# Patient Record
Sex: MALE | Race: WHITE | NOT HISPANIC OR LATINO | Employment: OTHER | URBAN - METROPOLITAN AREA
[De-identification: names, ages, dates, MRNs, and addresses within clinical notes are randomized per-mention and may not be internally consistent; named-entity substitution may affect disease eponyms.]

---

## 2017-03-30 ENCOUNTER — TRANSCRIBE ORDERS (OUTPATIENT)
Dept: ADMINISTRATIVE | Facility: HOSPITAL | Age: 72
End: 2017-03-30

## 2017-03-30 DIAGNOSIS — N34.2 INFECTIVE URETHRITIS: Primary | ICD-10-CM

## 2017-03-31 ENCOUNTER — HOSPITAL ENCOUNTER (OUTPATIENT)
Dept: GENERAL RADIOLOGY | Facility: HOSPITAL | Age: 72
Discharge: HOME | End: 2017-03-31
Admitting: NURSE PRACTITIONER

## 2017-03-31 VITALS
DIASTOLIC BLOOD PRESSURE: 77 MMHG | RESPIRATION RATE: 16 BRPM | HEART RATE: 90 BPM | HEIGHT: 71 IN | OXYGEN SATURATION: 95 % | SYSTOLIC BLOOD PRESSURE: 123 MMHG | TEMPERATURE: 97.6 F | BODY MASS INDEX: 25.2 KG/M2 | WEIGHT: 180 LBS

## 2017-03-31 DIAGNOSIS — N34.2 INFECTIVE URETHRITIS: ICD-10-CM

## 2017-03-31 PROCEDURE — 76937 US GUIDE VASCULAR ACCESS: CPT

## 2017-03-31 PROCEDURE — 77001 FLUOROGUIDE FOR VEIN DEVICE: CPT

## 2017-03-31 RX ORDER — LIDOCAINE WITH 8.4% SOD BICARB 0.9%(10ML)
2 SYRINGE (ML) INJECTION ONCE
Status: COMPLETED | OUTPATIENT
Start: 2017-03-31 | End: 2017-03-31

## 2017-03-31 RX ORDER — OXYBUTYNIN CHLORIDE 5 MG/1
5 TABLET ORAL 2 TIMES DAILY
COMMUNITY
End: 2018-10-02

## 2017-03-31 RX ORDER — MONTELUKAST SODIUM 10 MG/1
10 TABLET ORAL NIGHTLY
COMMUNITY

## 2017-03-31 RX ORDER — NITROFURANTOIN MACROCRYSTALS 100 MG/1
100 CAPSULE ORAL NIGHTLY
COMMUNITY
End: 2017-10-01 | Stop reason: HOSPADM

## 2017-03-31 RX ADMIN — Medication 5 ML: at 13:25

## 2017-09-28 ENCOUNTER — APPOINTMENT (OUTPATIENT)
Dept: GENERAL RADIOLOGY | Facility: HOSPITAL | Age: 72
End: 2017-09-28

## 2017-09-28 ENCOUNTER — HOSPITAL ENCOUNTER (OUTPATIENT)
Facility: HOSPITAL | Age: 72
Setting detail: OBSERVATION
LOS: 1 days | Discharge: NURSING, REHAB OR LONG-TERM CARE | End: 2017-10-01
Attending: EMERGENCY MEDICINE | Admitting: INTERNAL MEDICINE

## 2017-09-28 ENCOUNTER — APPOINTMENT (OUTPATIENT)
Dept: CT IMAGING | Facility: HOSPITAL | Age: 72
End: 2017-09-28

## 2017-09-28 DIAGNOSIS — R53.1 WEAKNESS: Primary | ICD-10-CM

## 2017-09-28 DIAGNOSIS — G20 PARKINSON'S DISEASE (HCC): ICD-10-CM

## 2017-09-28 DIAGNOSIS — R26.89 IMPAIRED GAIT AND MOBILITY: ICD-10-CM

## 2017-09-28 PROBLEM — R29.898 LEFT LEG WEAKNESS: Status: ACTIVE | Noted: 2017-09-28

## 2017-09-28 PROBLEM — J44.9 COPD (CHRONIC OBSTRUCTIVE PULMONARY DISEASE) (HCC): Status: ACTIVE | Noted: 2017-09-28

## 2017-09-28 PROBLEM — I25.10 CORONARY ARTERY DISEASE: Status: ACTIVE | Noted: 2017-09-28

## 2017-09-28 PROBLEM — Z86.73 HISTORY OF CVA (CEREBROVASCULAR ACCIDENT): Status: ACTIVE | Noted: 2017-09-28

## 2017-09-28 LAB
ALBUMIN SERPL-MCNC: 3.4 G/DL (ref 3.5–5.2)
ALBUMIN/GLOB SERPL: 1.1 G/DL
ALP SERPL-CCNC: 76 U/L (ref 39–117)
ALT SERPL W P-5'-P-CCNC: 7 U/L (ref 1–41)
ANION GAP SERPL CALCULATED.3IONS-SCNC: 10.9 MMOL/L
AST SERPL-CCNC: 10 U/L (ref 1–40)
BASOPHILS # BLD AUTO: 0.01 10*3/MM3 (ref 0–0.2)
BASOPHILS NFR BLD AUTO: 0.1 % (ref 0–1.5)
BILIRUB SERPL-MCNC: 0.4 MG/DL (ref 0.1–1.2)
BILIRUB UR QL STRIP: NEGATIVE
BUN BLD-MCNC: 13 MG/DL (ref 8–23)
BUN/CREAT SERPL: 14 (ref 7–25)
CALCIUM SPEC-SCNC: 10.2 MG/DL (ref 8.6–10.5)
CHLORIDE SERPL-SCNC: 105 MMOL/L (ref 98–107)
CLARITY UR: CLEAR
CO2 SERPL-SCNC: 25.1 MMOL/L (ref 22–29)
COLOR UR: YELLOW
CREAT BLD-MCNC: 0.93 MG/DL (ref 0.76–1.27)
DEPRECATED RDW RBC AUTO: 47.9 FL (ref 37–54)
EOSINOPHIL # BLD AUTO: 0.1 10*3/MM3 (ref 0–0.7)
EOSINOPHIL NFR BLD AUTO: 1.1 % (ref 0.3–6.2)
ERYTHROCYTE [DISTWIDTH] IN BLOOD BY AUTOMATED COUNT: 13.4 % (ref 11.5–14.5)
GFR SERPL CREATININE-BSD FRML MDRD: 79 ML/MIN/1.73
GLOBULIN UR ELPH-MCNC: 3.1 GM/DL
GLUCOSE BLD-MCNC: 109 MG/DL (ref 65–99)
GLUCOSE UR STRIP-MCNC: NEGATIVE MG/DL
HCT VFR BLD AUTO: 42.3 % (ref 40.4–52.2)
HGB BLD-MCNC: 13.8 G/DL (ref 13.7–17.6)
HGB UR QL STRIP.AUTO: NEGATIVE
HOLD SPECIMEN: NORMAL
HOLD SPECIMEN: NORMAL
IMM GRANULOCYTES # BLD: 0.02 10*3/MM3 (ref 0–0.03)
IMM GRANULOCYTES NFR BLD: 0.2 % (ref 0–0.5)
KETONES UR QL STRIP: NEGATIVE
LEUKOCYTE ESTERASE UR QL STRIP.AUTO: NEGATIVE
LYMPHOCYTES # BLD AUTO: 2.58 10*3/MM3 (ref 0.9–4.8)
LYMPHOCYTES NFR BLD AUTO: 27.9 % (ref 19.6–45.3)
MAGNESIUM SERPL-MCNC: 2 MG/DL (ref 1.6–2.4)
MCH RBC QN AUTO: 32.1 PG (ref 27–32.7)
MCHC RBC AUTO-ENTMCNC: 32.6 G/DL (ref 32.6–36.4)
MCV RBC AUTO: 98.4 FL (ref 79.8–96.2)
MONOCYTES # BLD AUTO: 0.91 10*3/MM3 (ref 0.2–1.2)
MONOCYTES NFR BLD AUTO: 9.8 % (ref 5–12)
NEUTROPHILS # BLD AUTO: 5.62 10*3/MM3 (ref 1.9–8.1)
NEUTROPHILS NFR BLD AUTO: 60.9 % (ref 42.7–76)
NITRITE UR QL STRIP: NEGATIVE
PH UR STRIP.AUTO: 6 [PH] (ref 5–8)
PLATELET # BLD AUTO: 186 10*3/MM3 (ref 140–500)
PMV BLD AUTO: 11.5 FL (ref 6–12)
POTASSIUM BLD-SCNC: 4.3 MMOL/L (ref 3.5–5.2)
PROT SERPL-MCNC: 6.5 G/DL (ref 6–8.5)
PROT UR QL STRIP: NEGATIVE
RBC # BLD AUTO: 4.3 10*6/MM3 (ref 4.6–6)
SODIUM BLD-SCNC: 141 MMOL/L (ref 136–145)
SP GR UR STRIP: 1.02 (ref 1–1.03)
TROPONIN T SERPL-MCNC: <0.01 NG/ML (ref 0–0.03)
UROBILINOGEN UR QL STRIP: NORMAL
VALPROATE SERPL-MCNC: 33 MCG/ML (ref 50–125)
WBC NRBC COR # BLD: 9.24 10*3/MM3 (ref 4.5–10.7)
WHOLE BLOOD HOLD SPECIMEN: NORMAL
WHOLE BLOOD HOLD SPECIMEN: NORMAL

## 2017-09-28 PROCEDURE — 85025 COMPLETE CBC W/AUTO DIFF WBC: CPT | Performed by: EMERGENCY MEDICINE

## 2017-09-28 PROCEDURE — 80053 COMPREHEN METABOLIC PANEL: CPT | Performed by: EMERGENCY MEDICINE

## 2017-09-28 PROCEDURE — 84484 ASSAY OF TROPONIN QUANT: CPT | Performed by: EMERGENCY MEDICINE

## 2017-09-28 PROCEDURE — 36415 COLL VENOUS BLD VENIPUNCTURE: CPT | Performed by: EMERGENCY MEDICINE

## 2017-09-28 PROCEDURE — 83735 ASSAY OF MAGNESIUM: CPT | Performed by: EMERGENCY MEDICINE

## 2017-09-28 PROCEDURE — 93005 ELECTROCARDIOGRAM TRACING: CPT | Performed by: EMERGENCY MEDICINE

## 2017-09-28 PROCEDURE — 70450 CT HEAD/BRAIN W/O DYE: CPT

## 2017-09-28 PROCEDURE — 80164 ASSAY DIPROPYLACETIC ACD TOT: CPT | Performed by: EMERGENCY MEDICINE

## 2017-09-28 PROCEDURE — 99285 EMERGENCY DEPT VISIT HI MDM: CPT

## 2017-09-28 PROCEDURE — 71010 HC CHEST PA OR AP: CPT

## 2017-09-28 PROCEDURE — 82550 ASSAY OF CK (CPK): CPT | Performed by: INTERNAL MEDICINE

## 2017-09-28 PROCEDURE — 93010 ELECTROCARDIOGRAM REPORT: CPT | Performed by: INTERNAL MEDICINE

## 2017-09-28 PROCEDURE — 81003 URINALYSIS AUTO W/O SCOPE: CPT | Performed by: EMERGENCY MEDICINE

## 2017-09-28 RX ORDER — SODIUM CHLORIDE 0.9 % (FLUSH) 0.9 %
10 SYRINGE (ML) INJECTION AS NEEDED
Status: DISCONTINUED | OUTPATIENT
Start: 2017-09-28 | End: 2017-09-29

## 2017-09-29 ENCOUNTER — APPOINTMENT (OUTPATIENT)
Dept: CARDIOLOGY | Facility: HOSPITAL | Age: 72
End: 2017-09-29
Attending: INTERNAL MEDICINE

## 2017-09-29 ENCOUNTER — APPOINTMENT (OUTPATIENT)
Dept: MRI IMAGING | Facility: HOSPITAL | Age: 72
End: 2017-09-29

## 2017-09-29 PROBLEM — G95.9 CERVICAL MYELOPATHY (HCC): Status: ACTIVE | Noted: 2017-09-29

## 2017-09-29 PROBLEM — R53.81 DEBILITY: Status: ACTIVE | Noted: 2017-09-29

## 2017-09-29 PROBLEM — I67.9 CEREBROVASCULAR DISEASE: Status: ACTIVE | Noted: 2017-09-29

## 2017-09-29 PROBLEM — G20 PARKINSON'S DISEASE (HCC): Status: ACTIVE | Noted: 2017-09-29

## 2017-09-29 LAB
CHOLEST SERPL-MCNC: 197 MG/DL (ref 0–200)
CK SERPL-CCNC: 88 U/L (ref 20–200)
HBA1C MFR BLD: 4.87 % (ref 4.8–5.6)
HDLC SERPL-MCNC: 23 MG/DL (ref 40–60)
LDLC SERPL CALC-MCNC: 106 MG/DL (ref 0–100)
LDLC/HDLC SERPL: 4.59 {RATIO}
TRIGL SERPL-MCNC: 342 MG/DL (ref 0–150)
TSH SERPL DL<=0.05 MIU/L-ACNC: 0.01 MIU/ML (ref 0.27–4.2)
VIT B12 BLD-MCNC: >2000 PG/ML (ref 211–946)
VLDLC SERPL-MCNC: 68.4 MG/DL (ref 5–40)

## 2017-09-29 PROCEDURE — 93306 TTE W/DOPPLER COMPLETE: CPT

## 2017-09-29 PROCEDURE — 84439 ASSAY OF FREE THYROXINE: CPT | Performed by: INTERNAL MEDICINE

## 2017-09-29 PROCEDURE — 99222 1ST HOSP IP/OBS MODERATE 55: CPT | Performed by: PSYCHIATRY & NEUROLOGY

## 2017-09-29 PROCEDURE — 82607 VITAMIN B-12: CPT | Performed by: INTERNAL MEDICINE

## 2017-09-29 PROCEDURE — 93306 TTE W/DOPPLER COMPLETE: CPT | Performed by: INTERNAL MEDICINE

## 2017-09-29 PROCEDURE — 84443 ASSAY THYROID STIM HORMONE: CPT | Performed by: INTERNAL MEDICINE

## 2017-09-29 PROCEDURE — 96360 HYDRATION IV INFUSION INIT: CPT

## 2017-09-29 PROCEDURE — 96361 HYDRATE IV INFUSION ADD-ON: CPT

## 2017-09-29 PROCEDURE — A9577 INJ MULTIHANCE: HCPCS | Performed by: INTERNAL MEDICINE

## 2017-09-29 PROCEDURE — 97162 PT EVAL MOD COMPLEX 30 MIN: CPT

## 2017-09-29 PROCEDURE — 70553 MRI BRAIN STEM W/O & W/DYE: CPT

## 2017-09-29 PROCEDURE — 25510000001 GADOBENATE DIMEGLUMINE 529 MG/ML SOLUTION: Performed by: INTERNAL MEDICINE

## 2017-09-29 PROCEDURE — 70544 MR ANGIOGRAPHY HEAD W/O DYE: CPT

## 2017-09-29 PROCEDURE — 70549 MR ANGIOGRAPH NECK W/O&W/DYE: CPT

## 2017-09-29 PROCEDURE — G0378 HOSPITAL OBSERVATION PER HR: HCPCS

## 2017-09-29 PROCEDURE — 80061 LIPID PANEL: CPT | Performed by: INTERNAL MEDICINE

## 2017-09-29 PROCEDURE — 83036 HEMOGLOBIN GLYCOSYLATED A1C: CPT | Performed by: INTERNAL MEDICINE

## 2017-09-29 RX ORDER — CETIRIZINE HYDROCHLORIDE 10 MG/1
10 TABLET ORAL DAILY
Status: DISCONTINUED | OUTPATIENT
Start: 2017-09-29 | End: 2017-09-29

## 2017-09-29 RX ORDER — MONTELUKAST SODIUM 10 MG/1
10 TABLET ORAL NIGHTLY
Status: DISCONTINUED | OUTPATIENT
Start: 2017-09-29 | End: 2017-10-01 | Stop reason: HOSPADM

## 2017-09-29 RX ORDER — OXYBUTYNIN CHLORIDE 5 MG/1
5 TABLET ORAL DAILY
Status: DISCONTINUED | OUTPATIENT
Start: 2017-09-29 | End: 2017-10-01 | Stop reason: HOSPADM

## 2017-09-29 RX ORDER — ONDANSETRON 4 MG/1
4 TABLET, ORALLY DISINTEGRATING ORAL EVERY 6 HOURS PRN
Status: DISCONTINUED | OUTPATIENT
Start: 2017-09-29 | End: 2017-10-01 | Stop reason: HOSPADM

## 2017-09-29 RX ORDER — CALCIUM CARBONATE 200(500)MG
2 TABLET,CHEWABLE ORAL 2 TIMES DAILY PRN
Status: DISCONTINUED | OUTPATIENT
Start: 2017-09-29 | End: 2017-10-01 | Stop reason: HOSPADM

## 2017-09-29 RX ORDER — SODIUM CHLORIDE 9 MG/ML
75 INJECTION, SOLUTION INTRAVENOUS CONTINUOUS
Status: ACTIVE | OUTPATIENT
Start: 2017-09-29 | End: 2017-09-30

## 2017-09-29 RX ORDER — ALBUTEROL SULFATE 90 UG/1
2 AEROSOL, METERED RESPIRATORY (INHALATION) EVERY 4 HOURS PRN
Status: ON HOLD | COMMUNITY
End: 2018-05-03

## 2017-09-29 RX ORDER — PANTOPRAZOLE SODIUM 20 MG/1
20 TABLET, DELAYED RELEASE ORAL 2 TIMES DAILY
Status: DISCONTINUED | OUTPATIENT
Start: 2017-09-29 | End: 2017-10-01 | Stop reason: HOSPADM

## 2017-09-29 RX ORDER — ASPIRIN 325 MG
325 TABLET ORAL DAILY
Status: DISCONTINUED | OUTPATIENT
Start: 2017-09-29 | End: 2017-10-01 | Stop reason: HOSPADM

## 2017-09-29 RX ORDER — SODIUM CHLORIDE 0.9 % (FLUSH) 0.9 %
1-10 SYRINGE (ML) INJECTION AS NEEDED
Status: DISCONTINUED | OUTPATIENT
Start: 2017-09-29 | End: 2017-10-01 | Stop reason: HOSPADM

## 2017-09-29 RX ORDER — NITROGLYCERIN 0.4 MG/1
0.4 TABLET SUBLINGUAL
Status: DISCONTINUED | OUTPATIENT
Start: 2017-09-29 | End: 2017-10-01 | Stop reason: HOSPADM

## 2017-09-29 RX ORDER — SODIUM CHLORIDE 9 MG/ML
75 INJECTION, SOLUTION INTRAVENOUS CONTINUOUS
Status: ACTIVE | OUTPATIENT
Start: 2017-09-29 | End: 2017-09-29

## 2017-09-29 RX ORDER — LEVOTHYROXINE SODIUM 175 UG/1
175 TABLET ORAL DAILY
Status: DISCONTINUED | OUTPATIENT
Start: 2017-09-29 | End: 2017-09-30

## 2017-09-29 RX ORDER — ACETAMINOPHEN 325 MG/1
650 TABLET ORAL EVERY 4 HOURS PRN
Status: DISCONTINUED | OUTPATIENT
Start: 2017-09-29 | End: 2017-10-01 | Stop reason: HOSPADM

## 2017-09-29 RX ORDER — DOCUSATE SODIUM 100 MG/1
100 CAPSULE, LIQUID FILLED ORAL 2 TIMES DAILY
Status: DISCONTINUED | OUTPATIENT
Start: 2017-09-29 | End: 2017-10-01 | Stop reason: HOSPADM

## 2017-09-29 RX ORDER — ONDANSETRON 4 MG/1
4 TABLET, FILM COATED ORAL EVERY 6 HOURS PRN
Status: DISCONTINUED | OUTPATIENT
Start: 2017-09-29 | End: 2017-10-01 | Stop reason: HOSPADM

## 2017-09-29 RX ORDER — OLANZAPINE 10 MG/1
10 TABLET ORAL NIGHTLY
Status: DISCONTINUED | OUTPATIENT
Start: 2017-09-29 | End: 2017-09-29

## 2017-09-29 RX ORDER — ONDANSETRON 2 MG/ML
4 INJECTION INTRAMUSCULAR; INTRAVENOUS EVERY 6 HOURS PRN
Status: DISCONTINUED | OUTPATIENT
Start: 2017-09-29 | End: 2017-10-01 | Stop reason: HOSPADM

## 2017-09-29 RX ORDER — ATORVASTATIN CALCIUM 80 MG/1
80 TABLET, FILM COATED ORAL NIGHTLY
Status: DISCONTINUED | OUTPATIENT
Start: 2017-09-29 | End: 2017-10-01 | Stop reason: HOSPADM

## 2017-09-29 RX ORDER — ASPIRIN 300 MG/1
300 SUPPOSITORY RECTAL DAILY
Status: DISCONTINUED | OUTPATIENT
Start: 2017-09-29 | End: 2017-10-01 | Stop reason: HOSPADM

## 2017-09-29 RX ORDER — DIVALPROEX SODIUM 500 MG/1
500 TABLET, DELAYED RELEASE ORAL 2 TIMES DAILY
Status: DISCONTINUED | OUTPATIENT
Start: 2017-09-29 | End: 2017-10-01 | Stop reason: HOSPADM

## 2017-09-29 RX ORDER — ARFORMOTEROL TARTRATE 15 UG/2ML
15 SOLUTION RESPIRATORY (INHALATION)
COMMUNITY

## 2017-09-29 RX ORDER — DONEPEZIL HYDROCHLORIDE 10 MG/1
20 TABLET, FILM COATED ORAL NIGHTLY
Status: DISCONTINUED | OUTPATIENT
Start: 2017-09-29 | End: 2017-10-01 | Stop reason: HOSPADM

## 2017-09-29 RX ORDER — CARBIDOPA/LEVODOPA 25MG-250MG
1 TABLET ORAL
Status: DISCONTINUED | OUTPATIENT
Start: 2017-09-29 | End: 2017-10-01 | Stop reason: HOSPADM

## 2017-09-29 RX ORDER — OLANZAPINE 10 MG/1
10 TABLET ORAL NIGHTLY
Status: DISCONTINUED | OUTPATIENT
Start: 2017-09-29 | End: 2017-10-01 | Stop reason: HOSPADM

## 2017-09-29 RX ADMIN — DIVALPROEX SODIUM 500 MG: 500 TABLET, DELAYED RELEASE ORAL at 11:02

## 2017-09-29 RX ADMIN — CARBIDOPA AND LEVODOPA 1 TABLET: 25; 250 TABLET ORAL at 17:33

## 2017-09-29 RX ADMIN — SODIUM CHLORIDE 75 ML/HR: 9 INJECTION, SOLUTION INTRAVENOUS at 22:11

## 2017-09-29 RX ADMIN — PANTOPRAZOLE SODIUM 20 MG: 20 TABLET, DELAYED RELEASE ORAL at 17:33

## 2017-09-29 RX ADMIN — SODIUM CHLORIDE 75 ML/HR: 9 INJECTION, SOLUTION INTRAVENOUS at 17:33

## 2017-09-29 RX ADMIN — PANTOPRAZOLE SODIUM 20 MG: 20 TABLET, DELAYED RELEASE ORAL at 11:02

## 2017-09-29 RX ADMIN — ATORVASTATIN CALCIUM 80 MG: 80 TABLET, FILM COATED ORAL at 21:00

## 2017-09-29 RX ADMIN — DOCUSATE SODIUM 100 MG: 100 CAPSULE, LIQUID FILLED ORAL at 11:02

## 2017-09-29 RX ADMIN — OXYBUTYNIN CHLORIDE 5 MG: 5 TABLET ORAL at 11:02

## 2017-09-29 RX ADMIN — DONEPEZIL HYDROCHLORIDE 20 MG: 10 TABLET, FILM COATED ORAL at 21:00

## 2017-09-29 RX ADMIN — LEVOTHYROXINE SODIUM 175 MCG: 175 TABLET ORAL at 11:03

## 2017-09-29 RX ADMIN — DIVALPROEX SODIUM 500 MG: 500 TABLET, DELAYED RELEASE ORAL at 17:33

## 2017-09-29 RX ADMIN — MONTELUKAST 10 MG: 10 TABLET, FILM COATED ORAL at 21:00

## 2017-09-29 RX ADMIN — ASPIRIN 325 MG: 325 TABLET ORAL at 11:02

## 2017-09-29 RX ADMIN — SODIUM CHLORIDE 75 ML/HR: 9 INJECTION, SOLUTION INTRAVENOUS at 02:24

## 2017-09-29 RX ADMIN — GADOBENATE DIMEGLUMINE 16 ML: 529 INJECTION, SOLUTION INTRAVENOUS at 09:36

## 2017-09-29 RX ADMIN — OLANZAPINE 10 MG: 10 TABLET, FILM COATED ORAL at 21:00

## 2017-09-29 RX ADMIN — CETIRIZINE HYDROCHLORIDE 10 MG: 10 TABLET, FILM COATED ORAL at 11:03

## 2017-09-29 RX ADMIN — DOCUSATE SODIUM 100 MG: 100 CAPSULE, LIQUID FILLED ORAL at 17:33

## 2017-09-30 PROBLEM — R79.89 ABNORMAL TSH: Status: ACTIVE | Noted: 2017-09-30

## 2017-09-30 LAB
GLUCOSE BLDC GLUCOMTR-MCNC: 117 MG/DL (ref 70–130)
T4 FREE SERPL-MCNC: 1.61 NG/DL (ref 0.93–1.7)

## 2017-09-30 PROCEDURE — 94640 AIRWAY INHALATION TREATMENT: CPT

## 2017-09-30 PROCEDURE — 94799 UNLISTED PULMONARY SVC/PX: CPT

## 2017-09-30 PROCEDURE — G8979 MOBILITY GOAL STATUS: HCPCS

## 2017-09-30 PROCEDURE — G8978 MOBILITY CURRENT STATUS: HCPCS

## 2017-09-30 PROCEDURE — G0378 HOSPITAL OBSERVATION PER HR: HCPCS

## 2017-09-30 PROCEDURE — 82962 GLUCOSE BLOOD TEST: CPT

## 2017-09-30 PROCEDURE — 99213 OFFICE O/P EST LOW 20 MIN: CPT | Performed by: PSYCHIATRY & NEUROLOGY

## 2017-09-30 PROCEDURE — 97110 THERAPEUTIC EXERCISES: CPT

## 2017-09-30 RX ORDER — BUSPIRONE HYDROCHLORIDE 5 MG/1
5 TABLET ORAL 3 TIMES DAILY PRN
Status: DISCONTINUED | OUTPATIENT
Start: 2017-09-30 | End: 2017-10-01 | Stop reason: HOSPADM

## 2017-09-30 RX ORDER — ARFORMOTEROL TARTRATE 15 UG/2ML
15 SOLUTION RESPIRATORY (INHALATION)
Status: DISCONTINUED | OUTPATIENT
Start: 2017-09-30 | End: 2017-10-01 | Stop reason: HOSPADM

## 2017-09-30 RX ADMIN — ASPIRIN 300 MG: 300 SUPPOSITORY RECTAL at 08:29

## 2017-09-30 RX ADMIN — DOCUSATE SODIUM 100 MG: 100 CAPSULE, LIQUID FILLED ORAL at 08:30

## 2017-09-30 RX ADMIN — ARFORMOTEROL TARTRATE 15 MCG: 15 SOLUTION RESPIRATORY (INHALATION) at 19:26

## 2017-09-30 RX ADMIN — PANTOPRAZOLE SODIUM 20 MG: 20 TABLET, DELAYED RELEASE ORAL at 08:30

## 2017-09-30 RX ADMIN — PANTOPRAZOLE SODIUM 20 MG: 20 TABLET, DELAYED RELEASE ORAL at 17:17

## 2017-09-30 RX ADMIN — OLANZAPINE 10 MG: 10 TABLET, FILM COATED ORAL at 21:19

## 2017-09-30 RX ADMIN — DOCUSATE SODIUM 100 MG: 100 CAPSULE, LIQUID FILLED ORAL at 17:18

## 2017-09-30 RX ADMIN — BUSPIRONE HYDROCHLORIDE 5 MG: 5 TABLET ORAL at 16:03

## 2017-09-30 RX ADMIN — DIVALPROEX SODIUM 500 MG: 500 TABLET, DELAYED RELEASE ORAL at 08:29

## 2017-09-30 RX ADMIN — CARBIDOPA AND LEVODOPA 1 TABLET: 25; 250 TABLET ORAL at 08:30

## 2017-09-30 RX ADMIN — LEVOTHYROXINE SODIUM 175 MCG: 175 TABLET ORAL at 08:30

## 2017-09-30 RX ADMIN — ARFORMOTEROL TARTRATE 15 MCG: 15 SOLUTION RESPIRATORY (INHALATION) at 14:46

## 2017-09-30 RX ADMIN — OXYBUTYNIN CHLORIDE 5 MG: 5 TABLET ORAL at 08:29

## 2017-09-30 RX ADMIN — DIVALPROEX SODIUM 500 MG: 500 TABLET, DELAYED RELEASE ORAL at 17:18

## 2017-09-30 RX ADMIN — ALBUTEROL SULFATE 2.5 MG: 2.5 SOLUTION RESPIRATORY (INHALATION) at 06:35

## 2017-09-30 RX ADMIN — CARBIDOPA AND LEVODOPA 1 TABLET: 25; 250 TABLET ORAL at 17:17

## 2017-09-30 RX ADMIN — ATORVASTATIN CALCIUM 80 MG: 80 TABLET, FILM COATED ORAL at 21:19

## 2017-09-30 RX ADMIN — MONTELUKAST 10 MG: 10 TABLET, FILM COATED ORAL at 21:19

## 2017-09-30 RX ADMIN — CARBIDOPA AND LEVODOPA 1 TABLET: 25; 250 TABLET ORAL at 12:51

## 2017-09-30 RX ADMIN — BUSPIRONE HYDROCHLORIDE 5 MG: 5 TABLET ORAL at 23:29

## 2017-09-30 RX ADMIN — DONEPEZIL HYDROCHLORIDE 20 MG: 10 TABLET, FILM COATED ORAL at 21:19

## 2017-10-01 VITALS
HEIGHT: 71 IN | OXYGEN SATURATION: 97 % | WEIGHT: 179.2 LBS | RESPIRATION RATE: 16 BRPM | DIASTOLIC BLOOD PRESSURE: 71 MMHG | SYSTOLIC BLOOD PRESSURE: 102 MMHG | TEMPERATURE: 98 F | BODY MASS INDEX: 25.09 KG/M2 | HEART RATE: 69 BPM

## 2017-10-01 PROBLEM — E03.9 HYPOTHYROIDISM: Status: ACTIVE | Noted: 2017-10-01

## 2017-10-01 PROCEDURE — G0378 HOSPITAL OBSERVATION PER HR: HCPCS

## 2017-10-01 PROCEDURE — 94799 UNLISTED PULMONARY SVC/PX: CPT

## 2017-10-01 PROCEDURE — 97110 THERAPEUTIC EXERCISES: CPT

## 2017-10-01 RX ORDER — CARBIDOPA/LEVODOPA 25MG-250MG
1 TABLET ORAL
Qty: 90 TABLET | Refills: 0 | Status: SHIPPED | OUTPATIENT
Start: 2017-10-01 | End: 2019-01-01 | Stop reason: HOSPADM

## 2017-10-01 RX ORDER — ATORVASTATIN CALCIUM 80 MG/1
80 TABLET, FILM COATED ORAL NIGHTLY
Qty: 30 TABLET | Refills: 0 | Status: SHIPPED | OUTPATIENT
Start: 2017-10-01 | End: 2018-05-12 | Stop reason: HOSPADM

## 2017-10-01 RX ORDER — BUSPIRONE HYDROCHLORIDE 5 MG/1
5 TABLET ORAL 3 TIMES DAILY PRN
Start: 2017-10-01

## 2017-10-01 RX ORDER — LEVOTHYROXINE SODIUM 137 UG/1
137 TABLET ORAL DAILY
Qty: 30 TABLET | Refills: 0 | Status: SHIPPED | OUTPATIENT
Start: 2017-10-01 | End: 2017-10-01 | Stop reason: HOSPADM

## 2017-10-01 RX ORDER — ASPIRIN 325 MG
325 TABLET ORAL DAILY
Status: ON HOLD
Start: 2017-10-02 | End: 2018-05-03 | Stop reason: ALTCHOICE

## 2017-10-01 RX ORDER — PSEUDOEPHEDRINE HCL 30 MG
100 TABLET ORAL 2 TIMES DAILY
Start: 2017-10-01 | End: 2018-10-02

## 2017-10-01 RX ORDER — ACETAMINOPHEN 325 MG/1
650 TABLET ORAL EVERY 4 HOURS PRN
Start: 2017-10-01

## 2017-10-01 RX ADMIN — CARBIDOPA AND LEVODOPA 1 TABLET: 25; 250 TABLET ORAL at 11:46

## 2017-10-01 RX ADMIN — DOCUSATE SODIUM 100 MG: 100 CAPSULE, LIQUID FILLED ORAL at 09:05

## 2017-10-01 RX ADMIN — ARFORMOTEROL TARTRATE 15 MCG: 15 SOLUTION RESPIRATORY (INHALATION) at 09:16

## 2017-10-01 RX ADMIN — CARBIDOPA AND LEVODOPA 1 TABLET: 25; 250 TABLET ORAL at 09:05

## 2017-10-01 RX ADMIN — BUSPIRONE HYDROCHLORIDE 5 MG: 5 TABLET ORAL at 09:13

## 2017-10-01 RX ADMIN — ASPIRIN 325 MG: 325 TABLET ORAL at 09:00

## 2017-10-01 RX ADMIN — DIVALPROEX SODIUM 500 MG: 500 TABLET, DELAYED RELEASE ORAL at 09:05

## 2017-10-01 RX ADMIN — OXYBUTYNIN CHLORIDE 5 MG: 5 TABLET ORAL at 09:05

## 2017-10-01 RX ADMIN — PANTOPRAZOLE SODIUM 20 MG: 20 TABLET, DELAYED RELEASE ORAL at 09:05

## 2017-10-01 NOTE — PROGRESS NOTES
Continued Stay Note  [facility]     Patient Name: Victor Manuel Issa  MRN: [id number]  Today's Date: 10/1/2017    Admit Date: 9/28/2017          Discharge Plan       10/01/17 1123    Case Management/Social Work Plan    Plan Per Bella, [facility] can [facility] pt but she is waiting on the financial OK from family for private pay placement for skilled care    Patient/Family In Agreement With Plan other (see comments)   pt and family reviewing financial options for SNF at [place]    Additional Comments Received call from Bella with [facility] who states they can accept pt clinically and she has spoken with pt and family. They are discussing finances at this time and she will contact [facility] today if she hears back from family. [facility] to follow.............JW      10/01/17 1000    Case Management/Social Work Plan    Plan referral remains pending for [facility]    Patient/Family In Agreement With Plan yes    Additional Comments Placed call to [facility] ([phone]) to f/u on SNF. Spoke with Bella and she requested [facility] fax clinicals to [phone]. Info faxed and Bella states she will call [facility] back with further info as soon as possible. [facility] to follow...........JW              Discharge Codes     None            Nataliia Montes RN

## 2017-10-01 NOTE — DISCHARGE SUMMARY
NAME: Victor Manuel Issa ADMIT: 2017   : 1945  PCP: CUONG Guadalupe    MRN: [id number] LOS: 1 days   AGE/SEX: 72 y.o. male  ROOM: [facility]     Date of Admission:  2017  Date of Discharge:  10/1/2017    PCP: CUONG Guadalupe    CHIEF COMPLAINT  Weakness - Generalized (Weakness x2 days, just finished oral antibiotic for uti. )      DISCHARGE DIAGNOSIS  Active Hospital Problems (** Indicates Principal Problem)    Diagnosis Date Noted   • **Parkinson's disease [G20] 2017   • Hypothyroidism [E03.9] 10/01/2017   • Abnormal TSH [R94.6] 2017   • Debility [R53.81] 2017   • Cerebrovascular disease [I67.9] 2017   • Left leg weakness [R29.898] 2017   • History of CVA (cerebrovascular accident) [Z86.73] 2017   • COPD (chronic obstructive pulmonary disease) [J44.9] 2017   • Coronary artery disease [I25.10] 2017      Resolved Hospital Problems    Diagnosis Date Noted Date Resolved   No resolved problems to display.       SECONDARY DIAGNOSES  Past Medical History:   Diagnosis Date   • Arthritis    • Bipolar 1 disorder    • COPD (chronic obstructive pulmonary disease)    • Coronary artery disease    • Disease of thyroid gland    • Hyperlipidemia    • Myocardial infarction    • Stroke        CONSULTS   Dr. Peterson- Neurology    HOSPITAL COURSE  Patient is a 72 y.o. male with history of previous stroke, CAD, bipolar disorder. He has had significant worsening weakness over the past few months and mostly has been in a wheelchair.    He was admitted. Apparently the history he gave ER/Dr. Daigle was more acute onset so stroke workup was done which was negative, but again then when I discussed with him on hospital day 2 it was more of a gradual process. He was seen by Neurology who felt this may be from Parkinsons disease. He was started on sinemet with some improvement. Physical therapy was recommending subacute rehab but the patient could not afford it and  would rather go home with .      He is on zyprexa for bipolar which could make Parkinsons symptoms worse but I do not want to cause worsening of his bipolar so he will follow with his Psychiatrist.    His TSH was very low. I was going to decrease his synthroid dose, however he and his wife said that actually Shira CUONG Pierce  Had just decreased it from 200mcg to 175mcg and he hasn't yet picked up the new RX. So he can go to the 175mcg and follow with CUONG Guadalupe.    It sounds like he gets frequent abx for UTIs but my suspicion is a lot of times this is asymptomatic bacturia. U/A negative here. Family wants to repeat but that is not indicated as he has no symptoms. I told them unnecessary abx would increase risk of Cdiff or renal failure and they understand.        DIAGNOSTICS  MRI Brain With & Without Contrast [[id number]] Not Reviewed        Order Status: Completed Collected: 09/29/17 1122        Updated: 09/29/17 1405       Narrative:         MRI OF THE BRAIN WITH AND WITHOUT CONTRAST, MRA OF THE HEAD WITHOUT  CONTRAST AND MRA OF THE NECK WITH AND WITHOUT CONTRAST 09/29/2017     CLINICAL HISTORY: Bilateral leg weakness left worse than right for the  past 3 days, possible stroke.     MRI OF THE BRAIN TECHNIQUE: Axial T1, FLAIR, fat-suppressed T2, axial  diffusion and gradient echo T2, sagittal T1 and postcontrast axial  fat-suppressed T1 and coronal T1-weighted images were obtained of the  entire head.     COMPARISON: This is correlated to a prior MRI of the brain from [facility] 03/22/2014 and prior noncontrast head CT 09/28/2017.     FINDINGS: There is some mild T2 high signal in the periventricular white  matter consistent with mild small vessel disease. There is a 5 x 3 mm  old lacunar infarct in the anterior limb of the right internal capsule  and 8 x 5 mm old lacunar infarct in the posterior left putamen and a 7 x  3 mm old lacunar infarct at the junction of the head and body of  the  right caudate nucleus and a 6 x 3 mm old lacunar infarct at the junction  of the head and body of the left caudate nucleus. There is an 11 x 6 mm  old infarct in the posterior inferior right cerebellum and a 10 x 4 mm  old infarct in the inferior lateral right cerebellum and these are both  in the right PICA territory. The remainder of the brain parenchyma is  normal in signal intensity. Specifically, no diffusion-weighted  abnormality is seen with no acute infarct identified. On the gradient  echo T2-weighted images, no acute or old blood breakdown products are  seen intracranially. The ventricles are normal in size. There is no mass  effect, no midline shift and no extra-axial fluid collections are  identified. No abnormal areas of enhancement are seen in the head.   There is a tiny amount of fluid at the inferior tip of the left mastoid  air cells. The paranasal sinuses, the right mastoid and middle ear  cavity are clear. Good flow voids are demonstrated within the cerebral  vessels and in the dural venous sinuses.          Impression:         1. No acute abnormality is seen with no acute infarct identified.  2. There is mild small vessel disease in the cerebral white matter.  There are multiple old lacunar type infarcts including a 5 x 3 mm old  lacunar infarct in the anterior limb of the right internal capsule and  an 8 x 5 mm old lacunar infarct in the posterior left putamen extending  superiorly into the mid left corona radiata region, a 7 x 3 mm old  lacunar infarct at the junction of the head and body of the right  caudate nucleus, a 6 x 3 mm old lacunar infarct at the junction of the  head and body of the left caudate nucleus, and a 5 mm old lacunar  infarct in the anterior superior medial right thalamus. In addition,  there is an 11 x 6 mm old infarct in the posterior inferior right  cerebellum and a 10 x 4 mm old infarct in the inferior lateral right  cerebellum, both of which are in the right PICA  territory.  3. The remainder of the MRI of the brain is normal and again no acute  infarct is seen. The etiology of the bilateral leg weakness is not  established on this exam.        MRA OF THE HEAD TECHNIQUE: 3D time-of-flight images were obtained of the  vertebrobasilar system and of the [place] with maximum  intensity projection reconstructions for an MRA examination.     FINDINGS: On the MRA of the vertebrobasilar system, the distal right  vertebral artery is smaller in diameter. The small diameter distal right  vertebral artery demonstrates good flow signal from the C1-2 cervical  level to the vertebrobasilar junction. It demonstrates some areas of  mild stenosis in its intracranial segment. The more dominant distal left  vertebral artery is widely patent from the C1-2 cervical level of the  vertebrobasilar junction. The posterior inferior cerebellar arteries are  normal in appearance. The proximal basilar artery is normal in  appearance. Upper cervical and petrous segments of the internal carotid  arteries are normal in appearance bilaterally.     On the MRA of the [place], the distal basilar artery and  basilar tip are normal in appearance. There is an atretic P1 segment of  the right posterior cerebral artery with persistent fetal origin of the  right posterior cerebral artery off the back wall of the supracavernous  right internal carotid artery which is a normal anatomic variation. The  posterior cerebral and superior cerebellar arteries are within normal  limits. The cavernous and supracavernous segments of the internal  carotid arteries are within normal limits. There is a markedly  hypoplastic A1 segment of the right anterior cerebral artery. The  dominant left A1 segment supplies both A2 segments via well-developed  anterior communicating artery. The anterior communicating artery origin  is normal in appearance.  The M1 segment of the middle cerebral arteries  and middle cerebral  artery trifurcations are normal in appearance.     IMPRESSION:   Normal MRA of the head.        MRA OF THE NECK TECHNIQUE: 2D and 3D time-of-flight images were obtained  of the great vessels of the neck. Additional gadolinium enhanced MRA was  obtained of the great vessels of the neck and maximum intensity  projection reconstructions were performed to complete the MRA  examination.     FINDINGS: There is anatomic origin of the great vessels off the aortic  arch. The left subclavian artery origin is normal in appearance. No  stenosis is seen in the left subclavian artery. The left vertebral  artery origin is normal in appearance. No stenosis is seen in the left  vertebral artery from its origin to the vertebrobasilar junction. The  left common carotid origin is normal in appearance. No stenosis is seen  in the left common carotid artery and its bifurcation into the left  internal and external carotid arteries is within normal limits. No  stenosis is seen in the left internal carotid artery using the NASCET  criteria. The brachiocephalic artery origin is normal in appearance. No  stenosis is seen in the brachiocephalic artery and its bifurcation into  the right subclavian and common carotid arteries is within normal  limits. No stenosis is seen in the right subclavian artery. The right  vertebral artery origin of the proximal right vertebral artery is less  than optimally assessed but appears to be patent. The smaller diameter  right vertebral artery appears to be widely patent from its origin to  the vertebrobasilar junction. The right common carotid origin is normal  in appearance. No stenosis is seen in the right common carotid artery  and its bifurcation into the right internal and external carotid  arteries is normal in appearance. No stenosis is seen in the right  internal carotid artery using NASCET criteria.     IMPRESSION:   Essentially negative MRA of the neck with no stenosis seen  in the great vessels of  the neck.         Vitamin B12 [[id number]] (Abnormal) Collected: 09/29/17 1836        Lab Status: Final result Specimen: Blood Updated: 09/29/17 1937        Vitamin B-12 >2000 (H) pg/mL        Hemoglobin A1c [[id number]] (Normal) Collected: 09/29/17 0443       Lab Status: Final result Specimen: Blood Updated: 09/29/17 0646        Hemoglobin A1C 4.87 %        Narrative:         Hemoglobin A1C Ranges:    Increased Risk for Diabetes  5.7% to 6.4%  Diabetes                     >= 6.5%  Diabetic Goal                < 7.0%       Lipid Panel [[id number]] (Abnormal) Collected: 09/29/17 0443       Lab Status: Final result Specimen: Blood Updated: 09/29/17 0700        Total Cholesterol 197 mg/dL         Triglycerides 342 (H) mg/dL         HDL Cholesterol 23 (L) mg/dL         LDL Cholesterol  106 (H) mg/dL         VLDL Cholesterol 68.4 (H) mg/dL         LDL/HDL Ratio 4.59       Narrative:         Cholesterol Reference Ranges  (U.S. Department of Health and Human Services ATP III Classifications)    Desirable          <200 mg/dL  Borderline High    200-239 mg/dL  High Risk          >240 mg/dL      Triglyceride Reference Ranges  (U.S. Department of Health and Human Services ATP III Classifications)    Normal           <150 mg/dL  Borderline High  150-199 mg/dL  High             200-499 mg/dL  Very High        >500 mg/dL    HDL Reference Ranges  (U.S. Department of Health and Human Services ATP III Classifcations)    Low     <40 mg/dl (major risk factor for CHD)  High    >60 mg/dl ('negative' risk factor for CHD)        LDL Reference Ranges  (U.S. Department of Health and Human Services ATP III Classifcations)    Optimal          <100 mg/dL  Near Optimal     100-129 mg/dL  Borderline High  130-159 mg/dL  High             160-189 mg/dL  Very High        >189 mg/dL       TSH [[id number]] (Abnormal) Collected: 09/29/17 0443       Lab Status: Final result Specimen: Blood Updated: 09/29/17 1806        TSH 0.013 (L) mIU/mL        T4, Free  [[id number]] (Normal) Collected: 09/29/17 0443       Lab Status: Final result Specimen: Blood Updated: 09/30/17 1722        Free T4 1.61 ng/dL        Urinalysis With / Culture If Indicated - Urine, Clean Catch [[id number]] (Normal) Collected: 09/28/17 1958       Lab Status: Final result Specimen: Urine from Urine, Catheter Updated: 09/28/17 2115        Color, UA Yellow        Appearance, UA Clear        pH, UA 6.0        Specific [place], UA 1.019        Glucose, UA Negative        Ketones, UA Negative        Bilirubin, UA Negative        Blood, UA Negative        Protein, UA Negative        Leuk Esterase, UA Negative        Nitrite, UA Negative        Urobilinogen, UA 1.0 E.U./dL       Narrative:         Urine microscopic not indicated.       [place] Draw [[id number]] Collected: 09/28/17 1444       Lab Status: Final result Specimen: Blood Updated: 09/28/17 1602       Narrative:         The following orders were created for panel order [place] Draw.  Procedure                               Abnormality         Status                     ---------                               -----------         ------                     Light Blue Top[[id number]]                                   Final result               Green Top (Gel)[[id number]]                                  Final result               Lavender Top[[id number]]                                     Final result               Gold Top - SST[[id number]]                                   Final result                 Please view results for these tests on the individual orders.       Comprehensive Metabolic Panel [[id number]] (Abnormal) Collected: 09/28/17 1444       Lab Status: Final result Specimen: Blood Updated: 09/28/17 1548        Glucose 109 (H) mg/dL         BUN 13 mg/dL         Creatinine 0.93 mg/dL         Sodium 141 mmol/L         Potassium 4.3 mmol/L         Chloride 105 mmol/L         CO2 25.1 mmol/L         Calcium 10.2 mg/dL         Total Protein 6.5 g/dL          Albumin 3.40 (L) g/dL         ALT (SGPT) 7 U/L         AST (SGOT) 10 U/L         Alkaline Phosphatase 76 U/L         Total Bilirubin 0.4 mg/dL         eGFR Non African Amer 79 mL/min/1.73         Globulin 3.1 gm/dL         A/G Ratio 1.1 g/dL         BUN/Creatinine Ratio 14.0            PHYSICAL EXAM  Objective     Alert, nad  chronically ill  No resp distress  generalized muscle weakness- improved from 9/29  Better tone/ less rigidity today    CONDITION ON DISCHARGE  Stable.      DISCHARGE DISPOSITION   Skilled Nursing Facility (DC - External)      DISCHARGE MEDICATIONS   Victor Manuel Issa   Home Medication Instructions ANN:[id number]    Printed on:10/01/17 6009   Medication Information                      acetaminophen (TYLENOL) 325 MG tablet  Take 2 tablets by mouth Every 4 (Four) Hours As Needed for Mild Pain , Headache or Fever.             albuterol (PROVENTIL HFA;VENTOLIN HFA) 108 (90 Base) MCG/ACT inhaler  Inhale 2 puffs Every 4 (Four) Hours As Needed for Wheezing.             arformoterol (BROVANA) 15 MCG/2ML nebulizer solution  Take 15 mcg by nebulization 2 (Two) Times a Day.             aspirin 325 MG tablet  Take 1 tablet by mouth Daily.             atorvastatin (LIPITOR) 80 MG tablet  Take 1 tablet by mouth Every Night.             busPIRone (BUSPAR) 5 MG tablet  Take 1 tablet by mouth 3 (Three) Times a Day As Needed (anxiety).             carbidopa-levodopa (SINEMET)  MG per tablet  Take 1 tablet by mouth 3 (Three) Times a Day With Meals.             cholecalciferol (VITAMIN D3) 1000 UNITS tablet  Take 1,000 Units by mouth daily.             divalproex (DEPAKOTE) 250 MG EC tablet  Take 500 mg by mouth 2 (two) times a day.             docusate sodium 100 MG capsule  Take 100 mg by mouth 2 (Two) Times a Day.             donepezil (ARICEPT) 10 MG tablet  Take 20 mg by mouth every night.             fexofenadine (ALLEGRA) 180 MG tablet  Take 180 mg by mouth daily.             LEVOTHYROXINE  SODIUM PO  Take 175 mcg by mouth Daily.             montelukast (SINGULAIR) 10 MG tablet  Take 10 mg by mouth Every Night.             Multiple Vitamins-Minerals (MULTIVITAMIN ADULT PO)  Take 1 tablet by mouth daily.             OLANZapine (ZYPREXA) 10 MG tablet  Take 10 mg by mouth every night.             oxybutynin (DITROPAN) 5 MG tablet  Take 5 mg by mouth Daily.             pantoprazole (PROTONIX) 20 MG EC tablet  Take 20 mg by mouth 2 (two) times a day.                No future appointments.  Additional Instructions for the Follow-ups that You Need to Schedule     Additional Discharge Follow-Up (Specify Provider)    As directed    To:  Psychiatrist   Follow Up Details:  2-3 weeks as able to schedule       Ambulatory Referral to Home Health    As directed    CUONG Guadalupe   Face to Face Visit Date:  10/1/2017   Follow-up Provider for Plan of Care?:  I treated the patient in an acute care facility and will not continue treatment after discharge.   Follow-up Provider:  KATY CABRERA   Reason/Clinical Findings:  parkinsons, debility, previous stroke   Describe mobility limitations that make leaving home difficult:  parkinsons, debility, previous stroke   Nursing/Therapeutic Services Requested:   Skilled Nursing  Physical Therapy  Occupational Therapy      Skilled nursing orders:  Medication education   PT orders:   Therapeutic exercise  Transfer training  Strengthening  Gait Training  Home safety assessment      Weight Bearing Status:  As Tolerated   Occupational orders:   Activities of daily living  Strengthening  Home safety assessment      Frequency:  Other Comment - 5 times per week       Discharge Follow-Up With Specified Provider    As directed    To:  [facility]   Follow Up Details:  1 month or as able to schedule       Discharge Follow-up with PCP    As directed    Follow Up Details:  PCP in 1-2 weeks             Follow-up Information     Follow up with [facility] .    Specialty:  Home  Health Services    Contact information:    [address]  [facility] [address]  [phone]        Follow up with [facility] Gu[facility] .    Specialty:  Family Medicine    Why:  Rehab physician in 1-2 days, PCP after dc from rehab    Contact information:    [address] [address]  [phone]          Follow up with [facility] Guadalupe .    Specialty:  Family Medicine    Why:  PCP in 1-2 weeks    Contact information:    [address] [id number] [phone]            TEST  RESULTS PENDING AT DISCHARGE         Braulio Grissom MD  [facility]  10/01/17  5:06 PM      It was a pleasure taking care of this patient while in the hospital.

## 2017-10-01 NOTE — PLAN OF CARE
Problem: Patient Care Overview (Adult)  Goal: Plan of Care Review  Outcome: Ongoing (interventions implemented as appropriate)    10/01/17 0445   Coping/Psychosocial Response Interventions   Plan Of Care Reviewed With patient;spouse   Patient Care Overview   Progress no change   Outcome Evaluation   Outcome Summary/Follow up Plan Patient had a bout of anxiety last night, busbar given with relief, states will be going to rehab today, wife at bedside, no complaints of pain.       Goal: Adult Individualization and Mutuality  Outcome: Ongoing (interventions implemented as appropriate)  Goal: Discharge Needs Assessment  Outcome: Ongoing (interventions implemented as appropriate)    Problem: Activity Intolerance (Adult)  Goal: Identify Related Risk Factors and Signs and Symptoms  Outcome: Outcome(s) achieved Date Met:  10/01/17  Goal: Activity Tolerance  Outcome: Ongoing (interventions implemented as appropriate)  Goal: Effective Energy Conservation Techniques  Outcome: Ongoing (interventions implemented as appropriate)    Problem: Fall Risk (Adult)  Goal: Identify Related Risk Factors and Signs and Symptoms  Outcome: Outcome(s) achieved Date Met:  10/01/17  Goal: Absence of Falls  Outcome: Ongoing (interventions implemented as appropriate)

## 2017-10-01 NOTE — THERAPY TREATMENT NOTE
Acute Care - Physical Therapy Treatment Note  [facility]     Patient Name: Victor Manuel Issa  : 1945  MRN: [id number]  Today's Date: 10/1/2017  Onset of Illness/Injury or Date of Surgery Date: 17  Date of Referral to PT: 17  Referring Physician: Pilo    Admit Date: 2017    Visit Dx:    ICD-10-CM ICD-9-CM   1. Weakness R53.1 780.79   2. Impaired gait and mobility R26.89 781.2     Patient Active Problem List   Diagnosis   • Thigh pain, musculoskeletal   • Left leg weakness   • History of CVA (cerebrovascular accident)   • COPD (chronic obstructive pulmonary disease)   • Coronary artery disease   • Cervical myelopathy   • Parkinson's disease   • Debility   • Cerebrovascular disease   • Abnormal TSH               Adult Rehabilitation Note       10/01/17 1100 17 1132       Rehab Assessment/Intervention    Discipline physical therapy assistant  -RW physical therapist  -     Document Type therapy note (daily note)  -RW therapy note (daily note)  -     Subjective Information agree to therapy;no complaints  -RW agree to therapy  -LH     Patient Effort, Rehab Treatment good  -RW good  -     Precautions/Limitations fall precautions  -RW fall precautions  -LH     Recorded by [RW] Yuridia Felipe, PTA [LH] Elisa Hannon, PT     Pain Assessment    Pain Assessment No/denies pain  -RW No/denies pain  -LH     Recorded by [RW] Yuridia Felipe, PTA [LH] Elisa Hannon, PT     Vision Assessment/Intervention    Visual Impairment  WNL  -LH     Recorded by  [] Elisa Hannon, PT     Cognitive Assessment/Intervention    Current Cognitive/Communication Assessment functional  -RW functional  -     Orientation Status oriented x 4  -RW oriented to;person;situation  -     Follows Commands/Answers Questions 100% of the time  -% of the time  -     Personal Safety WNL/WFL  -RW mild impairment  -     Personal Safety Interventions fall prevention program maintained;gait belt;nonskid  shoes/slippers when out of bed  -RW      Recorded by [RW] Yuridia Felipe PTA [LH] Elisa Hannon, PT     Bed Mobility, Assessment/Treatment    Bed Mobility, Assistive Device bed rails;head of bed elevated  -RW bed rails;draw sheet  -     Bed Mob, Supine to Sit, [place] minimum assist (75% patient effort);moderate assist (50% patient effort);verbal cues required;nonverbal cues required (demo/gesture)  - moderate assist (50% patient effort)  -     Bed Mob, Sit to Supine, [place] not tested   Pt up in chair  -RW not tested  -     Recorded by [RW] Yuridia Felipe PTA [LH] Elisa Hannon, PT     Transfer Assessment/Treatment    Transfers, Bed-Chair [place] minimum assist (75% patient effort);2 person assist required;verbal cues required;nonverbal cues required (demo/gesture)  -RW      Transfers, Chair-Bed [place] not tested  -RW      Transfers, Bed-Chair-Bed, Assist Device rolling walker  -RW      Transfers, Sit-Stand [place] minimum assist (75% patient effort);2 person assist required;verbal cues required;nonverbal cues required (demo/gesture)  - minimum assist (75% patient effort);moderate assist (50% patient effort);2 person assist required  -     Transfers, Stand-Sit [place] minimum assist (75% patient effort);2 person assist required;verbal cues required;nonverbal cues required (demo/gesture)  - minimum assist (75% patient effort);moderate assist (50% patient effort);2 person assist required  -     Transfers, Sit-Stand-Sit, Assist Device rolling walker;elevated surface  -RW rolling walker  -     Transfer, Safety Issues balance decreased during turns;sequencing ability decreased;step length decreased;weight-shifting ability decreased;loses balance backward  -RW sequencing ability decreased;step length decreased;balance decreased during turns;weight-shifting ability decreased  -     Transfer, Impairments ROM decreased;strength decreased;impaired balance;coordination  impaired;motor control impaired;postural control impaired  - ROM decreased;strength decreased;impaired balance;coordination impaired;motor control impaired;postural control impaired  -     Transfer, Comment Pt able to take a few pivoting steps from bed to chair. Posterior lean in standing  -      Recorded by [RW] Yuridia Felipe PTA [] Elisa Hannon PT     Gait Assessment/Treatment    Gait, [place] Level not tested  - moderate assist (50% patient effort);2 person assist required;minimum assist (75% patient effort)  -     Gait, Assistive Device  rolling walker  -     Gait, Distance (Feet)  3  -     Gait, Gait Deviations  bilateral:;dayanna decreased;decreased heel strike;forward flexed posture  -     Gait, Safety Issues  step length decreased;weight-shifting ability decreased;sequencing ability decreased;balance decreased during turns  -     Gait, Impairments  ROM decreased;muscle tone abnormal;strength decreased  -     Gait, Comment  leonora knee flexion - hamstring tightness/contracture 2' WC use/immobility  -     Recorded by [RW] Yuridia Felipe PTA [] Elisa Hannon PT     Therapy Exercises    Bilateral Lower Extremities AROM:;10 reps;sitting;ankle pumps/circles;hip flexion;LAQ  - 15 reps;AROM:;sitting;ankle pumps/circles;LAQ;hip flexion  -     Recorded by [RW] Yuridia Felipe PTA [] Elisa Hannon PT     Positioning and Restraints    Pre-Treatment Position in bed  -RW in bed  -     Post Treatment Position chair  - chair  -     In Chair sitting;call light within reach;encouraged to call for assist;exit alarm on;with family/caregiver;notified [facility]  - sitting;call light within reach;encouraged to call for assist;with family/caregiver;notified [facility]  -     Recorded by [RW] Yuridia Felipe PTA [] Elisa Hannon PT       User Key  (r) = Recorded By, (t) = Taken By, (c) = Cosigned By    Initials Name Effective Dates     Elisa Hannon PT 02/07/17 -      Yuridia Felipe PTA  04/06/16 -                 IP PT Goals       09/29/17 1049          Bed Mobility PT LTG    Bed Mobility PT LTG, Date Established 09/29/17  -      Bed Mobility PT LTG, Time to Achieve 1 wk  -      Bed Mobility PT LTG, Activity Type all bed mobility  -      Bed Mobility PT LTG, [place] Level minimum assist (75% patient effort)  -      Transfer Training PT LTG    Transfer Training PT LTG, Date Established 09/29/17  -      Transfer Training PT LTG, Time to Achieve 1 wk  -      Transfer Training PT LTG, Activity Type all transfers  -      Transfer Training PT LTG, [place] Level minimum assist (75% patient effort)  -      Transfer Training PT LTG, Assist Device walker, rolling  -      Gait Training PT LTG    Gait Training Goal PT LTG, Date Established 09/29/17  -      Gait Training Goal PT LTG, Time to Achieve 1 wk  -      Gait Training Goal PT LTG, [place] Level minimum assist (75% patient effort)  -      Gait Training Goal PT LTG, Assist Device walker, rolling  -      Gait Training Goal PT LTG, Distance to Achieve 80  -LH        User Key  (r) = Recorded By, (t) = Taken By, (c) = Cosigned By    Initials Name Provider Type     Elisa Hannon, PT Physical Therapist          Physical Therapy Education     Title: PT OT SLP Therapies (Done)     Topic: Physical Therapy (Done)     Point: Mobility training (Done)    Learning Progress Summary    Learner Readiness Method Response Comment Documented by Status   Patient Acceptance [facility] D VU,NR  RW 10/01/17 1144 Done    Acceptance E [facility] 09/30/17 1135 Active    Acceptance E NR   09/29/17 1049 Active    Acceptance [facility] MEJIA   09/29/17 0154 Done               Point: Home exercise program (Done)    Learning Progress Summary    Learner Readiness Method Response Comment Documented by Status   Patient Acceptance [facility] D VU,NR   10/01/17 1144 Done    Acceptance E [facility] 09/30/17 1135 Active    Acceptance E [facility] 09/29/17 1049 Active     Acceptance E,TB VU   09/29/17 0154 Done               Point: Body mechanics (Done)    Learning Progress Summary    Learner Readiness Method Response Comment Documented by Status   Patient Acceptance E,TB,D VU,NR   10/01/17 1144 Done    Acceptance E NR   09/30/17 1135 Active    Acceptance E NR   09/29/17 1049 Active    Acceptance E,TB VU   09/29/17 0154 Done               Point: Precautions (Done)    Learning Progress Summary    Learner Readiness Method Response Comment Documented by Status   Patient Acceptance E,TB,D VU,NR   10/01/17 1144 Done    Acceptance E NR   09/30/17 1135 Active    Acceptance E NR   09/29/17 1049 Active    Acceptance E,TB VU   09/29/17 0154 Done                      User Key     Initials Effective Dates Name Provider Type Discipline     02/07/17 -  Elisa Hannon, PT Physical Therapist PT     04/06/16 -  Yuridia Felipe PTA Physical Therapy Assistant PT     04/28/17 -  Danielle Downs RN Registered Nurse Nurse                    PT Recommendation and Plan  Anticipated Discharge Disposition: skilled nursing facility  Planned Therapy Interventions: balance training, bed mobility training, gait training, home exercise program, ROM (Range of Motion), stair training, strengthening, stretching, transfer training, wheelchair management/propulsion training  PT Frequency: daily  Plan of Care Review  Plan Of Care Reviewed With: patient, spouse  Outcome Summary/Follow up Plan: Pt tolerated PT well. Posterior lean in standing and requiring assist x2 for mobility. Would recommend SNF at this time.           Outcome Measures       10/01/17 1100 09/30/17 1100 09/29/17 1000    How much help from another person do you currently need...    Turning from your back to your side while in flat bed without using bedrails? 3  -RW 2  -LH 2  -LH    Moving from lying on back to sitting on the side of a flat bed without bedrails? 2  -RW 2  -LH 2  -LH    Moving to and from a bed to a chair  (including a wheelchair)? 2  -RW 2  -LH 2  -LH    Standing up from a chair using your arms (e.g., wheelchair, bedside chair)? 2  -RW 2  -LH 2  -LH    Climbing 3-5 steps with a railing? 1  -RW 2  -LH 1  -LH    To walk in hospital room? 2  -RW 2  -LH 1  -LH    AM-PAC 6 Clicks Score 12  -RW 12  -LH 10  -LH    Functional Assessment    Outcome Measure Options AM-PAC 6 Clicks Basic Mobility (PT)  -RW AM-PAC 6 Clicks Basic Mobility (PT)  - AM-PAC 6 Clicks Basic Mobility (PT)  -      User Key  (r) = Recorded By, (t) = Taken By, (c) = Cosigned By    Initials Name Provider Type     Elisa Hannon, PT Physical Therapist    RW Yuridia Felipe PTA Physical Therapy Assistant           Time Calculation:         PT Charges       10/01/17 1142          Time Calculation    Start Time 1126  -RW      Stop Time 1141  -RW      Time Calculation (min) 15 min  -RW      PT Received On 10/01/17  -      PT - Next Appointment 10/02/17  -        User Key  (r) = Recorded By, (t) = Taken By, (c) = Cosigned By    Initials Name Provider Type     Yuridia Felipe PTA Physical Therapy Assistant          Therapy Charges for Today     Code Description Service Date Service Provider Modifiers Qty    65231313948 HC PT THER PROC EA 15 MIN 10/1/2017 Yuridia Felipe PTA GP 1    91546093277 HC PT THER SUPP EA 15 MIN 10/1/2017 Yuridia Felipe PTA GP 1          PT G-Codes  Outcome Measure Options: AM-PAC 6 Clicks Basic Mobility (PT)  Functional Limitation: Mobility: Walking and moving around  Mobility: Walking and Moving Around Current Status (): At least 60 percent but less than 80 percent impaired, limited or restricted  Mobility: Walking and Moving Around Goal Status (): At least 40 percent but less than 60 percent impaired, limited or restricted    Yuridia Felipe PTA  10/1/2017

## 2017-10-01 NOTE — PROGRESS NOTES
Continued Stay Note  [facility]     Patient Name: Victor Maneul Issa  MRN: [id number]  Today's Date: 10/1/2017    Admit Date: 9/28/2017          Discharge Plan       10/01/17 1255    Case Management/Social Work Plan    Plan Home via private auto with [facility] to follow.    Patient/Family In Agreement With Plan yes    Additional Comments Spoke with pt's spouse Evelyn who states they will be unable to afford to go to rehab and have discussed this with Dr. Grissom. Spouse states MD is going to request PT to see more frequently at home. [facility] has already accepted and is following and [facility] will fax DC summary and update on-call [facility] nurse Char of pending DC to home later today...........JW      10/01/17 1123    Case Management/Social Work Plan    Plan Per Bella, [facility] can [facility] pt but she is waiting on the financial OK from family for private pay placement for skilled care    Patient/Family In Agreement With Plan other (see comments)   pt and family reviewing financial options for SNF at [facility]    Additional Comments Received call from Bella with [facility] who states they can accept pt clinically and she has spoken with pt and family. They are discussing finances at this time and she will contact [facility] today if she hears back from family. [facility] to follow.............JW      10/01/17 1000    Case Management/Social Work Plan    Plan referral remains pending for [facility]    Patient/Family In Agreement With Plan yes    Additional Comments Placed call to [facility] ([phone]) to f/u on SNF. Spoke with Bella and she requested [facility] fax clinicals to [phone]. Info faxed and Bella states she will call [facility] back with further info as soon as possible. [facility] to follow...........JW              Discharge Codes     None        Expected Discharge Date and Time     Expected Discharge Date Expected Discharge Time    Oct 1, 2017             Nataliia Montes RN

## 2017-10-01 NOTE — PLAN OF CARE
Problem: Patient Care Overview (Adult)  Goal: Plan of Care Review  Outcome: Ongoing (interventions implemented as appropriate)    10/01/17 1144   Coping/Psychosocial Response Interventions   Plan Of Care Reviewed With patient;spouse   Outcome Evaluation   Outcome Summary/Follow up Plan Pt tolerated PT well. Posterior lean in standing and requiring assist x2 for mobility. Would recommend SNF at this time.

## 2017-10-01 NOTE — PLAN OF CARE
Problem: Patient Care Overview (Adult)  Goal: Plan of Care Review  Outcome: Outcome(s) achieved Date Met:  10/01/17  Goal: Adult Individualization and Mutuality  Outcome: Outcome(s) achieved Date Met:  10/01/17  Goal: Discharge Needs Assessment  Outcome: Outcome(s) achieved Date Met:  10/01/17    Problem: Activity Intolerance (Adult)  Goal: Activity Tolerance  Outcome: Ongoing (interventions implemented as appropriate)  Goal: Effective Energy Conservation Techniques  Outcome: Ongoing (interventions implemented as appropriate)    Problem: Fall Risk (Adult)  Goal: Absence of Falls  Outcome: Ongoing (interventions implemented as appropriate)    Problem: Mobility, Physical Impaired (Adult)  Goal: Identify Related Risk Factors and Signs and Symptoms  Outcome: Ongoing (interventions implemented as appropriate)  Goal: Enhanced Mobility Skills  Outcome: Ongoing (interventions implemented as appropriate)  Goal: Enhanced Functionality Ability  Outcome: Ongoing (interventions implemented as appropriate)

## 2017-10-02 LAB
BH CV ECHO MEAS - ACS: 2.3 CM
BH CV ECHO MEAS - AO MAX PG: 4 MMHG
BH CV ECHO MEAS - AO MEAN PG (FULL): 1 MMHG
BH CV ECHO MEAS - AO MEAN PG: 3 MMHG
BH CV ECHO MEAS - AO ROOT AREA (BSA CORRECTED): 1.7
BH CV ECHO MEAS - AO ROOT AREA: 9.6 CM^2
BH CV ECHO MEAS - AO ROOT DIAM: 3.5 CM
BH CV ECHO MEAS - AO V2 MAX: 96 CM/SEC
BH CV ECHO MEAS - AO V2 MEAN: 87.2 CM/SEC
BH CV ECHO MEAS - AO V2 VTI: 28.7 CM
BH CV ECHO MEAS - AVA(I,A): 2.4 CM^2
BH CV ECHO MEAS - AVA(I,D): 2.4 CM^2
BH CV ECHO MEAS - BSA(HAYCOCK): 2 M^2
BH CV ECHO MEAS - BSA: 2 M^2
BH CV ECHO MEAS - BZI_BMI: 25 KILOGRAMS/M^2
BH CV ECHO MEAS - BZI_METRIC_HEIGHT: 180.3 CM
BH CV ECHO MEAS - BZI_METRIC_WEIGHT: 81.2 KG
BH CV ECHO MEAS - CONTRAST EF (2CH): 66.7 ML/M^2
BH CV ECHO MEAS - CONTRAST EF 4CH: 57.4 ML/M^2
BH CV ECHO MEAS - EDV(CUBED): 74.1 ML
BH CV ECHO MEAS - EDV(MOD-SP2): 72 ML
BH CV ECHO MEAS - EDV(MOD-SP4): 94 ML
BH CV ECHO MEAS - EDV(TEICH): 78.6 ML
BH CV ECHO MEAS - EF(CUBED): 63.6 %
BH CV ECHO MEAS - EF(MOD-SP2): 66.7 %
BH CV ECHO MEAS - EF(MOD-SP4): 57.4 %
BH CV ECHO MEAS - EF(TEICH): 55.5 %
BH CV ECHO MEAS - ESV(CUBED): 27 ML
BH CV ECHO MEAS - ESV(MOD-SP2): 24 ML
BH CV ECHO MEAS - ESV(MOD-SP4): 40 ML
BH CV ECHO MEAS - ESV(TEICH): 35 ML
BH CV ECHO MEAS - FS: 28.6 %
BH CV ECHO MEAS - IVS/LVPW: 1.1
BH CV ECHO MEAS - IVSD: 1.2 CM
BH CV ECHO MEAS - LAT PEAK E' VEL: 7 CM/SEC
BH CV ECHO MEAS - LV DIASTOLIC VOL/BSA (35-75): 46.7 ML/M^2
BH CV ECHO MEAS - LV MASS(C)D: 167.4 GRAMS
BH CV ECHO MEAS - LV MASS(C)DI: 83.2 GRAMS/M^2
BH CV ECHO MEAS - LV MEAN PG: 2 MMHG
BH CV ECHO MEAS - LV SYSTOLIC VOL/BSA (12-30): 19.9 ML/M^2
BH CV ECHO MEAS - LV V1 MEAN: 61.2 CM/SEC
BH CV ECHO MEAS - LV V1 VTI: 22.3 CM
BH CV ECHO MEAS - LVIDD: 4.2 CM
BH CV ECHO MEAS - LVIDS: 3 CM
BH CV ECHO MEAS - LVLD AP2: 7.4 CM
BH CV ECHO MEAS - LVLD AP4: 7.6 CM
BH CV ECHO MEAS - LVLS AP2: 5.9 CM
BH CV ECHO MEAS - LVLS AP4: 6.6 CM
BH CV ECHO MEAS - LVOT AREA (M): 3.1 CM^2
BH CV ECHO MEAS - LVOT AREA: 3.1 CM^2
BH CV ECHO MEAS - LVOT DIAM: 2 CM
BH CV ECHO MEAS - LVPWD: 1.1 CM
BH CV ECHO MEAS - MED PEAK E' VEL: 6 CM/SEC
BH CV ECHO MEAS - MR MAX PG: 44.5 MMHG
BH CV ECHO MEAS - MR MAX VEL: 332.7 CM/SEC
BH CV ECHO MEAS - MV A DUR: 0.14 SEC
BH CV ECHO MEAS - MV A MAX VEL: 77.9 CM/SEC
BH CV ECHO MEAS - MV DEC SLOPE: 349 CM/SEC^2
BH CV ECHO MEAS - MV DEC TIME: 0.14 SEC
BH CV ECHO MEAS - MV E MAX VEL: 64 CM/SEC
BH CV ECHO MEAS - MV E/A: 0.82
BH CV ECHO MEAS - MV MEAN PG: 1 MMHG
BH CV ECHO MEAS - MV P1/2T MAX VEL: 74.4 CM/SEC
BH CV ECHO MEAS - MV P1/2T: 62.4 MSEC
BH CV ECHO MEAS - MV V2 MEAN: 48.2 CM/SEC
BH CV ECHO MEAS - MV V2 VTI: 26.6 CM
BH CV ECHO MEAS - MVA P1/2T LCG: 3 CM^2
BH CV ECHO MEAS - MVA(P1/2T): 3.5 CM^2
BH CV ECHO MEAS - MVA(VTI): 2.6 CM^2
BH CV ECHO MEAS - PA ACC SLOPE: 1125 CM/SEC^2
BH CV ECHO MEAS - PA ACC TIME: 0.09 SEC
BH CV ECHO MEAS - PA MAX PG (FULL): 2.1 MMHG
BH CV ECHO MEAS - PA MAX PG: 3.8 MMHG
BH CV ECHO MEAS - PA PR(ACCEL): 39.4 MMHG
BH CV ECHO MEAS - PA V2 MAX: 97.3 CM/SEC
BH CV ECHO MEAS - PULM A REVS DUR: 0.12 SEC
BH CV ECHO MEAS - PULM A REVS VEL: 90.2 CM/SEC
BH CV ECHO MEAS - PULM DIAS VEL: 69.4 CM/SEC
BH CV ECHO MEAS - PULM S/D: 1.1
BH CV ECHO MEAS - PULM SYS VEL: 79.4 CM/SEC
BH CV ECHO MEAS - PVA(V,A): 1.9 CM^2
BH CV ECHO MEAS - PVA(V,D): 1.9 CM^2
BH CV ECHO MEAS - QP/QS: 0.54
BH CV ECHO MEAS - RAP SYSTOLE: 3 MMHG
BH CV ECHO MEAS - RV MAX PG: 1.7 MMHG
BH CV ECHO MEAS - RV MEAN PG: 1 MMHG
BH CV ECHO MEAS - RV V1 MAX: 64.4 CM/SEC
BH CV ECHO MEAS - RV V1 MEAN: 40.9 CM/SEC
BH CV ECHO MEAS - RV V1 VTI: 13.4 CM
BH CV ECHO MEAS - RVOT AREA: 2.8 CM^2
BH CV ECHO MEAS - RVOT DIAM: 1.9 CM
BH CV ECHO MEAS - RVSP: 19.8 MMHG
BH CV ECHO MEAS - SI(AO): 137.3 ML/M^2
BH CV ECHO MEAS - SI(CUBED): 23.4 ML/M^2
BH CV ECHO MEAS - SI(LVOT): 34.8 ML/M^2
BH CV ECHO MEAS - SI(MOD-SP2): 23.9 ML/M^2
BH CV ECHO MEAS - SI(MOD-SP4): 26.8 ML/M^2
BH CV ECHO MEAS - SI(TEICH): 21.7 ML/M^2
BH CV ECHO MEAS - SV(AO): 276.1 ML
BH CV ECHO MEAS - SV(CUBED): 47.1 ML
BH CV ECHO MEAS - SV(LVOT): 70.1 ML
BH CV ECHO MEAS - SV(MOD-SP2): 48 ML
BH CV ECHO MEAS - SV(MOD-SP4): 54 ML
BH CV ECHO MEAS - SV(RVOT): 38 ML
BH CV ECHO MEAS - SV(TEICH): 43.6 ML
BH CV ECHO MEAS - TAPSE (>1.6): 1 CM2
BH CV ECHO MEAS - TR MAX VEL: 205 CM/SEC
BH CV VAS BP RIGHT ARM: NORMAL MMHG
BH CV XLRA - RV BASE: 2.2 CM
BH CV XLRA - TDI S': 8 CM/SEC
E/E' RATIO: 11
LEFT ATRIUM VOLUME INDEX: 24 ML/M2

## 2018-05-03 ENCOUNTER — HOSPITAL ENCOUNTER (INPATIENT)
Facility: HOSPITAL | Age: 73
LOS: 9 days | Discharge: NURSING, REHAB OR LONG-TERM CARE | End: 2018-05-12
Attending: EMERGENCY MEDICINE | Admitting: HOSPITALIST

## 2018-05-03 ENCOUNTER — APPOINTMENT (OUTPATIENT)
Dept: GENERAL RADIOLOGY | Facility: HOSPITAL | Age: 73
End: 2018-05-03

## 2018-05-03 DIAGNOSIS — S72.001A CLOSED FRACTURE OF RIGHT HIP, INITIAL ENCOUNTER (HCC): Primary | ICD-10-CM

## 2018-05-03 DIAGNOSIS — S72.009A HIP FRACTURE (HCC): ICD-10-CM

## 2018-05-03 DIAGNOSIS — R26.2 DIFFICULTY WALKING: ICD-10-CM

## 2018-05-03 LAB
ALBUMIN SERPL-MCNC: 3.5 G/DL (ref 3.5–5.2)
ALBUMIN/GLOB SERPL: 1.2 G/DL
ALP SERPL-CCNC: 67 U/L (ref 39–117)
ALT SERPL W P-5'-P-CCNC: 8 U/L (ref 1–41)
ANION GAP SERPL CALCULATED.3IONS-SCNC: 10.7 MMOL/L
AST SERPL-CCNC: 12 U/L (ref 1–40)
BASOPHILS # BLD AUTO: 0.03 10*3/MM3 (ref 0–0.2)
BASOPHILS NFR BLD AUTO: 0.3 % (ref 0–1.5)
BILIRUB SERPL-MCNC: 0.2 MG/DL (ref 0.1–1.2)
BUN BLD-MCNC: 19 MG/DL (ref 8–23)
BUN/CREAT SERPL: 21.6 (ref 7–25)
CALCIUM SPEC-SCNC: 9.8 MG/DL (ref 8.6–10.5)
CHLORIDE SERPL-SCNC: 106 MMOL/L (ref 98–107)
CO2 SERPL-SCNC: 26.3 MMOL/L (ref 22–29)
CREAT BLD-MCNC: 0.88 MG/DL (ref 0.76–1.27)
DEPRECATED RDW RBC AUTO: 50.4 FL (ref 37–54)
EOSINOPHIL # BLD AUTO: 0.15 10*3/MM3 (ref 0–0.7)
EOSINOPHIL NFR BLD AUTO: 1.4 % (ref 0.3–6.2)
ERYTHROCYTE [DISTWIDTH] IN BLOOD BY AUTOMATED COUNT: 13.6 % (ref 11.5–14.5)
GFR SERPL CREATININE-BSD FRML MDRD: 85 ML/MIN/1.73
GLOBULIN UR ELPH-MCNC: 2.9 GM/DL
GLUCOSE BLD-MCNC: 81 MG/DL (ref 65–99)
HCT VFR BLD AUTO: 43.3 % (ref 40.4–52.2)
HGB BLD-MCNC: 14 G/DL (ref 13.7–17.6)
IMM GRANULOCYTES # BLD: 0.02 10*3/MM3 (ref 0–0.03)
IMM GRANULOCYTES NFR BLD: 0.2 % (ref 0–0.5)
LYMPHOCYTES # BLD AUTO: 2.6 10*3/MM3 (ref 0.9–4.8)
LYMPHOCYTES NFR BLD AUTO: 24.1 % (ref 19.6–45.3)
MCH RBC QN AUTO: 32.4 PG (ref 27–32.7)
MCHC RBC AUTO-ENTMCNC: 32.3 G/DL (ref 32.6–36.4)
MCV RBC AUTO: 100.2 FL (ref 79.8–96.2)
MONOCYTES # BLD AUTO: 0.78 10*3/MM3 (ref 0.2–1.2)
MONOCYTES NFR BLD AUTO: 7.2 % (ref 5–12)
NEUTROPHILS # BLD AUTO: 7.19 10*3/MM3 (ref 1.9–8.1)
NEUTROPHILS NFR BLD AUTO: 66.8 % (ref 42.7–76)
PLATELET # BLD AUTO: 186 10*3/MM3 (ref 140–500)
PMV BLD AUTO: 11.3 FL (ref 6–12)
POTASSIUM BLD-SCNC: 3.8 MMOL/L (ref 3.5–5.2)
PROT SERPL-MCNC: 6.4 G/DL (ref 6–8.5)
RBC # BLD AUTO: 4.32 10*6/MM3 (ref 4.6–6)
SODIUM BLD-SCNC: 143 MMOL/L (ref 136–145)
VALPROATE SERPL-MCNC: 16 MCG/ML (ref 50–125)
WBC NRBC COR # BLD: 10.77 10*3/MM3 (ref 4.5–10.7)

## 2018-05-03 PROCEDURE — 25010000002 MORPHINE PER 10 MG: Performed by: INTERNAL MEDICINE

## 2018-05-03 PROCEDURE — 80164 ASSAY DIPROPYLACETIC ACD TOT: CPT | Performed by: EMERGENCY MEDICINE

## 2018-05-03 PROCEDURE — 99284 EMERGENCY DEPT VISIT MOD MDM: CPT

## 2018-05-03 PROCEDURE — 25010000002 MORPHINE PER 10 MG: Performed by: EMERGENCY MEDICINE

## 2018-05-03 PROCEDURE — 80053 COMPREHEN METABOLIC PANEL: CPT | Performed by: EMERGENCY MEDICINE

## 2018-05-03 PROCEDURE — 85025 COMPLETE CBC W/AUTO DIFF WBC: CPT | Performed by: EMERGENCY MEDICINE

## 2018-05-03 PROCEDURE — 73502 X-RAY EXAM HIP UNI 2-3 VIEWS: CPT

## 2018-05-03 RX ORDER — NICOTINE 21 MG/24HR
1 PATCH, TRANSDERMAL 24 HOURS TRANSDERMAL EVERY 24 HOURS
Status: DISCONTINUED | OUTPATIENT
Start: 2018-05-03 | End: 2018-05-12 | Stop reason: HOSPADM

## 2018-05-03 RX ORDER — SIMVASTATIN 40 MG
40 TABLET ORAL NIGHTLY
COMMUNITY

## 2018-05-03 RX ORDER — ALBUTEROL SULFATE 2.5 MG/3ML
2.5 SOLUTION RESPIRATORY (INHALATION) DAILY
Status: DISCONTINUED | OUTPATIENT
Start: 2018-05-04 | End: 2018-05-12 | Stop reason: HOSPADM

## 2018-05-03 RX ORDER — PANTOPRAZOLE SODIUM 40 MG/1
40 TABLET, DELAYED RELEASE ORAL 2 TIMES DAILY
COMMUNITY
End: 2018-05-12 | Stop reason: HOSPADM

## 2018-05-03 RX ORDER — NITROFURANTOIN MACROCRYSTALS 100 MG/1
100 CAPSULE ORAL DAILY
COMMUNITY
End: 2018-05-12 | Stop reason: HOSPADM

## 2018-05-03 RX ORDER — SODIUM CHLORIDE 9 MG/ML
75 INJECTION, SOLUTION INTRAVENOUS CONTINUOUS
Status: DISCONTINUED | OUTPATIENT
Start: 2018-05-03 | End: 2018-05-07

## 2018-05-03 RX ORDER — DIVALPROEX SODIUM 500 MG/1
500 TABLET, DELAYED RELEASE ORAL 2 TIMES DAILY
COMMUNITY

## 2018-05-03 RX ORDER — NAPROXEN 500 MG/1
500 TABLET ORAL 2 TIMES DAILY PRN
COMMUNITY
End: 2018-05-12 | Stop reason: HOSPADM

## 2018-05-03 RX ORDER — DONEPEZIL HYDROCHLORIDE 10 MG/1
20 TABLET, FILM COATED ORAL NIGHTLY
COMMUNITY

## 2018-05-03 RX ORDER — PROMETHAZINE HYDROCHLORIDE 25 MG/1
25 TABLET ORAL EVERY 6 HOURS PRN
COMMUNITY
End: 2019-01-01 | Stop reason: HOSPADM

## 2018-05-03 RX ORDER — VITAMIN E 200 UNIT
1 CAPSULE ORAL DAILY
COMMUNITY
End: 2018-10-02

## 2018-05-03 RX ORDER — ATORVASTATIN CALCIUM 20 MG/1
20 TABLET, FILM COATED ORAL DAILY
Status: DISCONTINUED | OUTPATIENT
Start: 2018-05-04 | End: 2018-05-12 | Stop reason: HOSPADM

## 2018-05-03 RX ORDER — SODIUM CHLORIDE 0.9 % (FLUSH) 0.9 %
1-10 SYRINGE (ML) INJECTION AS NEEDED
Status: DISCONTINUED | OUTPATIENT
Start: 2018-05-03 | End: 2018-05-12 | Stop reason: HOSPADM

## 2018-05-03 RX ORDER — ACETAMINOPHEN 325 MG/1
650 TABLET ORAL EVERY 4 HOURS PRN
Status: DISCONTINUED | OUTPATIENT
Start: 2018-05-03 | End: 2018-05-12 | Stop reason: HOSPADM

## 2018-05-03 RX ORDER — MORPHINE SULFATE 2 MG/ML
2 INJECTION, SOLUTION INTRAMUSCULAR; INTRAVENOUS
Status: DISCONTINUED | OUTPATIENT
Start: 2018-05-03 | End: 2018-05-03

## 2018-05-03 RX ORDER — CHOLECALCIFEROL (VITAMIN D3) 125 MCG
5 CAPSULE ORAL NIGHTLY PRN
Status: DISCONTINUED | OUTPATIENT
Start: 2018-05-03 | End: 2018-05-03

## 2018-05-03 RX ORDER — DONEPEZIL HYDROCHLORIDE 10 MG/1
20 TABLET, FILM COATED ORAL NIGHTLY
Status: DISCONTINUED | OUTPATIENT
Start: 2018-05-03 | End: 2018-05-12 | Stop reason: HOSPADM

## 2018-05-03 RX ORDER — ONDANSETRON 2 MG/ML
4 INJECTION INTRAMUSCULAR; INTRAVENOUS EVERY 6 HOURS PRN
Status: DISCONTINUED | OUTPATIENT
Start: 2018-05-03 | End: 2018-05-12 | Stop reason: HOSPADM

## 2018-05-03 RX ORDER — PROPRANOLOL HYDROCHLORIDE 10 MG/1
10 TABLET ORAL DAILY
Status: DISCONTINUED | OUTPATIENT
Start: 2018-05-04 | End: 2018-05-12

## 2018-05-03 RX ORDER — MULTIPLE VITAMINS W/ MINERALS TAB 9MG-400MCG
1 TAB ORAL DAILY
COMMUNITY

## 2018-05-03 RX ORDER — GUAIFENESIN 600 MG/1
600 TABLET, EXTENDED RELEASE ORAL DAILY PRN
COMMUNITY
End: 2018-05-12 | Stop reason: HOSPADM

## 2018-05-03 RX ORDER — MELATONIN
1000 DAILY
Status: DISCONTINUED | OUTPATIENT
Start: 2018-05-04 | End: 2018-05-12 | Stop reason: HOSPADM

## 2018-05-03 RX ORDER — MORPHINE SULFATE 2 MG/ML
2 INJECTION, SOLUTION INTRAMUSCULAR; INTRAVENOUS ONCE
Status: COMPLETED | OUTPATIENT
Start: 2018-05-03 | End: 2018-05-05

## 2018-05-03 RX ORDER — DOCUSATE SODIUM 100 MG/1
100 CAPSULE, LIQUID FILLED ORAL 2 TIMES DAILY
Status: DISCONTINUED | OUTPATIENT
Start: 2018-05-03 | End: 2018-05-12 | Stop reason: HOSPADM

## 2018-05-03 RX ORDER — ALBUTEROL SULFATE 2.5 MG/3ML
2.5 SOLUTION RESPIRATORY (INHALATION) DAILY
Status: ON HOLD | COMMUNITY
End: 2018-05-12

## 2018-05-03 RX ORDER — ESOMEPRAZOLE MAGNESIUM 40 MG/1
40 CAPSULE, DELAYED RELEASE ORAL DAILY
COMMUNITY
End: 2018-10-06 | Stop reason: HOSPADM

## 2018-05-03 RX ORDER — CARBIDOPA/LEVODOPA 25MG-250MG
1 TABLET ORAL
Status: DISCONTINUED | OUTPATIENT
Start: 2018-05-04 | End: 2018-05-12 | Stop reason: HOSPADM

## 2018-05-03 RX ORDER — NICOTINE 21 MG/24HR
1 PATCH, TRANSDERMAL 24 HOURS TRANSDERMAL ONCE
Status: COMPLETED | OUTPATIENT
Start: 2018-05-03 | End: 2018-05-04

## 2018-05-03 RX ORDER — SODIUM CHLORIDE 0.9 % (FLUSH) 0.9 %
10 SYRINGE (ML) INJECTION AS NEEDED
Status: DISCONTINUED | OUTPATIENT
Start: 2018-05-03 | End: 2018-05-12 | Stop reason: HOSPADM

## 2018-05-03 RX ORDER — BUSPIRONE HYDROCHLORIDE 5 MG/1
5 TABLET ORAL 3 TIMES DAILY PRN
Status: DISCONTINUED | OUTPATIENT
Start: 2018-05-03 | End: 2018-05-12 | Stop reason: HOSPADM

## 2018-05-03 RX ORDER — OXYBUTYNIN CHLORIDE 5 MG/1
5 TABLET ORAL 2 TIMES DAILY
Status: DISCONTINUED | OUTPATIENT
Start: 2018-05-03 | End: 2018-05-12 | Stop reason: HOSPADM

## 2018-05-03 RX ORDER — FLUTICASONE PROPIONATE 50 MCG
1 SPRAY, SUSPENSION (ML) NASAL DAILY
Status: DISCONTINUED | OUTPATIENT
Start: 2018-05-04 | End: 2018-05-12 | Stop reason: HOSPADM

## 2018-05-03 RX ORDER — PHENOL 1.4 %
10 AEROSOL, SPRAY (ML) MUCOUS MEMBRANE NIGHTLY PRN
Status: ON HOLD | COMMUNITY
End: 2018-05-03

## 2018-05-03 RX ORDER — LEVOTHYROXINE SODIUM 0.2 MG/1
200 TABLET ORAL DAILY
COMMUNITY
End: 2018-10-02

## 2018-05-03 RX ORDER — LEVOTHYROXINE SODIUM 0.1 MG/1
200 TABLET ORAL
Status: DISCONTINUED | OUTPATIENT
Start: 2018-05-04 | End: 2018-05-12 | Stop reason: HOSPADM

## 2018-05-03 RX ORDER — PROPRANOLOL HYDROCHLORIDE 10 MG/1
10 TABLET ORAL DAILY
COMMUNITY
End: 2018-05-12 | Stop reason: HOSPADM

## 2018-05-03 RX ORDER — DIVALPROEX SODIUM 500 MG/1
500 TABLET, DELAYED RELEASE ORAL 2 TIMES DAILY
Status: DISCONTINUED | OUTPATIENT
Start: 2018-05-03 | End: 2018-05-08

## 2018-05-03 RX ORDER — CICLOPIROX 7.7 MG/G
1 GEL TOPICAL DAILY
COMMUNITY
End: 2018-10-02

## 2018-05-03 RX ORDER — ALBUTEROL SULFATE 90 UG/1
2 AEROSOL, METERED RESPIRATORY (INHALATION) EVERY 4 HOURS PRN
Status: DISCONTINUED | OUTPATIENT
Start: 2018-05-03 | End: 2018-05-03

## 2018-05-03 RX ORDER — FLUTICASONE PROPIONATE 50 MCG
1 SPRAY, SUSPENSION (ML) NASAL DAILY
COMMUNITY

## 2018-05-03 RX ORDER — CETIRIZINE HYDROCHLORIDE 10 MG/1
10 TABLET ORAL DAILY
Status: DISCONTINUED | OUTPATIENT
Start: 2018-05-04 | End: 2018-05-12 | Stop reason: HOSPADM

## 2018-05-03 RX ORDER — ARFORMOTEROL TARTRATE 15 UG/2ML
15 SOLUTION RESPIRATORY (INHALATION)
Status: DISCONTINUED | OUTPATIENT
Start: 2018-05-03 | End: 2018-05-12 | Stop reason: HOSPADM

## 2018-05-03 RX ORDER — MULTIPLE VITAMINS W/ MINERALS TAB 9MG-400MCG
1 TAB ORAL DAILY
Status: DISCONTINUED | OUTPATIENT
Start: 2018-05-04 | End: 2018-05-12 | Stop reason: HOSPADM

## 2018-05-03 RX ORDER — MONTELUKAST SODIUM 10 MG/1
10 TABLET ORAL NIGHTLY
Status: DISCONTINUED | OUTPATIENT
Start: 2018-05-03 | End: 2018-05-12 | Stop reason: HOSPADM

## 2018-05-03 RX ORDER — PANTOPRAZOLE SODIUM 40 MG/1
40 TABLET, DELAYED RELEASE ORAL EVERY MORNING
Status: DISCONTINUED | OUTPATIENT
Start: 2018-05-04 | End: 2018-05-12 | Stop reason: HOSPADM

## 2018-05-03 RX ORDER — OLANZAPINE 10 MG/1
10 TABLET ORAL NIGHTLY
Status: DISCONTINUED | OUTPATIENT
Start: 2018-05-03 | End: 2018-05-08

## 2018-05-03 RX ORDER — PROMETHAZINE HYDROCHLORIDE 25 MG/1
25 TABLET ORAL EVERY 6 HOURS PRN
Status: DISCONTINUED | OUTPATIENT
Start: 2018-05-03 | End: 2018-05-12 | Stop reason: HOSPADM

## 2018-05-03 RX ADMIN — MORPHINE SULFATE 4 MG: 4 INJECTION, SOLUTION INTRAMUSCULAR; INTRAVENOUS at 22:20

## 2018-05-03 RX ADMIN — DONEPEZIL HYDROCHLORIDE 20 MG: 10 TABLET, FILM COATED ORAL at 23:35

## 2018-05-03 RX ADMIN — OLANZAPINE 10 MG: 10 TABLET, FILM COATED ORAL at 23:35

## 2018-05-03 RX ADMIN — MORPHINE SULFATE 2 MG: 2 INJECTION, SOLUTION INTRAMUSCULAR; INTRAVENOUS at 20:17

## 2018-05-03 RX ADMIN — SODIUM CHLORIDE 75 ML/HR: 9 INJECTION, SOLUTION INTRAVENOUS at 21:52

## 2018-05-03 RX ADMIN — NICOTINE 1 PATCH: 21 PATCH, EXTENDED RELEASE TRANSDERMAL at 19:21

## 2018-05-03 RX ADMIN — MORPHINE SULFATE 2 MG: 2 INJECTION, SOLUTION INTRAMUSCULAR; INTRAVENOUS at 19:04

## 2018-05-03 RX ADMIN — MONTELUKAST 10 MG: 10 TABLET, FILM COATED ORAL at 23:35

## 2018-05-03 RX ADMIN — DIVALPROEX SODIUM 500 MG: 500 TABLET, DELAYED RELEASE ORAL at 23:34

## 2018-05-03 NOTE — ED PROVIDER NOTES
[facility] EMERGENCY DEPARTMENT ENCOUNTER    CHIEF COMPLAINT  Chief Complaint: right hip pain s/p fall  History given by: patient  History limited by: nothing  [facility] Room Number: 48/48  PMD: CUONG Guadalupe      HPI:  Pt is a 72 y.o. male who presents complaining of right hip pain s/p mechanical fall while trying to move a large ceramic plant pot PTA. Pt also c/o tingling to his right leg. Pt denies hitting his head, LOC, neck pain, CP, abdominal pain or SOA. Pt reports he is not on blood thinners.     Onset: sudden  Duration: PTA  Severity: moderate  Associated symptoms: tingling to right leg  Previous treatment: Pt reports no treatment PTA.    PAST MEDICAL HISTORY  Active Ambulatory Problems     Diagnosis Date Noted   • Thigh pain, musculoskeletal 03/22/2016   • Left leg weakness 09/28/2017   • History of CVA (cerebrovascular accident) 09/28/2017   • COPD (chronic obstructive pulmonary disease) 09/28/2017   • Coronary artery disease 09/28/2017   • Cervical myelopathy 09/29/2017   • Parkinson's disease 09/29/2017   • Debility 09/29/2017   • Cerebrovascular disease 09/29/2017   • Abnormal TSH 09/30/2017   • Hypothyroidism 10/01/2017     Resolved Ambulatory Problems     Diagnosis Date Noted   • No Resolved Ambulatory Problems     Past Medical History:   Diagnosis Date   • Arthritis    • Bipolar 1 disorder    • COPD (chronic obstructive pulmonary disease)    • Coronary artery disease    • Disease of thyroid gland    • Hyperlipidemia    • Myocardial infarction    • Stroke        PAST SURGICAL HISTORY  Past Surgical History:   Procedure Laterality Date   • ABDOMINAL SURGERY     • CARDIAC SURGERY     • CHOLECYSTECTOMY     • CORONARY ARTERY BYPASS GRAFT      x2   • EYE SURGERY      cataracts   • MUSCLE BIOPSY Left 3/24/2016    Procedure: LT THIGH MUSCLE BIOPSY;  Surgeon: Fausto Mack MD;  Location: [facility];  Service:    • SKIN BIOPSY     • THYROID SURGERY     • TURP / TRANSURETHRAL INCISION / DRAINAGE  PROSTATE     • VASCULAR SURGERY         FAMILY HISTORY  Family History   Problem Relation Age of Onset   • Cancer Mother    • Arthritis Father    • Heart disease Father    • Early death Brother    • Heart disease Brother        SOCIAL HISTORY  Social History     Social History   • Marital status:      Spouse name: N/A   • Number of children: N/A   • Years of education: N/A     Occupational History   • Not on file.     Social History Main Topics   • Smoking status: Current Every Day Smoker     Packs/day: 1.00     Types: Cigarettes     Start date: 1960   • Smokeless tobacco: Not on file      Comment: Educated pt on quitting   • Alcohol use No   • Drug use: No   • Sexual activity: Yes     Partners: Female     Other Topics Concern   • Not on file     Social History Narrative   • No narrative on file       ALLERGIES  Percocet [oxycodone-acetaminophen] and Phenergan [promethazine hcl]    REVIEW OF SYSTEMS  Review of Systems   Constitutional: Negative for activity change, appetite change and fever.   HENT: Negative for congestion and sore throat.    Respiratory: Negative for cough and shortness of breath.    Cardiovascular: Negative for chest pain and leg swelling.   Gastrointestinal: Negative for abdominal pain, diarrhea and vomiting.   Genitourinary: Negative for decreased urine volume and dysuria.   Musculoskeletal: Positive for arthralgias (right hip). Negative for neck pain.   Skin: Negative for rash and wound.   Neurological: Negative for weakness, numbness and headaches.        Tingling to right leg   All other systems reviewed and are negative.      PHYSICAL EXAM  ED Triage Vitals   Temp Heart Rate Resp BP SpO2   05/03/18 1648 05/03/18 1647 05/03/18 1647 05/03/18 1647 05/03/18 1647   98 °F (36.7 °C) 65 14 136/78 96 %      Temp src Heart Rate Source Patient Position BP Location FiO2 (%)   05/03/18 1648 05/03/18 1647 05/03/18 1647 -- --   Oral Monitor Lying         Physical Exam   Constitutional: He is  oriented to person, place, and time. He appears distressed (mild).   HENT:   Head: Normocephalic and atraumatic.   Eyes: EOM are normal. Pupils are equal, round, and reactive to light.   Neck: Normal range of motion. Neck supple.   Cardiovascular: Normal rate, normal heart sounds and intact distal pulses.    Pulses:       Posterior tibial pulses are 2+ on the right side, and 2+ on the left side.   Pulmonary/Chest: Effort normal and breath sounds normal. No respiratory distress.   Abdominal: Soft. There is no tenderness. There is no rebound and no guarding.   Musculoskeletal: He exhibits no edema.        Right hip: He exhibits decreased range of motion, tenderness and bony tenderness. He exhibits no deformity and no laceration.   Tenderness to right hip  Distal ROM, pulse, sens intact right lower leg.   Neurological: He is alert and oriented to person, place, and time. He has normal sensation and normal strength.   Skin: Skin is warm and dry.   Psychiatric: Mood and affect normal.   Nursing note and vitals reviewed.      LAB RESULTS  Lab Results (last 24 hours)     Procedure Component Value Units Date/Time    Valproic Acid Level, Total [[id number]] Collected:  05/03/18 1903    Specimen:  Blood Updated:  05/03/18 1913    CBC & Differential [[id number]] Collected:  05/03/18 1903    Specimen:  Blood Updated:  05/03/18 1913    Narrative:       The following orders were created for panel order CBC & Differential.  Procedure                               Abnormality         Status                     ---------                               -----------         ------                     CBC Auto Differential[[id number]]                            In process                   Please view results for these tests on the individual orders.    Comprehensive Metabolic Panel [[id number]] Collected:  05/03/18 1903    Specimen:  Blood Updated:  05/03/18 1913    CBC Auto Differential [[id number]] Collected:  05/03/18 1903    Specimen:   Blood Updated:  05/03/18 1913          I ordered the above labs and reviewed the results    RADIOLOGY  XR Hip With or Without Pelvis 2 - 3 View Right      FINDINGS: An AP view of the pelvis as well as AP and crosstable lateral  views of the right hip demonstrates a subcapital femoral fracture on the  right with mild impaction and superolateral displacement. There is no  evidence of dislocation of the femoral head. No other fractures are  identified. There is moderate osteoarthritis involving the left hip.      The above information was called to Dr. Butts.        I ordered the above noted radiological studies. Interpreted by radiologist. Reviewed by me in PACS.       PROCEDURES  Procedures      PROGRESS AND CONSULTS  ED Course   1855  I discussed pt's percocet allergy. Will order morphine for pain.    1858  Notified pt and wife of XR hip which shows a fracture. Discussed plan to admit pt. Pt understands and agrees with the plan, all questions answered.    1903  Placed call to [facility] and ortho for consult and nicotine patch per request of pt.    1922  Discussed pt's case and imaging results with Dr. Aaron ([facility]) who will admit pt.    1940  Discussed pt's case and imaging results with Dr. Vallejo (ortho) who will consult.    MEDICAL DECISION MAKING  Results were reviewed/discussed with the patient and they were also made aware of online access. Pt also made aware that some labs, such as cultures, will not be resulted during ER visit and follow up with PMD is necessary.     MDM  Number of Diagnoses or Management Options  Closed fracture of right hip (femor), initial encounter:      Amount and/or Complexity of Data Reviewed  Clinical lab tests: ordered and reviewed (valproic acid=16.0)  Tests in the radiology section of CPT®: ordered and reviewed (XR right hip shows fracture.)  Discuss the patient with other providers: yes (Dr. Vallejo (ortho), Dr. Aaron ([facility]))  Independent visualization of images, tracings, or  specimens: yes    Patient Progress  Patient progress: stable         DIAGNOSIS  Final diagnoses:   Closed fracture of right hip (femor), initial encounter       DISPOSITION  ADMISSION    Discussed treatment plan and reason for admission with pt/family and admitting physician.  Pt/family voiced understanding of the plan for admission for further testing/treatment as needed.       Latest Documented Vital Signs:  As of 7:23 PM  BP- 136/78 HR- 65 Temp- 98 °F (36.7 °C) (Oral) O2 sat- 96%    --  Documentation assistance provided by blue Cotto for Dr. Black.  Information recorded by the scribe was done at my direction and has been verified and validated by me.     Rain Cotto  05/03/18 4539       Atnonia Black MD  05/08/18 0567

## 2018-05-03 NOTE — PROGRESS NOTES
Clinical Pharmacy Services: Medication History    Victor Manuel Issa is a 72 y.o. male presenting to [facility] for   Chief Complaint   Patient presents with   • Fall     pt lost his balance and fell on his right hip. Pt complains of pain in that hip.    • Hip Pain       He  has a past medical history of Arthritis; Bipolar 1 disorder; COPD (chronic obstructive pulmonary disease); Coronary artery disease; Disease of thyroid gland; Hyperlipidemia; Myocardial infarction; and Stroke.    Allergies as of 05/03/2018 - Reviewed 05/03/2018   Allergen Reaction Noted   • Percocet [oxycodone-acetaminophen] Delirium 03/22/2016   • Phenergan [promethazine hcl]  03/22/2016       Medication information was obtained from: Spouse, medication list  Pharmacy and Phone Number: [facility] [phone]    Prior to Admission Medications     Prescriptions Last Dose Informant Patient Reported? Taking?    acetaminophen (TYLENOL) 325 MG tablet  Spouse/Significant Other No Yes    Take 2 tablets by mouth Every 4 (Four) Hours As Needed for Mild Pain , Headache or Fever.    albuterol (PROVENTIL HFA;VENTOLIN HFA) 108 (90 Base) MCG/ACT inhaler  Spouse/Significant Other Yes Yes    Inhale 2 puffs Every 4 (Four) Hours As Needed for Wheezing.    albuterol (PROVENTIL) (2.5 MG/3ML) 0.083% nebulizer solution  Spouse/Significant Other Yes Yes    Take 2.5 mg by nebulization Daily.    arformoterol (BROVANA) 15 MCG/2ML nebulizer solution  Spouse/Significant Other Yes Yes    Take 15 mcg by nebulization 2 (Two) Times a Day.    aspirin 325 MG tablet  Spouse/Significant Other No No    Take 1 tablet by mouth Daily.    atorvastatin (LIPITOR) 80 MG tablet  Spouse/Significant Other No No    Take 1 tablet by mouth Every Night.    busPIRone (BUSPAR) 5 MG tablet  Spouse/Significant Other No Yes    Take 1 tablet by mouth 3 (Three) Times a Day As Needed (anxiety).    carbidopa-levodopa (SINEMET)  MG per tablet  Spouse/Significant Other No Yes    Take 1  tablet by mouth 3 (Three) Times a Day With Meals.    cholecalciferol (VITAMIN D3) 1000 UNITS tablet  Spouse/Significant Other Yes Yes    Take 1,000 Units by mouth daily.    ciclopirox (LOPROX) 0.77 % gel  Spouse/Significant Other Yes Yes    Apply 1 application topically Daily. Apply to the nails    diclofenac (VOLTAREN) 1 % gel gel  Spouse/Significant Other Yes Yes    Apply 4 g topically Daily As Needed (apply to the knees).    divalproex (DEPAKOTE) 500 MG DR tablet  Spouse/Significant Other Yes Yes    Take 500 mg by mouth 2 (Two) Times a Day.    docusate sodium 100 MG capsule  Spouse/Significant Other No Yes    Take 100 mg by mouth 2 (Two) Times a Day.    donepezil (ARICEPT) 10 MG tablet  Spouse/Significant Other Yes Yes    Take 20 mg by mouth Every Night.    esomeprazole (nexIUM) 40 MG capsule  Spouse/Significant Other Yes Yes    Take 40 mg by mouth Daily As Needed.    fexofenadine (ALLEGRA) 180 MG tablet  Spouse/Significant Other Yes Yes    Take 180 mg by mouth daily.    fluticasone (FLONASE) 50 MCG/ACT nasal spray  Spouse/Significant Other Yes Yes    1 spray into each nostril Daily.    guaiFENesin (MUCINEX) 600 MG 12 hr tablet  Spouse/Significant Other Yes Yes    Take 600 mg by mouth Daily As Needed for Cough.    levothyroxine (SYNTHROID, LEVOTHROID) 200 MCG tablet  Spouse/Significant Other Yes Yes    Take 200 mcg by mouth Daily.    MEGARED OMEGA-3 KRILL  MG capsule  Spouse/Significant Other Yes Yes    Take 1 capsule by mouth Daily.    Melatonin 10 MG tablet  Spouse/Significant Other Yes Yes    Take 10 mg by mouth At Night As Needed.    montelukast (SINGULAIR) 10 MG tablet  Spouse/Significant Other Yes Yes    Take 10 mg by mouth Every Night.    Multiple Vitamins-Minerals (MULTIVITAMIN WITH MINERALS) tablet tablet  Spouse/Significant Other Yes Yes    Take 1 tablet by mouth Daily.    naproxen (NAPROSYN) 500 MG tablet  Spouse/Significant Other Yes Yes    Take 500 mg by mouth 2 (Two) Times a Day As Needed for  Mild Pain .    nitrofurantoin (MACRODANTIN) 100 MG capsule  Spouse/Significant Other Yes Yes    Take 100 mg by mouth Daily.    OLANZapine (ZYPREXA) 10 MG tablet  Spouse/Significant Other Yes Yes    Take 10 mg by mouth every night.    oxybutynin (DITROPAN) 5 MG tablet  Spouse/Significant Other Yes Yes    Take 5 mg by mouth 2 (Two) Times a Day.    pantoprazole (PROTONIX) 40 MG EC tablet  Spouse/Significant Other Yes Yes    Take 40 mg by mouth 2 (Two) Times a Day.    promethazine (PHENERGAN) 25 MG tablet  Spouse/Significant Other Yes Yes    Take 25 mg by mouth Every 6 (Six) Hours As Needed for Nausea or Vomiting.    propranolol (INDERAL) 10 MG tablet  Spouse/Significant Other Yes Yes    Take 10 mg by mouth Daily.    simvastatin (ZOCOR) 40 MG tablet  Spouse/Significant Other Yes Yes    Take 40 mg by mouth Every Night.            Medication notes: Added per patient list:  Naproxen, Nexium, Mucinex, Voltaren, Propranolol, Steven Red, Melatonin, Nitrofurantoin, Promethazine, Flonase, Ciclopirox, Albuterol, Simvastatin    Removed per spouse:  Aspirin, Lipitor    This medication list is complete to the best of my knowledge as of 5/3/2018    Please call if questions.    Tracie Pérez, Medication History Technician  5/3/2018 7:58 PM

## 2018-05-03 NOTE — ED NOTES
Pt states that his right hip hurts and can feel some numbness in right lower leg. Pt pulses present with no discoloration at this time.      Vee Castañeda RN  05/03/18 [id number]

## 2018-05-04 LAB
ABO GROUP BLD: NORMAL
ABO GROUP BLD: NORMAL
ALBUMIN SERPL-MCNC: 3.6 G/DL (ref 3.5–5.2)
ALBUMIN/GLOB SERPL: 1.4 G/DL
ALP SERPL-CCNC: 67 U/L (ref 39–117)
ALT SERPL W P-5'-P-CCNC: 7 U/L (ref 1–41)
ANION GAP SERPL CALCULATED.3IONS-SCNC: 12.2 MMOL/L
AST SERPL-CCNC: 11 U/L (ref 1–40)
BACTERIA UR QL AUTO: ABNORMAL /HPF
BASOPHILS # BLD AUTO: 0.05 10*3/MM3 (ref 0–0.2)
BASOPHILS NFR BLD AUTO: 0.5 % (ref 0–1.5)
BILIRUB SERPL-MCNC: 0.7 MG/DL (ref 0.1–1.2)
BILIRUB UR QL STRIP: NEGATIVE
BLD GP AB SCN SERPL QL: NEGATIVE
BUN BLD-MCNC: 17 MG/DL (ref 8–23)
BUN/CREAT SERPL: 21.8 (ref 7–25)
CALCIUM SPEC-SCNC: 9.6 MG/DL (ref 8.6–10.5)
CHLORIDE SERPL-SCNC: 107 MMOL/L (ref 98–107)
CLARITY UR: ABNORMAL
CO2 SERPL-SCNC: 21.8 MMOL/L (ref 22–29)
COLOR UR: ABNORMAL
CREAT BLD-MCNC: 0.78 MG/DL (ref 0.76–1.27)
DEPRECATED RDW RBC AUTO: 51.7 FL (ref 37–54)
EOSINOPHIL # BLD AUTO: 0.23 10*3/MM3 (ref 0–0.7)
EOSINOPHIL NFR BLD AUTO: 2.3 % (ref 0.3–6.2)
ERYTHROCYTE [DISTWIDTH] IN BLOOD BY AUTOMATED COUNT: 13.7 % (ref 11.5–14.5)
GFR SERPL CREATININE-BSD FRML MDRD: 97 ML/MIN/1.73
GLOBULIN UR ELPH-MCNC: 2.6 GM/DL
GLUCOSE BLD-MCNC: 102 MG/DL (ref 65–99)
GLUCOSE UR STRIP-MCNC: NEGATIVE MG/DL
HCT VFR BLD AUTO: 44.3 % (ref 40.4–52.2)
HGB BLD-MCNC: 13.9 G/DL (ref 13.7–17.6)
HGB UR QL STRIP.AUTO: NEGATIVE
HYALINE CASTS UR QL AUTO: ABNORMAL /LPF
IMM GRANULOCYTES # BLD: 0.02 10*3/MM3 (ref 0–0.03)
IMM GRANULOCYTES NFR BLD: 0.2 % (ref 0–0.5)
KETONES UR QL STRIP: NEGATIVE
LEUKOCYTE ESTERASE UR QL STRIP.AUTO: ABNORMAL
LYMPHOCYTES # BLD AUTO: 2.76 10*3/MM3 (ref 0.9–4.8)
LYMPHOCYTES NFR BLD AUTO: 28.2 % (ref 19.6–45.3)
MAGNESIUM SERPL-MCNC: 2.2 MG/DL (ref 1.6–2.4)
MCH RBC QN AUTO: 32.1 PG (ref 27–32.7)
MCHC RBC AUTO-ENTMCNC: 31.4 G/DL (ref 32.6–36.4)
MCV RBC AUTO: 102.3 FL (ref 79.8–96.2)
MONOCYTES # BLD AUTO: 0.86 10*3/MM3 (ref 0.2–1.2)
MONOCYTES NFR BLD AUTO: 8.8 % (ref 5–12)
NEUTROPHILS # BLD AUTO: 5.88 10*3/MM3 (ref 1.9–8.1)
NEUTROPHILS NFR BLD AUTO: 60 % (ref 42.7–76)
NITRITE UR QL STRIP: NEGATIVE
PH UR STRIP.AUTO: 5.5 [PH] (ref 5–8)
PLATELET # BLD AUTO: 143 10*3/MM3 (ref 140–500)
PMV BLD AUTO: 10.9 FL (ref 6–12)
POTASSIUM BLD-SCNC: 4.1 MMOL/L (ref 3.5–5.2)
PROT SERPL-MCNC: 6.2 G/DL (ref 6–8.5)
PROT UR QL STRIP: NEGATIVE
RBC # BLD AUTO: 4.33 10*6/MM3 (ref 4.6–6)
RBC # UR: ABNORMAL /HPF
REF LAB TEST METHOD: ABNORMAL
RH BLD: POSITIVE
RH BLD: POSITIVE
SODIUM BLD-SCNC: 141 MMOL/L (ref 136–145)
SP GR UR STRIP: 1.02 (ref 1–1.03)
SQUAMOUS #/AREA URNS HPF: ABNORMAL /HPF
T&S EXPIRATION DATE: NORMAL
TROPONIN T SERPL-MCNC: <0.01 NG/ML (ref 0–0.03)
UROBILINOGEN UR QL STRIP: ABNORMAL
WBC NRBC COR # BLD: 9.8 10*3/MM3 (ref 4.5–10.7)
WBC UR QL AUTO: ABNORMAL /HPF

## 2018-05-04 PROCEDURE — 94799 UNLISTED PULMONARY SVC/PX: CPT

## 2018-05-04 PROCEDURE — 93010 ELECTROCARDIOGRAM REPORT: CPT | Performed by: INTERNAL MEDICINE

## 2018-05-04 PROCEDURE — 86850 RBC ANTIBODY SCREEN: CPT | Performed by: ORTHOPAEDIC SURGERY

## 2018-05-04 PROCEDURE — 93005 ELECTROCARDIOGRAM TRACING: CPT | Performed by: INTERNAL MEDICINE

## 2018-05-04 PROCEDURE — 80053 COMPREHEN METABOLIC PANEL: CPT | Performed by: INTERNAL MEDICINE

## 2018-05-04 PROCEDURE — 86901 BLOOD TYPING SEROLOGIC RH(D): CPT | Performed by: ORTHOPAEDIC SURGERY

## 2018-05-04 PROCEDURE — 81001 URINALYSIS AUTO W/SCOPE: CPT | Performed by: INTERNAL MEDICINE

## 2018-05-04 PROCEDURE — 87186 SC STD MICRODIL/AGAR DIL: CPT | Performed by: INTERNAL MEDICINE

## 2018-05-04 PROCEDURE — 85025 COMPLETE CBC W/AUTO DIFF WBC: CPT | Performed by: INTERNAL MEDICINE

## 2018-05-04 PROCEDURE — 87086 URINE CULTURE/COLONY COUNT: CPT | Performed by: INTERNAL MEDICINE

## 2018-05-04 PROCEDURE — 25010000002 MORPHINE PER 10 MG: Performed by: INTERNAL MEDICINE

## 2018-05-04 PROCEDURE — 99222 1ST HOSP IP/OBS MODERATE 55: CPT | Performed by: INTERNAL MEDICINE

## 2018-05-04 PROCEDURE — 99222 1ST HOSP IP/OBS MODERATE 55: CPT | Performed by: ORTHOPAEDIC SURGERY

## 2018-05-04 PROCEDURE — 86900 BLOOD TYPING SEROLOGIC ABO: CPT | Performed by: ORTHOPAEDIC SURGERY

## 2018-05-04 PROCEDURE — 94640 AIRWAY INHALATION TREATMENT: CPT

## 2018-05-04 PROCEDURE — 84484 ASSAY OF TROPONIN QUANT: CPT | Performed by: INTERNAL MEDICINE

## 2018-05-04 PROCEDURE — 83735 ASSAY OF MAGNESIUM: CPT | Performed by: INTERNAL MEDICINE

## 2018-05-04 RX ADMIN — DIVALPROEX SODIUM 500 MG: 500 TABLET, DELAYED RELEASE ORAL at 21:06

## 2018-05-04 RX ADMIN — PANTOPRAZOLE SODIUM 40 MG: 40 TABLET, DELAYED RELEASE ORAL at 09:38

## 2018-05-04 RX ADMIN — CARBIDOPA AND LEVODOPA 1 TABLET: 25; 250 TABLET ORAL at 18:14

## 2018-05-04 RX ADMIN — OXYBUTYNIN CHLORIDE 5 MG: 5 TABLET ORAL at 09:19

## 2018-05-04 RX ADMIN — OLANZAPINE 10 MG: 10 TABLET, FILM COATED ORAL at 21:06

## 2018-05-04 RX ADMIN — DIVALPROEX SODIUM 500 MG: 500 TABLET, DELAYED RELEASE ORAL at 09:18

## 2018-05-04 RX ADMIN — MORPHINE SULFATE 4 MG: 4 INJECTION, SOLUTION INTRAMUSCULAR; INTRAVENOUS at 03:27

## 2018-05-04 RX ADMIN — FLUTICASONE PROPIONATE 1 SPRAY: 50 SPRAY, METERED NASAL at 09:21

## 2018-05-04 RX ADMIN — MORPHINE SULFATE 4 MG: 4 INJECTION, SOLUTION INTRAMUSCULAR; INTRAVENOUS at 21:13

## 2018-05-04 RX ADMIN — DONEPEZIL HYDROCHLORIDE 20 MG: 10 TABLET, FILM COATED ORAL at 21:06

## 2018-05-04 RX ADMIN — CARBIDOPA AND LEVODOPA 1 TABLET: 25; 250 TABLET ORAL at 14:16

## 2018-05-04 RX ADMIN — ARFORMOTEROL TARTRATE 15 MCG: 15 SOLUTION RESPIRATORY (INHALATION) at 20:58

## 2018-05-04 RX ADMIN — MULTIPLE VITAMINS W/ MINERALS TAB 1 TABLET: TAB at 09:17

## 2018-05-04 RX ADMIN — SODIUM CHLORIDE 75 ML/HR: 9 INJECTION, SOLUTION INTRAVENOUS at 11:35

## 2018-05-04 RX ADMIN — LEVOTHYROXINE SODIUM 200 MCG: 100 TABLET ORAL at 09:17

## 2018-05-04 RX ADMIN — NICOTINE 1 PATCH: 21 PATCH, EXTENDED RELEASE TRANSDERMAL at 21:06

## 2018-05-04 RX ADMIN — ARFORMOTEROL TARTRATE 15 MCG: 15 SOLUTION RESPIRATORY (INHALATION) at 15:36

## 2018-05-04 RX ADMIN — ATORVASTATIN CALCIUM 20 MG: 20 TABLET, FILM COATED ORAL at 09:17

## 2018-05-04 RX ADMIN — BUSPIRONE HYDROCHLORIDE 5 MG: 5 TABLET ORAL at 09:18

## 2018-05-04 RX ADMIN — CETIRIZINE HYDROCHLORIDE 10 MG: 10 TABLET, FILM COATED ORAL at 09:20

## 2018-05-04 RX ADMIN — MORPHINE SULFATE 4 MG: 4 INJECTION, SOLUTION INTRAMUSCULAR; INTRAVENOUS at 13:14

## 2018-05-04 RX ADMIN — ALBUTEROL SULFATE 2.5 MG: 2.5 SOLUTION RESPIRATORY (INHALATION) at 10:22

## 2018-05-04 RX ADMIN — PROPRANOLOL HYDROCHLORIDE 10 MG: 10 TABLET ORAL at 09:18

## 2018-05-04 RX ADMIN — VITAMIN D, TAB 1000IU (100/BT) 1000 UNITS: 25 TAB at 09:17

## 2018-05-04 RX ADMIN — CARBIDOPA AND LEVODOPA 1 TABLET: 25; 250 TABLET ORAL at 09:16

## 2018-05-04 RX ADMIN — ARFORMOTEROL TARTRATE 15 MCG: 15 SOLUTION RESPIRATORY (INHALATION) at 00:41

## 2018-05-04 RX ADMIN — OXYBUTYNIN CHLORIDE 5 MG: 5 TABLET ORAL at 21:06

## 2018-05-04 NOTE — PLAN OF CARE
Problem: Patient Care Overview  Goal: Plan of Care Review  Outcome: Ongoing (interventions implemented as appropriate)   05/04/18 0317   Coping/Psychosocial   Plan of Care Reviewed With patient   Plan of Care Review   Progress no change   OTHER   Outcome Summary VSS, NEUROVASCULAR STATUS WNL, VOIDING WELL, REPORTS FAIR PAIN CONTROL, DISCUSSED IMPORTANCE OF NOT SMOKING TO FACILITATE HEALING AFTER SURGERY, FOR SURGERY THIS AM      Goal: Individualization and Mutuality  Outcome: Ongoing (interventions implemented as appropriate)    Goal: Discharge Needs Assessment  Outcome: Ongoing (interventions implemented as appropriate)    Goal: Interprofessional Rounds/Family Conf  Outcome: Ongoing (interventions implemented as appropriate)      Problem: Fall Risk (Adult)  Goal: Identify Related Risk Factors and Signs and Symptoms  Outcome: Outcome(s) achieved Date Met: 05/04/18    Goal: Absence of Fall  Outcome: Ongoing (interventions implemented as appropriate)      Problem: Fracture Orthopaedic (Adult)  Goal: Signs and Symptoms of Listed Potential Problems Will be Absent, Minimized or Managed (Fracture Orthopaedic)  Outcome: Ongoing (interventions implemented as appropriate)

## 2018-05-04 NOTE — PROGRESS NOTES
Name: Victor Manuel Issa ADMIT: 5/3/2018   : 1945  PCP: CUONG Guadalupe    MRN: [id number] LOS: 1 days   AGE/SEX: 72 y.o. male  ROOM: [facility]/   Subjective     Pain right hip but overall controlled  No chest pain or palpitations  No soa or cough  No nausea or vomiting but decreased appetite  Objective   Vital Signs  Temp:  [97.5 °F (36.4 °C)-99.1 °F (37.3 °C)] 97.5 °F (36.4 °C)  Heart Rate:  [64-94] 65  Resp:  [14-20] 16  BP: ()/(55-87) 95/57  SpO2:  [93 %-96 %] 93 %  on   ;   Device (Oxygen Therapy): room air  Body mass index is 24.72 kg/m².    Physical Exam   Constitutional: No distress.   HENT:   Head: Normocephalic and atraumatic.   Cardiovascular: Normal rate and regular rhythm.    No murmur heard.  Pulmonary/Chest: Effort normal and breath sounds normal. He has no wheezes.   Abdominal: Soft. Bowel sounds are normal. He exhibits no distension.   Musculoskeletal: He exhibits no edema or tenderness.        Right hip: He exhibits decreased range of motion and bony tenderness.   Neurological: He is alert.   Skin: Skin is warm and dry. He is not diaphoretic.   Psychiatric: He has a normal mood and affect. His behavior is normal.   Vitals reviewed.      Results Review:       I reviewed the patient's new clinical results.    Results from last 7 days  Lab Units 18  0443 18  1903   WBC 10*3/mm3 9.80 10.77*   HEMOGLOBIN g/dL 13.9 14.0   PLATELETS 10*3/mm3 143 186     Results from last 7 days  Lab Units 18  0558 18  1903   SODIUM mmol/L 141 143   POTASSIUM mmol/L 4.1 3.8   CHLORIDE mmol/L 107 106   CO2 mmol/L 21.8* 26.3   BUN mg/dL 17 19   CREATININE mg/dL 0.78 0.88   GLUCOSE mg/dL 102* 81   Estimated Creatinine Clearance: 92.1 mL/min (by C-G formula based on SCr of 0.78 mg/dL).  Results from last 7 days  Lab Units 18  0558 18  0443 18  1903   CALCIUM mg/dL 9.6  --  9.8   ALBUMIN g/dL 3.60  --  3.50   MAGNESIUM mg/dL  --  2.2  --          albuterol 2.5 mg  Nebulization Daily   arformoterol 15 mcg Nebulization BID - RT   atorvastatin 20 mg Oral Daily   carbidopa-levodopa 1 tablet Oral TID With Meals   cetirizine 10 mg Oral Daily   cholecalciferol 1,000 Units Oral Daily   divalproex 500 mg Oral BID   docusate sodium 100 mg Oral BID   donepezil 20 mg Oral Nightly   fluticasone 1 spray Nasal Daily   levothyroxine 200 mcg Oral Q AM   montelukast 10 mg Oral Nightly   Morphine sulfate (PF) 2 mg Intravenous Once   multivitamin with minerals 1 tablet Oral Daily   nicotine 1 patch Transdermal Once   nicotine 1 patch Transdermal Q24H   OLANZapine 10 mg Oral Nightly   oxybutynin 5 mg Oral BID   pantoprazole 40 mg Oral QAM   propranolol 10 mg Oral Daily       sodium chloride 75 mL/hr Last Rate: 75 mL/hr (05/04/18 1135)   Diet Regular  NPO Diet NPO Except: Sips With Meds      Assessment/Plan      Active Hospital Problems (** Indicates Principal Problem)    Diagnosis Date Noted   • **Hip fracture requiring operative repair, right, closed, initial encounter [S72.001A] 05/03/2018   • Hypothyroidism [E03.9] 10/01/2017   • Parkinson's disease [G20] 09/29/2017   • COPD (chronic obstructive pulmonary disease) [J44.9] 09/28/2017   • Coronary artery disease [I25.10] 09/28/2017   • History of CVA (cerebrovascular accident) [Z86.73] 09/28/2017      Resolved Hospital Problems    Diagnosis Date Noted Date Resolved   No resolved problems to display.       · Right subcapital femoral neck fracture: Acceptable risk for surgery, plan OR tomorrow am. Pain controlled, cont IV fluids  · Parkinson's: continue sinemet  · COPD: no active issues, cont home meds  · H/o CVA  · CAD: statin, nicotine patch  · Bipolar 1 disorder: cont zyprexa, depakote  · ? Demenita: on aricept which we will continue  · Reactive leukocytosis resolved  · PT consult post op        Kuldip Hurst MD  [facility]  05/04/18  2:25 PM

## 2018-05-04 NOTE — CONSULTS
Date of Hospital Visit: 18  Encounter Provider: Victor Manuel Bhat MD  Place of Service: [facility] CARDIOLOGY  Patient Name: Victor Manuel Issa  :1945  [id number]  Referral Provider: Lisbet Aaron MD    Chief complaint: Hip fracture preop evaluation    History of Present Illness: 72-year-old gentleman who fell down while moving a planned and fractured his hip he did not pass out.  He has had bypass surgery twice one to  once  and is followed by Dr. Read.  He is an ongoing smoker he does not have any chest pain shortness of breath PND orthopnea edema syncope palpitations or angina currently.  He is not allergic to any anesthetic agents and has not had any history of bleeding      Past Medical History:   Diagnosis Date   • Arthritis    • Bipolar 1 disorder    • COPD (chronic obstructive pulmonary disease)    • Coronary artery disease    • Disease of thyroid gland    • Hyperlipidemia    • Myocardial infarction    • Stroke        Past Surgical History:   Procedure Laterality Date   • ABDOMINAL SURGERY     • CARDIAC SURGERY     • CHOLECYSTECTOMY     • CORONARY ARTERY BYPASS GRAFT      x2   • EYE SURGERY      cataracts   • MUSCLE BIOPSY Left 3/24/2016    Procedure: LT THIGH MUSCLE BIOPSY;  Surgeon: Fausto Mack MD;  Location: [facility];  Service:    • SKIN BIOPSY     • THYROID SURGERY     • TURP / TRANSURETHRAL INCISION / DRAINAGE PROSTATE     • VASCULAR SURGERY         Prescriptions Prior to Admission   Medication Sig Dispense Refill Last Dose   • acetaminophen (TYLENOL) 325 MG tablet Take 2 tablets by mouth Every 4 (Four) Hours As Needed for Mild Pain , Headache or Fever.      • albuterol (PROVENTIL) (2.5 MG/3ML) 0.083% nebulizer solution Take 2.5 mg by nebulization Daily.      • arformoterol (BROVANA) 15 MCG/2ML nebulizer solution Take 15 mcg by nebulization 2 (Two) Times a Day.      • atorvastatin (LIPITOR) 80 MG tablet Take 1 tablet by mouth Every  Night. 30 tablet 0    • busPIRone (BUSPAR) 5 MG tablet Take 1 tablet by mouth 3 (Three) Times a Day As Needed (anxiety).      • carbidopa-levodopa (SINEMET)  MG per tablet Take 1 tablet by mouth 3 (Three) Times a Day With Meals. 90 tablet 0    • cholecalciferol (VITAMIN D3) 1000 UNITS tablet Take 1,000 Units by mouth daily.   Taking   • ciclopirox (LOPROX) 0.77 % gel Apply 1 application topically Daily. Apply to the nails      • diclofenac (VOLTAREN) 1 % gel gel Apply 4 g topically Daily As Needed (apply to the knees).      • divalproex (DEPAKOTE) 500 MG DR tablet Take 500 mg by mouth 2 (Two) Times a Day.      • docusate sodium 100 MG capsule Take 100 mg by mouth 2 (Two) Times a Day.      • donepezil (ARICEPT) 10 MG tablet Take 20 mg by mouth Every Night.      • esomeprazole (nexIUM) 40 MG capsule Take 40 mg by mouth Daily As Needed.      • fexofenadine (ALLEGRA) 180 MG tablet Take 180 mg by mouth daily.   Taking   • fluticasone (FLONASE) 50 MCG/ACT nasal spray 1 spray into each nostril Daily.      • guaiFENesin (MUCINEX) 600 MG 12 hr tablet Take 600 mg by mouth Daily As Needed for Cough.      • levothyroxine (SYNTHROID, LEVOTHROID) 200 MCG tablet Take 200 mcg by mouth Daily.      • MEGARED OMEGA-3 KRILL  MG capsule Take 1 capsule by mouth Daily.      • montelukast (SINGULAIR) 10 MG tablet Take 10 mg by mouth Every Night.   Taking   • Multiple Vitamins-Minerals (MULTIVITAMIN WITH MINERALS) tablet tablet Take 1 tablet by mouth Daily.      • naproxen (NAPROSYN) 500 MG tablet Take 500 mg by mouth 2 (Two) Times a Day As Needed for Mild Pain .      • nitrofurantoin (MACRODANTIN) 100 MG capsule Take 100 mg by mouth Daily.      • OLANZapine (ZYPREXA) 10 MG tablet Take 10 mg by mouth every night.   Taking   • oxybutynin (DITROPAN) 5 MG tablet Take 5 mg by mouth 2 (Two) Times a Day.   Taking   • pantoprazole (PROTONIX) 40 MG EC tablet Take 40 mg by mouth 2 (Two) Times a Day.      • promethazine (PHENERGAN) 25 MG  tablet Take 25 mg by mouth Every 6 (Six) Hours As Needed for Nausea or Vomiting.      • propranolol (INDERAL) 10 MG tablet Take 10 mg by mouth Daily.      • simvastatin (ZOCOR) 40 MG tablet Take 40 mg by mouth Every Night.          Current Meds  Scheduled Meds:  albuterol 2.5 mg Nebulization Daily   arformoterol 15 mcg Nebulization BID - RT   atorvastatin 20 mg Oral Daily   carbidopa-levodopa 1 tablet Oral TID With Meals   cetirizine 10 mg Oral Daily   cholecalciferol 1,000 Units Oral Daily   divalproex 500 mg Oral BID   docusate sodium 100 mg Oral BID   donepezil 20 mg Oral Nightly   fluticasone 1 spray Nasal Daily   levothyroxine 200 mcg Oral Q AM   montelukast 10 mg Oral Nightly   Morphine sulfate (PF) 2 mg Intravenous Once   multivitamin with minerals 1 tablet Oral Daily   nicotine 1 patch Transdermal Once   nicotine 1 patch Transdermal Q24H   OLANZapine 10 mg Oral Nightly   oxybutynin 5 mg Oral BID   pantoprazole 40 mg Oral QAM   propranolol 10 mg Oral Daily     Continuous Infusions:  sodium chloride 75 mL/hr Last Rate: 75 mL/hr (05/03/18 2152)     PRN Meds:.•  acetaminophen  •  busPIRone  •  Morphine  •  ondansetron  •  promethazine  •  sodium chloride  •  Insert peripheral IV **AND** sodium chloride    Allergies as of 05/03/2018 - Reviewed 05/03/2018   Allergen Reaction Noted   • Percocet [oxycodone-acetaminophen] Delirium 03/22/2016   • Phenergan [promethazine hcl] Hallucinations 03/22/2016       Social History     Social History   • Marital status:      Spouse name: N/A   • Number of children: N/A   • Years of education: N/A     Occupational History   • Not on file.     Social History Main Topics   • Smoking status: Current Every Day Smoker     Packs/day: 1.00     Types: Cigarettes     Start date: 1960   • Smokeless tobacco: Not on file      Comment: Educated pt on quitting   • Alcohol use No   • Drug use: No   • Sexual activity: Yes     Partners: Female     Other Topics Concern   • Not on file  "    Social History Narrative   • No narrative on file       Family History   Problem Relation Age of Onset   • Cancer Mother    • Arthritis Father    • Heart disease Father    • Early death Brother    • Heart disease Brother        REVIEW OF SYSTEMS:   ROS was performed and is negative except as outlined in HPI     REVIEW OF SYSTEMS:   CONSTITUTIONAL: No weight loss, fever, chills, weakness or fatigue.   HEENT: Eyes: No visual loss, blurred vision, double vision or yellow sclerae. Ears, Nose, Throat: No hearing loss, sneezing, congestion, runny nose or sore throat.   SKIN: No rash or itching.     RESPIRATORY: No shortness of breath, hemoptysis, cough or sputum.   GASTROINTESTINAL: No anorexia, nausea, vomiting or diarrhea. No abdominal pain, bright red blood per rectum or melena.  GENITOURINARY: No burning on urination, hematuria or increased frequency.  NEUROLOGICAL: No headache, dizziness, syncope, paralysis, ataxia, numbness or tingling in the extremities. No change in bowel or bladder control.   MUSCULOSKELETAL: No muscle, back pain, joint pain or stiffness.   HEMATOLOGIC: No anemia, bleeding or bruising.   LYMPHATICS: No enlarged nodes. No history of splenectomy.   PSYCHIATRIC: No history of depression, anxiety, hallucinations.   ENDOCRINOLOGIC: No reports of sweating, cold or heat intolerance. No polyuria or polydipsia.       Objective:   Temp:  [97.7 °F (36.5 °C)-99.1 °F (37.3 °C)] 97.9 °F (36.6 °C)  Heart Rate:  [65-94] 72  Resp:  [14-16] 16  BP: (103-140)/(55-87) 105/65  Body mass index is 24.72 kg/m².  Flowsheet Rows    Flowsheet Row First Filed Value   Admission Height 180.3 cm (71\") Documented at 05/03/2018 1649   Admission Weight 77.1 kg (170 lb) Documented at 05/03/2018 1649        Vitals:    05/04/18 0722   BP: 105/65   Pulse: 72   Resp: 16   Temp: 97.9 °F (36.6 °C)   SpO2: 96%       Head:    Normocephalic, without obvious abnormality, atraumatic   Eyes:            Lids and lashes normal, conjunctivae " and sclerae normal, no   icterus, no pallor   Ears:    Ears appear intact with no abnormalities noted   Throat:   No oral lesions, dentition good   Neck:   No adenopathy, supple, trachea midline, no thyromegaly, no   carotid bruit, no JVD   Lungs:     Breath sounds are equal and clear to auscultation    Heart:    Normal S1 and S2, RRR, No M/G/R   Abdomen:     Normal bowel sounds, no masses, no organomegaly, soft        non-tender, non-distended, no guarding   Extremities:   Moves all extremities well, no edema, no cyanosis, no redness   Pulses:   Pulses palpable and equal bilaterally.    Skin:  Psychiatric:   No bleeding, bruising or rash    Awake, alert and oriented x 3, normal mood and affect             I personally viewed and interpreted the patient's EKG/Telemetry data    Assessment:  Active Hospital Problems (** Indicates Principal Problem)    Diagnosis Date Noted   • **Hip fracture requiring operative repair, right, closed, initial encounter [S72.001A] 05/03/2018   • Hypothyroidism [E03.9] 10/01/2017   • Parkinson's disease [G20] 09/29/2017   • COPD (chronic obstructive pulmonary disease) [J44.9] 09/28/2017   • Coronary artery disease [I25.10] 09/28/2017   • History of CVA (cerebrovascular accident) [Z86.73] 09/28/2017      Resolved Hospital Problems    Diagnosis Date Noted Date Resolved   No resolved problems to display.       Plan:He does not have any active cardiac symptoms right now and has a hip fracture.  He has to have his hip fixed arousal never get out of bed again.  We know that the sooner it can get done the better it is for him.  I don't think he needs any further testing and I think he's had an acceptable risk to proceed with his hip surgery.  We will follow along.  He has nonspecific changes on his EKG that are not changed    Victor Manuel Bhat MD  05/04/18  8:35 AM.

## 2018-05-04 NOTE — CONSULTS
Orthopedic Consult    Patient: Victor Manuel Issa    Date of Admission: 5/3/2018  4:53 PM    YOB: 1945    Medical Record Number: [id number]    Consulting Physician: Kuldip Hurst MD    Chief Complaints: Right femoral neck fracture    History of Present Illness: 72 y.o. male [facility] [place] to services of Kuldip Hurst MD with a displaced femoral neck fracture. Patient reports that the injury was sustained in a fall at home. Landed awkwardly on the affected hip. Noticed immediate pain and inability to bear weight on the leg. Was brought to the ER and diagnosed with a displaced femoral neck fracture. Localizes the pain to the groin. Describes pain as severe, constant, worse with any movement but alleviated by rest. Denies LOC. Denies any other associated injury or complaint.  Prior to his fall, he ambulated with a walker.    Allergies:   Allergies   Allergen Reactions   • Percocet [Oxycodone-Acetaminophen] Delirium   • Phenergan [Promethazine Hcl] Hallucinations       Medications:   Home Medications:    Current Facility-Administered Medications:   •  acetaminophen (TYLENOL) tablet 650 mg, 650 mg, Oral, Q4H PRN, Lisbet Aaron MD  •  albuterol (PROVENTIL) nebulizer solution 0.083% 2.5 mg/3mL, 2.5 mg, Nebulization, Daily, Lisbet Aaron MD  •  arformoterol (BROVANA) nebulizer solution 15 mcg, 15 mcg, Nebulization, BID - RT, Lisbet Aaron MD, 15 mcg at 05/04/18 0041  •  atorvastatin (LIPITOR) tablet 20 mg, 20 mg, Oral, Daily, Lisbet Aaron MD  •  busPIRone (BUSPAR) tablet 5 mg, 5 mg, Oral, TID PRN, Lisbet Aaron MD  •  carbidopa-levodopa (SINEMET)  MG per tablet 1 tablet, 1 tablet, Oral, TID With Meals, Lisbet Aaron MD  •  cetirizine (zyrTEC) tablet 10 mg, 10 mg, Oral, Daily, Lisbet Aaron MD  •  cholecalciferol (VITAMIN D3) tablet 1,000 Units, 1,000 Units, Oral, Daily, Lisbet Aaron MD  •  divalproex (DEPAKOTE) DR tablet 500 mg, 500 mg, Oral, BID, Jawed MD Galen, 500 mg at  05/03/18 2334  •  docusate sodium (COLACE) capsule 100 mg, 100 mg, Oral, BID, Jawstanley Aaron MD  •  donepezil (ARICEPT) tablet 20 mg, 20 mg, Oral, Nightly, Jawed MD Galen, 20 mg at 05/03/18 2335  •  fluticasone (FLONASE) 50 MCG/ACT nasal spray 1 spray, 1 spray, Nasal, Daily, Lisbet Aaron MD  •  levothyroxine (SYNTHROID, LEVOTHROID) tablet 200 mcg, 200 mcg, Oral, Q AM, Lisbet Aaron MD  •  montelukast (SINGULAIR) tablet 10 mg, 10 mg, Oral, Nightly, Jawed MD Galen, 10 mg at 05/03/18 2335  •  morphine injection 4 mg, 4 mg, Intravenous, Q4H PRN, Lisbet Aaron MD, 4 mg at 05/04/18 0327  •  Morphine sulfate (PF) injection 2 mg, 2 mg, Intravenous, Once, Antonia Black MD  •  multivitamin with minerals 1 tablet, 1 tablet, Oral, Daily, Lisbet Aaron MD  •  nicotine (NICODERM CQ) 21 MG/24HR patch 1 patch, 1 patch, Transdermal, Once, Antonia Black MD, 1 patch at 05/03/18 1921  •  nicotine (NICODERM CQ) 21 MG/24HR patch 1 patch, 1 patch, Transdermal, Q24H, Jawstanley Aaron MD  •  OLANZapine (zyPREXA) tablet 10 mg, 10 mg, Oral, Nightly, Jawstanley Aaron MD, 10 mg at 05/03/18 2335  •  ondansetron (ZOFRAN) injection 4 mg, 4 mg, Intravenous, Q6H PRN, Lisbet Aaron MD  •  oxybutynin (DITROPAN) tablet 5 mg, 5 mg, Oral, BID, Lisbet Aaron MD  •  pantoprazole (PROTONIX) EC tablet 40 mg, 40 mg, Oral, QAM, Lisbet Aaron MD  •  promethazine (PHENERGAN) tablet 25 mg, 25 mg, Oral, Q6H PRN, Lisbet Aaron MD  •  propranolol (INDERAL) tablet 10 mg, 10 mg, Oral, Daily, Jawstanley Aaron MD  •  sodium chloride 0.9 % flush 1-10 mL, 1-10 mL, Intravenous, PRN, Jawstanley Aaron MD  •  Insert peripheral IV, , , Once **AND** sodium chloride 0.9 % flush 10 mL, 10 mL, Intravenous, PRN, Antonia Black MD  •  sodium chloride 0.9 % infusion, 75 mL/hr, Intravenous, Continuous, Jawed MD Galen, Last Rate: 75 mL/hr at 05/03/18 2152, 75 mL/hr at 05/03/18 2152    Current Medications:  Scheduled Meds:  albuterol 2.5 mg Nebulization Daily   arformoterol 15 mcg Nebulization BID - RT    atorvastatin 20 mg Oral Daily   carbidopa-levodopa 1 tablet Oral TID With Meals   cetirizine 10 mg Oral Daily   cholecalciferol 1,000 Units Oral Daily   divalproex 500 mg Oral BID   docusate sodium 100 mg Oral BID   donepezil 20 mg Oral Nightly   fluticasone 1 spray Nasal Daily   levothyroxine 200 mcg Oral Q AM   montelukast 10 mg Oral Nightly   Morphine sulfate (PF) 2 mg Intravenous Once   multivitamin with minerals 1 tablet Oral Daily   nicotine 1 patch Transdermal Once   nicotine 1 patch Transdermal Q24H   OLANZapine 10 mg Oral Nightly   oxybutynin 5 mg Oral BID   pantoprazole 40 mg Oral QAM   propranolol 10 mg Oral Daily     Continuous Infusions:  sodium chloride 75 mL/hr Last Rate: 75 mL/hr (05/03/18 2152)     PRN Meds:.•  acetaminophen  •  busPIRone  •  Morphine  •  ondansetron  •  promethazine  •  sodium chloride  •  Insert peripheral IV **AND** sodium chloride    Past Medical History:   Diagnosis Date   • Arthritis    • Bipolar 1 disorder    • COPD (chronic obstructive pulmonary disease)    • Coronary artery disease    • Disease of thyroid gland    • Hyperlipidemia    • Myocardial infarction    • Stroke        Past Surgical History:   Procedure Laterality Date   • ABDOMINAL SURGERY     • CARDIAC SURGERY     • CHOLECYSTECTOMY     • CORONARY ARTERY BYPASS GRAFT      x2   • EYE SURGERY      cataracts   • MUSCLE BIOPSY Left 3/24/2016    Procedure: LT THIGH MUSCLE BIOPSY;  Surgeon: Fausto Mack MD;  Location: [facility];  Service:    • SKIN BIOPSY     • THYROID SURGERY     • TURP / TRANSURETHRAL INCISION / DRAINAGE PROSTATE     • VASCULAR SURGERY         Social History     Occupational History   • Not on file.     Social History Main Topics   • Smoking status: Current Every Day Smoker     Packs/day: 1.00     Types: Cigarettes     Start date: 1960   • Smokeless tobacco: Not on file      Comment: Educated pt on quitting   • Alcohol use No   • Drug use: No   • Sexual activity: Yes     Partners:  Female    Social History     Social History Narrative   • No narrative on file       Family History   Problem Relation Age of Onset   • Cancer Mother    • Arthritis Father    • Heart disease Father    • Early death Brother    • Heart disease Brother      Review of Systems:     Constitutional:  Denies fever, shaking or chills   Eyes:  Denies change in visual acuity   HEENT:  Denies nasal congestion or sore throat   Respiratory:  Denies cough or shortness of breath   Cardiovascular:  Denies chest pain or edema  Endocrine: Denies tremors, palpitations, intolerance of heat or cold, polyuria, polydipsia.  GI:  Denies abdominal pain, nausea, vomiting, bloody stools or diarrhea  :  Denies frequency, urgency, incontinence, retention, or nocturia.  Musculoskeletal:  Denies numbness tingling or loss of motor function except as above  Integument:  Denies rash, lesion or ulceration   Neurologic:  Denies headache or focal weakness, deficits  Heme:  Denies epistaxis, spontaneous or excessive bleeding, epistaxis, hematuria, melena, fatigue, enlarged or tender lymph nodes.      All other pertinent positives and negatives as noted above in HPI.    Physical Exam: 72 y.o. male    Vitals:    05/03/18 2245 05/04/18 0041 05/04/18 0330 05/04/18 0722   BP: 117/67  103/65 105/65   BP Location: Right arm  Right arm Right arm   Patient Position: Lying  Lying Lying   Pulse: 94 79 79 72   Resp: 16 16 16 16   Temp: 99.1 °F (37.3 °C)  98.4 °F (36.9 °C) 97.9 °F (36.6 °C)   TempSrc: Oral  Oral Oral   SpO2: 93% 95% 94% 96%   Weight:       Height:         General:  Awake, alert. No acute distress.     Head/Neck:  Normocephalic, atraumatic.  Conjunctiva and sclera clear.  Hearing adequate for the exam.  Neck is supple with normal ROM.    Psych:  Affect and demeanor appropriate.    CV:  Regular rate and rhythm.  Hemodynamically stable.    Lungs:  Good chest expansion, breathing unlabored.    Abdomen:  Soft.  Non-tender, non-distended.    Extremities:       Right lower extremity: Skin appears benign without obvious lacerations, ulcerations or lesions. Leg appears shortened and externally rotated. Compartments soft without evidence for DVT or compartment syndrome. No atrophy. No palpable masses or adenopathy. Focal TTP over hip. ROM of hip extremely limited and uncomfortable. No pain with passive motion of knee, ankle. Strength well-preserved distally. Sensation to light touch grossly intact distally. Good skin turgor, brisk cap refill and good pulses distally.    All other extremities atraumatic without gross abnormality. Contralateral leg demonstrates no bony tenderness. Good hip, knee motion. No instability. Good motor and sensory function distally with brisk cap refill.    Diagnostic Tests:    Admission on 05/03/2018   Component Date Value Ref Range Status   • Valproic Acid 05/03/2018 16.0* 50.0 - 125.0 mcg/mL Final   • Glucose 05/03/2018 81  65 - 99 mg/dL Final   • BUN 05/03/2018 19  8 - 23 mg/dL Final   • Creatinine 05/03/2018 0.88  0.76 - 1.27 mg/dL Final   • Sodium 05/03/2018 143  136 - 145 mmol/L Final   • Potassium 05/03/2018 3.8  3.5 - 5.2 mmol/L Final   • Chloride 05/03/2018 106  98 - 107 mmol/L Final   • CO2 05/03/2018 26.3  22.0 - 29.0 mmol/L Final   • Calcium 05/03/2018 9.8  8.6 - 10.5 mg/dL Final   • Total Protein 05/03/2018 6.4  6.0 - 8.5 g/dL Final   • Albumin 05/03/2018 3.50  3.50 - 5.20 g/dL Final   • ALT (SGPT) 05/03/2018 8  1 - 41 U/L Final   • AST (SGOT) 05/03/2018 12  1 - 40 U/L Final   • Alkaline Phosphatase 05/03/2018 67  39 - 117 U/L Final   • Total Bilirubin 05/03/2018 0.2  0.1 - 1.2 mg/dL Final   • eGFR Non African Amer 05/03/2018 85  >60 mL/min/1.73 Final   • Globulin 05/03/2018 2.9  gm/dL Final   • A/G Ratio 05/03/2018 1.2  g/dL Final   • BUN/Creatinine Ratio 05/03/2018 21.6  7.0 - 25.0 Final   • Anion Gap 05/03/2018 10.7  mmol/L Final   • WBC 05/03/2018 10.77* 4.50 - 10.70 10*3/mm3 Final   • RBC 05/03/2018 4.32* 4.60 - 6.00 10*6/mm3  Final   • Hemoglobin 05/03/2018 14.0  13.7 - 17.6 g/dL Final   • Hematocrit 05/03/2018 43.3  40.4 - 52.2 % Final   • MCV 05/03/2018 100.2* 79.8 - 96.2 fL Final   • MCH 05/03/2018 32.4  27.0 - 32.7 pg Final   • MCHC 05/03/2018 32.3* 32.6 - 36.4 g/dL Final   • RDW 05/03/2018 13.6  11.5 - 14.5 % Final   • RDW-SD 05/03/2018 50.4  37.0 - 54.0 fl Final   • MPV 05/03/2018 11.3  6.0 - 12.0 fL Final   • Platelets 05/03/2018 186  140 - 500 10*3/mm3 Final   • Neutrophil % 05/03/2018 66.8  42.7 - 76.0 % Final   • Lymphocyte % 05/03/2018 24.1  19.6 - 45.3 % Final   • Monocyte % 05/03/2018 7.2  5.0 - 12.0 % Final   • Eosinophil % 05/03/2018 1.4  0.3 - 6.2 % Final   • Basophil % 05/03/2018 0.3  0.0 - 1.5 % Final   • Immature Grans % 05/03/2018 0.2  0.0 - 0.5 % Final   • Neutrophils, Absolute 05/03/2018 7.19  1.90 - 8.10 10*3/mm3 Final   • Lymphocytes, Absolute 05/03/2018 2.60  0.90 - 4.80 10*3/mm3 Final   • Monocytes, Absolute 05/03/2018 0.78  0.20 - 1.20 10*3/mm3 Final   • Eosinophils, Absolute 05/03/2018 0.15  0.00 - 0.70 10*3/mm3 Final   • Basophils, Absolute 05/03/2018 0.03  0.00 - 0.20 10*3/mm3 Final   • Immature Grans, Absolute 05/03/2018 0.02  0.00 - 0.03 10*3/mm3 Final   • Glucose 05/04/2018 102* 65 - 99 mg/dL Final   • BUN 05/04/2018 17  8 - 23 mg/dL Final   • Creatinine 05/04/2018 0.78  0.76 - 1.27 mg/dL Final   • Sodium 05/04/2018 141  136 - 145 mmol/L Final   • Potassium 05/04/2018 4.1  3.5 - 5.2 mmol/L Final   • Chloride 05/04/2018 107  98 - 107 mmol/L Final   • CO2 05/04/2018 21.8* 22.0 - 29.0 mmol/L Final   • Calcium 05/04/2018 9.6  8.6 - 10.5 mg/dL Final   • Total Protein 05/04/2018 6.2  6.0 - 8.5 g/dL Final   • Albumin 05/04/2018 3.60  3.50 - 5.20 g/dL Final   • ALT (SGPT) 05/04/2018 7  1 - 41 U/L Final   • AST (SGOT) 05/04/2018 11  1 - 40 U/L Final   • Alkaline Phosphatase 05/04/2018 67  39 - 117 U/L Final   • Total Bilirubin 05/04/2018 0.7  0.1 - 1.2 mg/dL Final   • eGFR Non African Amer 05/04/2018 97  >60  mL/min/1.73 Final   • Globulin 05/04/2018 2.6  gm/dL Final   • A/G Ratio 05/04/2018 1.4  g/dL Final   • BUN/Creatinine Ratio 05/04/2018 21.8  7.0 - 25.0 Final   • Anion Gap 05/04/2018 12.2  mmol/L Final   • WBC 05/04/2018 9.80  4.50 - 10.70 10*3/mm3 Final   • RBC 05/04/2018 4.33* 4.60 - 6.00 10*6/mm3 Final   • Hemoglobin 05/04/2018 13.9  13.7 - 17.6 g/dL Final   • Hematocrit 05/04/2018 44.3  40.4 - 52.2 % Final   • MCV 05/04/2018 102.3* 79.8 - 96.2 fL Final   • MCH 05/04/2018 32.1  27.0 - 32.7 pg Final   • MCHC 05/04/2018 31.4* 32.6 - 36.4 g/dL Final   • RDW 05/04/2018 13.7  11.5 - 14.5 % Final   • RDW-SD 05/04/2018 51.7  37.0 - 54.0 fl Final   • MPV 05/04/2018 10.9  6.0 - 12.0 fL Final   • Platelets 05/04/2018 143  140 - 500 10*3/mm3 Final   • Neutrophil % 05/04/2018 60.0  42.7 - 76.0 % Final   • Lymphocyte % 05/04/2018 28.2  19.6 - 45.3 % Final   • Monocyte % 05/04/2018 8.8  5.0 - 12.0 % Final   • Eosinophil % 05/04/2018 2.3  0.3 - 6.2 % Final   • Basophil % 05/04/2018 0.5  0.0 - 1.5 % Final   • Immature Grans % 05/04/2018 0.2  0.0 - 0.5 % Final   • Neutrophils, Absolute 05/04/2018 5.88  1.90 - 8.10 10*3/mm3 Final   • Lymphocytes, Absolute 05/04/2018 2.76  0.90 - 4.80 10*3/mm3 Final   • Monocytes, Absolute 05/04/2018 0.86  0.20 - 1.20 10*3/mm3 Final   • Eosinophils, Absolute 05/04/2018 0.23  0.00 - 0.70 10*3/mm3 Final   • Basophils, Absolute 05/04/2018 0.05  0.00 - 0.20 10*3/mm3 Final   • Immature Grans, Absolute 05/04/2018 0.02  0.00 - 0.03 10*3/mm3 Final   • Magnesium 05/04/2018 2.2  1.6 - 2.4 mg/dL Final   • Troponin T 05/04/2018 <0.010  0.000 - 0.030 ng/mL Final     Lab Results (last 24 hours)     Procedure Component Value Units Date/Time    Comprehensive Metabolic Panel [[id number]]  (Abnormal) Collected:  05/04/18 0558    Specimen:  Blood Updated:  05/04/18 0634     Glucose 102 (H) mg/dL      BUN 17 mg/dL      Creatinine 0.78 mg/dL      Sodium 141 mmol/L      Potassium 4.1 mmol/L      Chloride 107 mmol/L       CO2 21.8 (L) mmol/L      Calcium 9.6 mg/dL      Total Protein 6.2 g/dL      Albumin 3.60 g/dL      ALT (SGPT) 7 U/L      AST (SGOT) 11 U/L      Alkaline Phosphatase 67 U/L      Total Bilirubin 0.7 mg/dL      eGFR Non African Amer 97 mL/min/1.73      Globulin 2.6 gm/dL      A/G Ratio 1.4 g/dL      BUN/Creatinine Ratio 21.8     Anion Gap 12.2 mmol/L     Narrative:       The MDRD GFR formula is only valid for adults with stable renal function between ages 18 and 70.    Magnesium [[id number]]  (Normal) Collected:  05/04/18 0443    Specimen:  Blood Updated:  05/04/18 0535     Magnesium 2.2 mg/dL     Troponin [[id number]]  (Normal) Collected:  05/04/18 0443    Specimen:  Blood Updated:  05/04/18 0535     Troponin T <0.010 ng/mL     Narrative:       Troponin T Reference Ranges:  Less than 0.03 ng/mL:    Negative for AMI  0.03 to 0.09 ng/mL:      Indeterminant for AMI  Greater than 0.09 ng/mL: Positive for AMI    CBC Auto Differential [[id number]]  (Abnormal) Collected:  05/04/18 0443    Specimen:  Blood Updated:  05/04/18 0509     WBC 9.80 10*3/mm3      RBC 4.33 (L) 10*6/mm3      Hemoglobin 13.9 g/dL      Hematocrit 44.3 %      .3 (H) fL      MCH 32.1 pg      MCHC 31.4 (L) g/dL      RDW 13.7 %      RDW-SD 51.7 fl      MPV 10.9 fL      Platelets 143 10*3/mm3      Neutrophil % 60.0 %      Lymphocyte % 28.2 %      Monocyte % 8.8 %      Eosinophil % 2.3 %      Basophil % 0.5 %      Immature Grans % 0.2 %      Neutrophils, Absolute 5.88 10*3/mm3      Lymphocytes, Absolute 2.76 10*3/mm3      Monocytes, Absolute 0.86 10*3/mm3      Eosinophils, Absolute 0.23 10*3/mm3      Basophils, Absolute 0.05 10*3/mm3      Immature Grans, Absolute 0.02 10*3/mm3     Valproic Acid Level, Total [[id number]]  (Abnormal) Collected:  05/03/18 1903    Specimen:  Blood Updated:  05/03/18 1939     Valproic Acid 16.0 (L) mcg/mL     Comprehensive Metabolic Panel [[id number]] Collected:  05/03/18 1903    Specimen:  Blood Updated:  05/03/18 1939      Glucose 81 mg/dL      BUN 19 mg/dL      Creatinine 0.88 mg/dL      Sodium 143 mmol/L      Potassium 3.8 mmol/L      Chloride 106 mmol/L      CO2 26.3 mmol/L      Calcium 9.8 mg/dL      Total Protein 6.4 g/dL      Albumin 3.50 g/dL      ALT (SGPT) 8 U/L      AST (SGOT) 12 U/L      Alkaline Phosphatase 67 U/L      Total Bilirubin 0.2 mg/dL      eGFR Non African Amer 85 mL/min/1.73      Globulin 2.9 gm/dL      A/G Ratio 1.2 g/dL      BUN/Creatinine Ratio 21.6     Anion Gap 10.7 mmol/L     Narrative:       The MDRD GFR formula is only valid for adults with stable renal function between ages 18 and 70.    CBC & Differential [[id number]] Collected:  05/03/18 1903    Specimen:  Blood Updated:  05/03/18 1923    Narrative:       The following orders were created for panel order CBC & Differential.  Procedure                               Abnormality         Status                     ---------                               -----------         ------                     CBC Auto Differential[[id number]]        Abnormal            Final result                 Please view results for these tests on the individual orders.    CBC Auto Differential [[id number]]  (Abnormal) Collected:  05/03/18 1903    Specimen:  Blood Updated:  05/03/18 1923     WBC 10.77 (H) 10*3/mm3      RBC 4.32 (L) 10*6/mm3      Hemoglobin 14.0 g/dL      Hematocrit 43.3 %      .2 (H) fL      MCH 32.4 pg      MCHC 32.3 (L) g/dL      RDW 13.6 %      RDW-SD 50.4 fl      MPV 11.3 fL      Platelets 186 10*3/mm3      Neutrophil % 66.8 %      Lymphocyte % 24.1 %      Monocyte % 7.2 %      Eosinophil % 1.4 %      Basophil % 0.3 %      Immature Grans % 0.2 %      Neutrophils, Absolute 7.19 10*3/mm3      Lymphocytes, Absolute 2.60 10*3/mm3      Monocytes, Absolute 0.78 10*3/mm3      Eosinophils, Absolute 0.15 10*3/mm3      Basophils, Absolute 0.03 10*3/mm3      Immature Grans, Absolute 0.02 10*3/mm3         Imaging:  AP pelvis and lateral views of the right hip are  available for review on the [facility] system along with the associated report. The films show a displaced subcapital femoral neck fracture.    Assessment:  Right femoral neck fracture    Plan:  We had a thorough discussion regarding the risks, benefits and alternatives to an arthroplasty and non-surgical management versus surgery. I explained that surgical risks include infection, hematoma, hardware related complications including failure of fixation, loosening, fracture, persistent pain and/or loss of motion, dislocation, acetabular pain necessitating conversion to a total hip, post-operative arthrofibrosis, iatrogenic nerve and/or blood vessel injury resulting in permanent weakness, numbness or dysfunction, RSD, DVT, PE, positioning related neuropraxia, and anesthesia related complications resulting in death. Lastly, we discussed the rehab and all that will be expected of the patient post operatively to ensure an optimal outcome. The patient voiced understanding of the risks, benefits, and alternative forms of treatment that were discussed and the patient consents to proceed with the hip hemiarthroplasty.  I had hoped to proceed with his surgery this morning but cardiology clearance was required.  We'll plan to proceed first thing in the morning.  Thank you for the consultation.    Date: 5/4/2018    Winston Vallejo MD    CC: Lisbet Aaron MD

## 2018-05-04 NOTE — NURSING NOTE
DR PALM CALLED AND STATES TO HAVE CARDIOLOGY ON CALL LOOK AT EKG TO SEE IF PT OK TO GO TO SURGERY. DR PALM STATES PT OK TO GO TO SURGERY FROM [facility]NAL [facility] POINT

## 2018-05-04 NOTE — PLAN OF CARE
Problem: Patient Care Overview  Goal: Plan of Care Review  Outcome: Ongoing (interventions implemented as appropriate)   05/04/18 1720   Coping/Psychosocial   Plan of Care Reviewed With patient   Plan of Care Review   Progress improving   OTHER   Outcome Summary VSS. Pain controlled with IV morphine. Neurovascular status WNL. Condom cath changed after pericare. UA obtained as ordered, culture pending. Surgery scheduled for 0730 on 5/5/18. Pt and family want to wait to sign consent after speaking with MD, still has questions. NPO after midnight with sips of water for meds. Discussed BP med and monitoring r/t HTN.        Problem: Fall Risk (Adult)  Goal: Absence of Fall  Outcome: Ongoing (interventions implemented as appropriate)      Problem: Fracture Orthopaedic (Adult)  Goal: Signs and Symptoms of Listed Potential Problems Will be Absent, Minimized or Managed (Fracture Orthopaedic)  Outcome: Ongoing (interventions implemented as appropriate)      Problem: Skin Injury Risk (Adult)  Goal: Identify Related Risk Factors and Signs and Symptoms  Outcome: Outcome(s) achieved Date Met: 05/04/18    Goal: Skin Health and Integrity  Outcome: Ongoing (interventions implemented as appropriate)

## 2018-05-04 NOTE — ED NOTES
Attempted to call report, nurse in isolation room and will call me back at this time     Vee Castañeda RN  05/03/18 2015

## 2018-05-04 NOTE — H&P
Internal medicine history and physical   INTERNAL MEDICINE   [facility]       Patient Identification:  Name: Victor Manuel Issa  Age: 72 y.o.  Sex: male  :  1945  MRN: [id number]                 Primary Care Physician: CUONG Guadalupe                                   Chief Complaint: Severe pain and discomfort in the right hip after a fall this afternoon.    History of Present Illness:   Patient is a 72-year-old male whose past medical history as listed below was doing otherwise okay and had no new symptoms until this afternoon while moving a ceramic pot in the house he lost his balance and fell.  His wife witnessed the fall and because of his inability to get up and severe pain in his right hip EMS was called and patient was brought into the emergency room for further evaluation.  Patient states that they had a indoor plant that  and he got another plant that needed to be placed in the house that require removing the ceramic pot to a different area.  Patient has underlying history of COPD and coronary artery disease and sees Dr. Read for his heart issues.  He states that the last time he saw Dr. Read was about a year ago and has not had a need to see him since then.  He denies any chest pain denies any orthopnea denies any PND denies any specific swelling of his legs.  He says that his ability to do physical activities unchanged in the last year.  His last hospitalization prior to this was in 2017 when he was admitted for weakness after finishing oral antibiotics for urinary tract infection and was diagnosed with Parkinson's disease or vertebral describes as he was told that he has parkinsonian weakness.  According to the patient if it was not for the fall she would not have come to the hospital otherwise.      Past Medical History:  Past Medical History:   Diagnosis Date   • Arthritis    • Bipolar 1 disorder    • COPD (chronic obstructive pulmonary disease)    •  Coronary artery disease    • Disease of thyroid gland    • Hyperlipidemia    • Myocardial infarction    • Stroke      Past Surgical History:  Past Surgical History:   Procedure Laterality Date   • ABDOMINAL SURGERY     • CARDIAC SURGERY     • CHOLECYSTECTOMY     • CORONARY ARTERY BYPASS GRAFT      x2   • EYE SURGERY      cataracts   • MUSCLE BIOPSY Left 3/24/2016    Procedure: LT THIGH MUSCLE BIOPSY;  Surgeon: Fausto Mack MD;  Location: [facility] MAIN OR;  Service:    • SKIN BIOPSY     • THYROID SURGERY     • TURP / TRANSURETHRAL INCISION / DRAINAGE PROSTATE     • VASCULAR SURGERY        Home Meds:    (Not in a hospital admission)  Current Meds:     Current Facility-Administered Medications:   •  morphine injection 2 mg, 2 mg, Intravenous, Q1H PRN, Antonia Black MD, 2 mg at 05/03/18 2017  •  nicotine (NICODERM CQ) 21 MG/24HR patch 1 patch, 1 patch, Transdermal, Once, Antonia Black MD, 1 patch at 05/03/18 1921  •  Insert peripheral IV, , , Once **AND** sodium chloride 0.9 % flush 10 mL, 10 mL, Intravenous, PRN, Antonia Black MD    Current Outpatient Prescriptions:   •  acetaminophen (TYLENOL) 325 MG tablet, Take 2 tablets by mouth Every 4 (Four) Hours As Needed for Mild Pain , Headache or Fever., Disp: , Rfl:   •  albuterol (PROVENTIL HFA;VENTOLIN HFA) 108 (90 Base) MCG/ACT inhaler, Inhale 2 puffs Every 4 (Four) Hours As Needed for Wheezing., Disp: , Rfl:   •  albuterol (PROVENTIL) (2.5 MG/3ML) 0.083% nebulizer solution, Take 2.5 mg by nebulization Daily., Disp: , Rfl:   •  arformoterol (BROVANA) 15 MCG/2ML nebulizer solution, Take 15 mcg by nebulization 2 (Two) Times a Day., Disp: , Rfl:   •  busPIRone (BUSPAR) 5 MG tablet, Take 1 tablet by mouth 3 (Three) Times a Day As Needed (anxiety)., Disp: , Rfl:   •  carbidopa-levodopa (SINEMET)  MG per tablet, Take 1 tablet by mouth 3 (Three) Times a Day With Meals., Disp: 90 tablet, Rfl: 0  •  cholecalciferol (VITAMIN D3) 1000 UNITS tablet, Take 1,000 Units by  mouth daily., Disp: , Rfl:   •  ciclopirox (LOPROX) 0.77 % gel, Apply 1 application topically Daily. Apply to the nails, Disp: , Rfl:   •  diclofenac (VOLTAREN) 1 % gel gel, Apply 4 g topically Daily As Needed (apply to the knees)., Disp: , Rfl:   •  divalproex (DEPAKOTE) 500 MG DR tablet, Take 500 mg by mouth 2 (Two) Times a Day., Disp: , Rfl:   •  docusate sodium 100 MG capsule, Take 100 mg by mouth 2 (Two) Times a Day., Disp: , Rfl:   •  donepezil (ARICEPT) 10 MG tablet, Take 20 mg by mouth Every Night., Disp: , Rfl:   •  esomeprazole (nexIUM) 40 MG capsule, Take 40 mg by mouth Daily As Needed., Disp: , Rfl:   •  fexofenadine (ALLEGRA) 180 MG tablet, Take 180 mg by mouth daily., Disp: , Rfl:   •  fluticasone (FLONASE) 50 MCG/ACT nasal spray, 1 spray into each nostril Daily., Disp: , Rfl:   •  guaiFENesin (MUCINEX) 600 MG 12 hr tablet, Take 600 mg by mouth Daily As Needed for Cough., Disp: , Rfl:   •  levothyroxine (SYNTHROID, LEVOTHROID) 200 MCG tablet, Take 200 mcg by mouth Daily., Disp: , Rfl:   •  MEGARED OMEGA-3 KRILL  MG capsule, Take 1 capsule by mouth Daily., Disp: , Rfl:   •  Melatonin 10 MG tablet, Take 10 mg by mouth At Night As Needed., Disp: , Rfl:   •  montelukast (SINGULAIR) 10 MG tablet, Take 10 mg by mouth Every Night., Disp: , Rfl:   •  Multiple Vitamins-Minerals (MULTIVITAMIN WITH MINERALS) tablet tablet, Take 1 tablet by mouth Daily., Disp: , Rfl:   •  naproxen (NAPROSYN) 500 MG tablet, Take 500 mg by mouth 2 (Two) Times a Day As Needed for Mild Pain ., Disp: , Rfl:   •  nitrofurantoin (MACRODANTIN) 100 MG capsule, Take 100 mg by mouth Daily., Disp: , Rfl:   •  OLANZapine (ZYPREXA) 10 MG tablet, Take 10 mg by mouth every night., Disp: , Rfl:   •  oxybutynin (DITROPAN) 5 MG tablet, Take 5 mg by mouth 2 (Two) Times a Day., Disp: , Rfl:   •  pantoprazole (PROTONIX) 40 MG EC tablet, Take 40 mg by mouth 2 (Two) Times a Day., Disp: , Rfl:   •  promethazine (PHENERGAN) 25 MG tablet, Take 25 mg  "by mouth Every 6 (Six) Hours As Needed for Nausea or Vomiting., Disp: , Rfl:   •  propranolol (INDERAL) 10 MG tablet, Take 10 mg by mouth Daily., Disp: , Rfl:   •  simvastatin (ZOCOR) 40 MG tablet, Take 40 mg by mouth Every Night., Disp: , Rfl:   •  aspirin 325 MG tablet, Take 1 tablet by mouth Daily., Disp: , Rfl:   •  atorvastatin (LIPITOR) 80 MG tablet, Take 1 tablet by mouth Every Night., Disp: 30 tablet, Rfl: 0  Allergies:  Allergies   Allergen Reactions   • Percocet [Oxycodone-Acetaminophen] Delirium   • Phenergan [Promethazine Hcl]      Social History:   Social History   Substance Use Topics   • Smoking status: Current Every Day Smoker     Packs/day: 1.00     Types: Cigarettes     Start date: 1960   • Smokeless tobacco: Not on file      Comment: Educated pt on quitting   • Alcohol use No      Family History:  Family History   Problem Relation Age of Onset   • Cancer Mother    • Arthritis Father    • Heart disease Father    • Early death Brother    • Heart disease Brother           Review of Systems  See history of present illness and past medical history.    Constitutional: Negative for activity change, appetite change and fever.   HENT: Negative for congestion and sore throat.    Respiratory: Negative for cough and shortness of breath.    Cardiovascular: Negative for chest pain and leg swelling.   Gastrointestinal: Negative for abdominal pain, diarrhea and vomiting.   Genitourinary: Negative for decreased urine volume and dysuria.   Musculoskeletal: Positive for arthralgias (right hip). Negative for neck pain.   Skin: Negative for rash and wound.   Neurological: Negative for weakness, numbness and headaches.       Vitals:   /82   Pulse 65   Temp 98 °F (36.7 °C) (Oral)   Resp 14   Ht 180.3 cm (71\")   Wt 77.1 kg (170 lb)   SpO2 95%   BMI 23.71 kg/m²   I/O: No intake or output data in the 24 hours ending 05/03/18 2056  Exam:  General Appearance:    Alert, cooperative, no distress, appears stated " age   Head:    Normocephalic, without obvious abnormality, atraumatic   Eyes:    PERRL, conjunctiva/corneas clear, EOM's intact, both eyes   Ears:    Normal external ear canals, both ears   Nose:   Nares normal, septum midline, mucosa normal, no drainage    or sinus tenderness   Throat:   Lips, tongue, gums normal; oral mucosa pink and moist   Neck:   Supple, symmetrical, trachea midline, no adenopathy;     thyroid:  no enlargement/tenderness/nodules; no carotid    bruit or JVD   Back:     Symmetric, no curvature, ROM normal, no CVA tenderness   Lungs:     Bilateral air entry clear to auscultation obvious rhonchi or crackles heard no obvious use of accessory muscles of breathing noted    Chest Wall:    No tenderness or deformity    Heart:    Regular rate and rhythm, S1 and S2 normal, no murmur, rub   or gallop   Abdomen:     Soft, non-tender, bowel sounds active all four quadrants,     no masses, no hepatomegaly, no splenomegaly   Extremities:   Right hip tenderness and limited range of motion.  Chronic changes in the lower extremities noted    Pulses:   Pulses palpable in all extremities; symmetric all extremities   Skin:   Skin color normal, Skin is warm and dry,  no rashes or palpable lesions   Neurologic:   Has some obvious rigidity involving the left leg but no focal deficits noted leg could not be tested because of the pain.       Data Review:      I reviewed the patient's new clinical results.    Results from last 7 days  Lab Units 05/03/18  1903   WBC 10*3/mm3 10.77*   HEMOGLOBIN g/dL 14.0   PLATELETS 10*3/mm3 186       Results from last 7 days  Lab Units 05/03/18  1903   SODIUM mmol/L 143   POTASSIUM mmol/L 3.8   CHLORIDE mmol/L 106   CO2 mmol/L 26.3   BUN mg/dL 19   CREATININE mg/dL 0.88   CALCIUM mg/dL 9.8   GLUCOSE mg/dL 81     Depakote level is 16  X-rays reviewed  Assessment:  Active Hospital Problems (** Indicates Principal Problem)    Diagnosis Date Noted   • **Hip fracture requiring operative  repair, right, closed, initial encounter [S72.001A] 05/03/2018   • Hypothyroidism [E03.9] 10/01/2017   • Parkinson's disease [G20] 09/29/2017   • COPD (chronic obstructive pulmonary disease) [J44.9] 09/28/2017   • Coronary artery disease [I25.10] 09/28/2017   • History of CVA (cerebrovascular accident) [Z86.73] 09/28/2017      Resolved Hospital Problems    Diagnosis Date Noted Date Resolved   No resolved problems to display.       Plan:  Status post mechanical fall while carrying a heavy ceramic pot in the context of underlying Parkinson's disease and previous CVA - provide him with fall precautions and monitor his clinical course.  Continue his home medications for Parkinson's disease.  Subcapital femoral neck fracture with mild impaction and displacement - on his orthopedic surgery consultation pain control and prepare him for surgery.  Preoperative clearance: Patient does not have any symptoms of cardiac decompensation in his status is fairly stable the last 1 year in terms of physical capacity, absence of chest pain orthopnea and PND and no worsening in dyspnea on exertion.  We'll check baseline EKG and if that turns out to be okay that he should be cleared for hip surgery with close intraoperative and perioperative monitoring of his hemodynamics.  COPD-clinically stable provide him with nebulizer treatment and incentive spirometry preop and postoperatively.  History of CVA currently is does not show any evidence of new weakness continue present care.  Coronary artery disease clinically stable with no new symptoms of cardiac decompensation and is to continue his present cardiac regimen but hold aspirin and restart until after surgery.  History of urinary tract infection with hospitalization in September 2017 for weakness plan: Check urinalysis and urine C&S.  Hypothyroidism-continue his thyroid replacement therapy  Smoking provide him with nicotine patch  Dyslipidemia continue his statins.    Lisbet Aaron,  MD   5/3/2018  8:56 PM  Much of this encounter note is an electronic transcription/translation of spoken language to printed text. The electronic translation of spoken language may permit erroneous, or at times, nonsensical words or phrases to be inadvertently transcribed; Although I have reviewed the note for such errors, some may still exist

## 2018-05-05 ENCOUNTER — ANESTHESIA (OUTPATIENT)
Dept: PERIOP | Facility: HOSPITAL | Age: 73
End: 2018-05-05

## 2018-05-05 ENCOUNTER — ANESTHESIA EVENT (OUTPATIENT)
Dept: PERIOP | Facility: HOSPITAL | Age: 73
End: 2018-05-05

## 2018-05-05 ENCOUNTER — APPOINTMENT (OUTPATIENT)
Dept: GENERAL RADIOLOGY | Facility: HOSPITAL | Age: 73
End: 2018-05-05

## 2018-05-05 LAB
BACTERIA UR QL AUTO: ABNORMAL /HPF
BILIRUB UR QL STRIP: NEGATIVE
CLARITY UR: ABNORMAL
COLOR UR: YELLOW
GLUCOSE UR STRIP-MCNC: NEGATIVE MG/DL
HGB UR QL STRIP.AUTO: NEGATIVE
HYALINE CASTS UR QL AUTO: ABNORMAL /LPF
KETONES UR QL STRIP: NEGATIVE
LEUKOCYTE ESTERASE UR QL STRIP.AUTO: ABNORMAL
NITRITE UR QL STRIP: POSITIVE
PH UR STRIP.AUTO: 6.5 [PH] (ref 5–8)
PROT UR QL STRIP: NEGATIVE
RBC # UR: ABNORMAL /HPF
REF LAB TEST METHOD: ABNORMAL
SP GR UR STRIP: 1.01 (ref 1–1.03)
SQUAMOUS #/AREA URNS HPF: ABNORMAL /HPF
UROBILINOGEN UR QL STRIP: ABNORMAL
WBC UR QL AUTO: ABNORMAL /HPF

## 2018-05-05 PROCEDURE — 94799 UNLISTED PULMONARY SVC/PX: CPT

## 2018-05-05 PROCEDURE — 99231 SBSQ HOSP IP/OBS SF/LOW 25: CPT | Performed by: INTERNAL MEDICINE

## 2018-05-05 PROCEDURE — 88304 TISSUE EXAM BY PATHOLOGIST: CPT | Performed by: ORTHOPAEDIC SURGERY

## 2018-05-05 PROCEDURE — 25010000002 MORPHINE SULFATE (PF) 2 MG/ML SOLUTION: Performed by: EMERGENCY MEDICINE

## 2018-05-05 PROCEDURE — 25010000003 CEFAZOLIN IN DEXTROSE 2-4 GM/100ML-% SOLUTION: Performed by: ORTHOPAEDIC SURGERY

## 2018-05-05 PROCEDURE — 72170 X-RAY EXAM OF PELVIS: CPT

## 2018-05-05 PROCEDURE — 88311 DECALCIFY TISSUE: CPT | Performed by: ORTHOPAEDIC SURGERY

## 2018-05-05 PROCEDURE — 25010000002 FENTANYL CITRATE (PF) 100 MCG/2ML SOLUTION: Performed by: NURSE ANESTHETIST, CERTIFIED REGISTERED

## 2018-05-05 PROCEDURE — 25010000002 FENTANYL CITRATE (PF) 100 MCG/2ML SOLUTION: Performed by: ANESTHESIOLOGY

## 2018-05-05 PROCEDURE — 27236 TREAT THIGH FRACTURE: CPT | Performed by: ORTHOPAEDIC SURGERY

## 2018-05-05 PROCEDURE — 81001 URINALYSIS AUTO W/SCOPE: CPT | Performed by: INTERNAL MEDICINE

## 2018-05-05 PROCEDURE — 25010000002 CEFTRIAXONE PER 250 MG: Performed by: INTERNAL MEDICINE

## 2018-05-05 PROCEDURE — 25010000002 MORPHINE PER 10 MG: Performed by: INTERNAL MEDICINE

## 2018-05-05 PROCEDURE — 25010000002 LORAZEPAM PER 2 MG: Performed by: INTERNAL MEDICINE

## 2018-05-05 PROCEDURE — L1830 KO IMMOB CANVAS LONG PRE OTS: HCPCS | Performed by: ORTHOPAEDIC SURGERY

## 2018-05-05 PROCEDURE — 0SRR019 REPLACEMENT OF RIGHT HIP JOINT, FEMORAL SURFACE WITH METAL SYNTHETIC SUBSTITUTE, CEMENTED, OPEN APPROACH: ICD-10-PCS | Performed by: ORTHOPAEDIC SURGERY

## 2018-05-05 PROCEDURE — C1776 JOINT DEVICE (IMPLANTABLE): HCPCS | Performed by: ORTHOPAEDIC SURGERY

## 2018-05-05 PROCEDURE — 25010000002 VANCOMYCIN PER 500 MG: Performed by: NURSE ANESTHETIST, CERTIFIED REGISTERED

## 2018-05-05 PROCEDURE — C1713 ANCHOR/SCREW BN/BN,TIS/BN: HCPCS | Performed by: ORTHOPAEDIC SURGERY

## 2018-05-05 PROCEDURE — 25010000002 PROPOFOL 10 MG/ML EMULSION: Performed by: NURSE ANESTHETIST, CERTIFIED REGISTERED

## 2018-05-05 DEVICE — ARTICUL/EZE FEMORAL HEAD DIAMETER 28MM +1.5 12/14 TAPER
Type: IMPLANTABLE DEVICE | Site: HIP | Status: FUNCTIONAL
Brand: ARTICUL/EZE

## 2018-05-05 DEVICE — SUMMIT FEMORAL STEM 12/14 TAPER CEMENTED SIZE 5 STD 120MM
Type: IMPLANTABLE DEVICE | Site: HIP | Status: FUNCTIONAL
Brand: SUMMIT

## 2018-05-05 DEVICE — CEMENTRALIZER STEM CENTRALIZER 10.0MM CEMENTED
Type: IMPLANTABLE DEVICE | Site: HIP | Status: FUNCTIONAL
Brand: CEMENTRALIZER

## 2018-05-05 DEVICE — SELF CENTERING BI-POLAR HEAD 28MM ID 48MM OD
Type: IMPLANTABLE DEVICE | Site: HIP | Status: FUNCTIONAL
Brand: SELF CENTERING

## 2018-05-05 DEVICE — PRT HIP BIPOL PRIM DEPUY 9525109/9525107: Type: IMPLANTABLE DEVICE | Site: HIP | Status: FUNCTIONAL

## 2018-05-05 RX ORDER — BISACODYL 10 MG
10 SUPPOSITORY, RECTAL RECTAL DAILY PRN
Status: DISCONTINUED | OUTPATIENT
Start: 2018-05-05 | End: 2018-05-12 | Stop reason: HOSPADM

## 2018-05-05 RX ORDER — EPHEDRINE SULFATE 50 MG/ML
INJECTION, SOLUTION INTRAVENOUS AS NEEDED
Status: DISCONTINUED | OUTPATIENT
Start: 2018-05-05 | End: 2018-05-05 | Stop reason: SURG

## 2018-05-05 RX ORDER — FENTANYL CITRATE 50 UG/ML
50 INJECTION, SOLUTION INTRAMUSCULAR; INTRAVENOUS
Status: DISCONTINUED | OUTPATIENT
Start: 2018-05-05 | End: 2018-05-05 | Stop reason: HOSPADM

## 2018-05-05 RX ORDER — DIPHENHYDRAMINE HYDROCHLORIDE 50 MG/ML
12.5 INJECTION INTRAMUSCULAR; INTRAVENOUS
Status: DISCONTINUED | OUTPATIENT
Start: 2018-05-05 | End: 2018-05-05 | Stop reason: HOSPADM

## 2018-05-05 RX ORDER — ROCURONIUM BROMIDE 10 MG/ML
INJECTION, SOLUTION INTRAVENOUS AS NEEDED
Status: DISCONTINUED | OUTPATIENT
Start: 2018-05-05 | End: 2018-05-05 | Stop reason: SURG

## 2018-05-05 RX ORDER — MAGNESIUM HYDROXIDE 1200 MG/15ML
LIQUID ORAL AS NEEDED
Status: DISCONTINUED | OUTPATIENT
Start: 2018-05-05 | End: 2018-05-05 | Stop reason: HOSPADM

## 2018-05-05 RX ORDER — NALOXONE HCL 0.4 MG/ML
0.2 VIAL (ML) INJECTION AS NEEDED
Status: DISCONTINUED | OUTPATIENT
Start: 2018-05-05 | End: 2018-05-05 | Stop reason: HOSPADM

## 2018-05-05 RX ORDER — ACETAMINOPHEN 325 MG/1
325 TABLET ORAL EVERY 4 HOURS PRN
Status: DISCONTINUED | OUTPATIENT
Start: 2018-05-05 | End: 2018-05-12 | Stop reason: HOSPADM

## 2018-05-05 RX ORDER — ASPIRIN 325 MG
325 TABLET, DELAYED RELEASE (ENTERIC COATED) ORAL DAILY
Status: DISCONTINUED | OUTPATIENT
Start: 2018-05-06 | End: 2018-05-12 | Stop reason: HOSPADM

## 2018-05-05 RX ORDER — MEPERIDINE HYDROCHLORIDE 25 MG/ML
12.5 INJECTION INTRAMUSCULAR; INTRAVENOUS; SUBCUTANEOUS
Status: DISCONTINUED | OUTPATIENT
Start: 2018-05-05 | End: 2018-05-05 | Stop reason: HOSPADM

## 2018-05-05 RX ORDER — LORAZEPAM 0.5 MG/1
0.5 TABLET ORAL ONCE
Status: DISCONTINUED | OUTPATIENT
Start: 2018-05-05 | End: 2018-05-07

## 2018-05-05 RX ORDER — CEFAZOLIN SODIUM 2 G/100ML
2 INJECTION, SOLUTION INTRAVENOUS EVERY 8 HOURS
Status: COMPLETED | OUTPATIENT
Start: 2018-05-05 | End: 2018-05-06

## 2018-05-05 RX ORDER — ONDANSETRON 4 MG/1
4 TABLET, ORALLY DISINTEGRATING ORAL EVERY 6 HOURS PRN
Status: DISCONTINUED | OUTPATIENT
Start: 2018-05-05 | End: 2018-05-12 | Stop reason: HOSPADM

## 2018-05-05 RX ORDER — TRANEXAMIC ACID 100 MG/ML
INJECTION, SOLUTION INTRAVENOUS
Status: DISPENSED
Start: 2018-05-05 | End: 2018-05-05

## 2018-05-05 RX ORDER — HYDROMORPHONE HCL 110MG/55ML
0.25 PATIENT CONTROLLED ANALGESIA SYRINGE INTRAVENOUS
Status: DISCONTINUED | OUTPATIENT
Start: 2018-05-05 | End: 2018-05-05 | Stop reason: HOSPADM

## 2018-05-05 RX ORDER — PROPOFOL 10 MG/ML
VIAL (ML) INTRAVENOUS AS NEEDED
Status: DISCONTINUED | OUTPATIENT
Start: 2018-05-05 | End: 2018-05-05 | Stop reason: SURG

## 2018-05-05 RX ORDER — LIDOCAINE HYDROCHLORIDE 10 MG/ML
0.5 INJECTION, SOLUTION EPIDURAL; INFILTRATION; INTRACAUDAL; PERINEURAL ONCE AS NEEDED
Status: DISCONTINUED | OUTPATIENT
Start: 2018-05-05 | End: 2018-05-05 | Stop reason: HOSPADM

## 2018-05-05 RX ORDER — HYDROMORPHONE HYDROCHLORIDE 1 MG/ML
0.5 INJECTION, SOLUTION INTRAMUSCULAR; INTRAVENOUS; SUBCUTANEOUS
Status: DISCONTINUED | OUTPATIENT
Start: 2018-05-05 | End: 2018-05-12 | Stop reason: HOSPADM

## 2018-05-05 RX ORDER — FLUMAZENIL 0.1 MG/ML
0.2 INJECTION INTRAVENOUS AS NEEDED
Status: DISCONTINUED | OUTPATIENT
Start: 2018-05-05 | End: 2018-05-05 | Stop reason: HOSPADM

## 2018-05-05 RX ORDER — SODIUM CHLORIDE 0.9 % (FLUSH) 0.9 %
1-10 SYRINGE (ML) INJECTION AS NEEDED
Status: DISCONTINUED | OUTPATIENT
Start: 2018-05-05 | End: 2018-05-05 | Stop reason: HOSPADM

## 2018-05-05 RX ORDER — ONDANSETRON 2 MG/ML
4 INJECTION INTRAMUSCULAR; INTRAVENOUS ONCE AS NEEDED
Status: DISCONTINUED | OUTPATIENT
Start: 2018-05-05 | End: 2018-05-05 | Stop reason: HOSPADM

## 2018-05-05 RX ORDER — ONDANSETRON 2 MG/ML
4 INJECTION INTRAMUSCULAR; INTRAVENOUS EVERY 6 HOURS PRN
Status: DISCONTINUED | OUTPATIENT
Start: 2018-05-05 | End: 2018-05-12 | Stop reason: HOSPADM

## 2018-05-05 RX ORDER — LORAZEPAM 2 MG/ML
0.5 INJECTION INTRAMUSCULAR ONCE
Status: COMPLETED | OUTPATIENT
Start: 2018-05-05 | End: 2018-05-05

## 2018-05-05 RX ORDER — FAMOTIDINE 10 MG/ML
20 INJECTION, SOLUTION INTRAVENOUS ONCE
Status: COMPLETED | OUTPATIENT
Start: 2018-05-05 | End: 2018-05-05

## 2018-05-05 RX ORDER — CEFAZOLIN SODIUM 2 G/100ML
2 INJECTION, SOLUTION INTRAVENOUS ONCE
Status: COMPLETED | OUTPATIENT
Start: 2018-05-05 | End: 2018-05-05

## 2018-05-05 RX ORDER — ONDANSETRON 4 MG/1
4 TABLET, FILM COATED ORAL EVERY 6 HOURS PRN
Status: DISCONTINUED | OUTPATIENT
Start: 2018-05-05 | End: 2018-05-12 | Stop reason: HOSPADM

## 2018-05-05 RX ORDER — HYDRALAZINE HYDROCHLORIDE 20 MG/ML
5 INJECTION INTRAMUSCULAR; INTRAVENOUS
Status: DISCONTINUED | OUTPATIENT
Start: 2018-05-05 | End: 2018-05-05 | Stop reason: HOSPADM

## 2018-05-05 RX ORDER — HYDROCODONE BITARTRATE AND ACETAMINOPHEN 5; 325 MG/1; MG/1
2 TABLET ORAL EVERY 4 HOURS PRN
Status: DISCONTINUED | OUTPATIENT
Start: 2018-05-05 | End: 2018-05-11

## 2018-05-05 RX ORDER — TRANEXAMIC ACID 100 MG/ML
INJECTION, SOLUTION INTRAVENOUS AS NEEDED
Status: DISCONTINUED | OUTPATIENT
Start: 2018-05-05 | End: 2018-05-05 | Stop reason: HOSPADM

## 2018-05-05 RX ORDER — CEFTRIAXONE SODIUM 1 G/50ML
1 INJECTION, SOLUTION INTRAVENOUS EVERY 24 HOURS
Status: DISCONTINUED | OUTPATIENT
Start: 2018-05-05 | End: 2018-05-10

## 2018-05-05 RX ORDER — EPHEDRINE SULFATE 50 MG/ML
5 INJECTION, SOLUTION INTRAVENOUS ONCE AS NEEDED
Status: DISCONTINUED | OUTPATIENT
Start: 2018-05-05 | End: 2018-05-05 | Stop reason: HOSPADM

## 2018-05-05 RX ORDER — LIDOCAINE HYDROCHLORIDE 20 MG/ML
INJECTION, SOLUTION INFILTRATION; PERINEURAL AS NEEDED
Status: DISCONTINUED | OUTPATIENT
Start: 2018-05-05 | End: 2018-05-05 | Stop reason: SURG

## 2018-05-05 RX ORDER — SODIUM CHLORIDE, SODIUM LACTATE, POTASSIUM CHLORIDE, CALCIUM CHLORIDE 600; 310; 30; 20 MG/100ML; MG/100ML; MG/100ML; MG/100ML
9 INJECTION, SOLUTION INTRAVENOUS CONTINUOUS
Status: DISCONTINUED | OUTPATIENT
Start: 2018-05-05 | End: 2018-05-07

## 2018-05-05 RX ORDER — NALOXONE HCL 0.4 MG/ML
0.1 VIAL (ML) INJECTION
Status: DISCONTINUED | OUTPATIENT
Start: 2018-05-05 | End: 2018-05-12 | Stop reason: HOSPADM

## 2018-05-05 RX ORDER — LABETALOL HYDROCHLORIDE 5 MG/ML
5 INJECTION, SOLUTION INTRAVENOUS
Status: DISCONTINUED | OUTPATIENT
Start: 2018-05-05 | End: 2018-05-05 | Stop reason: HOSPADM

## 2018-05-05 RX ORDER — DOCUSATE SODIUM 100 MG/1
100 CAPSULE, LIQUID FILLED ORAL 2 TIMES DAILY PRN
Status: DISCONTINUED | OUTPATIENT
Start: 2018-05-05 | End: 2018-05-12 | Stop reason: HOSPADM

## 2018-05-05 RX ADMIN — MORPHINE SULFATE 2 MG: 2 INJECTION, SOLUTION INTRAMUSCULAR; INTRAVENOUS at 04:45

## 2018-05-05 RX ADMIN — DONEPEZIL HYDROCHLORIDE 20 MG: 10 TABLET, FILM COATED ORAL at 20:27

## 2018-05-05 RX ADMIN — EPHEDRINE SULFATE 5 MG: 50 INJECTION INTRAMUSCULAR; INTRAVENOUS; SUBCUTANEOUS at 08:41

## 2018-05-05 RX ADMIN — FAMOTIDINE 20 MG: 10 INJECTION, SOLUTION INTRAVENOUS at 07:37

## 2018-05-05 RX ADMIN — VANCOMYCIN HYDROCHLORIDE 1 G: 1 INJECTION, POWDER, LYOPHILIZED, FOR SOLUTION INTRAVENOUS at 08:59

## 2018-05-05 RX ADMIN — PROPOFOL 120 MG: 10 INJECTION, EMULSION INTRAVENOUS at 07:58

## 2018-05-05 RX ADMIN — LIDOCAINE HYDROCHLORIDE 80 MG: 20 INJECTION, SOLUTION INFILTRATION; PERINEURAL at 07:58

## 2018-05-05 RX ADMIN — SUGAMMADEX 160 MG: 100 INJECTION, SOLUTION INTRAVENOUS at 09:34

## 2018-05-05 RX ADMIN — OXYBUTYNIN CHLORIDE 5 MG: 5 TABLET ORAL at 20:29

## 2018-05-05 RX ADMIN — EPHEDRINE SULFATE 5 MG: 50 INJECTION INTRAMUSCULAR; INTRAVENOUS; SUBCUTANEOUS at 08:56

## 2018-05-05 RX ADMIN — EPHEDRINE SULFATE 10 MG: 50 INJECTION INTRAMUSCULAR; INTRAVENOUS; SUBCUTANEOUS at 09:19

## 2018-05-05 RX ADMIN — SODIUM CHLORIDE, POTASSIUM CHLORIDE, SODIUM LACTATE AND CALCIUM CHLORIDE 9 ML/HR: 600; 310; 30; 20 INJECTION, SOLUTION INTRAVENOUS at 07:36

## 2018-05-05 RX ADMIN — LORAZEPAM 0.5 MG: 2 INJECTION, SOLUTION INTRAMUSCULAR; INTRAVENOUS at 21:44

## 2018-05-05 RX ADMIN — FENTANYL CITRATE 50 MCG: 50 INJECTION INTRAMUSCULAR; INTRAVENOUS at 07:58

## 2018-05-05 RX ADMIN — CEFTRIAXONE SODIUM 1 G: 1 INJECTION, SOLUTION INTRAVENOUS at 22:52

## 2018-05-05 RX ADMIN — ROCURONIUM BROMIDE 40 MG: 10 INJECTION INTRAVENOUS at 07:58

## 2018-05-05 RX ADMIN — DIVALPROEX SODIUM 500 MG: 500 TABLET, DELAYED RELEASE ORAL at 12:51

## 2018-05-05 RX ADMIN — SODIUM CHLORIDE 75 ML/HR: 9 INJECTION, SOLUTION INTRAVENOUS at 00:26

## 2018-05-05 RX ADMIN — CEFAZOLIN SODIUM 2 G: 2 INJECTION, SOLUTION INTRAVENOUS at 08:05

## 2018-05-05 RX ADMIN — HYDROCODONE BITARTRATE AND ACETAMINOPHEN 2 TABLET: 5; 325 TABLET ORAL at 16:30

## 2018-05-05 RX ADMIN — CARBIDOPA AND LEVODOPA 1 TABLET: 25; 250 TABLET ORAL at 12:51

## 2018-05-05 RX ADMIN — MORPHINE SULFATE 4 MG: 4 INJECTION, SOLUTION INTRAMUSCULAR; INTRAVENOUS at 20:37

## 2018-05-05 RX ADMIN — CEFAZOLIN SODIUM 2 G: 2 INJECTION, SOLUTION INTRAVENOUS at 17:30

## 2018-05-05 RX ADMIN — DIVALPROEX SODIUM 500 MG: 500 TABLET, DELAYED RELEASE ORAL at 20:28

## 2018-05-05 RX ADMIN — SODIUM CHLORIDE, POTASSIUM CHLORIDE, SODIUM LACTATE AND CALCIUM CHLORIDE 9 ML/HR: 600; 310; 30; 20 INJECTION, SOLUTION INTRAVENOUS at 10:24

## 2018-05-05 RX ADMIN — FENTANYL CITRATE 50 MCG: 50 INJECTION INTRAMUSCULAR; INTRAVENOUS at 10:24

## 2018-05-05 RX ADMIN — CARBIDOPA AND LEVODOPA 1 TABLET: 25; 250 TABLET ORAL at 17:32

## 2018-05-05 RX ADMIN — ALBUTEROL SULFATE 2.5 MG: 2.5 SOLUTION RESPIRATORY (INHALATION) at 18:48

## 2018-05-05 RX ADMIN — OLANZAPINE 10 MG: 10 TABLET, FILM COATED ORAL at 20:29

## 2018-05-05 RX ADMIN — PROPRANOLOL HYDROCHLORIDE 10 MG: 10 TABLET ORAL at 12:56

## 2018-05-05 RX ADMIN — NICOTINE 1 PATCH: 21 PATCH, EXTENDED RELEASE TRANSDERMAL at 22:52

## 2018-05-05 NOTE — PERIOPERATIVE NURSING NOTE
Dr Vallejo made aware of patient's U/A results from 5/4. No new orders, ok to proceed with surgery

## 2018-05-05 NOTE — ANESTHESIA POSTPROCEDURE EVALUATION
Patient: Victor Manuel Issa    Procedure Summary     Date:  05/05/18 Room / Location:  [facility] OR [address] [place] [address] MAIN OR    Anesthesia Start:  0756 Anesthesia Stop:  0954    Procedure:  RIGHT HIP HEMIARTHROPLASTY (Right Hip) Diagnosis:      Surgeon:  Winston Vallejo MD Provider:  Terra Agudelo MD    Anesthesia Type:  general ASA Status:  3          Anesthesia Type: general  Last vitals  BP   103/49 (05/05/18 1103)   Temp   36.1 °C (97 °F) (05/05/18 1103)   Pulse   55 (05/05/18 1103)   Resp   18 (05/05/18 1103)     SpO2   97 % (05/05/18 1103)     Post Anesthesia Care and Evaluation      Comments: Patient was discharged from the PACU without being evaluated by anesthesia.  No apparent anesthetic complications were reported. Non-medically directed

## 2018-05-05 NOTE — PLAN OF CARE
Problem: Patient Care Overview  Goal: Plan of Care Review  Outcome: Ongoing (interventions implemented as appropriate)   05/05/18 6293   Coping/Psychosocial   Plan of Care Reviewed With patient;daughter;spouse   OTHER   Outcome Summary VSS. Pain has been well controlled with IV analgesic. Has refused frequently to turn and reposition despite education regarding skin injury risk. Condom cath in place. UA with 4+ bacteria--Dr. Aaron in this date and will speak with other providers on case regarding proceeding with scheduled surgical repair of right hip fracture today. Family continue to have questions regarding surgery. Currently NPO. Verbalizes understanding of all educational topics to include benefits of smoking cessation and recovery along with pre-existing COPD     Goal: Individualization and Mutuality  Outcome: Ongoing (interventions implemented as appropriate)      Problem: Fall Risk (Adult)  Goal: Absence of Fall  Outcome: Ongoing (interventions implemented as appropriate)      Problem: Fracture Orthopaedic (Adult)  Goal: Signs and Symptoms of Listed Potential Problems Will be Absent, Minimized or Managed (Fracture Orthopaedic)  Outcome: Ongoing (interventions implemented as appropriate)      Problem: Skin Injury Risk (Adult)  Goal: Skin Health and Integrity  Outcome: Ongoing (interventions implemented as appropriate)   05/05/18 2393   Skin Injury Risk (Adult)   Skin Health and Integrity making progress toward outcome

## 2018-05-05 NOTE — ANESTHESIA PROCEDURE NOTES
Airway  Urgency: elective    Date/Time: 5/5/2018 8:03 AM  Airway not difficult    General Information and Staff    Patient location during procedure: OR  Anesthesiologist: LUIS THOMPSON  CRNA: JUAREZ BRITT    Indications and Patient Condition  Indications for airway management: airway protection    Preoxygenated: yes  Mask difficulty assessment: 1 - vent by mask    Final Airway Details  Final airway type: endotracheal airway      Successful airway: ETT  Cuffed: yes   Successful intubation technique: direct laryngoscopy  Endotracheal tube insertion site: oral  Blade: Doug  Blade size: #4  ETT size: 7.5 mm  Cormack-Lehane Classification: grade I - full view of glottis  Placement verified by: chest auscultation and capnometry   Cuff volume (mL): 5  Measured from: lips  ETT to lips (cm): 21  Number of attempts at approach: 1    Additional Comments  Smooth IV induction. Trachea intubated. Cuff up. Dentition intact without injury.

## 2018-05-05 NOTE — ANESTHESIA PREPROCEDURE EVALUATION
Anesthesia Evaluation     Patient summary reviewed and Nursing notes reviewed   NPO Solid Status: > 8 hours  NPO Liquid Status: > 8 hours           Airway   Mallampati: III  TM distance: <3 FB  Neck ROM: limited  Possible difficult intubation  Dental    (+) poor dentition    Pulmonary    (+) COPD moderate, decreased breath sounds,   Cardiovascular     ECG reviewed  Patient on routine beta blocker and Beta blocker given within 24 hours of surgery  Rhythm: regular  Rate: normal    (+) past MI  >12 months, CAD, CABG,       Neuro/Psych  (+) CVA residual symptoms,     GI/Hepatic/Renal/Endo      Musculoskeletal     Abdominal    Substance History      OB/GYN          Other                        Anesthesia Plan    ASA 3     general     Anesthetic plan and risks discussed with patient, child and spouse/significant other.

## 2018-05-05 NOTE — PROGRESS NOTES
"Victor Manuel Issa  1945 72 y.o.  [id number]      Patient Care Team:  CUONG Guadalupe as PCP - General (Family Medicine)    CC: Hip fracture status post repair today, history of CABG    Interval History: Doing well early postoperatively no chest pain or shortness of breath      Objective   Vital Signs  Temp:  [97 °F (36.1 °C)-97.8 °F (36.6 °C)] 97 °F (36.1 °C)  Heart Rate:  [54-76] 55  Resp:  [13-20] 18  BP: ()/(49-76) 103/49    Intake/Output Summary (Last 24 hours) at 05/05/18 1313  Last data filed at 05/05/18 0950   Gross per 24 hour   Intake          2571.25 ml   Output              800 ml   Net          1771.25 ml     Flowsheet Rows    Flowsheet Row First Filed Value   Admission Height 180.3 cm (71\") Documented at 05/03/2018 1649   Admission Weight 77.1 kg (170 lb) Documented at 05/03/2018 1649          Physical Exam:   General Appearance:    Alert,oriented, in no acute distress   Lungs:     Clear to auscultation,BS are equal    Heart:    Normal S1 and S2, RRR without murmur, gallop or rub   HEENT:    Sclera are clear, no JVD or adenopathy   Abdomen:     Normal bowel sounds, soft non-tender, non-distended, no HSM   Extremities:   Moves all extremities well, no edema, no cyanosis, no             Redness, no rash     Medication Review:        albuterol 2.5 mg Nebulization Daily   arformoterol 15 mcg Nebulization BID - RT   [START ON 5/6/2018] aspirin 325 mg Oral Daily   atorvastatin 20 mg Oral Daily   carbidopa-levodopa 1 tablet Oral TID With Meals   ceFAZolin 2 g Intravenous Q8H   cetirizine 10 mg Oral Daily   cholecalciferol 1,000 Units Oral Daily   divalproex 500 mg Oral BID   docusate sodium 100 mg Oral BID   donepezil 20 mg Oral Nightly   fluticasone 1 spray Nasal Daily   levothyroxine 200 mcg Oral Q AM   montelukast 10 mg Oral Nightly   multivitamin with minerals 1 tablet Oral Daily   mupirocin  Each Nare BID   nicotine 1 patch Transdermal Q24H   OLANZapine 10 mg Oral Nightly   oxybutynin 5 " mg Oral BID   pantoprazole 40 mg Oral QAM   propranolol 10 mg Oral Daily   Tranexamic Acid      vancomycin 15 mg/kg Intravenous Once       lactated ringers 9 mL/hr Last Rate: 9 mL/hr (05/05/18 1024)   sodium chloride 75 mL/hr Last Rate: 75 mL/hr (05/05/18 0026)         I reviewed the patient's new clinical results.  I personally viewed and interpreted the patient's EKG/Telemetry data    Assessment/Plan  Active Hospital Problems (** Indicates Principal Problem)    Diagnosis Date Noted   • **Hip fracture requiring operative repair, right, closed, initial encounter [S72.001A] 05/03/2018   • Hypothyroidism [E03.9] 10/01/2017   • Parkinson's disease [G20] 09/29/2017   • COPD (chronic obstructive pulmonary disease) [J44.9] 09/28/2017   • Coronary artery disease [I25.10] 09/28/2017   • History of CVA (cerebrovascular accident) [Z86.73] 09/28/2017      Resolved Hospital Problems    Diagnosis Date Noted Date Resolved   No resolved problems to display.       Doing well from a cardiac standpoint we will see him as needed have him follow-up with his primary cardiologist Louann Velazquez cardiologist    Victor Manuel Bhat MD  05/05/18  1:13 PM

## 2018-05-05 NOTE — BRIEF OP NOTE
HIP ENDOPROSTHESIS  Progress Note    Victor Manuel Issa  5/3/2018 - 5/5/2018    Pre-op Diagnosis:   Right hip fractrue       Post-Op Diagnosis Codes:   same    Procedure/CPT® Codes:      Procedure(s):  RIGHT HIP HEMIARTHROPLASTY    Surgeon(s):  Winston Vallejo MD    Anesthesia: General    Staff:   Circulator: Kathy Rush RN  Scrub Person: Leah Peña  Vendor Representative: Mendoza Álvarez  Assistant: LESVIA Corona    Estimated Blood Loss: 100ml    Urine Voided: * No values recorded between 5/5/2018  7:55 AM and 5/5/2018  9:25 AM *    Specimens:                ID Type Source Tests Collected by Time   A : right femur head Bone Hip, Right TISSUE PATHOLOGY EXAM Winston Vallejo MD 5/5/2018 0830         Drains:   External Urinary Catheter (Active)   Site Assessment Clean 5/5/2018  5:05 AM   Application/Removal external catheter changed;skin care provided 5/4/2018  1:14 PM   Collection Container Standard drainage bag 5/4/2018  9:13 PM   Securement Method Securing device 5/5/2018  5:05 AM   Output (mL) 300 mL 5/5/2018  5:05 AM       Findings: see dictation    Complications: none      Winston Vallejo MD     Date: 5/5/2018  Time: 9:25 AM

## 2018-05-05 NOTE — NURSING NOTE
While covering patient for primary RN, DR Aaron here and ask that lab work be opened up in computer for him to visualize

## 2018-05-05 NOTE — PROGRESS NOTES
"    Name: Victor Manuel Issa ADMIT: 5/3/2018   : 1945  PCP: ShiraCUONG Covarrubias    MRN: [id number] LOS: 2 days   AGE/SEX: 72 y.o. male  ROOM: [facility]   Subjective   Feels \"groggy\"  Pain right hip improved post op  No chest pain or palpitations  No soa or cough  No nausea or vomiting but decreased appetite  Objective   Vital Signs  Temp:  [97 °F (36.1 °C)-97.8 °F (36.6 °C)] 97 °F (36.1 °C)  Heart Rate:  [54-76] 55  Resp:  [13-20] 18  BP: ()/(49-76) 103/49  SpO2:  [92 %-98 %] 97 %  on  Flow (L/min):  [2-4] 2;   Device (Oxygen Therapy): nasal cannula  Body mass index is 24.72 kg/m².    Physical Exam   Constitutional: No distress.   HENT:   Head: Normocephalic and atraumatic.   Cardiovascular: Normal rate and regular rhythm.    No murmur heard.  Pulmonary/Chest: Effort normal and breath sounds normal. He has no wheezes.   Abdominal: Soft. Bowel sounds are normal. He exhibits no distension.   Musculoskeletal: He exhibits no edema or tenderness.        Right hip: He exhibits decreased range of motion and bony tenderness.   Neurological: He is alert.   Skin: Skin is warm and dry. He is not diaphoretic.   Psychiatric: He has a normal mood and affect. His behavior is normal.   Vitals reviewed.      Results Review:       I reviewed the patient's new clinical results.    Results from last 7 days  Lab Units 18  0443 18  1903   WBC 10*3/mm3 9.80 10.77*   HEMOGLOBIN g/dL 13.9 14.0   PLATELETS 10*3/mm3 143 186       Results from last 7 days  Lab Units 18  0558 18  1903   SODIUM mmol/L 141 143   POTASSIUM mmol/L 4.1 3.8   CHLORIDE mmol/L 107 106   CO2 mmol/L 21.8* 26.3   BUN mg/dL 17 19   CREATININE mg/dL 0.78 0.88   GLUCOSE mg/dL 102* 81   Estimated Creatinine Clearance: 92.1 mL/min (by C-G formula based on SCr of 0.78 mg/dL).    Results from last 7 days  Lab Units 18  0558 18  0443 18  1903   CALCIUM mg/dL 9.6  --  9.8   ALBUMIN g/dL 3.60  --  3.50   MAGNESIUM mg/dL  --  2.2 "  --          albuterol 2.5 mg Nebulization Daily   arformoterol 15 mcg Nebulization BID - RT   [START ON 5/6/2018] aspirin 325 mg Oral Daily   atorvastatin 20 mg Oral Daily   carbidopa-levodopa 1 tablet Oral TID With Meals   ceFAZolin 2 g Intravenous Q8H   cetirizine 10 mg Oral Daily   cholecalciferol 1,000 Units Oral Daily   divalproex 500 mg Oral BID   docusate sodium 100 mg Oral BID   donepezil 20 mg Oral Nightly   fluticasone 1 spray Nasal Daily   levothyroxine 200 mcg Oral Q AM   montelukast 10 mg Oral Nightly   multivitamin with minerals 1 tablet Oral Daily   mupirocin  Each Nare BID   nicotine 1 patch Transdermal Q24H   OLANZapine 10 mg Oral Nightly   oxybutynin 5 mg Oral BID   pantoprazole 40 mg Oral QAM   propranolol 10 mg Oral Daily   Tranexamic Acid      vancomycin 15 mg/kg Intravenous Once       lactated ringers 9 mL/hr Last Rate: 9 mL/hr (05/05/18 1024)   sodium chloride 75 mL/hr Last Rate: 75 mL/hr (05/05/18 0026)   Diet Regular      Assessment/Plan      Active Hospital Problems (** Indicates Principal Problem)    Diagnosis Date Noted   • **Hip fracture requiring operative repair, right, closed, initial encounter [S72.001A] 05/03/2018   • Hypothyroidism [E03.9] 10/01/2017   • Parkinson's disease [G20] 09/29/2017   • COPD (chronic obstructive pulmonary disease) [J44.9] 09/28/2017   • Coronary artery disease [I25.10] 09/28/2017   • History of CVA (cerebrovascular accident) [Z86.73] 09/28/2017      Resolved Hospital Problems    Diagnosis Date Noted Date Resolved   No resolved problems to display.       · Right subcapital femoral neck fracture: POD 0 right hip hemiarthroplasty Pain better controlled, cont IV fluids  · Parkinson's: continue sinemet  · COPD: no active issues, cont home meds  · H/o CVA  · CAD: statin, nicotine patch  · Bipolar 1 disorder: cont zyprexa, depakote  · ? Demenita: on aricept which we will continue  · Reactive leukocytosis resolved  · PT consulted        Kuldip Hurst  MD [facility]  05/05/18  2:13 PM

## 2018-05-05 NOTE — OP NOTE
Orthopaedic Operative Note    Facility: [facility]    Patient: Victor Manuel Issa     Medical Record Number: [id number]     YOB: 1945     Dictating Surgeon: Winston Vallejo M.D.*     Primary Care Physician: CUONG Guadalupe     Date of Operation: 5/5/2018     PREOPERATIVE DIAGNOSIS: Displaced right femoral neck fracture.       POSTOPERATIVE DIAGNOSIS: Displaced right femoral neck fracture.       PROCEDURE PERFORMED: Right hip hemiarthroplasty      SURGEON: Winston Vallejo MD       ASSISTANT: AYDIN Leung       ANESTHESIA: General.       SPECIMENS: None.       COMPLICATIONS: None.       ESTIMATED BLOOD LOSS: Less than 100 mL.       IMPLANTS: Depuy Summitt size 5 cemented femoral stem with 28 head (1.5 mm neck)   and 48 bipolar shell.                INDICATIONS:  The patient fell and sustained a displaced femoral neck fracture.  The risks, benefits and alternatives to a hemiarthroplasty were discussed with the patient and their family in detail.  Risks discussed included infection, wound healing problems, hematoma formation, prosthesis-related problems including loosening, fracture, leg length discrepancy, dislocation or persistent pain, any of which could result in the need for further surgery.  We also discussed the risk of iatrogenic nerve or major blood vessel injury which could result in permanent weakness, numbness, dysfunction and possibly loss of life with major blood vessel injury.  Also, discussed the risk of DVT, PE, and anesthesia related complications resulting in death.  They acknowledged understanding of this information and have consented to proceed.              DESCRIPTION OF PROCEDURE: The patient and operative site were identified in the   preoperative holding area. The surgical site was marked. Following this, the   patient was taken to the operating room and placed in the supine position.    Adequate general anesthesia was administered and then was  repositioned on   the operating table in the lateral decubitus with the operative side up. A   timeout was taken. Preoperative antibiotics were administered. All bony   prominences were carefully padded and protected once we had the patient appropriately   positioned. The operative extremity was prepped and draped in a standard   sterile fashion. I cleaned the extremity with an alcohol solution. A Hibiclens   scrub was performed then the extremity was prepped with ChloraPrep x2. I   allowed those to dry for 3 minutes before the draping procedure was carried   out. An approximately 8 cm incision was fashioned about the posterolateral   aspect of the hip, carefully dissected down through skin and subcutaneous   tissues to identify the fascia. This was split in line and the underlying   bursal tissue and short external rotators exposed. The piriformis was tagged   and released off the posterior aspect of the femur and then I carefully   released the short external rotators down to the level of the quadratus,   preserving the capsule as a separate layer. The capsule was incised opened and   the fracture site exposed, tagging the capsule to allow for a later anatomic repair. The fracture hematoma was evacuated. Cutting guide   for the neck was inserted. I measured the cut for roughly a centimeter above the   lesser and then cut the neck in the typical fashion. The cut portion of the   neck was removed, followed by the head from the acetabulum. The was measured   on the back table.. I trialed with the matching size implant in the acetabulum and   it seemed to fit well. The acetabular articular cartilage appeared   well-preserved, as was the periarticular labrum. I did not feel that a total   hip was necessary in this case, and I did feel that we could proceed with the   hemiarthroplasty. Again, I trialed with the appropriate size head; it seemed to fit well in the   socket and I therefore directed my attention to the  femur. The femur was   reamed and broached in typical fashion.. I elected to cement in this case due to the poor bone quality.   A distal cement restrictor was placed and then the canal   irrigated out and cleaned in the typical fashion. I had my assistant mix 2   batches of Simplex bone cement on the back table using current generation   cement mixing technique and a centrifuge. The cement was applied down the   canal. This was pressurized. The implant was impacted into position, careful   to maintain appropriate anteversion as I referenced off of the lower leg. The   excess extruded cement was carefully removed with a [place] elevator. I held the   implant in position until the cement had fully cured. Once the implant was   stable, I trialed off of that with multiple neck lengths and the trial shell.    Ultimately, the 1.5 mm neck seemed to fit the best. It restored a symmetric leg length   relative to the contralateral side and excellent motion and stability without   excessive tension in the periarticular soft tissue structures and hamstrings.    The trial component was removed. Final component impacted into position. I   took care to make sure the Morley taper was fully secured before reducing the   hip, and again carrying it through range of motion. Once again, the hip   demonstrated full motion and excellent stability.     The wound was irrigated out   with 500 mL of a Betadine-containing saline solution followed by 1L of sterile   saline mixed with bacitracin via pulsatile lavage. Topical transexamic acid   was utilized and left in position for 2 minutes before irrigating this out.  The capsule was   anatomically repaired using multiple 0 Vicryl sutures. A Tevdek suture was used   to repair the piriformis to the trochanter through a transosseous tunnel. I   was able to get an excellent repair of the posterior soft tissue structures.    The fascia was closed using a loop PDS stitch. I then irrigated with another  2L   of sterile saline and made sure we had good hemostasis. The wound was then   sequentially closed in layers using Vicryl for the deep tissues and staples for   the skin. Sterile dressings were applied to the wound. The drapes were   withdrawn. The patient tolerated the procedure well. There were no   complications. The patient was awakened and taken to the recovery room in excellent   condition.          Winston Vallejo M.D.   5/5/2018     Cc: CUONG Guadalupe

## 2018-05-06 ENCOUNTER — APPOINTMENT (OUTPATIENT)
Dept: GENERAL RADIOLOGY | Facility: HOSPITAL | Age: 73
End: 2018-05-06

## 2018-05-06 PROBLEM — R09.02 HYPOXIA: Status: ACTIVE | Noted: 2018-05-06

## 2018-05-06 PROBLEM — N39.0 URINARY TRACT INFECTION WITHOUT HEMATURIA: Status: ACTIVE | Noted: 2018-05-06

## 2018-05-06 LAB
BACTERIA SPEC AEROBE CULT: ABNORMAL
BACTERIA SPEC AEROBE CULT: ABNORMAL
BASOPHILS # BLD AUTO: 0.02 10*3/MM3 (ref 0–0.2)
BASOPHILS NFR BLD AUTO: 0.2 % (ref 0–1.5)
DEPRECATED RDW RBC AUTO: 48.6 FL (ref 37–54)
EOSINOPHIL # BLD AUTO: 0.34 10*3/MM3 (ref 0–0.7)
EOSINOPHIL NFR BLD AUTO: 3 % (ref 0.3–6.2)
ERYTHROCYTE [DISTWIDTH] IN BLOOD BY AUTOMATED COUNT: 13.5 % (ref 11.5–14.5)
HCT VFR BLD AUTO: 36.5 % (ref 40.4–52.2)
HGB BLD-MCNC: 12 G/DL (ref 13.7–17.6)
IMM GRANULOCYTES # BLD: 0.03 10*3/MM3 (ref 0–0.03)
IMM GRANULOCYTES NFR BLD: 0.3 % (ref 0–0.5)
LYMPHOCYTES # BLD AUTO: 1.72 10*3/MM3 (ref 0.9–4.8)
LYMPHOCYTES NFR BLD AUTO: 15.4 % (ref 19.6–45.3)
MCH RBC QN AUTO: 32.3 PG (ref 27–32.7)
MCHC RBC AUTO-ENTMCNC: 32.9 G/DL (ref 32.6–36.4)
MCV RBC AUTO: 98.4 FL (ref 79.8–96.2)
MONOCYTES # BLD AUTO: 0.98 10*3/MM3 (ref 0.2–1.2)
MONOCYTES NFR BLD AUTO: 8.8 % (ref 5–12)
NEUTROPHILS # BLD AUTO: 8.12 10*3/MM3 (ref 1.9–8.1)
NEUTROPHILS NFR BLD AUTO: 72.6 % (ref 42.7–76)
PLATELET # BLD AUTO: 138 10*3/MM3 (ref 140–500)
PMV BLD AUTO: 10.9 FL (ref 6–12)
RBC # BLD AUTO: 3.71 10*6/MM3 (ref 4.6–6)
WBC NRBC COR # BLD: 11.18 10*3/MM3 (ref 4.5–10.7)

## 2018-05-06 PROCEDURE — 94799 UNLISTED PULMONARY SVC/PX: CPT

## 2018-05-06 PROCEDURE — 85025 COMPLETE CBC W/AUTO DIFF WBC: CPT | Performed by: INTERNAL MEDICINE

## 2018-05-06 PROCEDURE — 25010000002 CEFTRIAXONE PER 250 MG: Performed by: INTERNAL MEDICINE

## 2018-05-06 PROCEDURE — 25010000003 CEFAZOLIN IN DEXTROSE 2-4 GM/100ML-% SOLUTION: Performed by: ORTHOPAEDIC SURGERY

## 2018-05-06 PROCEDURE — 71045 X-RAY EXAM CHEST 1 VIEW: CPT

## 2018-05-06 PROCEDURE — 97161 PT EVAL LOW COMPLEX 20 MIN: CPT

## 2018-05-06 RX ORDER — LORAZEPAM 1 MG/1
1 TABLET ORAL ONCE
Status: COMPLETED | OUTPATIENT
Start: 2018-05-06 | End: 2018-05-06

## 2018-05-06 RX ADMIN — CEFAZOLIN SODIUM 2 G: 2 INJECTION, SOLUTION INTRAVENOUS at 01:31

## 2018-05-06 RX ADMIN — MULTIPLE VITAMINS W/ MINERALS TAB 1 TABLET: TAB at 08:26

## 2018-05-06 RX ADMIN — HYDROCODONE BITARTRATE AND ACETAMINOPHEN 2 TABLET: 5; 325 TABLET ORAL at 16:02

## 2018-05-06 RX ADMIN — OXYBUTYNIN CHLORIDE 5 MG: 5 TABLET ORAL at 22:17

## 2018-05-06 RX ADMIN — VITAMIN D, TAB 1000IU (100/BT) 1000 UNITS: 25 TAB at 08:26

## 2018-05-06 RX ADMIN — SODIUM CHLORIDE 75 ML/HR: 9 INJECTION, SOLUTION INTRAVENOUS at 02:33

## 2018-05-06 RX ADMIN — MUPIROCIN: 20 OINTMENT TOPICAL at 22:30

## 2018-05-06 RX ADMIN — CARBIDOPA AND LEVODOPA 1 TABLET: 25; 250 TABLET ORAL at 08:26

## 2018-05-06 RX ADMIN — CETIRIZINE HYDROCHLORIDE 10 MG: 10 TABLET, FILM COATED ORAL at 08:26

## 2018-05-06 RX ADMIN — ALBUTEROL SULFATE 2.5 MG: 2.5 SOLUTION RESPIRATORY (INHALATION) at 06:50

## 2018-05-06 RX ADMIN — HYDROCODONE BITARTRATE AND ACETAMINOPHEN 2 TABLET: 5; 325 TABLET ORAL at 09:35

## 2018-05-06 RX ADMIN — DOCUSATE SODIUM 100 MG: 100 CAPSULE, LIQUID FILLED ORAL at 08:34

## 2018-05-06 RX ADMIN — LEVOTHYROXINE SODIUM 200 MCG: 100 TABLET ORAL at 06:34

## 2018-05-06 RX ADMIN — LORAZEPAM 1 MG: 1 TABLET ORAL at 23:33

## 2018-05-06 RX ADMIN — CARBIDOPA AND LEVODOPA 1 TABLET: 25; 250 TABLET ORAL at 17:48

## 2018-05-06 RX ADMIN — ATORVASTATIN CALCIUM 20 MG: 20 TABLET, FILM COATED ORAL at 08:26

## 2018-05-06 RX ADMIN — DONEPEZIL HYDROCHLORIDE 20 MG: 10 TABLET, FILM COATED ORAL at 22:16

## 2018-05-06 RX ADMIN — DIVALPROEX SODIUM 500 MG: 500 TABLET, DELAYED RELEASE ORAL at 22:16

## 2018-05-06 RX ADMIN — CARBIDOPA AND LEVODOPA 1 TABLET: 25; 250 TABLET ORAL at 11:42

## 2018-05-06 RX ADMIN — ARFORMOTEROL TARTRATE 15 MCG: 15 SOLUTION RESPIRATORY (INHALATION) at 23:08

## 2018-05-06 RX ADMIN — DOCUSATE SODIUM 100 MG: 100 CAPSULE, LIQUID FILLED ORAL at 22:16

## 2018-05-06 RX ADMIN — OLANZAPINE 10 MG: 10 TABLET, FILM COATED ORAL at 22:17

## 2018-05-06 RX ADMIN — ARFORMOTEROL TARTRATE 15 MCG: 15 SOLUTION RESPIRATORY (INHALATION) at 09:12

## 2018-05-06 RX ADMIN — NICOTINE 1 PATCH: 21 PATCH, EXTENDED RELEASE TRANSDERMAL at 23:58

## 2018-05-06 RX ADMIN — MUPIROCIN: 20 OINTMENT TOPICAL at 08:34

## 2018-05-06 RX ADMIN — MONTELUKAST 10 MG: 10 TABLET, FILM COATED ORAL at 22:17

## 2018-05-06 RX ADMIN — DIVALPROEX SODIUM 500 MG: 500 TABLET, DELAYED RELEASE ORAL at 08:27

## 2018-05-06 RX ADMIN — PANTOPRAZOLE SODIUM 40 MG: 40 TABLET, DELAYED RELEASE ORAL at 06:34

## 2018-05-06 RX ADMIN — OXYBUTYNIN CHLORIDE 5 MG: 5 TABLET ORAL at 08:25

## 2018-05-06 RX ADMIN — FLUTICASONE PROPIONATE 1 SPRAY: 50 SPRAY, METERED NASAL at 08:26

## 2018-05-06 RX ADMIN — ASPIRIN 325 MG: 325 TABLET, DELAYED RELEASE ORAL at 08:26

## 2018-05-06 RX ADMIN — CEFTRIAXONE SODIUM 1 G: 1 INJECTION, SOLUTION INTRAVENOUS at 23:33

## 2018-05-06 NOTE — PLAN OF CARE
Problem: Patient Care Overview  Goal: Plan of Care Review  Outcome: Ongoing (interventions implemented as appropriate)   05/06/18 1026   Coping/Psychosocial   Plan of Care Reviewed With patient;spouse   OTHER   Outcome Summary Pt presents with impaired mobility in all areas after hip fx s/p right hemiarthroplasty. Pt amb inside home with rwx and sup for 20-30' PTA due to declining strength/Parkinson like symptoms(per wife). Pt now with SHANTHI precautions and severe pain. Pt max/dep of 2 today for bed mobility and attempt to stand. Anticipate need for snf at d/c.

## 2018-05-06 NOTE — NURSING NOTE
PATIENT WAKING UP MORE NOW BUT STILL SLEEPY, FOLLOWS COMMANDS, TONGUE MIDLINE, ORIENTED ONLY TO SELF, NO ARM DRIFT, CLARITZA, SLIGHT WEAKNESS TO LEFT SIDE WHICH IS PTS BASELINE,

## 2018-05-06 NOTE — NURSING NOTE
When assisting pt back to bed, pt agitated and unable to follow directions, refused to use walker, so pt stood up, with asst of two, chair removed from behind patient and bed slid over to patient, patient then sat down on bed and was asst to lie down.

## 2018-05-06 NOTE — PLAN OF CARE
Problem: Patient Care Overview  Goal: Plan of Care Review  Outcome: Ongoing (interventions implemented as appropriate)   05/06/18 0308   Coping/Psychosocial   Plan of Care Reviewed With patient   Plan of Care Review   Progress improving   OTHER   Outcome Summary VSS, DSG C/D/I, NEUROVASCULAR STATUS WNL, VOIDING INCONTINENTLY, U/A SHOWS UTI AND PT STARTED ON IV ROCEPHIN, PT CONFUSED AND AGITATIDED AT SHIFT CHANGE AND RESPONDED WELL TO ATIVAN, MED X1 FOR PAIN, MAX ASST OF 3 TO GET PT FROM CHAIR TO BED-HE WAS UNABLE TO FOLLOW DIRECTIONS TO USE WALKER OR MOVE HIS LEGS, EDUCATED ON IMPORTANCE OF NOT SMOKING TO FACILITATE HEALING , PT TO START TODAY     Goal: Individualization and Mutuality  Outcome: Ongoing (interventions implemented as appropriate)    Goal: Discharge Needs Assessment  Outcome: Ongoing (interventions implemented as appropriate)    Goal: Interprofessional Rounds/Family Conf  Outcome: Ongoing (interventions implemented as appropriate)      Problem: Fall Risk (Adult)  Goal: Absence of Fall  Outcome: Ongoing (interventions implemented as appropriate)      Problem: Fracture Orthopaedic (Adult)  Goal: Signs and Symptoms of Listed Potential Problems Will be Absent, Minimized or Managed (Fracture Orthopaedic)  Outcome: Ongoing (interventions implemented as appropriate)      Problem: Skin Injury Risk (Adult)  Goal: Skin Health and Integrity  Outcome: Ongoing (interventions implemented as appropriate)

## 2018-05-06 NOTE — NURSING NOTE
Dr Grissom notified that pt agitated and that family requesting he be given meds to calm him down, also notified of meds already given to patient and also notified of pts abnormal u/a results and orders received

## 2018-05-06 NOTE — PROGRESS NOTES
Name: Victor Manuel Issa ADMIT: 5/3/2018   : 1945  PCP: CUONG Guadalupe    MRN: [id number] LOS: 3 days   AGE/SEX: 72 y.o. male  ROOM: [facility]   Subjective   Confused overnight which is better now per wife  Pain right hip improved post op  No chest pain or palpitations  No soa or cough  No nausea or vomiting but decreased appetite  Objective   Vital Signs  Temp:  [96.9 °F (36.1 °C)-97.8 °F (36.6 °C)] 97.3 °F (36.3 °C)  Heart Rate:  [69-78] 69  Resp:  [14-22] 16  BP: ()/(52-63) 93/52  SpO2:  [94 %-99 %] 96 %  on  Flow (L/min):  [2-2.5] 2;   Device (Oxygen Therapy): nasal cannula  Body mass index is 24.72 kg/m².    Physical Exam   Constitutional: No distress.   Looks ill     HENT:   Head: Normocephalic and atraumatic.   Cardiovascular: Normal rate and regular rhythm.    No murmur heard.  Pulmonary/Chest: Effort normal. He has no wheezes. He has rales (bilateral, throughout).   Abdominal: Soft. Bowel sounds are normal. He exhibits no distension.   Musculoskeletal: He exhibits no edema or tenderness.        Right hip: He exhibits decreased range of motion and bony tenderness.   Neurological: He is alert.   Skin: Skin is warm and dry. He is not diaphoretic.   Psychiatric: He has a normal mood and affect. His behavior is normal.   Vitals reviewed.      Results Review:       I reviewed the patient's new clinical results.    Results from last 7 days  Lab Units 18  0854 18  0443 18  1903   WBC 10*3/mm3 11.18* 9.80 10.77*   HEMOGLOBIN g/dL 12.0* 13.9 14.0   PLATELETS 10*3/mm3 138* 143 186       Results from last 7 days  Lab Units 18  0558 18  1903   SODIUM mmol/L 141 143   POTASSIUM mmol/L 4.1 3.8   CHLORIDE mmol/L 107 106   CO2 mmol/L 21.8* 26.3   BUN mg/dL 17 19   CREATININE mg/dL 0.78 0.88   GLUCOSE mg/dL 102* 81   Estimated Creatinine Clearance: 92.1 mL/min (by C-G formula based on SCr of 0.78 mg/dL).    Results from last 7 days  Lab Units 18  0558 18  0443  05/03/18  1903   CALCIUM mg/dL 9.6  --  9.8   ALBUMIN g/dL 3.60  --  3.50   MAGNESIUM mg/dL  --  2.2  --          albuterol 2.5 mg Nebulization Daily   arformoterol 15 mcg Nebulization BID - RT   aspirin 325 mg Oral Daily   atorvastatin 20 mg Oral Daily   carbidopa-levodopa 1 tablet Oral TID With Meals   ceftriaxone 1 g Intravenous Q24H   cetirizine 10 mg Oral Daily   cholecalciferol 1,000 Units Oral Daily   divalproex 500 mg Oral BID   docusate sodium 100 mg Oral BID   donepezil 20 mg Oral Nightly   fluticasone 1 spray Nasal Daily   levothyroxine 200 mcg Oral Q AM   LORazepam 0.5 mg Oral Once   Or      LORazepam 0.5 mg Oral Once   montelukast 10 mg Oral Nightly   multivitamin with minerals 1 tablet Oral Daily   mupirocin  Each Nare BID   nicotine 1 patch Transdermal Q24H   OLANZapine 10 mg Oral Nightly   oxybutynin 5 mg Oral BID   pantoprazole 40 mg Oral QAM   propranolol 10 mg Oral Daily   vancomycin 15 mg/kg Intravenous Once       lactated ringers 9 mL/hr Last Rate: 9 mL/hr (05/05/18 1024)   sodium chloride 75 mL/hr Last Rate: 75 mL/hr (05/06/18 0233)   Diet Regular      Assessment/Plan      Active Hospital Problems (** Indicates Principal Problem)    Diagnosis Date Noted   • **Hip fracture requiring operative repair, right, closed, initial encounter [S72.001A] 05/03/2018   • Urinary tract infection without hematuria [N39.0] 05/06/2018   • Hypothyroidism [E03.9] 10/01/2017   • Parkinson's disease [G20] 09/29/2017   • COPD (chronic obstructive pulmonary disease) [J44.9] 09/28/2017   • Coronary artery disease [I25.10] 09/28/2017   • History of CVA (cerebrovascular accident) [Z86.73] 09/28/2017      Resolved Hospital Problems    Diagnosis Date Noted Date Resolved   No resolved problems to display.       · Right subcapital femoral neck fracture: POD 1 right hip hemiarthroplasty Pain better controlled, off IV fluids  · UTI: rocephin, follow cultures  · Dyspnea, hypoxia and cough with rhonchi: poor inspiration and  cough, get CXR now  · Parkinson's: continue sinemet  · COPD: no active issues, cont home meds  · H/o CVA  · CAD: statin, nicotine patch  · Bipolar 1 disorder: cont zyprexa, depakote  · ? Demenita: on aricept which we will continue  · Reactive leukocytosis resolved  · PT consulted    DISPO: rehab TBD    Kuldip Hurst MD  [facility]  05/06/18  2:07 PM

## 2018-05-06 NOTE — PROGRESS NOTES
Patients Name:  Victor Manuel Issa  YOB: 1945  Medical Records Number:  [id number]    HPI:  No complaints or issues.  Wife tells me that he was confused overnight.  Tolerating regular diet.  Pain adequately controlled.    Exam:    Vitals:    05/06/18 0529 05/06/18 0650 05/06/18 0700 05/06/18 0713   BP: 102/59   99/63   BP Location: Right arm   Right arm   Patient Position: Lying   Lying   Pulse: 74 75 73 78   Resp: 14 18 22 16   Temp: 97.8 °F (36.6 °C)   97.6 °F (36.4 °C)   TempSrc: Oral   Oral   SpO2: 98% 97%  98%   Weight:       Height:           Lab Results (last 48 hours)     Procedure Component Value Units Date/Time    CBC & Differential [[id number]] Collected:  05/06/18 0854    Specimen:  Blood Updated:  05/06/18 0927    Narrative:       The following orders were created for panel order CBC & Differential.  Procedure                               Abnormality         Status                     ---------                               -----------         ------                     CBC Auto Differential[[id number]]        Abnormal            Final result                 Please view results for these tests on the individual orders.    CBC Auto Differential [[id number]]  (Abnormal) Collected:  05/06/18 0854    Specimen:  Blood Updated:  05/06/18 0927     WBC 11.18 (H) 10*3/mm3      RBC 3.71 (L) 10*6/mm3      Hemoglobin 12.0 (L) g/dL      Hematocrit 36.5 (L) %      MCV 98.4 (H) fL      MCH 32.3 pg      MCHC 32.9 g/dL      RDW 13.5 %      RDW-SD 48.6 fl      MPV 10.9 fL      Platelets 138 (L) 10*3/mm3      Neutrophil % 72.6 %      Lymphocyte % 15.4 (L) %      Monocyte % 8.8 %      Eosinophil % 3.0 %      Basophil % 0.2 %      Immature Grans % 0.3 %      Neutrophils, Absolute 8.12 (H) 10*3/mm3      Lymphocytes, Absolute 1.72 10*3/mm3      Monocytes, Absolute 0.98 10*3/mm3      Eosinophils, Absolute 0.34 10*3/mm3      Basophils, Absolute 0.02 10*3/mm3      Immature Grans, Absolute 0.03 10*3/mm3     Urine  Culture - Urine, Urine, Clean Catch [[id number]]  (Abnormal)  (Susceptibility) Collected:  05/04/18 1607    Specimen:  Urine from Urine, Clean Catch Updated:  05/06/18 0713     Urine Culture --      >100,000 CFU/mL Escherichia coli (A)    Susceptibility      Escherichia coli     TJ     Ampicillin >=32 ug/ml Resistant     Ampicillin + Sulbactam >=32 ug/ml Resistant     Cefazolin 16 ug/ml Susceptible     Cefepime <=1 ug/ml Susceptible     Ceftriaxone <=1 ug/ml Susceptible     Ciprofloxacin >=4 ug/ml Resistant     Ertapenem <=0.5 ug/ml Susceptible     Gentamicin <=1 ug/ml Susceptible     Levofloxacin >=8 ug/ml Resistant     Nitrofurantoin 256 ug/ml Resistant     Piperacillin + Tazobactam 64 ug/ml Intermediate     Tetracycline >=16 ug/ml Resistant     Trimethoprim + Sulfamethoxazole >=320 ug/ml Resistant                    Urinalysis With / Microscopic If Indicated - Urine, Clean Catch [[id number]]  (Abnormal) Collected:  05/05/18 1814    Specimen:  Urine from Urine, Clean Catch Updated:  05/05/18 1824     Color, UA Yellow     Appearance, UA Cloudy (A)     pH, UA 6.5     Specific Gravity, UA 1.011     Glucose, UA Negative     Ketones, UA Negative     Bilirubin, UA Negative     Blood, UA Negative     Protein, UA Negative     Leuk Esterase, UA Large (3+) (A)     Nitrite, UA Positive (A)     Urobilinogen, UA 0.2 E.U./dL    Urinalysis, Microscopic Only - Urine, Clean Catch [[id number]]  (Abnormal) Collected:  05/05/18 1814    Specimen:  Urine from Urine, Clean Catch Updated:  05/05/18 1824     RBC, UA 3-5 (A) /HPF      WBC, UA Too Numerous to Count (A) /HPF      Bacteria, UA 2+ (A) /HPF      Squamous Epithelial Cells, UA 0-2 /HPF      Hyaline Casts, UA 0-2 /LPF      Methodology Automated Microscopy    Tissue Pathology Exam [[id number]] Collected:  05/05/18 0830    Specimen:  Bone from Hip, Right Updated:  05/05/18 1042    Urinalysis, Microscopic Only - Urine, Clean Catch [[id number]]  (Abnormal) Collected:  05/04/18 1607     Specimen:  Urine from Urine, Clean Catch Updated:  05/04/18 1657     RBC, UA 3-5 (A) /HPF      WBC, UA Too Numerous to Count (A) /HPF      Bacteria, UA 4+ (A) /HPF      Squamous Epithelial Cells, UA 0-2 /HPF      Hyaline Casts, UA None Seen /LPF      Methodology Automated Microscopy    Urinalysis With / Culture If Indicated - Urine, Clean Catch [[id number]]  (Abnormal) Collected:  05/04/18 1607    Specimen:  Urine from Urine, Clean Catch Updated:  05/04/18 1654     Color, UA Dark Yellow (A)     Appearance, UA Cloudy (A)     pH, UA 5.5     Specific Gravity, UA 1.019     Glucose, UA Negative     Ketones, UA Negative     Bilirubin, UA Negative     Blood, UA Negative     Protein, UA Negative     Leuk Esterase, UA Large (3+) (A)     Nitrite, UA Negative     Urobilinogen, UA 1.0 E.U./dL          Incision: Clean, benign appearing.    Extremity:  Calf soft.  Negative Homans sign.  Good motor and sensory function in foot.  Good pedal pulses with brisk cap refill.    Postoperative x-ray shows good alignment of the implants without complicating process.    Plan:   1.  POD#1 s/p right hip hemiarthroplasty   2.  Continue PT for mobilization, ROM--strict 90 degree hip flexion precautions   3.  Begin DVT prophylaxis   4.  Discharge disposition pending    Winston Vallejo MD

## 2018-05-06 NOTE — THERAPY EVALUATION
Acute Care - Physical Therapy Initial Evaluation   [facility]     Patient Name: Victor Manuel Issa  : 1945  MRN: [id number]  Today's Date: 2018   Onset of Illness/Injury or Date of Surgery: 18  Date of Referral to PT: 18  Referring Physician: Robert      Admit Date: 5/3/2018    Visit Dx:     ICD-10-CM ICD-9-CM   1. Closed fracture of right hip (femor), initial encounter S72.001A 820.8   2. Hip fracture S72.009A 820.8   3. Difficulty walking R26.2 719.7     Patient Active Problem List   Diagnosis   • Thigh pain, musculoskeletal   • Left leg weakness   • History of CVA (cerebrovascular accident)   • COPD (chronic obstructive pulmonary disease)   • Coronary artery disease   • Cervical myelopathy   • Parkinson's disease   • Debility   • Cerebrovascular disease   • Abnormal TSH   • Hypothyroidism   • Hip fracture requiring operative repair, right, closed, initial encounter   • Urinary tract infection without hematuria     Past Medical History:   Diagnosis Date   • Arthritis    • Bipolar 1 disorder    • COPD (chronic obstructive pulmonary disease)    • Coronary artery disease    • Disease of thyroid gland    • Hyperlipidemia    • Myocardial infarction    • Stroke      Past Surgical History:   Procedure Laterality Date   • ABDOMINAL SURGERY     • CARDIAC SURGERY     • CHOLECYSTECTOMY     • CORONARY ARTERY BYPASS GRAFT      x2   • EYE SURGERY      cataracts   • MUSCLE BIOPSY Left 3/24/2016    Procedure: LT THIGH MUSCLE BIOPSY;  Surgeon: Fausto Mack MD;  Location: [facility];  Service:    • SKIN BIOPSY     • THYROID SURGERY     • TURP / TRANSURETHRAL INCISION / DRAINAGE PROSTATE     • VASCULAR SURGERY          PT ASSESSMENT (last 12 hours)      Physical Therapy Evaluation     Row Name 18 1000 18 0942       PT Evaluation Time/Intention    Subjective Information dizziness;weakness;fatigue;pain  -SV complains of;pain;weakness;fatigue  -SV    Document Type  -- evaluation   -SV    Patient Effort  -- adequate  -SV    Comment  -- Pt decreased responsiveness, unable to grasp rwx:returned to supine  -SV    Row Name 05/06/18 0942          General Information    Patient Profile Reviewed? yes  -SV     Onset of Illness/Injury or Date of Surgery 05/05/18  -     Referring Physician Robert  -     Patient Observations alert;cooperative;agree to therapy  -SV     Patient/Family Observations wife present  -SV     Prior Level of Function --   sup 20-30 feet with rollator  -SV     Equipment Currently Used at Home rollator  -SV     Pertinent History of Current Functional Problem Pt lifted heavy planter for wife and fell sustaing right hip fx  -SV     Existing Precautions/Restrictions fall;hip, posterior  -SV     Limitations/Impairments other (see comments)   Parkinsonism symptoms, immobile  -SV     Barriers to Rehab previous functional deficit  -SV     Row Name 05/06/18 0942          Relationship/Environment    Primary Source of Support/Comfort spouse  -SV     Lives With spouse  -SV     Row Name 05/06/18 0942          Cognitive Assessment/Intervention- PT/OT    Orientation Status (Cognition) oriented x 4  -SV     Follows Commands (Cognition) WFL  -SV     Row Name 05/06/18 0942          Safety Issues, Functional Mobility    Safety Issues Affecting Function (Mobility) other (see comments)   falls risk  -SV     Row Name 05/06/18 0942          Mobility Assessment/Treatment    Additional Documentation --   WBAT  -SV     Row Name 05/06/18 0942          Bed Mobility Assessment/Treatment    Bed Mobility Assessment/Treatment supine-sit-supine  -SV     Supine-Sit-Supine [place] (Bed Mobility) maximum assist (25% patient effort);dependent (less than 25% patient effort);2 person assist  -SV     Bed Mobility, Safety Issues decreased use of arms for pushing/pulling;decreased use of legs for bridging/pushing;other (see comments)   premedicated for pain  -SV     Assistive Device (Bed Mobility) draw sheet  -  "    Row Name 05/06/18 0942          Transfer Assessment/Treatment    Transfer Assessment/Treatment sit-stand transfer  -     Comment (Transfers) unable to maintain partial standing with rwx  -     Sit-Stand [place] (Transfers) maximum assist (25% patient effort);2 person assist  -[facility] Name 05/06/18 0942          Sit-Stand Transfer    Assistive Device (Sit-Stand Transfers) walker, front-wheeled  -     Row Name 05/06/18 0942          Gait/Stairs Assessment/Training    Gait/Stairs Assessment/Training gait/ambulation independence  -     [place] Level (Gait) unable to assess;not tested  -     Row Name 05/06/18 0942          General ROM    RT Upper Ext --   shoulder limited 50%, elbow limited end rom ext  -SV     LT Upper Ext --   shoulder limited 50%, elbow limited end rom ext  -SV     Right Hand --   wfl  -SV     Left Hand --   wfl  -SV     RT Lower Ext --   HS, abd, saq,A/p performed 25% rom AA/PROM 10 reps  -     LT Lower Ext --   grossly wfl: tight HS and Achilles tendon observed (  -[facility] Name 05/06/18 0942          MMT (Manual Muscle Testing)    Additional Documentation --   Pt lethargic today after pain meds: 2-/5 grosslyBUE/LLE  -[facility] Name 05/06/18 0942          General Assessment (Manual Muscle Testing)    General Manual Muscle Testing (MMT) Assessment upper extremity strength deficits identified;lower extremity strength deficits identified;other (see comments)   No active movement RLE today  -[facility] Name 05/06/18 0942          Vision Assessment/Intervention    Visual Impairment/Limitations unable/difficult to assess  -     Row Name 05/06/18 0942          Pain Assessment    Additional Documentation --   9/10 per Pt \" Im in hell\" premedicated for PT  -     Row Name             Wound 05/05/18 0920 Right hip incision    Wound - Properties Group Date first assessed: 05/05/18  -EG Time first assessed: 0920  -EG Side: Right  -EG Location: hip  -EG Type: incision  -EG    " Row Name 05/06/18 0942          Plan of Care Review    Plan of Care Reviewed With patient;spouse  -SV     Row Name 05/06/18 0942          Physical Therapy Clinical Impression    Date of Referral to PT 05/05/18  -SV     PT Diagnosis (PT Clinical Impression) right hip hemiarthroplasty  -SV     Functional Level at Time of Evaluation (PT Clinical Impression) max/dep of 2  -SV     Patient/Family Goals Statement (PT Clinical Impression) Indep amb household level distances  -SV     Criteria for Skilled Interventions Met (PT Clinical Impression) treatment indicated  -SV     Pathology/Pathophysiology Noted (Describe Specifically for Each System) musculoskeletal  -SV     Impairments Found (describe specific impairments) aerobic capacity/endurance;gait, locomotion, and balance  -SV     Functional Limitations in Following Categories (Describe Specific Limitations) self-care;home management;community/leisure  -SV     Rehab Potential (PT Clinical Summary) good, to achieve stated therapy goals  -SV     Predicted Duration of Therapy (PT) 6-8 weeks  -SV     Row Name 05/06/18 0942          Vital Signs    Pretreatment Heart Rate (beats/min) 79  -SV     Posttreatment Heart Rate (beats/min) 80  -SV     Pre SpO2 (%) 97  -SV     O2 Delivery Pre Treatment supplemental O2  -SV     Intra SpO2 (%) 94  -SV     O2 Delivery Intra Treatment supplemental O2  -SV     Post SpO2 (%) 96  -SV     O2 Delivery Post Treatment supplemental O2  -SV     Pre Patient Position Supine  -SV     Intra Patient Position Standing  -SV     Post Patient Position Supine  -SV     Row Name 05/06/18 0942          Physical Therapy Goals    Bed Mobility Goal Selection (PT) bed mobility, PT goal 1  -SV     Transfer Goal Selection (PT) transfer, PT goal 1  -SV     Gait Training Goal Selection (PT) gait training, PT goal 1  -SV     Row Name 05/06/18 0942          Bed Mobility Goal 1 (PT)    Activity/Assistive Device (Bed Mobility Goal 1, PT) sit to supine/supine to sit  -SV      [place] Level/Cues Needed (Bed Mobility Goal 1, PT) minimum assist (75% or more patient effort)  -SV     Time Frame (Bed Mobility Goal 1, PT) 2 weeks  -SV     Barriers (Bed Mobility Goal 1, PT) Parkinsonism symptoms  -SV     Row Name 05/06/18 0942          Transfer Goal 1 (PT)    Activity/Assistive Device (Transfer Goal 1, PT) sit-to-stand/stand-to-sit;walker, rolling  -SV     [place] Level/Cues Needed (Transfer Goal 1, PT) minimum assist (75% or more patient effort)  -SV     Time Frame (Transfer Goal 1, PT) 2 weeks  -SV     Barriers (Transfers Goal 1, PT) Parkinsonism symptoms  -SV     Row Name 05/06/18 0942          Gait Training Goal 1 (PT)    Activity/Assistive Device (Gait Training Goal 1, PT) gait (walking locomotion);walker, rolling  -SV     [place] Level (Gait Training Goal 1, PT) contact guard assist  -SV     Distance (Gait Goal 1, PT) 50  -SV     Time Frame (Gait Training Goal 1, PT) 2 weeks  -SV     Barriers (Gait Training Goal 1, PT) Parkinsonism sypmptoms  -SV     Row Name 05/06/18 0942          Patient Education Goal (PT)    Activity (Patient Education Goal, PT) vc with THR precautions  -SV     [place]/Cues/Accuracy (Memory Goal 2, PT) verbalizes understanding  -SV     Time Frame (Patient Education Goal, PT) 2 weeks  -SV     Row Name 05/06/18 0942          Positioning and Restraints    Pre-Treatment Position in bed  -SV     Post Treatment Position bed  -SV     In Bed supine;notified nsg;call light within reach;encouraged to call for assist;exit alarm on;with family/caregiver  -SV     Row Name 05/06/18 0942          Living Environment    Home Accessibility --   stair lift into house, raised ranch home, uses lift recliner  -SV       User Key  (r) = Recorded By, (t) = Taken By, (c) = Cosigned By    Initials Name Provider Type    SV Mariluz Zamora, PT Physical Therapist    ELISHA Rush, TRINY Registered Nurse          Physical Therapy Education     Title: PT OT SLP Therapies (Done)      Topic: Physical Therapy (Done)     Point: Mobility training (Done)    Learning Progress Summary     Learner Status Readiness Method Response Comment Documented by    Patient Done Acceptance E,TB,D VU,NR   05/06/18 1026     Done Acceptance E,TB VU,NR   05/05/18 1552    Family Done Acceptance E,TB,D VU,NR   05/06/18 1026     Done Acceptance E,TB VU,NR  ST 05/05/18 1552          Point: Home exercise program (Done)    Learning Progress Summary     Learner Status Readiness Method Response Comment Documented by    Patient Done Acceptance E,TB,D VU,NR   05/06/18 1026     Done Acceptance E,TB VU,NR   05/05/18 1552    Family Done Acceptance E,TB,D VU,NR   05/06/18 1026     Done Acceptance E,TB VU,NR   05/05/18 1552          Point: Body mechanics (Done)    Learning Progress Summary     Learner Status Readiness Method Response Comment Documented by    Patient Done Acceptance E,TB,D VU,NR   05/06/18 1026     Done Acceptance E,TB VU,NR   05/05/18 1552    Family Done Acceptance E,TB,D VU,NR   05/06/18 1026     Done Acceptance E,TB VU,NR  ST 05/05/18 1552          Point: Precautions (Done)    Learning Progress Summary     Learner Status Readiness Method Response Comment Documented by    Patient Done Acceptance E,TB,D VU,NR   05/06/18 1026     Done Acceptance E,TB VU,NR  ST 05/05/18 1552    Family Done Acceptance E,TB,D VU,NR   05/06/18 1026     Done Acceptance E,TB VU,NR   05/05/18 1552                      User Key     Initials Effective Dates Name Provider Type Discipline     04/03/18 -  Mariluz Zamora, PT Physical Therapist PT     10/13/17 -  Sana Acuña RN Registered Nurse Nurse                PT Recommendation and Plan  Anticipated Discharge Disposition (PT): skilled nursing facility (SNF)  Planned Therapy Interventions (PT Eval): bed mobility training, gait training, home exercise program, strengthening, ROM (range of motion), transfer training  Therapy Frequency (PT Clinical  Impression): daily  Outcome Summary/Treatment Plan (PT)  Anticipated Discharge Disposition (PT): skilled nursing facility (SNF)  Plan of Care Reviewed With: patient, spouse  Outcome Summary: Pt presents with impaired mobility in all areas after hip fx s/p right hemiarthroplasty. Pt amb inside home with rwx and sup for 20-30' PTA due to declining strength/Parkinson like symptoms(per wife). Pt  now with SHANTHI precautions and severe pain. Pt max/dep of 2 today for bed mobility and attempt to stand. Anticipate need for snf at d/c.          Outcome Measures     Row Name 05/06/18 1000             How much help from another person do you currently need...    Turning from your back to your side while in flat bed without using bedrails? 1  -SV      Moving from lying on back to sitting on the side of a flat bed without bedrails? 1  -SV      Moving to and from a bed to a chair (including a wheelchair)? 1  -SV      Standing up from a chair using your arms (e.g., wheelchair, bedside chair)? 1  -SV      Climbing 3-5 steps with a railing? 1  -SV      To walk in hospital room? 1  -SV      AM-PAC 6 Clicks Score 6  -SV         Functional Assessment    Outcome Measure Options AM-PAC 6 Clicks Basic Mobility (PT)  -SV        User Key  (r) = Recorded By, (t) = Taken By, (c) = Cosigned By    Initials Name Provider Type    CLEMENTINA Zamora PT Physical Therapist           Time Calculation:         PT Charges     Row Name 05/06/18 1030             Time Calculation    Start Time 0942  -SV      Stop Time 1007  -SV      Time Calculation (min) 25 min  -SV      PT Received On 05/06/18  -SV      PT - Next Appointment 05/07/18  -SV      PT Goal Re-Cert Due Date 05/20/18  -SV        User Key  (r) = Recorded By, (t) = Taken By, (c) = Cosigned By    Initials Name Provider Type    CLEMENTINA Zamora PT Physical Therapist          Therapy Charges for Today     Code Description Service Date Service Provider Modifiers Qty    [id number]  PT EVAL LOW  COMPLEXITY 2 5/6/2018 Mariluz Zamora, PT GP 1    [id number] HC PT THER SUPP EA 15 MIN 5/6/2018 Mariluz Zamora, PT GP 1          PT G-Codes  Outcome Measure Options: AM-PAC 6 Clicks Basic Mobility (PT)      Mariluz Zamora, PT  5/6/2018

## 2018-05-06 NOTE — PLAN OF CARE
Problem: Patient Care Overview  Goal: Plan of Care Review  Outcome: Ongoing (interventions implemented as appropriate)   05/06/18 1227   Coping/Psychosocial   Plan of Care Reviewed With patient   Plan of Care Review   Progress improving   OTHER   Outcome Summary pain under control with pain medicatiojn. patient to the side of the bed with pt. patient anfd family educated about htn and htn medicaitons.      Goal: Individualization and Mutuality  Outcome: Ongoing (interventions implemented as appropriate)    Goal: Discharge Needs Assessment  Outcome: Ongoing (interventions implemented as appropriate)    Goal: Interprofessional Rounds/Family Conf  Outcome: Ongoing (interventions implemented as appropriate)      Problem: Fall Risk (Adult)  Goal: Absence of Fall  Outcome: Ongoing (interventions implemented as appropriate)      Problem: Fracture Orthopaedic (Adult)  Goal: Signs and Symptoms of Listed Potential Problems Will be Absent, Minimized or Managed (Fracture Orthopaedic)  Outcome: Ongoing (interventions implemented as appropriate)      Problem: Skin Injury Risk (Adult)  Goal: Skin Health and Integrity  Outcome: Ongoing (interventions implemented as appropriate)

## 2018-05-06 NOTE — PLAN OF CARE
Problem: Patient Care Overview  Goal: Plan of Care Review  Outcome: Ongoing (interventions implemented as appropriate)   05/05/18 2012   Coping/Psychosocial   Plan of Care Reviewed With patient;spouse;family   Plan of Care Review   Progress improving   OTHER   Outcome Summary Pt arrived on floor 1130 from right hip mati-arthroplasty, I added pt to my team around 1530, pt incontinent of urine, soaked bed linens, collected a second U/A, pt disoriented to place, family says pt is not normally confused, max assist for transfer from bed to chair, pt became SOA shortly after transfer-oxygen level was mid 90%, RT administerd tx, mepilex c/d/i, educated pt on importance of not smoking     Goal: Individualization and Mutuality  Outcome: Ongoing (interventions implemented as appropriate)      Problem: Fracture Orthopaedic (Adult)  Goal: Signs and Symptoms of Listed Potential Problems Will be Absent, Minimized or Managed (Fracture Orthopaedic)  Outcome: Ongoing (interventions implemented as appropriate)      Problem: Skin Injury Risk (Adult)  Goal: Skin Health and Integrity  Outcome: Ongoing (interventions implemented as appropriate)

## 2018-05-07 ENCOUNTER — APPOINTMENT (OUTPATIENT)
Dept: CT IMAGING | Facility: HOSPITAL | Age: 73
End: 2018-05-07

## 2018-05-07 ENCOUNTER — APPOINTMENT (OUTPATIENT)
Dept: MRI IMAGING | Facility: HOSPITAL | Age: 73
End: 2018-05-07

## 2018-05-07 LAB
HCT VFR BLD AUTO: 34.4 % (ref 40.4–52.2)
HGB BLD-MCNC: 11 G/DL (ref 13.7–17.6)

## 2018-05-07 PROCEDURE — 85014 HEMATOCRIT: CPT | Performed by: ORTHOPAEDIC SURGERY

## 2018-05-07 PROCEDURE — 94799 UNLISTED PULMONARY SVC/PX: CPT

## 2018-05-07 PROCEDURE — 25010000002 CEFTRIAXONE PER 250 MG: Performed by: INTERNAL MEDICINE

## 2018-05-07 PROCEDURE — 70450 CT HEAD/BRAIN W/O DYE: CPT

## 2018-05-07 PROCEDURE — 70553 MRI BRAIN STEM W/O & W/DYE: CPT

## 2018-05-07 PROCEDURE — A9577 INJ MULTIHANCE: HCPCS | Performed by: INTERNAL MEDICINE

## 2018-05-07 PROCEDURE — 0 GADOBENATE DIMEGLUMINE 529 MG/ML SOLUTION: Performed by: INTERNAL MEDICINE

## 2018-05-07 PROCEDURE — 85018 HEMOGLOBIN: CPT | Performed by: ORTHOPAEDIC SURGERY

## 2018-05-07 RX ORDER — SODIUM CHLORIDE 9 MG/ML
75 INJECTION, SOLUTION INTRAVENOUS CONTINUOUS
Status: DISCONTINUED | OUTPATIENT
Start: 2018-05-07 | End: 2018-05-10

## 2018-05-07 RX ADMIN — CARBIDOPA AND LEVODOPA 1 TABLET: 25; 250 TABLET ORAL at 08:40

## 2018-05-07 RX ADMIN — LEVOTHYROXINE SODIUM 200 MCG: 100 TABLET ORAL at 06:16

## 2018-05-07 RX ADMIN — NICOTINE 1 PATCH: 21 PATCH, EXTENDED RELEASE TRANSDERMAL at 23:42

## 2018-05-07 RX ADMIN — VITAMIN D, TAB 1000IU (100/BT) 1000 UNITS: 25 TAB at 08:40

## 2018-05-07 RX ADMIN — FLUTICASONE PROPIONATE 1 SPRAY: 50 SPRAY, METERED NASAL at 08:42

## 2018-05-07 RX ADMIN — ARFORMOTEROL TARTRATE 15 MCG: 15 SOLUTION RESPIRATORY (INHALATION) at 11:21

## 2018-05-07 RX ADMIN — ALBUTEROL SULFATE 2.5 MG: 2.5 SOLUTION RESPIRATORY (INHALATION) at 07:41

## 2018-05-07 RX ADMIN — PANTOPRAZOLE SODIUM 40 MG: 40 TABLET, DELAYED RELEASE ORAL at 06:16

## 2018-05-07 RX ADMIN — OXYBUTYNIN CHLORIDE 5 MG: 5 TABLET ORAL at 08:40

## 2018-05-07 RX ADMIN — GADOBENATE DIMEGLUMINE 16 ML: 529 INJECTION, SOLUTION INTRAVENOUS at 19:59

## 2018-05-07 RX ADMIN — DIVALPROEX SODIUM 500 MG: 500 TABLET, DELAYED RELEASE ORAL at 08:40

## 2018-05-07 RX ADMIN — ACETAMINOPHEN 650 MG: 325 TABLET, FILM COATED ORAL at 08:41

## 2018-05-07 RX ADMIN — ATORVASTATIN CALCIUM 20 MG: 20 TABLET, FILM COATED ORAL at 08:40

## 2018-05-07 RX ADMIN — SODIUM CHLORIDE 75 ML/HR: 9 INJECTION, SOLUTION INTRAVENOUS at 23:40

## 2018-05-07 RX ADMIN — MULTIPLE VITAMINS W/ MINERALS TAB 1 TABLET: TAB at 08:41

## 2018-05-07 RX ADMIN — CETIRIZINE HYDROCHLORIDE 10 MG: 10 TABLET, FILM COATED ORAL at 08:40

## 2018-05-07 RX ADMIN — PROPRANOLOL HYDROCHLORIDE 10 MG: 10 TABLET ORAL at 08:41

## 2018-05-07 RX ADMIN — DOCUSATE SODIUM 100 MG: 100 CAPSULE, LIQUID FILLED ORAL at 08:41

## 2018-05-07 RX ADMIN — ASPIRIN 325 MG: 325 TABLET, DELAYED RELEASE ORAL at 08:41

## 2018-05-07 RX ADMIN — CEFTRIAXONE SODIUM 1 G: 1 INJECTION, SOLUTION INTRAVENOUS at 23:40

## 2018-05-07 NOTE — PLAN OF CARE
Problem: Patient Care Overview  Goal: Plan of Care Review  Outcome: Ongoing (interventions implemented as appropriate)  POD 2 Right Hip Hemiarthroplasty. Pt extremely drowsy today. Unable to take medications. Slight fever this AM. VSS. Dressing CDI. lvana heard through out lungs. O2 had to be placed back on pt. Unable to follow commands. HO CVA, weakness noted. Wife at bedside. Unable to participate with PT today R/T drowsiness. MRI Head/CT scan ordered. Pt placed on NPO diet for speech eval. NVI. Unable to demonstrate use of IS. Educated on the importance of strengthening exercises, with HO CVA/Weakness; importance of BP monitoring and the use of medications as ordered for HO HTN. Pt unable to comprehend teaching; wife at bedside verbalized understanding. Awaiting clearance for [facility]. Will cont to monitor.   Goal: Individualization and Mutuality  Outcome: Ongoing (interventions implemented as appropriate)    Goal: Discharge Needs Assessment  Outcome: Ongoing (interventions implemented as appropriate)    Goal: Interprofessional Rounds/Family Conf  Outcome: Ongoing (interventions implemented as appropriate)      Problem: Fall Risk (Adult)  Goal: Identify Related Risk Factors and Signs and Symptoms  Outcome: Ongoing (interventions implemented as appropriate)    Goal: Absence of Fall  Outcome: Ongoing (interventions implemented as appropriate)      Problem: Fracture Orthopaedic (Adult)  Goal: Signs and Symptoms of Listed Potential Problems Will be Absent, Minimized or Managed (Fracture Orthopaedic)  Outcome: Ongoing (interventions implemented as appropriate)      Problem: Skin Injury Risk (Adult)  Goal: Identify Related Risk Factors and Signs and Symptoms  Outcome: Ongoing (interventions implemented as appropriate)    Goal: Skin Health and Integrity  Outcome: Ongoing (interventions implemented as appropriate)

## 2018-05-07 NOTE — PLAN OF CARE
Problem: Patient Care Overview  Goal: Plan of Care Review  Outcome: Ongoing (interventions implemented as appropriate)   05/07/18 0850 05/07/18 1556   Coping/Psychosocial   Plan of Care Reviewed With patient --    Plan of Care Review   Progress --  declining     Goal: Individualization and Mutuality  Outcome: Ongoing (interventions implemented as appropriate)    Goal: Discharge Needs Assessment  Outcome: Ongoing (interventions implemented as appropriate)    Goal: Interprofessional Rounds/Family Conf  Outcome: Ongoing (interventions implemented as appropriate)      Problem: Fall Risk (Adult)  Goal: Identify Related Risk Factors and Signs and Symptoms  Outcome: Ongoing (interventions implemented as appropriate)    Goal: Absence of Fall  Outcome: Ongoing (interventions implemented as appropriate)      Problem: Fracture Orthopaedic (Adult)  Goal: Signs and Symptoms of Listed Potential Problems Will be Absent, Minimized or Managed (Fracture Orthopaedic)  Outcome: Ongoing (interventions implemented as appropriate)      Problem: Skin Injury Risk (Adult)  Goal: Identify Related Risk Factors and Signs and Symptoms  Outcome: Ongoing (interventions implemented as appropriate)

## 2018-05-07 NOTE — PROGRESS NOTES
Orthopedic Progress Note      Patient: Victor Manuel Issa    YOB: 1945    Medical Record Number: [id number]    Attending Physician: Kuldip Hurst MD    Date of Admission: 5/3/2018  4:53 PM    Admitting Dx:  Closed fracture of right hip, initial encounter [S72.001A]  Hip fracture requiring operative repair, right, closed, initial encounter [S72.001A]    Status Post: HIP BIPOLAR REPLACEMENT    Post Operative Day Number: 2    Current Problem List:   Patient Active Problem List   Diagnosis   • Thigh pain, musculoskeletal   • Left leg weakness   • History of CVA (cerebrovascular accident)   • COPD (chronic obstructive pulmonary disease)   • Coronary artery disease   • Cervical myelopathy   • Parkinson's disease   • Debility   • Cerebrovascular disease   • Abnormal TSH   • Hypothyroidism   • Hip fracture requiring operative repair, right, closed, initial encounter   • Urinary tract infection without hematuria   • Hypoxia         Past Medical History:   Diagnosis Date   • Arthritis    • Bipolar 1 disorder    • COPD (chronic obstructive pulmonary disease)    • Coronary artery disease    • Disease of thyroid gland    • Hyperlipidemia    • Myocardial infarction    • Stroke        SUBJECTIVE: 72 y.o.  male. Very drowsy.  Arouses easily, but falls right back to sleep.  Oriented to name only.  Confusion last PM that required Ativan.     OBJECTIVE:   Vitals:    05/06/18 2308 05/07/18 0327 05/07/18 0722 05/07/18 0741   BP:  96/54 98/66    BP Location:  Right arm Right arm    Patient Position:  Lying Lying    Pulse: 70 80 85 88   Resp: 16 16 18 20   Temp:  98.6 °F (37 °C) 100.3 °F (37.9 °C)    TempSrc:  Oral Oral    SpO2: 94% 93% 92% 93%   Weight:       Height:         I/O last 3 completed shifts:  In: 1480 [P.O.:455; I.V.:725; IV Piggyback:300]  Out: -     Current Medications:  Scheduled Meds:  albuterol 2.5 mg Nebulization Daily   arformoterol 15 mcg Nebulization BID - RT   aspirin 325 mg Oral Daily  "  atorvastatin 20 mg Oral Daily   carbidopa-levodopa 1 tablet Oral TID With Meals   ceftriaxone 1 g Intravenous Q24H   cetirizine 10 mg Oral Daily   cholecalciferol 1,000 Units Oral Daily   divalproex 500 mg Oral BID   docusate sodium 100 mg Oral BID   donepezil 20 mg Oral Nightly   fluticasone 1 spray Nasal Daily   levothyroxine 200 mcg Oral Q AM   LORazepam 0.5 mg Oral Once   Or      LORazepam 0.5 mg Oral Once   montelukast 10 mg Oral Nightly   multivitamin with minerals 1 tablet Oral Daily   nicotine 1 patch Transdermal Q24H   OLANZapine 10 mg Oral Nightly   oxybutynin 5 mg Oral BID   pantoprazole 40 mg Oral QAM   propranolol 10 mg Oral Daily   vancomycin 15 mg/kg Intravenous Once     PRN Meds:.•  acetaminophen  •  acetaminophen  •  bisacodyl  •  busPIRone  •  docusate sodium  •  HYDROcodone-acetaminophen  •  HYDROmorphone **AND** naloxone  •  Morphine  •  ondansetron  •  ondansetron **OR** ondansetron ODT **OR** ondansetron  •  promethazine  •  sodium chloride  •  Insert peripheral IV **AND** sodium chloride    Diagnostic Tests:   Lab Results (last 24 hours)     Procedure Component Value Units Date/Time    Hemoglobin & Hematocrit, Blood [[id number]]  (Abnormal) Collected:  05/07/18 0346    Specimen:  Blood Updated:  05/07/18 0402     Hemoglobin 11.0 (L) g/dL      Hematocrit 34.4 (L) %           PHYSICAL EXAM: Right hip dressing dry and intact.  Knee immobilizer in place.  Will wiggle toes on command,  Sensation intact.  Appears comfortable.  Was confused last PM and trying to get out of bed.  Lungs with diminished BS in bases.  Does have \"rattling\" in upper airways.  Cough effort is poor.  Encouraged his wife to have him use IS and CDB q 1-2 hours during the day.       ASSESSMENT & PLAN:  Continue to work on pulmonary toileting.  Continue to mobilize with PT as he can tolerate.  Will need subacute rehab post discharge          Date: 5/7/2018    TRINY Dillon MD            "

## 2018-05-07 NOTE — PROGRESS NOTES
Continued Stay Note   [facility]     Patient Name: Victor Manuel Issa  MRN: [id number]  Today's Date: 5/7/2018    Admit Date: 5/3/2018          Discharge Plan     Row Name 05/07/18 1525       Plan    Plan [facility]     Patient/Family in Agreement with Plan yes    Plan Comments [facility] bed available once pt is medically stable, Louann will follow. Per family request referral also sent to [facility] in [place], [place]. S/w [facility] [phone] pertinent medical record faxed [phone]. [facility] is able to accept. Pt will require ambulance to transport. Per Medicare guidelines, ambulance will be private pay as [facility] is greater than [place] miles from [facility] and it is not medically necessary for pt to d/c there.  Family does not wish to privately pay for transportation, they would like the pt to d/c to [facility].               Discharge Codes    No documentation.           Gill Brown RN

## 2018-05-07 NOTE — SIGNIFICANT NOTE
05/07/18 1509   Rehab Time/Intention   Evaluation Not Performed other (see comments)  (Orders received for Bedside Swallow Eval. Discussed pt with RN. Pt is not alert to participate at this time. Pt also has multiple tests scheduled. ST to f/u in am.)   Rehab Treatment   Discipline speech language pathologist

## 2018-05-07 NOTE — SIGNIFICANT NOTE
05/07/18 0745   Rehab Time/Intention   Evaluation Not Performed (Noted d/c plan for SNU.  Will defer OT eval needs to SNU setting. )   Rehab Treatment   Discipline occupational therapist

## 2018-05-07 NOTE — SIGNIFICANT NOTE
05/07/18 1639   Rehab Time/Intention   Evaluation Not Performed patient unavailable for evaluation  (KRISTA: CT. Will follow up tomorrow.)   Rehab Treatment   Discipline physical therapist   Recommendation   PT - Next Appointment 05/08/18

## 2018-05-07 NOTE — PROGRESS NOTES
Discharge Planning Assessment   [facility]     Patient Name: Victor Manuel Issa  MRN: [id number]  Today's Date: 5/6/2018    Admit Date: 5/3/2018          Discharge Needs Assessment     Row Name 05/06/18 2008       Living Environment    Lives With spouse    Current Living Arrangements home/apartment/condo    Primary Care Provided by spouse/significant other    Provides Primary Care For no one, unable/limited ability to care for self    Family Caregiver if Needed none    Quality of Family Relationships helpful;involved;supportive    Able to Return to Prior Arrangements no    Living Arrangement Comments plans SNF at [place]       Resource/Environmental Concerns    Resource/Environmental Concerns none    Transportation Concerns car, none       Transition Planning    Patient/Family Anticipated Services at [facility] home health care    Transportation Anticipated family or friend will provide       Discharge Needs Assessment    Readmission Within the Last 30 Days no previous admission in last 30 days    Concerns to be Addressed discharge planning    Equipment Currently Used at Home wheelchair;rollator;walker, rolling;other (see comments)   BP monitor    Anticipated Changes Related to Illness inability to care for self    Equipment Needed After Discharge none    Outpatient/Agency/Support Group Needs skilled nursing facility    Discharge Facility/Level of Care Needs nursing facility, skilled    Offered/Gave Vendor List no    Patient's Choice of Community Agency(s) family wants referral sent to [facility] for SNF    Current Discharge Risk dependent with mobility/activities of daily living            Discharge Plan     Row Name 05/06/18 2011       Plan    Plan referral pending for [facility] for SNF    Patient/Family in Agreement with Plan yes   spoke with pt and his spouse Evelyn    Plan Comments Introduced self/explained role of CCP. Face sheet data confirmed. IMM letter checked. Pt lives at home with his spouse in a  [place] with a chair lift to the basement. Confirmed PCP and pharmacy. Pt has rollator, roll walker, WC and a BP monitor at home. Pt has used [facility] in the past. Pt has been to [facility] and [facility] in the past and does not want to go to either of those for SNF. Reviewed SNF list with spouse and she would like a referral to [facility]. In basket referral sent and Louann/[facility] emailed. CCP to follow...........JW        Destination     Service Request Status Selected Specialties Address Phone Number Fax Number    [facility] Pending - Request Sent N/A [address] [facility] [id number] [phone]      Durable Medical Equipment     No service coordination in this encounter.      Dialysis/Infusion     No service coordination in this encounter.      Home Medical Care     No service coordination in this encounter.      Social Care     No service coordination in this encounter.                Demographic Summary     Row Name 05/06/18 2007       General Information    Admission Type inpatient    Arrived From home    Required Notices Provided Important Message from Medicare    Referral Source admission list;physician    Reason for Consult discharge planning    Preferred Language English     Used During This Interaction no       Contact Information    Permission Granted to Share Info With ;family/designee            Functional Status     Row Name 05/06/18 2008       Functional Status    Usual Activity Tolerance good    Current Activity Tolerance poor       Functional Status, IADL    Medications assistive person    Meal Preparation assistive person    Housekeeping assistive person    Laundry assistive person    Shopping assistive person    IADL Comments spouse assists pt at home       Mental Status    General Appearance WDL WDL       Mental Status Summary    Recent Changes in Mental Status/Cognitive Functioning ability to understand;decision  making/judgment   spouse states pt has been recently medicated            Psychosocial    No documentation.           Abuse/Neglect    No documentation.           Legal    No documentation.           Substance Abuse    No documentation.           Patient Forms    No documentation.         Nataliia Montes RN

## 2018-05-07 NOTE — DISCHARGE PLACEMENT REQUEST
"Victor Manuel Issa (72 y.o. Male)     Date of Birth Social Security Number Address Home Phone MRN    1945  [id number] [place] [address] [phone] [id number]    Hinduism Marital Status          Congregation        Admission Date Admission Type Admitting Provider Attending Provider Department, Room/Bed    5/3/18 Emergency Lisbet Aaron MD [place], Kuldip Diane MD [address], P893/1    Discharge Date Discharge Disposition Discharge Destination                       Attending Provider:  Kuldip Hurst MD    Allergies:  Percocet [Oxycodone-acetaminophen], Phenergan [Promethazine Hcl]    Isolation:  None   Infection:  None   Code Status:  FULL    Ht:  180.3 cm (71\")   Wt:  80.4 kg (177 lb 4 oz)    Admission Cmt:  None   Principal Problem:  Hip fracture requiring operative repair, right, closed, initial encounter [S72.001A]                 Active Insurance as of 5/3/2018     Primary Coverage     Payor Plan Insurance Group Employer/Plan Group    MEDICARE MEDICARE A & B      Payor Plan Address Payor Plan Phone Number Effective From Effective To    [address] [phone] 6/1/2008     [place] [address]       Subscriber Name Subscriber Birth Date Member ID       VICTOR MANUEL ISSA 1945 [id number]           Secondary Coverage     Payor Plan Insurance Group Employer/Plan Group    [facility] SUPP [id number]     Payor Plan Address Payor Plan Phone Number Effective From Effective To    [address]  12/1/2016     [place] [address]       Subscriber Name Subscriber Birth Date Member ID       VICTOR MANUEL ISSA 1945 [id number]                 Emergency Contacts      (Rel.) Home Phone Work Phone Mobile Phone    Evelyn Issa (Spouse) [phone] -- [phone]    GarciaAgatha (Daughter) -- -- [phone]              "

## 2018-05-07 NOTE — PROGRESS NOTES
Name: Victor Manuel Issa ADMIT: 5/3/2018   : 1945  PCP: CUONG [facility]    MRN: [id number] LOS: 4 days   AGE/SEX: 72 y.o. male  ROOM: [facility]   Subjective   Confused and agitated again overnight and had 1mg IV ativan at 1130 pm  New left side weakness, doesn't move left leg, weak left arm/hand  Very sleepy currently, difficult to arouse  Wife states choked on food earlier  No chest pain or palpitations  Coughing thick mucus  No nausea or vomiting but decreased appetite  Objective   Vital Signs  Temp:  [97.1 °F (36.2 °C)-100.3 °F (37.9 °C)] 98.1 °F (36.7 °C)  Heart Rate:  [64-88] 75  Resp:  [14-20] 20  BP: ()/(52-68) 95/56  SpO2:  [88 %-96 %] 94 %  on  Flow (L/min):  [2] 2;   Device (Oxygen Therapy): nasal cannula  Body mass index is 24.72 kg/m².    Physical Exam   Constitutional: No distress.   Looks ill     HENT:   Head: Normocephalic and atraumatic.   Cardiovascular: Normal rate and regular rhythm.    No murmur heard.  Pulmonary/Chest: Effort normal. He has no wheezes. He has rales (bilateral, throughout).   Abdominal: Soft. Bowel sounds are normal. He exhibits no distension.   Musculoskeletal: He exhibits no edema or tenderness.        Right hip: He exhibits decreased range of motion and bony tenderness.   Neurological: He is alert.   Not oriented to person  3-4/5 strength in hand, 2/5 Left upper arm  0/5 in Left leg  Some facial asymmetry but wife states it's normal     Skin: Skin is warm and dry. He is not diaphoretic.   Psychiatric: He has a normal mood and affect. His behavior is normal.   Vitals reviewed.      Results Review:       I reviewed the patient's new clinical results.    Results from last 7 days  Lab Units 18  0346 18  0854 18  0443 18  1903   WBC 10*3/mm3  --  11.18* 9.80 10.77*   HEMOGLOBIN g/dL 11.0* 12.0* 13.9 14.0   PLATELETS 10*3/mm3  --  138* 143 186       Results from last 7 days  Lab Units 18  0558 18  1903   SODIUM mmol/L 141 143    POTASSIUM mmol/L 4.1 3.8   CHLORIDE mmol/L 107 106   CO2 mmol/L 21.8* 26.3   BUN mg/dL 17 19   CREATININE mg/dL 0.78 0.88   GLUCOSE mg/dL 102* 81   Estimated Creatinine Clearance: 92.1 mL/min (by C-G formula based on SCr of 0.78 mg/dL).    Results from last 7 days  Lab Units 05/04/18  0558 05/04/18  0443 05/03/18  1903   CALCIUM mg/dL 9.6  --  9.8   ALBUMIN g/dL 3.60  --  3.50   MAGNESIUM mg/dL  --  2.2  --          albuterol 2.5 mg Nebulization Daily   arformoterol 15 mcg Nebulization BID - RT   aspirin 325 mg Oral Daily   atorvastatin 20 mg Oral Daily   carbidopa-levodopa 1 tablet Oral TID With Meals   ceftriaxone 1 g Intravenous Q24H   cetirizine 10 mg Oral Daily   cholecalciferol 1,000 Units Oral Daily   divalproex 500 mg Oral BID   docusate sodium 100 mg Oral BID   donepezil 20 mg Oral Nightly   fluticasone 1 spray Nasal Daily   levothyroxine 200 mcg Oral Q AM   montelukast 10 mg Oral Nightly   multivitamin with minerals 1 tablet Oral Daily   nicotine 1 patch Transdermal Q24H   OLANZapine 10 mg Oral Nightly   oxybutynin 5 mg Oral BID   pantoprazole 40 mg Oral QAM   propranolol 10 mg Oral Daily   vancomycin 15 mg/kg Intravenous Once      Diet Regular      Assessment/Plan      Active Hospital Problems (** Indicates Principal Problem)    Diagnosis Date Noted   • **Hip fracture requiring operative repair, right, closed, initial encounter [S72.001A] 05/03/2018   • Urinary tract infection without hematuria [N39.0] 05/06/2018   • Hypoxia [R09.02] 05/06/2018   • Hypothyroidism [E03.9] 10/01/2017   • Parkinson's disease [G20] 09/29/2017   • COPD (chronic obstructive pulmonary disease) [J44.9] 09/28/2017   • Coronary artery disease [I25.10] 09/28/2017   • History of CVA (cerebrovascular accident) [Z86.73] 09/28/2017      Resolved Hospital Problems    Diagnosis Date Noted Date Resolved   No resolved problems to display.       · New left side weakness, concern for stroke, get STAT CT head and order MRI.  Has had previous  stroke but weakness was on the right side  · Somnolent today likely from ativan overnight for agitation  · Remove nicotine patch prior to mri, order placed  · Avoid anymore ativan  · Dysphagia: NPO and speech consult  · Hypoxia from atelectasis, IS and mobilize as able. CXR 5/6 reviewed  · Right subcapital femoral neck fracture: POD 2 right hip hemiarthroplasty Pain better controlled, off IV fluids  · UTI: rocephin, E. coli  · Parkinson's: continue sinemet  · COPD: no active issues, cont home meds  · H/o CVA  · CAD: statin, nicotine patch  · Bipolar 1 disorder: cont zyprexa, depakote  · Demenita: on aricept which we will continue  · Reactive leukocytosis resolved  · PT consulted    D/W RN    DISPO: rehab TBD    Kuldip Hurst MD  [facility]  05/07/18  2:35 PM

## 2018-05-07 NOTE — PLAN OF CARE
Problem: Patient Care Overview  Goal: Plan of Care Review  Outcome: Ongoing (interventions implemented as appropriate)   05/07/18 0850   Coping/Psychosocial   Plan of Care Reviewed With patient   Plan of Care Review   Progress improving   OTHER   Outcome Summary Pt is a post op day 2 of a rt hip Wes. dressing is clean dry and intact. PT attempted to get him up to he chair  they were not able to. PT educated on the importance of monitoring blood pressures related to comorbidity of HTN. Will continue to monitor.     Goal: Individualization and Mutuality  Outcome: Ongoing (interventions implemented as appropriate)    Goal: Discharge Needs Assessment  Outcome: Ongoing (interventions implemented as appropriate)    Goal: Interprofessional Rounds/Family Conf  Outcome: Ongoing (interventions implemented as appropriate)      Problem: Fracture Orthopaedic (Adult)  Goal: Signs and Symptoms of Listed Potential Problems Will be Absent, Minimized or Managed (Fracture Orthopaedic)  Outcome: Ongoing (interventions implemented as appropriate)

## 2018-05-08 ENCOUNTER — APPOINTMENT (OUTPATIENT)
Dept: GENERAL RADIOLOGY | Facility: HOSPITAL | Age: 73
End: 2018-05-08

## 2018-05-08 LAB
ANION GAP SERPL CALCULATED.3IONS-SCNC: 15.6 MMOL/L
APPEARANCE CSF: CLEAR
BASOPHILS # BLD AUTO: 0.01 10*3/MM3 (ref 0–0.2)
BASOPHILS NFR BLD AUTO: 0.1 % (ref 0–1.5)
BUN BLD-MCNC: 14 MG/DL (ref 8–23)
BUN/CREAT SERPL: 20.9 (ref 7–25)
CALCIUM SPEC-SCNC: 9.2 MG/DL (ref 8.6–10.5)
CHLORIDE SERPL-SCNC: 102 MMOL/L (ref 98–107)
CO2 SERPL-SCNC: 21.4 MMOL/L (ref 22–29)
COLOR CSF: COLORLESS
CREAT BLD-MCNC: 0.67 MG/DL (ref 0.76–1.27)
CYTO UR: NORMAL
DEPRECATED RDW RBC AUTO: 48.3 FL (ref 37–54)
EOSINOPHIL # BLD AUTO: 0.22 10*3/MM3 (ref 0–0.7)
EOSINOPHIL NFR BLD AUTO: 2.4 % (ref 0.3–6.2)
ERYTHROCYTE [DISTWIDTH] IN BLOOD BY AUTOMATED COUNT: 13.2 % (ref 11.5–14.5)
GFR SERPL CREATININE-BSD FRML MDRD: 116 ML/MIN/1.73
GLUCOSE BLD-MCNC: 83 MG/DL (ref 65–99)
GLUCOSE CSF-MCNC: 59 MG/DL (ref 40–70)
HCT VFR BLD AUTO: 35 % (ref 40.4–52.2)
HGB BLD-MCNC: 11.4 G/DL (ref 13.7–17.6)
IMM GRANULOCYTES # BLD: 0 10*3/MM3 (ref 0–0.03)
IMM GRANULOCYTES NFR BLD: 0 % (ref 0–0.5)
INR PPP: 1.1 (ref 0.9–1.1)
LAB AP CASE REPORT: NORMAL
LYMPHOCYTES # BLD AUTO: 1.7 10*3/MM3 (ref 0.9–4.8)
LYMPHOCYTES NFR BLD AUTO: 18.4 % (ref 19.6–45.3)
Lab: NORMAL
MCH RBC QN AUTO: 32.2 PG (ref 27–32.7)
MCHC RBC AUTO-ENTMCNC: 32.6 G/DL (ref 32.6–36.4)
MCV RBC AUTO: 98.9 FL (ref 79.8–96.2)
MONOCYTES # BLD AUTO: 0.96 10*3/MM3 (ref 0.2–1.2)
MONOCYTES NFR BLD AUTO: 10.4 % (ref 5–12)
NEUTROPHILS # BLD AUTO: 6.35 10*3/MM3 (ref 1.9–8.1)
NEUTROPHILS NFR BLD AUTO: 68.7 % (ref 42.7–76)
NUC CELL # CSF MANUAL: 2 /MM3 (ref 0–5)
PATH REPORT.FINAL DX SPEC: NORMAL
PATH REPORT.GROSS SPEC: NORMAL
PLATELET # BLD AUTO: 155 10*3/MM3 (ref 140–500)
PMV BLD AUTO: 11 FL (ref 6–12)
POTASSIUM BLD-SCNC: 3.7 MMOL/L (ref 3.5–5.2)
PROCALCITONIN SERPL-MCNC: 0.2 NG/ML (ref 0.1–0.25)
PROT CSF-MCNC: 18 MG/DL (ref 15–45)
PROTHROMBIN TIME: 14 SECONDS (ref 11.7–14.2)
RBC # BLD AUTO: 3.54 10*6/MM3 (ref 4.6–6)
RBC # CSF MANUAL: 338 /MM3 (ref 0–0)
SODIUM BLD-SCNC: 139 MMOL/L (ref 136–145)
TUBE # CSF: 1
WBC NRBC COR # BLD: 9.24 10*3/MM3 (ref 4.5–10.7)

## 2018-05-08 PROCEDURE — 85025 COMPLETE CBC W/AUTO DIFF WBC: CPT | Performed by: INTERNAL MEDICINE

## 2018-05-08 PROCEDURE — 87040 BLOOD CULTURE FOR BACTERIA: CPT | Performed by: INTERNAL MEDICINE

## 2018-05-08 PROCEDURE — 89050 BODY FLUID CELL COUNT: CPT | Performed by: INTERNAL MEDICINE

## 2018-05-08 PROCEDURE — 77003 FLUOROGUIDE FOR SPINE INJECT: CPT

## 2018-05-08 PROCEDURE — 85610 PROTHROMBIN TIME: CPT | Performed by: INTERNAL MEDICINE

## 2018-05-08 PROCEDURE — 84157 ASSAY OF PROTEIN OTHER: CPT | Performed by: INTERNAL MEDICINE

## 2018-05-08 PROCEDURE — 87798 DETECT AGENT NOS DNA AMP: CPT | Performed by: INTERNAL MEDICINE

## 2018-05-08 PROCEDURE — 87070 CULTURE OTHR SPECIMN AEROBIC: CPT | Performed by: INTERNAL MEDICINE

## 2018-05-08 PROCEDURE — 87498 ENTEROVIRUS PROBE&REVRS TRNS: CPT | Performed by: INTERNAL MEDICINE

## 2018-05-08 PROCEDURE — 86788 WEST NILE VIRUS AB IGM: CPT | Performed by: INTERNAL MEDICINE

## 2018-05-08 PROCEDURE — 87205 SMEAR GRAM STAIN: CPT | Performed by: INTERNAL MEDICINE

## 2018-05-08 PROCEDURE — 25010000002 CEFTRIAXONE PER 250 MG: Performed by: INTERNAL MEDICINE

## 2018-05-08 PROCEDURE — 009U3ZX DRAINAGE OF SPINAL CANAL, PERCUTANEOUS APPROACH, DIAGNOSTIC: ICD-10-PCS | Performed by: RADIOLOGY

## 2018-05-08 PROCEDURE — 94799 UNLISTED PULMONARY SVC/PX: CPT

## 2018-05-08 PROCEDURE — 80048 BASIC METABOLIC PNL TOTAL CA: CPT | Performed by: INTERNAL MEDICINE

## 2018-05-08 PROCEDURE — 82945 GLUCOSE OTHER FLUID: CPT | Performed by: INTERNAL MEDICINE

## 2018-05-08 PROCEDURE — B01BZZZ FLUOROSCOPY OF SPINAL CORD: ICD-10-PCS | Performed by: RADIOLOGY

## 2018-05-08 PROCEDURE — 84145 PROCALCITONIN (PCT): CPT | Performed by: INTERNAL MEDICINE

## 2018-05-08 PROCEDURE — 87015 SPECIMEN INFECT AGNT CONCNTJ: CPT | Performed by: INTERNAL MEDICINE

## 2018-05-08 PROCEDURE — 87529 HSV DNA AMP PROBE: CPT | Performed by: INTERNAL MEDICINE

## 2018-05-08 PROCEDURE — 87496 CYTOMEG DNA AMP PROBE: CPT | Performed by: INTERNAL MEDICINE

## 2018-05-08 PROCEDURE — 86787 VARICELLA-ZOSTER ANTIBODY: CPT | Performed by: INTERNAL MEDICINE

## 2018-05-08 PROCEDURE — 86789 WEST NILE VIRUS ANTIBODY: CPT | Performed by: INTERNAL MEDICINE

## 2018-05-08 RX ORDER — LIDOCAINE HYDROCHLORIDE 10 MG/ML
10 INJECTION, SOLUTION INFILTRATION; PERINEURAL ONCE
Status: CANCELLED | OUTPATIENT
Start: 2018-05-08 | End: 2018-05-08

## 2018-05-08 RX ADMIN — ARFORMOTEROL TARTRATE 15 MCG: 15 SOLUTION RESPIRATORY (INHALATION) at 00:29

## 2018-05-08 RX ADMIN — CEFTRIAXONE SODIUM 1 G: 1 INJECTION, SOLUTION INTRAVENOUS at 23:00

## 2018-05-08 RX ADMIN — ARFORMOTEROL TARTRATE 15 MCG: 15 SOLUTION RESPIRATORY (INHALATION) at 12:01

## 2018-05-08 RX ADMIN — SODIUM CHLORIDE 75 ML/HR: 9 INJECTION, SOLUTION INTRAVENOUS at 16:24

## 2018-05-08 RX ADMIN — ARFORMOTEROL TARTRATE 15 MCG: 15 SOLUTION RESPIRATORY (INHALATION) at 20:07

## 2018-05-08 RX ADMIN — NICOTINE 1 PATCH: 21 PATCH, EXTENDED RELEASE TRANSDERMAL at 23:00

## 2018-05-08 RX ADMIN — ALBUTEROL SULFATE 2.5 MG: 2.5 SOLUTION RESPIRATORY (INHALATION) at 06:58

## 2018-05-08 NOTE — PROGRESS NOTES
Name: Victor Manuel Issa ADMIT: 5/3/2018   : 1945  PCP: CUONG Guadalupe    MRN: [id number] LOS: 5 days   AGE/SEX: 72 y.o. male  ROOM: [facility]   Subjective   Confused and more somnolent, not following commands, continues with left side weakness but stroke r/o  Fever this am  Pt wakes up for me and c/o acute on chronic neck pain and stiffness and headache  No chest pain or palpitations    Objective   Vital Signs  Temp:  [97.6 °F (36.4 °C)-101 °F (38.3 °C)] 97.6 °F (36.4 °C)  Heart Rate:  [] 111  Resp:  [16-28] 18  BP: (110-148)/(60-82) 137/72  SpO2:  [92 %-98 %] 96 %  on  Flow (L/min):  [2] 2;   Device (Oxygen Therapy): nasal cannula  Body mass index is 24.72 kg/m².    Physical Exam   Constitutional: No distress.   Looks ill     HENT:   Head: Normocephalic and atraumatic.   Cardiovascular: Normal rate and regular rhythm.    No murmur heard.  Pulmonary/Chest: Effort normal. He has no wheezes. He has rales (bilateral, throughout).   Abdominal: Soft. Bowel sounds are normal. He exhibits no distension.   Musculoskeletal: He exhibits no edema or tenderness.        Right hip: He exhibits decreased range of motion and bony tenderness.   No nuchal rigidity     Neurological: He is alert.   oriented to person, thinks he is at [facility], it is 1918 and Marco Antonio is president  3-4/5 strength in hand, 2/5 Left upper arm  0/5 in Left leg  Some facial asymmetry but wife states it's normal     Skin: Skin is warm and dry. He is not diaphoretic.   Psychiatric: He has a normal mood and affect. His behavior is normal.   Vitals reviewed.      Results Review:       I reviewed the patient's new clinical results.    Results from last 7 days  Lab Units 18  0402 18  0346 18  0854 18  0443 18  1903   WBC 10*3/mm3 9.24  --  11.18* 9.80 10.77*   HEMOGLOBIN g/dL 11.4* 11.0* 12.0* 13.9 14.0   PLATELETS 10*3/mm3 155  --  138* 143 186       Results from last 7 days  Lab Units 18  0402 18  0505  05/03/18  1903   SODIUM mmol/L 139 141 143   POTASSIUM mmol/L 3.7 4.1 3.8   CHLORIDE mmol/L 102 107 106   CO2 mmol/L 21.4* 21.8* 26.3   BUN mg/dL 14 17 19   CREATININE mg/dL 0.67* 0.78 0.88   GLUCOSE mg/dL 83 102* 81   Estimated Creatinine Clearance: 92.1 mL/min (by C-G formula based on SCr of 0.67 mg/dL (L)).    Results from last 7 days  Lab Units 05/08/18  0402 05/04/18  0558 05/04/18  0443 05/03/18  1903   CALCIUM mg/dL 9.2 9.6  --  9.8   ALBUMIN g/dL  --  3.60  --  3.50   MAGNESIUM mg/dL  --   --  2.2  --          albuterol 2.5 mg Nebulization Daily   arformoterol 15 mcg Nebulization BID - RT   aspirin 325 mg Oral Daily   atorvastatin 20 mg Oral Daily   carbidopa-levodopa 1 tablet Oral TID With Meals   ceftriaxone 1 g Intravenous Q24H   cetirizine 10 mg Oral Daily   cholecalciferol 1,000 Units Oral Daily   divalproex 500 mg Oral BID   docusate sodium 100 mg Oral BID   donepezil 20 mg Oral Nightly   fluticasone 1 spray Nasal Daily   levothyroxine 200 mcg Oral Q AM   montelukast 10 mg Oral Nightly   multivitamin with minerals 1 tablet Oral Daily   nicotine 1 patch Transdermal Q24H   OLANZapine 10 mg Oral Nightly   oxybutynin 5 mg Oral BID   pantoprazole 40 mg Oral QAM   propranolol 10 mg Oral Daily   vancomycin 15 mg/kg Intravenous Once       hold 1 each    sodium chloride 75 mL/hr Last Rate: 75 mL/hr (05/07/18 2340)   NPO Diet      Assessment/Plan      Active Hospital Problems (** Indicates Principal Problem)    Diagnosis Date Noted   • **Hip fracture requiring operative repair, right, closed, initial encounter [S72.001A] 05/03/2018   • Urinary tract infection without hematuria [N39.0] 05/06/2018   • Hypoxia [R09.02] 05/06/2018   • Hypothyroidism [E03.9] 10/01/2017   • Parkinson's disease [G20] 09/29/2017   • COPD (chronic obstructive pulmonary disease) [J44.9] 09/28/2017   • Coronary artery disease [I25.10] 09/28/2017   • History of CVA (cerebrovascular accident) [Z86.73] 09/28/2017      [facility]  Problems    Diagnosis Date Noted Date Resolved   No resolved problems to display.     Pt seen this morning    · New left side weakness, concern for stroke, get STAT CT head and order MRI.  Has had previous stroke but weakness was on the right side  · More Somnolent today than yesterday, thought it was from ativan but hasn't had any sedating meds in over 24h.  He has had fever, worsening confusion, encephalopathy, neck stiffness/pain and headache  · Will get LP for further eval  · Move to telemetry  · CT and MRI head negative for acute dz on 5/7  · Avoid anymore ativan  · Hold zyprexa and depakote  · Dysphagia: NPO and speech consulted but much to drowsy to work with them  · Hypoxia from atelectasis, IS and mobilize as able. CXR 5/6 reviewed  · Right subcapital femoral neck fracture: POD 3 right hip hemiarthroplasty Pain better controlled   · UTI: rocephin, E. coli  · Parkinson's: continue sinemet  · COPD: no active issues, cont home meds  · H/o CVA  · CAD: statin, nicotine patch  · Bipolar 1 disorder: hold zyprexa and depakote  · Demenita: on aricept which we will continue  · Reactive leukocytosis resolved  · PT consulted    D/W RN    DISPO: rehab TBD    Kuldip Hurst MD  [facility]  05/08/18  1:36 PM

## 2018-05-08 NOTE — SIGNIFICANT NOTE
05/08/18 0915   Rehab Time/Intention   Evaluation Not Performed other (see comments)  (Pt not appropriate per RN.  New facial droop, non-communicative.  Will f/u tomorrow.)   Rehab Treatment   Discipline speech language pathologist

## 2018-05-08 NOTE — PLAN OF CARE
Problem: Patient Care Overview  Goal: Plan of Care Review  Outcome: Ongoing (interventions implemented as appropriate)   05/08/18 0411   Coping/Psychosocial   Plan of Care Reviewed With patient   Plan of Care Review   Progress no change   OTHER   Outcome Summary COPD controlled with po meds and breathing treatments. pain well controlled.        Problem: Fall Risk (Adult)  Goal: Absence of Fall  Outcome: Outcome(s) achieved Date Met: 05/08/18 05/08/18 0411   Fall Risk (Adult)   Absence of Fall making progress toward outcome

## 2018-05-08 NOTE — PLAN OF CARE
Problem: Patient Care Overview  Goal: Plan of Care Review  Outcome: Ongoing (interventions implemented as appropriate)   05/08/18 4962   Coping/Psychosocial   Plan of Care Reviewed With patient;spouse   Plan of Care Review   Progress no change   OTHER   Outcome Summary Oriented to self only, bladder scan 338,lumbar puncture today.     Goal: Individualization and Mutuality  Outcome: Ongoing (interventions implemented as appropriate)      Problem: Fall Risk (Adult)  Goal: Absence of Fall  Outcome: Ongoing (interventions implemented as appropriate)      Problem: Fracture Orthopaedic (Adult)  Goal: Signs and Symptoms of Listed Potential Problems Will be Absent, Minimized or Managed (Fracture Orthopaedic)  Outcome: Ongoing (interventions implemented as appropriate)      Problem: Skin Injury Risk (Adult)  Goal: Identify Related Risk Factors and Signs and Symptoms  Outcome: Outcome(s) achieved Date Met: 05/08/18    Goal: Skin Health and Integrity  Outcome: Ongoing (interventions implemented as appropriate)

## 2018-05-09 LAB
ANION GAP SERPL CALCULATED.3IONS-SCNC: 13.2 MMOL/L
BASOPHILS # BLD AUTO: 0.02 10*3/MM3 (ref 0–0.2)
BASOPHILS NFR BLD AUTO: 0.2 % (ref 0–1.5)
BUN BLD-MCNC: 17 MG/DL (ref 8–23)
BUN/CREAT SERPL: 25.8 (ref 7–25)
CALCIUM SPEC-SCNC: 10.1 MG/DL (ref 8.6–10.5)
CHLORIDE SERPL-SCNC: 106 MMOL/L (ref 98–107)
CO2 SERPL-SCNC: 20.8 MMOL/L (ref 22–29)
CREAT BLD-MCNC: 0.66 MG/DL (ref 0.76–1.27)
DEPRECATED RDW RBC AUTO: 49.1 FL (ref 37–54)
EOSINOPHIL # BLD AUTO: 0.12 10*3/MM3 (ref 0–0.7)
EOSINOPHIL NFR BLD AUTO: 1.2 % (ref 0.3–6.2)
ERYTHROCYTE [DISTWIDTH] IN BLOOD BY AUTOMATED COUNT: 13.4 % (ref 11.5–14.5)
GFR SERPL CREATININE-BSD FRML MDRD: 118 ML/MIN/1.73
GLUCOSE BLD-MCNC: 100 MG/DL (ref 65–99)
HCT VFR BLD AUTO: 34.1 % (ref 40.4–52.2)
HGB BLD-MCNC: 10.9 G/DL (ref 13.7–17.6)
IMM GRANULOCYTES # BLD: 0.02 10*3/MM3 (ref 0–0.03)
IMM GRANULOCYTES NFR BLD: 0.2 % (ref 0–0.5)
LYMPHOCYTES # BLD AUTO: 1.3 10*3/MM3 (ref 0.9–4.8)
LYMPHOCYTES NFR BLD AUTO: 13 % (ref 19.6–45.3)
MCH RBC QN AUTO: 32 PG (ref 27–32.7)
MCHC RBC AUTO-ENTMCNC: 32 G/DL (ref 32.6–36.4)
MCV RBC AUTO: 100 FL (ref 79.8–96.2)
MONOCYTES # BLD AUTO: 1.18 10*3/MM3 (ref 0.2–1.2)
MONOCYTES NFR BLD AUTO: 11.8 % (ref 5–12)
NEUTROPHILS # BLD AUTO: 7.38 10*3/MM3 (ref 1.9–8.1)
NEUTROPHILS NFR BLD AUTO: 73.6 % (ref 42.7–76)
PLATELET # BLD AUTO: 176 10*3/MM3 (ref 140–500)
PMV BLD AUTO: 10.8 FL (ref 6–12)
POTASSIUM BLD-SCNC: 3.7 MMOL/L (ref 3.5–5.2)
RBC # BLD AUTO: 3.41 10*6/MM3 (ref 4.6–6)
SODIUM BLD-SCNC: 140 MMOL/L (ref 136–145)
WBC NRBC COR # BLD: 10.02 10*3/MM3 (ref 4.5–10.7)

## 2018-05-09 PROCEDURE — 97110 THERAPEUTIC EXERCISES: CPT

## 2018-05-09 PROCEDURE — 85025 COMPLETE CBC W/AUTO DIFF WBC: CPT | Performed by: INTERNAL MEDICINE

## 2018-05-09 PROCEDURE — 94799 UNLISTED PULMONARY SVC/PX: CPT

## 2018-05-09 PROCEDURE — 92610 EVALUATE SWALLOWING FUNCTION: CPT

## 2018-05-09 PROCEDURE — 80048 BASIC METABOLIC PNL TOTAL CA: CPT | Performed by: INTERNAL MEDICINE

## 2018-05-09 PROCEDURE — 25010000002 CEFTRIAXONE PER 250 MG: Performed by: INTERNAL MEDICINE

## 2018-05-09 RX ADMIN — CEFTRIAXONE SODIUM 1 G: 1 INJECTION, SOLUTION INTRAVENOUS at 22:46

## 2018-05-09 RX ADMIN — ARFORMOTEROL TARTRATE 15 MCG: 15 SOLUTION RESPIRATORY (INHALATION) at 22:17

## 2018-05-09 RX ADMIN — SODIUM CHLORIDE 75 ML/HR: 9 INJECTION, SOLUTION INTRAVENOUS at 01:36

## 2018-05-09 RX ADMIN — ALBUTEROL SULFATE 2.5 MG: 2.5 SOLUTION RESPIRATORY (INHALATION) at 08:10

## 2018-05-09 RX ADMIN — ARFORMOTEROL TARTRATE 15 MCG: 15 SOLUTION RESPIRATORY (INHALATION) at 08:11

## 2018-05-09 RX ADMIN — NICOTINE 1 PATCH: 21 PATCH, EXTENDED RELEASE TRANSDERMAL at 22:48

## 2018-05-09 NOTE — PLAN OF CARE
Problem: Patient Care Overview  Goal: Plan of Care Review   05/09/18 8600   Coping/Psychosocial   Plan of Care Reviewed With patient   Plan of Care Review   Progress improving   OTHER   Outcome Summary Pt improving. Pt quick to fatigue with PO trials, developing wet voice as trials progressed. SLP introduced concept of alternate nutrition to family, who appear hesitant at this time d/t associated risks of cortrak causing increased confusion. Pt alert and appropriate during SLP's assessment, dysphagia appears related to hx of stroke and generalized weakness at this time. Will continue to evaluate.

## 2018-05-09 NOTE — PLAN OF CARE
Problem: Patient Care Overview  Goal: Plan of Care Review  Outcome: Ongoing (interventions implemented as appropriate)   05/09/18 0320   Coping/Psychosocial   Plan of Care Reviewed With patient;spouse   Plan of Care Review   Progress no change   OTHER   Outcome Summary pt is alert to self only, opening eyes more, wife at bedside, following some commands, no falls, bed alarm on, turn, 2L O2 nasal cannula, continue to monitor     Goal: Individualization and Mutuality  Outcome: Ongoing (interventions implemented as appropriate)    Goal: Discharge Needs Assessment  Outcome: Ongoing (interventions implemented as appropriate)      Problem: Fall Risk (Adult)  Goal: Identify Related Risk Factors and Signs and Symptoms  Outcome: Outcome(s) achieved Date Met: 05/09/18    Goal: Absence of Fall  Outcome: Ongoing (interventions implemented as appropriate)      Problem: Fracture Orthopaedic (Adult)  Goal: Signs and Symptoms of Listed Potential Problems Will be Absent, Minimized or Managed (Fracture Orthopaedic)  Outcome: Ongoing (interventions implemented as appropriate)      Problem: Skin Injury Risk (Adult)  Goal: Skin Health and Integrity  Outcome: Ongoing (interventions implemented as appropriate)

## 2018-05-09 NOTE — PLAN OF CARE
Problem: Patient Care Overview  Goal: Plan of Care Review  Outcome: Ongoing (interventions implemented as appropriate)   05/09/18 1234   Coping/Psychosocial   Plan of Care Reviewed With patient   Plan of Care Review   Progress improving   OTHER   Outcome Summary pt is a & 0 x 4, met with speech today still to remain NPO, hip immobilizer removed from leg, family at bedside, will continue to monitor      Goal: Individualization and Mutuality  Outcome: Ongoing (interventions implemented as appropriate)    Goal: Discharge Needs Assessment  Outcome: Ongoing (interventions implemented as appropriate)    Goal: Interprofessional Rounds/Family Conf  Outcome: Ongoing (interventions implemented as appropriate)      Problem: Fracture Orthopaedic (Adult)  Goal: Signs and Symptoms of Listed Potential Problems Will be Absent, Minimized or Managed (Fracture Orthopaedic)  Outcome: Ongoing (interventions implemented as appropriate)

## 2018-05-09 NOTE — THERAPY TREATMENT NOTE
Acute Care - Physical Therapy Treatment Note   [facility]     Patient Name: Victor Manuel Issa  : 1945  MRN: [id number]  Today's Date: 2018  Onset of Illness/Injury or Date of Surgery: 18  Date of Referral to PT: 18  Referring Physician: Robert    Admit Date: 5/3/2018    Visit Dx:    ICD-10-CM ICD-9-CM   1. Closed fracture of right hip (femor), initial encounter S72.001A 820.8   2. Hip fracture S72.009A 820.8   3. Difficulty walking R26.2 719.7     Patient Active Problem List   Diagnosis   • Thigh pain, musculoskeletal   • Left leg weakness   • History of CVA (cerebrovascular accident)   • COPD (chronic obstructive pulmonary disease)   • Coronary artery disease   • Cervical myelopathy   • Parkinson's disease   • Debility   • Cerebrovascular disease   • Abnormal TSH   • Hypothyroidism   • Hip fracture requiring operative repair, right, closed, initial encounter   • Urinary tract infection without hematuria   • Hypoxia       Therapy Treatment          Rehabilitation Treatment Summary     Row Name 18 1000             Treatment Time/Intention    Discipline physical therapy assistant  -CW      Document Type therapy note (daily note)  -CW      Subjective Information complains of;weakness;fatigue;pain  -CW      Mode of Treatment physical therapy  -CW      Therapy Frequency (PT Clinical Impression) daily  -CW      Patient Effort fair  -CW      Existing Precautions/Restrictions fall;right;hip, posterior  -CW      Recorded by [CW] Leif Doherty, LADI 18 1041 18 1041      Row Name 18 1000             Vital Signs    O2 Delivery Pre Treatment supplemental O2  -CW      O2 Delivery Intra Treatment supplemental O2  -CW      O2 Delivery Post Treatment supplemental O2  -CW      Recorded by [CW] Leif Doherty, LADI 18 1041 18 1041      Row Name 18 1000             Cognitive Assessment/Intervention- PT/OT    Orientation Status (Cognition) person;place;time   -CW      Follows Commands (Cognition) follows one step commands  -CW      Personal Safety Interventions fall prevention program maintained;muscle strengthening facilitated  -CW      Recorded by [CW] Leif Doherty, PTA 05/09/18 1041 05/09/18 1041      Row Name 05/09/18 1000             Bed Mobility Assessment/Treatment    Supine-Sit-Supine [place] (Bed Mobility) not tested  -CW      Comment (Bed Mobility) pt refused and unwilling to get to EOB  -CW      Recorded by [CW] Leif Doherty, PTA 05/09/18 1041 05/09/18 1041      Row Name 05/09/18 1000             Transfer Assessment/Treatment    Sit-Stand [place] (Transfers) not tested  -CW      Recorded by [CW] Leif Doherty, PTA 05/09/18 1041 05/09/18 1041      Row Name 05/09/18 1000             Gait/Stairs Assessment/Training    [place] Level (Gait) not tested  -CW      Recorded by [CW] Leif Doherty, PTA 05/09/18 1041 05/09/18 1041      Row Name 05/09/18 1000             Therapeutic Exercise    Lower Extremity (Therapeutic Exercise) gluteal sets;quad sets, bilateral  -CW      Lower Extremity Range of Motion (Therapeutic Exercise) ankle dorsiflexion/plantar flexion, bilateral  -CW      Exercise Type (Therapeutic Exercise) PROM (passive range of motion);AROM (active range of motion);AAROM (active assistive range of motion)  -CW      Position (Therapeutic Exercise) supine  -CW      Sets/Reps (Therapeutic Exercise) 10  -CW      Recorded by [CW] Leif Doherty, PTA 05/09/18 1044 05/09/18 1044      Row Name 05/09/18 1000             Positioning and Restraints    Pre-Treatment Position in bed  -CW      Post Treatment Position bed  -CW      In Bed notified nsg;supine;call light within reach;encouraged to call for assist;exit alarm on;with family/caregiver  -CW      Recorded by [CW] Leif Doherty, PTA 05/09/18 1041 05/09/18 1041      Row Name 05/09/18 1000             Pain Assessment    Additional Documentation Pain Scale: Numbers  Pre/Post-Treatment (Group)  -CW      Recorded by [CW] Leif Doherty, PTA 05/09/18 1041 05/09/18 1041      Row Name 05/09/18 1000             Pain Scale: Numbers Pre/Post-Treatment    Pain Scale: Numbers, Pretreatment 7/10  -CW      Pain Scale: Numbers, Post-Treatment 7/10  -CW      Pain Location - Side Bilateral  -CW      Pain Location foot  -CW      Recorded by [CW] Leif Doherty, PTA 05/09/18 1041 05/09/18 1041      Row Name                Wound 05/05/18 0920 Right hip incision    Wound - Properties Group Date first assessed: 05/05/18 [EG] Time first assessed: 0920 [EG] Side: Right [EG] Location: hip [EG] Type: incision [EG] Recorded by:  [EG] Kathy Rush RN 05/05/18 0920 05/05/18 0920    Row Name 05/09/18 1000             Outcome Summary/Treatment Plan (PT)    Anticipated Discharge Disposition (PT) skilled nursing facility (SNF)  -CW      Recorded by [CW] Leif Doherty, LADI 05/09/18 1041 05/09/18 1041        User Key  (r) = Recorded By, (t) = Taken By, (c) = Cosigned By    Initials Name Effective Dates Discipline    CW Leif Doherty, LADI 03/07/18 -  PT    EG Kathy Rush RN 07/10/17 -  Nurse          Wound 05/05/18 0920 Right hip incision (Active)   Dressing Appearance dry;intact 5/9/2018  8:36 AM   Closure HARDIK 5/9/2018  8:36 AM   Base dressing in place, unable to visualize 5/9/2018  8:36 AM   Periwound Temperature warm 5/9/2018 12:26 AM   Periwound Skin Turgor soft 5/9/2018 12:26 AM   Drainage Amount none 5/9/2018 12:26 AM   Dressing Care, Wound low-adherent 5/9/2018 12:26 AM             Physical Therapy Education     Title: PT OT SLP Therapies (Active)     Topic: Physical Therapy (Active)     Point: Mobility training (Active)    Learning Progress Summary     Learner Status Readiness Method Response Comment Documented by    Patient Active Acceptance D NR  CW 05/09/18 1041     Done Acceptance E,TB,D VU,NR  SV 05/06/18 1026     Done Acceptance E,TB VU,NR  ST 05/05/18 1552    Family  Active Acceptance D NR   05/09/18 1041     Done Acceptance E,TB,D VU,NR   05/06/18 1026     Done Acceptance E,TB VU,NR  ST 05/05/18 1552          Point: Home exercise program (Active)    Learning Progress Summary     Learner Status Readiness Method Response Comment Documented by    Patient Active Acceptance D NR   05/09/18 1041     Done Acceptance E,TB,D VU,NR   05/06/18 1026     Done Acceptance E,TB VU,NR  ST 05/05/18 1552    Family Active Acceptance D NR   05/09/18 1041     Done Acceptance E,TB,D VU,NR   05/06/18 1026     Done Acceptance E,TB VU,NR  ST 05/05/18 1552          Point: Body mechanics (Active)    Learning Progress Summary     Learner Status Readiness Method Response Comment Documented by    Patient Active Acceptance D NR   05/09/18 1041     Done Acceptance E,TB,D VU,NR   05/06/18 1026     Done Acceptance E,TB VU,NR  ST 05/05/18 1552    Family Active Acceptance D [facility] 05/09/18 1041     Done Acceptance E,TB,D VU,NR   05/06/18 1026     Done Acceptance E,TB VU,NR  ST 05/05/18 1552          Point: Precautions (Active)    Learning Progress Summary     Learner Status Readiness Method Response Comment Documented by    Patient Active Acceptance D NR   05/09/18 1041     Done Acceptance E,TB,D VU,NR   05/06/18 1026     Done Acceptance E,TB VU,NR  ST 05/05/18 1552    Family Active Acceptance D [facility] 05/09/18 1041     Done Acceptance E,TB,D VU,NR   05/06/18 1026     Done Acceptance E,TB VU,NR  ST 05/05/18 1552                      User Key     Initials Effective Dates Name Provider Type Discipline     04/03/18 -  Mariluz Zaomra, PT Physical Therapist PT     03/07/18 -  Leif Doherty PTA Physical Therapy Assistant PT     10/13/17 -  Sana Acuña, RN Registered Nurse Nurse                    PT Recommendation and Plan  Anticipated Discharge Disposition (PT): skilled nursing facility (SNF)  Therapy Frequency (PT Clinical Impression): daily  Outcome Summary/Treatment Plan  (PT)  Anticipated Discharge Disposition (PT): skilled nursing facility (SNF)  Plan of Care Reviewed With: patient  Progress: improving  Outcome Summary: Pt increasing with strength and activity otlerance with bed ther ex in supine refusing to get up to EOB          Outcome Measures     Row Name 05/09/18 1000             How much help from another person do you currently need...    Turning from your back to your side while in flat bed without using bedrails? 1  -CW      Moving from lying on back to sitting on the side of a flat bed without bedrails? 1  -CW      Moving to and from a bed to a chair (including a wheelchair)? 1  -CW      Standing up from a chair using your arms (e.g., wheelchair, bedside chair)? 1  -CW      Climbing 3-5 steps with a railing? 1  -CW      To walk in hospital room? 1  -CW      AM-PAC 6 Clicks Score 6  -CW         Functional Assessment    Outcome Measure Options AM-PAC 6 Clicks Basic Mobility (PT)  -CW        User Key  (r) = Recorded By, (t) = Taken By, (c) = Cosigned By    Initials Name Provider Type    CW Leif Doherty PTA Physical Therapy Assistant           Time Calculation:         PT Charges     Row Name 05/09/18 1044             Time Calculation    Start Time 1017  -CW      Stop Time 1044  -CW      Time Calculation (min) 27 min  -CW      PT Received On 05/09/18  -CW      PT - Next Appointment 05/10/18  -CW        User Key  (r) = Recorded By, (t) = Taken By, (c) = Cosigned By    Initials Name Provider Type    CW Leif Doherty PTA Physical Therapy Assistant          Therapy Charges for Today     Code Description Service Date Service Provider Modifiers Qty    17046931909 HC PT THER PROC EA 15 MIN 5/9/2018 Leif Doherty PTA GP 2          PT G-Codes  Outcome Measure Options: AM-PAC 6 Clicks Basic Mobility (PT)    Leif Doherty PTA  5/9/2018

## 2018-05-09 NOTE — PLAN OF CARE
Problem: Patient Care Overview  Goal: Plan of Care Review  Outcome: Ongoing (interventions implemented as appropriate)   05/09/18 1041   Coping/Psychosocial   Plan of Care Reviewed With patient   Plan of Care Review   Progress improving   OTHER   Outcome Summary Pt increasing with strength and activity tolerance with bed ther ex in supine refusing to get up to EOB

## 2018-05-09 NOTE — PROGRESS NOTES
[facility]     LOS: 6 days     Name: Victor Manuel Issa  Age: 72 y.o.  Sex: male  :  1945  MRN: [id number]         Primary Care Physician: CUONG Guadalupe    NPO Diet    Subjective   patient without complaint. wife states he is much more alert and oriented today. states similarly got confused after prior surgery but not this bad but then again prior surgeries over 10 years ago. Time: 35 minutes, greater than 50% spent in counseling and coordination of care.     Objective   Temp:  [98.5 °F (36.9 °C)-99.3 °F (37.4 °C)] 98.9 °F (37.2 °C)  Heart Rate:  [] 79  Resp:  [18] 18  BP: (117-140)/(65-97) 124/69  SpO2:  [94 %-98 %] 96 %  on  Flow (L/min):  [2] 2;   Device (Oxygen Therapy): nasal cannula  Body mass index is 25.57 kg/m².    Physical Exam   Constitutional: No distress.   Cardiovascular: Normal rate and regular rhythm.    Pulmonary/Chest: Effort normal. No respiratory distress. He has rhonchi.   Abdominal: Soft. There is no tenderness.   Musculoskeletal: He exhibits no edema.   Neurological: He is alert.   knew he was at [facility] and had hip surgery and thought year was . recognized wife.   Skin: Skin is warm and dry.     Reviewed medications and new clinical results    albuterol 2.5 mg Nebulization Daily   arformoterol 15 mcg Nebulization BID - RT   aspirin 325 mg Oral Daily   atorvastatin 20 mg Oral Daily   carbidopa-levodopa 1 tablet Oral TID With Meals   ceftriaxone 1 g Intravenous Q24H   cetirizine 10 mg Oral Daily   cholecalciferol 1,000 Units Oral Daily   docusate sodium 100 mg Oral BID   donepezil 20 mg Oral Nightly   fluticasone 1 spray Nasal Daily   levothyroxine 200 mcg Oral Q AM   montelukast 10 mg Oral Nightly   multivitamin with minerals 1 tablet Oral Daily   nicotine 1 patch Transdermal Q24H   oxybutynin 5 mg Oral BID   pantoprazole 40 mg Oral QAM   propranolol 10 mg Oral Daily   vancomycin 15 mg/kg Intravenous Once      hold 1 each    sodium chloride 75 mL/hr Last Rate: 75 mL/hr (05/09/18 0136)     Results from last 7 days  Lab Units 05/09/18  0402 05/08/18  0402 05/07/18  0346 05/06/18  0854 05/04/18  0443 05/03/18  1903   WBC 10*3/mm3 10.02 9.24  --  11.18* 9.80 10.77*   HEMOGLOBIN g/dL 10.9* 11.4* 11.0* 12.0* 13.9 14.0   PLATELETS 10*3/mm3 176 155  --  138* 143 186     Results from last 7 days  Lab Units 05/09/18  0402 05/08/18  0402 05/04/18  0558 05/03/18  1903   SODIUM mmol/L 140 139 141 143   POTASSIUM mmol/L 3.7 3.7 4.1 3.8   CHLORIDE mmol/L 106 102 107 106   CO2 mmol/L 20.8* 21.4* 21.8* 26.3   BUN mg/dL 17 14 17 19   CREATININE mg/dL 0.66* 0.67* 0.78 0.88   CALCIUM mg/dL 10.1 9.2 9.6 9.8   GLUCOSE mg/dL 100* 83 102* 81     Lab Results   Component Value Date    ANIONGAP 13.2 05/09/2018     Estimated Creatinine Clearance: 95.2 mL/min (by C-G formula based on SCr of 0.66 mg/dL (L)).    Assessment/Plan   Active Hospital Problems (** Indicates Principal Problem)    Diagnosis Date Noted   • **Hip fracture requiring operative repair, right, closed, initial encounter [S72.001A] 05/03/2018   • Urinary tract infection without hematuria [N39.0] 05/06/2018   • Hypoxia [R09.02] 05/06/2018   • Hypothyroidism [E03.9] 10/01/2017   • Parkinson's disease [G20] 09/29/2017   • COPD (chronic obstructive pulmonary disease) [J44.9] 09/28/2017   • Coronary artery disease [I25.10] 09/28/2017   • History of CVA (cerebrovascular accident) [Z86.73] 09/28/2017      Resolved Hospital Problems    Diagnosis Date Noted Date Resolved   No resolved problems to display.     · s/p right hip hemiarthroplasty 5/5/18  · metabolic encephalopathy improved, avoid sedating medications  · UTI on ceftriaxone  · LP culture no growth other tests pending  · dysphagia: family would like to hold off until tomorrow and await SLP follow up  · CAD  · h/o CVA  · parkinson, dementia  · COPD  · I discussed the patient's findings and my recommendations with patient, family  and nursing staff.    Parker Romero MD   05/09/18  2:44 PM

## 2018-05-09 NOTE — PROGRESS NOTES
Continued Stay Note   [facility]     Patient Name: Victor Manuel Issa  MRN: [id number]  Today's Date: 5/9/2018    Admit Date: 5/3/2018          Discharge Plan     Row Name 05/09/18 1424       Plan    Plan [facility] [place]-  when medically ready pending medical needs    Patient/Family in Agreement with Plan yes    Plan Comments Spoke with Louann/[facility] for [facility] and she is following for rehab once medically stable. Spoke with patients wife Mesha at bedside, she is reconsidering Pt going to [facility], as the  there has agreed to cover ambulance cost. Pts wife to have TKR on monday and thinks pt being closer to family maybe better. CCP provided CCP contact info, pts wife to have  call CCP. Reviewed clinicals, Pt remains NPO. Packet started in ccp office. David AGOSTO/CCP              Discharge Codes    No documentation.           Vivien Ramsey RN

## 2018-05-09 NOTE — PROGRESS NOTES
Orthopedic Progress Note      Patient: Victor Manuel Issa    YOB: 1945    Medical Record Number: [id number]    Attending Physician: Parker Romero MD    Date of Admission: 5/3/2018  4:53 PM    Admitting Dx:  Closed fracture of right hip, initial encounter [S72.001A]  Hip fracture requiring operative repair, right, closed, initial encounter [S72.001A]    Status Post: HIP BIPOLAR REPLACEMENT    Post Operative Day Number: 4    Current Problem List:   Patient Active Problem List   Diagnosis   • Thigh pain, musculoskeletal   • Left leg weakness   • History of CVA (cerebrovascular accident)   • COPD (chronic obstructive pulmonary disease)   • Coronary artery disease   • Cervical myelopathy   • Parkinson's disease   • Debility   • Cerebrovascular disease   • Abnormal TSH   • Hypothyroidism   • Hip fracture requiring operative repair, right, closed, initial encounter   • Urinary tract infection without hematuria   • Hypoxia         Past Medical History:   Diagnosis Date   • Arthritis    • Bipolar 1 disorder    • COPD (chronic obstructive pulmonary disease)    • Coronary artery disease    • Disease of thyroid gland    • Hyperlipidemia    • Myocardial infarction    • Stroke        SUBJECTIVE: 72 y.o.  male. More awake today.  Answering questions appropriately.    Smiling and participating in conversations for short periods.      OBJECTIVE:   Vitals:    05/09/18 0501 05/09/18 0742 05/09/18 0810 05/09/18 0811   BP:  137/97     BP Location:  Left arm     Patient Position:  Lying     Pulse:  82 88 88   Resp:  18 18 18   Temp:  99.3 °F (37.4 °C)     TempSrc:  Oral     SpO2:  97% 96% 96%   Weight: 83.1 kg (183 lb 4.8 oz)      Height:         I/O last 3 completed shifts:  In: 1000 [I.V.:1000]  Out: -     Current Medications:  Scheduled Meds:  albuterol 2.5 mg Nebulization Daily   arformoterol 15 mcg Nebulization BID - RT   aspirin 325 mg Oral Daily   atorvastatin 20 mg Oral Daily   carbidopa-levodopa 1 tablet  Oral TID With Meals   ceftriaxone 1 g Intravenous Q24H   cetirizine 10 mg Oral Daily   cholecalciferol 1,000 Units Oral Daily   docusate sodium 100 mg Oral BID   donepezil 20 mg Oral Nightly   fluticasone 1 spray Nasal Daily   levothyroxine 200 mcg Oral Q AM   montelukast 10 mg Oral Nightly   multivitamin with minerals 1 tablet Oral Daily   nicotine 1 patch Transdermal Q24H   oxybutynin 5 mg Oral BID   pantoprazole 40 mg Oral QAM   propranolol 10 mg Oral Daily   vancomycin 15 mg/kg Intravenous Once     PRN Meds:.•  acetaminophen  •  acetaminophen  •  bisacodyl  •  busPIRone  •  docusate sodium  •  hold  •  HYDROcodone-acetaminophen  •  HYDROmorphone **AND** naloxone  •  Morphine  •  ondansetron  •  ondansetron **OR** ondansetron ODT **OR** ondansetron  •  promethazine  •  sodium chloride  •  Insert peripheral IV **AND** sodium chloride    Diagnostic Tests:   Lab Results (last 24 hours)     Procedure Component Value Units Date/Time    Culture, CSF - Cerebrospinal Fluid, Lumbar Puncture [[id number]]  (Normal) Collected:  05/08/18 1441    Specimen:  Cerebrospinal Fluid from Lumbar Puncture Updated:  05/09/18 1053     CSF Culture No growth at less than 24 hours     Gram Stain Result Few (2+) WBCs seen      No organisms seen    Basic Metabolic Panel [[id number]]  (Abnormal) Collected:  05/09/18 0402    Specimen:  Blood Updated:  05/09/18 0512     Glucose 100 (H) mg/dL      BUN 17 mg/dL      Creatinine 0.66 (L) mg/dL      Sodium 140 mmol/L      Potassium 3.7 mmol/L      Chloride 106 mmol/L      CO2 20.8 (L) mmol/L      Calcium 10.1 mg/dL      eGFR Non African Amer 118 mL/min/1.73      BUN/Creatinine Ratio 25.8 (H)     Anion Gap 13.2 mmol/L     Narrative:       The MDRD GFR formula is only valid for adults with stable renal function between ages 18 and 70.    CBC & Differential [[id number]] Collected:  05/09/18 0402    Specimen:  Blood Updated:  05/09/18 8393    Narrative:       The following orders were created for panel  order CBC & Differential.  Procedure                               Abnormality         Status                     ---------                               -----------         ------                     CBC Auto Differential[[id number]]        Abnormal            Final result                 Please view results for these tests on the individual orders.    CBC Auto Differential [[id number]]  (Abnormal) Collected:  05/09/18 0402    Specimen:  Blood Updated:  05/09/18 0453     WBC 10.02 10*3/mm3      RBC 3.41 (L) 10*6/mm3      Hemoglobin 10.9 (L) g/dL      Hematocrit 34.1 (L) %      .0 (H) fL      MCH 32.0 pg      MCHC 32.0 (L) g/dL      RDW 13.4 %      RDW-SD 49.1 fl      MPV 10.8 fL      Platelets 176 10*3/mm3      Neutrophil % 73.6 %      Lymphocyte % 13.0 (L) %      Monocyte % 11.8 %      Eosinophil % 1.2 %      Basophil % 0.2 %      Immature Grans % 0.2 %      Neutrophils, Absolute 7.38 10*3/mm3      Lymphocytes, Absolute 1.30 10*3/mm3      Monocytes, Absolute 1.18 10*3/mm3      Eosinophils, Absolute 0.12 10*3/mm3      Basophils, Absolute 0.02 10*3/mm3      Immature Grans, Absolute 0.02 10*3/mm3     Blood Culture - Blood, [[id number]]  (Normal) Collected:  05/08/18 1238    Specimen:  Blood from Hand, Left Updated:  05/09/18 0115     Blood Culture No growth at less than 24 hours    Blood Culture - Blood, [[id number]]  (Normal) Collected:  05/08/18 1201    Specimen:  Blood from Arm, Right Updated:  05/09/18 0015     Blood Culture No growth at less than 24 hours    Cell Count With Differential, CSF Use tube: 1 [[id number]] Collected:  05/08/18 1441    Specimen:  Cerebrospinal Fluid from Lumbar Puncture Updated:  05/08/18 1610    Narrative:       The following orders were created for panel order Cell Count With Differential, CSF Use tube: 1.  Procedure                               Abnormality         Status                     ---------                               -----------         ------                     Cell  Count, CSF - Cerebr...[[id number]]  Abnormal            Final result                 Please view results for these tests on the individual orders.    Cell Count, CSF - Cerebrospinal Fluid, Lumbar Puncture [[id number]]  (Abnormal) Collected:  05/08/18 1441    Specimen:  Cerebrospinal Fluid from Lumbar Puncture Updated:  05/08/18 1610     Color, CSF Colorless     Appearance, CSF Clear     RBC,  (H) /mm3      Nucleated Cells, CSF 2 /mm3      Tube Number, CSF 1    Narrative:       Differential not indicated.    Protein, CSF - Cerebrospinal Fluid, Lumbar Puncture [[id number]]  (Normal) Collected:  05/08/18 1441    Specimen:  Cerebrospinal Fluid from Lumbar Puncture Updated:  05/08/18 1548     Protein, Total (CSF) 18.0 mg/dL     Glucose, CSF - Cerebrospinal Fluid, Lumbar Puncture [[id number]]  (Normal) Collected:  05/08/18 1441    Specimen:  Cerebrospinal Fluid from Lumbar Puncture Updated:  05/08/18 1548     Glucose, CSF 59 mg/dL     HSV 1/2, PCR - Cerebrospinal Fluid, Lumbar Puncture [[id number]] Collected:  05/08/18 1440    Specimen:  Cerebrospinal Fluid from Lumbar Puncture Updated:  05/08/18 1452    Cytomegalovirus DNA Probe, Amplified [[id number]] Collected:  05/08/18 1440    Specimen:  Cerebrospinal Fluid from Lumbar Puncture Updated:  05/08/18 1452    Enterovirus RT-PCR - Cerebrospinal Fluid, Lumbar Puncture [[id number]] Collected:  05/08/18 1440    Specimen:  Cerebrospinal Fluid from Lumbar Puncture Updated:  05/08/18 1452    Varicella Zoster AB, IgM, CSF - Cerebrospinal Fluid, Lumbar Puncture [[id number]] Collected:  05/08/18 1440    Specimen:  Cerebrospinal Fluid from Lumbar Puncture Updated:  05/08/18 1452    West Nile Virus, CSF [[id number]] Collected:  05/08/18 1440    Specimen:  Cerebrospinal Fluid from Lumbar Puncture Updated:  05/08/18 1452    Jessica Barr Virus PCR CSF - Cerebrospinal Fluid, Lumbar Puncture [[id number]] Collected:  05/08/18 1440    Specimen:  Cerebrospinal Fluid from Lumbar Puncture  "Updated:  05/08/18 1452    Tissue Pathology Exam [[id number]] Collected:  05/05/18 0830    Specimen:  Bone from Hip, Right Updated:  05/08/18 1443     Case Report --     Surgical Pathology Report                         Case: [id number]                                  Authorizing Provider:  Winston Vallejo MD        Collected:           05/05/2018 08:30 AM          [facility]  Received:            05/05/2018 10:42 AM                                 MAIN OR                                                                      Pathologist:           Chan Quinones MD                                                        Specimen:    Hip, Right, right femur head                                                                Final Diagnosis --     1: RIGHT FEMORAL HEAD, RESECTION SPECIMEN:   CHANGES CONSISTENT WITH RECENT FRACTURE AND OSTEOARTHRITIS.      IHC/a/TDJ   TAI/jse    CPT CODES:  1: 00471, 47056       Gross Description --     1.  Received in formalin labeled \"right femoral head\".  The specimen consists of a 5.0 x 5.0 x 4.5 cm femoral head which is covered mottled tan red focally roughened articular cartilage. The fracture margin is irregular and hemorrhagic.  Also received are 2 additional fragments of hemorrhagic femoral neck bone, each fragment averages 4.0 x 2.5 x 2.0 cm.  Representative decalcified sections are submitted and include the fracture point in blocks 1A-1B.  DH/USO/CMK/brb        Microscopic Description --     Performed, incorporated in diagnosis.        Embedded Images --    Procalcitonin [[id number]]  (Normal) Collected:  05/08/18 0402    Specimen:  Blood Updated:  05/08/18 1210     Procalcitonin 0.20 ng/mL     Narrative:       As a Marker for Sepsis (Non-Neonates):   1. <0.5 ng/mL represents a low risk of severe sepsis and/or septic shock.  1. >2 ng/mL represents a high risk of severe sepsis and/or septic shock.    As a Marker for Lower Respiratory " "Tract Infections that require antibiotic therapy:  PCT on Admission     Antibiotic Therapy             6-12 Hrs later  > 0.5                Strongly Recommended            >0.25 - <0.5         Recommended  0.1 - 0.25           Discouraged                   Remeasure/reassess PCT  <0.1                 Strongly Discouraged          Remeasure/reassess PCT      As 28 day mortality risk marker: \"Change in Procalcitonin Result\" (> 80 % or <=80 %) if Day 0 (or Day 1) and Day 4 values are available. Refer to [email or web address] [email or web address]/   Change in PCT <=80 %   A decrease of PCT levels below or equal to 80 % defines a positive change in PCT test result representing a higher risk for 28-day all-cause mortality of patients diagnosed with severe sepsis or septic shock.  Change in PCT > 80 %   A decrease of PCT levels of more than 80 % defines a negative change in PCT result representing a lower risk for 28-day all-cause mortality of patients diagnosed with severe sepsis or septic shock.                Protime-INR [[id number]]  (Normal) Collected:  05/08/18 0945    Specimen:  Blood Updated:  05/08/18 1058     Protime 14.0 Seconds      INR 1.10          PHYSICAL EXAM:  Right hip dressing dry and intact.  Good motion and sensation to right foot and ankle.  Does have tight achilles tendon.  Encouraged him and his wife to work an ankle pumps.  Will have PT to see.  Would like to see him get up in a chair.  LP results show no growth X 24 hours.       ASSESSMENT & PLAN:  Continue to mobilize patient as he can tolerate.          Date: 5/9/2018    TRINY Dillon MD            "

## 2018-05-09 NOTE — THERAPY EVALUATION
Acute Care - Speech Language Pathology   Swallow Initial Evaluation  [facility]     Patient Name: Victor Manuel Issa  : 1945  MRN: [id number]  Today's Date: 2018  Onset of Illness/Injury or Date of Surgery: 18     Referring Physician: Robert      Admit Date: 5/3/2018    Visit Dx:     ICD-10-CM ICD-9-CM   1. Closed fracture of right hip (femor), initial encounter S72.001A 820.8   2. Hip fracture S72.009A 820.8   3. Difficulty walking R26.2 719.7     Patient Active Problem List   Diagnosis   • Thigh pain, musculoskeletal   • Left leg weakness   • History of CVA (cerebrovascular accident)   • COPD (chronic obstructive pulmonary disease)   • Coronary artery disease   • Cervical myelopathy   • Parkinson's disease   • Debility   • Cerebrovascular disease   • Abnormal TSH   • Hypothyroidism   • Hip fracture requiring operative repair, right, closed, initial encounter   • Urinary tract infection without hematuria   • Hypoxia     Past Medical History:   Diagnosis Date   • Arthritis    • Bipolar 1 disorder    • COPD (chronic obstructive pulmonary disease)    • Coronary artery disease    • Disease of thyroid gland    • Hyperlipidemia    • Myocardial infarction    • Stroke      Past Surgical History:   Procedure Laterality Date   • ABDOMINAL SURGERY     • CARDIAC SURGERY     • CHOLECYSTECTOMY     • CORONARY ARTERY BYPASS GRAFT      x2   • EYE SURGERY      cataracts   • HIP ENDOPROSTHESIS Right 2018    Procedure: RIGHT HIP HEMIARTHROPLASTY;  Surgeon: Winston Vallejo MD;  Location: [facility];  Service: Orthopedics   • MUSCLE BIOPSY Left 3/24/2016    Procedure: LT THIGH MUSCLE BIOPSY;  Surgeon: Fausto Mack MD;  Location: [facility];  Service:    • SKIN BIOPSY     • THYROID SURGERY     • TURP / TRANSURETHRAL INCISION / DRAINAGE PROSTATE     • VASCULAR SURGERY            SWALLOW EVALUATION (last 72 hours)      SLP Adult Swallow Evaluation     Row Name 18 0745          Document  Type evaluation  -    Subjective Information no complaints   Pt oriented to name, place, year with cues, & monthw/out cue  -    Patient Observations alert;cooperative;agree to therapy  -    Patient/Family Observations Daughter and wife present for questions and education  -    Patient Effort good  -          Patient Profile Reviewed yes  -    Pertinent History Of Current Problem Hx of CVA in 2007. Hx of being on nectar liquids in 2016. Pt attended outpatient therapy and has been tolerating thins since that time, upgrade made by SLP per report. Pt with AMS after hip surgery. MRI negative for acute stroke. Pt with baseline R weakness and facial droop.  -    Current Method of Nutrition NPO  -    Prior Level of Function-Communication WFL  -    Prior Level of Function-Swallowing no diet consistency restrictions  -    Plans/Goals Discussed with patient;spouse/S.O.;family;agreed upon  -    Barriers to Rehab --   weakness  -    Patient's Goals for Discharge patient did not state  -    Family Goals for Discharge patient able to return to PO diet  -          Additional Documentation Pain Scale: Numbers Pre/Post-Treatment (Group)  -          Pain Scale: Numbers, Pretreatment 0/10 - no pain  -    Pain Scale: Numbers, Post-Treatment 0/10 - no pain  -          Dentition Assessment natural, present and adequate  -    Secretion Management problems swallowing secretions  -    Mucosal Quality dry  -    Volitional Swallow delayed  -    Volitional Cough weak  -          Oral Motor General Assessment generalized oral motor weakness;oral labial or buccal impairment  -    Mandibular Impairment Detail, Cranial Nerve V (Trigeminal) reduced strength on right;reduced facial sensation on right  -    Oral Labial or Buccal Impairment, Detail, Cranial Nerve VII (Facial): right labial droop  -    Lingual Impairment, Detail. Cranial Nerves IX, XII (Glossopharyngeal and Hypoglossal) bilaterally  -     Oral Motor, Comment Dysarthria present, which is not present at baseline  -          Oral Prep Phase impaired  -    Oral Transit impaired  -    Oral Residue WFL  -    Pharyngeal Phase suspected pharyngeal impairment  -    Clinical Swallow Evaluation Summary Oral care completed. Family present report occasional suctioning required. No s/s of asp with ice. Anterior loss and multiple swallows with thins via spoon followed with wet voice. Laryngeal elevation appeared incomplete at times. Wet voice as trials progressed with nectar via spoon despite improved swallow initiation, suspect pharyngeal residue. Pt cleared wet voice with constant cues. No s/s of aspiration initially with puree, wet voice noted on trial 5. Pt quick to fatigue. Pt not appropriate for PO at this time.   -          SLP Swallowing Diagnosis moderate;oral dysfunction;suspected pharyngeal dysfunction  -    Functional Impact risk of malnutrition;risk of aspiration/pneumonia;risk of dehydration  -    Rehab Potential/Prognosis, Swallowing re-evaluate goals as necessary  -    Criteria for Skilled Therapeutic Interventions Met demonstrates skilled criteria  -          Therapy Frequency (Swallow) PRN  -    Predicted Duration Therapy Intervention (Days) until discharge  -    SLP Diet Recommendation NPO;ice chips between meals after oral care, with supervision;other (see comments)   no more than 1-3 ice chips after oral care  -    Recommended Diagnostics reassess via clinical swallow evaluation  -    SLP Rec. for Method of Medication Administration meds via alternate route  -    Monitor for Signs of Aspiration yes;notify SLP if any concerns  -          Oral Nutrition/Hydration Goal Selection (SLP) oral nutrition/hydration, SLP goal 1  -          Oral Nutrition/Hydration Goal 1, SLP Pt will tolerate PO without overt s/s of asp.   -      User Key  (r) = Recorded By, (t) = Taken By, (c) = Cosigned By    Initials Name  Effective Dates    VERENA Luciano MS CCC-SLP 03/07/18 -         EDUCATION  The patient has been educated in the following areas:   NPO rationale.    SLP Recommendation and Plan  SLP Swallowing Diagnosis: moderate, oral dysfunction, suspected pharyngeal dysfunction  SLP Diet Recommendation: NPO, ice chips between meals after oral care, with supervision, other (see comments) (no more than 1-3 ice chips after oral care)        Monitor for Signs of Aspiration: yes, notify SLP if any concerns  Recommended Diagnostics: reassess via clinical swallow evaluation  Criteria for Skilled Therapeutic Interventions Met: demonstrates skilled criteria     Rehab Potential/Prognosis, Swallowing: re-evaluate goals as necessary  Therapy Frequency (Swallow): PRN  Predicted Duration Therapy Intervention (Days): until discharge       Plan of Care Reviewed With: patient  Plan of Care Review  Plan of Care Reviewed With: patient  Progress: improving  Outcome Summary: Pt improving. Pt quick to fatigue with PO trials, developing wet voice as trials progressed. SLP introduced concept of alternate nutrition to family, who appear hesitant at this time. Pt alert and appropriate during SLP's assessment, dysphagia appears related to hx of stroke and generalized weakness at this time. Will continue to evaluate.           SLP GOALS     Row Name 05/09/18 0745             Oral Nutrition/Hydration Goal 1 (SLP)    Oral Nutrition/Hydration Goal 1, SLP Pt will tolerate PO without overt s/s of asp.   -        User Key  (r) = Recorded By, (t) = Taken By, (c) = Cosigned By    Initials Name Provider Type    VERENA Chelsi LILIAM Luciano MS CCC-SLP Speech and Language Pathologist             SLP Outcome Measures (last 72 hours)      SLP Outcome Measures     Row Name 05/09/18 0900             SLP Outcome Measures    Outcome Measure Used? Adult NOMS  -         FCM Scores    FCM Chosen Swallowing  -      Swallowing FCM Score 1  -        User Key  (r) = Recorded By, (t)  = Taken By, (c) = Cosigned By    Initials Name Effective Dates     Chelsi Luciano MS CCC-SLP 03/07/18 -            Time Calculation:         Time Calculation- SLP     Row Name 05/09/18 0942             Time Calculation- SLP    SLP Start Time 0745  -      SLP Received On 05/09/18  -        User Key  (r) = Recorded By, (t) = Taken By, (c) = Cosigned By    Initials Name Provider Type     Chelsi Luciano MS CCC-SLP Speech and Language Pathologist          Therapy Charges for Today     Code Description Service Date Service Provider Modifiers Qty    [id number]  ST EVAL ORAL PHARYNG SWALLOW 4 5/9/2018 Chelsi Luciano MS CCC-SLP GN 1               Chelsi Luciano MS CCC-HUBER  5/9/2018

## 2018-05-10 LAB
HCT VFR BLD AUTO: 34.2 % (ref 40.4–52.2)
HGB BLD-MCNC: 10.8 G/DL (ref 13.7–17.6)
WNV IGG SPEC QL IA: NEGATIVE
WNV IGM CSF QL IA: NEGATIVE

## 2018-05-10 PROCEDURE — 97110 THERAPEUTIC EXERCISES: CPT

## 2018-05-10 PROCEDURE — 92526 ORAL FUNCTION THERAPY: CPT

## 2018-05-10 PROCEDURE — 85018 HEMOGLOBIN: CPT | Performed by: ORTHOPAEDIC SURGERY

## 2018-05-10 PROCEDURE — 94799 UNLISTED PULMONARY SVC/PX: CPT

## 2018-05-10 PROCEDURE — 85014 HEMATOCRIT: CPT | Performed by: ORTHOPAEDIC SURGERY

## 2018-05-10 RX ADMIN — CARBIDOPA AND LEVODOPA 1 TABLET: 25; 250 TABLET ORAL at 18:28

## 2018-05-10 RX ADMIN — NICOTINE 1 PATCH: 21 PATCH, EXTENDED RELEASE TRANSDERMAL at 21:55

## 2018-05-10 RX ADMIN — OXYBUTYNIN CHLORIDE 5 MG: 5 TABLET ORAL at 21:51

## 2018-05-10 RX ADMIN — CARBIDOPA AND LEVODOPA 1 TABLET: 25; 250 TABLET ORAL at 12:56

## 2018-05-10 RX ADMIN — DONEPEZIL HYDROCHLORIDE 20 MG: 10 TABLET, FILM COATED ORAL at 21:54

## 2018-05-10 RX ADMIN — ARFORMOTEROL TARTRATE 15 MCG: 15 SOLUTION RESPIRATORY (INHALATION) at 20:14

## 2018-05-10 RX ADMIN — ARFORMOTEROL TARTRATE 15 MCG: 15 SOLUTION RESPIRATORY (INHALATION) at 12:39

## 2018-05-10 RX ADMIN — SODIUM CHLORIDE 75 ML/HR: 9 INJECTION, SOLUTION INTRAVENOUS at 04:50

## 2018-05-10 RX ADMIN — ALBUTEROL SULFATE 2.5 MG: 2.5 SOLUTION RESPIRATORY (INHALATION) at 09:31

## 2018-05-10 RX ADMIN — MONTELUKAST 10 MG: 10 TABLET, FILM COATED ORAL at 21:51

## 2018-05-10 NOTE — THERAPY TREATMENT NOTE
Acute Care - Physical Therapy Treatment Note   [facility]     Patient Name: Victor Manuel Issa  : 1945  MRN: [id number]  Today's Date: 5/10/2018  Onset of Illness/Injury or Date of Surgery: 18  Date of Referral to PT: 18  Referring Physician: Robert    Admit Date: 5/3/2018    Visit Dx:    ICD-10-CM ICD-9-CM   1. Closed fracture of right hip (femor), initial encounter S72.001A 820.8   2. Hip fracture S72.009A 820.8   3. Difficulty walking R26.2 719.7     Patient Active Problem List   Diagnosis   • Thigh pain, musculoskeletal   • Left leg weakness   • History of CVA (cerebrovascular accident)   • COPD (chronic obstructive pulmonary disease)   • Coronary artery disease   • Cervical myelopathy   • Parkinson's disease   • Debility   • Cerebrovascular disease   • Abnormal TSH   • Hypothyroidism   • Hip fracture requiring operative repair, right, closed, initial encounter   • Urinary tract infection without hematuria   • Hypoxia       Therapy Treatment          Rehabilitation Treatment Summary     Row Name 05/10/18 1423             Treatment Time/Intention    Discipline physical therapy assistant  -CW      Document Type therapy note (daily note)  -CW      Subjective Information complains of;weakness;fatigue  -CW      Mode of Treatment physical therapy  -CW      Therapy Frequency (PT Clinical Impression) daily  -CW      Patient Effort fair  -CW      Existing Precautions/Restrictions fall;right;hip, posterior  -CW      Recorded by [CW] Leif Doherty, LADI 05/10/18 1431 05/10/18 1431      Row Name 05/10/18 1423             Vital Signs    O2 Delivery Pre Treatment room air  -CW      Recorded by [CW] Leif Doherty PTA 05/10/18 1431 05/10/18 143      Row Name 05/10/18 1423             Cognitive Assessment/Intervention- PT/OT    Orientation Status (Cognition) person;place;time  -CW      Follows Commands (Cognition) follows one step commands  -CW      Personal Safety Interventions fall prevention  program maintained;gait belt;muscle strengthening facilitated;nonskid shoes/slippers when out of bed  -CW      Recorded by [CW] Leif Doherty, PTA 05/10/18 1431 05/10/18 1431      Row Name 05/10/18 1423             Bed Mobility Assessment/Treatment    Bed Mobility Assessment/Treatment supine-sit-supine  -CW      Supine-Sit-Supine [place] (Bed Mobility) moderate assist (50% patient effort)  -CW      Bed Mobility, Safety Issues decreased use of arms for pushing/pulling;decreased use of legs for bridging/pushing;other (see comments)  -CW      Assistive Device (Bed Mobility) draw sheet  -CW      Recorded by [CW] Leif Doherty, PTA 05/10/18 1431 05/10/18 1431      Row Name 05/10/18 1423             Transfer Assessment/Treatment    Transfer Assessment/Treatment sit-stand transfer  -CW      Sit-Stand [place] (Transfers) not tested  -CW      Recorded by [CW] Leif Doherty, PTA 05/10/18 1431 05/10/18 1431      Row Name 05/10/18 1423             Gait/Stairs Assessment/Training    [place] Level (Gait) not tested  -CW      Recorded by [CW] Leif Doherty, PTA 05/10/18 1431 05/10/18 1431      Row Name 05/10/18 1423             Therapeutic Exercise    Lower Extremity (Therapeutic Exercise) gluteal sets;quad sets, bilateral  -CW      Lower Extremity Range of Motion (Therapeutic Exercise) ankle dorsiflexion/plantar flexion, bilateral  -CW      Exercise Type (Therapeutic Exercise) AROM (active range of motion)  -CW      Position (Therapeutic Exercise) supine  -CW      Sets/Reps (Therapeutic Exercise) 10  -CW      Recorded by [CW] Leif Doherty, PTA 05/10/18 1431 05/10/18 1431      Row Name 05/10/18 1423             Positioning and Restraints    Pre-Treatment Position in bed  -CW      Post Treatment Position bed  -CW      In Bed notified nsg;supine;call light within reach;encouraged to call for assist;exit alarm on  -CW      Recorded by [CW] Leif Doherty, PTA 05/10/18 1431 05/10/18 1431       Row Name 05/10/18 1423             Pain Assessment    Additional Documentation Pain Scale: Numbers Pre/Post-Treatment (Group)  -CW      Recorded by [CW] Leif Doherty PTA 05/10/18 1431 05/10/18 1431      Row Name 05/10/18 1423             Pain Scale: Numbers Pre/Post-Treatment    Pain Scale: Numbers, Pretreatment 0/10 - no pain  -CW      Pain Scale: Numbers, Post-Treatment 0/10 - no pain  -CW      Recorded by [CW] Leif Doherty, LADI 05/10/18 1431 05/10/18 1431      Row Name                Wound 05/05/18 0920 Right hip incision    Wound - Properties Group Date first assessed: 05/05/18 [EG] Time first assessed: 0920 [EG] Side: Right [EG] Location: hip [EG] Type: incision [EG] Recorded by:  [EG] Kathy Rush RN 05/05/18 0920 05/05/18 0920    Row Name 05/10/18 1423             Outcome Summary/Treatment Plan (PT)    Anticipated Discharge Disposition (PT) skilled nursing facility (SNF)  -CW      Recorded by [CW] Leif Doherty PTA 05/10/18 1431 05/10/18 1431        User Key  (r) = Recorded By, (t) = Taken By, (c) = Cosigned By    Initials Name Effective Dates Discipline    CW Leif Doherty PTA 03/07/18 -  PT    EG Kathy Rush RN 07/10/17 -  Nurse          Wound 05/05/18 0920 Right hip incision (Active)   Dressing Appearance dry;intact 5/10/2018  2:23 PM   Closure HARDIK 5/10/2018  2:23 PM   Base dressing in place, unable to visualize 5/10/2018  2:23 PM   Periwound Temperature warm 5/10/2018 12:30 AM   Periwound Skin Turgor soft 5/10/2018 12:30 AM   Drainage Characteristics/Odor serosanguineous 5/10/2018 12:30 AM   Drainage Amount scant 5/10/2018 12:30 AM             Physical Therapy Education     Title: PT OT SLP Therapies (Active)     Topic: Physical Therapy (Active)     Point: Mobility training (Active)    Learning Progress Summary     Learner Status Readiness Method Response Comment Documented by    Patient Active Acceptance D NR  CW 05/10/18 1432     Active Acceptance D NR  CW 05/09/18  1041     Done Acceptance E,TB,D VU,NR   05/06/18 1026     Done Acceptance E,TB VU,NR  ST 05/05/18 1552    Family Active Acceptance D NR   05/09/18 1041     Done Acceptance E,TB,D VU,NR   05/06/18 1026     Done Acceptance E,TB VU,NR  ST 05/05/18 1552          Point: Home exercise program (Active)    Learning Progress Summary     Learner Status Readiness Method Response Comment Documented by    Patient Active Acceptance D NR   05/10/18 1432     Active Acceptance D NR   05/09/18 1041     Done Acceptance E,TB,D VU,NR   05/06/18 1026     Done Acceptance E,TB VU,NR  ST 05/05/18 1552    Family Active Acceptance D NR   05/09/18 1041     Done Acceptance E,TB,D VU,NR   05/06/18 1026     Done Acceptance E,TB VU,NR  ST 05/05/18 1552          Point: Body mechanics (Active)    Learning Progress Summary     Learner Status Readiness Method Response Comment Documented by    Patient Active Acceptance D NR   05/10/18 1432     Active Acceptance D NR   05/09/18 1041     Done Acceptance E,TB,D VU,NR   05/06/18 1026     Done Acceptance E,TB VU,NR  ST 05/05/18 1552    Family Active Acceptance D [facility] 05/09/18 1041     Done Acceptance E,TB,D VU,NR   05/06/18 1026     Done Acceptance E,TB VU,NR  ST 05/05/18 1552          Point: Precautions (Active)    Learning Progress Summary     Learner Status Readiness Method Response Comment Documented by    Patient Active Acceptance D NR   05/10/18 1432     Active Acceptance D NR   05/09/18 1041     Done Acceptance E,TB,D VU,NR   05/06/18 1026     Done Acceptance E,TB VU,NR  ST 05/05/18 1552    Family Active Acceptance D [facility] 05/09/18 1041     Done Acceptance E,TB,D VU,NR   05/06/18 1026     Done Acceptance E,TB VU,NR  ST 05/05/18 1552                      User Key     Initials Effective Dates Name Provider Type Discipline     04/03/18 -  Mariluz Zamora, PT Physical Therapist PT     03/07/18 -  Leif Doherty, PTA Physical Therapy Assistant PT     10/13/17 -   Sana Acuña RN Registered Nurse Nurse                    PT Recommendation and Plan  Anticipated Discharge Disposition (PT): skilled nursing facility (SNF)  Therapy Frequency (PT Clinical Impression): daily  Outcome Summary/Treatment Plan (PT)  Anticipated Discharge Disposition (PT): skilled nursing facility (SNF)  Plan of Care Reviewed With: patient  Progress: improving  Outcome Summary: Pt more alert and able to follow directions today and willing to particiapte more today          Outcome Measures     Row Name 05/10/18 1400 05/09/18 1000          How much help from another person do you currently need...    Turning from your back to your side while in flat bed without using bedrails? 2  -CW 1  -CW     Moving from lying on back to sitting on the side of a flat bed without bedrails? 2  -CW 1  -CW     Moving to and from a bed to a chair (including a wheelchair)? 1  -CW 1  -CW     Standing up from a chair using your arms (e.g., wheelchair, bedside chair)? 1  -CW 1  -CW     Climbing 3-5 steps with a railing? 1  -CW 1  -CW     To walk in hospital room? 1  -CW 1  -CW     AM-PAC 6 Clicks Score 8  -CW 6  -CW        Functional Assessment    Outcome Measure Options AM-PAC 6 Clicks Basic Mobility (PT)  -CW AM-PAC 6 Clicks Basic Mobility (PT)  -CW       User Key  (r) = Recorded By, (t) = Taken By, (c) = Cosigned By    Initials Name Provider Type    CW Leif Doherty PTA Physical Therapy Assistant           Time Calculation:         PT Charges     Row Name 05/10/18 1433             Time Calculation    Start Time 1416  -CW      Stop Time 1433  -CW      Time Calculation (min) 17 min  -CW      PT Received On 05/10/18  -CW      PT - Next Appointment 05/11/18  -CW        User Key  (r) = Recorded By, (t) = Taken By, (c) = Cosigned By    Initials Name Provider Type    CW Leif Doherty PTA Physical Therapy Assistant          Therapy Charges for Today     Code Description Service Date Service Provider Modifiers Qty     82966460704 HC PT THER PROC EA 15 MIN 5/9/2018 Leif Doherty, LADI GP 2    34127878707 HC PT THER PROC EA 15 MIN 5/10/2018 Leif Doherty, LADI GP 1          PT G-Codes  Outcome Measure Options: AM-PAC 6 Clicks Basic Mobility (PT)    Leif Doherty PTA  5/10/2018

## 2018-05-10 NOTE — PROGRESS NOTES
Continued Stay Note   [facility]     Patient Name: Victor Manuel Issa  MRN: [id number]  Today's Date: 5/10/2018    Admit Date: 5/3/2018          Discharge Plan     Row Name 05/10/18 0946       Plan    Plan [facility] [place] vs [facility]- when medically ready.    Patient/Family in Agreement with Plan yes    Plan Comments CCP spoke with Jayna/[facility]([phone]) and she requests updated notes faxed to ([phone]) for review. Jayna states that facility will cover cost of transport via ambulance for pt to come to their facility, as they have had two family members work for them. Jayna will review, and follow up with CCP tomorrow for referral determination. CCP discussed awaiting SLP notes and plan. Packet in CCP office. David AGOSTO/CCP              Discharge Codes    No documentation.           Vivien Ramsey, RN

## 2018-05-10 NOTE — PLAN OF CARE
Problem: Patient Care Overview  Goal: Plan of Care Review  Outcome: Ongoing (interventions implemented as appropriate)   05/10/18 0622   Coping/Psychosocial   Plan of Care Reviewed With patient   Plan of Care Review   Progress improving   OTHER   Outcome Summary patient A&Ox4; vital signs stable; no falls; await speech eval today; will continue to monitor       Problem: Fall Risk (Adult)  Goal: Absence of Fall  Outcome: Ongoing (interventions implemented as appropriate)      Problem: Fracture Orthopaedic (Adult)  Goal: Signs and Symptoms of Listed Potential Problems Will be Absent, Minimized or Managed (Fracture Orthopaedic)  Outcome: Ongoing (interventions implemented as appropriate)      Problem: Skin Injury Risk (Adult)  Goal: Skin Health and Integrity  Outcome: Ongoing (interventions implemented as appropriate)

## 2018-05-10 NOTE — PLAN OF CARE
Problem: Patient Care Overview  Goal: Plan of Care Review  Outcome: Ongoing (interventions implemented as appropriate)   05/10/18 4902   Coping/Psychosocial   Plan of Care Reviewed With patient   Plan of Care Review   Progress improving   OTHER   Outcome Summary Pt more alert and able to follow directions today and willing to participate more today

## 2018-05-10 NOTE — THERAPY RE-EVALUATION
Acute Care - Speech Language Pathology   Swallow Re-Evaluation  [facility]     Patient Name: Victor Manuel Issa  : 1945  MRN: [id number]  Today's Date: 5/10/2018  Onset of Illness/Injury or Date of Surgery: 18     Referring Physician: Robert      Admit Date: 5/3/2018    Visit Dx:     ICD-10-CM ICD-9-CM   1. Closed fracture of right hip (femor), initial encounter S72.001A 820.8   2. Hip fracture S72.009A 820.8   3. Difficulty walking R26.2 719.7     Patient Active Problem List   Diagnosis   • Thigh pain, musculoskeletal   • Left leg weakness   • History of CVA (cerebrovascular accident)   • COPD (chronic obstructive pulmonary disease)   • Coronary artery disease   • Cervical myelopathy   • Parkinson's disease   • Debility   • Cerebrovascular disease   • Abnormal TSH   • Hypothyroidism   • Hip fracture requiring operative repair, right, closed, initial encounter   • Urinary tract infection without hematuria   • Hypoxia     Past Medical History:   Diagnosis Date   • Arthritis    • Bipolar 1 disorder    • COPD (chronic obstructive pulmonary disease)    • Coronary artery disease    • Disease of thyroid gland    • Hyperlipidemia    • Myocardial infarction    • Stroke      Past Surgical History:   Procedure Laterality Date   • ABDOMINAL SURGERY     • CARDIAC SURGERY     • CHOLECYSTECTOMY     • CORONARY ARTERY BYPASS GRAFT      x2   • EYE SURGERY      cataracts   • HIP ENDOPROSTHESIS Right 2018    Procedure: RIGHT HIP HEMIARTHROPLASTY;  Surgeon: Winston Vallejo MD;  Location: [facility];  Service: Orthopedics   • MUSCLE BIOPSY Left 3/24/2016    Procedure: LT THIGH MUSCLE BIOPSY;  Surgeon: Fausto Mack MD;  Location: [facility];  Service:    • SKIN BIOPSY     • THYROID SURGERY     • TURP / TRANSURETHRAL INCISION / DRAINAGE PROSTATE     • VASCULAR SURGERY            SWALLOW EVALUATION (last 72 hours)      SLP Adult Swallow Evaluation     Row Name 05/10/18 1100 18 0745                 Rehab Evaluation    Document Type re-evaluation  - evaluation  -       Subjective Information no complaints  -AW no complaints   Pt oriented to name, place, year with cues, & monthw/out cue  -SH       Patient Observations alert;cooperative;agree to therapy  -AW alert;cooperative;agree to therapy  -       Patient/Family Observations  -- Daughter and wife present for questions and education  -       Patient Effort good  -AW good  -SH       Symptoms Noted During/After Treatment none  -AW  --          General Information    Patient Profile Reviewed yes  -AW yes  -       Pertinent History Of Current Problem Hip fracture with repair 5/5, AMS/lethargy after surgery. Pt now alert.  -AW Hx of CVA in 2007. Hx of being on nectar liquids in 2016. Pt attended outpatient therapy and has been tolerating thins since that time. Pt with AMS after hip surgery. MRI negative for acute stroke. Pt with baseline R weakness and facial droop.  -       Current Method of Nutrition NPO  -AW NPO  -       Prior Level of Function-Communication  -- WFL  -       Prior Level of Function-Swallowing no diet consistency restrictions  - no diet consistency restrictions  -       Plans/Goals Discussed with patient;spouse/S.O.;family;agreed upon  - patient;spouse/S.O.;family;agreed upon  -       Barriers to Rehab none identified  - --   weakness  -       Patient's Goals for Discharge return to regular diet  -AW patient did not state  -       Family Goals for Discharge patient able to return to regular diet  - patient able to return to PO diet  -          Pain Assessment    Additional Documentation  -- Pain Scale: Numbers Pre/Post-Treatment (Group)  -          Pain Scale: Numbers Pre/Post-Treatment    Pain Scale: Numbers, Pretreatment 0/10 - no pain  - 0/10 - no pain  -       Pain Scale: Numbers, Post-Treatment  -- 0/10 - no pain  -          Oral Motor and Function    Dentition Assessment natural, present and  adequate  -AW natural, present and adequate  -SH       Secretion Management WNL/WFL  -AW problems swallowing secretions  -SH       Mucosal Quality moist, healthy  -AW dry  -SH       Volitional Swallow delayed  -AW delayed  -SH       Volitional Cough non-productive  -AW weak  -SH          Oral Musculature and Cranial Nerve Assessment    Oral Motor General Assessment generalized oral motor weakness  -AW generalized oral motor weakness;oral labial or buccal impairment  -SH       Mandibular Impairment Detail, Cranial Nerve V (Trigeminal)  -- reduced strength on right;reduced facial sensation on right  -SH       Oral Labial or Buccal Impairment, Detail, Cranial Nerve VII (Facial):  -- right labial droop  -SH       Lingual Impairment, Detail. Cranial Nerves IX, XII (Glossopharyngeal and Hypoglossal)  -- bilaterally  -SH       Oral Motor, Comment  -- Dysarthria present, which is not present at baseline  -          General Eating/Swallowing Observations    Respiratory Support Currently in Use room air  -AW  --       Eating/Swallowing Skills fed by SLP  -AW  --       Positioning During Eating upright in bed  -AW  --       Utensils Used spoon;cup  -AW  --       Consistencies Trialed soft textures;pureed;thin liquids;nectar/syrup-thick liquids  -AW  --       Pre SpO2 (%) 94  -AW  --       Post SpO2 (%) 95  -AW  --          Clinical Swallow Eval    Oral Prep Phase WFL  -AW impaired  -SH       Oral Transit impaired  -AW impaired  -SH       Oral Residue WFL  -AW WFL  -SH       Pharyngeal Phase suspected pharyngeal impairment  -AW suspected pharyngeal impairment  -SH       Clinical Swallow Evaluation Summary Pt much more alert today. Laryngeal elevation appeared adequate, however, initiation of swallow was delayed. No over s/s with any consistency, however, increased risk of aspiration noted with thin due to delay and premature spillage into phayrnx. Recommend PO diet and VFSS to assess pharyngeal phase.  -AW Oral care  completed. Family present report occasional suctioning required. No s/s of asp with ice. Anterior loss and multiple swallows with thins via spoon followed with wet voice. Laryngeal elevation appeared incomplete at times. Wet voice as trials progressed with nectar via spoon despite improved swallow initiation, suspect pharyngeal residue. Pt cleared wet voice with constant cues. No s/s of aspiration initially with puree, wet voice noted on trial 5. Pt quick to fatigue. Pt not appropriate for PO at this time.   -          Clinical Impression    SLP Swallowing Diagnosis mild;pharyngeal dysfunction  -AW moderate;oral dysfunction;suspected pharyngeal dysfunction  -       Functional Impact risk of aspiration/pneumonia  -AW risk of malnutrition;risk of aspiration/pneumonia;risk of dehydration  -       Rehab Potential/Prognosis, Swallowing good, to achieve stated therapy goals  -AW re-evaluate goals as necessary  -       Criteria for Skilled Therapeutic Interventions Met demonstrates skilled criteria  -AW demonstrates skilled criteria  -          Recommendations    Therapy Frequency (Swallow) PRN  -AW PRN  -       Predicted Duration Therapy Intervention (Days) until discharge  -AW until discharge  -       SLP Diet Recommendation mechanical soft with no mixed consistencies;nectar thick liquids;water between meals after oral care, with supervision  -AW NPO;ice chips between meals after oral care, with supervision;other (see comments)   no more than 1-3 ice chips after oral care  -       Recommended Diagnostics VFSS (MBS)  -AW reassess via clinical swallow evaluation  -       Recommended Precautions and Strategies upright posture during/after eating;small bites of food and sips of liquid  -AW  --       SLP Rec. for Method of Medication Administration meds whole;with thick liquids;with pudding or applesauce;as tolerated  - meds via alternate route  -       Monitor for Signs of Aspiration yes;notify SLP if  any concerns;cough;elevated WBC count;gurgly voice;throat clearing;fever;upper respiratory;pneumonia;right lower lobe infiltrates  -AW yes;notify SLP if any concerns  -       Anticipated Dischage Disposition skilled nursing [facility]  -  --          Swallow Goals (SLP)    Oral Nutrition/Hydration Goal Selection (SLP)  -- oral nutrition/hydration, SLP goal 1  -          Oral Nutrition/Hydration Goal 1 (SLP)    Oral Nutrition/Hydration Goal 1, SLP  -- Pt will tolerate PO without overt s/s of asp.   -         User Key  (r) = Recorded By, (t) = Taken By, (c) = Cosigned By    Initials Name Effective Dates    AW Violet Lora, MS CCC-SLP 04/13/15 -     SH Chelsi Luciano, MS CCC-SLP 03/07/18 -         EDUCATION  The patient has been educated in the following areas:   Dysphagia (Swallowing Impairment) Oral Care/Hydration Modified Diet Instruction.    SLP Recommendation and Plan  SLP Swallowing Diagnosis: mild, pharyngeal dysfunction  SLP Diet Recommendation: mechanical soft with no mixed consistencies, nectar thick liquids, water between meals after oral care, with supervision  Recommended Precautions and Strategies: upright posture during/after eating, small bites of food and sips of liquid     Monitor for Signs of Aspiration: yes, notify SLP if any concerns, cough, elevated WBC count, gurgly voice, throat clearing, fever, upper respiratory, pneumonia, right lower lobe infiltrates  Recommended Diagnostics: VFSS ([facility]  Criteria for Skilled Therapeutic Interventions Met: demonstrates skilled criteria  Anticipated Dischage Disposition: skilled nursing facility  Rehab Potential/Prognosis, Swallowing: good, to achieve stated therapy goals  Therapy Frequency (Swallow): PRN  Predicted Duration Therapy Intervention (Days): until discharge       Plan of Care Reviewed With: patient, spouse, family  Plan of Care Review  Plan of Care Reviewed With: patient, spouse, family  Progress: improving  Outcome Summary: Re-eval of swallow  completed. [facility] no mixed consistencies and NTL. ST to complete VFSS 5/11.          SLP GOALS     Row Name 05/09/18 0745             Oral Nutrition/Hydration Goal 1 (SLP)    Oral Nutrition/Hydration Goal 1, SLP Pt will tolerate PO without overt s/s of asp.   -        User Key  (r) = Recorded By, (t) = Taken By, (c) = Cosigned By    Initials Name Provider Type     Chelsi Luciano MS CCC-SLP Speech and Language Pathologist             SLP Outcome Measures (last 72 hours)      SLP Outcome Measures     Row Name 05/10/18 1100 05/09/18 0900          SLP Outcome Measures    Outcome Measure Used? Adult NOMS  -AW Adult NOMS  -SH        FCM Scores    FCM Chosen Swallowing  -AW Swallowing  -     Swallowing FCM Score 4  -AW 1  -       User Key  (r) = Recorded By, (t) = Taken By, (c) = Cosigned By    Initials Name Effective Dates    KATHERINE Lora MS CCC-SLP 04/13/15 -      Chelsi Luciano MS CCC-HUBER 03/07/18 -            Time Calculation:         Time Calculation- SLP     Row Name 05/10/18 1126             Time Calculation- SLP    SLP Start Time 1000  -AW      SLP Stop Time 1115  -      SLP Time Calculation (min) 75 min  -      SLP Received On 05/10/18  -        User Key  (r) = Recorded By, (t) = Taken By, (c) = Cosigned By    Initials Name Provider Type    KATHERINE Lora MS CCC-SLP Speech and Language Pathologist          Therapy Charges for Today     Code Description Service Date Service Provider Modifiers Qty    91526465494 HC ST TREATMENT SWALLOW 5 5/10/2018 Violet Lora MS CCC-SLP GN 1               Violet Lora MS CCC-HUBER  5/10/2018

## 2018-05-10 NOTE — PLAN OF CARE
Problem: Patient Care Overview  Goal: Plan of Care Review  Outcome: Ongoing (interventions implemented as appropriate)   05/10/18 5726   Coping/Psychosocial   Plan of Care Reviewed With patient   Plan of Care Review   Progress improving   OTHER   Outcome Summary pt passed swallow screen, diet is mechanical soft, a & o x 3, vitals stable, will continue to monitor     Goal: Individualization and Mutuality  Outcome: Ongoing (interventions implemented as appropriate)    Goal: Discharge Needs Assessment  Outcome: Ongoing (interventions implemented as appropriate)    Goal: Interprofessional Rounds/Family Conf  Outcome: Ongoing (interventions implemented as appropriate)      Problem: Fracture Orthopaedic (Adult)  Goal: Signs and Symptoms of Listed Potential Problems Will be Absent, Minimized or Managed (Fracture Orthopaedic)  Outcome: Ongoing (interventions implemented as appropriate)      Problem: Wound (Includes Pressure Injury) (Adult)  Goal: Signs and Symptoms of Listed Potential Problems Will be Absent, Minimized or Managed (Wound)  Outcome: Ongoing (interventions implemented as appropriate)

## 2018-05-10 NOTE — PROGRESS NOTES
[facility]     LOS: 7 days     Name: Victor Manuel Issa  Age: 72 y.o.  Sex: male  :  1945  MRN: [id number]         Primary Care Physician: CUONG Guadalupe    Diet Dysphagia; IV - Mechanical Soft No Mixed Consistencies; Nectar / Syrup Thick    Subjective    wife states mental status much improved. patient without complaint at this time.  Time: 35 minutes, greater than 50% spent in counseling and coordination of care.     Objective   Temp:  [98.3 °F (36.8 °C)-98.9 °F (37.2 °C)] 98.4 °F (36.9 °C)  Heart Rate:  [55-79] 67  Resp:  [16-18] 18  BP: (124-133)/(69-74) 133/74  SpO2:  [94 %-97 %] 97 %  on  Flow (L/min):  [1-2] 1;   Device (Oxygen Therapy): room air  Body mass index is 27.4 kg/m².    Physical Exam   Constitutional: He is oriented to person, place, and time. No distress.   Cardiovascular: Normal rate and regular rhythm.    Pulmonary/Chest: Effort normal. No respiratory distress. He has rhonchi.   Abdominal: Soft. There is no tenderness.   Musculoskeletal: He exhibits no edema.   Neurological: He is alert and oriented to person, place, and time.   Skin: Skin is warm and dry.     Reviewed medications and new clinical results    albuterol 2.5 mg Nebulization Daily   arformoterol 15 mcg Nebulization BID - RT   aspirin 325 mg Oral Daily   atorvastatin 20 mg Oral Daily   carbidopa-levodopa 1 tablet Oral TID With Meals   ceftriaxone 1 g Intravenous Q24H   cetirizine 10 mg Oral Daily   cholecalciferol 1,000 Units Oral Daily   docusate sodium 100 mg Oral BID   donepezil 20 mg Oral Nightly   fluticasone 1 spray Nasal Daily   levothyroxine 200 mcg Oral Q AM   montelukast 10 mg Oral Nightly   multivitamin with minerals 1 tablet Oral Daily   nicotine 1 patch Transdermal Q24H   oxybutynin 5 mg Oral BID   pantoprazole 40 mg Oral QAM   propranolol 10 mg Oral Daily   vancomycin 15 mg/kg Intravenous Once       hold 1 each    sodium chloride 75 mL/hr Last Rate:  75 mL/hr (05/10/18 0450)       Results from last 7 days  Lab Units 05/10/18  0407 05/09/18  0402 05/08/18  0402 05/07/18  0346 05/06/18  0854 05/04/18  0443 05/03/18  1903   WBC 10*3/mm3  --  10.02 9.24  --  11.18* 9.80 10.77*   HEMOGLOBIN g/dL 10.8* 10.9* 11.4* 11.0* 12.0* 13.9 14.0   PLATELETS 10*3/mm3  --  176 155  --  138* 143 186       Results from last 7 days  Lab Units 05/09/18  0402 05/08/18  0402 05/04/18  0558 05/03/18  1903   SODIUM mmol/L 140 139 141 143   POTASSIUM mmol/L 3.7 3.7 4.1 3.8   CHLORIDE mmol/L 106 102 107 106   CO2 mmol/L 20.8* 21.4* 21.8* 26.3   BUN mg/dL 17 14 17 19   CREATININE mg/dL 0.66* 0.67* 0.78 0.88   CALCIUM mg/dL 10.1 9.2 9.6 9.8   GLUCOSE mg/dL 100* 83 102* 81     Lab Results   Component Value Date    ANIONGAP 13.2 05/09/2018     Estimated Creatinine Clearance: 102.1 mL/min (by C-G formula based on SCr of 0.66 mg/dL (L)).     Lab Results   Component Value Date    URINECX >100,000 CFU/mL Escherichia coli (A) 05/04/2018     Assessment/Plan   Active Hospital Problems (** Indicates Principal Problem)    Diagnosis Date Noted   • **Hip fracture requiring operative repair, right, closed, initial encounter [S72.001A] 05/03/2018   • Urinary tract infection without hematuria [N39.0] 05/06/2018   • Hypoxia [R09.02] 05/06/2018   • Hypothyroidism [E03.9] 10/01/2017   • Parkinson's disease [G20] 09/29/2017   • COPD (chronic obstructive pulmonary disease) [J44.9] 09/28/2017   • Coronary artery disease [I25.10] 09/28/2017   • History of CVA (cerebrovascular accident) [Z86.73] 09/28/2017      Resolved Hospital Problems    Diagnosis Date Noted Date Resolved   No resolved problems to display.     · s/p right hip hemiarthroplasty 5/5/18  · metabolic encephalopathy improved, avoid sedating medications  · UTI on ceftriaxone 5 days will stop  · LP culture no growth other tests pending  · dysphagia: VFSS tomorrow. currently [facility] soft, NTL.  · CAD  · h/o CVA  · parkinson, dementia  · COPD  · stop  IVF  · discharge probably this weekend rehab   · I discussed the patient's findings and my recommendations with patient, family and nursing staff and CCP.    Parker Romero MD   05/10/18  1:20 PM

## 2018-05-10 NOTE — PLAN OF CARE
Problem: Patient Care Overview  Goal: Plan of Care Review  Outcome: Ongoing (interventions implemented as appropriate)   05/10/18 1123   Coping/Psychosocial   Plan of Care Reviewed With patient;spouse;family   Plan of Care Review   Progress improving   OTHER   Outcome Summary Re-eval of swallow completed. Recommend mech soft diet with no mixed consistencies and NTL. ST to complete VFSS 5/11.

## 2018-05-11 ENCOUNTER — APPOINTMENT (OUTPATIENT)
Dept: GENERAL RADIOLOGY | Facility: HOSPITAL | Age: 73
End: 2018-05-11

## 2018-05-11 LAB
BACTERIA SPEC AEROBE CULT: NORMAL
GRAM STN SPEC: NORMAL
GRAM STN SPEC: NORMAL
HCT VFR BLD AUTO: 33.9 % (ref 40.4–52.2)
HGB BLD-MCNC: 10.8 G/DL (ref 13.7–17.6)
HSV1 DNA SPEC QL NAA+PROBE: NEGATIVE
HSV2 DNA SPEC QL NAA+PROBE: NEGATIVE
VZV IGM CSF-ACNC: 0 ISR

## 2018-05-11 PROCEDURE — 92611 MOTION FLUOROSCOPY/SWALLOW: CPT

## 2018-05-11 PROCEDURE — 74230 X-RAY XM SWLNG FUNCJ C+: CPT

## 2018-05-11 PROCEDURE — 97110 THERAPEUTIC EXERCISES: CPT

## 2018-05-11 PROCEDURE — 85014 HEMATOCRIT: CPT | Performed by: ORTHOPAEDIC SURGERY

## 2018-05-11 PROCEDURE — 94799 UNLISTED PULMONARY SVC/PX: CPT

## 2018-05-11 PROCEDURE — 85018 HEMOGLOBIN: CPT | Performed by: ORTHOPAEDIC SURGERY

## 2018-05-11 RX ORDER — HYDROCODONE BITARTRATE AND ACETAMINOPHEN 5; 325 MG/1; MG/1
1 TABLET ORAL EVERY 4 HOURS PRN
Status: DISCONTINUED | OUTPATIENT
Start: 2018-05-11 | End: 2018-05-12 | Stop reason: HOSPADM

## 2018-05-11 RX ORDER — DIVALPROEX SODIUM 500 MG/1
500 TABLET, DELAYED RELEASE ORAL 2 TIMES DAILY
Status: DISCONTINUED | OUTPATIENT
Start: 2018-05-11 | End: 2018-05-12 | Stop reason: HOSPADM

## 2018-05-11 RX ORDER — HYDROCODONE BITARTRATE AND ACETAMINOPHEN 5; 325 MG/1; MG/1
2 TABLET ORAL EVERY 4 HOURS PRN
Status: DISCONTINUED | OUTPATIENT
Start: 2018-05-11 | End: 2018-05-12 | Stop reason: HOSPADM

## 2018-05-11 RX ORDER — OLANZAPINE 10 MG/1
10 TABLET ORAL NIGHTLY
Status: DISCONTINUED | OUTPATIENT
Start: 2018-05-11 | End: 2018-05-12 | Stop reason: HOSPADM

## 2018-05-11 RX ADMIN — ARFORMOTEROL TARTRATE 15 MCG: 15 SOLUTION RESPIRATORY (INHALATION) at 09:31

## 2018-05-11 RX ADMIN — ASPIRIN 325 MG: 325 TABLET, DELAYED RELEASE ORAL at 09:42

## 2018-05-11 RX ADMIN — OXYBUTYNIN CHLORIDE 5 MG: 5 TABLET ORAL at 09:42

## 2018-05-11 RX ADMIN — VITAMIN D, TAB 1000IU (100/BT) 1000 UNITS: 25 TAB at 09:42

## 2018-05-11 RX ADMIN — OLANZAPINE 10 MG: 10 TABLET, FILM COATED ORAL at 21:02

## 2018-05-11 RX ADMIN — BARIUM SULFATE 50 ML: 400 SUSPENSION ORAL at 08:29

## 2018-05-11 RX ADMIN — CARBIDOPA AND LEVODOPA 1 TABLET: 25; 250 TABLET ORAL at 13:58

## 2018-05-11 RX ADMIN — BARIUM SULFATE 65 ML: 960 POWDER, FOR SUSPENSION ORAL at 08:29

## 2018-05-11 RX ADMIN — BARIUM SULFATE 8 ML: 980 POWDER, FOR SUSPENSION ORAL at 08:30

## 2018-05-11 RX ADMIN — PANTOPRAZOLE SODIUM 40 MG: 40 TABLET, DELAYED RELEASE ORAL at 09:42

## 2018-05-11 RX ADMIN — OXYBUTYNIN CHLORIDE 5 MG: 5 TABLET ORAL at 21:02

## 2018-05-11 RX ADMIN — CARBIDOPA AND LEVODOPA 1 TABLET: 25; 250 TABLET ORAL at 09:41

## 2018-05-11 RX ADMIN — DOCUSATE SODIUM 100 MG: 100 CAPSULE, LIQUID FILLED ORAL at 21:02

## 2018-05-11 RX ADMIN — ARFORMOTEROL TARTRATE 15 MCG: 15 SOLUTION RESPIRATORY (INHALATION) at 22:57

## 2018-05-11 RX ADMIN — CETIRIZINE HYDROCHLORIDE 10 MG: 10 TABLET, FILM COATED ORAL at 09:42

## 2018-05-11 RX ADMIN — DIVALPROEX SODIUM 500 MG: 500 TABLET, DELAYED RELEASE ORAL at 21:02

## 2018-05-11 RX ADMIN — DIVALPROEX SODIUM 500 MG: 500 TABLET, DELAYED RELEASE ORAL at 14:14

## 2018-05-11 RX ADMIN — ATORVASTATIN CALCIUM 20 MG: 20 TABLET, FILM COATED ORAL at 09:42

## 2018-05-11 RX ADMIN — PROPRANOLOL HYDROCHLORIDE 10 MG: 10 TABLET ORAL at 09:42

## 2018-05-11 RX ADMIN — MULTIPLE VITAMINS W/ MINERALS TAB 1 TABLET: TAB at 09:42

## 2018-05-11 RX ADMIN — DONEPEZIL HYDROCHLORIDE 20 MG: 10 TABLET, FILM COATED ORAL at 21:02

## 2018-05-11 RX ADMIN — DOCUSATE SODIUM 100 MG: 100 CAPSULE, LIQUID FILLED ORAL at 09:42

## 2018-05-11 RX ADMIN — CARBIDOPA AND LEVODOPA 1 TABLET: 25; 250 TABLET ORAL at 18:47

## 2018-05-11 RX ADMIN — NICOTINE 1 PATCH: 21 PATCH, EXTENDED RELEASE TRANSDERMAL at 23:00

## 2018-05-11 RX ADMIN — MONTELUKAST 10 MG: 10 TABLET, FILM COATED ORAL at 21:02

## 2018-05-11 RX ADMIN — FLUTICASONE PROPIONATE 1 SPRAY: 50 SPRAY, METERED NASAL at 09:42

## 2018-05-11 RX ADMIN — LEVOTHYROXINE SODIUM 200 MCG: 100 TABLET ORAL at 06:42

## 2018-05-11 RX ADMIN — HYDROCODONE BITARTRATE AND ACETAMINOPHEN 1 TABLET: 5; 325 TABLET ORAL at 21:02

## 2018-05-11 NOTE — PROGRESS NOTES
[facility]    Physicians Statement of Medical Necessity for Ambulance Transportation    It is medically necessary for:    Patient Name: Victor Manuel Issa    Insurance Information:  MEDICARE AND ANTHEM BLUE CROSS    To be transported by ambulance: [facility]    From (if nursing facility, specify level of care: skilled, retirement, etc): [facility]    To (specify level of care if nursing facility): [facility], [address] [place] [id number]      Date of Service:     For dialysis patients state date dialysis began:    Diagnosis: ACUTE CVA    Past Medical/Surgical History:  Past Medical History:   Diagnosis Date   • Arthritis    • Bipolar 1 disorder    • COPD (chronic obstructive pulmonary disease)    • Coronary artery disease    • Disease of thyroid gland    • Hyperlipidemia    • Myocardial infarction    • Stroke       Past Surgical History:   Procedure Laterality Date   • ABDOMINAL SURGERY     • CARDIAC SURGERY     • CHOLECYSTECTOMY     • CORONARY ARTERY BYPASS GRAFT      x2   • EYE SURGERY      cataracts   • HIP ENDOPROSTHESIS Right 5/5/2018    Procedure: RIGHT HIP HEMIARTHROPLASTY;  Surgeon: Winston Vallejo MD;  Location: [facility];  Service: Orthopedics   • MUSCLE BIOPSY Left 3/24/2016    Procedure: LT THIGH MUSCLE BIOPSY;  Surgeon: Fausto Mack MD;  Location: [facility];  Service:    • SKIN BIOPSY     • THYROID SURGERY     • TURP / TRANSURETHRAL INCISION / DRAINAGE PROSTATE     • VASCULAR SURGERY          Current Objective Medical Evidence(including physical exam finding to support reason for limitations):    Immobilization syndrome  CVA with paralysis  Unable to sit at 90 degree angle    Other: HEMIPARESIS, NOT ABLE TO SIT AT SIDE OF BED, NON-AMBULATORY.    Physician Signature:           (RN,NP,PA,CAN, Discharge Planner) Date/Time: 5/11/2018      Printed Name:    ______Vivien Ramsey  RN/CCP____________________________    [facility]   Phone: [phone] Phone: [phone] Phone: [phone]   Fax: [phone] Fax: [phone] Fax: [phone]

## 2018-05-11 NOTE — PLAN OF CARE
Problem: Patient Care Overview  Goal: Plan of Care Review  Outcome: Ongoing (interventions implemented as appropriate)   05/11/18 0519   Coping/Psychosocial   Plan of Care Reviewed With patient   Plan of Care Review   Progress improving   OTHER   Outcome Summary no falls; vital signs stable; pt and wife requested pt to not turn q2 hours for better rest; tolerating new diet; await video swallow; will continue to monitor       Problem: Fall Risk (Adult)  Goal: Absence of Fall  Outcome: Ongoing (interventions implemented as appropriate)      Problem: Pain, Acute (Adult)  Goal: Acceptable Pain Control/Comfort Level  Outcome: Ongoing (interventions implemented as appropriate)

## 2018-05-11 NOTE — PLAN OF CARE
Problem: Patient Care Overview  Goal: Plan of Care Review   05/11/18 1033   Coping/Psychosocial   Plan of Care Reviewed With patient   Plan of Care Review   Progress improving   OTHER   Outcome Summary Pt improving. No aspiration observed, but concern present for posterior penetration/aspiration d/t baseline shadowing. SLP recs upgrade to thins and regular. Will monitor tolerance, if aspiration suspected, please contact SLP. Will proceed with FEES if intolerance observed.

## 2018-05-11 NOTE — PLAN OF CARE
Problem: Patient Care Overview  Goal: Plan of Care Review  Outcome: Ongoing (interventions implemented as appropriate)   05/11/18 8743   Coping/Psychosocial   Plan of Care Reviewed With patient   Plan of Care Review   Progress improving   OTHER   Outcome Summary Mentally better, more alert, worked with Pt today.     Goal: Individualization and Mutuality  Outcome: Ongoing (interventions implemented as appropriate)      Problem: Fall Risk (Adult)  Goal: Absence of Fall  Outcome: Ongoing (interventions implemented as appropriate)      Problem: Fracture Orthopaedic (Adult)  Goal: Signs and Symptoms of Listed Potential Problems Will be Absent, Minimized or Managed (Fracture Orthopaedic)  Outcome: Ongoing (interventions implemented as appropriate)      Problem: Skin Injury Risk (Adult)  Goal: Skin Health and Integrity  Outcome: Ongoing (interventions implemented as appropriate)      Problem: Wound (Includes Pressure Injury) (Adult)  Goal: Signs and Symptoms of Listed Potential Problems Will be Absent, Minimized or Managed (Wound)  Outcome: Ongoing (interventions implemented as appropriate)      Problem: Pain, Acute (Adult)  Goal: Identify Related Risk Factors and Signs and Symptoms  Outcome: Ongoing (interventions implemented as appropriate)    Goal: Acceptable Pain Control/Comfort Level  Outcome: Ongoing (interventions implemented as appropriate)

## 2018-05-11 NOTE — PROGRESS NOTES
Continued Stay Note   [facility]     Patient Name: Victor Manuel Issa  MRN: [id number]  Today's Date: 5/11/2018    Admit Date: 5/3/2018          Discharge Plan     Row Name 05/11/18 1350       Plan    Plan [facility] [place] [place] [facility] at [facility]    Patient/Family in Agreement with Plan yes    Plan Comments Spoke with Jayna/[facility] ([phone]) and pt has been accepted and bed available tomorrow. Janya stated they would cover ambulance coverage. CCP called [facility] [phone] and per Anupama cost of run after medicare discount is $303.66 that [facility] would be responsible for. [facility] will need to fax letter to [facility] stating so including letter on offical letter head, signed by  with pts name stating would be paying for transport. Once [facility] recieves letter then offical transport can be confirmed. Spoke with Jayna and she will fax over letter to [phone]. Packet in chart [facility]. Updated Danielle/A Coordinator for Dr. Romero of [facility]. David AGOSTO/CCP              Discharge Codes    No documentation.           Vivien Ramsey, RN

## 2018-05-11 NOTE — PROGRESS NOTES
[facility]               [facility]     LOS: 8 days     Name: Victor Manuel Issa  Age: 72 y.o.  Sex: male  :  1945  MRN: [id number]         Primary Care Physician: CUONG Guadalupe    Diet Regular; Thin    Subjective    Wife would like patient back on Zyprexa and Depakote. discussed with wife disposition to rehab, resuming meds. Time: 35 minutes, greater than 50% spent in counseling and coordination of care.    Objective   Temp:  [98.5 °F (36.9 °C)-98.6 °F (37 °C)] 98.6 °F (37 °C)  Heart Rate:  [47-76] 62  Resp:  [16-18] 18  BP: (127-137)/(58-82) 127/58  SpO2:  [92 %-97 %] 97 %  on   ;   Device (Oxygen Therapy): room air  Body mass index is 26.11 kg/m².    Physical Exam   Constitutional: He is oriented to person, place, and time. No distress.   Cardiovascular: Normal rate and regular rhythm.    Pulmonary/Chest: Effort normal. No respiratory distress. He has no wheezes. He has no rhonchi.   Abdominal: Soft. There is no tenderness.   Musculoskeletal: He exhibits no edema.   Neurological: He is alert and oriented to person, place, and time.   Skin: Skin is warm and dry.     Reviewed medications and new clinical results    albuterol 2.5 mg Nebulization Daily   arformoterol 15 mcg Nebulization BID - RT   aspirin 325 mg Oral Daily   atorvastatin 20 mg Oral Daily   carbidopa-levodopa 1 tablet Oral TID With Meals   cetirizine 10 mg Oral Daily   cholecalciferol 1,000 Units Oral Daily   divalproex 500 mg Oral BID   docusate sodium 100 mg Oral BID   donepezil 20 mg Oral Nightly   fluticasone 1 spray Nasal Daily   levothyroxine 200 mcg Oral Q AM   montelukast 10 mg Oral Nightly   multivitamin with minerals 1 tablet Oral Daily   nicotine 1 patch Transdermal Q24H   OLANZapine 10 mg Oral Nightly   oxybutynin 5 mg Oral BID   pantoprazole 40 mg Oral QAM   propranolol 10 mg Oral Daily   vancomycin 15 mg/kg Intravenous Once       hold 1 each       Results from last 7 days  Lab Units  05/11/18  0448 05/10/18  0407 05/09/18  0402 05/08/18  0402 05/07/18  0346 05/06/18  0854   WBC 10*3/mm3  --   --  10.02 9.24  --  11.18*   HEMOGLOBIN g/dL 10.8* 10.8* 10.9* 11.4* 11.0* 12.0*   PLATELETS 10*3/mm3  --   --  176 155  --  138*       Results from last 7 days  Lab Units 05/09/18  0402 05/08/18  0402   SODIUM mmol/L 140 139   POTASSIUM mmol/L 3.7 3.7   CHLORIDE mmol/L 106 102   CO2 mmol/L 20.8* 21.4*   BUN mg/dL 17 14   CREATININE mg/dL 0.66* 0.67*   CALCIUM mg/dL 10.1 9.2   GLUCOSE mg/dL 100* 83     Lab Results   Component Value Date    ANIONGAP 13.2 05/09/2018     Estimated Creatinine Clearance: 97.3 mL/min (by C-G formula based on SCr of 0.66 mg/dL (L)).     Lab Results   Component Value Date    URINECX >100,000 CFU/mL Escherichia coli (A) 05/04/2018     Assessment/Plan   Active Hospital Problems (** Indicates Principal Problem)    Diagnosis Date Noted   • **Hip fracture requiring operative repair, right, closed, initial encounter [S72.001A] 05/03/2018   • Urinary tract infection without hematuria [N39.0] 05/06/2018   • Hypoxia [R09.02] 05/06/2018   • Hypothyroidism [E03.9] 10/01/2017   • Parkinson's disease [G20] 09/29/2017   • COPD (chronic obstructive pulmonary disease) [J44.9] 09/28/2017   • Coronary artery disease [I25.10] 09/28/2017   • History of CVA (cerebrovascular accident) [Z86.73] 09/28/2017      Resolved Hospital Problems    Diagnosis Date Noted Date Resolved   No resolved problems to display.     · s/p right hip hemiarthroplasty 5/5/18  · metabolic encephalopathy significantly improved, avoid sedating medications. wife would like him back on zyprexa and depakote (stopped couple days ago)  · UTI on ceftriaxone 5 days will stop  · LP culture no growth some tests pending  · dysphagia: FEES per SLP today  · CAD  · h/o CVA  · parkinson, dementia  · COPD  · discharge tomorrow rehab, CCP arranging long distance ambulance  · I discussed the patient's findings and my recommendations with patient,  family and nursing staff and CCP.    Parker Romero MD   05/11/18  1:01 PM

## 2018-05-11 NOTE — MBS/VFSS/FEES
Acute Care - Speech Language Pathology   Swallow Initial Evaluation  [facility]     Patient Name: Victor Manuel Issa  : 1945  MRN: [id number]  Today's Date: 2018  Onset of Illness/Injury or Date of Surgery: 18     Referring Physician: Robert      Admit Date: 5/3/2018    Visit Dx:     ICD-10-CM ICD-9-CM   1. Closed fracture of right hip (femor), initial encounter S72.001A 820.8   2. Hip fracture S72.009A 820.8   3. Difficulty walking R26.2 719.7     Patient Active Problem List   Diagnosis   • Thigh pain, musculoskeletal   • Left leg weakness   • History of CVA (cerebrovascular accident)   • COPD (chronic obstructive pulmonary disease)   • Coronary artery disease   • Cervical myelopathy   • Parkinson's disease   • Debility   • Cerebrovascular disease   • Abnormal TSH   • Hypothyroidism   • Hip fracture requiring operative repair, right, closed, initial encounter   • Urinary tract infection without hematuria   • Hypoxia     Past Medical History:   Diagnosis Date   • Arthritis    • Bipolar 1 disorder    • COPD (chronic obstructive pulmonary disease)    • Coronary artery disease    • Disease of thyroid gland    • Hyperlipidemia    • Myocardial infarction    • Stroke      Past Surgical History:   Procedure Laterality Date   • ABDOMINAL SURGERY     • CARDIAC SURGERY     • CHOLECYSTECTOMY     • CORONARY ARTERY BYPASS GRAFT      x2   • EYE SURGERY      cataracts   • HIP ENDOPROSTHESIS Right 2018    Procedure: RIGHT HIP HEMIARTHROPLASTY;  Surgeon: Winston Vallejo MD;  Location: [facility];  Service: Orthopedics   • MUSCLE BIOPSY Left 3/24/2016    Procedure: LT THIGH MUSCLE BIOPSY;  Surgeon: Fausto Mack MD;  Location: [facility];  Service:    • SKIN BIOPSY     • THYROID SURGERY     • TURP / TRANSURETHRAL INCISION / DRAINAGE PROSTATE     • VASCULAR SURGERY            SWALLOW EVALUATION (last 72 hours)      SLP Adult Swallow Evaluation     Row Name 18 0800 05/10/18 1100  05/09/18 0745          Document Type evaluation  -SH re-evaluation  -AW evaluation  -SH    Subjective Information no complaints  -SH no complaints  -AW no complaints   Pt oriented to name, place, year with cues, & monthw/out cue  -SH    Patient Observations alert;cooperative  -SH alert;cooperative;agree to therapy  -AW alert;cooperative;agree to therapy  -SH    Patient/Family Observations Strong cough prior to PO. Pt s/o allergies  -SH  -- Daughter and wife present for questions and education  -SH    Patient Effort good  -SH good  -AW good  -SH    Symptoms Noted During/After Treatment  -- none  -AW  --          Patient Profile Reviewed yes  -SH yes  -AW yes  -SH    Pertinent History Of Current Problem AMS s/p hip fracture. Hx of modified liquids with upgrade to thins with therapy.   -SH Hip fracture with repair 5/5, AMS/lethargy after surgery. Pt now alert.  -AW Hx of CVA in 2007. Hx of being on nectar liquids in 2016. Pt attended outpatient therapy and has been tolerating thins since that time. Pt with AMS after hip surgery. MRI negative for acute stroke. Pt with baseline R weakness and facial droop.  -SH    Current Method of Nutrition mechanical soft, no mixed consistencies;nectar/syrup-thick liquids  -SH NPO  -AW NPO  -SH    Prior Level of Function-Communication other (see comments)   PD per chart, carrying out conversation this date  -  -- WFL  -    Prior Level of Function-Swallowing no diet consistency restrictions  -SH no diet consistency restrictions  -AW no diet consistency restrictions  -SH    Plans/Goals Discussed with patient;agreed upon  -SH patient;spouse/S.O.;family;agreed upon  -AW patient;spouse/S.O.;family;agreed upon  -SH    Barriers to Rehab none identified  -SH none identified  -AW --   weakness  -SH    Patient's Goals for Discharge return to regular diet  -SH return to regular diet  -AW patient did not state  -SH    Family Goals for Discharge  -- patient able to return to regular diet  -AW  patient able to return to PO diet  -          Additional Documentation Pain Scale: Numbers Pre/Post-Treatment (Group)  -  -- Pain Scale: Numbers Pre/Post-Treatment (Group)  -          Pain Scale: Numbers, Pretreatment 0/10 - no pain  -SH 0/10 - no pain  -AW 0/10 - no pain  -SH    Pain Scale: Numbers, Post-Treatment 0/10 - no pain  -SH  -- 0/10 - no pain  -SH          Dentition Assessment  -- natural, present and adequate  -AW natural, present and adequate  -    Secretion Management  -- WNL/WFL  -AW problems swallowing secretions  -    Mucosal Quality  -- moist, healthy  -AW dry  -    Volitional Swallow  -- delayed  -AW delayed  -    Volitional Cough  -- non-productive  -AW weak  -          Oral Motor General Assessment  -- generalized oral motor weakness  -AW generalized oral motor weakness;oral labial or buccal impairment  -    Mandibular Impairment Detail, Cranial Nerve V (Trigeminal)  --  -- reduced strength on right;reduced facial sensation on right  -    Oral Labial or Buccal Impairment, Detail, Cranial Nerve VII (Facial):  --  -- right labial droop  -    Lingual Impairment, Detail. Cranial Nerves IX, XII (Glossopharyngeal and Hypoglossal)  --  -- bilaterally  -    Oral Motor, Comment  --  -- Dysarthria present, which is not present at baseline  -          Respiratory Support Currently in Use  -- room air  -AW  --    Eating/Swallowing Skills  -- fed by SLP  -  --    Positioning During Eating  -- upright in bed  -AW  --    Utensils Used  -- spoon;cup  -AW  --    Consistencies Trialed  -- soft textures;pureed;thin liquids;nectar/syrup-thick liquids  -AW  --    Pre SpO2 (%)  -- 94  -AW  --    Post SpO2 (%)  -- 95  -AW  --          Oral Prep Phase  -- WFL  -AW impaired  -SH    Oral Transit  -- impaired  -AW impaired  -SH    Oral Residue  -- WFL  -AW WFL  -SH    Pharyngeal Phase  -- suspected pharyngeal impairment  -AW suspected pharyngeal impairment  -    Clinical Swallow Evaluation  Summary  -- Pt much more alert today. Laryngeal elevation appeared adequate, however, initiation of swallow was delayed. No over s/s with any consistency, however, increased risk of aspiration noted with thin due to delay and premature spillage into phayrnx. Recommend PO diet and VFSS to assess pharyngeal phase.  -AW Oral care completed. Family present report occasional suctioning required. No s/s of asp with ice. Anterior loss and multiple swallows with thins via spoon followed with wet voice. Laryngeal elevation appeared incomplete at times. Wet voice as trials progressed with nectar via spoon despite improved swallow initiation, suspect pharyngeal residue. Pt cleared wet voice with constant cues. No s/s of aspiration initially with puree, wet voice noted on trial 5. Pt quick to fatigue. Pt not appropriate for PO at this time.   -          VFSS Summary Pt presents with mild oropharyngeal dysphagia characterized by swallow delay. Question of trace posterior penetration/aspiration at times, unable to r/o d/t poor baseline view from shoulder. No penetration/aspiration with honey via cup. No penetration/aspiration with nectar via cup. Pt cleared mild oral residue without cues. Intermittent premature spillage to pyriforms with nectar via straw without penetration. Pt cleared mild oral residue without cues. Consistent premature spillage to pyriforms with thins via cup and straw without penetration/aspiration. Mild residue intermittently present at valleculae/pyriforms post swallow, posterior aspiration risk d/t this. Swallow initiation WFLs with puree, pt cleared oral residue without cues. Mild valleculae/pyriform residue with puree. Spillage to valleculae before the swallow with strained mech soft without penetration. Spillage to pyriforms before the swallow with mixed without penetration. No penetration with liquid wash thins via straw with regular. Adequate bolus preparation and manipulation with solids. Pt with  coughing following assessment without observed penetration/aspiration.     -SH  --  --          Summary Statement Pt presents with mild oropharyngeal dysphagia characterized by swallow delay. No penetration/aspiration with honey via cup. No penetration/aspiration with nectar via cup. Pt cleared mild oral residue without cues. Intermittent premature spillage to pyriforms with nectar via straw without penetration. Pt cleared mild oral residue without cues. Consistent premature spillage to pyriforms with thins via cup and straw without penetration/aspiration. Mild residue intermittently present at valleculae/pyriforms post swallow, posterior aspiration risk d/t this. Swallow initiation WFLs with puree, pt cleared oral residue without cues. Spillage to valleculae before the swallow with strained mech soft without penetration. Spillage to pyriforms before the swallow with mixed without penetration. No penetration with liquid wash thins via straw with regular. Adequate bolus preparation and manipulation with solids.     -SH  --  --          SLP Swallowing Diagnosis mild;oral dysfunction;pharyngeal dysfunction  -SH mild;pharyngeal dysfunction  -AW moderate;oral dysfunction;suspected pharyngeal dysfunction  -SH    Functional Impact risk of aspiration/pneumonia  -SH risk of aspiration/pneumonia  -AW risk of malnutrition;risk of aspiration/pneumonia;risk of dehydration  -SH    Rehab Potential/Prognosis, Swallowing good, to achieve stated therapy goals  -SH good, to achieve stated therapy goals  -AW re-evaluate goals as necessary  -    Criteria for Skilled Therapeutic Interventions Met demonstrates skilled criteria  -SH demonstrates skilled criteria  -AW demonstrates skilled criteria  -SH          Therapy Frequency (Swallow) PRN  -SH PRN  -AW PRN  -SH    Predicted Duration Therapy Intervention (Days) until discharge  -SH until discharge  -AW until discharge  -SH    SLP Diet Recommendation regular textures;thin liquids  -SH  mechanical soft with no mixed consistencies;nectar thick liquids;water between meals after oral care, with supervision  -AW NPO;ice chips between meals after oral care, with supervision;other (see comments)   no more than 1-3 ice chips after oral care  -    Recommended Diagnostics reassess via clinical swallow evaluation  - VFSS (MBS)  -AW reassess via clinical swallow evaluation  -    Recommended Precautions and Strategies upright posture during/after eating;multiple swallows per bite of food;multiple swallows per sip of liquid  - upright posture during/after eating;small bites of food and sips of liquid  -AW  --    SLP Rec. for Method of Medication Administration meds whole;with pudding or applesauce  - meds whole;with thick liquids;with pudding or applesauce;as tolerated  - meds via alternate route  -    Monitor for Signs of Aspiration yes;notify SLP if any concerns  -SH yes;notify SLP if any concerns;cough;elevated WBC count;gurgly voice;throat clearing;fever;upper respiratory;pneumonia;right lower lobe infiltrates  -AW yes;notify SLP if any concerns  -    Anticipated Dischage Disposition skilled nursing facility  - skilled nursing facility  -AW  --          Oral Nutrition/Hydration Goal Selection (SLP) oral nutrition/hydration, SLP goal 1  -SH  -- oral nutrition/hydration, SLP goal 1  -          Oral Nutrition/Hydration Goal 1, SLP Pt will tolerate PO without overt s/s of asp.   -  -- Pt will tolerate PO without overt s/s of asp.   -    Time Frame (Oral Nutrition/Hydration Goal 1, SLP) by discharge  -  --  --    Barriers (Oral Nutrition/Hydration Goal 1, SLP) Previous deficit  -  --  --      User Key  (r) = Recorded By, (t) = Taken By, (c) = Cosigned By    Initials Name Effective Dates    AW Violet Lora, MS CCC-SLP 04/13/15 -      Chelsi Luciano, MS CCC-SLP 03/07/18 -         EDUCATION  The patient has been educated in the following areas:   Dysphagia (Swallowing Impairment).    SLP  Recommendation and Plan  SLP Swallowing Diagnosis: mild, oral dysfunction, pharyngeal dysfunction  SLP Diet Recommendation: regular textures, thin liquids  Recommended Precautions and Strategies: upright posture during/after eating, multiple swallows per bite of food, multiple swallows per sip of liquid     Monitor for Signs of Aspiration: yes, notify SLP if any concerns  Recommended Diagnostics: reassess via clinical swallow evaluation  Criteria for Skilled Therapeutic Interventions Met: demonstrates skilled criteria  Anticipated Dischage Disposition: skilled nursing facility  Rehab Potential/Prognosis, Swallowing: good, to achieve stated therapy goals  Therapy Frequency (Swallow): PRN  Predicted Duration Therapy Intervention (Days): until discharge       Plan of Care Reviewed With: patient  Plan of Care Review  Plan of Care Reviewed With: patient  Progress: improving  Outcome Summary: Pt improving. No aspiration observed, but concern present for posterior penetration/aspiration d/t baseline shadowing. SLP recs upgrade to thins and regular. Will monitor tolerance, if concern present with tolerance, will proceed with FEES.           SLP GOALS     Row Name 05/11/18 0800 05/09/18 0745          Oral Nutrition/Hydration Goal 1 (SLP)    Oral Nutrition/Hydration Goal 1, SLP Pt will tolerate PO without overt s/s of asp.   -SH Pt will tolerate PO without overt s/s of asp.   -SH     Time Frame (Oral Nutrition/Hydration Goal 1, SLP) by discharge  -SH  --     Barriers (Oral Nutrition/Hydration Goal 1, SLP) Previous deficit  -SH  --       User Key  (r) = Recorded By, (t) = Taken By, (c) = Cosigned By    Initials Name Provider Type     Chelsi Luciano MS CCC-SLP Speech and Language Pathologist             SLP Outcome Measures (last 72 hours)      SLP Outcome Measures     Row Name 05/11/18 1100 05/10/18 1100 05/09/18 0900       SLP Outcome Measures    Outcome Measure Used? Adult NOMS  -SH Adult NOMS  -AW Adult NOMS  -SH       FCM  Scores    FCM Chosen Swallowing  - Swallowing  -AW Swallowing  -    Swallowing FCM Score 6  -SH 4  -AW 1  -      User Key  (r) = Recorded By, (t) = Taken By, (c) = Cosigned By    Initials Name Effective Dates    KATHERINE Lora, MS OSUNA-SLP 04/13/15 -      Chelsi Luciano MS CCC-HUBER 03/07/18 -            Time Calculation:         Time Calculation- SLP     Row Name 05/11/18 1105             Time Calculation- SLP    SLP Start Time 0800  -      SLP Received On 05/11/18  -        User Key  (r) = Recorded By, (t) = Taken By, (c) = Cosigned By    Initials Name Provider Type     Chelsi Luciano, MS CCC-SLP Speech and Language Pathologist          Therapy Charges for Today     Code Description Service Date Service Provider Modifiers Qty    [id number] HC ST MOTION FLUORO EVAL SWALLOW 5 5/11/2018 Chelsi Luciano MS CCC-SLP  1               Chelsi Luciano MS CCC-HUBER  5/11/2018

## 2018-05-11 NOTE — PLAN OF CARE
Problem: Patient Care Overview  Goal: Plan of Care Review  Outcome: Ongoing (interventions implemented as appropriate)   05/11/18 3113   Coping/Psychosocial   Plan of Care Reviewed With patient   Plan of Care Review   Progress improving   OTHER   Outcome Summary Pt increased with bed mobility and sitting EOB tolerance and balance. Pt able to stand with Max A x2 with RWX for <30 sec and much more alert and oriented today

## 2018-05-11 NOTE — THERAPY TREATMENT NOTE
Acute Care - Physical Therapy Treatment Note   [facility]     Patient Name: Victor Manuel Issa  : 1945  MRN: [id number]  Today's Date: 2018  Onset of Illness/Injury or Date of Surgery: 18  Date of Referral to PT: 18  Referring Physician: Robert    Admit Date: 5/3/2018    Visit Dx:    ICD-10-CM ICD-9-CM   1. Closed fracture of right hip (femor), initial encounter S72.001A 820.8   2. Hip fracture S72.009A 820.8   3. Difficulty walking R26.2 719.7     Patient Active Problem List   Diagnosis   • Thigh pain, musculoskeletal   • Left leg weakness   • History of CVA (cerebrovascular accident)   • COPD (chronic obstructive pulmonary disease)   • Coronary artery disease   • Cervical myelopathy   • Parkinson's disease   • Debility   • Cerebrovascular disease   • Abnormal TSH   • Hypothyroidism   • Hip fracture requiring operative repair, right, closed, initial encounter   • Urinary tract infection without hematuria   • Hypoxia       Therapy Treatment          Rehabilitation Treatment Summary     Row Name 18 1500             Treatment Time/Intention    Discipline physical therapy assistant  -CW      Document Type therapy note (daily note)  -CW      Subjective Information complains of;weakness;fatigue;pain  -CW      Mode of Treatment physical therapy  -CW      Therapy Frequency (PT Clinical Impression) daily  -CW      Patient Effort good  -CW      Existing Precautions/Restrictions fall;right;hip, posterior  -CW      Recorded by [CW] Leif Doherty, LADI 18 1530 18 1530      Row Name 18 1500             Vital Signs    O2 Delivery Pre Treatment room air  -CW      Recorded by [CW] Leif Doherty PTA 18 [id number] 153      Row Name 18 1500             Cognitive Assessment/Intervention- PT/OT    Orientation Status (Cognition) oriented x 3  -CW      Follows Commands (Cognition) follows one step commands  -CW      Personal Safety Interventions fall prevention  program maintained;gait belt;muscle strengthening facilitated;nonskid shoes/slippers when out of bed  -CW      Recorded by [CW] Leif Doherty, PTA 05/11/18 1530 05/11/18 1530      Row Name 05/11/18 1500             Bed Mobility Assessment/Treatment    Bed Mobility Assessment/Treatment supine-sit-supine  -CW      Supine-Sit-Supine [place] (Bed Mobility) moderate assist (50% patient effort)  -CW      Bed Mobility, Safety Issues decreased use of arms for pushing/pulling;decreased use of legs for bridging/pushing;other (see comments)  -CW      Assistive Device (Bed Mobility) draw sheet  -CW      Recorded by [CW] Leif Doherty, PTA 05/11/18 1530 05/11/18 1530      Row Name 05/11/18 1500             Transfer Assessment/Treatment    Transfer Assessment/Treatment sit-stand transfer;stand-sit transfer  -CW      Sit-Stand [place] (Transfers) maximum assist (25% patient effort);2 person assist  -CW      Stand-Sit [place] (Transfers) maximum assist (25% patient effort);2 person assist  -CW      Recorded by [CW] Leif Doherty, PTA 05/11/18 1530 05/11/18 1530      Row Name 05/11/18 1500             Sit-Stand Transfer    Assistive Device (Sit-Stand Transfers) walker, front-wheeled  -CW      Recorded by [CW] Leif Doherty, PTA 05/11/18 1530 05/11/18 1530      Row Name 05/11/18 1500             Stand-Sit Transfer    Assistive Device (Stand-Sit Transfers) walker, front-wheeled  -CW      Recorded by [CW] Leif Doherty, PTA 05/11/18 1530 05/11/18 1530      Row Name 05/11/18 1500             Gait/Stairs Assessment/Training    [place] Level (Gait) not tested  -CW      Recorded by [CW] Leif Doherty, PTA 05/11/18 1530 05/11/18 1530      Row Name 05/11/18 1500             Therapeutic Exercise    Lower Extremity (Therapeutic Exercise) gluteal sets;LAQ (long arc quad), bilateral;marching while seated  -CW      Lower Extremity Range of Motion (Therapeutic Exercise) ankle dorsiflexion/plantar  flexion, bilateral  -CW      Exercise Type (Therapeutic Exercise) AROM (active range of motion)  -CW      Position (Therapeutic Exercise) seated  -CW      Sets/Reps (Therapeutic Exercise) 10  -CW      Recorded by [CW] Leif Doherty, PTA 05/11/18 1530 05/11/18 1530      Row Name 05/11/18 1500             Positioning and Restraints    Pre-Treatment Position in bed  -CW      Post Treatment Position bed  -CW      In Bed notified nsg;fowlers;call light within reach;encouraged to call for assist;with family/caregiver  -CW      Recorded by [CW] Leif Doherty, PTA 05/11/18 1530 05/11/18 1530      Row Name 05/11/18 1500             Pain Assessment    Additional Documentation Pain Scale: Numbers Pre/Post-Treatment (Group)  -CW      Recorded by [CW] Leif Doherty, PTA 05/11/18 1530 05/11/18 1530      Row Name 05/11/18 1500             Pain Scale: Numbers Pre/Post-Treatment    Pain Scale: Numbers, Pretreatment 6/10  -CW      Pain Scale: Numbers, Post-Treatment 6/10  -CW      Pain Location - Side Right  -CW      Pain Location hip  -CW      Recorded by [CW] Leif Doherty, PTA 05/11/18 1530 05/11/18 1530      Row Name                Wound 05/05/18 0920 Right hip incision    Wound - Properties Group Date first assessed: 05/05/18 [EG] Time first assessed: 0920 [EG] Side: Right [EG] Location: hip [EG] Type: incision [EG] Recorded by:  [EG] Kathy Rush RN 05/05/18 0920 05/05/18 0920    Row Name 05/11/18 1500             Outcome Summary/Treatment Plan (PT)    Anticipated Discharge Disposition (PT) skilled nursing facility (SNF)  -CW      Recorded by [CW] Leif Doherty, PTA 05/11/18 1530 05/11/18 1530        User Key  (r) = Recorded By, (t) = Taken By, (c) = Cosigned By    Initials Name Effective Dates Discipline    CW Leif Doherty, LADI 03/07/18 -  PT    EG Kathy Rush RN 07/10/17 -  Nurse          Wound 05/05/18 0920 Right hip incision (Active)   Dressing Appearance dry;intact 5/11/2018  2:00  PM   Closure HARDIK 5/11/2018 12:44 AM   Base dressing in place, unable to visualize 5/11/2018 12:44 AM   Periwound Temperature warm 5/11/2018 12:44 AM   Periwound Skin Turgor soft 5/11/2018 12:44 AM   Drainage Characteristics/Odor serosanguineous 5/11/2018 12:44 AM   Drainage Amount scant 5/11/2018 12:44 AM             Physical Therapy Education     Title: PT OT SLP Therapies (Done)     Topic: Physical Therapy (Done)     Point: Mobility training (Done)    Learning Progress Summary     Learner Status Readiness Method Response Comment Documented by    Patient Done Acceptance TB,E DU,VU  CW 05/11/18 1530     Active Acceptance D NR   05/10/18 1432     Active Acceptance D NR   05/09/18 1041     Done Acceptance E,TB,D VU,NR  SV 05/06/18 1026     Done Acceptance E,TB VU,NR  ST 05/05/18 1552    Family Done Acceptance TB,E DU,VU   05/11/18 1530     Active Acceptance D NR   05/09/18 1041     Done Acceptance E,TB,D VU,NR  SV 05/06/18 1026     Done Acceptance E,TB VU,NR  ST 05/05/18 1552          Point: Home exercise program (Done)    Learning Progress Summary     Learner Status Readiness Method Response Comment Documented by    Patient Done Acceptance TB,E DU,VU   05/11/18 1530     Active Acceptance D NR   05/10/18 1432     Active Acceptance D NR  CW 05/09/18 1041     Done Acceptance E,TB,D VU,NR  SV 05/06/18 1026     Done Acceptance E,TB VU,NR  ST 05/05/18 1552    Family Done Acceptance TB,E DU,VU  CW 05/11/18 1530     Active Acceptance D NR  CW 05/09/18 1041     Done Acceptance E,TB,D VU,NR  SV 05/06/18 1026     Done Acceptance E,TB VU,NR  ST 05/05/18 1552          Point: Body mechanics (Done)    Learning Progress Summary     Learner Status Readiness Method Response Comment Documented by    Patient Done Acceptance TB,E DU,VU   05/11/18 1530     Active Acceptance D NR   05/10/18 1432     Active Acceptance D NR   05/09/18 1041     Done Acceptance E,TB,D VU,NR  SV 05/06/18 1026     Done Acceptance E,TB VU,NR  ST  05/05/18 1552    Family Done Acceptance TB,E DU,VU  CW 05/11/18 1530     Active Acceptance D NR  CW 05/09/18 1041     Done Acceptance E,TB,D VU,NR   05/06/18 1026     Done Acceptance E,TB VU,NR  ST 05/05/18 1552          Point: Precautions (Done)    Learning Progress Summary     Learner Status Readiness Method Response Comment Documented by    Patient Done Acceptance TB,E DU,VU   05/11/18 1530     Active Acceptance D NR  CW 05/10/18 1432     Active Acceptance D NR   05/09/18 1041     Done Acceptance E,TB,D VU,NR   05/06/18 1026     Done Acceptance E,TB VU,NR  ST 05/05/18 1552    Family Done Acceptance TB,E DU,VU   05/11/18 1530     Active Acceptance D NR  CW 05/09/18 1041     Done Acceptance E,TB,D VU,NR   05/06/18 1026     Done Acceptance E,TB VU,NR  ST 05/05/18 1552                      User Key     Initials Effective Dates Name Provider Type Discipline     04/03/18 -  Mariluz Zamora, PT Physical Therapist PT     03/07/18 -  Leif Doherty PTA Physical Therapy Assistant PT     10/13/17 -  Sana Acuña RN Registered Nurse Nurse                    PT Recommendation and Plan  Anticipated Discharge Disposition (PT): skilled nursing facility (SNF)  Therapy Frequency (PT Clinical Impression): daily  Outcome Summary/Treatment Plan (PT)  Anticipated Discharge Disposition (PT): skilled nursing facility (SNF)  Plan of Care Reviewed With: patient  Progress: improving  Outcome Summary: Pt increased with bed mobility and sitting EOB tolerance and balance.  Pt able to stand with Max A x2 with RWX for <30 sec and much more alert and oriented today          Outcome Measures     Row Name 05/11/18 1500 05/10/18 1400 05/09/18 1000       How much help from another person do you currently need...    Turning from your back to your side while in flat bed without using bedrails? 2  -CW 2  -CW 1  -CW    Moving from lying on back to sitting on the side of a flat bed without bedrails? 2  -CW 2  -CW 1  -CW     Moving to and from a bed to a chair (including a wheelchair)? 2  -CW 1  -CW 1  -CW    Standing up from a chair using your arms (e.g., wheelchair, bedside chair)? 2  -CW 1  -CW 1  -CW    Climbing 3-5 steps with a railing? 1  -CW 1  -CW 1  -CW    To walk in hospital room? 1  -CW 1  -CW 1  -CW    AM-PAC 6 Clicks Score 10  -CW 8  -CW 6  -CW       Functional Assessment    Outcome Measure Options AM-PAC 6 Clicks Basic Mobility (PT)  -CW AM-PAC 6 Clicks Basic Mobility (PT)  -CW AM-PAC 6 Clicks Basic Mobility (PT)  -CW      User Key  (r) = Recorded By, (t) = Taken By, (c) = Cosigned By    Initials Name Provider Type    GRIFFIN Doherty PTA Physical Therapy Assistant           Time Calculation:         PT Charges     Row Name 05/11/18 1532             Time Calculation    Start Time 1505  -CW      Stop Time 1532  -CW      Time Calculation (min) 27 min  -CW      PT Received On 05/11/18  -CW      PT - Next Appointment 05/12/18  -CW        User Key  (r) = Recorded By, (t) = Taken By, (c) = Cosigned By    Initials Name Provider Type    GRIFFIN Doherty PTA Physical Therapy Assistant          Therapy Charges for Today     Code Description Service Date Service Provider Modifiers Qty    34224869875 HC PT THER PROC EA 15 MIN 5/10/2018 Leif Doherty PTA GP 1    75660914315 HC PT THER PROC EA 15 MIN 5/11/2018 Leif Doherty PTA GP 2    39189527521 HC PT THER SUPP EA 15 MIN 5/11/2018 Leif Doherty PTA GP 2          PT G-Codes  Outcome Measure Options: AM-PAC 6 Clicks Basic Mobility (PT)    Leif Doherty PTA  5/11/2018

## 2018-05-12 VITALS
SYSTOLIC BLOOD PRESSURE: 122 MMHG | TEMPERATURE: 98.4 F | BODY MASS INDEX: 25.43 KG/M2 | OXYGEN SATURATION: 97 % | RESPIRATION RATE: 18 BRPM | WEIGHT: 181.66 LBS | DIASTOLIC BLOOD PRESSURE: 60 MMHG | HEART RATE: 41 BPM | HEIGHT: 71 IN

## 2018-05-12 PROBLEM — R00.1 SINUS BRADYCARDIA: Status: ACTIVE | Noted: 2018-05-12

## 2018-05-12 LAB
EV RNA SPEC QL NAA+PROBE: NEGATIVE
HCT VFR BLD AUTO: 34.2 % (ref 40.4–52.2)
HGB BLD-MCNC: 11 G/DL (ref 13.7–17.6)

## 2018-05-12 PROCEDURE — 93005 ELECTROCARDIOGRAM TRACING: CPT | Performed by: HOSPITALIST

## 2018-05-12 PROCEDURE — 85018 HEMOGLOBIN: CPT | Performed by: ORTHOPAEDIC SURGERY

## 2018-05-12 PROCEDURE — 93010 ELECTROCARDIOGRAM REPORT: CPT | Performed by: INTERNAL MEDICINE

## 2018-05-12 PROCEDURE — 85014 HEMATOCRIT: CPT | Performed by: ORTHOPAEDIC SURGERY

## 2018-05-12 RX ORDER — ALBUTEROL SULFATE 2.5 MG/3ML
2.5 SOLUTION RESPIRATORY (INHALATION) EVERY 6 HOURS PRN
Refills: 12
Start: 2018-05-12

## 2018-05-12 RX ADMIN — ATORVASTATIN CALCIUM 20 MG: 20 TABLET, FILM COATED ORAL at 09:05

## 2018-05-12 RX ADMIN — CARBIDOPA AND LEVODOPA 1 TABLET: 25; 250 TABLET ORAL at 12:38

## 2018-05-12 RX ADMIN — PANTOPRAZOLE SODIUM 40 MG: 40 TABLET, DELAYED RELEASE ORAL at 06:27

## 2018-05-12 RX ADMIN — LEVOTHYROXINE SODIUM 200 MCG: 100 TABLET ORAL at 06:27

## 2018-05-12 RX ADMIN — OXYBUTYNIN CHLORIDE 5 MG: 5 TABLET ORAL at 09:05

## 2018-05-12 RX ADMIN — ASPIRIN 325 MG: 325 TABLET, DELAYED RELEASE ORAL at 09:05

## 2018-05-12 RX ADMIN — VITAMIN D, TAB 1000IU (100/BT) 1000 UNITS: 25 TAB at 09:05

## 2018-05-12 RX ADMIN — MULTIPLE VITAMINS W/ MINERALS TAB 1 TABLET: TAB at 09:05

## 2018-05-12 RX ADMIN — ARFORMOTEROL TARTRATE 15 MCG: 15 SOLUTION RESPIRATORY (INHALATION) at 08:18

## 2018-05-12 RX ADMIN — PROPRANOLOL HYDROCHLORIDE 10 MG: 10 TABLET ORAL at 09:05

## 2018-05-12 RX ADMIN — FLUTICASONE PROPIONATE 1 SPRAY: 50 SPRAY, METERED NASAL at 09:05

## 2018-05-12 RX ADMIN — CARBIDOPA AND LEVODOPA 1 TABLET: 25; 250 TABLET ORAL at 09:05

## 2018-05-12 RX ADMIN — DIVALPROEX SODIUM 500 MG: 500 TABLET, DELAYED RELEASE ORAL at 09:05

## 2018-05-12 RX ADMIN — CETIRIZINE HYDROCHLORIDE 10 MG: 10 TABLET, FILM COATED ORAL at 09:05

## 2018-05-12 RX ADMIN — HYDROCODONE BITARTRATE AND ACETAMINOPHEN 2 TABLET: 5; 325 TABLET ORAL at 12:38

## 2018-05-12 NOTE — DISCHARGE SUMMARY
PHYSICIAN DISCHARGE SUMMARY                                                                        [facility]    Patient Identification:  Name: Victor Manuel Issa  Age: 72 y.o.  Sex: male  :  1945  MRN: [id number]  Primary Care Physician: CUONG Guadalupe    Admit date: 5/3/2018  Discharge date and time: 2018     Discharged Condition: good    Discharge Diagnoses:Principal Problem:    Hip fracture requiring operative repair, right, closed, initial encounter  Active Problems:    History of CVA (cerebrovascular accident)    COPD (chronic obstructive pulmonary disease)    Coronary artery disease    Parkinson's disease    Hypothyroidism    Urinary tract infection without hematuria    Hypoxia    Sinus bradycardia         Hospital Course:  Via Parker Romero MD 2018  72-year-old male presented to the hospital after falling he was moving a ceramic pot and sustained a right femoral neck fracture. He underwent right hip hemiarthroplasty on 18.    Postoperatively he had persistent somnolence. MR imaging showed no acute infarct. He had a lumbar puncture due to a fever, cultures are negative and other tests from LP are pending at this time. His somnolence was felt likely due to Ativan that he received for some confusion and trying to get out of bed. He does have a history of dementia and Parkinson's. Patient's wife states that he does have similar reaction postoperatively. Further Ativan doses were held and his mental status significantly improved over the last couple of days and that is confirmed by patients wife at bedside.    Patient did have urinary tract infection treated with 5 days of ceftriaxone. Culture grew Escherichia coli susceptible to some antibiotics including ceftriaxone. This may be contributed to his confusion in addition to being postoperative, hospitalize, etc.    He has a history of bipolar disorder and  is on Zyprexa and Depakote and that was held for a couple of days when he was somnolent but is being resumed today.    Addenedum 05/12/2018:  Vital signs stable with exception of sinus bradycardia in the morning.  It is noted that he takes 1 dose of short acting Inderal each morning and sometimes can become pretty significantly bradycardic after this though asymptomatic.  His wife is at bedside and states that he takes this for tremors.   She is not sure that it's making much of an impact however.  I would recommend that this be discontinued.  He is 7 days postop and it appears his Lovenox has been discontinued.  I'll continue on with aspirin at this point.     Consults:     Consults     Date and Time Order Name Status Description    5/4/2018 0416 Inpatient Cardiology Consult      5/4/2018 0129 Inpatient Orthopedic Surgery Consult      5/3/2018 1904 Ortho (on-call MD unless specified) Completed     5/3/2018 1904 [facility] (on-call MD unless specified) Completed             Discharge Exam:  Physical Exam  Afebrile vital signs stable exception of a heart rate approximately 50.  Lungs clear to auscultation with good air movement.  Heart with mild bradycardia.  Extremities with no clubbing cyanosis or edema.  Alert conversant cooperative and pleasant.         Disposition:  Rehab    Patient Instructions:    Victor Manuel Issa   Home Medication Instructions ANN:[id number]    Printed on:05/12/18 0951   Medication Information                      acetaminophen (TYLENOL) 325 MG tablet  Take 2 tablets by mouth Every 4 (Four) Hours As Needed for Mild Pain , Headache or Fever.             albuterol (PROVENTIL) (2.5 MG/3ML) 0.083% nebulizer solution  Take 2.5 mg by nebulization Every 6 (Six) Hours As Needed for Wheezing.             arformoterol (BROVANA) 15 MCG/2ML nebulizer solution  Take 15 mcg by nebulization 2 (Two) Times a Day.             aspirin  MG EC tablet  Take 1 tablet by mouth Daily.             busPIRone  (BUSPAR) 5 MG tablet  Take 1 tablet by mouth 3 (Three) Times a Day As Needed (anxiety).             carbidopa-levodopa (SINEMET)  MG per tablet  Take 1 tablet by mouth 3 (Three) Times a Day With Meals.             cholecalciferol (VITAMIN D3) 1000 UNITS tablet  Take 1,000 Units by mouth daily.             ciclopirox (LOPROX) 0.77 % gel  Apply 1 application topically Daily. Apply to the nails             diclofenac (VOLTAREN) 1 % gel gel  Apply 4 g topically Daily As Needed (apply to the knees).             divalproex (DEPAKOTE) 500 MG DR tablet  Take 500 mg by mouth 2 (Two) Times a Day.             docusate sodium 100 MG capsule  Take 100 mg by mouth 2 (Two) Times a Day.             donepezil (ARICEPT) 10 MG tablet  Take 20 mg by mouth Every Night.             esomeprazole (nexIUM) 40 MG capsule  Take 40 mg by mouth Daily As Needed.             fexofenadine (ALLEGRA) 180 MG tablet  Take 180 mg by mouth daily.             fluticasone (FLONASE) 50 MCG/ACT nasal spray  1 spray into each nostril Daily.             levothyroxine (SYNTHROID, LEVOTHROID) 200 MCG tablet  Take 200 mcg by mouth Daily.             MEGARED OMEGA-3 KRILL  MG capsule  Take 1 capsule by mouth Daily.             montelukast (SINGULAIR) 10 MG tablet  Take 10 mg by mouth Every Night.             Multiple Vitamins-Minerals (MULTIVITAMIN WITH MINERALS) tablet tablet  Take 1 tablet by mouth Daily.             OLANZapine (ZYPREXA) 10 MG tablet  Take 10 mg by mouth every night.             oxybutynin (DITROPAN) 5 MG tablet  Take 5 mg by mouth 2 (Two) Times a Day.             promethazine (PHENERGAN) 25 MG tablet  Take 25 mg by mouth Every 6 (Six) Hours As Needed for Nausea or Vomiting.             simvastatin (ZOCOR) 40 MG tablet  Take 40 mg by mouth Every Night.               Diet Instructions     Diet: Regular       Discharge Diet:  Regular        No future appointments.  Additional Instructions for the Follow-ups that You Need to Schedule      Discharge Follow-up with PCP    As directed      Follow Up Details:  1 week         Discharge Follow-up with Specialty: Orthopedic Surgery.    As directed      Specialty:  Orthopedic Surgery.    Follow Up Details:  As directed.         Discharge Follow-up with Specialty: Orthopedics; 2 Weeks    As directed      Specialty:  Orthopedics    Follow Up:  2 Weeks    Follow Up Details:  Return to the office to see Dr. JEAN Vallejo           Follow-up Information     Shiraleilani Pierce, APRN .    Specialty:  Family Medicine  Why:  1 week  Contact information:  [address]  [facility] [address]  [place] [id number]  [phone]                 Discharge Order     Start     Ordered    05/12/18 0943  Discharge patient  Once     Expected Discharge Date:  05/12/18    Discharge Disposition:  Rehab Facility or Unit (DC - External)    Physician of Record:  JUANITO BAE [[id number]]    Physician of Record selection review needed?:  No       Question Answer Comment   Physician of Record JUANITO BAE    Physician of Record selection review needed? No        05/12/18 0950            Total time spent discharging patient including evaluation,post hospitalization follow up,  medication and post hospitalization instructions and education total time exceeds 30 minutes.    Signed:  Jaxon Gustafson MD  5/12/2018  9:51 AM    EMR Dragon/Transcription disclaimer:   Much of this encounter note is an electronic transcription/translation of spoken language to printed text. The electronic translation of spoken language may permit erroneous, or at times, nonsensical words or phrases to be inadvertently transcribed; Although I have reviewed the note for such errors, some may still exist.

## 2018-05-12 NOTE — SIGNIFICANT NOTE
05/12/18 0959   PT Discharge Summary   Reason for Discharge Discharge from facility   Discharge Destination SNF

## 2018-05-12 NOTE — PLAN OF CARE
Problem: Patient Care Overview  Goal: Plan of Care Review  Outcome: Ongoing (interventions implemented as appropriate)   05/12/18 0541   Coping/Psychosocial   Plan of Care Reviewed With patient   Plan of Care Review   Progress improving   OTHER   Outcome Summary HR running tanvir in the 40's-50's. Pain medication given as needed per MD order. Plan is for possible d/c today. Patient and spouse requested for the patient to be turned q3h instead of q2h. Will continue to monitor.        Problem: Fall Risk (Adult)  Goal: Absence of Fall  Outcome: Ongoing (interventions implemented as appropriate)      Problem: Skin Injury Risk (Adult)  Goal: Skin Health and Integrity  Outcome: Ongoing (interventions implemented as appropriate)      Problem: Wound (Includes Pressure Injury) (Adult)  Goal: Signs and Symptoms of Listed Potential Problems Will be Absent, Minimized or Managed (Wound)  Outcome: Ongoing (interventions implemented as appropriate)      Problem: Pain, Acute (Adult)  Goal: Acceptable Pain Control/Comfort Level  Outcome: Ongoing (interventions implemented as appropriate)

## 2018-05-13 LAB
BACTERIA SPEC AEROBE CULT: NORMAL
BACTERIA SPEC AEROBE CULT: NORMAL
CMV DNA SPEC QL NAA+PROBE: NEGATIVE
EBV DNA SPEC QL NAA+PROBE: NEGATIVE

## 2018-05-15 NOTE — PROGRESS NOTES
Case Management Discharge Note    Final Note: [facility] joel rn/ccp    Destination     Service Request Status Selected Specialties Address Phone Number Fax Number    [facility] Accepted N/A [address] [facility] [address] [phone]      Durable Medical Equipment     No service coordination in this encounter.      Dialysis/Infusion     No service coordination in this encounter.      Home Medical Care     No service coordination in this encounter.      Social Care     No service coordination in this encounter.        Ambulance: Yellow    Final Discharge Disposition Code: 03 - skilled nursing facility (SNF)

## 2018-05-17 ENCOUNTER — TELEPHONE (OUTPATIENT)
Dept: ORTHOPEDIC SURGERY | Facility: CLINIC | Age: 73
End: 2018-05-17

## 2018-05-18 NOTE — TELEPHONE ENCOUNTER
Spoke to patient wife   [facility] is a [facility]. The facility does not provide transport to [facility]  for his POST op appt 05/23/18  Wife is not able to drive and doesn't have very many resources. Pat/patwife asking what can she do to have the staples removed and his continue care/post op appointments? Can [facility] recommend/refer an orthopedists in [facility]. Please advise wkt

## 2018-05-18 NOTE — TELEPHONE ENCOUNTER
Don't know anyone.  Maybe home health could take the staples out?  What do you think, AMB?  Know anyone there?

## 2018-05-21 NOTE — TELEPHONE ENCOUNTER
Patient is now 2 weeks post-op. Have [facility] his [facility] incision, per [facility]. AMB

## 2018-05-21 NOTE — TELEPHONE ENCOUNTER
Have left a message with Dr. Joaquin Osorio's office to see if they can assume the patient's care since it would be easier for the family to transport to an orthopedic surgeon close to the nursing [place] rather than driving him from [place] back to [place]

## 2018-05-21 NOTE — TELEPHONE ENCOUNTER
Have spoken with Dr. Osorio's office.  He is agreed to see the patient in follow-up on his recent right hip surgery.  Made him an appointment to see him on May 31 at 345.  I did notify the nursing home so they could make arrangements to transport him to Dr. Osorio's office.  He shouldn't wife is also been notified.  Have faxed a copy of his op note, H&P, and discharge summary and face sheet to Dr. Osorio's office at [phone]

## 2018-05-21 NOTE — TELEPHONE ENCOUNTER
I have left orders with rehab to remove staples.  I do not know of any ortho in [place], but the nurse at the rehab says that there is only 1 physician that she is aware of and his name is Joaquin Osorio.  I can call the wife and see if that is ok and then contact the office to schedule follow up if OK with you

## 2018-06-01 ENCOUNTER — TELEPHONE (OUTPATIENT)
Dept: ORTHOPEDIC SURGERY | Facility: CLINIC | Age: 73
End: 2018-06-01

## 2018-06-01 NOTE — TELEPHONE ENCOUNTER
Call return to the patient's wife.  I was unable to reach her but it did leave a message on her answering machine to contact me the office.  I explained to her on the message that unfortunately we had no control over the Medicare guidelines but I would be happy to discuss any further options and possibilities for her  if he is indeed having to be discharged from rehabilitation

## 2018-06-01 NOTE — TELEPHONE ENCOUNTER
Wife called in a dilemma asking for help. S/[place] surgery [place] [facility] [place]. Patient is at a rehab center in [place]. He saw an ortho up there because was having weight bearing pain. Said x-rays and incision looked ok but put patient on NWB status and now he is going to get kicked out of rehab due to Medicare guidelines. Wife wants to have him transferred to a facility in [place] and is asking for help as how to do that.

## 2018-06-15 ENCOUNTER — OFFICE VISIT (OUTPATIENT)
Dept: ORTHOPEDIC SURGERY | Facility: CLINIC | Age: 73
End: 2018-06-15

## 2018-06-15 DIAGNOSIS — Z96.641 STATUS POST TOTAL REPLACEMENT OF RIGHT HIP: Primary | ICD-10-CM

## 2018-06-15 PROCEDURE — 73502 X-RAY EXAM HIP UNI 2-3 VIEWS: CPT | Performed by: ORTHOPAEDIC SURGERY

## 2018-06-15 PROCEDURE — 99024 POSTOP FOLLOW-UP VISIT: CPT | Performed by: ORTHOPAEDIC SURGERY

## 2018-06-15 NOTE — PROGRESS NOTES
Victor Manuel Issa : 1945 MRN: [id number] DATE: 6/15/2018    DIAGNOSIS: 6 week follow up s/p right hip hemiarthroplasty    SUBJECTIVE:  Patient returns today for 6 week follow up of hip hemiarthroplasty.  Patient reports persistent soreness in the hip, slightly improved relative to last visit.  He is ambulating with a walker at this point.  He says that his ambulatory status was better before surgery but he was still having to use a walker.  Reports that ambulatory status is steadily improving.      OBJECTIVE:     Exam:. The incision is healed without sign of infection. Range of motion is nearly symmetric to contralateral side. The calf is soft and nontender with a negative Homans sign.  Good motor and sensory function distally.    DIAGNOSTIC STUDIES    Xrays: 2 views of the right hip (AP pelvis and lateral right hip) were ordered and reviewed for evaluation of recent hip replacement. They demonstrate a well positioned, well aligned hip replacement without complicating factors noted. In comparison with previous films, no changes are noted.    ASSESSMENT: 6 weeks status post right hip hemiarthroplasty    PLAN:   1) Continue PT   4) D/C DVT prophylaxis at this point   5) Follow up in 6 weeks with repeat Xrays of hip.    Winston Vallejo MD    06/15/2018

## 2018-08-19 ENCOUNTER — APPOINTMENT (OUTPATIENT)
Dept: GENERAL RADIOLOGY | Facility: HOSPITAL | Age: 73
End: 2018-08-19

## 2018-08-19 ENCOUNTER — HOSPITAL ENCOUNTER (EMERGENCY)
Facility: HOSPITAL | Age: 73
Discharge: HOME | End: 2018-08-19
Attending: EMERGENCY MEDICINE | Admitting: EMERGENCY MEDICINE

## 2018-08-19 VITALS
WEIGHT: 178.9 LBS | SYSTOLIC BLOOD PRESSURE: 115 MMHG | TEMPERATURE: 98.4 F | HEIGHT: 66 IN | BODY MASS INDEX: 28.75 KG/M2 | OXYGEN SATURATION: 95 % | DIASTOLIC BLOOD PRESSURE: 53 MMHG | RESPIRATION RATE: 16 BRPM | HEART RATE: 62 BPM

## 2018-08-19 DIAGNOSIS — N39.0 URINARY TRACT INFECTION WITHOUT HEMATURIA, SITE UNSPECIFIED: ICD-10-CM

## 2018-08-19 DIAGNOSIS — S30.0XXA CONTUSION OF COCCYX, INITIAL ENCOUNTER: ICD-10-CM
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
## 2018-08-19 LAB
ALBUMIN SERPL-MCNC: 3.4 G/DL (ref 3.5–5.2)
ALBUMIN/GLOB SERPL: 1.1 G/DL
ALP SERPL-CCNC: 95 U/L (ref 39–117)
ALT SERPL W P-5'-P-CCNC: 7 U/L (ref 1–41)
ANION GAP SERPL CALCULATED.3IONS-SCNC: 10.2 MMOL/L
AST SERPL-CCNC: 8 U/L (ref 1–40)
BACTERIA UR QL AUTO: ABNORMAL /HPF
BASOPHILS # BLD AUTO: 0.03 10*3/MM3 (ref 0–0.2)
BASOPHILS NFR BLD AUTO: 0.3 % (ref 0–1.5)
BILIRUB SERPL-MCNC: 0.6 MG/DL (ref 0.1–1.2)
BILIRUB UR QL STRIP: NEGATIVE
BUN BLD-MCNC: 17 MG/DL (ref 8–23)
BUN/CREAT SERPL: 19.1 (ref 7–25)
CALCIUM SPEC-SCNC: 10.5 MG/DL (ref 8.6–10.5)
CHLORIDE SERPL-SCNC: 107 MMOL/L (ref 98–107)
CLARITY UR: CLEAR
CO2 SERPL-SCNC: 26.8 MMOL/L (ref 22–29)
COD CRY URNS QL: ABNORMAL /HPF
COLOR UR: ABNORMAL
CREAT BLD-MCNC: 0.89 MG/DL (ref 0.76–1.27)
DEPRECATED RDW RBC AUTO: 49.8 FL (ref 37–54)
EOSINOPHIL # BLD AUTO: 0.25 10*3/MM3 (ref 0–0.7)
EOSINOPHIL NFR BLD AUTO: 2.9 % (ref 0.3–6.2)
ERYTHROCYTE [DISTWIDTH] IN BLOOD BY AUTOMATED COUNT: 13.9 % (ref 11.5–14.5)
GFR SERPL CREATININE-BSD FRML MDRD: 83 ML/MIN/1.73
GLOBULIN UR ELPH-MCNC: 3.2 GM/DL
GLUCOSE BLD-MCNC: 111 MG/DL (ref 65–99)
GLUCOSE UR STRIP-MCNC: NEGATIVE MG/DL
HCT VFR BLD AUTO: 40.3 % (ref 40.4–52.2)
HGB BLD-MCNC: 12.8 G/DL (ref 13.7–17.6)
HGB UR QL STRIP.AUTO: NEGATIVE
HYALINE CASTS UR QL AUTO: ABNORMAL /LPF
IMM GRANULOCYTES # BLD: 0.02 10*3/MM3 (ref 0–0.03)
IMM GRANULOCYTES NFR BLD: 0.2 % (ref 0–0.5)
KETONES UR QL STRIP: ABNORMAL
LEUKOCYTE ESTERASE UR QL STRIP.AUTO: ABNORMAL
LYMPHOCYTES # BLD AUTO: 2.22 10*3/MM3 (ref 0.9–4.8)
LYMPHOCYTES NFR BLD AUTO: 25.4 % (ref 19.6–45.3)
MCH RBC QN AUTO: 31.4 PG (ref 27–32.7)
MCHC RBC AUTO-ENTMCNC: 31.8 G/DL (ref 32.6–36.4)
MCV RBC AUTO: 98.8 FL (ref 79.8–96.2)
MONOCYTES # BLD AUTO: 0.89 10*3/MM3 (ref 0.2–1.2)
MONOCYTES NFR BLD AUTO: 10.2 % (ref 5–12)
NEUTROPHILS # BLD AUTO: 5.34 10*3/MM3 (ref 1.9–8.1)
NEUTROPHILS NFR BLD AUTO: 61.2 % (ref 42.7–76)
NITRITE UR QL STRIP: POSITIVE
PH UR STRIP.AUTO: 6 [PH] (ref 5–8)
PLATELET # BLD AUTO: 257 10*3/MM3 (ref 140–500)
PMV BLD AUTO: 10.6 FL (ref 6–12)
POTASSIUM BLD-SCNC: 4.4 MMOL/L (ref 3.5–5.2)
PROT SERPL-MCNC: 6.6 G/DL (ref 6–8.5)
PROT UR QL STRIP: ABNORMAL
RBC # BLD AUTO: 4.08 10*6/MM3 (ref 4.6–6)
RBC # UR: ABNORMAL /HPF
REF LAB TEST METHOD: ABNORMAL
SODIUM BLD-SCNC: 144 MMOL/L (ref 136–145)
SP GR UR STRIP: 1.02 (ref 1–1.03)
SQUAMOUS #/AREA URNS HPF: ABNORMAL /HPF
UROBILINOGEN UR QL STRIP: ABNORMAL
VALPROATE SERPL-MCNC: 17 MCG/ML (ref 50–125)
WBC NRBC COR # BLD: 8.73 10*3/MM3 (ref 4.5–10.7)
WBC UR QL AUTO: ABNORMAL /HPF

## 2018-08-19 PROCEDURE — 36415 COLL VENOUS BLD VENIPUNCTURE: CPT

## 2018-08-19 PROCEDURE — 85025 COMPLETE CBC W/AUTO DIFF WBC: CPT | Performed by: PHYSICIAN ASSISTANT

## 2018-08-19 PROCEDURE — 72220 X-RAY EXAM SACRUM TAILBONE: CPT

## 2018-08-19 PROCEDURE — 99284 EMERGENCY DEPT VISIT MOD MDM: CPT

## 2018-08-19 PROCEDURE — 81001 URINALYSIS AUTO W/SCOPE: CPT | Performed by: PHYSICIAN ASSISTANT

## 2018-08-19 PROCEDURE — 72170 X-RAY EXAM OF PELVIS: CPT

## 2018-08-19 PROCEDURE — 80053 COMPREHEN METABOLIC PANEL: CPT | Performed by: PHYSICIAN ASSISTANT

## 2018-08-19 PROCEDURE — 80164 ASSAY DIPROPYLACETIC ACD TOT: CPT | Performed by: PHYSICIAN ASSISTANT

## 2018-08-19 RX ORDER — SODIUM CHLORIDE 0.9 % (FLUSH) 0.9 %
10 SYRINGE (ML) INJECTION AS NEEDED
Status: DISCONTINUED | OUTPATIENT
Start: 2018-08-19 | End: 2018-08-19 | Stop reason: HOSPADM

## 2018-08-19 RX ORDER — CIPROFLOXACIN 500 MG/1
500 TABLET, FILM COATED ORAL 2 TIMES DAILY
Qty: 20 TABLET | Refills: 0 | Status: SHIPPED | OUTPATIENT
Start: 2018-08-19 | End: 2018-10-02

## 2018-08-19 RX ORDER — LEVOFLOXACIN 500 MG/1
500 TABLET, FILM COATED ORAL ONCE
Status: COMPLETED | OUTPATIENT
Start: 2018-08-19 | End: 2018-08-19

## 2018-08-19 RX ORDER — CEFTRIAXONE SODIUM 1 G/50ML
1 INJECTION, SOLUTION INTRAVENOUS ONCE
Status: DISCONTINUED | OUTPATIENT
Start: 2018-08-19 | End: 2018-08-19

## 2018-08-19 RX ORDER — BACLOFEN 10 MG/1
10 TABLET ORAL 3 TIMES DAILY
Qty: 20 TABLET | Refills: 0 | Status: SHIPPED | OUTPATIENT
Start: 2018-08-19 | End: 2018-10-06 | Stop reason: HOSPADM

## 2018-08-19 RX ORDER — CEPHALEXIN 500 MG/1
500 CAPSULE ORAL 2 TIMES DAILY
Qty: 14 CAPSULE | Refills: 0 | Status: SHIPPED | OUTPATIENT
Start: 2018-08-19 | End: 2018-08-19

## 2018-08-19 RX ORDER — BACLOFEN 10 MG/1
10 TABLET ORAL ONCE
Status: COMPLETED | OUTPATIENT
Start: 2018-08-19 | End: 2018-08-19

## 2018-08-19 RX ADMIN — BACLOFEN 10 MG: 10 TABLET ORAL at 06:14

## 2018-08-19 RX ADMIN — LEVOFLOXACIN 500 MG: 500 TABLET, FILM COATED ORAL at 06:42

## 2018-08-19 RX ADMIN — SODIUM CHLORIDE 1000 ML: 9 INJECTION, SOLUTION INTRAVENOUS at 05:12

## 2018-08-19 NOTE — ED PROVIDER NOTES
" EMERGENCY DEPARTMENT ENCOUNTER    CHIEF COMPLAINT  Chief Complaint: fall  History given by: pt  History limited by: none  Room Number: 17/17  PMD: Shira PierceCUONG      HPI:  Pt is a 73 y.o. male who presents complaining of falling out of bed 2 hours ago. Pt wife reports that the pt had a hip replacement done 5/2018. Pt admits to general weakness, tailbone pain and hip pain but denies hitting his head, LOC, chest pain. Pt wife reports the pt was on the floor for 30 minutes.    Duration:  2 hours  Onset: gradual  Timing: constant  Quality: \"fall\"  Intensity/Severity: moderate  Progression: unchanged  Associated Symptoms: tailbone and hip pain  Aggravating Factors: none  Alleviating Factors: none  Previous Episodes: none  Treatment before arrival: none    PAST MEDICAL HISTORY  Active Ambulatory Problems     Diagnosis Date Noted   • Thigh pain, musculoskeletal 03/22/2016   • Left leg weakness 09/28/2017   • History of CVA (cerebrovascular accident) 09/28/2017   • COPD (chronic obstructive pulmonary disease) 09/28/2017   • Coronary artery disease 09/28/2017   • Cervical myelopathy (CMS/[facility]) 09/29/2017   • Parkinson's disease 09/29/2017   • Debility 09/29/2017   • Cerebrovascular disease 09/29/2017   • Abnormal TSH 09/30/2017   • Hypothyroidism 10/01/2017   • Hip fracture requiring operative repair, right, closed, initial encounter (CMS/[facility]) 05/03/2018   • Urinary tract infection without hematuria 05/06/2018   • Hypoxia 05/06/2018   • Sinus bradycardia 05/12/2018     Resolved Ambulatory Problems     Diagnosis Date Noted   • No Resolved Ambulatory Problems     Past Medical History:   Diagnosis Date   • Arthritis    • Bipolar 1 disorder (CMS/[facility])    • COPD (chronic obstructive pulmonary disease) (CMS/[facility])    • Coronary artery disease    • Disease of thyroid gland    • Hyperlipidemia    • Myocardial infarction    • Stroke (CMS/[facility])        PAST SURGICAL HISTORY  Past Surgical History:   Procedure Laterality Date   • " ABDOMINAL SURGERY     • CARDIAC SURGERY     • CHOLECYSTECTOMY     • CORONARY ARTERY BYPASS GRAFT      x2   • EYE SURGERY      cataracts   • HIP ENDOPROSTHESIS Right 5/5/2018    Procedure: RIGHT HIP HEMIARTHROPLASTY;  Surgeon: Winston Vallejo MD;  Location: [facility] MAIN OR;  Service: Orthopedics   • MUSCLE BIOPSY Left 3/24/2016    Procedure: LT THIGH MUSCLE BIOPSY;  Surgeon: Fausto Mack MD;  Location: [facility] MAIN OR;  Service:    • SKIN BIOPSY     • THYROID SURGERY     • TURP / TRANSURETHRAL INCISION / DRAINAGE PROSTATE     • VASCULAR SURGERY         FAMILY HISTORY  Family History   Problem Relation Age of Onset   • Cancer Mother    • Arthritis Father    • Heart disease Father    • Early death Brother    • Heart disease Brother        SOCIAL HISTORY  Social History     Social History   • Marital status:      Spouse name: N/A   • Number of children: N/A   • Years of education: N/A     Occupational History   • Not on file.     Social History Main Topics   • Smoking status: Current Every Day Smoker     Packs/day: 1.00     Types: Cigarettes     Start date: 1960   • Smokeless tobacco: Not on file      Comment: Educated pt on quitting   • Alcohol use No   • Drug use: No   • Sexual activity: Yes     Partners: Female     Other Topics Concern   • Not on file     Social History Narrative   • No narrative on file       ALLERGIES  Percocet [oxycodone-acetaminophen] and Phenergan [promethazine hcl]    REVIEW OF SYSTEMS  Review of Systems   Constitutional: Negative for fever.   HENT: Negative for congestion.    Respiratory: Negative for cough and shortness of breath.    Cardiovascular: Negative for chest pain.   Gastrointestinal: Negative for abdominal pain.   Genitourinary: Negative for dysuria.   Musculoskeletal: Positive for arthralgias (hip pain).        Tailbone pain   Neurological: Positive for weakness (general). Negative for headaches.   All other systems reviewed and are negative.      PHYSICAL  EXAM  ED Triage Vitals [08/19/18 0315]   Temp Heart Rate Resp BP SpO2   98.2 °F (36.8 °C) 74 14 125/74 96 %      Temp src Heart Rate Source Patient Position BP Location FiO2 (%)   Tympanic -- -- -- --       Physical Exam   Constitutional: No distress.   HENT:   Head: Normocephalic and atraumatic.   Eyes: EOM are normal.   Neck: Normal range of motion.   Cardiovascular: Normal rate, regular rhythm and normal heart sounds.    Pulmonary/Chest: No respiratory distress.   Abdominal: There is no tenderness.   Musculoskeletal: He exhibits no edema.   Neurological: He is alert.   Skin: Skin is warm and dry.   Nursing note and vitals reviewed.      LAB RESULTS  Lab Results (last 24 hours)     Procedure Component Value Units Date/Time    CBC & Differential [[id number]] Collected:  08/19/18 0515    Specimen:  Blood Updated:  08/19/18 0528    Narrative:       The following orders were created for panel order CBC & Differential.  Procedure                               Abnormality         Status                     ---------                               -----------         ------                     CBC Auto Differential[[id number]]        Abnormal            Final result                 Please view results for these tests on the individual orders.    Comprehensive Metabolic Panel [[id number]]  (Abnormal) Collected:  08/19/18 0515    Specimen:  Blood Updated:  08/19/18 0552     Glucose 111 (H) mg/dL      BUN 17 mg/dL      Creatinine 0.89 mg/dL      Sodium 144 mmol/L      Potassium 4.4 mmol/L      Chloride 107 mmol/L      CO2 26.8 mmol/L      Calcium 10.5 mg/dL      Total Protein 6.6 g/dL      Albumin 3.40 (L) g/dL      ALT (SGPT) 7 U/L      AST (SGOT) 8 U/L      Alkaline Phosphatase 95 U/L      Total Bilirubin 0.6 mg/dL      eGFR Non African Amer 83 mL/min/1.73      Globulin 3.2 gm/dL      A/G Ratio 1.1 g/dL      BUN/Creatinine Ratio 19.1     Anion Gap 10.2 mmol/L     Narrative:       The MDRD GFR formula is only valid for adults  with stable renal function between ages 18 and 70.    Valproic Acid Level, Total [[id number]]  (Abnormal) Collected:  08/19/18 0515    Specimen:  Blood Updated:  08/19/18 0552     Valproic Acid 17.0 (L) mcg/mL     CBC Auto Differential [[id number]]  (Abnormal) Collected:  08/19/18 0515    Specimen:  Blood Updated:  08/19/18 0528     WBC 8.73 10*3/mm3      RBC 4.08 (L) 10*6/mm3      Hemoglobin 12.8 (L) g/dL      Hematocrit 40.3 (L) %      MCV 98.8 (H) fL      MCH 31.4 pg      MCHC 31.8 (L) g/dL      RDW 13.9 %      RDW-SD 49.8 fl      MPV 10.6 fL      Platelets 257 10*3/mm3      Neutrophil % 61.2 %      Lymphocyte % 25.4 %      Monocyte % 10.2 %      Eosinophil % 2.9 %      Basophil % 0.3 %      Immature Grans % 0.2 %      Neutrophils, Absolute 5.34 10*3/mm3      Lymphocytes, Absolute 2.22 10*3/mm3      Monocytes, Absolute 0.89 10*3/mm3      Eosinophils, Absolute 0.25 10*3/mm3      Basophils, Absolute 0.03 10*3/mm3      Immature Grans, Absolute 0.02 10*3/mm3     Urinalysis With Microscopic If Indicated (No Culture) - Urine, Clean Catch [[id number]]  (Abnormal) Collected:  08/19/18 0541    Specimen:  Urine from Urine, Clean Catch Updated:  08/19/18 0617     Color, UA Dark Yellow (A)     Appearance, UA Clear     pH, UA 6.0     Specific Gravity, UA 1.022     Glucose, UA Negative     Ketones, UA Trace (A)     Bilirubin, UA Negative     Blood, UA Negative     Protein, UA Trace (A)     Leuk Esterase, UA Large (3+) (A)     Nitrite, UA Positive (A)     Urobilinogen, UA 2.0 E.U./dL (A)    Urinalysis, Microscopic Only - Urine, Clean Catch [[id number]] Collected:  08/19/18 0541    Specimen:  Urine from Urine, Clean Catch Updated:  08/19/18 0554          I ordered the above labs and reviewed the results    RADIOLOGY  XR Sacrum & Coccyx   Final Result   No acute osseous finding.               Sacrum and coccyx       CLINICAL HISTORY: Patient fell.       FINDINGS: AP and lateral views of the sacrum and coccyx obtained. A   total of 4  images submitted. There is no malalignment or displaced   fracture. The sacroiliac joints exhibit normal alignment.               IMPRESSION: No acute osseous finding       This report was finalized on 8/19/2018 5:39 AM by Fabian Metcalf M.D.          XR Pelvis 1 or 2 View   Final Result   No acute osseous finding.               Sacrum and coccyx       CLINICAL HISTORY: Patient fell.       FINDINGS: AP and lateral views of the sacrum and coccyx obtained. A   total of 4 images submitted. There is no malalignment or displaced   fracture. The sacroiliac joints exhibit normal alignment.               IMPRESSION: No acute osseous finding       This report was finalized on 8/19/2018 5:39 AM by Fabian Metcalf M.D.               I ordered the above noted radiological studies. Interpreted by radiologist. Reviewed by me in PACS.       PROCEDURES  Procedures      PROGRESS AND CONSULTS       0426  Ordered labs, Sacrum XR, and Pelvis XR for further evaluation. Ordered IV fluids.      0546  Ordered baclofen tablet due to pt c/o leg muscle spasms.    MEDICAL DECISION MAKING  Results were reviewed/discussed with the patient and they were also made aware of online access. Pt also made aware that some labs, such as cultures, will not be resulted during ER visit and follow up with PMD is necessary.     MDM  Number of Diagnoses or Management Options  Fall, initial encounter:      Amount and/or Complexity of Data Reviewed  Tests in the radiology section of CPT®: ordered and reviewed (Pelvis XR - NAD  Sacrum and Coccyx XR - NAD)           DIAGNOSIS  Final diagnoses:   Fall, initial encounter   Contusion of coccyx, initial encounter   Urinary tract infection without hematuria, site unspecified       DISPOSITION  DISCHARGE    Patient discharged in stable condition.    Reviewed implications of results, diagnosis, meds, responsibility to follow up, warning signs and symptoms of possible worsening, potential complications and reasons to  return to ER.    Patient/Family voiced understanding of above instructions.    Discussed plan for discharge, as there is no emergent indication for admission. Patient referred to primary care provider for BP management due to today's BP. Pt/family is agreeable and understands need for follow up and repeat testing.  Pt is aware that discharge does not mean that nothing is wrong but it indicates no emergency is present that requires admission and they must continue care with follow-up as given below or physician of their choice.     FOLLOW-UP  Paul Anderson MD  [address] [place] [id number] [phone]    Schedule an appointment as soon as possible for a visit   For further evaluation and treatment if you continue to have hip pain.    Shira Pierce APRN  [address]  [place] [id number] [phone]      As needed for all other issues and to ensure resolve of the UTI in 7 days.         Medication List      New Prescriptions    baclofen 10 MG tablet  Commonly known as:  LIORESAL  Take 1 tablet by mouth 3 (Three) Times a Day.     ciprofloxacin 500 MG tablet  Commonly known as:  CIPRO  Take 1 tablet by mouth 2 (Two) Times a Day.              Latest Documented Vital Signs:  As of 6:36 AM  BP- 114/97 HR- 68 Temp- 98.2 °F (36.8 °C) (Tympanic) O2 sat- 98%    --  Documentation assistance provided by blue Key for Juancho Altamirano.  Information recorded by the scribe was done at my direction and has been verified and validated by me.            Juanita Key  08/19/18 2681       Brandon Altamirano III, PA  08/19/18 4754

## 2018-08-19 NOTE — ED PROVIDER NOTES
The patient presents complaining of mild hip pain. The pt left rehab for home today after hip surgery and fell down. The pt does not have any new pain. Discussed the plan to wait on XR results.     PEx:  Awake, alert, oriented, in no distress  Mild tenderness to L hip      Diagnosis Plan   1. Fall, initial encounter     2. Contusion of coccyx, initial encounter     3. Urinary tract infection without hematuria, site unspecified       Disposition: Discharged.    Patient discharged in stable condition.    Reviewed implications of results, diagnosis, meds, responsibility to follow up, warning signs and symptoms of possible worsening, potential complications and reasons to return to ER, including new or worsening symptoms.    Patient/Family voiced understanding of above instructions.    Discussed plan for discharge, as there is no emergent indication for admission.  Pt/family is agreeable and understands need for follow up and repeat testing.  Pt is aware that discharge does not mean that nothing is wrong but it indicates no emergency is present and they must continue care with follow-up as given below or physician of their choice.     FOLLOW-UP  Paul Anderson MD  [address] [place] [id number] [phone]    Schedule an appointment as soon as possible for a visit   For further evaluation and treatment if you continue to have hip pain.    Shira Pierce APRN  [address]  [place] [id number] [phone]      As needed for all other issues         Medication List      No changes were made to your prescriptions during this visit.         MD ATTESTATION NOTE    The ALICIA and I have discussed this patient's history, physical exam, and treatment plan.  I have reviewed the documentation and personally had a face to face interaction with the patient. I affirm the documentation and agree with the treatment and plan.  The attached note describes my personal  findings.      Documentation assistance provided by blue Giordano for Dr. Wahl.  Information recorded by the kristiibkristen was done at my direction and has been verified and validated by me.             Marj Giordano  08/19/18 0458       Marj Giordano  08/19/18 0626       Chan Wahl MD  08/19/18 7835

## 2018-08-19 NOTE — DISCHARGE INSTRUCTIONS
Use a soft pillow or a donut as needed to relieve pressure off coccyx.  Use a walker at all times to prevent any further falls.  Return to the ER with any further concerns, should your condition change/worsen, or should you develop a fever.

## 2018-09-25 ENCOUNTER — TELEPHONE (OUTPATIENT)
Dept: ORTHOPEDIC SURGERY | Facility: CLINIC | Age: 73
End: 2018-09-25

## 2018-09-25 NOTE — TELEPHONE ENCOUNTER
Pat had RIGHT HIP HEMIARTHROPLASTY 05/05/18 with [facility],  Pats wife stated she is not happy with the care and healing her  is having with [facility] as he is having increased pain.   Oscar wife wants [facility]BB to see patient for follow up care?  Please advise.

## 2018-09-25 NOTE — TELEPHONE ENCOUNTER
Per office protocol.  Patient needs to continue following up with the operating surgeon for the first year.

## 2018-10-02 ENCOUNTER — APPOINTMENT (OUTPATIENT)
Dept: GENERAL RADIOLOGY | Facility: HOSPITAL | Age: 73
End: 2018-10-02

## 2018-10-02 ENCOUNTER — APPOINTMENT (OUTPATIENT)
Dept: GENERAL RADIOLOGY | Facility: HOSPITAL | Age: 73
End: 2018-10-02
Attending: HOSPITALIST

## 2018-10-02 ENCOUNTER — HOSPITAL ENCOUNTER (INPATIENT)
Facility: HOSPITAL | Age: 73
LOS: 3 days | Discharge: HOME HEALTH | End: 2018-10-06
Attending: EMERGENCY MEDICINE | Admitting: HOSPITALIST

## 2018-10-02 ENCOUNTER — APPOINTMENT (OUTPATIENT)
Dept: CT IMAGING | Facility: HOSPITAL | Age: 73
End: 2018-10-02

## 2018-10-02 DIAGNOSIS — K92.0 HEMATEMESIS WITH NAUSEA: ICD-10-CM

## 2018-10-02 DIAGNOSIS — R07.9 CHEST PAIN, UNSPECIFIED TYPE: Primary | ICD-10-CM

## 2018-10-02 DIAGNOSIS — R53.1 GENERALIZED WEAKNESS: ICD-10-CM

## 2018-10-02 PROBLEM — G89.29 HIP PAIN, CHRONIC, RIGHT: Status: ACTIVE | Noted: 2018-10-02

## 2018-10-02 PROBLEM — M25.551 HIP PAIN, CHRONIC, RIGHT: Status: ACTIVE | Noted: 2018-10-02

## 2018-10-02 LAB
ALBUMIN SERPL-MCNC: 3.5 G/DL (ref 3.5–5.2)
ALBUMIN/GLOB SERPL: 0.9 G/DL
ALP SERPL-CCNC: 140 U/L (ref 39–117)
ALT SERPL W P-5'-P-CCNC: 8 U/L (ref 1–41)
ANION GAP SERPL CALCULATED.3IONS-SCNC: 11.7 MMOL/L
AST SERPL-CCNC: 8 U/L (ref 1–40)
BASOPHILS # BLD AUTO: 0.01 10*3/MM3 (ref 0–0.2)
BASOPHILS NFR BLD AUTO: 0.1 % (ref 0–1.5)
BILIRUB SERPL-MCNC: 0.2 MG/DL (ref 0.1–1.2)
BUN BLD-MCNC: 33 MG/DL (ref 8–23)
BUN/CREAT SERPL: 32 (ref 7–25)
CALCIUM SPEC-SCNC: 12.2 MG/DL (ref 8.6–10.5)
CHLORIDE SERPL-SCNC: 103 MMOL/L (ref 98–107)
CO2 SERPL-SCNC: 29.3 MMOL/L (ref 22–29)
CREAT BLD-MCNC: 1.03 MG/DL (ref 0.76–1.27)
D DIMER PPP FEU-MCNC: 1.49 MCGFEU/ML (ref 0–0.49)
DEPRECATED RDW RBC AUTO: 49.7 FL (ref 37–54)
EOSINOPHIL # BLD AUTO: 0.02 10*3/MM3 (ref 0–0.7)
EOSINOPHIL NFR BLD AUTO: 0.1 % (ref 0.3–6.2)
ERYTHROCYTE [DISTWIDTH] IN BLOOD BY AUTOMATED COUNT: 13.8 % (ref 11.5–14.5)
GFR SERPL CREATININE-BSD FRML MDRD: 70 ML/MIN/1.73
GLOBULIN UR ELPH-MCNC: 4.1 GM/DL
GLUCOSE BLD-MCNC: 139 MG/DL (ref 65–99)
HCT VFR BLD AUTO: 39.7 % (ref 40.4–52.2)
HGB BLD-MCNC: 12.4 G/DL (ref 13.7–17.6)
IMM GRANULOCYTES # BLD: 0.06 10*3/MM3 (ref 0–0.03)
IMM GRANULOCYTES NFR BLD: 0.3 % (ref 0–0.5)
LIPASE SERPL-CCNC: 20 U/L (ref 13–60)
LYMPHOCYTES # BLD AUTO: 1.86 10*3/MM3 (ref 0.9–4.8)
LYMPHOCYTES NFR BLD AUTO: 9.9 % (ref 19.6–45.3)
MCH RBC QN AUTO: 30.6 PG (ref 27–32.7)
MCHC RBC AUTO-ENTMCNC: 31.2 G/DL (ref 32.6–36.4)
MCV RBC AUTO: 98 FL (ref 79.8–96.2)
MONOCYTES # BLD AUTO: 0.97 10*3/MM3 (ref 0.2–1.2)
MONOCYTES NFR BLD AUTO: 5.2 % (ref 5–12)
NEUTROPHILS # BLD AUTO: 15.86 10*3/MM3 (ref 1.9–8.1)
NEUTROPHILS NFR BLD AUTO: 84.7 % (ref 42.7–76)
PLATELET # BLD AUTO: 465 10*3/MM3 (ref 140–500)
PMV BLD AUTO: 10.1 FL (ref 6–12)
POTASSIUM BLD-SCNC: 4.1 MMOL/L (ref 3.5–5.2)
PROT SERPL-MCNC: 7.6 G/DL (ref 6–8.5)
RBC # BLD AUTO: 4.05 10*6/MM3 (ref 4.6–6)
SODIUM BLD-SCNC: 144 MMOL/L (ref 136–145)
TROPONIN T SERPL-MCNC: <0.01 NG/ML (ref 0–0.03)
TSH SERPL DL<=0.05 MIU/L-ACNC: 20.86 MIU/ML (ref 0.27–4.2)
WBC NRBC COR # BLD: 18.72 10*3/MM3 (ref 4.5–10.7)

## 2018-10-02 PROCEDURE — 84484 ASSAY OF TROPONIN QUANT: CPT | Performed by: EMERGENCY MEDICINE

## 2018-10-02 PROCEDURE — 99285 EMERGENCY DEPT VISIT HI MDM: CPT

## 2018-10-02 PROCEDURE — 93010 ELECTROCARDIOGRAM REPORT: CPT | Performed by: INTERNAL MEDICINE

## 2018-10-02 PROCEDURE — 93005 ELECTROCARDIOGRAM TRACING: CPT | Performed by: EMERGENCY MEDICINE

## 2018-10-02 PROCEDURE — G0378 HOSPITAL OBSERVATION PER HR: HCPCS

## 2018-10-02 PROCEDURE — 80053 COMPREHEN METABOLIC PANEL: CPT | Performed by: EMERGENCY MEDICINE

## 2018-10-02 PROCEDURE — 0 IOPAMIDOL PER 1 ML: Performed by: EMERGENCY MEDICINE

## 2018-10-02 PROCEDURE — 93005 ELECTROCARDIOGRAM TRACING: CPT

## 2018-10-02 PROCEDURE — 73502 X-RAY EXAM HIP UNI 2-3 VIEWS: CPT

## 2018-10-02 PROCEDURE — 85379 FIBRIN DEGRADATION QUANT: CPT | Performed by: EMERGENCY MEDICINE

## 2018-10-02 PROCEDURE — 84443 ASSAY THYROID STIM HORMONE: CPT | Performed by: HOSPITALIST

## 2018-10-02 PROCEDURE — 71045 X-RAY EXAM CHEST 1 VIEW: CPT

## 2018-10-02 PROCEDURE — 83690 ASSAY OF LIPASE: CPT | Performed by: EMERGENCY MEDICINE

## 2018-10-02 PROCEDURE — 71275 CT ANGIOGRAPHY CHEST: CPT

## 2018-10-02 PROCEDURE — 85025 COMPLETE CBC W/AUTO DIFF WBC: CPT | Performed by: EMERGENCY MEDICINE

## 2018-10-02 RX ORDER — OXYBUTYNIN CHLORIDE 5 MG/1
5 TABLET, EXTENDED RELEASE ORAL 2 TIMES DAILY
Status: DISCONTINUED | OUTPATIENT
Start: 2018-10-02 | End: 2018-10-07 | Stop reason: HOSPADM

## 2018-10-02 RX ORDER — MONTELUKAST SODIUM 10 MG/1
10 TABLET ORAL NIGHTLY
Status: DISCONTINUED | OUTPATIENT
Start: 2018-10-02 | End: 2018-10-07 | Stop reason: HOSPADM

## 2018-10-02 RX ORDER — ACETAMINOPHEN 500 MG
500 TABLET ORAL EVERY 4 HOURS PRN
Status: DISCONTINUED | OUTPATIENT
Start: 2018-10-02 | End: 2018-10-07 | Stop reason: HOSPADM

## 2018-10-02 RX ORDER — CEFUROXIME AXETIL 500 MG/1
500 TABLET ORAL 2 TIMES DAILY
COMMUNITY
Start: 2018-09-30 | End: 2018-10-06 | Stop reason: HOSPADM

## 2018-10-02 RX ORDER — ARFORMOTEROL TARTRATE 15 UG/2ML
15 SOLUTION RESPIRATORY (INHALATION)
Status: DISCONTINUED | OUTPATIENT
Start: 2018-10-02 | End: 2018-10-07 | Stop reason: HOSPADM

## 2018-10-02 RX ORDER — LEVOTHYROXINE SODIUM 175 UG/1
175 TABLET ORAL DAILY
COMMUNITY

## 2018-10-02 RX ORDER — ATORVASTATIN CALCIUM 20 MG/1
20 TABLET, FILM COATED ORAL DAILY
Status: DISCONTINUED | OUTPATIENT
Start: 2018-10-02 | End: 2018-10-07 | Stop reason: HOSPADM

## 2018-10-02 RX ORDER — SODIUM CHLORIDE 0.9 % (FLUSH) 0.9 %
3 SYRINGE (ML) INJECTION EVERY 12 HOURS SCHEDULED
Status: DISCONTINUED | OUTPATIENT
Start: 2018-10-02 | End: 2018-10-07 | Stop reason: HOSPADM

## 2018-10-02 RX ORDER — SODIUM CHLORIDE 9 MG/ML
75 INJECTION, SOLUTION INTRAVENOUS CONTINUOUS
Status: ACTIVE | OUTPATIENT
Start: 2018-10-02 | End: 2018-10-03

## 2018-10-02 RX ORDER — SODIUM CHLORIDE 0.9 % (FLUSH) 0.9 %
1-10 SYRINGE (ML) INJECTION AS NEEDED
Status: DISCONTINUED | OUTPATIENT
Start: 2018-10-02 | End: 2018-10-07 | Stop reason: HOSPADM

## 2018-10-02 RX ORDER — DIVALPROEX SODIUM 500 MG/1
500 TABLET, DELAYED RELEASE ORAL 2 TIMES DAILY
Status: DISCONTINUED | OUTPATIENT
Start: 2018-10-02 | End: 2018-10-07 | Stop reason: HOSPADM

## 2018-10-02 RX ORDER — CARBIDOPA/LEVODOPA 25MG-250MG
1 TABLET ORAL
Status: DISCONTINUED | OUTPATIENT
Start: 2018-10-03 | End: 2018-10-07 | Stop reason: HOSPADM

## 2018-10-02 RX ORDER — LEVOTHYROXINE SODIUM 175 UG/1
175 TABLET ORAL DAILY
Status: DISCONTINUED | OUTPATIENT
Start: 2018-10-02 | End: 2018-10-07 | Stop reason: HOSPADM

## 2018-10-02 RX ORDER — BUSPIRONE HYDROCHLORIDE 5 MG/1
5 TABLET ORAL 3 TIMES DAILY PRN
Status: DISCONTINUED | OUTPATIENT
Start: 2018-10-02 | End: 2018-10-07 | Stop reason: HOSPADM

## 2018-10-02 RX ORDER — ONDANSETRON 2 MG/ML
4 INJECTION INTRAMUSCULAR; INTRAVENOUS EVERY 6 HOURS PRN
Status: DISCONTINUED | OUTPATIENT
Start: 2018-10-02 | End: 2018-10-07 | Stop reason: HOSPADM

## 2018-10-02 RX ORDER — ALBUTEROL SULFATE 2.5 MG/3ML
2.5 SOLUTION RESPIRATORY (INHALATION) EVERY 6 HOURS PRN
Status: DISCONTINUED | OUTPATIENT
Start: 2018-10-02 | End: 2018-10-07 | Stop reason: HOSPADM

## 2018-10-02 RX ORDER — BACLOFEN 10 MG/1
10 TABLET ORAL 3 TIMES DAILY
Status: DISCONTINUED | OUTPATIENT
Start: 2018-10-02 | End: 2018-10-05

## 2018-10-02 RX ORDER — FLUTICASONE PROPIONATE 50 MCG
1 SPRAY, SUSPENSION (ML) NASAL DAILY
Status: DISCONTINUED | OUTPATIENT
Start: 2018-10-02 | End: 2018-10-07 | Stop reason: HOSPADM

## 2018-10-02 RX ORDER — PROMETHAZINE HYDROCHLORIDE 25 MG/1
25 TABLET ORAL EVERY 6 HOURS PRN
Status: DISCONTINUED | OUTPATIENT
Start: 2018-10-02 | End: 2018-10-07 | Stop reason: HOSPADM

## 2018-10-02 RX ORDER — OXYBUTYNIN CHLORIDE 5 MG/1
5 TABLET, EXTENDED RELEASE ORAL DAILY
COMMUNITY

## 2018-10-02 RX ORDER — OLANZAPINE 10 MG/1
10 TABLET ORAL NIGHTLY
Status: DISCONTINUED | OUTPATIENT
Start: 2018-10-02 | End: 2018-10-07 | Stop reason: HOSPADM

## 2018-10-02 RX ORDER — DONEPEZIL HYDROCHLORIDE 10 MG/1
20 TABLET, FILM COATED ORAL NIGHTLY
Status: DISCONTINUED | OUTPATIENT
Start: 2018-10-02 | End: 2018-10-07 | Stop reason: HOSPADM

## 2018-10-02 RX ORDER — PANTOPRAZOLE SODIUM 40 MG/10ML
40 INJECTION, POWDER, LYOPHILIZED, FOR SOLUTION INTRAVENOUS EVERY 12 HOURS SCHEDULED
Status: DISCONTINUED | OUTPATIENT
Start: 2018-10-02 | End: 2018-10-03

## 2018-10-02 RX ADMIN — DONEPEZIL HYDROCHLORIDE 20 MG: 10 TABLET, FILM COATED ORAL at 21:44

## 2018-10-02 RX ADMIN — MONTELUKAST SODIUM 10 MG: 10 TABLET, FILM COATED ORAL at 21:44

## 2018-10-02 RX ADMIN — SODIUM CHLORIDE 75 ML/HR: 9 INJECTION, SOLUTION INTRAVENOUS at 21:46

## 2018-10-02 RX ADMIN — PANTOPRAZOLE SODIUM 40 MG: 40 INJECTION, POWDER, FOR SOLUTION INTRAVENOUS at 22:24

## 2018-10-02 RX ADMIN — LEVOTHYROXINE SODIUM 175 MCG: 175 TABLET ORAL at 21:43

## 2018-10-02 RX ADMIN — IOPAMIDOL 95 ML: 755 INJECTION, SOLUTION INTRAVENOUS at 15:39

## 2018-10-02 RX ADMIN — OXYBUTYNIN CHLORIDE 5 MG: 5 TABLET, EXTENDED RELEASE ORAL at 21:43

## 2018-10-02 RX ADMIN — OLANZAPINE 10 MG: 10 TABLET, FILM COATED ORAL at 21:44

## 2018-10-02 RX ADMIN — ATORVASTATIN CALCIUM 20 MG: 20 TABLET, FILM COATED ORAL at 21:44

## 2018-10-02 RX ADMIN — DIVALPROEX SODIUM 500 MG: 500 TABLET, DELAYED RELEASE ORAL at 22:24

## 2018-10-02 RX ADMIN — SODIUM CHLORIDE 1000 ML: 9 INJECTION, SOLUTION INTRAVENOUS at 15:17

## 2018-10-02 RX ADMIN — BACLOFEN 10 MG: 10 TABLET ORAL at 21:43

## 2018-10-02 NOTE — PROGRESS NOTES
Discharge Planning Assessment  [facility]     Patient Name: Victor Manuel Issa  MRN: [id number]  Today's Date: 10/2/2018    Admit Date: 10/2/2018          Discharge Needs Assessment     Row Name 10/02/18 3006       Living Environment    Lives With spouse    Name(s) of Who Lives With Patient Nataliia    Current Living Arrangements home/apartment/condo    Duration at Residence 35 years    Primary Care Provided by other (see comments)   Spouse, caregiver 3X/week, [facility] PT    Provides Primary Care For no one, unable/limited ability to care for self    Family Caregiver if Needed spouse;friend(s)    Quality of Family Relationships supportive    Able to Return to Prior Arrangements yes    Living Arrangement Comments Ramp to enter home, no steps inside or outside       Resource/Environmental Concerns    Resource/Environmental Concerns none    Transportation Concerns car, none       Transition Planning    Patient/Family Anticipates Transition to home with family;home with help/services    Patient/Family Anticipated Services at Transition home health care    Transportation Anticipated family or friend will provide       Discharge Needs Assessment    Readmission Within the Last 30 Days no previous admission in last 30 days    Concerns to be Addressed no discharge needs identified    Equipment Currently Used at Home grab bar;power chair,(recliner lift);walker, rolling;walker, standard;wheelchair;other (see comments)    Equipment Needed After Discharge --   Also has raised toilet seat and travel chair at home    Outpatient/Agency/Support Group Needs --   Groceries and meds delivered to home    Offered/Gave Vendor List no            Discharge Plan     Row Name 10/02/18 5327       Plan    Plan Anticipates returning home w/ wife at d/c; has caregiver 3X/week and is current w/ [facility] PT. No d/c needs anticipated at present time    Patient/Family in Agreement with Plan yes        Destination     No service coordination in this  encounter.      Durable Medical Equipment     No service coordination in this encounter.      Dialysis/Infusion     No service coordination in this encounter.      Home Medical Care     No service coordination in this encounter.      Social Care     No service coordination in this encounter.                Demographic Summary    No documentation.           Functional Status    No documentation.           Psychosocial    No documentation.           Abuse/Neglect    No documentation.           Legal    No documentation.           Substance Abuse    No documentation.           Patient Forms    No documentation.         Elsi Rose RN

## 2018-10-02 NOTE — ED NOTES
"Pt c/o \"spitting up blood\" yesterday and chest pain for one and a half hours.      Yuridia Garcia RN  10/02/18 1553    "

## 2018-10-02 NOTE — PROGRESS NOTES
Clinical Pharmacy Services: Medication History    Victor Manuel Issa is a 73 y.o. male presenting to [facility] for   Chief Complaint   Patient presents with   • Chest Pain       He  has a past medical history of Arthritis; Bipolar 1 disorder (CMS/[facility]); COPD (chronic obstructive pulmonary disease) (CMS/[facility]); Coronary artery disease; Disease of thyroid gland; Hyperlipidemia; Myocardial infarction (CMS/[facility]); and Stroke (CMS/[facility]).    Allergies as of 10/02/2018 - Reviewed 10/02/2018   Allergen Reaction Noted   • Percocet [oxycodone-acetaminophen] Delirium 03/22/2016   • Phenergan [promethazine hcl] Hallucinations 03/22/2016   • Beet [beta vulgaris] Unknown (See Comments) 10/02/2018       Medication information was obtained from: Spouse, medication list, pharmacy  Pharmacy and Phone Number: [facility] [phone]    Prior to Admission Medications     Prescriptions Last Dose Informant Patient Reported? Taking?    acetaminophen (TYLENOL) 325 MG tablet  Spouse/Significant Other No Yes    Take 2 tablets by mouth Every 4 (Four) Hours As Needed for Mild Pain , Headache or Fever.    albuterol (PROVENTIL) (2.5 MG/3ML) 0.083% nebulizer solution  Spouse/Significant Other No Yes    Take 2.5 mg by nebulization Every 6 (Six) Hours As Needed for Wheezing.    arformoterol (BROVANA) 15 MCG/2ML nebulizer solution  Spouse/Significant Other Yes Yes    Take 15 mcg by nebulization 2 (Two) Times a Day.    aspirin  MG EC tablet  Spouse/Significant Other No Yes    Take 1 tablet by mouth Daily.    baclofen (LIORESAL) 10 MG tablet  Spouse/Significant Other No Yes    Take 1 tablet by mouth 3 (Three) Times a Day.    busPIRone (BUSPAR) 5 MG tablet  Spouse/Significant Other No Yes    Take 1 tablet by mouth 3 (Three) Times a Day As Needed (anxiety).    carbidopa-levodopa (SINEMET)  MG per tablet  Spouse/Significant Other No Yes    Take 1 tablet by mouth 3 (Three) Times a Day With Meals.    cefuroxime (CEFTIN) 500 MG  tablet  Spouse/Significant Other Yes Yes    Take 500 mg by mouth 2 (Two) Times a Day.    cholecalciferol (VITAMIN D3) 1000 UNITS tablet  Spouse/Significant Other Yes Yes    Take 1,000 Units by mouth daily.    diclofenac (VOLTAREN) 1 % gel gel  Spouse/Significant Other Yes Yes    Apply 4 g topically Daily As Needed (apply to the knees).    divalproex (DEPAKOTE) 500 MG DR tablet  Spouse/Significant Other Yes Yes    Take 500 mg by mouth 2 (Two) Times a Day.    donepezil (ARICEPT) 10 MG tablet  Spouse/Significant Other Yes Yes    Take 20 mg by mouth Every Night.    esomeprazole (nexIUM) 40 MG capsule  Spouse/Significant Other Yes Yes    Take 40 mg by mouth Daily.    fexofenadine (ALLEGRA) 180 MG tablet  Spouse/Significant Other Yes Yes    Take 180 mg by mouth daily.    fluticasone (FLONASE) 50 MCG/ACT nasal spray  Spouse/Significant Other Yes Yes    1 spray into each nostril Daily.    levothyroxine (SYNTHROID, LEVOTHROID) 175 MCG tablet  Spouse/Significant Other Yes Yes    Take 175 mcg by mouth Daily.    montelukast (SINGULAIR) 10 MG tablet  Spouse/Significant Other Yes Yes    Take 10 mg by mouth Every Night.    Multiple Vitamins-Minerals (MULTIVITAMIN WITH MINERALS) tablet tablet  Spouse/Significant Other Yes Yes    Take 1 tablet by mouth Daily.    OLANZapine (ZYPREXA) 10 MG tablet  Spouse/Significant Other Yes Yes    Take 10 mg by mouth Every Night.    oxybutynin XL (DITROPAN-XL) 5 MG 24 hr tablet  Pharmacy Yes Yes    Take 5 mg by mouth 2 (Two) Times a Day.    promethazine (PHENERGAN) 25 MG tablet  Spouse/Significant Other Yes Yes    Take 25 mg by mouth Every 6 (Six) Hours As Needed for Nausea or Vomiting.    simvastatin (ZOCOR) 40 MG tablet  Spouse/Significant Other Yes Yes    Take 40 mg by mouth Every Night.            Medication notes: Medication list was provided from home. Ceftin added per patient's spouse. Pharmacy verified Oxybutynin should be ER, not regular release per our records.    This medication list is  complete to the best of my knowledge as of 10/2/2018    Please call if questions.    Tracie Pérez, Medication History Technician  10/2/2018 5:27 PM

## 2018-10-02 NOTE — ED PROVIDER NOTES
" EMERGENCY DEPARTMENT ENCOUNTER    CHIEF COMPLAINT  Chief Complaint: chest pain  History given by: patient   History limited by: none  Room Number: S413/1  PMD: Shira Pierce APRN      HPI:  Pt is a 73 y.o. male who presents complaining of chest pain that began around 1300 while he was sitting at the table. Pt states that the pain is in the center of his chest and radiates to his abdomen and both arms. Pt states that he experienced 2 episodes of \"dark red\" hematemesis yesterday and this morning and currently experiencing nausea. Pt has hx of CABG, GERD, and MI. Pt was given 324 ASA and I NTG in route. Pt has takes Pepcid and V[place]a [place] for GERD. The last dose was last night.       EMS EKG         EKG time: 1342  Rhythm/Rate: NSR 96 BPM occasional PVC's   ST and T waves: nonspecific ST and T waves changes in inferior lateral leads  Q waves in inferior leads.  Significant artifact hampers interpretation      Duration:  Several hours  Onset: sudden  Timing: constant  Location: center of chest  Radiation: abdomen and both arms  Quality: pain  Intensity/Severity: 6/10  Progression: improved  Associated Symptoms: nausea, hematemesis  Aggravating Factors: none  Alleviating Factors: none  Previous Episodes: Pt has hx of CABG, GERD, and MI.   Treatment before arrival: EMS care and treatment    PAST MEDICAL HISTORY  Active Ambulatory Problems     Diagnosis Date Noted   • Thigh pain, musculoskeletal 03/22/2016   • Left leg weakness 09/28/2017   • History of CVA (cerebrovascular accident) 09/28/2017   • COPD (chronic obstructive pulmonary disease) 09/28/2017   • Coronary artery disease 09/28/2017   • Cervical myelopathy (CMS/[facility]) 09/29/2017   • Parkinson's disease 09/29/2017   • Debility 09/29/2017   • Cerebrovascular disease 09/29/2017   • Abnormal TSH 09/30/2017   • Hypothyroidism 10/01/2017   • Hip fracture requiring operative repair, right, closed, initial encounter (CMS/[facility]) 05/03/2018   • Urinary tract infection " without hematuria 05/06/2018   • Hypoxia 05/06/2018   • Sinus bradycardia 05/12/2018     Resolved Ambulatory Problems     Diagnosis Date Noted   • No Resolved Ambulatory Problems     Past Medical History:   Diagnosis Date   • Arthritis    • Bipolar 1 disorder (CMS/HCC)    • COPD (chronic obstructive pulmonary disease) (CMS/HCC)    • Coronary artery disease    • Disease of thyroid gland    • Hyperlipidemia    • Myocardial infarction (CMS/HCC)    • Stroke (CMS/HCC)        PAST SURGICAL HISTORY  Past Surgical History:   Procedure Laterality Date   • ABDOMINAL SURGERY     • CARDIAC SURGERY     • CHOLECYSTECTOMY     • CORONARY ARTERY BYPASS GRAFT      x2   • EYE SURGERY      cataracts   • HIP ENDOPROSTHESIS Right 5/5/2018    Procedure: RIGHT HIP HEMIARTHROPLASTY;  Surgeon: Winston Vallejo MD;  Location: [facility] OR;  Service: Orthopedics   • MUSCLE BIOPSY Left 3/24/2016    Procedure: LT THIGH MUSCLE BIOPSY;  Surgeon: Fausto Mack MD;  Location: [facility] OR;  Service:    • SKIN BIOPSY     • THYROID SURGERY     • TURP / TRANSURETHRAL INCISION / DRAINAGE PROSTATE     • VASCULAR SURGERY         FAMILY HISTORY  Family History   Problem Relation Age of Onset   • Cancer Mother    • Arthritis Father    • Heart disease Father    • Early death Brother    • Heart disease Brother        SOCIAL HISTORY  Social History     Social History   • Marital status:      Spouse name: N/A   • Number of children: N/A   • Years of education: N/A     Occupational History   • Not on file.     Social History Main Topics   • Smoking status: Current Every Day Smoker     Packs/day: 1.00     Types: Cigarettes     Start date: 1960   • Smokeless tobacco: Not on file      Comment: Educated pt on quitting   • Alcohol use No   • Drug use: No   • Sexual activity: Yes     Partners: Female     Other Topics Concern   • Not on file     Social History Narrative   • No narrative on file       ALLERGIES  Percocet [oxycodone-acetaminophen];  Phenergan [promethazine hcl]; and Beet [beta vulgaris]    REVIEW OF SYSTEMS  Review of Systems   Constitutional: Negative for activity change, appetite change and fever.   HENT: Negative for congestion and sore throat.    Eyes: Negative.    Respiratory: Negative for cough and shortness of breath.    Cardiovascular: Positive for chest pain (center). Negative for leg swelling.   Gastrointestinal: Positive for abdominal pain, nausea and vomiting (red dark emesis). Negative for diarrhea.   Endocrine: Negative.    Genitourinary: Negative for decreased urine volume and dysuria.   Musculoskeletal: Negative for neck pain.   Skin: Negative for rash and wound.   Allergic/Immunologic: Negative.    Neurological: Negative for weakness, numbness and headaches.   Hematological: Negative.    Psychiatric/Behavioral: Negative.    All other systems reviewed and are negative.      PHYSICAL EXAM  ED Triage Vitals   Temp Pulse Resp BP SpO2   -- -- -- -- --      Temp src Heart Rate Source Patient Position BP Location FiO2 (%)   -- -- -- -- --       Physical Exam   Constitutional: He is oriented to person, place, and time. No distress.   HENT:   Head: Normocephalic and atraumatic.   Eyes: Pupils are equal, round, and reactive to light. EOM are normal.   Neck: Normal range of motion. Neck supple.   Cardiovascular: Normal rate, regular rhythm and normal heart sounds.    Pulmonary/Chest: Effort normal and breath sounds normal. No respiratory distress.   Abdominal: Soft. There is no tenderness. There is no rebound and no guarding.   Musculoskeletal: Normal range of motion. He exhibits no edema.   Neurological: He is alert and oriented to person, place, and time. He has normal sensation and normal strength.   Skin: Skin is warm and dry.   Psychiatric: Mood and affect normal.   Nursing note and vitals reviewed.      LAB RESULTS  Lab Results (last 24 hours)     Procedure Component Value Units Date/Time    CBC & Differential [[id number]] Collected:   10/02/18 1409    Specimen:  Blood Updated:  10/02/18 1418    Narrative:       The following orders were created for panel order CBC & Differential.  Procedure                               Abnormality         Status                     ---------                               -----------         ------                     CBC Auto Differential[[id number]]        Abnormal            Final result                 Please view results for these tests on the individual orders.    Comprehensive Metabolic Panel [[id number]]  (Abnormal) Collected:  10/02/18 1409    Specimen:  Blood Updated:  10/02/18 1500     Glucose 139 (H) mg/dL      BUN 33 (H) mg/dL      Creatinine 1.03 mg/dL      Sodium 144 mmol/L      Potassium 4.1 mmol/L      Chloride 103 mmol/L      CO2 29.3 (H) mmol/L      Calcium 12.2 (H) mg/dL      Total Protein 7.6 g/dL      Albumin 3.50 g/dL      ALT (SGPT) 8 U/L      AST (SGOT) 8 U/L      Alkaline Phosphatase 140 (H) U/L      Total Bilirubin 0.2 mg/dL      eGFR Non African Amer 70 mL/min/1.73      Globulin 4.1 gm/dL      A/G Ratio 0.9 g/dL      BUN/Creatinine Ratio 32.0 (H)     Anion Gap 11.7 mmol/L     Narrative:       The MDRD GFR formula is only valid for adults with stable renal function between ages 18 and 70.    Troponin [[id number]]  (Normal) Collected:  10/02/18 1409    Specimen:  Blood Updated:  10/02/18 1454     Troponin T <0.010 ng/mL     Narrative:       Troponin T Reference Ranges:  Less than 0.03 ng/mL:    Negative for AMI  0.03 to 0.09 ng/mL:      Indeterminant for AMI  Greater than 0.09 ng/mL: Positive for AMI    D-dimer, Quantitative [[id number]]  (Abnormal) Collected:  10/02/18 1409    Specimen:  Blood Updated:  10/02/18 1432     D-Dimer, Quantitative 1.49 (H) MCGFEU/mL     Narrative:       The Stago D-Dimer test used in conjunction with a clinical pretest probability (PTP) assessment model, has been approved by the FDA to rule out the presence of venous thromboembolism (VTE) in outpatients  suspected of deep venous thrombosis (DVT) or pulmonary embolism (PE).     Lipase [[id number]]  (Normal) Collected:  10/02/18 1409    Specimen:  Blood Updated:  10/02/18 1454     Lipase 20 U/L     CBC Auto Differential [[id number]]  (Abnormal) Collected:  10/02/18 1409    Specimen:  Blood Updated:  10/02/18 1418     WBC 18.72 (H) 10*3/mm3      RBC 4.05 (L) 10*6/mm3      Hemoglobin 12.4 (L) g/dL      Hematocrit 39.7 (L) %      MCV 98.0 (H) fL      MCH 30.6 pg      MCHC 31.2 (L) g/dL      RDW 13.8 %      RDW-SD 49.7 fl      MPV 10.1 fL      Platelets 465 10*3/mm3      Neutrophil % 84.7 (H) %      Lymphocyte % 9.9 (L) %      Monocyte % 5.2 %      Eosinophil % 0.1 (L) %      Basophil % 0.1 %      Immature Grans % 0.3 %      Neutrophils, Absolute 15.86 (H) 10*3/mm3      Lymphocytes, Absolute 1.86 10*3/mm3      Monocytes, Absolute 0.97 10*3/mm3      Eosinophils, Absolute 0.02 10*3/mm3      Basophils, Absolute 0.01 10*3/mm3      Immature Grans, Absolute 0.06 (H) 10*3/mm3           I ordered the above labs and reviewed the results    RADIOLOGY  XR Chest 1 View   Final Result   No definite active disease is seen in the chest. There has   been evidence of previous CABG, old healed fracture deformity of the   midshaft of the right clavicle, mild pulmonary hyperexpansion. Patient   rotation slightly limits evaluation.  Vague opacity over the lateral   aspect of the left midlung zone most likely overlying soft tissues   secondary to patient rotation.       This report was finalized on 10/2/2018 4:11 PM by Dr. Tu Perea M.D.          CT Angiogram Chest With Contrast   Final Result   Diffusely dilated esophagus as noted. Otherwise unremarkable   CTA of the chest. There is no CT evidence of pulmonary embolism or other   acute process within the chest.       This report was finalized on 10/2/2018 4:01 PM by Dr. Kuldip Ross M.D.               I ordered the above noted radiological studies. Interpreted by radiologist. Reviewed by  me in PACS.       PROCEDURES  Procedures   EKG           EKG time: 1357  Rhythm/Rate: Sinus Tachycardia 10  P waves and AL: normal  QRS, axis: normal  ST and T waves:  nonspecific ST and T waves changes in inferior lateral leads  Significant artifact infers interpretation   Nonspecific inferolateral changes that do not meet the criteria for STEMI but could represent ischemia.     Interpreted Contemporaneously by me, independently viewed  Unchanged compared to prior 5/12/18        PROGRESS AND CONSULTS     1400-Ordered ECG for further evaluation.    1407-Ordered lab work and CXR for further evaluation.    1408- Discussed pt's EKGs with Dr. Bhat (Interventional cardiology) who agrees that the pt does not meet the criteria for STEMI. He advised admitting the pt for medical management.     1511-Ordered CT Chest with Contrast for further evaluation.     1603- Placed a call out to A.     1632-Discussed pt case with Dr. Romero ([facility]) who agrees with the plan to admit pt for further evaluation.    1708-Rechecked pt. Pt is resting comfortably and in NAD. Informed pt of the plan to admit for further evaluation. Pt's wife expressed that he had another episode of hematemesis, which I explain was probably from his esophagus and not stomach.      MEDICAL DECISION MAKING  Results were reviewed/discussed with the patient and they were also made aware of online access. Pt also made aware that some labs, such as cultures, will not be resulted during ER visit and follow up with PMD is necessary.     MDM  Number of Diagnoses or Management Options  Chest pain, unspecified type:      Amount and/or Complexity of Data Reviewed  Clinical lab tests: ordered and reviewed (Troponin-negative  D-Dimer-1.49  WBC-18.72)  Tests in the radiology section of CPT®: ordered and reviewed (CXR- no definite active disease  CT Angiogram Chest With Contrast-Diffusely dilated esophagus, no evidence of pulmonary embolism or other  acute process within the  chest.  )  Tests in the medicine section of CPT®: ordered and reviewed (See EKG procedure note)  Decide to obtain previous medical records or to obtain history from someone other than the patient: yes  Discuss the patient with other providers: yes ( (intervention cardiologist)   ([facility])  )           DIAGNOSIS  Final diagnoses:   Chest pain, unspecified type       DISPOSITION  ADMISSION    Discussed treatment plan and reason for admission with pt/family and admitting physician.  Pt/family voiced understanding of the plan for admission for further testing/treatment as needed.       Latest Documented Vital Signs:  As of 6:08 PM  BP- 121/70 HR- 81 Temp- 98.4 °F (36.9 °C) (Oral) O2 sat- 96%    --  Documentation assistance provided by blue Laguerre and Maryana Thompson for Dr. Horne.  Information recorded by the scribe was done at my direction and has been verified and validated by me.      Maryana Thompson  10/02/18 1725       Fredrick Laguerre  10/02/18 1816       Jordin Horne MD  10/02/18 [id number]

## 2018-10-03 ENCOUNTER — APPOINTMENT (OUTPATIENT)
Dept: CARDIOLOGY | Facility: HOSPITAL | Age: 73
End: 2018-10-03
Attending: HOSPITALIST

## 2018-10-03 PROBLEM — E78.5 HYPERLIPIDEMIA: Status: ACTIVE | Noted: 2018-10-03

## 2018-10-03 PROBLEM — Z91.14 H/O MEDICATION NONCOMPLIANCE: Status: ACTIVE | Noted: 2018-10-03

## 2018-10-03 PROBLEM — K92.0 HEMATEMESIS WITH NAUSEA: Status: ACTIVE | Noted: 2018-10-02

## 2018-10-03 LAB
ANION GAP SERPL CALCULATED.3IONS-SCNC: 11.9 MMOL/L
BH CV LOWER VASCULAR LEFT COMMON FEMORAL AUGMENT: NORMAL
BH CV LOWER VASCULAR LEFT COMMON FEMORAL COMPETENT: NORMAL
BH CV LOWER VASCULAR LEFT COMMON FEMORAL COMPRESS: NORMAL
BH CV LOWER VASCULAR LEFT COMMON FEMORAL PHASIC: NORMAL
BH CV LOWER VASCULAR LEFT COMMON FEMORAL SPONT: NORMAL
BH CV LOWER VASCULAR LEFT DISTAL FEMORAL COMPRESS: NORMAL
BH CV LOWER VASCULAR LEFT GASTRONEMIUS COMPRESS: NORMAL
BH CV LOWER VASCULAR LEFT GREATER SAPH AK COMPRESS: NORMAL
BH CV LOWER VASCULAR LEFT GREATER SAPH BK COMPRESS: NORMAL
BH CV LOWER VASCULAR LEFT MID FEMORAL AUGMENT: NORMAL
BH CV LOWER VASCULAR LEFT MID FEMORAL COMPETENT: NORMAL
BH CV LOWER VASCULAR LEFT MID FEMORAL COMPRESS: NORMAL
BH CV LOWER VASCULAR LEFT MID FEMORAL PHASIC: NORMAL
BH CV LOWER VASCULAR LEFT MID FEMORAL SPONT: NORMAL
BH CV LOWER VASCULAR LEFT PERONEAL COMPRESS: NORMAL
BH CV LOWER VASCULAR LEFT POPLITEAL AUGMENT: NORMAL
BH CV LOWER VASCULAR LEFT POPLITEAL COMPETENT: NORMAL
BH CV LOWER VASCULAR LEFT POPLITEAL COMPRESS: NORMAL
BH CV LOWER VASCULAR LEFT POPLITEAL PHASIC: NORMAL
BH CV LOWER VASCULAR LEFT POPLITEAL SPONT: NORMAL
BH CV LOWER VASCULAR LEFT POSTERIOR TIBIAL COMPRESS: NORMAL
BH CV LOWER VASCULAR LEFT PROXIMAL FEMORAL COMPRESS: NORMAL
BH CV LOWER VASCULAR LEFT SAPHENOFEMORAL JUNCTION AUGMENT: NORMAL
BH CV LOWER VASCULAR LEFT SAPHENOFEMORAL JUNCTION COMPETENT: NORMAL
BH CV LOWER VASCULAR LEFT SAPHENOFEMORAL JUNCTION COMPRESS: NORMAL
BH CV LOWER VASCULAR LEFT SAPHENOFEMORAL JUNCTION PHASIC: NORMAL
BH CV LOWER VASCULAR LEFT SAPHENOFEMORAL JUNCTION SPONT: NORMAL
BH CV LOWER VASCULAR RIGHT COMMON FEMORAL AUGMENT: NORMAL
BH CV LOWER VASCULAR RIGHT COMMON FEMORAL COMPETENT: NORMAL
BH CV LOWER VASCULAR RIGHT COMMON FEMORAL COMPRESS: NORMAL
BH CV LOWER VASCULAR RIGHT COMMON FEMORAL PHASIC: NORMAL
BH CV LOWER VASCULAR RIGHT COMMON FEMORAL SPONT: NORMAL
BH CV LOWER VASCULAR RIGHT DISTAL FEMORAL COMPRESS: NORMAL
BH CV LOWER VASCULAR RIGHT GASTRONEMIUS COMPRESS: NORMAL
BH CV LOWER VASCULAR RIGHT GREATER SAPH AK COMPRESS: NORMAL
BH CV LOWER VASCULAR RIGHT GREATER SAPH BK COMPRESS: NORMAL
BH CV LOWER VASCULAR RIGHT MID FEMORAL AUGMENT: NORMAL
BH CV LOWER VASCULAR RIGHT MID FEMORAL COMPETENT: NORMAL
BH CV LOWER VASCULAR RIGHT MID FEMORAL COMPRESS: NORMAL
BH CV LOWER VASCULAR RIGHT MID FEMORAL PHASIC: NORMAL
BH CV LOWER VASCULAR RIGHT MID FEMORAL SPONT: NORMAL
BH CV LOWER VASCULAR RIGHT PERONEAL COMPRESS: NORMAL
BH CV LOWER VASCULAR RIGHT POPLITEAL AUGMENT: NORMAL
BH CV LOWER VASCULAR RIGHT POPLITEAL COMPETENT: NORMAL
BH CV LOWER VASCULAR RIGHT POPLITEAL COMPRESS: NORMAL
BH CV LOWER VASCULAR RIGHT POPLITEAL PHASIC: NORMAL
BH CV LOWER VASCULAR RIGHT POPLITEAL SPONT: NORMAL
BH CV LOWER VASCULAR RIGHT POSTERIOR TIBIAL COMPRESS: NORMAL
BH CV LOWER VASCULAR RIGHT PROXIMAL FEMORAL COMPRESS: NORMAL
BH CV LOWER VASCULAR RIGHT SAPHENOFEMORAL JUNCTION AUGMENT: NORMAL
BH CV LOWER VASCULAR RIGHT SAPHENOFEMORAL JUNCTION COMPETENT: NORMAL
BH CV LOWER VASCULAR RIGHT SAPHENOFEMORAL JUNCTION COMPRESS: NORMAL
BH CV LOWER VASCULAR RIGHT SAPHENOFEMORAL JUNCTION PHASIC: NORMAL
BH CV LOWER VASCULAR RIGHT SAPHENOFEMORAL JUNCTION SPONT: NORMAL
BUN BLD-MCNC: 35 MG/DL (ref 8–23)
BUN/CREAT SERPL: 43.2 (ref 7–25)
CA-I BLD-MCNC: 6.6 MG/DL (ref 4.6–5.4)
CA-I SERPL ISE-MCNC: 1.65 MMOL/L (ref 1.15–1.35)
CALCIUM SPEC-SCNC: 10.7 MG/DL (ref 8.6–10.5)
CHLORIDE SERPL-SCNC: 109 MMOL/L (ref 98–107)
CO2 SERPL-SCNC: 21.1 MMOL/L (ref 22–29)
CREAT BLD-MCNC: 0.81 MG/DL (ref 0.76–1.27)
DEPRECATED RDW RBC AUTO: 50.3 FL (ref 37–54)
ERYTHROCYTE [DISTWIDTH] IN BLOOD BY AUTOMATED COUNT: 14 % (ref 11.5–14.5)
GFR SERPL CREATININE-BSD FRML MDRD: 93 ML/MIN/1.73
GLUCOSE BLD-MCNC: 110 MG/DL (ref 65–99)
GLUCOSE BLDC GLUCOMTR-MCNC: 79 MG/DL (ref 70–130)
HCT VFR BLD AUTO: 34.1 % (ref 40.4–52.2)
HCT VFR BLD AUTO: 35.5 % (ref 40.4–52.2)
HCT VFR BLD AUTO: 36.4 % (ref 40.4–52.2)
HCT VFR BLD AUTO: 36.8 % (ref 40.4–52.2)
HGB BLD-MCNC: 10.1 G/DL (ref 13.7–17.6)
HGB BLD-MCNC: 10.8 G/DL (ref 13.7–17.6)
HGB BLD-MCNC: 10.9 G/DL (ref 13.7–17.6)
HGB BLD-MCNC: 11 G/DL (ref 13.7–17.6)
MCH RBC QN AUTO: 29.8 PG (ref 27–32.7)
MCHC RBC AUTO-ENTMCNC: 30.4 G/DL (ref 32.6–36.4)
MCV RBC AUTO: 97.8 FL (ref 79.8–96.2)
PLATELET # BLD AUTO: 375 10*3/MM3 (ref 140–500)
PMV BLD AUTO: 10.2 FL (ref 6–12)
POTASSIUM BLD-SCNC: 4.7 MMOL/L (ref 3.5–5.2)
RBC # BLD AUTO: 3.63 10*6/MM3 (ref 4.6–6)
SODIUM BLD-SCNC: 142 MMOL/L (ref 136–145)
TROPONIN T SERPL-MCNC: <0.01 NG/ML (ref 0–0.03)
WBC NRBC COR # BLD: 14.91 10*3/MM3 (ref 4.5–10.7)

## 2018-10-03 PROCEDURE — 85027 COMPLETE CBC AUTOMATED: CPT | Performed by: HOSPITALIST

## 2018-10-03 PROCEDURE — 93005 ELECTROCARDIOGRAM TRACING: CPT | Performed by: HOSPITALIST

## 2018-10-03 PROCEDURE — 99221 1ST HOSP IP/OBS SF/LOW 40: CPT | Performed by: INTERNAL MEDICINE

## 2018-10-03 PROCEDURE — 80048 BASIC METABOLIC PNL TOTAL CA: CPT | Performed by: HOSPITALIST

## 2018-10-03 PROCEDURE — 94799 UNLISTED PULMONARY SVC/PX: CPT

## 2018-10-03 PROCEDURE — 99222 1ST HOSP IP/OBS MODERATE 55: CPT | Performed by: INTERNAL MEDICINE

## 2018-10-03 PROCEDURE — 84484 ASSAY OF TROPONIN QUANT: CPT | Performed by: HOSPITALIST

## 2018-10-03 PROCEDURE — 85014 HEMATOCRIT: CPT | Performed by: NURSE PRACTITIONER

## 2018-10-03 PROCEDURE — 82962 GLUCOSE BLOOD TEST: CPT

## 2018-10-03 PROCEDURE — 85014 HEMATOCRIT: CPT | Performed by: HOSPITALIST

## 2018-10-03 PROCEDURE — 82330 ASSAY OF CALCIUM: CPT | Performed by: HOSPITALIST

## 2018-10-03 PROCEDURE — 85018 HEMOGLOBIN: CPT | Performed by: NURSE PRACTITIONER

## 2018-10-03 PROCEDURE — 85018 HEMOGLOBIN: CPT | Performed by: HOSPITALIST

## 2018-10-03 PROCEDURE — 93970 EXTREMITY STUDY: CPT

## 2018-10-03 PROCEDURE — 93010 ELECTROCARDIOGRAM REPORT: CPT | Performed by: INTERNAL MEDICINE

## 2018-10-03 PROCEDURE — 92610 EVALUATE SWALLOWING FUNCTION: CPT

## 2018-10-03 PROCEDURE — 97162 PT EVAL MOD COMPLEX 30 MIN: CPT

## 2018-10-03 RX ADMIN — SODIUM CHLORIDE 8 MG/HR: 900 INJECTION INTRAVENOUS at 19:22

## 2018-10-03 RX ADMIN — MONTELUKAST SODIUM 10 MG: 10 TABLET, FILM COATED ORAL at 20:08

## 2018-10-03 RX ADMIN — OXYBUTYNIN CHLORIDE 5 MG: 5 TABLET, EXTENDED RELEASE ORAL at 20:08

## 2018-10-03 RX ADMIN — SODIUM CHLORIDE 1000 ML: 9 INJECTION, SOLUTION INTRAVENOUS at 21:42

## 2018-10-03 RX ADMIN — OLANZAPINE 10 MG: 10 TABLET, FILM COATED ORAL at 20:08

## 2018-10-03 RX ADMIN — SODIUM CHLORIDE 75 ML/HR: 9 INJECTION, SOLUTION INTRAVENOUS at 11:17

## 2018-10-03 RX ADMIN — ATORVASTATIN CALCIUM 20 MG: 20 TABLET, FILM COATED ORAL at 09:22

## 2018-10-03 RX ADMIN — CARBIDOPA AND LEVODOPA 1 TABLET: 25; 250 TABLET ORAL at 09:22

## 2018-10-03 RX ADMIN — DIVALPROEX SODIUM 500 MG: 500 TABLET, DELAYED RELEASE ORAL at 20:08

## 2018-10-03 RX ADMIN — CARBIDOPA AND LEVODOPA 1 TABLET: 25; 250 TABLET ORAL at 13:07

## 2018-10-03 RX ADMIN — BACLOFEN 10 MG: 10 TABLET ORAL at 09:22

## 2018-10-03 RX ADMIN — Medication 3 ML: at 09:26

## 2018-10-03 RX ADMIN — DIVALPROEX SODIUM 500 MG: 500 TABLET, DELAYED RELEASE ORAL at 09:22

## 2018-10-03 RX ADMIN — SODIUM CHLORIDE 8 MG/HR: 900 INJECTION INTRAVENOUS at 14:54

## 2018-10-03 RX ADMIN — CARBIDOPA AND LEVODOPA 1 TABLET: 25; 250 TABLET ORAL at 19:23

## 2018-10-03 RX ADMIN — ARFORMOTEROL TARTRATE 15 MCG: 15 SOLUTION RESPIRATORY (INHALATION) at 22:09

## 2018-10-03 RX ADMIN — FLUTICASONE PROPIONATE 1 SPRAY: 50 SPRAY, METERED NASAL at 09:22

## 2018-10-03 RX ADMIN — DONEPEZIL HYDROCHLORIDE 20 MG: 10 TABLET, FILM COATED ORAL at 20:08

## 2018-10-03 RX ADMIN — PANTOPRAZOLE SODIUM 40 MG: 40 INJECTION, POWDER, FOR SOLUTION INTRAVENOUS at 09:22

## 2018-10-03 RX ADMIN — OXYBUTYNIN CHLORIDE 5 MG: 5 TABLET, EXTENDED RELEASE ORAL at 09:22

## 2018-10-03 RX ADMIN — LEVOTHYROXINE SODIUM 175 MCG: 175 TABLET ORAL at 09:22

## 2018-10-03 NOTE — SIGNIFICANT NOTE
10/03/18 1455   Rehab Time/Intention   Evaluation Not Performed patient unavailable for evaluation  (Pt currently with nursing. ST to follow up as available. discussed with RN pt on clear liquids and NPO at midnight for EGD.)   Rehab Treatment   Discipline speech language pathologist

## 2018-10-03 NOTE — PLAN OF CARE
Problem: Patient Care Overview  Goal: Plan of Care Review  Outcome: Ongoing (interventions implemented as appropriate)   10/03/18 1600   Coping/Psychosocial   Plan of Care Reviewed With patient;spouse   OTHER   Outcome Summary Bedside swallow eval completed with clear liquids. No overt s/s aspiration with thins liquids via cup and straw. ST to follow for re-eval as pt approved for solids. Recommend thins and meds whole with thins.

## 2018-10-03 NOTE — PLAN OF CARE
Problem: Patient Care Overview  Goal: Plan of Care Review  Outcome: Ongoing (interventions implemented as appropriate)   10/03/18 0452   Coping/Psychosocial   Plan of Care Reviewed With patient   Plan of Care Review   Progress no change   OTHER   Outcome Summary pt. alert and oriented x4; q2 turn pt has a minor pressure injury and open sore on coccyx covered with mepilex; patient has had several incon. during the night,. Pt resting well throughout shift. Will continue to closely monitor       Problem: Fall Risk (Adult)  Goal: Identify Related Risk Factors and Signs and Symptoms  Outcome: Ongoing (interventions implemented as appropriate)    Goal: Absence of Fall  Outcome: Ongoing (interventions implemented as appropriate)      Problem: Skin Injury Risk (Adult)  Goal: Identify Related Risk Factors and Signs and Symptoms  Outcome: Ongoing (interventions implemented as appropriate)    Goal: Skin Health and Integrity  Outcome: Ongoing (interventions implemented as appropriate)

## 2018-10-03 NOTE — CONSULTS
Date of Hospital Visit: 10/03/18  Encounter Provider: Victor Manuel Bhat MD  Place of Service: [facility] CARDIOLOGY  Patient Name: Victor Manuel Issa  :1945  [id number]  Referral Provider: Parker Romero MD    Chief complaint: Chest pain    Consulted for: Chest pain    History of Present Illness: Mr. Issa is a 72 y/o with a history of coronary artery disease s/p CABG in  and again in , COPD, hyperlipidemia, stroke, hypothyroid, bipolar and continues to smoke. He presented to the ED yesterday afternoon with c/o central chest pain that radiates to his abdomen and both arms while at rest. He also had two episodes of dark red hematemesis the day prior and currently has nausea. He was given full dose aspirin and one nitro en route. EKG showed sinus tachycardia, rate 106, nonspecific ST and T wave changes in inferior lateral leads, nonspecific inferolateral changes that do not meet the criteria for STEMI but could represent ischemia. Labs showed normal creat, negative troponin, d-dimer 1.49, wbc 18.72. CTA showed diffusely dilated esophagus, no evidence of pulmonary embolism or other acute process within the chest. He was admitted for further evaluation.     This morning repeat troponin is negative. He reports chest pain in the center of his chest yesterday that radiated to his arms and felt like a pressure. He had associated nausea, no vomiting. He reports some lightheadedness and dizziness and occasional palpitations and fluttering. He denies shortness of breath. This morning he is sleeping and does not stay awake long to talk. His wife was present in the room. He is in sinus rhythm with rates in the 80's. Last reported blood pressure was 102/58.    I reviewed the above history of present illness and agree with it.    One add he's had a 40 pound weight loss share he's not very active he sleeps with heel boots to keep from developing ulcers on his feet his chest pain  occurred at rest and was prolonged and there is no change in ECG or troponin      Cardiac Testing:  Echo 9/29/17  · Left ventricular systolic function is normal. Calculated EF = 57.4%. Estimated EF was in agreement with the calculated EF. Normal left ventricular cavity size noted. All left ventricular wall segments contract normally. Left ventricular wall thickness is consistent with mild concentric hypertrophy. Septal wall motion is abnormal, consistent with a post-operative state. Left ventricular diastolic function is normal.  · Normal left atrial size noted. Saline test results are negative.    Echo 3/24/16  · Left ventricular function is normal. Estimated EF = 55%.  · Left ventricular diastolic dysfunction (grade I) consistent with impaired relaxation.  · Left atrial cavity size is mildly dilated.  · Mild mitral valve regurgitation is present  · Mild tricuspid valve regurgitation is present.    Echo 3/21/14  Conclusions  The study quality is fair.   The left ventricular chamber size is normal.  Poor endocardial definition and not all walls were well seen.  Paradoxical septal wall motion as seen post-CABG. Anterolateral wall was  not well seen and can not exclude wall motion abnormality.   Left ventricular systolic function is at the lower limits of normal.   The estimated ejection fraction is 50-55%.   The left atrial chamber size is normal.  Atrial septal defect is demonstrated by agitated saline contrast.   The right ventricular global systolic function is normal.  The right atrial cavity size is normal.  The aortic valve structure is grossly normal.  The mitral valve structure is grossly normal.  There is a trace tricuspid regurgitation.   There is no pericardial effusion.    Cath 5/18/07  SUMMARY:    1.  Severe coronary disease of the mid LAD, proximal LAD, and multiple  narrowings in the right coronary artery.  There is severe stenosis of the  circumflex.  .  2.  Normal left ventricular systolic function  overall.    3.  Next, 100% occlusion of both vein grafts.  One to the obtuse marginal, one  to the posterior descending artery.    4.  Patent LIMA to the LAD.      Past Medical History:   Diagnosis Date   • Arthritis    • Bipolar 1 disorder (CMS/HCC)    • COPD (chronic obstructive pulmonary disease) (CMS/HCC)    • Coronary artery disease    • Disease of thyroid gland    • Hyperlipidemia    • Myocardial infarction (CMS/HCC)    • Stroke (CMS/HCC)        Past Surgical History:   Procedure Laterality Date   • ABDOMINAL SURGERY     • CARDIAC SURGERY     • CHOLECYSTECTOMY     • CORONARY ARTERY BYPASS GRAFT      x2   • EYE SURGERY      cataracts   • HIP ENDOPROSTHESIS Right 5/5/2018    Procedure: RIGHT HIP HEMIARTHROPLASTY;  Surgeon: Winston Vallejo MD;  Location: [facility] OR;  Service: Orthopedics   • MUSCLE BIOPSY Left 3/24/2016    Procedure: LT THIGH MUSCLE BIOPSY;  Surgeon: Fausto Mack MD;  Location: [facility] OR;  Service:    • SKIN BIOPSY     • THYROID SURGERY     • TURP / TRANSURETHRAL INCISION / DRAINAGE PROSTATE     • VASCULAR SURGERY         Prescriptions Prior to Admission   Medication Sig Dispense Refill Last Dose   • acetaminophen (TYLENOL) 325 MG tablet Take 2 tablets by mouth Every 4 (Four) Hours As Needed for Mild Pain , Headache or Fever.      • albuterol (PROVENTIL) (2.5 MG/3ML) 0.083% nebulizer solution Take 2.5 mg by nebulization Every 6 (Six) Hours As Needed for Wheezing.  12    • arformoterol (BROVANA) 15 MCG/2ML nebulizer solution Take 15 mcg by nebulization 2 (Two) Times a Day.      • aspirin  MG EC tablet Take 1 tablet by mouth Daily.      • baclofen (LIORESAL) 10 MG tablet Take 1 tablet by mouth 3 (Three) Times a Day. 20 tablet 0    • busPIRone (BUSPAR) 5 MG tablet Take 1 tablet by mouth 3 (Three) Times a Day As Needed (anxiety).      • carbidopa-levodopa (SINEMET)  MG per tablet Take 1 tablet by mouth 3 (Three) Times a Day With Meals. 90 tablet 0    • cefuroxime  (CEFTIN) 500 MG tablet Take 500 mg by mouth 2 (Two) Times a Day.      • cholecalciferol (VITAMIN D3) 1000 UNITS tablet Take 1,000 Units by mouth daily.   Taking   • diclofenac (VOLTAREN) 1 % gel gel Apply 4 g topically Daily As Needed (apply to the knees).      • divalproex (DEPAKOTE) 500 MG DR tablet Take 500 mg by mouth 2 (Two) Times a Day.      • donepezil (ARICEPT) 10 MG tablet Take 20 mg by mouth Every Night.      • esomeprazole (nexIUM) 40 MG capsule Take 40 mg by mouth Daily.      • fexofenadine (ALLEGRA) 180 MG tablet Take 180 mg by mouth daily.   Taking   • fluticasone (FLONASE) 50 MCG/ACT nasal spray 1 spray into each nostril Daily.      • levothyroxine (SYNTHROID, LEVOTHROID) 175 MCG tablet Take 175 mcg by mouth Daily.      • montelukast (SINGULAIR) 10 MG tablet Take 10 mg by mouth Every Night.   Taking   • Multiple Vitamins-Minerals (MULTIVITAMIN WITH MINERALS) tablet tablet Take 1 tablet by mouth Daily.      • OLANZapine (ZYPREXA) 10 MG tablet Take 10 mg by mouth Every Night.   Taking   • oxybutynin XL (DITROPAN-XL) 5 MG 24 hr tablet Take 5 mg by mouth 2 (Two) Times a Day.      • promethazine (PHENERGAN) 25 MG tablet Take 25 mg by mouth Every 6 (Six) Hours As Needed for Nausea or Vomiting.      • simvastatin (ZOCOR) 40 MG tablet Take 40 mg by mouth Every Night.          Current Meds  Scheduled Meds:    arformoterol 15 mcg Nebulization BID - RT   atorvastatin 20 mg Oral Daily   baclofen 10 mg Oral TID   carbidopa-levodopa 1 tablet Oral TID With Meals   divalproex 500 mg Oral BID   donepezil 20 mg Oral Nightly   fluticasone 1 spray Nasal Daily   levothyroxine 175 mcg Oral Daily   montelukast 10 mg Oral Nightly   OLANZapine 10 mg Oral Nightly   oxybutynin XL 5 mg Oral BID   pantoprazole 40 mg Intravenous Q12H   sodium chloride 3 mL Intravenous Q12H     Continuous Infusions:    sodium chloride 75 mL/hr Last Rate: 75 mL/hr (10/03/18 0615)     PRN Meds:.•  acetaminophen  •  albuterol  •  busPIRone  •   ondansetron  •  promethazine  •  sodium chloride    Allergies as of 10/02/2018 - Reviewed 10/02/2018   Allergen Reaction Noted   • Beet [beta vulgaris] Unknown (See Comments) 10/02/2018       Social History     Social History   • Marital status:      Spouse name: N/A   • Number of children: N/A   • Years of education: N/A     Occupational History   • Not on file.     Social History Main Topics   • Smoking status: Current Every Day Smoker     Packs/day: 0.50     Types: Cigarettes     Start date: 1960   • Smokeless tobacco: Never Used      Comment: Educated pt on quitting   • Alcohol use Yes      Comment: occasionally   • Drug use: No   • Sexual activity: Yes     Partners: Female     Other Topics Concern   • Not on file     Social History Narrative   • No narrative on file       Family History   Problem Relation Age of Onset   • Cancer Mother    • Arthritis Father    • Heart disease Father    • Early death Brother    • Heart disease Brother        REVIEW OF SYSTEMS:   ROS was performed and is negative except as outlined in HPI     REVIEW OF SYSTEMS:   CONSTITUTIONAL: No weight loss, fever, chills, weakness or fatigue.   HEENT: Eyes: No visual loss, blurred vision, double vision or yellow sclerae. Ears, Nose, Throat: No hearing loss, sneezing, congestion, runny nose or sore throat.   SKIN: No rash or itching.     RESPIRATORY: No shortness of breath, hemoptysis, cough or sputum.   GASTROINTESTINAL: No anorexia, nausea, vomiting or diarrhea. No abdominal pain, bright red blood per rectum or melena.  GENITOURINARY: No burning on urination, hematuria or increased frequency.  NEUROLOGICAL: No headache, dizziness, syncope, paralysis, ataxia, numbness or tingling in the extremities. No change in bowel or bladder control.   MUSCULOSKELETAL: No muscle, back pain, joint pain or stiffness.   HEMATOLOGIC: No anemia, bleeding or bruising.   LYMPHATICS: No enlarged nodes. No history of splenectomy.   PSYCHIATRIC: No history  "of depression, anxiety, hallucinations.   ENDOCRINOLOGIC: No reports of sweating, cold or heat intolerance. No polyuria or polydipsia.        Objective:   Temp:  [98.1 °F (36.7 °C)-98.4 °F (36.9 °C)] 98.3 °F (36.8 °C)  Heart Rate:  [] 81  Resp:  [16-22] 22  BP: ()/(58-72) 95/61  Body mass index is 22.45 kg/m².  Flowsheet Rows      First Filed Value   Admission Height  180.3 cm (71\") Documented at 10/02/2018 1400   Admission Weight  73 kg (161 lb) Documented at 10/02/2018 1401        Vitals:    10/03/18 0725   BP: 95/61   Pulse: 81   Resp: 22   Temp:    SpO2: 94%       Head:    Normocephalic, without obvious abnormality, atraumatic   Eyes:            Lids and lashes normal, conjunctivae and sclerae normal, no   icterus, no pallor   Ears:    Ears appear intact with no abnormalities noted   Throat:   No oral lesions, dentition good   Neck:   No adenopathy, supple, trachea midline, no thyromegaly, no   carotid bruit, no JVD   Lungs:     Breath sounds are equal and clear to auscultation    Heart:    Normal S1 and S2, RRR, No M/G/R   Abdomen:     Normal bowel sounds, no masses, no organomegaly, soft        non-tender, non-distended, no guarding   Extremities:   Moves all extremities well, no edema, no cyanosis, no redness   Pulses:   Pulses palpable and equal bilaterally.    Skin:  Psychiatric:   No bleeding, bruising or rash    Awake, alert and oriented x 3, normal mood and affect                 EKG      Baseline      I personally viewed and interpreted the patient's EKG/Telemetry data    Assessment:  Active Hospital Problems    Diagnosis Date Noted   • **Chest pain [R07.9] 10/02/2018   • Hip pain, chronic, right [M25.551, G89.29] 10/02/2018   • Hematemesis [K92.0] 10/02/2018   • Hypothyroidism [E03.9] 10/01/2017   • Parkinson's disease [G20] 09/29/2017   • COPD (chronic obstructive pulmonary disease) [J44.9] 09/28/2017   • History of CVA (cerebrovascular accident) [Z86.73] 09/28/2017   • Coronary artery " disease [I25.10] 09/28/2017      Resolved Hospital Problems    Diagnosis Date Noted Date Resolved   No resolved problems to display.       Plan:At this point this patient is pretty worn down by life, he has lost a lot of weight, he is not mobile, had a hip fracture this year. He has chest pain, he has known coronary disease, but he is also coughing up blood, throwing up blood. I think with his pain we have not seen a change in troponin or ECG that se should pursue a GI evaluation first although I would not be surprised if his coronary disease is worsened because he still smokes. He does not strike me as a good candidate for invasive testing given his overall health.         Victor Manuel Bhat MD  10/03/18  11:04 AM.

## 2018-10-03 NOTE — CONSULTS
Orthopedic Consult      Patient: Victor Manuel Issa  YOB: 1945     Date of Admission: 10/2/2018  1:57 PM    Medical Record Number: [id number]    Attending Physician: Jeovany Mcmahon MD    Consulting Physician: Radha Paredes MD    Reason for Consult: Right hip pain, spasms, difficulty ambulating    History of Present Illness: 73 y.o. male admitted to [facility] with Chest pain [R07.9]  Chest pain, unspecified type [R07.9]  Chest pain, unspecified type [R07.9].     The patient was evaluated in the emergency room and was diagnosed with a  Chest pain and admitted for evaluation.     Secondary to the age and multiple medical co morbidities the patient was admitted to the hospitalist.   As I was on call for the emergency room I was consulted for further evaluation and treatment.   The patient has a history of right partial hip replacement by Dr Catherine in May 2018 for femoral neck fracture.  He was supposed to see him today in the office for continued pain. He has tried extensive rehabilitation but is not improving.     Denies any history of fevers, chills, gout, other joint pains.     The patient is accompanied by his wife family members  to this hospital visit.     Patient is a   community ambulator. Patient denies  walker/cane as assistive device, prior to his injury, but has been using assist since his surgery and is unable to get off the assist device.     The patient  lives at home with  His wife  , is quite active and independent in activities of daily living.    The patient denies history of dementia.    Patient denies any history of: DVT/PE, MRSA,   CHF,   Diabetes mellitus, Dementia or A-Fib.   The patient has history of : Stroke and Parkinsonism. COPD, bipolar, CAD, MI  The patient is not on anticoagulants:       Past medical history, Past surgical history, family history, Social history, current medications, home medications Have been reviewed by me.    Past Medical  History:   Diagnosis Date   • Arthritis    • Bipolar 1 disorder (CMS/HCC)    • COPD (chronic obstructive pulmonary disease) (CMS/HCC)    • Coronary artery disease    • Disease of thyroid gland    • Hyperlipidemia    • Myocardial infarction (CMS/HCC)    • Stroke (CMS/HCC)      Past Surgical History:   Procedure Laterality Date   • ABDOMINAL SURGERY     • CARDIAC SURGERY     • CHOLECYSTECTOMY     • CORONARY ARTERY BYPASS GRAFT      x2   • EYE SURGERY      cataracts   • HIP ENDOPROSTHESIS Right 5/5/2018    Procedure: RIGHT HIP HEMIARTHROPLASTY;  Surgeon: Winston Vallejo MD;  Location: [facility] OR;  Service: Orthopedics   • MUSCLE BIOPSY Left 3/24/2016    Procedure: LT THIGH MUSCLE BIOPSY;  Surgeon: Fausto Mack MD;  Location: [facility] OR;  Service:    • SKIN BIOPSY     • THYROID SURGERY     • TURP / TRANSURETHRAL INCISION / DRAINAGE PROSTATE     • VASCULAR SURGERY       Social History     Occupational History   • Not on file.     Social History Main Topics   • Smoking status: Current Every Day Smoker     Packs/day: 0.50     Types: Cigarettes     Start date: 1960   • Smokeless tobacco: Never Used      Comment: Educated pt on quitting   • Alcohol use Yes      Comment: occasionally   • Drug use: No   • Sexual activity: Yes     Partners: Female    Social History     Social History Narrative   • No narrative on file     Family History   Problem Relation Age of Onset   • Cancer Mother    • Arthritis Father    • Heart disease Father    • Early death Brother    • Heart disease Brother           Allergies   Allergen Reactions   • Beet [Beta Vulgaris] Unknown (See Comments)     Pt cant remember        Home Medications:  Prescriptions Prior to Admission   Medication Sig Dispense Refill Last Dose   • acetaminophen (TYLENOL) 325 MG tablet Take 2 tablets by mouth Every 4 (Four) Hours As Needed for Mild Pain , Headache or Fever.      • albuterol (PROVENTIL) (2.5 MG/3ML) 0.083% nebulizer solution Take 2.5 mg by  nebulization Every 6 (Six) Hours As Needed for Wheezing.  12    • arformoterol (BROVANA) 15 MCG/2ML nebulizer solution Take 15 mcg by nebulization 2 (Two) Times a Day.      • aspirin  MG EC tablet Take 1 tablet by mouth Daily.      • baclofen (LIORESAL) 10 MG tablet Take 1 tablet by mouth 3 (Three) Times a Day. 20 tablet 0    • busPIRone (BUSPAR) 5 MG tablet Take 1 tablet by mouth 3 (Three) Times a Day As Needed (anxiety).      • carbidopa-levodopa (SINEMET)  MG per tablet Take 1 tablet by mouth 3 (Three) Times a Day With Meals. 90 tablet 0    • cefuroxime (CEFTIN) 500 MG tablet Take 500 mg by mouth 2 (Two) Times a Day.      • cholecalciferol (VITAMIN D3) 1000 UNITS tablet Take 1,000 Units by mouth daily.   Taking   • diclofenac (VOLTAREN) 1 % gel gel Apply 4 g topically Daily As Needed (apply to the knees).      • divalproex (DEPAKOTE) 500 MG DR tablet Take 500 mg by mouth 2 (Two) Times a Day.      • donepezil (ARICEPT) 10 MG tablet Take 20 mg by mouth Every Night.      • esomeprazole (nexIUM) 40 MG capsule Take 40 mg by mouth Daily.      • fexofenadine (ALLEGRA) 180 MG tablet Take 180 mg by mouth daily.   Taking   • fluticasone (FLONASE) 50 MCG/ACT nasal spray 1 spray into each nostril Daily.      • levothyroxine (SYNTHROID, LEVOTHROID) 175 MCG tablet Take 175 mcg by mouth Daily.      • montelukast (SINGULAIR) 10 MG tablet Take 10 mg by mouth Every Night.   Taking   • Multiple Vitamins-Minerals (MULTIVITAMIN WITH MINERALS) tablet tablet Take 1 tablet by mouth Daily.      • OLANZapine (ZYPREXA) 10 MG tablet Take 10 mg by mouth Every Night.   Taking   • oxybutynin XL (DITROPAN-XL) 5 MG 24 hr tablet Take 5 mg by mouth 2 (Two) Times a Day.      • promethazine (PHENERGAN) 25 MG tablet Take 25 mg by mouth Every 6 (Six) Hours As Needed for Nausea or Vomiting.      • simvastatin (ZOCOR) 40 MG tablet Take 40 mg by mouth Every Night.          Current Medications:  Scheduled Meds:  arformoterol 15 mcg  Nebulization BID - RT   atorvastatin 20 mg Oral Daily   baclofen 10 mg Oral TID   carbidopa-levodopa 1 tablet Oral TID With Meals   divalproex 500 mg Oral BID   donepezil 20 mg Oral Nightly   fluticasone 1 spray Nasal Daily   levothyroxine 175 mcg Oral Daily   montelukast 10 mg Oral Nightly   OLANZapine 10 mg Oral Nightly   oxybutynin XL 5 mg Oral BID   sodium chloride 3 mL Intravenous Q12H     Continuous Infusions:  pantoprazole 8 mg/hr Last Rate: 8 mg/hr (10/03/18 1922)   sodium chloride 75 mL/hr Last Rate: 75 mL/hr (10/03/18 1117)     PRN Meds:.•  acetaminophen  •  albuterol  •  busPIRone  •  ondansetron  •  promethazine  •  sodium chloride    Review of Systems:   A 12 point system review was reviewed with the patient and from the chart  and is negative except as mentioned in history of present illness.      Physical Exam: 73 y.o. male                    Vitals:    10/02/18 2300 10/03/18 0725 10/03/18 1300 10/03/18 1910   BP: 102/58 95/61 94/57 109/60   BP Location: Right arm Right arm Right arm Right arm   Patient Position: Lying Lying Lying Lying   Pulse: 81 81 75 61   Resp: 17 22 20 20   Temp: 98.3 °F (36.8 °C)  98.3 °F (36.8 °C) 98.1 °F (36.7 °C)   TempSrc: Oral  Oral Oral   SpO2: 95% 94% 96% 96%   Weight:       Height:            Gait: Not evaluated.     Mental/HEENT/cardio/skin: The patient's general appearance was well-nourished, well-hydrated, no acute distress.  Orientation was alert and oriented ×3.  The patient's mood was normal.   Pulmonary exam shows normal late exchange, no labored breathing, or shortness of breath.    The  skin exam showed normal temperature and color in the area of examination.    Extremities: Right hip flexion, adduction, internal rotation deformity,  Spasm, attempted movements are painful and restricted    Pulses:  Pulses palpable and equal bilaterally    Diagnostic Tests:      Results from last 7 days  Lab Units 10/03/18  0918 10/03/18  0611 10/03/18  0007 10/02/18  1409   WBC  10*3/mm3  --  14.91*  --  18.72*   HEMOGLOBIN g/dL 10.9* 10.8* 11.0* 12.4*   HEMATOCRIT % 36.4* 35.5* 36.8* 39.7*   PLATELETS 10*3/mm3  --  375  --  465       Results from last 7 days  Lab Units 10/03/18  0611 10/02/18  1409   SODIUM mmol/L 142 144   POTASSIUM mmol/L 4.7 4.1   CHLORIDE mmol/L 109* 103   CO2 mmol/L 21.1* 29.3*   BUN mg/dL 35* 33*   CREATININE mg/dL 0.81 1.03   GLUCOSE mg/dL 110* 139*   CALCIUM mg/dL 10.7* 12.2*         No results found for: URICACID  No results found for: CRYSTAL  Microbiology Results (last 10 days)     ** No results found for the last 240 hours. **        Xr Chest 1 View    Result Date: 10/2/2018  No definite active disease is seen in the chest. There has been evidence of previous CABG, old healed fracture deformity of the midshaft of the right clavicle, mild pulmonary hyperexpansion. Patient rotation slightly limits evaluation.  Vague opacity over the lateral aspect of the left midlung zone most likely overlying soft tissues secondary to patient rotation.  This report was finalized on 10/2/2018 4:11 PM by Dr. Tu Perea M.D.      Ct Angiogram Chest With Contrast    Result Date: 10/2/2018  Diffusely dilated esophagus as noted. Otherwise unremarkable CTA of the chest. There is no CT evidence of pulmonary embolism or other acute process within the chest.  This report was finalized on 10/2/2018 4:01 PM by Dr. Kuldip Ross M.D.        The labs, X-ray results for preoperative evaluation have been reviewed by me.    Assessment: Painful right hip bipolar arthroplasty    Patient Active Problem List   Diagnosis   • Thigh pain, musculoskeletal   • Left leg weakness   • History of CVA (cerebrovascular accident)   • COPD (chronic obstructive pulmonary disease)   • Coronary artery disease   • Cervical myelopathy (CMS/HCC)   • Parkinson's disease   • Debility   • Cerebrovascular disease   • Abnormal TSH   • Hypothyroidism   • Hip fracture requiring operative repair, right, closed, initial  encounter (CMS/[facility])   • Urinary tract infection without hematuria   • Hypoxia   • Sinus bradycardia   • Chest pain   • Hip pain, chronic, right   • Hematemesis   • Hematemesis with nausea       Plan:    Options and alternatives were discussed in detail with the patient and   family.   The patient is indicated for further evaluation.  His implant appears stable, but he seems to be having spasm due to his stroke and parkinsonism. Has been using muscle relaxants, not on opiates. He may benefit from conversion to a total hip to decrease the pain in the right hip which in turn may decrease the spasm.   Cannot gaurantee the outcome, significant risks involved including but not limited to recurrent dislocation.   May benefit from a diagnostic hip injection under fluoroscopy while in the hospital.  Will notify Dr Catherine.      Date: 10/3/2018    Radha Paredes MD     CC: Shira Pierce APRN; MD Salome Almendarez Stephen A, MD

## 2018-10-03 NOTE — THERAPY EVALUATION
Acute Care - Physical Therapy Initial Evaluation  [facility]     Patient Name: Victor Manuel Issa  : 1945  MRN: [id number]  Today's Date: 10/3/2018   Onset of Illness/Injury or Date of Surgery: 10/02/18  Date of Referral to PT: 10/03/18  Referring Physician: Heather      Admit Date: 10/2/2018    Visit Dx:     ICD-10-CM ICD-9-CM   1. Chest pain, unspecified type R07.9 786.50   2. Hematemesis with nausea K92.0 578.0     787.02   3. Generalized weakness R53.1 780.79     Patient Active Problem List   Diagnosis   • Thigh pain, musculoskeletal   • Left leg weakness   • History of CVA (cerebrovascular accident)   • COPD (chronic obstructive pulmonary disease)   • Coronary artery disease   • Cervical myelopathy (CMS/[facility])   • Parkinson's disease   • Debility   • Cerebrovascular disease   • Abnormal TSH   • Hypothyroidism   • Hip fracture requiring operative repair, right, closed, initial encounter (CMS/[facility])   • Urinary tract infection without hematuria   • Hypoxia   • Sinus bradycardia   • Chest pain   • Hip pain, chronic, right   • Hematemesis   • Hematemesis with nausea     Past Medical History:   Diagnosis Date   • Arthritis    • Bipolar 1 disorder (CMS/[facility])    • COPD (chronic obstructive pulmonary disease) (CMS/[facility])    • Coronary artery disease    • Disease of thyroid gland    • Hyperlipidemia    • Myocardial infarction (CMS/[facility])    • Stroke (CMS/[facility])      Past Surgical History:   Procedure Laterality Date   • ABDOMINAL SURGERY     • CARDIAC SURGERY     • CHOLECYSTECTOMY     • CORONARY ARTERY BYPASS GRAFT      x2   • EYE SURGERY      cataracts   • HIP ENDOPROSTHESIS Right 2018    Procedure: RIGHT HIP HEMIARTHROPLASTY;  Surgeon: Winston Vallejo MD;  Location: [facility] OR;  Service: Orthopedics   • MUSCLE BIOPSY Left 3/24/2016    Procedure: LT THIGH MUSCLE BIOPSY;  Surgeon: Fausto Mack MD;  Location: [facility] OR;  Service:    • SKIN BIOPSY     • THYROID SURGERY     • TURP / TRANSURETHRAL  INCISION / DRAINAGE PROSTATE     • VASCULAR SURGERY          PT ASSESSMENT (last 12 hours)      Physical Therapy Evaluation     Row Name 10/03/18 1350          PT Evaluation Time/Intention    Subjective Information complains of;pain   in R LE and buttock  -EJ (r) KH (t) EJ (c)     Document Type evaluation  -EJ (r) KH (t) EJ (c)     Mode of Treatment individual therapy;physical therapy  -EJ (r) KH (t) EJ (c)     Patient Effort adequate  -EJ (r) KH (t) EJ (c)     Symptoms Noted During/After Treatment increased pain  -EJ (r) KH (t) EJ (c)     Row Name 10/03/18 1350          General Information    Patient Profile Reviewed? yes  -EJ (r) KH (t) EJ (c)     Onset of Illness/Injury or Date of Surgery 10/02/18  -EJ (r) KH (t) EJ (c)     Referring Physician Heather  -EJ (r) KH (t) EJ (c)     Patient Observations alert;cooperative;agree to therapy  -EJ (r) KH (t) EJ (c)     Patient/Family Observations pt supine in bed, no acute distress, family at bedside  -EJ (r) KH (t) EJ (c)     Prior Level of Function --   used w/c last few weeks 2' inc R LE and buttock pain  -EJ (r) KH (t) EJ (c)     Equipment Currently Used at Home wheelchair;walker, rolling  -EJ (r) KH (t) EJ (c)     Pertinent History of Current Functional Problem R hip hemiarthroplasty 2' fall in 5/2018; recent inc R LE and buttock pain; Parkinsonism, COPD, chest pain  -EJ (r) KH (t) EJ (c)     Existing Precautions/Restrictions fall;right;hip  -EJ (r) KH (t) EJ (c)     Risks Reviewed patient:;family:  -EJ (r) KH (t) EJ (c)     Benefits Reviewed patient:;family:  -EJ (r) KH (t) EJ (c)     Barriers to Rehab (P)  previous functional deficit  -EJ     Row Name 10/03/18 1350          Relationship/Environment    Primary Source of Support/Comfort (P)  extended family  -EJ     Lives With spouse  -EJ (r) KH (t) EJ (c)     Row Name 10/03/18 1350          Resource/Environmental Concerns    Current Living Arrangements (P)  home/apartment/condo  -EJ     Row Name 10/03/18 8611           Cognitive Assessment/Intervention- PT/OT    Orientation Status (Cognition) oriented x 3  -EJ (r) KH (t) EJ (c)     Follows Commands (Cognition) follows one step commands;75-90% accuracy  -EJ (r) KH (t) EJ (c)     Personal Safety Interventions fall prevention program maintained;gait belt;nonskid shoes/slippers when out of bed;supervised activity  -EJ (r) KH (t) EJ (c)     Row Name 10/03/18 [facility]          Mobility Assessment/Treatment    Extremity Weight-bearing Status (P)  right lower extremity  -EJ     Right Lower Extremity (Weight-bearing Status) (P)  weight-bearing as tolerated (WBAT)  -EJ     Row Name 10/03/18 [facility]          Bed Mobility Assessment/Treatment    Bed Mobility Assessment/Treatment supine-sit;sit-supine;scooting/bridging  -EJ (r) KH (t) EJ (c)     Scooting/Bridging [place] (Bed Mobility) dependent (less than 25% patient effort);2 person assist  -EJ (r) KH (t) EJ (c)     Supine-Sit [place] (Bed Mobility) moderate assist (50% patient effort);2 person assist  -EJ (r) KH (t) EJ (c)     Sit-Supine [place] (Bed Mobility) moderate assist (50% patient effort);2 person assist  -EJ (r) KH (t) EJ (c)     Assistive Device (Bed Mobility) bed rails;draw sheet  -EJ (r) KH (t) EJ (c)     Row Name 10/03/18 6360          Transfer Assessment/Treatment    Transfer Assessment/Treatment sit-stand transfer;stand-sit transfer  -EJ (r) KH (t) EJ (c)     Sit-Stand [place] (Transfers) minimum assist (75% patient effort);moderate assist (50% patient effort);2 person assist;verbal cues   cues for upright posture  -EJ (r) KH (t) EJ (c)     Stand-Sit [place] (Transfers) minimum assist (75% patient effort);moderate assist (50% patient effort);2 person assist  -EJ (r) KH (t) EJ (c)     Row Name 10/03/18 1438          Sit-Stand Transfer    Assistive Device (Sit-Stand Transfers) walker, front-wheeled  -EJ (r) KH (t) EJ (c)     Row Name 10/03/18 [facility]          Stand-Sit Transfer    Assistive Device (Stand-Sit  Transfers) walker, front-wheeled  -EJ (r) KH (t) EJ (c)     Row Name 10/03/18 1350          Gait/Stairs Assessment/Training    [place] Level (Gait) (P)  unable to assess  -EJ     Comment (Gait/Stairs) (P)  limited 2' pain and weakness  -EJ     Row Name 10/03/18 1350          General ROM    GENERAL ROM COMMENTS BLE AROM WFL; tightness noted in hamstrings limiting full knee extension  -EJ (r) KH (t) EJ (c)     Row Name 10/03/18 1350          MMT (Manual Muscle Testing)    General MMT Comments RLE at least 3/5, difficulty to assess 2' to pain; L LE grossly at least 4+/5  -EJ (r) KH (t) EJ (c)     Row Name 10/03/18 1350          Sensory Assessment/Intervention    Sensory General Assessment no sensation deficits identified  -EJ (r) KH (t) EJ (c)     Row Name 10/03/18 [facility]          Pain Assessment    Additional Documentation Pain Scale: FACES Pre/Post-Treatment (Group)  -EJ (r) KH (t) EJ (c)     Row Name 10/03/18 [facility]          Pain Scale: Numbers Pre/Post-Treatment    Pain Location - Side Right  -EJ (r) KH (t) EJ (c)     Pain Location hip  -EJ (r) KH (t) EJ (c)     Pain Intervention(s) Repositioned  -EJ (r) KH (t) EJ (c)     Row Name 10/03/18 [facility]          Pain Scale: FACES Pre/Post-Treatment    Pain: FACES Scale, Pretreatment 2-->hurts little bit  -EJ (r) KH (t) EJ (c)     Pain: FACES Scale, Post-Treatment 2-->hurts little bit  -EJ (r) KH (t) EJ (c)     Pre/Post Treatment Pain Comment inc pain w, movement  -EJ (r) KH (t) EJ (c)     Row Name             Wound 10/02/18 1912 Left upper hip pressure injury    Wound - Properties Group Date first assessed: 10/02/18  -BF Time first assessed: 1912  -BF Present On Admission : yes  -BF Side: Left  -BF Orientation: upper  -BF Location: hip  -BF Type: pressure injury  -BF Stage, Pressure Injury: Stage 1  -BF    Row Name             Wound 10/02/18 1927 Bilateral medial coccyx pressure injury    Wound - Properties Group Date first assessed: 10/02/18  -BF Time first assessed: 1927   -BF Present On Admission : yes  -BF Side: Bilateral  -BF Orientation: medial  -BF Location: coccyx  -BF Type: pressure injury  -BF Stage, Pressure Injury: Stage 2  -BF    Row Name 10/03/18 8084          Plan of Care Review    Plan of Care Reviewed With patient;family  -EJ (r) KH (t) EJ (c)     Row Name 10/03/18 [facility]          Physical Therapy Clinical Impression    Date of Referral to PT 10/03/18  -EJ (r) KH (t) EJ (c)     Patient/Family Goals Statement (PT Clinical Impression) (P)  Pt did not state, may need SNU at [facility] pending progress with therapy  -EJ     Criteria for Skilled Interventions Met (PT Clinical Impression) yes  -EJ (r) KH (t) EJ (c)     Pathology/Pathophysiology Noted (Describe Specifically for Each System) musculoskeletal;neuromuscular;cardiovascular  -EJ (r) KH (t) EJ (c)     Impairments Found (describe specific impairments) aerobic capacity/endurance;gait, locomotion, and balance;ROM;posture;motor function;muscle performance  -EJ (r) KH (t) EJ (c)     Functional Limitations in Following Categories (Describe Specific Limitations) self-care;home management  -EJ (r) KH (t) EJ (c)     Rehab Potential (PT Clinical Summary) fair, will monitor progress closely  -EJ (r) KH (t) EJ (c)     Row Name 10/03/18 [facility]          Physical Therapy Goals    Bed Mobility Goal Selection (PT) bed mobility, PT goal 1  -EJ (r) KH (t) EJ (c)     Transfer Goal Selection (PT) transfer, PT goal 1  -EJ (r) KH (t) EJ (c)     Gait Training Goal Selection (PT) gait training, PT goal 1  -EJ (r) KH (t) EJ (c)     Row Name 10/03/18 5194          Bed Mobility Goal 1 (PT)    Activity/Assistive Device (Bed Mobility Goal 1, PT) sit to supine;supine to sit  -EJ (r) KH (t) EJ (c)     [place] Level/Cues Needed (Bed Mobility Goal 1, PT) contact guard assist  -EJ (r) KH (t) EJ (c)     Time Frame (Bed Mobility Goal 1, PT) 1 week  -EJ (r) KH (t) EJ (c)     Row Name 10/03/18 2972          Transfer Goal 1 (PT)    Activity/Assistive Device  (Transfer Goal 1, PT) sit-to-stand/stand-to-sit  -EJ (r) KH (t) EJ (c)     [place] Level/Cues Needed (Transfer Goal 1, PT) contact guard assist  -EJ (r) KH (t) EJ (c)     Time Frame (Transfer Goal 1, PT) 1 week  -EJ (r) KH (t) EJ (c)     Row Name 10/03/18 1350          Gait Training Goal 1 (PT)    Activity/Assistive Device (Gait Training Goal 1, PT) gait (walking locomotion)  -EJ (r) KH (t) EJ (c)     [place] Level (Gait Training Goal 1, PT) contact guard assist  -EJ (r) KH (t) EJ (c)     Distance (Gait Goal 1, PT) 80 feet  -EJ (r) KH (t) EJ (c)     Time Frame (Gait Training Goal 1, PT) 1 week  -EJ (r) KH (t) EJ (c)     Row Name 10/03/18 6211          Positioning and Restraints    Pre-Treatment Position in bed  -EJ (r) KH (t) EJ (c)     Post Treatment Position bed  -EJ (r) KH (t) EJ (c)     In Bed supine;call light within reach;encouraged to call for assist;with family/caregiver;with other staff  -EJ (r) KH (t) EJ (c)     Row Name 10/03/18 3203          Living Environment    Home Accessibility (P)  wheelchair accessible  -EJ       User Key  (r) = Recorded By, (t) = Taken By, (c) = Cosigned By    Initials Name Provider Type    EJ Char Myers, PT Physical Therapist    Charmaine Arguello, RN Registered Nurse     Rica Lemos, PT Student PT Student          Physical Therapy Education     Title: PT OT SLP Therapies (Active)     Topic: Physical Therapy (Active)     Point: Mobility training (Active)    Learning Progress Summary     Learner Status Readiness Method Response Comment Documented by    Patient Active Acceptance E,D NR   10/03/18 1405          Point: Body mechanics (Active)    Learning Progress Summary     Learner Status Readiness Method Response Comment Documented by    Patient Active Acceptance E,D NR   10/03/18 1405                      User Key     Initials Effective Dates Name Provider Type Discipline     09/17/18 -  Rica Lemos, PT Student PT Student PT                PT  Recommendation and Plan  Anticipated Discharge Disposition (PT): home with home health, skilled nursing facility, home with assist (pending progress)  Planned Therapy Interventions (PT Eval): balance training, bed mobility training, gait training, home exercise program, ROM (range of motion), strengthening, transfer training  Therapy Frequency (PT Clinical Impression): daily  Outcome Summary/Treatment Plan (PT)  Anticipated Discharge Disposition (PT): home with home health, skilled nursing facility, home with assist (pending progress)  Plan of Care Reviewed With: patient, family  Outcome Summary: Pt presents w, generalized weakness and immobility 2' inc R LE and buttock pain. Pt previously had R hip hemiarthroplasty in 5/2018 and was in rehab for 100 days.           Outcome Measures     Row Name 10/03/18 1400             How much help from another person do you currently need...    Turning from your back to your side while in flat bed without using bedrails? 3  -EJ (r) KH (t) EJ (c)      Moving from lying on back to sitting on the side of a flat bed without bedrails? 2  -EJ (r) KH (t) EJ (c)      Moving to and from a bed to a chair (including a wheelchair)? 2  -EJ (r) KH (t) EJ (c)      Standing up from a chair using your arms (e.g., wheelchair, bedside chair)? 2  -EJ (r) KH (t) EJ (c)      Climbing 3-5 steps with a railing? 1  -EJ (r) KH (t) EJ (c)      To walk in hospital room? 2  -EJ (r) KH (t) EJ (c)      AM-PAC 6 Clicks Score 12  -EJ (r) KH (t)         Functional Assessment    Outcome Measure Options AM-PAC 6 Clicks Basic Mobility (PT)  -EJ (r) KH (t) EJ (c)        User Key  (r) = Recorded By, (t) = Taken By, (c) = Cosigned By    Initials Name Provider Type    Char Sánchez, PT Physical Therapist    Rica Malagon, PT Student PT Student           Time Calculation:         PT Charges     Row Name 10/03/18 1419             Time Calculation    Start Time 1330  -EJ (r) KH (t) EJ (c)      Stop Time 1350   -EJ (r) KH (t) EJ (c)      Time Calculation (min) 20 min  -EJ (r) KH (t)      PT Received On 10/03/18  -EJ (r) KH (t) EJ (c)      PT - Next Appointment 10/04/18  -EJ (r) KH (t) EJ (c)      PT Goal Re-Cert Due Date 10/10/18  -EJ (r) KH (t) EJ (c)        User Key  (r) = Recorded By, (t) = Taken By, (c) = Cosigned By    Initials Name Provider Type    Char Sánchez, PT Physical Therapist    Rica Malagon, PT Student PT Student        Therapy Suggested Charges     Code   Minutes Charges    None           Therapy Charges for Today     Code Description Service Date Service Provider Modifiers Qty    72004378280 HC PT THER SUPP EA 15 MIN 10/3/2018 Rica Lemos, PT Student GP 1    12907173099 HC PT EVAL MOD COMPLEXITY 2 10/3/2018 Rica Lemos, PT Student GP 1          PT G-Codes  Outcome Measure Options: AM-PAC 6 Clicks Basic Mobility (PT)  AM-PAC 6 Clicks Score: 12      Rica Lemos PT Student  10/3/2018

## 2018-10-03 NOTE — PLAN OF CARE
Problem: Patient Care Overview  Goal: Plan of Care Review  Outcome: Ongoing (interventions implemented as appropriate)   10/03/18 0452 10/03/18 0929 10/03/18 1253   Coping/Psychosocial   Plan of Care Reviewed With --  patient;spouse;daughter --    Plan of Care Review   Progress no change --  --    OTHER   Outcome Summary --  --  Pt position changed q 2hr. Dressing to coccyx changed this am. Pt VSS no c/o soa on RA. Family at bedside. will continue to monitor pt.     Goal: Individualization and Mutuality  Outcome: Ongoing (interventions implemented as appropriate)    Goal: Discharge Needs Assessment  Outcome: Ongoing (interventions implemented as appropriate)    Goal: Interprofessional Rounds/Family Conf  Outcome: Ongoing (interventions implemented as appropriate)      Problem: Fall Risk (Adult)  Goal: Identify Related Risk Factors and Signs and Symptoms  Outcome: Ongoing (interventions implemented as appropriate)    Goal: Absence of Fall  Outcome: Ongoing (interventions implemented as appropriate)      Problem: Skin Injury Risk (Adult)  Goal: Identify Related Risk Factors and Signs and Symptoms  Outcome: Ongoing (interventions implemented as appropriate)

## 2018-10-03 NOTE — CONSULTS
[facility]  Initial Inpatient Consult Note    Referring Provider: Shira HUTCHINS    Reason for Consultation: Hematemesis, chest pain, abnormal CT scan    Subjective     History of present illness:    73 y.o. male, previously unknown to our service, with multiple comorbidities including GERD with esophagitis 2012, CAD s/p CABG x 2, COPD, CVA, hypothyroid, bipolar, tobacco abuse, and hip fracture this year. He presented to [facility] with complaints of central chest pain radiating to his arms that was relieved with nitro. Cardiology workup demonstrates normal troponin and unchanged EKG. Patient reports vomiting dark brown, maroon blood twice on 10/1. He continues to have nausea with spitting dark brown. He denies coughing blood but has been noted to cough when eating this admission. He denies abdominal pain, dysphagia, odynophagia, melena or bright red blood per rectum. Per epic he has lost 35lbs since May 2018. The patient has been sedentary since since hip fracture in May and has no appetite.    He is on an aspirin as an outpatient but not antiplatelets or anticoagulant. Naproxen almost daily and has been taking the medication for several years. He smokes 1/2 pack cigarettes per day but denies alcohol use.  He denies a history of GI bleeding. He had EGD 2012 at outlying facility with Dr. Lyn which demonstrated hiatus hernia, esophagitis with irregular Z-line and mucosal inflammation and gastritis.  He recalls a colonoscopy with Dr. Tneorio at [facility] over 5 years ago demonstrating colon polyps.     Hgb is fairly stable compared to baseline values. (Hgb 12.8 on 8/19/18 and now 10.9). BUN is 35 compared to 17 on 8/19/18.     Past Medical History:  Past Medical History:   Diagnosis Date   • Arthritis    • Bipolar 1 disorder (CMS/HCC)    • COPD (chronic obstructive pulmonary disease) (CMS/HCC)    • Coronary artery disease    • Disease of thyroid gland    • Hyperlipidemia    •  Myocardial infarction (CMS/HCC)    • Stroke (CMS/HCC)      Past Surgical History:  Past Surgical History:   Procedure Laterality Date   • ABDOMINAL SURGERY     • CARDIAC SURGERY     • CHOLECYSTECTOMY     • CORONARY ARTERY BYPASS GRAFT      x2   • EYE SURGERY      cataracts   • HIP ENDOPROSTHESIS Right 5/5/2018    Procedure: RIGHT HIP HEMIARTHROPLASTY;  Surgeon: Winston Vallejo MD;  Location: [facility] OR;  Service: Orthopedics   • MUSCLE BIOPSY Left 3/24/2016    Procedure: LT THIGH MUSCLE BIOPSY;  Surgeon: Fausto Mack MD;  Location: [facility] OR;  Service:    • SKIN BIOPSY     • THYROID SURGERY     • TURP / TRANSURETHRAL INCISION / DRAINAGE PROSTATE     • VASCULAR SURGERY        Social History:   Social History   Substance Use Topics   • Smoking status: Current Every Day Smoker     Packs/day: 0.50     Types: Cigarettes     Start date: 1960   • Smokeless tobacco: Never Used      Comment: Educated pt on quitting   • Alcohol use Yes      Comment: occasionally      Family History:  Family History   Problem Relation Age of Onset   • Cancer Mother    • Arthritis Father    • Heart disease Father    • Early death Brother    • Heart disease Brother        Home Meds:  Prescriptions Prior to Admission   Medication Sig Dispense Refill Last Dose   • acetaminophen (TYLENOL) 325 MG tablet Take 2 tablets by mouth Every 4 (Four) Hours As Needed for Mild Pain , Headache or Fever.      • albuterol (PROVENTIL) (2.5 MG/3ML) 0.083% nebulizer solution Take 2.5 mg by nebulization Every 6 (Six) Hours As Needed for Wheezing.  12    • arformoterol (BROVANA) 15 MCG/2ML nebulizer solution Take 15 mcg by nebulization 2 (Two) Times a Day.      • aspirin  MG EC tablet Take 1 tablet by mouth Daily.      • baclofen (LIORESAL) 10 MG tablet Take 1 tablet by mouth 3 (Three) Times a Day. 20 tablet 0    • busPIRone (BUSPAR) 5 MG tablet Take 1 tablet by mouth 3 (Three) Times a Day As Needed (anxiety).      • carbidopa-levodopa  (SINEMET)  MG per tablet Take 1 tablet by mouth 3 (Three) Times a Day With Meals. 90 tablet 0    • cefuroxime (CEFTIN) 500 MG tablet Take 500 mg by mouth 2 (Two) Times a Day.      • cholecalciferol (VITAMIN D3) 1000 UNITS tablet Take 1,000 Units by mouth daily.   Taking   • diclofenac (VOLTAREN) 1 % gel gel Apply 4 g topically Daily As Needed (apply to the knees).      • divalproex (DEPAKOTE) 500 MG DR tablet Take 500 mg by mouth 2 (Two) Times a Day.      • donepezil (ARICEPT) 10 MG tablet Take 20 mg by mouth Every Night.      • esomeprazole (nexIUM) 40 MG capsule Take 40 mg by mouth Daily.      • fexofenadine (ALLEGRA) 180 MG tablet Take 180 mg by mouth daily.   Taking   • fluticasone (FLONASE) 50 MCG/ACT nasal spray 1 spray into each nostril Daily.      • levothyroxine (SYNTHROID, LEVOTHROID) 175 MCG tablet Take 175 mcg by mouth Daily.      • montelukast (SINGULAIR) 10 MG tablet Take 10 mg by mouth Every Night.   Taking   • Multiple Vitamins-Minerals (MULTIVITAMIN WITH MINERALS) tablet tablet Take 1 tablet by mouth Daily.      • OLANZapine (ZYPREXA) 10 MG tablet Take 10 mg by mouth Every Night.   Taking   • oxybutynin XL (DITROPAN-XL) 5 MG 24 hr tablet Take 5 mg by mouth 2 (Two) Times a Day.      • promethazine (PHENERGAN) 25 MG tablet Take 25 mg by mouth Every 6 (Six) Hours As Needed for Nausea or Vomiting.      • simvastatin (ZOCOR) 40 MG tablet Take 40 mg by mouth Every Night.        Current Meds:     arformoterol 15 mcg Nebulization BID - RT   atorvastatin 20 mg Oral Daily   baclofen 10 mg Oral TID   carbidopa-levodopa 1 tablet Oral TID With Meals   divalproex 500 mg Oral BID   donepezil 20 mg Oral Nightly   fluticasone 1 spray Nasal Daily   levothyroxine 175 mcg Oral Daily   montelukast 10 mg Oral Nightly   OLANZapine 10 mg Oral Nightly   oxybutynin XL 5 mg Oral BID   sodium chloride 3 mL Intravenous Q12H     Allergies:  Allergies   Allergen Reactions   • Beet [Beta Vulgaris] Unknown (See Comments)      Pt cant remember     Review of Systems  Pertinent items are noted in HPI, all other systems reviewed and negative     Objective     Vital Signs  Temp:  [98.1 °F (36.7 °C)-98.4 °F (36.9 °C)] 98.3 °F (36.8 °C)  Heart Rate:  [] 75  Resp:  [16-22] 20  BP: ()/(57-72) 94/57  Physical Exam:  General Appearance:    Alert, cooperative, chronically ill in appearance    Head:    Normocephalic, without obvious abnormality, atraumatic   Eyes:            Lids and lashes normal, conjunctivae and sclerae normal, no   icterus   Throat:   No oral lesions, no thrush, oral mucosa moist   Neck:   No adenopathy, supple, trachea midline, no thyromegaly, no   carotid bruit, no JVD   Lungs:     Unproductive cough     Heart:    Regular rhythm and normal rate, normal S1 and S2, no            murmur, no gallop, no rub, no click   Chest Wall:    No abnormalities observed   Abdomen:     Normal bowel sounds, no masses, no organomegaly, soft        non-tender, non-distended, no guarding, no rebound                 tenderness   Rectal:     Deferred   Extremities:   no edema, no cyanosis, no redness   Skin:   No bleeding, bruising or rash   Lymph nodes:   No palpable adenopathy   Psychiatric:  Judgement and insight: normal   Orientation to person place and time: normal   Mood and affect: normal   Results Review:   I reviewed the patient's new clinical results.      Results from last 7 days  Lab Units 10/03/18  0918 10/03/18  0611 10/03/18  0007 10/02/18  1409   WBC 10*3/mm3  --  14.91*  --  18.72*   HEMOGLOBIN g/dL 10.9* 10.8* 11.0* 12.4*   HEMATOCRIT % 36.4* 35.5* 36.8* 39.7*   PLATELETS 10*3/mm3  --  375  --  465       Results from last 7 days  Lab Units 10/03/18  0611 10/02/18  1409   SODIUM mmol/L 142 144   POTASSIUM mmol/L 4.7 4.1   CHLORIDE mmol/L 109* 103   CO2 mmol/L 21.1* 29.3*   BUN mg/dL 35* 33*   CREATININE mg/dL 0.81 1.03   CALCIUM mg/dL 10.7* 12.2*   BILIRUBIN mg/dL  --  0.2   ALK PHOS U/L  --  140*   ALT (SGPT) U/L  --  8    AST (SGOT) U/L  --  8   GLUCOSE mg/dL 110* 139*           Lab Results  Lab Value Date/Time   LIPASE 20 10/02/2018 1409       Radiology:  XR Hip With or Without Pelvis 2 - 3 View Right   Final Result      XR Chest 1 View   Final Result   No definite active disease is seen in the chest. There has   been evidence of previous CABG, old healed fracture deformity of the   midshaft of the right clavicle, mild pulmonary hyperexpansion. Patient   rotation slightly limits evaluation.  Vague opacity over the lateral   aspect of the left midlung zone most likely overlying soft tissues   secondary to patient rotation.       This report was finalized on 10/2/2018 4:11 PM by Dr. Tu Perea M.D.          CT Angiogram Chest With Contrast   Final Result   Diffusely dilated esophagus as noted. Otherwise unremarkable   CTA of the chest. There is no CT evidence of pulmonary embolism or other   acute process within the chest.       This report was finalized on 10/2/2018 4:01 PM by Dr. Kuldip Ross M.D.              Assessment/Plan   Patient Active Problem List   Diagnosis   • Thigh pain, musculoskeletal   • Left leg weakness   • History of CVA (cerebrovascular accident)   • COPD (chronic obstructive pulmonary disease)   • Coronary artery disease   • Cervical myelopathy (CMS/[facility])   • Parkinson's disease   • Debility   • Cerebrovascular disease   • Abnormal TSH   • Hypothyroidism   • Hip fracture requiring operative repair, right, closed, initial encounter (CMS/[facility])   • Urinary tract infection without hematuria   • Hypoxia   • Sinus bradycardia   • Chest pain   • Hip pain, chronic, right   • Hematemesis       Impression  1. Hematemesis with nausea  2. NSAID use  3. ABLA   4. Weight loss  5. Cardiac disease  6. COPD w/ tobacco abuse         Recommend upper endoscopic evaluation but unfortunately patient has had oral intake within the last hour.   EGD tomorrow with Dr. Glenna Winn.  Start PPI gtt  Monitor Hgb q 12 hours and  increase frequency as needed  Clear liquids today and NPO at midnight.         I discussed the patients findings and my recommendations with patient, family and nursing staff.    Paul Duncan MD

## 2018-10-03 NOTE — CONSULTS
FIRST UROLOGY CONSULT      Patient Identification:  NAME:  Victor Manuel Issa  Age:  73 y.o.   Sex:  male   :  1945   MRN:  [id number]       Chief complaint: chest pain.    History of present illness:  Pt admitted with chest pain. S/p penile bx in office last week. Doing well from that standpoint. No swelling, pain. Healing well.  To discuss path with pt.        Past medical history:  Past Medical History:   Diagnosis Date   • Arthritis    • Bipolar 1 disorder (CMS/HCC)    • COPD (chronic obstructive pulmonary disease) (CMS/HCC)    • Coronary artery disease    • Disease of thyroid gland    • Hyperlipidemia    • Myocardial infarction (CMS/HCC)    • Stroke (CMS/HCC)        Past surgical history:  Past Surgical History:   Procedure Laterality Date   • ABDOMINAL SURGERY     • CARDIAC SURGERY     • CHOLECYSTECTOMY     • CORONARY ARTERY BYPASS GRAFT      x2   • EYE SURGERY      cataracts   • HIP ENDOPROSTHESIS Right 2018    Procedure: RIGHT HIP HEMIARTHROPLASTY;  Surgeon: Winston Vallejo MD;  Location: [facility] OR;  Service: Orthopedics   • MUSCLE BIOPSY Left 3/24/2016    Procedure: LT THIGH MUSCLE BIOPSY;  Surgeon: Fausto Mack MD;  Location: [facility] OR;  Service:    • SKIN BIOPSY     • THYROID SURGERY     • TURP / TRANSURETHRAL INCISION / DRAINAGE PROSTATE     • VASCULAR SURGERY         Allergies:  Beet [beta vulgaris]    Home medications:  Prescriptions Prior to Admission   Medication Sig Dispense Refill Last Dose   • acetaminophen (TYLENOL) 325 MG tablet Take 2 tablets by mouth Every 4 (Four) Hours As Needed for Mild Pain , Headache or Fever.      • albuterol (PROVENTIL) (2.5 MG/3ML) 0.083% nebulizer solution Take 2.5 mg by nebulization Every 6 (Six) Hours As Needed for Wheezing.  12    • arformoterol (BROVANA) 15 MCG/2ML nebulizer solution Take 15 mcg by nebulization 2 (Two) Times a Day.      • aspirin  MG EC tablet Take 1 tablet by mouth Daily.      • baclofen (LIORESAL)  10 MG tablet Take 1 tablet by mouth 3 (Three) Times a Day. 20 tablet 0    • busPIRone (BUSPAR) 5 MG tablet Take 1 tablet by mouth 3 (Three) Times a Day As Needed (anxiety).      • carbidopa-levodopa (SINEMET)  MG per tablet Take 1 tablet by mouth 3 (Three) Times a Day With Meals. 90 tablet 0    • cefuroxime (CEFTIN) 500 MG tablet Take 500 mg by mouth 2 (Two) Times a Day.      • cholecalciferol (VITAMIN D3) 1000 UNITS tablet Take 1,000 Units by mouth daily.   Taking   • diclofenac (VOLTAREN) 1 % gel gel Apply 4 g topically Daily As Needed (apply to the knees).      • divalproex (DEPAKOTE) 500 MG DR tablet Take 500 mg by mouth 2 (Two) Times a Day.      • donepezil (ARICEPT) 10 MG tablet Take 20 mg by mouth Every Night.      • esomeprazole (nexIUM) 40 MG capsule Take 40 mg by mouth Daily.      • fexofenadine (ALLEGRA) 180 MG tablet Take 180 mg by mouth daily.   Taking   • fluticasone (FLONASE) 50 MCG/ACT nasal spray 1 spray into each nostril Daily.      • levothyroxine (SYNTHROID, LEVOTHROID) 175 MCG tablet Take 175 mcg by mouth Daily.      • montelukast (SINGULAIR) 10 MG tablet Take 10 mg by mouth Every Night.   Taking   • Multiple Vitamins-Minerals (MULTIVITAMIN WITH MINERALS) tablet tablet Take 1 tablet by mouth Daily.      • OLANZapine (ZYPREXA) 10 MG tablet Take 10 mg by mouth Every Night.   Taking   • oxybutynin XL (DITROPAN-XL) 5 MG 24 hr tablet Take 5 mg by mouth 2 (Two) Times a Day.      • promethazine (PHENERGAN) 25 MG tablet Take 25 mg by mouth Every 6 (Six) Hours As Needed for Nausea or Vomiting.      • simvastatin (ZOCOR) 40 MG tablet Take 40 mg by mouth Every Night.           Hospital medications:    arformoterol 15 mcg Nebulization BID - RT   atorvastatin 20 mg Oral Daily   baclofen 10 mg Oral TID   carbidopa-levodopa 1 tablet Oral TID With Meals   divalproex 500 mg Oral BID   donepezil 20 mg Oral Nightly   fluticasone 1 spray Nasal Daily   levothyroxine 175 mcg Oral Daily   montelukast 10 mg Oral  Nightly   OLANZapine 10 mg Oral Nightly   oxybutynin XL 5 mg Oral BID   sodium chloride 3 mL Intravenous Q12H       pantoprazole 8 mg/hr    sodium chloride 75 mL/hr Last Rate: 75 mL/hr (10/03/18 1117)     •  acetaminophen  •  albuterol  •  busPIRone  •  ondansetron  •  promethazine  •  sodium chloride    Family history:  Family History   Problem Relation Age of Onset   • Cancer Mother    • Arthritis Father    • Heart disease Father    • Early death Brother    • Heart disease Brother        Social history:  Social History   Substance Use Topics   • Smoking status: Current Every Day Smoker     Packs/day: 0.50     Types: Cigarettes     Start date:    • Smokeless tobacco: Never Used      Comment: Educated pt on quitting   • Alcohol use Yes      Comment: occasionally       Review of systems:    Negative 12-system ROS except for the following:  Chest pain.      Objective:  TMax 24 hours:   Temp (24hrs), Av.3 °F (36.8 °C), Min:98.1 °F (36.7 °C), Max:98.4 °F (36.9 °C)      Vitals Ranges:   Temp:  [98.1 °F (36.7 °C)-98.4 °F (36.9 °C)] 98.3 °F (36.8 °C)  Heart Rate:  [] 81  Resp:  [16-22] 22  BP: ()/(58-72) 95/61    Intake/Output Last 3 shifts:  I/O last 3 completed shifts:  In: 1000 [IV Piggyback:1000]  Out: -      Physical Exam:       General Appearance:    Alert, cooperative, in no acute distress   Head:    Normocephalic, without obvious abnormality, atraumatic   Eyes:          PERRL.   Ears:    Normal external inspection   Throat:   No oral lesions, oral mucosa moist   Neck:   Supple, no LAD, trachea midline   Back:     No CVA tenderness   Lungs:     Respirations unlabored.    Heart:    RRR.   Abdomen:     Soft, NDNT, no masses, no guarding   :   penile bx incision no infection.   Extremities:   No edema, no deformity   Skin:   No bleeding, bruising or rashes   Neuro/Psych:   Orientation intact, mood/affect pleasant, no focal findings       Results review:   I reviewed the patient's new clinical  results.    Data review:  Lab Results (last 24 hours)     Procedure Component Value Units Date/Time    Hemoglobin & Hematocrit, Blood [[id number]]  (Abnormal) Collected:  10/03/18 0918    Specimen:  Blood Updated:  10/03/18 0952     Hemoglobin 10.9 (L) g/dL      Hematocrit 36.4 (L) %     Basic Metabolic Panel [[id number]]  (Abnormal) Collected:  10/03/18 0611    Specimen:  Blood Updated:  10/03/18 0730     Glucose 110 (H) mg/dL      BUN 35 (H) mg/dL      Creatinine 0.81 mg/dL      Sodium 142 mmol/L      Potassium 4.7 mmol/L      Chloride 109 (H) mmol/L      CO2 21.1 (L) mmol/L      Calcium 10.7 (H) mg/dL      eGFR Non African Amer 93 mL/min/1.73      BUN/Creatinine Ratio 43.2 (H)     Anion Gap 11.9 mmol/L     Narrative:       The MDRD GFR formula is only valid for adults with stable renal function between ages 18 and 70.    Troponin [[id number]]  (Normal) Collected:  10/03/18 0611    Specimen:  Blood Updated:  10/03/18 0728     Troponin T <0.010 ng/mL     Narrative:       Troponin T Reference Ranges:  Less than 0.03 ng/mL:    Negative for AMI  0.03 to 0.09 ng/mL:      Indeterminant for AMI  Greater than 0.09 ng/mL: Positive for AMI    CBC (No Diff) [[id number]]  (Abnormal) Collected:  10/03/18 0611    Specimen:  Blood Updated:  10/03/18 0648     WBC 14.91 (H) 10*3/mm3      RBC 3.63 (L) 10*6/mm3      Hemoglobin 10.8 (L) g/dL      Hematocrit 35.5 (L) %      MCV 97.8 (H) fL      MCH 29.8 pg      MCHC 30.4 (L) g/dL      RDW 14.0 %      RDW-SD 50.3 fl      MPV 10.2 fL      Platelets 375 10*3/mm3     Calcium, Ionized [[id number]]  (Abnormal) Collected:  10/03/18 0007    Specimen:  Blood Updated:  10/03/18 0119     Ionized Calcium 1.65 (H) mmol/L      Ionized Calcium 6.6 (H) mg/dL     Troponin [[id number]]  (Normal) Collected:  10/03/18 0007    Specimen:  Blood Updated:  10/03/18 0049     Troponin T <0.010 ng/mL     Narrative:       Troponin T Reference Ranges:  Less than 0.03 ng/mL:    Negative for AMI  0.03 to 0.09 ng/mL:       Indeterminant for AMI  Greater than 0.09 ng/mL: Positive for AMI    Hemoglobin & Hematocrit, Blood [[id number]]  (Abnormal) Collected:  10/03/18 0007    Specimen:  Blood Updated:  10/03/18 0032     Hemoglobin 11.0 (L) g/dL      Hematocrit 36.8 (L) %     TSH [[id number]]  (Abnormal) Collected:  10/02/18 2136    Specimen:  Blood Updated:  10/02/18 2222     TSH 20.860 (H) mIU/mL     Comprehensive Metabolic Panel [[id number]]  (Abnormal) Collected:  10/02/18 1409    Specimen:  Blood Updated:  10/02/18 1500     Glucose 139 (H) mg/dL      BUN 33 (H) mg/dL      Creatinine 1.03 mg/dL      Sodium 144 mmol/L      Potassium 4.1 mmol/L      Chloride 103 mmol/L      CO2 29.3 (H) mmol/L      Calcium 12.2 (H) mg/dL      Total Protein 7.6 g/dL      Albumin 3.50 g/dL      ALT (SGPT) 8 U/L      AST (SGOT) 8 U/L      Alkaline Phosphatase 140 (H) U/L      Total Bilirubin 0.2 mg/dL      eGFR Non African Amer 70 mL/min/1.73      Globulin 4.1 gm/dL      A/G Ratio 0.9 g/dL      BUN/Creatinine Ratio 32.0 (H)     Anion Gap 11.7 mmol/L     Narrative:       The MDRD GFR formula is only valid for adults with stable renal function between ages 18 and 70.    Troponin [[id number]]  (Normal) Collected:  10/02/18 1409    Specimen:  Blood Updated:  10/02/18 1454     Troponin T <0.010 ng/mL     Narrative:       Troponin T Reference Ranges:  Less than 0.03 ng/mL:    Negative for AMI  0.03 to 0.09 ng/mL:      Indeterminant for AMI  Greater than 0.09 ng/mL: Positive for AMI    Lipase [[id number]]  (Normal) Collected:  10/02/18 1409    Specimen:  Blood Updated:  10/02/18 1454     Lipase 20 U/L     D-dimer, Quantitative [[id number]]  (Abnormal) Collected:  10/02/18 1409    Specimen:  Blood Updated:  10/02/18 1432     D-Dimer, Quantitative 1.49 (H) MCGFEU/mL     Narrative:       The Stago D-Dimer test used in conjunction with a clinical pretest probability (PTP) assessment model, has been approved by the FDA to rule out the presence of venous thromboembolism  (VTE) in outpatients suspected of deep venous thrombosis (DVT) or pulmonary embolism (PE).     CBC & Differential [[id number]] Collected:  10/02/18 1409    Specimen:  Blood Updated:  10/02/18 1418    Narrative:       The following orders were created for panel order CBC & Differential.  Procedure                               Abnormality         Status                     ---------                               -----------         ------                     CBC Auto Differential[[id number]]        Abnormal            Final result                 Please view results for these tests on the individual orders.    CBC Auto Differential [[id number]]  (Abnormal) Collected:  10/02/18 1409    Specimen:  Blood Updated:  10/02/18 1418     WBC 18.72 (H) 10*3/mm3      RBC 4.05 (L) 10*6/mm3      Hemoglobin 12.4 (L) g/dL      Hematocrit 39.7 (L) %      MCV 98.0 (H) fL      MCH 30.6 pg      MCHC 31.2 (L) g/dL      RDW 13.8 %      RDW-SD 49.7 fl      MPV 10.1 fL      Platelets 465 10*3/mm3      Neutrophil % 84.7 (H) %      Lymphocyte % 9.9 (L) %      Monocyte % 5.2 %      Eosinophil % 0.1 (L) %      Basophil % 0.1 %      Immature Grans % 0.3 %      Neutrophils, Absolute 15.86 (H) 10*3/mm3      Lymphocytes, Absolute 1.86 10*3/mm3      Monocytes, Absolute 0.97 10*3/mm3      Eosinophils, Absolute 0.02 10*3/mm3      Basophils, Absolute 0.01 10*3/mm3      Immature Grans, Absolute 0.06 (H) 10*3/mm3            Imaging:  Imaging Results (last 24 hours)     Procedure Component Value Units Date/Time    XR Hip With or Without Pelvis 2 - 3 View Right [[id number]] Collected:  10/02/18 2048     Updated:  10/02/18 2054    Narrative:       PELVIS AND RIGHT HIP X-RAYS     CLINICAL HISTORY: Right hip pain.     AP view the pelvis and AP and frog-leg lateral views of the right hip  were obtained. There is mild to moderate diffuse osteopenia. A bipolar  right hip hemiarthroplasty is in place in satisfactory position. There  is no evidence of fracture or  dislocation. There is moderate narrowing  of the right hip joint space. There is no bony erosion. The left hip  joint is only mildly narrowed.     This report was finalized on 10/2/2018 8:51 PM by Dr. Kuldip Ross M.D.       XR Chest 1 View [[id number]] Collected:  10/02/18 1504     Updated:  10/02/18 1614    Narrative:       EMERGENCY PORTABLE VIEW OF THE CHEST  - 10/02/2018     HISTORY:  Chest pain.     This is correlated to prior chest x-ray from [facility]  05/06/2018.     FINDINGS: There are multiple sternal wires and mediastinal markers from  previous cardiac surgery.  The patient is slightly rotated on this exam,  slightly limits evaluation.  There is an old healed fracture deformity  of the midshaft of the right clavicle. Cardiomediastinal silhouette and  pulmonary vasculature are within normal limits. The lungs are clear, the  costophrenic angles are sharp, and there is mild pulmonary  hyperexpansion with some flattening of the diaphragms.       Impression:       No definite active disease is seen in the chest. There has  been evidence of previous CABG, old healed fracture deformity of the  midshaft of the right clavicle, mild pulmonary hyperexpansion. Patient  rotation slightly limits evaluation.  Vague opacity over the lateral  aspect of the left midlung zone most likely overlying soft tissues  secondary to patient rotation.     This report was finalized on 10/2/2018 4:11 PM by Dr. Tu Perea M.D.       CT Angiogram Chest With Contrast [[id number]] Collected:  10/02/18 1549     Updated:  10/02/18 1604    Narrative:       CT ANGIOGRAM CHEST W CONTRAST-     CLINICAL HISTORY: Chest pain. Elevated d-dimer.     TECHNIQUE: Spiral CT images were obtained through the chest during rapid  IV injection of contrast and were reconstructed in 2 mm thick axial  slices. Multiple coronal and sagittal and 3-D reconstructions were  obtained.     Radiation dose reduction techniques were utilized, including  automated  exposure control and exposure modulation based on body size.     COMPARISON: CT scan of the chest without contrast dated 3/25/2016.     FINDINGS: The main pulmonary arteries and their lobar and segmental  branches are fairly well opacified and demonstrate no filling defects.  There is no CT evidence of pulmonary embolism. The thoracic aorta is  mildly ectatic and calcified, and is otherwise unremarkable. There is no  mediastinal or hilar or axillary lymphadenopathy. The entire thoracic  esophagus is moderately distended and filled with fluid. This is likely  due to chronic esophageal dysmotility and gastroesophageal reflux. This  is a new finding since the preceding CT scan. Mild smooth pleural  thickening is noted in both hemithoraces. Lung window images show no  parenchymal infiltrates or noncalcified nodules. There are no pleural  effusions. Images through the upper abdomen show pneumobilia and a small  right renal cyst, and are otherwise unremarkable.       Impression:       Diffusely dilated esophagus as noted. Otherwise unremarkable  CTA of the chest. There is no CT evidence of pulmonary embolism or other  acute process within the chest.     This report was finalized on 10/2/2018 4:01 PM by Dr. Kuldip Ross M.D.                Assessment:     Principal Problem:    Chest pain  Active Problems:    History of CVA (cerebrovascular accident)    COPD (chronic obstructive pulmonary disease)    Coronary artery disease    Parkinson's disease    Hypothyroidism    Hip pain, chronic, right    Hematemesis    S/p Penile bx.  Dysplasia. No cancer.  D/w pt.      Plan:     F/u 4 months Dr. Chilel.      David Chilel MD  10/03/18  12:59 PM

## 2018-10-03 NOTE — PROGRESS NOTES
[facility]     LOS: 0 days     Subjective:  No chest pain  No soa  Eating some  Coughing       Objective:    Vital Signs:  Temp:  [98.1 °F (36.7 °C)-98.4 °F (36.9 °C)] 98.3 °F (36.8 °C)  Heart Rate:  [] 81  Resp:  [16-22] 22  BP: ()/(58-72) 95/61    SpO2:  [94 %-96 %] 94 %  on   ;   Device (Oxygen Therapy): room air  Body mass index is 22.45 kg/m².    Physical Exam   Constitutional:   Flat affected   HENT:   Head: Normocephalic and atraumatic.   Cardiovascular: Normal rate and regular rhythm.    No murmur heard.  Pulmonary/Chest: Effort normal and breath sounds normal.   Abdominal: Soft. Bowel sounds are normal. He exhibits no distension. There is no tenderness.   Neurological: He is alert.   Skin: Skin is warm and dry.       Results Review:    Glucose   Date Value Ref Range Status   10/03/2018 110 (H) 65 - 99 mg/dL Final   10/02/2018 139 (H) 65 - 99 mg/dL Final       Results from last 7 days  Lab Units 10/03/18  0611   WBC 10*3/mm3 14.91*   HEMOGLOBIN g/dL 10.8*   HEMATOCRIT % 35.5*   PLATELETS 10*3/mm3 375       Results from last 7 days  Lab Units 10/03/18  0611 10/02/18  1409   SODIUM mmol/L 142 144   POTASSIUM mmol/L 4.7 4.1   CHLORIDE mmol/L 109* 103   CO2 mmol/L 21.1* 29.3*   BUN mg/dL 35* 33*   CREATININE mg/dL 0.81 1.03   CALCIUM mg/dL 10.7* 12.2*   BILIRUBIN mg/dL  --  0.2   ALK PHOS U/L  --  140*   ALT (SGPT) U/L  --  8   AST (SGOT) U/L  --  8   GLUCOSE mg/dL 110* 139*               Results from last 7 days  Lab Units 10/03/18  0611 10/03/18  0007 10/02/18  1409   TROPONIN T ng/mL <0.010 <0.010 <0.010     Cultures:       I have reviewed daily medications and changes in CPOE    Scheduled meds    arformoterol 15 mcg Nebulization BID - RT   atorvastatin 20 mg Oral Daily   baclofen 10 mg Oral TID   carbidopa-levodopa 1 tablet Oral TID With Meals   divalproex 500 mg Oral BID   donepezil 20 mg Oral Nightly   fluticasone 1 spray Nasal Daily   levothyroxine 175 mcg Oral  Daily   montelukast 10 mg Oral Nightly   OLANZapine 10 mg Oral Nightly   oxybutynin XL 5 mg Oral BID   pantoprazole 40 mg Intravenous Q12H   sodium chloride 3 mL Intravenous Q12H         sodium chloride 75 mL/hr Last Rate: 75 mL/hr (10/03/18 0615)     PRN meds  •  acetaminophen  •  albuterol  •  busPIRone  •  ondansetron  •  promethazine  •  sodium chloride      Principal Problem:    Chest pain  Active Problems:    History of CVA (cerebrovascular accident)    COPD (chronic obstructive pulmonary disease)    Coronary artery disease    Parkinson's disease    Hypothyroidism    Hip pain, chronic, right    Hematemesis        Assessment/Plan:      Chest pain    Coronary artery disease  -chest pain that brought him to the ER  -MI 2002 (bypass) and 2007 bypass      History of CVA (cerebrovascular accident)    COPD (chronic obstructive pulmonary disease)       Stroke in 2007      Parkinson's disease       Hypothyroidism  -TSH is 20      Hip pain, chronic, right- broke hip may 3rd  - he was been ambulating  -supposed to see Dr Vallejo today      Hematemesis- more than 1/2 cup 2 days ago and 4 oz prior to admission      Weight loss 30-40 pounds this year      Still smokes      Copd    Bipolar - zyprexa and buspar    Plan:  Discussed the case with Dr. Bhat, discussed patient on rounds. >35 minutes spent counseling coordination of care on GI bleeding and chest pain    Jeovany Mcmahon MD  10/03/18  9:50 AM

## 2018-10-03 NOTE — PROGRESS NOTES
Discharge Planning Assessment  [facility]     Patient Name: Victor Manuel Issa  MRN: [id number]  Today's Date: 10/3/2018    Admit Date: 10/2/2018          Discharge Needs Assessment     Row Name 10/03/18 1609       Living Environment    Lives With spouse    Name(s) of Who Lives With Patient Mesha     Current Living Arrangements home/apartment/condo    Potentially Unsafe Housing Conditions other (see comments)   no concerns     Primary Care Provided by self;spouse/significant other    Provides Primary Care For no one    Family Caregiver if Needed spouse    Quality of Family Relationships supportive;involved;helpful    Able to Return to Prior Arrangements yes       Resource/Environmental Concerns    Resource/Environmental Concerns none    Transportation Concerns car, none       Transition Planning    Patient/Family Anticipates Transition to home with help/services;home    Patient/Family Anticipated Services at Transition none    Transportation Anticipated family or friend will provide       Discharge Needs Assessment    Readmission Within the Last 30 Days no previous admission in last 30 days    Concerns to be Addressed denies needs/concerns at this time;no discharge needs identified    Equipment Currently Used at Home wheelchair;walker, standard;ramp;lift device    Anticipated Changes Related to Illness none    Equipment Needed After Discharge none            Discharge Plan     Row Name 10/03/18 1610       Plan    Plan Home with [facility]     Patient/Family in Agreement with Plan yes    Plan Comments CCP met with patient and patient's spouse at bedside. CCP role explained and discharge planning discussed. Face sheet verified and IMM noted. Patient's APRN is Shira Stan. Patient lives with his spouse with a ramp to the entrance. Patient has chair lift access to the second floor where his bedroom and bathroom are located. Patient uses a wheelchair and walker for mobility. Patient has been using [facility];  voicemail left for [facility]. [facility] [place]. Patient's pharmacy is [facility]; patient denies having trouble affording his medications and does not have trouble remembering his medications. Patient plans to return home with assistance of spouse and [facility]. Nerissa Murdock CSW           Destination     No service coordination in this encounter.      Durable Medical Equipment     No service coordination in this encounter.      Dialysis/Infusion     No service coordination in this encounter.      Home Medical Care     Service Request Status Selected Specialties Address Phone Number Fax Number    [facility] Pending - Request Sent N/A [address] [place] [id number] [phone]      Social Care     No service coordination in this encounter.                Demographic Summary     Row Name 10/03/18 1608       General Information    Admission Type inpatient    Arrived From emergency department    Required Notices Provided Important Message from Medicare    Referral Source admission list    Reason for Consult discharge planning    Preferred Language English     Used During This Interaction no            Functional Status     Row Name 10/03/18 1608       Functional Status    Usual Activity Tolerance moderate    Current Activity Tolerance moderate       Functional Status, IADL    Medications assistive equipment    Meal Preparation assistive equipment    Housekeeping assistive equipment    Laundry assistive equipment    Shopping assistive equipment       Mental Status    General Appearance WDL WDL       Mental Status Summary    Recent Changes in Mental Status/Cognitive Functioning no changes            Psychosocial    No documentation.           Abuse/Neglect    No documentation.           Legal    No documentation.           Substance Abuse    No documentation.           Patient Forms    No documentation.         Danielle  SEGUNDO Murdock

## 2018-10-03 NOTE — THERAPY EVALUATION
Acute Care - Speech Language Pathology   Swallow Initial Evaluation [facility]     Patient Name: Victor Manuel Issa  : 1945  MRN: [id number]  Today's Date: 10/3/2018  Onset of Illness/Injury or Date of Surgery: 10/02/18     Referring Physician: Heather      Admit Date: 10/2/2018    Visit Dx:     ICD-10-CM ICD-9-CM   1. Chest pain, unspecified type R07.9 786.50   2. Hematemesis with nausea K92.0 578.0     787.02   3. Generalized weakness R53.1 780.79     Patient Active Problem List   Diagnosis   • Thigh pain, musculoskeletal   • Left leg weakness   • History of CVA (cerebrovascular accident)   • COPD (chronic obstructive pulmonary disease)   • Coronary artery disease   • Cervical myelopathy (CMS/[facility])   • Parkinson's disease   • Debility   • Cerebrovascular disease   • Abnormal TSH   • Hypothyroidism   • Hip fracture requiring operative repair, right, closed, initial encounter (CMS/[facility])   • Urinary tract infection without hematuria   • Hypoxia   • Sinus bradycardia   • Chest pain   • Hip pain, chronic, right   • Hematemesis   • Hematemesis with nausea     Past Medical History:   Diagnosis Date   • Arthritis    • Bipolar 1 disorder (CMS/[facility])    • COPD (chronic obstructive pulmonary disease) (CMS/[facility])    • Coronary artery disease    • Disease of thyroid gland    • Hyperlipidemia    • Myocardial infarction (CMS/[facility])    • Stroke (CMS/[facility])      Past Surgical History:   Procedure Laterality Date   • ABDOMINAL SURGERY     • CARDIAC SURGERY     • CHOLECYSTECTOMY     • CORONARY ARTERY BYPASS GRAFT      x2   • EYE SURGERY      cataracts   • HIP ENDOPROSTHESIS Right 2018    Procedure: RIGHT HIP HEMIARTHROPLASTY;  Surgeon: Winston Vallejo MD;  Location: [facility] OR;  Service: Orthopedics   • MUSCLE BIOPSY Left 3/24/2016    Procedure: LT THIGH MUSCLE BIOPSY;  Surgeon: Fausto Mack MD;  Location: [facility] OR;  Service:    • SKIN BIOPSY     • THYROID SURGERY     • TURP / TRANSURETHRAL INCISION /  DRAINAGE PROSTATE     • VASCULAR SURGERY            SWALLOW EVALUATION (last 72 hours)      SLP Adult Swallow Evaluation     Row Name 10/03/18 1600          Document Type evaluation  -OC    Subjective Information no complaints  -OC    Patient Observations alert;cooperative;agree to therapy  -OC    Patient/Family Observations Upright in bed  -OC    Patient Effort adequate  -OC          Patient Profile Reviewed yes  -OC    Pertinent History Of Current Problem Pt admitted for chest pain. VFSS 05/2018 difficult to r/o posterior aspiration, recommended reg/tl.  -OC    Current Method of Nutrition clear liquids  -OC    Precautions/Limitations, Vision WFL;for purposes of eval  -OC    Precautions/Limitations, Hearing WFL;for purposes of eval  -OC    Prior Level of Function-Communication WFL;other (see comments)   for eval  -OC    Prior Level of Function-Swallowing no diet consistency restrictions;other (see comments)   VFSS 05/2018 difficult to r/o posterior asp rec r/tl  -OC    Plans/Goals Discussed with patient;spouse/S.O.;agreed upon  -OC    Barriers to Rehab none identified  -OC    Patient's Goals for Discharge patient did not state  -OC    Family Goals for Discharge family did not state  -OC          Additional Documentation Pain Scale: Numbers Pre/Post-Treatment (Group)  -OC          Pain Scale: Numbers, Pretreatment 0/10 - no pain  -OC    Pain Scale: Numbers, Post-Treatment 0/10 - no pain  -OC          Dentition Assessment natural, present and adequate  -OC    Secretion Management WNL/WFL  -OC    Mucosal Quality moist, healthy  -OC    Volitional Swallow unable to elicit  -OC    Volitional Cough weak  -OC          Oral Motor General Assessment WFL  -OC          Oral Prep Phase WFL  -OC    Oral Transit WFL  -OC    Oral Residue WFL  -OC    Pharyngeal Phase no overt signs/symptoms of pharyngeal impairment  -OC    Clinical Swallow Evaluation Summary Limited bedside swallow eval complete, as pt on clear liquids. No overt  s/s aspiration with thins via cup and straw.   -OC          SLP Swallowing Diagnosis functional oral phase;functional pharyngeal phase  -OC    Functional Impact no impact on function  -OC    Rehab Potential/Prognosis, Swallowing good, to achieve stated therapy goals  -OC    Swallow Criteria for Skilled Therapeutic Interventions Met demonstrates skilled criteria;other (see comments)   full bedside when approved for solids  -OC          Therapy Frequency (Swallow) PRN  -OC    Predicted Duration Therapy Intervention (Days) until discharge  -OC    SLP Diet Recommendation thin liquids;clear liquid diet;other (see comments)   clears per MD  -OC    Recommended Diagnostics reassess via clinical swallow evaluation;other (see comments)   as approved for solids  -OC    Recommended Precautions and Strategies upright posture during/after eating;small bites of food and sips of liquid  -OC    SLP Rec. for Method of Medication Administration meds whole;with thin liquids  -OC    Monitor for Signs of Aspiration yes;notify SLP if any concerns  -OC    Anticipated Dischage Disposition unknown  -OC      User Key  (r) = Recorded By, (t) = Taken By, (c) = Cosigned By    Initials Name Effective Dates    OC Saray Roche MA,[facility]-SLP 06/08/18 -         EDUCATION  The patient has been educated in the following areas:   Dysphagia (Swallowing Impairment).    SLP Recommendation and Plan  SLP Swallowing Diagnosis: functional oral phase, functional pharyngeal phase  SLP Diet Recommendation: thin liquids, clear liquid diet, other (see comments) (clears per MD)  Recommended Precautions and Strategies: upright posture during/after eating, small bites of food and sips of liquid     Monitor for Signs of Aspiration: yes, notify SLP if any concerns  Recommended Diagnostics: reassess via clinical swallow evaluation, other (see comments) (as approved for solids)  Swallow Criteria for Skilled Therapeutic Interventions Met: demonstrates skilled criteria, other  (see comments) (full bedside when approved for solids)  Anticipated Dischage Disposition: unknown  Rehab Potential/Prognosis, Swallowing: good, to achieve stated therapy goals  Therapy Frequency (Swallow): PRN  Predicted Duration Therapy Intervention (Days): until discharge       Plan of Care Reviewed With: patient, spouse  Plan of Care Review  Plan of Care Reviewed With: patient, spouse  Outcome Summary: Bedside swallow eval completed with clear liquids. No overt s/s aspiration with thins liquids via cup and straw. ST to follow for re-eval as pt approved for solids.  Recommend thins and meds whole with thins.            SLP Outcome Measures (last 72 hours)      SLP Outcome Measures     Row Name 10/03/18 1600             SLP Outcome Measures    Outcome Measure Used? Adult NOMS  -OC         Adult FCM Scores    FCM Chosen Swallowing  -OC      Swallowing FCM Score 7  -OC        User Key  (r) = Recorded By, (t) = Taken By, (c) = Cosigned By    Initials Name Effective Dates     Saray Roche MA, CCC-SLP 06/08/18 -            Time Calculation:         Time Calculation- SLP     Row Name 10/03/18 1633             Time Calculation- SLP    SLP Start Time 1500  -OC      SLP Received On 10/03/18  -OC        User Key  (r) = Recorded By, (t) = Taken By, (c) = Cosigned By    Initials Name Provider Type    OC Saray Roche MA,CCC-SLP Speech and Language Pathologist          Therapy Charges for Today     Code Description Service Date Service Provider Modifiers Qty    83029900721  ST EVAL ORAL PHARYNG SWALLOW 3 10/3/2018 Saray Roche MA, CCC-SLP GN 1               Saray Roche MA, CCC-SLP  10/3/2018

## 2018-10-03 NOTE — CONSULTS
Adult Nutrition  Assessment/PES    Patient Name:  Victor Manuel Issa  YOB: 1945  MRN: [id number]  Admit Date:  10/2/2018    Assessment Date:  10/3/2018    Comments:  Completed nutrition assessment triggered by nurse admission screen.          Reason for Assessment     Row Name 10/03/18 0823          Reason for Assessment    Reason For Assessment nurse/nurse practitioner consult     Diagnosis cardiac disease;gastrointestinal disease     Identified At Risk by Screening Criteria MST SCORE 2+               Anthropometrics     Row Name 10/03/18 0826          Admit Weight    Admit Weight Method measured     Admit Weight 73 kg (160 lb 15 oz)        Usual Body Weight (UBW)    Weight Loss Time Frame May 9, 2018 weight 183#. Admitting weight 161# 22# weight loss (13.6%) x 5 months.        Body Mass Index (BMI)    BMI Assessment BMI 18.5-24.9: normal             Labs/Tests/Procedures/Meds     Row Name 10/03/18 0827          Labs/Procedures/Meds    Lab Results Reviewed reviewed     Lab Results Comments BUN, glu, ionized calcium, WBC high; H/H low        Diagnostic Tests/Procedures    Diagnostic Test/Procedure Reviewed reviewed     Diagnostic Test/Procedures Comments SLP evaluation pending        Medications    Pertinent Medications Reviewed reviewed     Pertinent Medications Comments antacid             Physical Findings     Row Name 10/03/18 0828          Physical Findings    Skin pressure injury;other (see comments)   Stage I pressure injury L upper hip; Stage II pressure injury bilateral medial coccyx; Nixon score 13             Estimated/Assessed Needs     Row Name 10/03/18 0829 10/03/18 0828       Calculation Measurements    Weight Used For Calculations 73 kg (160 lb 15 oz) 73 kg (160 lb 15 oz)       Estimated/Assessed Needs    Additional Documentation  -- Calorie Requirements (Group);Fluid Requirements (Group);Protein Requirements (Group)       Calorie Requirements    Weight Used For Calorie Calculations   -- 73 kg (160 lb 15 oz)    Estimated Calorie Requirement (kcal/day)  -- 2190    Estimated Calorie Need Method  -- kcal/kg    Estimated Calorie Requirement Comment  -- 30 ephraim/kg       KCAL/KG    14 Kcal/Kg (kcal) 1022 1022    15 Kcal/Kg (kcal) 1095 1095    18 Kcal/Kg (kcal) 1314 1314    20 Kcal/Kg (kcal) 1460 1460    25 Kcal/Kg (kcal) 1825 1825    30 Kcal/Kg (kcal) 2190 2190    35 Kcal/Kg (kcal) 2555 2555    40 Kcal/Kg (kcal) 2920 2920    45 Kcal/Kg (kcal) 3285 3285    50 Kcal/Kg (kcal) 3650 3650       D[place]-St. Jeor Equation    RMR (D[place]-St. Jeor Equation) 1487.13 1487.13       Protein Requirements    Weight Used For Protein Calculations  -- 73 kg (160 lb 15 oz)    Est Protein Requirement Amount (gms/kg)  -- 1.2 gm protein    Estimated Protein Requirements (gms/day)  -- 87.6       Fluid Requirements    Estimated Fluid Requirements (mL/day)  -- 2190    Estimated Fluid Requirement Method  -- RDA Method    RDA Method (mL)  -- 2190    Leroy-Alix Method (over 20 kg) 2960 2960            Nutrition Prescription Ordered     Row Name 10/03/18 0829          Nutrition Prescription PO    Current PO Diet Clear Liquid             Evaluation of Received Nutrient/Fluid Intake     Row Name 10/03/18 0829 10/03/18 0828       Calculation Measurements    Weight Used For Calculations 73 kg (160 lb 15 oz) 73 kg (160 lb 15 oz)       Nutrient/Fluid Evaluation    Number of Days Evaluated 1 day  --    Additional Documentation Fluid Intake Evaluation (Group)  --       Intake Assessment    Fluid Requirement Assessment not meeting needs  --       Fluid Intake Evaluation    IV Fluid (mL) 1800  --       PO Evaluation    Number of Days PO Intake Evaluated Insufficient Data   no intake recorded  --            Evaluation of Prescribed Nutrient/Fluid Intake     Row Name 10/03/18 0829 10/03/18 0828       Calculation Measurements    Weight Used For Calculations 73 kg (160 lb 15 oz) 73 kg (160 lb 15 oz)          Problem/Interventions:         Problem 1     Row Name 10/03/18 0830          Nutrition Diagnoses Problem 1    Problem 1 Increased Nutrient Needs     Macronutrient Kcal;Fluid;Protein     Etiology (related to) Medical Diagnosis     Skin Pressure injury     Signs/Symptoms (evidenced by) Report/Observation     Reported/Observed By RD/Tech                     Intervention Goal     Row Name 10/03/18 0830          Intervention Goal    General Maintain nutrition     PO Tolerate PO;Advance diet;PO intake (%)     PO Intake % 75 %     Weight No significant weight loss             Nutrition Intervention     Row Name 10/03/18 0830          Nutrition Intervention    RD/Tech Action Follow Tx progress;Care plan reviewd               Education/Evaluation     Row Name 10/03/18 0830          Education    Education Will Instruct as appropriate        Monitor/Evaluation    Monitor Per protocol         Electronically signed by:  Gina Khan RD  10/03/18 8:34 AM

## 2018-10-03 NOTE — PLAN OF CARE
Problem: Patient Care Overview  Goal: Plan of Care Review   10/03/18 7386   Coping/Psychosocial   Plan of Care Reviewed With patient;family   OTHER   Outcome Summary Pt admitted w, chest pain; however pt presents w, generalized weakness and immobility 2' inc R LE and buttock pain. Pt previously had R hip hemiarthroplasty in 5/2018 and was in rehab for 100 days. Over the last several weeks, pt's mobility has declined and he has been using w/c primarily for mobility. Previously he was ambulating short distances in home w, walker. Upon assessment, pt able to stand w, modA x2 and complains of pain w, minimal movement. He may need SNU at [place] pending progress w, therapy. He will continue to benefit from skilled PT acutely.

## 2018-10-04 ENCOUNTER — ANESTHESIA (OUTPATIENT)
Dept: GASTROENTEROLOGY | Facility: HOSPITAL | Age: 73
End: 2018-10-04

## 2018-10-04 ENCOUNTER — ANESTHESIA EVENT (OUTPATIENT)
Dept: GASTROENTEROLOGY | Facility: HOSPITAL | Age: 73
End: 2018-10-04

## 2018-10-04 LAB
ANION GAP SERPL CALCULATED.3IONS-SCNC: 17.6 MMOL/L
BASOPHILS # BLD AUTO: 0.02 10*3/MM3 (ref 0–0.2)
BASOPHILS NFR BLD AUTO: 0.2 % (ref 0–1.5)
BUN BLD-MCNC: 29 MG/DL (ref 8–23)
BUN/CREAT SERPL: 42 (ref 7–25)
CALCIUM SPEC-SCNC: 10.1 MG/DL (ref 8.6–10.5)
CHLORIDE SERPL-SCNC: 111 MMOL/L (ref 98–107)
CO2 SERPL-SCNC: 18.4 MMOL/L (ref 22–29)
CREAT BLD-MCNC: 0.69 MG/DL (ref 0.76–1.27)
DEPRECATED RDW RBC AUTO: 51.5 FL (ref 37–54)
EOSINOPHIL # BLD AUTO: 0.32 10*3/MM3 (ref 0–0.7)
EOSINOPHIL NFR BLD AUTO: 2.5 % (ref 0.3–6.2)
ERYTHROCYTE [DISTWIDTH] IN BLOOD BY AUTOMATED COUNT: 14.2 % (ref 11.5–14.5)
GFR SERPL CREATININE-BSD FRML MDRD: 112 ML/MIN/1.73
GLUCOSE BLD-MCNC: 66 MG/DL (ref 65–99)
HCT VFR BLD AUTO: 30.9 % (ref 40.4–52.2)
HCT VFR BLD AUTO: 30.9 % (ref 40.4–52.2)
HCT VFR BLD AUTO: 31.6 % (ref 40.4–52.2)
HGB BLD-MCNC: 9.6 G/DL (ref 13.7–17.6)
HGB BLD-MCNC: 9.6 G/DL (ref 13.7–17.6)
HGB BLD-MCNC: 9.8 G/DL (ref 13.7–17.6)
IMM GRANULOCYTES # BLD: 0.04 10*3/MM3 (ref 0–0.03)
IMM GRANULOCYTES NFR BLD: 0.3 % (ref 0–0.5)
LYMPHOCYTES # BLD AUTO: 2.61 10*3/MM3 (ref 0.9–4.8)
LYMPHOCYTES NFR BLD AUTO: 20.4 % (ref 19.6–45.3)
MCH RBC QN AUTO: 30.6 PG (ref 27–32.7)
MCHC RBC AUTO-ENTMCNC: 31.1 G/DL (ref 32.6–36.4)
MCV RBC AUTO: 98.4 FL (ref 79.8–96.2)
MONOCYTES # BLD AUTO: 0.58 10*3/MM3 (ref 0.2–1.2)
MONOCYTES NFR BLD AUTO: 4.5 % (ref 5–12)
NEUTROPHILS # BLD AUTO: 9.24 10*3/MM3 (ref 1.9–8.1)
NEUTROPHILS NFR BLD AUTO: 72.4 % (ref 42.7–76)
PLATELET # BLD AUTO: 341 10*3/MM3 (ref 140–500)
PMV BLD AUTO: 9.9 FL (ref 6–12)
POTASSIUM BLD-SCNC: 4.8 MMOL/L (ref 3.5–5.2)
RBC # BLD AUTO: 3.14 10*6/MM3 (ref 4.6–6)
SODIUM BLD-SCNC: 147 MMOL/L (ref 136–145)
T4 FREE SERPL-MCNC: 1.38 NG/DL (ref 0.93–1.7)
TROPONIN T SERPL-MCNC: <0.01 NG/ML (ref 0–0.03)
WBC NRBC COR # BLD: 12.77 10*3/MM3 (ref 4.5–10.7)

## 2018-10-04 PROCEDURE — 84439 ASSAY OF FREE THYROXINE: CPT | Performed by: INTERNAL MEDICINE

## 2018-10-04 PROCEDURE — 94799 UNLISTED PULMONARY SVC/PX: CPT

## 2018-10-04 PROCEDURE — 85018 HEMOGLOBIN: CPT | Performed by: NURSE PRACTITIONER

## 2018-10-04 PROCEDURE — 99232 SBSQ HOSP IP/OBS MODERATE 35: CPT | Performed by: INTERNAL MEDICINE

## 2018-10-04 PROCEDURE — 84484 ASSAY OF TROPONIN QUANT: CPT | Performed by: HOSPITALIST

## 2018-10-04 PROCEDURE — 25010000002 PROPOFOL 10 MG/ML EMULSION: Performed by: ANESTHESIOLOGY

## 2018-10-04 PROCEDURE — 97110 THERAPEUTIC EXERCISES: CPT

## 2018-10-04 PROCEDURE — 43235 EGD DIAGNOSTIC BRUSH WASH: CPT | Performed by: INTERNAL MEDICINE

## 2018-10-04 PROCEDURE — 85014 HEMATOCRIT: CPT | Performed by: NURSE PRACTITIONER

## 2018-10-04 PROCEDURE — 0DJ08ZZ INSPECTION OF UPPER INTESTINAL TRACT, VIA NATURAL OR ARTIFICIAL OPENING ENDOSCOPIC: ICD-10-PCS | Performed by: INTERNAL MEDICINE

## 2018-10-04 PROCEDURE — 85025 COMPLETE CBC W/AUTO DIFF WBC: CPT | Performed by: HOSPITALIST

## 2018-10-04 RX ORDER — SODIUM CHLORIDE 0.9 % (FLUSH) 0.9 %
3 SYRINGE (ML) INJECTION AS NEEDED
Status: DISCONTINUED | OUTPATIENT
Start: 2018-10-04 | End: 2018-10-05

## 2018-10-04 RX ORDER — SODIUM CHLORIDE, SODIUM LACTATE, POTASSIUM CHLORIDE, CALCIUM CHLORIDE 600; 310; 30; 20 MG/100ML; MG/100ML; MG/100ML; MG/100ML
1000 INJECTION, SOLUTION INTRAVENOUS CONTINUOUS
Status: DISCONTINUED | OUTPATIENT
Start: 2018-10-04 | End: 2018-10-05

## 2018-10-04 RX ORDER — PANTOPRAZOLE SODIUM 40 MG/1
40 TABLET, DELAYED RELEASE ORAL
Status: DISCONTINUED | OUTPATIENT
Start: 2018-10-04 | End: 2018-10-07 | Stop reason: HOSPADM

## 2018-10-04 RX ORDER — SUCRALFATE ORAL 1 G/10ML
1 SUSPENSION ORAL EVERY 6 HOURS SCHEDULED
Status: DISCONTINUED | OUTPATIENT
Start: 2018-10-04 | End: 2018-10-07 | Stop reason: HOSPADM

## 2018-10-04 RX ORDER — LIDOCAINE HYDROCHLORIDE 20 MG/ML
INJECTION, SOLUTION INFILTRATION; PERINEURAL AS NEEDED
Status: DISCONTINUED | OUTPATIENT
Start: 2018-10-04 | End: 2018-10-04 | Stop reason: SURG

## 2018-10-04 RX ORDER — PROPOFOL 10 MG/ML
VIAL (ML) INTRAVENOUS CONTINUOUS PRN
Status: DISCONTINUED | OUTPATIENT
Start: 2018-10-04 | End: 2018-10-04 | Stop reason: SURG

## 2018-10-04 RX ORDER — PROPOFOL 10 MG/ML
VIAL (ML) INTRAVENOUS AS NEEDED
Status: DISCONTINUED | OUTPATIENT
Start: 2018-10-04 | End: 2018-10-04 | Stop reason: SURG

## 2018-10-04 RX ADMIN — LEVOTHYROXINE SODIUM 175 MCG: 175 TABLET ORAL at 06:24

## 2018-10-04 RX ADMIN — PROPOFOL 180 MCG/KG/MIN: 10 INJECTION, EMULSION INTRAVENOUS at 15:45

## 2018-10-04 RX ADMIN — SODIUM CHLORIDE 8 MG/HR: 900 INJECTION INTRAVENOUS at 22:36

## 2018-10-04 RX ADMIN — DONEPEZIL HYDROCHLORIDE 20 MG: 10 TABLET, FILM COATED ORAL at 22:36

## 2018-10-04 RX ADMIN — SODIUM CHLORIDE, POTASSIUM CHLORIDE, SODIUM LACTATE AND CALCIUM CHLORIDE: 600; 310; 30; 20 INJECTION, SOLUTION INTRAVENOUS at 15:29

## 2018-10-04 RX ADMIN — MONTELUKAST SODIUM 10 MG: 10 TABLET, FILM COATED ORAL at 22:36

## 2018-10-04 RX ADMIN — SUCRALFATE 1 G: 1 SUSPENSION ORAL at 21:06

## 2018-10-04 RX ADMIN — ACETAMINOPHEN 500 MG: 500 TABLET, FILM COATED ORAL at 21:07

## 2018-10-04 RX ADMIN — PROPOFOL 50 MG: 10 INJECTION, EMULSION INTRAVENOUS at 15:45

## 2018-10-04 RX ADMIN — Medication 3 ML: at 08:09

## 2018-10-04 RX ADMIN — Medication 3 ML: at 21:10

## 2018-10-04 RX ADMIN — FLUTICASONE PROPIONATE 1 SPRAY: 50 SPRAY, METERED NASAL at 08:08

## 2018-10-04 RX ADMIN — ARFORMOTEROL TARTRATE 15 MCG: 15 SOLUTION RESPIRATORY (INHALATION) at 07:42

## 2018-10-04 RX ADMIN — CARBIDOPA AND LEVODOPA 1 TABLET: 25; 250 TABLET ORAL at 18:21

## 2018-10-04 RX ADMIN — OLANZAPINE 10 MG: 10 TABLET, FILM COATED ORAL at 22:36

## 2018-10-04 RX ADMIN — SODIUM CHLORIDE 8 MG/HR: 900 INJECTION INTRAVENOUS at 05:21

## 2018-10-04 RX ADMIN — CARBIDOPA AND LEVODOPA 1 TABLET: 25; 250 TABLET ORAL at 08:08

## 2018-10-04 RX ADMIN — DIVALPROEX SODIUM 500 MG: 500 TABLET, DELAYED RELEASE ORAL at 22:39

## 2018-10-04 RX ADMIN — SODIUM CHLORIDE 8 MG/HR: 900 INJECTION INTRAVENOUS at 00:13

## 2018-10-04 RX ADMIN — SODIUM CHLORIDE, POTASSIUM CHLORIDE, SODIUM LACTATE AND CALCIUM CHLORIDE 1000 ML: 600; 310; 30; 20 INJECTION, SOLUTION INTRAVENOUS at 13:30

## 2018-10-04 RX ADMIN — ATORVASTATIN CALCIUM 20 MG: 20 TABLET, FILM COATED ORAL at 08:08

## 2018-10-04 RX ADMIN — PROPOFOL 40 MG: 10 INJECTION, EMULSION INTRAVENOUS at 15:35

## 2018-10-04 RX ADMIN — LIDOCAINE HYDROCHLORIDE 40 MG: 20 INJECTION, SOLUTION INFILTRATION; PERINEURAL at 15:45

## 2018-10-04 RX ADMIN — ARFORMOTEROL TARTRATE 15 MCG: 15 SOLUTION RESPIRATORY (INHALATION) at 23:11

## 2018-10-04 RX ADMIN — OXYBUTYNIN CHLORIDE 5 MG: 5 TABLET, EXTENDED RELEASE ORAL at 08:08

## 2018-10-04 RX ADMIN — PANTOPRAZOLE SODIUM 40 MG: 40 TABLET, DELAYED RELEASE ORAL at 18:21

## 2018-10-04 RX ADMIN — OXYBUTYNIN CHLORIDE 5 MG: 5 TABLET, EXTENDED RELEASE ORAL at 22:36

## 2018-10-04 RX ADMIN — DIVALPROEX SODIUM 500 MG: 500 TABLET, DELAYED RELEASE ORAL at 08:08

## 2018-10-04 RX ADMIN — SODIUM CHLORIDE 8 MG/HR: 900 INJECTION INTRAVENOUS at 11:23

## 2018-10-04 NOTE — PLAN OF CARE
Problem: Patient Care Overview  Goal: Plan of Care Review   10/04/18 1142   Coping/Psychosocial   Plan of Care Reviewed With patient;family   Plan of Care Review   Progress improving   OTHER   Outcome Summary Pt improved mobility at this session; CGA/Darin x2 for sit to stand transfers and pt able to ambulate forward and back 4 feet x2 w, RWx and CGA/Darin x2. Plan to continue mobility as tolerated.

## 2018-10-04 NOTE — CONSULTS
Vascular surgery consultation    Asked to see patient for possible intravenous catheter placement in forearm artery.    There was infusion of Protonix intravenously through that catheter.  Catheter had been removed and pressure held when we were called.    I saw the patient in the endoscopy suite area.  No hematoma was present.  The patient had palpable pulses.  The hand was warm and well-perfused.  No motor or sensory deficit.    Not too difficult to do a pulse  evaluation.  Phone call to the pharmacy to answer  the question as to whether or not an intra-arterial infusion would be of any consequence, which it is not.    Nothing further needed.

## 2018-10-04 NOTE — PRE-PROCEDURE NOTE
H&P reviewed.  Labs reviewed.  Hemoglobin stable.  Repeat troponin negative.  Discussed with Dr Mcmahon - proceed with egd

## 2018-10-04 NOTE — THERAPY TREATMENT NOTE
Acute Care - Physical Therapy Treatment Note  [facility]     Patient Name: Victor Manuel Issa  : 1945  MRN: [id number]  Today's Date: 10/4/2018  Onset of Illness/Injury or Date of Surgery: 10/02/18  Date of Referral to PT: 10/03/18  Referring Physician: Heather    Admit Date: 10/2/2018    Visit Dx:    ICD-10-CM ICD-9-CM   1. Chest pain, unspecified type R07.9 786.50   2. Hematemesis with nausea K92.0 578.0     787.02   3. Generalized weakness R53.1 780.79     Patient Active Problem List   Diagnosis   • Thigh pain, musculoskeletal   • Left leg weakness   • History of CVA (cerebrovascular accident)   • COPD (chronic obstructive pulmonary disease)   • Coronary artery disease   • Cervical myelopathy (CMS/[facility])   • Parkinson's disease   • Debility   • Cerebrovascular disease   • Abnormal TSH   • Hypothyroidism   • Hip fracture requiring operative repair, right, closed, initial encounter (CMS/[facility])   • Urinary tract infection without hematuria   • Hypoxia   • Sinus bradycardia   • Chest pain   • Hip pain, chronic, right   • Hematemesis   • Hematemesis with nausea   • H/O medication noncompliance   • Hyperlipidemia       Therapy Treatment          Rehabilitation Treatment Summary     Row Name 10/04/18 1134             Treatment Time/Intention    Discipline (P)  physical therapist  -      Document Type (P)  therapy note (daily note)  -      Subjective Information (P)  complains of;pain  -KH      Mode of Treatment (P)  individual therapy;physical therapy  -      Patient/Family Observations (P)  pt supine in bed, no acute distress, family at bedside  -      Patient Effort (P)  good  -      Existing Precautions/Restrictions (P)  fall;hip;right  -KH      Recorded by [KH] Rica Lemos, PT Student 10/04/18 1141      Row Name 10/04/18 1134             Cognitive Assessment/Intervention- PT/OT    Orientation Status (Cognition) (P)  oriented x 3  -KH      Personal Safety Interventions (P)  fall prevention  program maintained;gait belt;nonskid shoes/slippers when out of bed;supervised activity  -KH      Recorded by [KH] Rica Lemos, PT Student 10/04/18 1141      Row Name 10/04/18 1134             Bed Mobility Assessment/Treatment    Supine-Sit [place] (Bed Mobility) (P)  maximum assist (25% patient effort);2 person assist  -KH      Sit-Supine [place] (Bed Mobility) (P)  moderate assist (50% patient effort);2 person assist  -KH      Recorded by [] Rica Lemos, PT Student 10/04/18 1141      Row Name 10/04/18 1134             Sit-Stand Transfer    Sit-Stand [place] (Transfers) (P)  contact guard;minimum assist (75% patient effort);2 person assist  -KH      Assistive Device (Sit-Stand Transfers) (P)  walker, front-wheeled  -KH      Recorded by [] Rica Lemos, PT Student 10/04/18 1141      Row Name 10/04/18 1134             Stand-Sit Transfer    Stand-Sit [place] (Transfers) (P)  contact guard;2 person assist  -KH      Recorded by [] Rica Lemos, PT Student 10/04/18 1141      Row Name 10/04/18 1134             Gait/Stairs Assessment/Training    Gait/Stairs Assessment/Training (P)  gait/ambulation assistive device  -KH      [place] Level (Gait) (P)  contact guard;minimum assist (75% patient effort);2 person assist  -      Assistive Device (Gait) (P)  walker, front-wheeled  -KH      Distance in Feet (Gait) (P)  8  -KH      Pattern (Gait) (P)  step-to  -KH      Deviations/Abnormal Patterns (Gait) (P)  gait speed decreased;antalgic  -KH      Bilateral Gait Deviations (P)  forward flexed posture   B knee flexion;  -KH      Comment (Gait/Stairs) (P)  pt able to take a few steps forward and back w, RWx and CGA/Darin x2  -KH      Recorded by [] Rica Lemos, PT Student 10/04/18 1141      Row Name 10/04/18 1134             Positioning and Restraints    Pre-Treatment Position (P)  in bed  -KH      Post Treatment Position (P)  bed  -KH      In Bed (P)  supine;call light within  reach;encouraged to call for assist;exit alarm on;with family/caregiver  -KH      Recorded by [KH] Rica Lemos, PT Student 10/04/18 1141      Row Name 10/04/18 1134             Pain Scale: Numbers Pre/Post-Treatment    Pain Scale: Numbers, Pretreatment (P)  9/10  -KH      Pain Scale: Numbers, Post-Treatment (P)  9/10  -KH      Pain Location - Side (P)  Right  -KH      Pain Location (P)  hip  -KH      Pain Intervention(s) (P)  Cold applied;Repositioned  -KH      Recorded by [KH] Rica Lemos, PT Student 10/04/18 1141      Row Name                Wound 10/02/18 1912 Left upper hip pressure injury    Wound - Properties Group Date first assessed: 10/02/18 [BF] Time first assessed: 1912 [BF] Present On Admission : yes [BF] Side: Left [BF] Orientation: upper [BF] Location: hip [BF] Type: pressure injury [BF] Stage, Pressure Injury: Stage 1 [BF] Recorded by:  [BF] Charmaine Santiago RN 10/02/18 1913    Row Name                Wound 10/02/18 1927 Bilateral medial coccyx pressure injury    Wound - Properties Group Date first assessed: 10/02/18 [BF] Time first assessed: 1927 [BF] Present On Admission : yes [BF] Side: Bilateral [BF] Orientation: medial [BF] Location: coccyx [BF] Type: pressure injury [BF] Stage, Pressure Injury: Stage 2 [BF] Recorded by:  [BF] Charmaine Santiago RN 10/02/18 1928      User Key  (r) = Recorded By, (t) = Taken By, (c) = Cosigned By    Initials Name Effective Dates Discipline    BF Charmaine Santiago RN 10/30/17 -  Nurse    Rica Malagon, PT Student 09/17/18 -  PT          Wound 10/02/18 1912 Left upper hip pressure injury (Active)   Dressing Appearance dry;intact 10/4/2018  8:32 AM   Closure HARDIK 10/4/2018  8:32 AM   Base dressing in place, unable to visualize 10/4/2018  8:32 AM   Drainage Amount none 10/4/2018 12:15 AM       Wound 10/02/18 1927 Bilateral medial coccyx pressure injury (Active)   Dressing Appearance dry;intact 10/4/2018  8:32 AM   Closure HARDIK 10/4/2018  8:32 AM    Base dressing in place, unable to visualize 10/4/2018  8:32 AM   Periwound blanchable;redness 10/4/2018 12:15 AM   Periwound Temperature cool 10/4/2018 12:15 AM   Periwound Skin Turgor soft 10/4/2018 12:15 AM   Drainage Amount none 10/4/2018  8:32 AM             Physical Therapy Education     Title: PT OT SLP Therapies (Active)     Topic: Physical Therapy (Active)     Point: Mobility training (Done)    Learning Progress Summary     Learner Status Readiness Method Response Comment Documented by    Patient Done Acceptance MONALISA MEJIA ENMANUEL BOBBY,NR   10/04/18 1141     Active Acceptance E,D NR   10/03/18 1405    Family Done Acceptance MONALISA MEJIA DU,NR   10/04/18 1141          Point: Body mechanics (Done)    Learning Progress Summary     Learner Status Readiness Method Response Comment Documented by    Patient Done Acceptance JACKIEENMANUEL RAZA,NR   10/04/18 1141     Active Acceptance JACKIEMONALISA NR   10/03/18 1405    Family Done Acceptance MONALISA MEJIA DU,NR   10/04/18 1141                      User Key     Initials Effective Dates Name Provider Type Discipline     09/17/18 -  Rica Lemos, PT Student PT Student PT                    PT Recommendation and Plan  Anticipated Discharge Disposition (PT): home with home health, skilled nursing facility, home with assist (pending progress)  Planned Therapy Interventions (PT Eval): balance training, bed mobility training, gait training, home exercise program, ROM (range of motion), strengthening, transfer training  Therapy Frequency (PT Clinical Impression): daily  Outcome Summary/Treatment Plan (PT)  Anticipated Discharge Disposition (PT): home with home health, skilled nursing facility, home with assist (pending progress)  Plan of Care Reviewed With: (P) patient, family  Progress: (P) improving  Outcome Summary: (P) Pt improved mobility at this session; CGA/Darin x2 for sit to stand transfers and pt able to ambulate forward and back 4 feet x2 w, RWx and CGA/Darin x2. Plan to continue mobility as  tolerated.           Outcome Measures     Row Name 10/04/18 1100 10/03/18 1400          How much help from another person do you currently need...    Turning from your back to your side while in flat bed without using bedrails? (P)  3  -KH 3  -EJ (r) KH (t) EJ (c)     Moving from lying on back to sitting on the side of a flat bed without bedrails? (P)  2  -KH 2  -EJ (r) KH (t) EJ (c)     Moving to and from a bed to a chair (including a wheelchair)? (P)  2  -KH 2  -EJ (r) KH (t) EJ (c)     Standing up from a chair using your arms (e.g., wheelchair, bedside chair)? (P)  3  -KH 2  -EJ (r) KH (t) EJ (c)     Climbing 3-5 steps with a railing? (P)  1  -KH 1  -EJ (r) KH (t) EJ (c)     To walk in hospital room? (P)  3  -KH 2  -EJ (r) KH (t) EJ (c)     AM-PAC 6 Clicks Score (P)  14  -KH 12  -EJ (r) KH (t)        Functional Assessment    Outcome Measure Options (P)  AM-PAC 6 Clicks Basic Mobility (PT)  -KH AM-PAC 6 Clicks Basic Mobility (PT)  -EJ (r) KH (t) EJ (c)       User Key  (r) = Recorded By, (t) = Taken By, (c) = Cosigned By    Initials Name Provider Type    Char Sánchez, PT Physical Therapist    Rica Malagon, PT Student PT Student           Time Calculation:         PT Charges     Row Name 10/04/18 1145             Time Calculation    Start Time (P)  1121  -KH      Stop Time (P)  1133  -KH      Time Calculation (min) (P)  12 min  -KH      PT Received On (P)  10/04/18  -KH      PT - Next Appointment (P)  10/05/18  -KH        User Key  (r) = Recorded By, (t) = Taken By, (c) = Cosigned By    Initials Name Provider Type    Rica Malagon, PT Student PT Student        Therapy Suggested Charges     Code   Minutes Charges    None           Therapy Charges for Today     Code Description Service Date Service Provider Modifiers Qty    91930793454  PT THER SUPP EA 15 MIN 10/3/2018 Rica Lemos, PT Student GP 1    21162867218 HC PT EVAL MOD COMPLEXITY 2 10/3/2018 Rica Lemos, PT Student GP 1     [id number]  PT THER SUPP EA 15 MIN 10/4/2018 Rica Lemos, PT Student GP 1    30720988986 HC PT THER PROC EA 15 MIN 10/4/2018 Rica Lemos, PT Student GP 1          PT G-Codes  Outcome Measure Options: (P) AM-PAC 6 Clicks Basic Mobility (PT)  AM-PAC 6 Clicks Score: (P) 14    Rica Lemos, PT Student  10/4/2018

## 2018-10-04 NOTE — ANESTHESIA POSTPROCEDURE EVALUATION
"Patient: Victor Manuel Issa    Procedure Summary     Date:  10/04/18 [facility] / [facility]  [facility] /  [facility]    Anesthesia Start:  1529 Anesthesia Stop:  1559    Procedure:  ESOPHAGOGASTRODUODENOSCOPY (N/A Esophagus) Diagnosis:       Hematemesis with nausea      (Hematemesis with nausea [K92.0])    Surgeon:  Glenna Winn MD Provider:  Terra Agudelo MD    Anesthesia Type:  MAC ASA Status:  3          Anesthesia Type: MAC  Last vitals  BP   104/69 (10/04/18 1645)   Temp   37.1 °C (98.7 °F) (10/04/18 1615)   Pulse   64 (10/04/18 1645)   Resp   20 (10/04/18 1645)     SpO2   95 % (10/04/18 1645)     Post Anesthesia Care and Evaluation    Patient location during evaluation: PHASE II  Patient participation: complete - patient participated  Level of consciousness: awake  Pain management: adequate  Airway patency: patent  Anesthetic complications: No anesthetic complications    Cardiovascular status: acceptable  Respiratory status: acceptable  Hydration status: acceptable    Comments: /69 (BP Location: Left arm, Patient Position: Lying)   Pulse 64   Temp 37.1 °C (98.7 °F) (Oral)   Resp 20   Ht 180.3 cm (71\")   Wt 73 kg (161 lb)   SpO2 95%   BMI 22.45 kg/m²       "

## 2018-10-04 NOTE — PROGRESS NOTES
"  ENDOCRINE    Subjective   AND PLANS  Victor Manuel Issa is a 73 y.o. male.     Follow-up thyroid and hypercalcemia    Patient seen earlier this morning.  No nausea or vomiting.  Complaining of epigastric discomfort.  Troponin was ordered by Dr. Mcmahon earlier.    Patient has been NPO for EGD today.  Restart levothyroxine 175 µg daily when able to take oral medications.  Have emphasized to patient the need to take levothyroxine regularly.    Serum calcium normal 10.1 after hydration.  Vitamin D and PTH pending.    Objective     /67 (BP Location: Left arm, Patient Position: Lying)   Pulse 64   Temp 97.9 °F (36.6 °C) (Oral)   Resp 18   Ht 180.3 cm (71\")   Wt 73 kg (161 lb)   SpO2 97%   BMI 22.45 kg/m²   Physical Exam    Awake, alert, not in distress.  No rales or wheezes.  Regular heart rate and rhythm.  Abdomen soft, nontender.  No cyanosis or pedal edema.  No xanthomas    Lab Results (last 24 hours)     Procedure Component Value Units Date/Time    T4, Free [[id number]]  (Normal) Collected:  10/04/18 0936    Specimen:  Blood Updated:  10/04/18 1235     Free T4 1.38 ng/dL     Troponin [[id number]]  (Normal) Collected:  10/04/18 0936    Specimen:  Blood Updated:  10/04/18 1016     Troponin T <0.010 ng/mL     Narrative:       Troponin T Reference Ranges:  Less than 0.03 ng/mL:    Negative for AMI  0.03 to 0.09 ng/mL:      Indeterminant for AMI  Greater than 0.09 ng/mL: Positive for AMI    Hemoglobin & Hematocrit, Blood [[id number]]  (Abnormal) Collected:  10/04/18 0809    Specimen:  Blood Updated:  10/04/18 0950     Hemoglobin 9.6 (L) g/dL      Hematocrit 30.9 (L) %     CBC & Differential [[id number]] Collected:  10/04/18 0809    Specimen:  Blood Updated:  10/04/18 0950    Narrative:       The following orders were created for panel order CBC & Differential.  Procedure                               Abnormality         Status                     ---------                               -----------         ------ "                     CBC Auto Differential[[id number]]        Abnormal            Final result                 Please view results for these tests on the individual orders.    CBC Auto Differential [[id number]]  (Abnormal) Collected:  10/04/18 0809    Specimen:  Blood Updated:  10/04/18 0950     WBC 12.77 (H) 10*3/mm3      RBC 3.14 (L) 10*6/mm3      Hemoglobin 9.6 (L) g/dL      Hematocrit 30.9 (L) %      MCV 98.4 (H) fL      MCH 30.6 pg      MCHC 31.1 (L) g/dL      RDW 14.2 %      RDW-SD 51.5 fl      MPV 9.9 fL      Platelets 341 10*3/mm3      Neutrophil % 72.4 %      Lymphocyte % 20.4 %      Monocyte % 4.5 (L) %      Eosinophil % 2.5 %      Basophil % 0.2 %      Immature Grans % 0.3 %      Neutrophils, Absolute 9.24 (H) 10*3/mm3      Lymphocytes, Absolute 2.61 10*3/mm3      Monocytes, Absolute 0.58 10*3/mm3      Eosinophils, Absolute 0.32 10*3/mm3      Basophils, Absolute 0.02 10*3/mm3      Immature Grans, Absolute 0.04 (H) 10*3/mm3     Basic Metabolic Panel [[id number]]  (Abnormal) Collected:  10/03/18 2145    Specimen:  Blood Updated:  10/04/18 0949     Glucose 66 mg/dL      BUN 29 (H) mg/dL      Creatinine 0.69 (L) mg/dL      Sodium 147 (H) mmol/L      Potassium 4.8 mmol/L      Chloride 111 (H) mmol/L      CO2 18.4 (L) mmol/L      Calcium 10.1 mg/dL      eGFR Non African Amer 112 mL/min/1.73      BUN/Creatinine Ratio 42.0 (H)     Anion Gap 17.6 mmol/L     Narrative:       The MDRD GFR formula is only valid for adults with stable renal function between ages 18 and 70.    POC Glucose Once [[id number]]  (Normal) Collected:  10/03/18 2306    Specimen:  Blood Updated:  10/03/18 2316     Glucose 79 mg/dL     Narrative:       Meter: [id number] : [id number] [place] [id number]    Hemoglobin & Hematocrit, Blood [[id number]]  (Abnormal) Collected:  10/03/18 2232    Specimen:  Blood Updated:  10/03/18 2241     Hemoglobin 10.1 (L) g/dL      Hematocrit 34.1 (L) %     Troponin [[id number]]  (Normal) Collected:  10/03/18 8785     Specimen:  Blood Updated:  10/03/18 3949     Troponin T <0.010 ng/mL     Narrative:       Troponin T Reference Ranges:  Less than 0.03 ng/mL:    Negative for AMI  0.03 to 0.09 ng/mL:      Indeterminant for AMI  Greater than 0.09 ng/mL: Positive for AMI            Principal Problem:    Chest pain  Active Problems:    History of CVA (cerebrovascular accident)    COPD (chronic obstructive pulmonary disease)    Coronary artery disease    Parkinson's disease    Hypothyroidism    Hip pain, chronic, right    Hematemesis    Hematemesis with nausea    H/O medication noncompliance    Hyperlipidemia    Continue levothyroxine.  Continue Zocor 40 mg once a day.  Lipid profile is pending.  I expect lipids to be higher due to hypothyroidism.  For EGD today.

## 2018-10-04 NOTE — NURSING NOTE
Right arm IV removed as per Dr Agudelo request.  No bleeding noted with initial removal.  Pressure was held for 5 minutes after which the site had a very small drop of blood at insertion.  Pressure was held for another 2 minutes at which time no bleeding was noted.    Dr Donaldson did round on pt and assured wife and pt that no problem had occurred.

## 2018-10-04 NOTE — ANESTHESIA PREPROCEDURE EVALUATION
Anesthesia Evaluation     Patient summary reviewed and Nursing notes reviewed                Airway   Mallampati: I  TM distance: >3 FB  Neck ROM: full  No difficulty expected  Dental - normal exam     Pulmonary - normal exam   (+) COPD,   Cardiovascular - normal exam    (+) past MI , CAD, CABG, hyperlipidemia,       Neuro/Psych  (+) CVA, psychiatric history,     GI/Hepatic/Renal/Endo    (+)  GI bleeding, hypothyroidism,     Musculoskeletal     Abdominal  - normal exam    Bowel sounds: normal.   Substance History - negative use     OB/GYN negative ob/gyn ROS         Other   (+) arthritis                     Anesthesia Plan    ASA 3     MAC     Anesthetic plan, all risks, benefits, and alternatives have been provided, discussed and informed consent has been obtained with: patient.

## 2018-10-04 NOTE — PROGRESS NOTES
[place] [facility]     LOS: 1 day     Subjective:   chest pain last night, lasting most of the night, no radiation, pain was an 8/10;no nitro because the bp was too low, bolus given and still too low  Smiling today  Rapid called last night for facial droop, no facial droop currently  No soa  Clear liquid  Some blurring sensation  Still Coughing     Smokes half pack    Objective:    Vital Signs:  Temp:  [97.9 °F (36.6 °C)-98.3 °F (36.8 °C)] 97.9 °F (36.6 °C)  Heart Rate:  [53-77] 69  Resp:  [16-20] 16  BP: ()/(44-60) 95/53    SpO2:  [90 %-100 %] 100 %  on   ;   Device (Oxygen Therapy): room air  Body mass index is 22.45 kg/m².    Physical Exam   Constitutional:   Flat affected   HENT:   Head: Normocephalic and atraumatic.   Cardiovascular: Normal rate and regular rhythm.    No murmur heard.  Pulmonary/Chest: Effort normal and breath sounds normal.   Abdominal: Soft. Bowel sounds are normal. He exhibits no distension. There is no tenderness.   Neurological: He is alert.   Skin: Skin is warm and dry.       Results Review:    Glucose   Date Value Ref Range Status   10/03/2018 110 (H) 65 - 99 mg/dL Final   10/02/2018 139 (H) 65 - 99 mg/dL Final       Results from last 7 days  Lab Units 10/03/18  2232  10/03/18  0611   WBC 10*3/mm3  --   --  14.91*   HEMOGLOBIN g/dL 10.1*  < > 10.8*   HEMATOCRIT % 34.1*  < > 35.5*   PLATELETS 10*3/mm3  --   --  375   < > = values in this interval not displayed.    Results from last 7 days  Lab Units 10/03/18  0611 10/02/18  1409   SODIUM mmol/L 142 144   POTASSIUM mmol/L 4.7 4.1   CHLORIDE mmol/L 109* 103   CO2 mmol/L 21.1* 29.3*   BUN mg/dL 35* 33*   CREATININE mg/dL 0.81 1.03   CALCIUM mg/dL 10.7* 12.2*   BILIRUBIN mg/dL  --  0.2   ALK PHOS U/L  --  140*   ALT (SGPT) U/L  --  8   AST (SGOT) U/L  --  8   GLUCOSE mg/dL 110* 139*               Results from last 7 days  Lab Units 10/03/18  2145 10/03/18  0611 10/03/18  0007   TROPONIN T ng/mL <0.010 <0.010 <0.010      Cultures:       I have reviewed daily medications and changes in CPOE    Scheduled meds    arformoterol 15 mcg Nebulization BID - RT   atorvastatin 20 mg Oral Daily   baclofen 10 mg Oral TID   carbidopa-levodopa 1 tablet Oral TID With Meals   divalproex 500 mg Oral BID   donepezil 20 mg Oral Nightly   fluticasone 1 spray Nasal Daily   levothyroxine 175 mcg Oral Daily   montelukast 10 mg Oral Nightly   OLANZapine 10 mg Oral Nightly   oxybutynin XL 5 mg Oral BID   sodium chloride 3 mL Intravenous Q12H         pantoprazole 8 mg/hr Last Rate: 8 mg/hr (10/04/18 0521)     PRN meds  •  acetaminophen  •  albuterol  •  busPIRone  •  ondansetron  •  promethazine  •  sodium chloride      Principal Problem:    Chest pain  Active Problems:    History of CVA (cerebrovascular accident)    COPD (chronic obstructive pulmonary disease)    Coronary artery disease    Parkinson's disease    Hypothyroidism    Hip pain, chronic, right    Hematemesis    Hematemesis with nausea    H/O medication noncompliance    Hyperlipidemia        Assessment/Plan:      Chest pain    Coronary artery disease  -chest pain that brought him to the ER, more chest pain last night, recheck a troponin this am  -MI 2002 (bypass) and 2007 bypass      History of CVA (cerebrovascular accident)  -rapid called last night      COPD (chronic obstructive pulmonary disease)       Stroke in 2007      Parkinson's disease       Hypothyroidism  -TSH is 20      Hip pain, chronic, right- broke hip may 3rd  - he was been ambulating  -supposed to see Dr Vallejo today      Hematemesis- more than 1/2 cup 2 days ago and 4 oz prior to admission  -no more bleeding last night      Weight loss 30-40 pounds this year      Still smokes      Copd    Bipolar - zyprexa and buspar    Plan:  Discussed the case with Dr. Home Mcmahon MD  10/04/18  9:16 AM

## 2018-10-04 NOTE — CONSULTS
ENDOCRINOLOGY CONSULTATION    REFERRING PHYSICIAN: Jeovany Mcmahon MD    REASON FOR CONSULTATION: Elevated TSH and calcium.    HISTORY OF PRESENT ILLNESS: The patient is a 73-year-old male who was admitted on 10/02/2018 because of intermittent episodes of nausea and spitting dark red blood. There is no associated melena or hematochezia. On admission his hemoglobin was 12.4 and after hydration has come down to 10.9. His serum calcium was elevated at 12.2 which came down to 10.7 after hydration.    He has no previous history of hypercalcemia. Serum calcium in 2018 was normal at 10.5 mg/dL. He has bipolar disorder and is on Depakote but is not on lithium. He has been taking multivitamins and vitamin D3 at 1000 units per day before admission.    He has previous thyroidectomy for a benign goiter. He is supposed to be on levothyroxine 175 mg per day but misses several doses a week. TSH done on admission was elevated at 20.86.    PAST MEDICAL HISTORY:  1. Arthritis.  2. Bipolar disorder.  3. COPD.  4. Coronary artery disease and had previous myocardial infarction and 2 previous coronary artery bypass surgeries.  5. Hyperlipidemia and is on Zocor 40 mg once a day.  6. Previous stroke with right hemiparesis and swallowing difficulty. He received tPA for the stroke in .  7. Previous cholecystectomy.  8. Cataract surgery.  9. Right hip prosthesis in 2018.  10. Left thigh muscle biopsy.  11. Penile skin biopsy and report is still pending.  12. Previous TURP.    FAMILY HISTORY: His mother had skin cancer and his father had heart disease. His brother  due to a ruptured aortic aneurysm at the age of 47.    SOCIAL HISTORY: He is . He smoked 1/2 pack of cigarettes per day for 50 years and continues to smoke. He denies alcohol or drug abuse.    ALLERGIES: NO KNOWN DRUG ALLERGIES    REVIEW OF SYSTEMS: He has lost 40 pounds during his treatment stay at the nursing home after the hip replacement in 2018. He has  cough with active whitish sputum. There is no associated fever or chills. He is no longer having any chest pain. He denies shortness of breath or wheezing. He denies abdominal pain, melena or hematochezia. He denies dysuria. He denies seizures or loss of consciousness.    PHYSICAL EXAMINATION:  GENERAL: The patient is awake, alert, not in distress.   HEAD/NECK: Pink conjunctivae, sclerae anicteric. No xanthelasma. Full extraocular muscle movement. No facial asymmetry. Speech is fluent. No carotid bruits. There is an old anterior cervical surgical scar. Thyroid is not enlarged.  LUNGS: Equal chest expansion. No rales or wheezes.   HEART: Regular heart rate and rhythm. No gallop.  ABDOMEN: Soft, nontender. Bowel sounds normoactive.   EXTREMITIES: Warm. No cyanosis, pedal edema or clubbing. No calf tenderness. Light touch sensation is grossly intact. The patient moves all extremities spontaneously.     IMPRESSION:  1. Postsurgical hypothyroidism under replaced due to multiple missed doses.  2. Hypercalcemia most likely due to hemoconcentration due to recurrent vomiting.  3. Hematemesis, etiology to be determined.  4. Hyperlipidemia.   5. COPD.  6. Tobacco abuse.  7. Coronary artery disease status post remote coronary artery bypass surgery.  8. History of stroke.  9. History of Parkinsonism.     RECOMMENDATIONS: I have emphasized to the patient and his wife the need of taking the levothyroxine. Continue levothyroxine 175 mcg daily and recheck thyroid function tests in 6 weeks. He was hypercalcemic on admission most likely due to hemoconcentration. Continue hydration. Check vitamin D levels and PTH in the morning. I agree with plans for EGD. Continue Zocor and check lipid profile. The patient is taking the maximum dose of Zocor. Reevaluate lipid profile again in 6 weeks after patient is euthyroid.    Thank you for the referral.

## 2018-10-04 NOTE — PLAN OF CARE
Problem: Patient Care Overview  Goal: Plan of Care Review  Outcome: Ongoing (interventions implemented as appropriate)   10/04/18 9723   Coping/Psychosocial   Plan of Care Reviewed With patient   OTHER   Outcome Summary patient still having pain in the right hip; went for EGD today for suspected GI bleed and has not returned yet; VSS, will continue to monitor     Goal: Individualization and Mutuality  Outcome: Ongoing (interventions implemented as appropriate)    Goal: Discharge Needs Assessment  Outcome: Ongoing (interventions implemented as appropriate)    Goal: Interprofessional Rounds/Family Conf  Outcome: Ongoing (interventions implemented as appropriate)      Problem: Fall Risk (Adult)  Goal: Identify Related Risk Factors and Signs and Symptoms  Outcome: Ongoing (interventions implemented as appropriate)    Goal: Absence of Fall  Outcome: Ongoing (interventions implemented as appropriate)      Problem: Skin Injury Risk (Adult)  Goal: Identify Related Risk Factors and Signs and Symptoms  Outcome: Ongoing (interventions implemented as appropriate)    Goal: Skin Health and Integrity  Outcome: Ongoing (interventions implemented as appropriate)

## 2018-10-04 NOTE — PROGRESS NOTES
"Victor Manuel Issa  1945 73 y.o.  [id number]      Patient Care Team:  Shira Pierce APRN as PCP - General (Family Medicine)  Shira Pierce APRN as PCP - Claims Attributed    CC: Chest pain, prior CABG    Interval History: No further hematemesis or chest pain      Objective   Vital Signs  Temp:  [97.9 °F (36.6 °C)-98.1 °F (36.7 °C)] 97.9 °F (36.6 °C)  Heart Rate:  [53-77] 64  Resp:  [16-20] 18  BP: ()/(44-67) 104/67    Intake/Output Summary (Last 24 hours) at 10/04/18 1503  Last data filed at 10/03/18 1922   Gross per 24 hour   Intake                0 ml   Output                0 ml   Net                0 ml     Flowsheet Rows      First Filed Value   Admission Height  180.3 cm (71\") Documented at 10/02/2018 1400   Admission Weight  73 kg (161 lb) Documented at 10/02/2018 1401          Physical Exam:   General Appearance:    Alert,oriented, in no acute distress   Lungs:     Clear to auscultation,BS are equal    Heart:    Normal S1 and S2, RRR without murmur, gallop or rub   HEENT:    Sclerae are clear, no JVD or adenopathy   Abdomen:     Normal bowel sounds, soft non-tender, non-distended, no HSM   Extremities:   Moves all extremities well, no edema, no cyanosis, no             Redness, no rash     Medication Review:        arformoterol 15 mcg Nebulization BID - RT   atorvastatin 20 mg Oral Daily   baclofen 10 mg Oral TID   carbidopa-levodopa 1 tablet Oral TID With Meals   divalproex 500 mg Oral BID   donepezil 20 mg Oral Nightly   fluticasone 1 spray Nasal Daily   levothyroxine 175 mcg Oral Daily   montelukast 10 mg Oral Nightly   OLANZapine 10 mg Oral Nightly   oxybutynin XL 5 mg Oral BID   sodium chloride 3 mL Intravenous Q12H       lactated ringers 1,000 mL Last Rate: 1,000 mL (10/04/18 1330)   pantoprazole 8 mg/hr Last Rate: 8 mg/hr (10/04/18 1123)         I reviewed the patient's new clinical results.  I personally viewed and interpreted the patient's EKG/Telemetry " data    Assessment/Plan  Active Hospital Problems    Diagnosis Date Noted   • **Chest pain [R07.9] 10/02/2018   • H/O medication noncompliance [Z91.14] 10/03/2018   • Hyperlipidemia [E78.5] 10/03/2018   • Hip pain, chronic, right [M25.551, G89.29] 10/02/2018   • Hematemesis [K92.0] 10/02/2018   • Hematemesis with nausea [K92.0] 10/02/2018   • Hypothyroidism [E03.9] 10/01/2017   • Parkinson's disease [G20] 09/29/2017   • COPD (chronic obstructive pulmonary disease) [J44.9] 09/28/2017   • History of CVA (cerebrovascular accident) [Z86.73] 09/28/2017   • Coronary artery disease [I25.10] 09/28/2017      Resolved Hospital Problems    Diagnosis Date Noted Date Resolved   No resolved problems to display.       I still think we should pursue a GI evaluation first; I know he has coronary disease and he smokes, but that should not make you cough up blood. We are going to pursue that first and then go from there.         Victor Manuel Bhat MD  10/04/18  3:03 PM

## 2018-10-04 NOTE — PLAN OF CARE
Problem: Patient Care Overview  Goal: Plan of Care Review  Outcome: Ongoing (interventions implemented as appropriate)   10/04/18 2940   Coping/Psychosocial   Plan of Care Reviewed With patient   Plan of Care Review   Progress no change   OTHER   Outcome Summary Pt. c/o of chest pressure during shift and was hypotensive. Received a 1L bolus to increase BP, a stat EKG and troponins were negative. Chest pain resolved on its own. A team D was called due a a possible slight facial droop and stroke was ruled out . Pt still on protonix drip and is to have an egd today. Will continue to monitor       Problem: Fall Risk (Adult)  Goal: Absence of Fall  Outcome: Ongoing (interventions implemented as appropriate)      Problem: Skin Injury Risk (Adult)  Goal: Skin Health and Integrity  Outcome: Ongoing (interventions implemented as appropriate)

## 2018-10-05 LAB
25(OH)D3 SERPL-MCNC: 37.8 NG/ML (ref 30–100)
ANION GAP SERPL CALCULATED.3IONS-SCNC: 8.7 MMOL/L
BASOPHILS # BLD AUTO: 0.02 10*3/MM3 (ref 0–0.2)
BASOPHILS NFR BLD AUTO: 0.2 % (ref 0–1.5)
BUN BLD-MCNC: 15 MG/DL (ref 8–23)
BUN/CREAT SERPL: 20.5 (ref 7–25)
CALCIUM SPEC-SCNC: 9.5 MG/DL (ref 8.6–10.5)
CHLORIDE SERPL-SCNC: 108 MMOL/L (ref 98–107)
CHOLEST SERPL-MCNC: 155 MG/DL (ref 0–200)
CO2 SERPL-SCNC: 24.3 MMOL/L (ref 22–29)
CREAT BLD-MCNC: 0.73 MG/DL (ref 0.76–1.27)
DEPRECATED RDW RBC AUTO: 49.8 FL (ref 37–54)
EOSINOPHIL # BLD AUTO: 0.33 10*3/MM3 (ref 0–0.7)
EOSINOPHIL NFR BLD AUTO: 3.3 % (ref 0.3–6.2)
ERYTHROCYTE [DISTWIDTH] IN BLOOD BY AUTOMATED COUNT: 14.1 % (ref 11.5–14.5)
GFR SERPL CREATININE-BSD FRML MDRD: 105 ML/MIN/1.73
GLUCOSE BLD-MCNC: 102 MG/DL (ref 65–99)
HCT VFR BLD AUTO: 30.3 % (ref 40.4–52.2)
HDLC SERPL-MCNC: 20 MG/DL (ref 40–60)
HGB BLD-MCNC: 9.4 G/DL (ref 13.7–17.6)
IMM GRANULOCYTES # BLD: 0.03 10*3/MM3 (ref 0–0.03)
IMM GRANULOCYTES NFR BLD: 0.3 % (ref 0–0.5)
LDLC SERPL CALC-MCNC: 109 MG/DL (ref 0–100)
LDLC/HDLC SERPL: 5.46 {RATIO}
LYMPHOCYTES # BLD AUTO: 1.85 10*3/MM3 (ref 0.9–4.8)
LYMPHOCYTES NFR BLD AUTO: 18.6 % (ref 19.6–45.3)
MCH RBC QN AUTO: 30.1 PG (ref 27–32.7)
MCHC RBC AUTO-ENTMCNC: 31 G/DL (ref 32.6–36.4)
MCV RBC AUTO: 97.1 FL (ref 79.8–96.2)
MONOCYTES # BLD AUTO: 0.63 10*3/MM3 (ref 0.2–1.2)
MONOCYTES NFR BLD AUTO: 6.3 % (ref 5–12)
NEUTROPHILS # BLD AUTO: 7.13 10*3/MM3 (ref 1.9–8.1)
NEUTROPHILS NFR BLD AUTO: 71.6 % (ref 42.7–76)
PLATELET # BLD AUTO: 319 10*3/MM3 (ref 140–500)
PMV BLD AUTO: 9.5 FL (ref 6–12)
POTASSIUM BLD-SCNC: 3.8 MMOL/L (ref 3.5–5.2)
PTH-INTACT SERPL-MCNC: 68.6 PG/ML (ref 15–65)
RBC # BLD AUTO: 3.12 10*6/MM3 (ref 4.6–6)
SODIUM BLD-SCNC: 141 MMOL/L (ref 136–145)
TRIGL SERPL-MCNC: 129 MG/DL (ref 0–150)
VLDLC SERPL-MCNC: 25.8 MG/DL (ref 5–40)
WBC NRBC COR # BLD: 9.96 10*3/MM3 (ref 4.5–10.7)

## 2018-10-05 PROCEDURE — 99232 SBSQ HOSP IP/OBS MODERATE 35: CPT | Performed by: INTERNAL MEDICINE

## 2018-10-05 PROCEDURE — 83970 ASSAY OF PARATHORMONE: CPT | Performed by: INTERNAL MEDICINE

## 2018-10-05 PROCEDURE — 94799 UNLISTED PULMONARY SVC/PX: CPT

## 2018-10-05 PROCEDURE — 80048 BASIC METABOLIC PNL TOTAL CA: CPT | Performed by: INTERNAL MEDICINE

## 2018-10-05 PROCEDURE — 80061 LIPID PANEL: CPT | Performed by: INTERNAL MEDICINE

## 2018-10-05 PROCEDURE — 97110 THERAPEUTIC EXERCISES: CPT

## 2018-10-05 PROCEDURE — 85025 COMPLETE CBC W/AUTO DIFF WBC: CPT | Performed by: INTERNAL MEDICINE

## 2018-10-05 PROCEDURE — 82306 VITAMIN D 25 HYDROXY: CPT | Performed by: INTERNAL MEDICINE

## 2018-10-05 RX ORDER — PANTOPRAZOLE SODIUM 40 MG/1
40 TABLET, DELAYED RELEASE ORAL
Qty: 180 TABLET | Refills: 1 | Status: SHIPPED | OUTPATIENT
Start: 2018-10-05

## 2018-10-05 RX ORDER — SUCRALFATE ORAL 1 G/10ML
1 SUSPENSION ORAL EVERY 6 HOURS SCHEDULED
Qty: 2400 ML | Refills: 0 | Status: SHIPPED | OUTPATIENT
Start: 2018-10-05 | End: 2018-11-08 | Stop reason: ALTCHOICE

## 2018-10-05 RX ADMIN — SUCRALFATE 1 G: 1 SUSPENSION ORAL at 02:59

## 2018-10-05 RX ADMIN — MONTELUKAST SODIUM 10 MG: 10 TABLET, FILM COATED ORAL at 20:48

## 2018-10-05 RX ADMIN — CARBIDOPA AND LEVODOPA 1 TABLET: 25; 250 TABLET ORAL at 09:09

## 2018-10-05 RX ADMIN — SUCRALFATE 1 G: 1 SUSPENSION ORAL at 09:09

## 2018-10-05 RX ADMIN — FLUTICASONE PROPIONATE 1 SPRAY: 50 SPRAY, METERED NASAL at 09:09

## 2018-10-05 RX ADMIN — CARBIDOPA AND LEVODOPA 1 TABLET: 25; 250 TABLET ORAL at 17:28

## 2018-10-05 RX ADMIN — DIVALPROEX SODIUM 500 MG: 500 TABLET, DELAYED RELEASE ORAL at 09:09

## 2018-10-05 RX ADMIN — SUCRALFATE 1 G: 1 SUSPENSION ORAL at 16:09

## 2018-10-05 RX ADMIN — ARFORMOTEROL TARTRATE 15 MCG: 15 SOLUTION RESPIRATORY (INHALATION) at 21:29

## 2018-10-05 RX ADMIN — NYSTATIN 500000 UNITS: 100000 SUSPENSION ORAL at 17:28

## 2018-10-05 RX ADMIN — SODIUM CHLORIDE 8 MG/HR: 900 INJECTION INTRAVENOUS at 05:13

## 2018-10-05 RX ADMIN — SUCRALFATE 1 G: 1 SUSPENSION ORAL at 20:49

## 2018-10-05 RX ADMIN — DIVALPROEX SODIUM 500 MG: 500 TABLET, DELAYED RELEASE ORAL at 20:48

## 2018-10-05 RX ADMIN — PANTOPRAZOLE SODIUM 40 MG: 40 TABLET, DELAYED RELEASE ORAL at 06:58

## 2018-10-05 RX ADMIN — DONEPEZIL HYDROCHLORIDE 20 MG: 10 TABLET, FILM COATED ORAL at 20:48

## 2018-10-05 RX ADMIN — PANTOPRAZOLE SODIUM 40 MG: 40 TABLET, DELAYED RELEASE ORAL at 17:28

## 2018-10-05 RX ADMIN — ATORVASTATIN CALCIUM 20 MG: 20 TABLET, FILM COATED ORAL at 09:09

## 2018-10-05 RX ADMIN — NYSTATIN 500000 UNITS: 100000 SUSPENSION ORAL at 20:49

## 2018-10-05 RX ADMIN — OXYBUTYNIN CHLORIDE 5 MG: 5 TABLET, EXTENDED RELEASE ORAL at 09:09

## 2018-10-05 RX ADMIN — LEVOTHYROXINE SODIUM 175 MCG: 175 TABLET ORAL at 06:58

## 2018-10-05 RX ADMIN — CARBIDOPA AND LEVODOPA 1 TABLET: 25; 250 TABLET ORAL at 11:48

## 2018-10-05 RX ADMIN — OLANZAPINE 10 MG: 10 TABLET, FILM COATED ORAL at 20:48

## 2018-10-05 RX ADMIN — OXYBUTYNIN CHLORIDE 5 MG: 5 TABLET, EXTENDED RELEASE ORAL at 20:48

## 2018-10-05 RX ADMIN — Medication 3 ML: at 20:53

## 2018-10-05 RX ADMIN — ARFORMOTEROL TARTRATE 15 MCG: 15 SOLUTION RESPIRATORY (INHALATION) at 09:37

## 2018-10-05 RX ADMIN — Medication 3 ML: at 09:10

## 2018-10-05 NOTE — THERAPY TREATMENT NOTE
Acute Care - Physical Therapy Treatment Note  [facility]     Patient Name: Victor Manuel Issa  : 1945  MRN: [id number]  Today's Date: 10/5/2018  Onset of Illness/Injury or Date of Surgery: 10/02/18  Date of Referral to PT: 10/03/18  Referring Physician: Heather    Admit Date: 10/2/2018    Visit Dx:    ICD-10-CM ICD-9-CM   1. Chest pain, unspecified type R07.9 786.50   2. Hematemesis with nausea K92.0 578.0     787.02   3. Generalized weakness R53.1 780.79     Patient Active Problem List   Diagnosis   • Thigh pain, musculoskeletal   • Left leg weakness   • History of CVA (cerebrovascular accident)   • COPD (chronic obstructive pulmonary disease)   • Coronary artery disease   • Cervical myelopathy (CMS/[facility])   • Parkinson's disease   • Debility   • Cerebrovascular disease   • Abnormal TSH   • Hypothyroidism   • Hip fracture requiring operative repair, right, closed, initial encounter (CMS/[facility])   • Urinary tract infection without hematuria   • Hypoxia   • Sinus bradycardia   • Chest pain   • Hip pain, chronic, right   • Hematemesis   • Hematemesis with nausea   • H/O medication noncompliance   • Hyperlipidemia       Therapy Treatment          Rehabilitation Treatment Summary     Row Name 10/05/18 8533             Treatment Time/Intention    Discipline physical therapy assistant  -SM      Document Type therapy note (daily note)  -SM      Subjective Information complains of;weakness;pain  -SM      Mode of Treatment physical therapy  -SM      Patient Effort good  -SM      Existing Precautions/Restrictions fall;hip;right  -SM      Recorded by [SM] Chelsi Maldonado, \[facility] [place] [facility]\"" 10/05/18 1613      Row Name 10/05/18 3577             Cognitive Assessment/Intervention- PT/OT    Orientation Status (Cognition) oriented x 3  -SM      Follows Commands (Cognition) follows two step commands;repetition of directions required  -SM      Personal Safety Interventions fall prevention program maintained;gait belt;nonskid shoes/slippers  when out of bed  -SM      Recorded by [] Chelsi Maldonado, PTA 10/05/18 1613      Row Name 10/05/18 1550             Bed Mobility Assessment/Treatment    Bed Mobility Assessment/Treatment supine-sit;sit-supine  -      Supine-Sit Day (Bed Mobility) moderate assist (50% patient effort);maximum assist (25% patient effort);2 person assist  -      Sit-Supine Day (Bed Mobility) moderate assist (50% patient effort);2 person assist  -SM      Bed Mobility, Safety Issues decreased use of legs for bridging/pushing  -      Assistive Device (Bed Mobility) bed rails;head of bed elevated  -SM      Recorded by [SM] Chelsi Maldonado, PTA 10/05/18 1613      Row Name 10/05/18 1550             Transfer Assessment/Treatment    Transfer Assessment/Treatment sit-stand transfer;stand-sit transfer  -      Comment (Transfers) 3x  -SM      Recorded by [SM] Chelsi Maldonado PTA 10/05/18 1613      Row Name 10/05/18 1550             Sit-Stand Transfer    Sit-Stand Day (Transfers) minimum assist (75% patient effort);moderate assist (50% patient effort);2 person assist;verbal cues  -      Assistive Device (Sit-Stand Transfers) walker, front-wheeled  -      Recorded by [SM] Chelsi Maldonado, PTA 10/05/18 1613      Row Name 10/05/18 1550             Stand-Sit Transfer    Stand-Sit Day (Transfers) minimum assist (75% patient effort);moderate assist (50% patient effort);2 person assist;verbal cues  -      Assistive Device (Stand-Sit Transfers) walker, front-wheeled  -SM      Recorded by [SM] Chelsi Maldonado, PTA 10/05/18 1613      Row Name 10/05/18 1550             Gait/Stairs Assessment/Training    Comment (Gait/Stairs) NT - pt unable to stand erect, was unsteady, weak and w/ increased pain  -SM      Recorded by [] Chelsi Maldonado, PTA 10/05/18 1613      Row Name 10/05/18 1550             Motor Skills Assessment/Interventions    Additional Documentation Therapeutic Exercise  (Group)  -SM      Recorded by [SM] Chelsi Maldonado, PTA 10/05/18 1613      Row Name 10/05/18 1550             Therapeutic Exercise    Lower Extremity (Therapeutic Exercise) gluteal sets  -SM      Lower Extremity Range of Motion (Therapeutic Exercise) hip abduction/adduction, bilateral;ankle dorsiflexion/plantar flexion, bilateral   adduction w/ pillow squeeze  -SM      Exercise Type (Therapeutic Exercise) AROM (active range of motion)  -SM      Position (Therapeutic Exercise) seated  -SM      Sets/Reps (Therapeutic Exercise) 10 reps  -SM      Recorded by [SM] Chelsi Maldonado, PTA 10/05/18 1613      Row Name 10/05/18 1550             Positioning and Restraints    Pre-Treatment Position in bed  -SM      Post Treatment Position bed  -SM      In Bed supine;call light within reach;encouraged to call for assist;exit alarm on;with family/caregiver  -SM      Recorded by [SM] Chelsi Maldonado, LADI 10/05/18 1613      Row Name 10/05/18 1550             Pain Assessment    Additional Documentation Pain Scale: Numbers Pre/Post-Treatment (Group)  -SM      Recorded by [SM] Chelsi Maldonado, PTA 10/05/18 1613      Row Name 10/05/18 1550             Pain Scale: Numbers Pre/Post-Treatment    Pain Scale: Numbers, Pretreatment 7/10  -SM      Pain Scale: Numbers, Post-Treatment 8/10  -SM      Pain Location - Side Right  -SM      Pain Location hip  -SM      Pain Intervention(s) Repositioned;Ambulation/increased activity;Rest  -SM      Recorded by [SM] Chelsi Maldonado, PTA 10/05/18 1613      Row Name                Wound 10/02/18 1912 Left upper hip pressure injury    Wound - Properties Group Date first assessed: 10/02/18 [BF] Time first assessed: 1912 [BF] Present On Admission : yes [BF] Side: Left [BF] Orientation: upper [BF] Location: hip [BF] Type: pressure injury [BF] Stage, Pressure Injury: Stage 1 [BF] Recorded by:  [BF] Charmaine Santiago, RN 10/02/18 1913    Row Name                Wound 10/02/18 1927 Bilateral  medial coccyx pressure injury    Wound - Properties Group Date first assessed: 10/02/18 [BF] Time first assessed: 1927 [BF] Present On Admission : yes [BF] Side: Bilateral [BF] Orientation: medial [BF] Location: coccyx [BF] Type: pressure injury [BF] Stage, Pressure Injury: Stage 2 [BF] Recorded by:  [BF] Charmaine Santiago RN 10/02/18 1928      User Key  (r) = Recorded By, (t) = Taken By, (c) = Cosigned By    Initials Name Effective Dates Discipline     Chelsi Maldonado, \[facility] [place] [facility]\"" 03/07/18 -  PT    BF Charmaine Santiago, RN 10/30/17 -  Nurse          Wound 10/02/18 1912 Left upper hip pressure injury (Active)   Dressing Appearance dry;intact 10/5/2018  3:49 PM   Closure HARDIK 10/5/2018  3:49 PM   Base dressing in place, unable to visualize 10/5/2018  3:49 PM   Periwound dry;intact 10/5/2018 12:18 AM   Periwound Temperature warm 10/5/2018 12:18 AM   Periwound Skin Turgor soft 10/5/2018 12:18 AM   Drainage Amount none 10/5/2018 12:18 AM   Dressing Care, Wound dressing reinforced 10/5/2018 12:18 AM       Wound 10/02/18 1927 Bilateral medial coccyx pressure injury (Active)   Dressing Appearance dry;intact 10/5/2018  3:49 PM   Closure HARDIK 10/5/2018  3:49 PM   Base dressing in place, unable to visualize 10/5/2018  3:49 PM   Periwound blanchable;redness 10/5/2018 12:18 AM   Periwound Temperature warm 10/5/2018 12:18 AM   Periwound Skin Turgor soft 10/5/2018 12:18 AM   Drainage Amount none 10/5/2018 12:18 AM   Care, Wound cleansed with;soap and water 10/4/2018  8:46 PM   Dressing Care, Wound dressing applied 10/5/2018 12:18 AM             Physical Therapy Education     Title: PT OT SLP Therapies (Done)     Topic: Physical Therapy (Done)     Point: Mobility training (Done)    Learning Progress Summary     Learner Status Readiness Method Response Comment Documented by    Patient Done Acceptance E,SEBAS,MONALISA BOBBY,YANNICK SHAFER 10/05/18 1613     Done Acceptance MONALISA MEJIA DUNR   10/04/18 1141     Active Acceptance MONALISA MEJIA   10/03/18 1400     Family Done Acceptance MONALISA MEJIA DU,NR   10/04/18 1141          Point: Home exercise program (Done)    Learning Progress Summary     Learner Status Readiness Method Response Comment Documented by    Patient Done Acceptance SEBAS MEJIA D VU,NR   10/05/18 1613          Point: Body mechanics (Done)    Learning Progress Summary     Learner Status Readiness Method Response Comment Documented by    Patient Done Acceptance SEBAS MEJIA D VU,NR   10/05/18 1613     Done Acceptance MONALISA MEJIA DU,NR   10/04/18 1141     Active Acceptance EMONALISA NR   10/03/18 1405    Family Done Acceptance MONALISA MEJIA DU,NR   10/04/18 1141          Point: Precautions (Done)    Learning Progress Summary     Learner Status Readiness Method Response Comment Documented by    Patient Done Acceptance SEBAS MEJIA D VU,NR   10/05/18 1613                      User Key     Initials Effective Dates Name Provider Type Discipline     03/07/18 -  Chelsi Maldonado, PTA Physical Therapy Assistant PT     09/17/18 -  Rica Lmeos, PT Student PT Student PT                    PT Recommendation and Plan     Plan of Care Reviewed With: patient  Progress: no change  Outcome Summary: Pt tolerated treatment fair this date. Increased pain noted in R hip. Pt required mod-max A x2 for bed mobility, then mod A x2 to stand. Pt unable to fully stand erect and demostrated B flexed knees when standing. Unsteady, weak and increased pain noted w/ performing 3x sit-to-stands. Pt unable to progress forward this date. APs and GSs given for HEP. PT will continue to address functional mobility deficits as tolerated.          Outcome Measures     Row Name 10/05/18 1600 10/04/18 1100 10/03/18 1400       How much help from another person do you currently need...    Turning from your back to your side while in flat bed without using bedrails? 3  -SM 3  -LH (r) KH (t) LH (c) 3  -EJ (r) KH (t) EJ (c)    Moving from lying on back to sitting on the side of a flat bed without bedrails? 2  -SM 2  -LH (r)  KH (t) LH (c) 2  -EJ (r) KH (t) EJ (c)    Moving to and from a bed to a chair (including a wheelchair)? 2  -SM 2  -LH (r) KH (t) LH (c) 2  -EJ (r) KH (t) EJ (c)    Standing up from a chair using your arms (e.g., wheelchair, bedside chair)? 2  -SM 3  -LH (r) KH (t) LH (c) 2  -EJ (r) KH (t) EJ (c)    Climbing 3-5 steps with a railing? 1  -SM 1  -LH (r) KH (t) LH (c) 1  -EJ (r) KH (t) EJ (c)    To walk in hospital room? 2  -SM 3  -LH (r) KH (t) LH (c) 2  -EJ (r) KH (t) EJ (c)    AM-PAC 6 Clicks Score 12  - 14  -LH (r) KH (t) 12  -EJ (r) KH (t)       Functional Assessment    Outcome Measure Options AM-PAC 6 Clicks Basic Mobility (PT)  - AM-PAC 6 Clicks Basic Mobility (PT)  -LH (r) KH (t) LH (c) AM-PAC 6 Clicks Basic Mobility (PT)  -EJ (r) KH (t) EJ (c)      User Key  (r) = Recorded By, (t) = Taken By, (c) = Cosigned By    Initials Name Provider Type     Elisa Hannon, PT Physical Therapist    Char Sánchez, PT Physical Therapist    Chelsi Crook, LADI Physical Therapy Assistant    Rica Malagon, PT Student PT Student           Time Calculation:         PT Charges     Row Name 10/05/18 1618             Time Calculation    Start Time 1550  -      Stop Time 1608  -      Time Calculation (min) 18 min  -      PT Received On 10/05/18  -      PT - Next Appointment 10/06/18  -        User Key  (r) = Recorded By, (t) = Taken By, (c) = Cosigned By    Initials Name Provider Type     Chelsi Maldonado, LADI Physical Therapy Assistant        Therapy Suggested Charges     Code   Minutes Charges    None           Therapy Charges for Today     Code Description Service Date Service Provider Modifiers Qty    81909706784  PT THER PROC EA 15 MIN 10/5/2018 Chelsi Maldonado PTA GP 1    [id number] HC PT THER SUPP EA 15 MIN 10/5/2018 Chelsi Maldonado, LADI GP 1          PT G-Codes  Outcome Measure Options: AM-PAC 6 Clicks Basic Mobility (PT)  AM-PAC 6 Clicks Score: 12    Chelsi Maldonado,  PTA  10/5/2018

## 2018-10-05 NOTE — PLAN OF CARE
Problem: Patient Care Overview  Goal: Plan of Care Review  Outcome: Ongoing (interventions implemented as appropriate)   10/05/18 4032   Coping/Psychosocial   Plan of Care Reviewed With patient   Plan of Care Review   Progress no change   OTHER   Outcome Summary Pt tolerated treatment fair this date. Increased pain noted in R hip. Pt required mod-max A x2 for bed mobility, then mod A x2 to stand. Pt unable to fully stand erect and demostrated B flexed knees when standing. Unsteady, weak and increased pain noted w/ performing 3x sit-to-stands. Pt unable to progress forward this date. APs and GSs given for HEP. PT will continue to address functional mobility deficits as tolerated.

## 2018-10-05 NOTE — SIGNIFICANT NOTE
SLP f/u with RN, patient remains on clear liquids. EGD yesterday revealed stricture and esophagitis per RN.   Will f/u week of 10/8

## 2018-10-05 NOTE — PROGRESS NOTES
"  ENDOCRINE    Subjective   AND PLANS  Victor Manuel Issa is a 73 y.o. male.     Follow-up thyroid and calcium.    EGD noted.  Denies nausea or vomiting.  Patient and wife advised to take the levothyroxine 175 µg daily and to have a repeat thyroid function test done in 4-6 weeks.    LDL mildly elevated before admission.  Lipid profile pending.  Continue Lipitor 20 mg per day.  Reevaluate lipids after thyroid hormone levels have normalized    Serum calcium normal on October 3.  A.m. labs pending    Objective   /62 (BP Location: Left arm, Patient Position: Lying)   Pulse 61   Temp 97.9 °F (36.6 °C) (Oral)   Resp 20   Ht 180.3 cm (71\")   Wt 73 kg (161 lb)   SpO2 93%   BMI 22.45 kg/m²   Physical Exam    Awake, alert, not in distress.  Regular heart rate and rhythm.  Rales or wheezes.  Abdomen soft, nontender.  No cyanosis.  No calf tenderness.    Lab Results (last 24 hours)     Procedure Component Value Units Date/Time    Hemoglobin & Hematocrit, Blood [[id number]]  (Abnormal) Collected:  10/04/18 2024    Specimen:  Blood Updated:  10/04/18 2034     Hemoglobin 9.8 (L) g/dL      Hematocrit 31.6 (L) %     T4, Free [[id number]]  (Normal) Collected:  10/04/18 0936    Specimen:  Blood Updated:  10/04/18 1235     Free T4 1.38 ng/dL     Troponin [[id number]]  (Normal) Collected:  10/04/18 0936    Specimen:  Blood Updated:  10/04/18 1016     Troponin T <0.010 ng/mL     Narrative:       Troponin T Reference Ranges:  Less than 0.03 ng/mL:    Negative for AMI  0.03 to 0.09 ng/mL:      Indeterminant for AMI  Greater than 0.09 ng/mL: Positive for AMI    Hemoglobin & Hematocrit, Blood [[id number]]  (Abnormal) Collected:  10/04/18 0809    Specimen:  Blood Updated:  10/04/18 0950     Hemoglobin 9.6 (L) g/dL      Hematocrit 30.9 (L) %     CBC & Differential [[id number]] Collected:  10/04/18 0809    Specimen:  Blood Updated:  10/04/18 0950    Narrative:       The following orders were created for panel order CBC & " Differential.  Procedure                               Abnormality         Status                     ---------                               -----------         ------                     CBC Auto Differential[[id number]]        Abnormal            Final result                 Please view results for these tests on the individual orders.    CBC Auto Differential [[id number]]  (Abnormal) Collected:  10/04/18 0809    Specimen:  Blood Updated:  10/04/18 0950     WBC 12.77 (H) 10*3/mm3      RBC 3.14 (L) 10*6/mm3      Hemoglobin 9.6 (L) g/dL      Hematocrit 30.9 (L) %      MCV 98.4 (H) fL      MCH 30.6 pg      MCHC 31.1 (L) g/dL      RDW 14.2 %      RDW-SD 51.5 fl      MPV 9.9 fL      Platelets 341 10*3/mm3      Neutrophil % 72.4 %      Lymphocyte % 20.4 %      Monocyte % 4.5 (L) %      Eosinophil % 2.5 %      Basophil % 0.2 %      Immature Grans % 0.3 %      Neutrophils, Absolute 9.24 (H) 10*3/mm3      Lymphocytes, Absolute 2.61 10*3/mm3      Monocytes, Absolute 0.58 10*3/mm3      Eosinophils, Absolute 0.32 10*3/mm3      Basophils, Absolute 0.02 10*3/mm3      Immature Grans, Absolute 0.04 (H) 10*3/mm3     Basic Metabolic Panel [[id number]]  (Abnormal) Collected:  10/03/18 2145    Specimen:  Blood Updated:  10/04/18 0949     Glucose 66 mg/dL      BUN 29 (H) mg/dL      Creatinine 0.69 (L) mg/dL      Sodium 147 (H) mmol/L      Potassium 4.8 mmol/L      Chloride 111 (H) mmol/L      CO2 18.4 (L) mmol/L      Calcium 10.1 mg/dL      eGFR Non African Amer 112 mL/min/1.73      BUN/Creatinine Ratio 42.0 (H)     Anion Gap 17.6 mmol/L     Narrative:       The MDRD GFR formula is only valid for adults with stable renal function between ages 18 and 70.            Principal Problem:    Chest pain  Active Problems:    History of CVA (cerebrovascular accident)    COPD (chronic obstructive pulmonary disease)    Coronary artery disease    Parkinson's disease    Hypothyroidism    Hip pain, chronic, right    Hematemesis    Hematemesis with  nausea    H/O medication noncompliance    Hyperlipidemia    Thyroid hormone replacement as discussed above.  Lipid therapy as discussed above.  Vitamin D and PTH pending

## 2018-10-05 NOTE — PROGRESS NOTES
[facility]  Inpatient Progress Note    Reason for Follow Up:  UGIB, chest pain    Subjective     Interval History:   One episode of chest pain overnight.  Mild discomfort with swallowing but is tolerating full liquids.  No abd pain or nausea/vomiting    Current Facility-Administered Medications:   •  acetaminophen (TYLENOL) tablet 500 mg, 500 mg, Oral, Q4H PRN, Parker Romero MD, 500 mg at 10/04/18 2107  •  albuterol (PROVENTIL) nebulizer solution 0.083% 2.5 mg/3mL, 2.5 mg, Nebulization, Q6H PRN, Parker Romero MD  •  arformoterol (BROVANA) nebulizer solution 15 mcg, 15 mcg, Nebulization, BID - RT, Parker Romero MD, 15 mcg at 10/04/18 2311  •  atorvastatin (LIPITOR) tablet 20 mg, 20 mg, Oral, Daily, Parker Romero MD, 20 mg at 10/04/18 0808  •  baclofen (LIORESAL) tablet 10 mg, 10 mg, Oral, TID, Parker Romero MD, 10 mg at 10/03/18 0922  •  busPIRone (BUSPAR) tablet 5 mg, 5 mg, Oral, TID PRN, Parker Romero MD  •  carbidopa-levodopa (SINEMET)  MG per tablet 1 tablet, 1 tablet, Oral, TID With Meals, Parker Romero MD, 1 tablet at 10/04/18 1821  •  divalproex (DEPAKOTE) DR tablet 500 mg, 500 mg, Oral, BID, Parker Romero MD, 500 mg at 10/04/18 2239  •  donepezil (ARICEPT) tablet 20 mg, 20 mg, Oral, Nightly, Parker Romero MD, 20 mg at 10/04/18 2236  •  fluticasone (FLONASE) 50 MCG/ACT nasal spray 1 spray, 1 spray, Nasal, Daily, Parker Romero MD, 1 spray at 10/04/18 0808  •  lactated ringers infusion 1,000 mL, 1,000 mL, Intravenous, Continuous, Glenna Winn MD, Last Rate: 25 mL/hr at 10/04/18 1330  •  levothyroxine (SYNTHROID, LEVOTHROID) tablet 175 mcg, 175 mcg, Oral, Daily, Parker Romero MD, 175 mcg at 10/05/18 0658  •  montelukast (SINGULAIR) tablet 10 mg, 10 mg, Oral, Nightly, Parker Romero MD, 10 mg at 10/04/18 2236  •  OLANZapine (zyPREXA) tablet 10 mg, 10 mg, Oral, Nightly, Parker Romero MD, 10 mg at 10/04/18 2236  •  ondansetron  (ZOFRAN) injection 4 mg, 4 mg, Intravenous, Q6H PRN, Parker Romero MD  •  oxybutynin XL (DITROPAN-XL) 24 hr tablet 5 mg, 5 mg, Oral, BID, Parker Romero MD, 5 mg at 10/04/18 2236  •  pantoprazole (PROTONIX) 40 mg/100 mL (0.4 mg/mL) in 0.9% NS IVPB, 8 mg/hr, Intravenous, Continuous, Kathy Maldonado APRN, Last Rate: 20 mL/hr at 10/05/18 0513, 8 mg/hr at 10/05/18 0513  •  pantoprazole (PROTONIX) EC tablet 40 mg, 40 mg, Oral, BID AC, Glenna Winn MD, 40 mg at 10/05/18 0658  •  promethazine (PHENERGAN) tablet 25 mg, 25 mg, Oral, Q6H PRN, Parkre Romero MD  •  sodium chloride 0.9 % flush 1-10 mL, 1-10 mL, Intravenous, PRN, Parker Romero MD  •  sodium chloride 0.9 % flush 3 mL, 3 mL, Intravenous, Q12H, Parker Romero MD, 3 mL at 10/04/18 2110  •  sodium chloride 0.9 % flush 3 mL, 3 mL, Intravenous, PRN, Glenna Winn MD  •  sucralfate (CARAFATE) 1 GM/10ML suspension 1 g, 1 g, Oral, Q6H, Glenna Winn MD, 1 g at 10/05/18 0259  Review of Systems:    All systems were reviewed and negative except for:  Cardiovascular: positive for  chest pain at night  Gastrointestinal: postitive for  difficulty / pain with swallowing    Objective     Vital Signs  Temp:  [97.8 °F (36.6 °C)-98.7 °F (37.1 °C)] 97.9 °F (36.6 °C)  Heart Rate:  [60-71] 61  Resp:  [18-20] 20  BP: ()/(48-69) 107/62  Body mass index is 22.45 kg/m².    Intake/Output Summary (Last 24 hours) at 10/05/18 0758  Last data filed at 10/05/18 0513   Gross per 24 hour   Intake              650 ml   Output                0 ml   Net              650 ml     No intake/output data recorded.     Physical Exam:   General: patient awake, alert and cooperative   Eyes: Normal lids and lashes, no scleral icterus   Neck: supple, normal ROM   Skin: warm and dry, not jaundiced   Abdomen: soft, nontender, nondistended; normal bowel sounds   Psychiatric: Normal mood and behavior; memory intact     Results Review:     I reviewed the patient's new  clinical results.      Results from last 7 days  Lab Units 10/04/18  2024 10/04/18  0809 10/03/18  2232  10/03/18  0611  10/02/18  1409   WBC 10*3/mm3  --  12.77*  --   --  14.91*  --  18.72*   HEMOGLOBIN g/dL 9.8* 9.6*  9.6* 10.1*  < > 10.8*  < > 12.4*   HEMATOCRIT % 31.6* 30.9*  30.9* 34.1*  < > 35.5*  < > 39.7*   PLATELETS 10*3/mm3  --  341  --   --  375  --  465   < > = values in this interval not displayed.    Results from last 7 days  Lab Units 10/03/18  2145 10/03/18  0611 10/02/18  1409   SODIUM mmol/L 147* 142 144   POTASSIUM mmol/L 4.8 4.7 4.1   CHLORIDE mmol/L 111* 109* 103   CO2 mmol/L 18.4* 21.1* 29.3*   BUN mg/dL 29* 35* 33*   CREATININE mg/dL 0.69* 0.81 1.03   CALCIUM mg/dL 10.1 10.7* 12.2*   BILIRUBIN mg/dL  --   --  0.2   ALK PHOS U/L  --   --  140*   ALT (SGPT) U/L  --   --  8   AST (SGOT) U/L  --   --  8   GLUCOSE mg/dL 66 110* 139*           Lab Results  Lab Value Date/Time   LIPASE 20 10/02/2018 1409       Radiology:  XR Hip With or Without Pelvis 2 - 3 View Right   Final Result      XR Chest 1 View   Final Result   No definite active disease is seen in the chest. There has   been evidence of previous CABG, old healed fracture deformity of the   midshaft of the right clavicle, mild pulmonary hyperexpansion. Patient   rotation slightly limits evaluation.  Vague opacity over the lateral   aspect of the left midlung zone most likely overlying soft tissues   secondary to patient rotation.       This report was finalized on 10/2/2018 4:11 PM by Dr. Tu Perea M.D.          CT Angiogram Chest With Contrast   Final Result   Diffusely dilated esophagus as noted. Otherwise unremarkable   CTA of the chest. There is no CT evidence of pulmonary embolism or other   acute process within the chest.       This report was finalized on 10/2/2018 4:01 PM by Dr. Kuldip Ross M.D.              Assessment/Plan     Patient Active Problem List   Diagnosis   • Thigh pain, musculoskeletal   • Left leg weakness    • History of CVA (cerebrovascular accident)   • COPD (chronic obstructive pulmonary disease)   • Coronary artery disease   • Cervical myelopathy (CMS/[facility])   • Parkinson's disease   • Debility   • Cerebrovascular disease   • Abnormal TSH   • Hypothyroidism   • Hip fracture requiring operative repair, right, closed, initial encounter (CMS/[facility])   • Urinary tract infection without hematuria   • Hypoxia   • Sinus bradycardia   • Chest pain   • Hip pain, chronic, right   • Hematemesis   • Hematemesis with nausea   • H/O medication noncompliance   • Hyperlipidemia     Assesssment:  1. UGIB due to severe erosive esophagitis  2. ABLA  3. Esophageal stricture    Plan:  - advance to soft diet  - continue bid PPI until repeat EGD  - carafate qid x 6 weeks  - soft diet only until repeat egd- discussed w/pt and wife  - recommend repeat egd in 6-8 weeks to check healing and dilate esophagus  - avoid nsaids  - rec smoking cessation  - OK to d/c from GI standpoint - office f/u in 3-4 weeks - our office will call to schedule and we will contact him with bx results    I discussed the patients findings and my recommendations with patient, family and Dr Mcmahon.    Glenna Winn MD

## 2018-10-05 NOTE — PROGRESS NOTES
"Victor Manuel Issa  1945 73 y.o.  [id number]      Patient Care Team:  Shira Pierce APRN as PCP - General (Family Medicine)  Shira Pierce APRN as PCP - Claims Attributed    CC: Chest pain, prior CABG    Interval History: He has severe esophagitis on his endoscopy      Objective   Vital Signs  Temp:  [97.8 °F (36.6 °C)-98.7 °F (37.1 °C)] 97.9 °F (36.6 °C)  Heart Rate:  [56-71] 56  Resp:  [18-20] 18  BP: ()/(48-69) 107/62    Intake/Output Summary (Last 24 hours) at 10/05/18 1004  Last data filed at 10/05/18 0513   Gross per 24 hour   Intake              650 ml   Output                0 ml   Net              650 ml     Flowsheet Rows      First Filed Value   Admission Height  180.3 cm (71\") Documented at 10/02/2018 1400   Admission Weight  73 kg (161 lb) Documented at 10/02/2018 1401          Physical Exam:   General Appearance:    Alert,oriented, in no acute distress   Lungs:     Clear to auscultation,BS are equal    Heart:    Normal S1 and S2, RRR without murmur, gallop or rub   HEENT:    Sclerae are clear, no JVD or adenopathy   Abdomen:     Normal bowel sounds, soft non-tender, non-distended, no HSM   Extremities:   Moves all extremities well, no edema, no cyanosis, no             Redness, no rash     Medication Review:        arformoterol 15 mcg Nebulization BID - RT   atorvastatin 20 mg Oral Daily   baclofen 10 mg Oral TID   carbidopa-levodopa 1 tablet Oral TID With Meals   divalproex 500 mg Oral BID   donepezil 20 mg Oral Nightly   fluticasone 1 spray Nasal Daily   levothyroxine 175 mcg Oral Daily   montelukast 10 mg Oral Nightly   OLANZapine 10 mg Oral Nightly   oxybutynin XL 5 mg Oral BID   pantoprazole 40 mg Oral BID AC   sodium chloride 3 mL Intravenous Q12H   sucralfate 1 g Oral Q6H       lactated ringers 1,000 mL Last Rate: 1,000 mL (10/04/18 1330)   pantoprazole 8 mg/hr Last Rate: 8 mg/hr (10/05/18 0513)         I reviewed the patient's new clinical results.  I personally viewed and " interpreted the patient's EKG/Telemetry data    Assessment/Plan  Active Hospital Problems    Diagnosis Date Noted   • **Chest pain [R07.9] 10/02/2018   • H/O medication noncompliance [Z91.14] 10/03/2018   • Hyperlipidemia [E78.5] 10/03/2018   • Hip pain, chronic, right [M25.551, G89.29] 10/02/2018   • Hematemesis [K92.0] 10/02/2018   • Hematemesis with nausea [K92.0] 10/02/2018   • Hypothyroidism [E03.9] 10/01/2017   • Parkinson's disease [G20] 09/29/2017   • COPD (chronic obstructive pulmonary disease) [J44.9] 09/28/2017   • History of CVA (cerebrovascular accident) [Z86.73] 09/28/2017   • Coronary artery disease [I25.10] 09/28/2017      Resolved Hospital Problems    Diagnosis Date Noted Date Resolved   No resolved problems to display.       He came in with chest pain and hematemesis has severe esophagitis on his esophagus endoscopy at this point I would not pursue a cardiac workup I did  him to stop smoking we will see him as needed        Victor Manuel Bhat MD  10/05/18  10:04 AM

## 2018-10-05 NOTE — PROGRESS NOTES
Continued Stay Note  [facility]     Patient Name: Victor Manuel Issa  MRN: [id number]  Today's Date: 10/5/2018    Admit Date: 10/2/2018          Discharge Plan     Row Name 10/05/18 1319       Plan    Plan Home with [facility]    Plan Comments Per Chelsey patient is current and she is following for DC.  Patient's plans remain home with [facility] home [facility].......................JACKIE Muniz RN              Discharge Codes    No documentation.           Jackie Muniz RN

## 2018-10-05 NOTE — PROGRESS NOTES
[facility]     LOS: 2 days     Subjective:  minimal chest pain last night  No soa  Eating some  Feeling better    Objective:    Vital Signs:  Temp:  [97.8 °F (36.6 °C)-98.7 °F (37.1 °C)] 97.9 °F (36.6 °C)  Heart Rate:  [60-71] 61  Resp:  [18-20] 20  BP: ()/(48-69) 107/62    SpO2:  [93 %-97 %] 93 %  on   ;   Device (Oxygen Therapy): room air  Body mass index is 22.45 kg/m².    Physical Exam   Constitutional:   Patient is smiling when I walk in the room and asked me how I am doing.  Patient overall is much improved   HENT:   Head: Normocephalic and atraumatic.   Cardiovascular: Normal rate and regular rhythm.    No murmur heard.  Pulmonary/Chest: Effort normal and breath sounds normal.   Abdominal: Soft. Bowel sounds are normal. He exhibits no distension. There is no tenderness.   Neurological: He is alert.   Skin: Skin is warm and dry.       Results Review:    Glucose   Date Value Ref Range Status   10/03/2018 66 65 - 99 mg/dL Final   10/03/2018 110 (H) 65 - 99 mg/dL Final   10/02/2018 139 (H) 65 - 99 mg/dL Final       Results from last 7 days  Lab Units 10/04/18  2024 10/04/18  0809   WBC 10*3/mm3  --  12.77*   HEMOGLOBIN g/dL 9.8* 9.6*  9.6*   HEMATOCRIT % 31.6* 30.9*  30.9*   PLATELETS 10*3/mm3  --  341       Results from last 7 days  Lab Units 10/03/18  2145  10/02/18  1409   SODIUM mmol/L 147*  < > 144   POTASSIUM mmol/L 4.8  < > 4.1   CHLORIDE mmol/L 111*  < > 103   CO2 mmol/L 18.4*  < > 29.3*   BUN mg/dL 29*  < > 33*   CREATININE mg/dL 0.69*  < > 1.03   CALCIUM mg/dL 10.1  < > 12.2*   BILIRUBIN mg/dL  --   --  0.2   ALK PHOS U/L  --   --  140*   ALT (SGPT) U/L  --   --  8   AST (SGOT) U/L  --   --  8   GLUCOSE mg/dL 66  < > 139*   < > = values in this interval not displayed.            Results from last 7 days  Lab Units 10/04/18  0936 10/03/18  2145 10/03/18  0611   TROPONIN T ng/mL <0.010 <0.010 <0.010     Cultures:       I have reviewed daily medications and changes in  CPOE    Scheduled meds    arformoterol 15 mcg Nebulization BID - RT   atorvastatin 20 mg Oral Daily   baclofen 10 mg Oral TID   carbidopa-levodopa 1 tablet Oral TID With Meals   divalproex 500 mg Oral BID   donepezil 20 mg Oral Nightly   fluticasone 1 spray Nasal Daily   levothyroxine 175 mcg Oral Daily   montelukast 10 mg Oral Nightly   OLANZapine 10 mg Oral Nightly   oxybutynin XL 5 mg Oral BID   pantoprazole 40 mg Oral BID AC   sodium chloride 3 mL Intravenous Q12H   sucralfate 1 g Oral Q6H         lactated ringers 1,000 mL Last Rate: 1,000 mL (10/04/18 1330)   pantoprazole 8 mg/hr Last Rate: 8 mg/hr (10/05/18 8852)     PRN meds  •  acetaminophen  •  albuterol  •  busPIRone  •  ondansetron  •  promethazine  •  sodium chloride  •  sodium chloride      Principal Problem:    Chest pain  Active Problems:    History of CVA (cerebrovascular accident)    COPD (chronic obstructive pulmonary disease)    Coronary artery disease    Parkinson's disease    Hypothyroidism    Hip pain, chronic, right    Hematemesis    Hematemesis with nausea    H/O medication noncompliance    Hyperlipidemia        Assessment/Plan:  UGIB due to severe erosive esophagitis  -more than 1/2 cup day prior to admission and 4 oz on the day of admission  -no more bleeding   -carafate  -PPI      Chest pain-likely from esophageal ulcers      Coronary artery disease  -chest pain that brought him to the ER, troponins negative  -MI 2002 (bypass) and 2007 bypass    Hypernatremia- need to recheck this am, and the sodium is better      History of CVA (cerebrovascular accident)  -rapid called 10/3  -no concerning symptoms on my review 10/4      COPD (chronic obstructive pulmonary disease)       Stroke in 2007      Parkinson's disease       Hypothyroidism  -TSH is 20  -on levothyroxine 175  -Endocrinology to follow up outpatient    Hypercalcemia has improved      Hip pain, chronic, right- broke hip may 3rd  - he was been ambulating  -Discussed with Dr. Paredes  yesterday and the patient may need a conversion to a total hip replacement.  If the patient undergoes steroid injection that his unable to get hip replacement for a couple months.      Weight loss 30-40 pounds this year      Still smokes      Copd    Bipolar - zyprexa and buspar    DVT prophylaxis with SCDs, no Lovenox given GI bleed on admission    Plan:  Disco 2-3 days      Jeovany Mcmahon MD  10/05/18  8:09 AM

## 2018-10-05 NOTE — PLAN OF CARE
Problem: Patient Care Overview  Goal: Plan of Care Review  Outcome: Ongoing (interventions implemented as appropriate)   10/05/18 7555   Coping/Psychosocial   Plan of Care Reviewed With patient   OTHER   Outcome Summary off protonix drip, up with PT, no complaints of pain or discomfort unless moved, VSS, will continue to monitor      Goal: Individualization and Mutuality  Outcome: Outcome(s) achieved Date Met: 10/05/18    Goal: Discharge Needs Assessment  Outcome: Ongoing (interventions implemented as appropriate)    Goal: Interprofessional Rounds/Family Conf  Outcome: Ongoing (interventions implemented as appropriate)      Problem: Fall Risk (Adult)  Goal: Identify Related Risk Factors and Signs and Symptoms  Outcome: Ongoing (interventions implemented as appropriate)    Goal: Absence of Fall  Outcome: Ongoing (interventions implemented as appropriate)      Problem: Skin Injury Risk (Adult)  Goal: Identify Related Risk Factors and Signs and Symptoms  Outcome: Ongoing (interventions implemented as appropriate)    Goal: Skin Health and Integrity  Outcome: Ongoing (interventions implemented as appropriate)

## 2018-10-05 NOTE — PLAN OF CARE
Problem: Patient Care Overview  Goal: Plan of Care Review  Outcome: Ongoing (interventions implemented as appropriate)   10/05/18 0352   Coping/Psychosocial   Plan of Care Reviewed With patient   Plan of Care Review   Progress improving   OTHER   Outcome Summary No significant changes throughout the night. Protonix restarted with new IV in right forearm. Pt complaining of heartburn and pain in RLE joints (hip, knee, and toes/feet) from previous fx in May. Tx with tylenol and keeping RLE elevated.Turning and changing brief Q2H. Full liquid diet and tolerating well. VSS. Afebrile.      Goal: Individualization and Mutuality  Outcome: Ongoing (interventions implemented as appropriate)    Goal: Discharge Needs Assessment  Outcome: Ongoing (interventions implemented as appropriate)    Goal: Interprofessional Rounds/Family Conf  Outcome: Ongoing (interventions implemented as appropriate)      Problem: Fall Risk (Adult)  Goal: Identify Related Risk Factors and Signs and Symptoms  Outcome: Ongoing (interventions implemented as appropriate)    Goal: Absence of Fall  Outcome: Ongoing (interventions implemented as appropriate)      Problem: Skin Injury Risk (Adult)  Goal: Identify Related Risk Factors and Signs and Symptoms  Outcome: Ongoing (interventions implemented as appropriate)    Goal: Skin Health and Integrity  Outcome: Ongoing (interventions implemented as appropriate)

## 2018-10-05 NOTE — PROGRESS NOTES
No further right  hand or arm issues.  No pain or swelling.    Has palpable pulses and hand is well perfused.    Not convinced there was ever an arterial stick.

## 2018-10-06 VITALS
HEIGHT: 71 IN | OXYGEN SATURATION: 98 % | TEMPERATURE: 97.7 F | HEART RATE: 63 BPM | WEIGHT: 161 LBS | SYSTOLIC BLOOD PRESSURE: 92 MMHG | DIASTOLIC BLOOD PRESSURE: 55 MMHG | BODY MASS INDEX: 22.54 KG/M2 | RESPIRATION RATE: 16 BRPM

## 2018-10-06 LAB
ANION GAP SERPL CALCULATED.3IONS-SCNC: 8.6 MMOL/L
BUN BLD-MCNC: 12 MG/DL (ref 8–23)
BUN/CREAT SERPL: 16.7 (ref 7–25)
CALCIUM SPEC-SCNC: 9.6 MG/DL (ref 8.6–10.5)
CHLORIDE SERPL-SCNC: 108 MMOL/L (ref 98–107)
CO2 SERPL-SCNC: 23.4 MMOL/L (ref 22–29)
CREAT BLD-MCNC: 0.72 MG/DL (ref 0.76–1.27)
GFR SERPL CREATININE-BSD FRML MDRD: 106 ML/MIN/1.73
GLUCOSE BLD-MCNC: 114 MG/DL (ref 65–99)
POTASSIUM BLD-SCNC: 3.6 MMOL/L (ref 3.5–5.2)
SODIUM BLD-SCNC: 140 MMOL/L (ref 136–145)

## 2018-10-06 PROCEDURE — 80048 BASIC METABOLIC PNL TOTAL CA: CPT | Performed by: HOSPITALIST

## 2018-10-06 PROCEDURE — 99231 SBSQ HOSP IP/OBS SF/LOW 25: CPT | Performed by: INTERNAL MEDICINE

## 2018-10-06 PROCEDURE — 94799 UNLISTED PULMONARY SVC/PX: CPT

## 2018-10-06 PROCEDURE — 97110 THERAPEUTIC EXERCISES: CPT

## 2018-10-06 RX ADMIN — OXYBUTYNIN CHLORIDE 5 MG: 5 TABLET, EXTENDED RELEASE ORAL at 09:09

## 2018-10-06 RX ADMIN — NYSTATIN 500000 UNITS: 100000 SUSPENSION ORAL at 20:08

## 2018-10-06 RX ADMIN — LEVOTHYROXINE SODIUM 175 MCG: 175 TABLET ORAL at 06:02

## 2018-10-06 RX ADMIN — DONEPEZIL HYDROCHLORIDE 20 MG: 10 TABLET, FILM COATED ORAL at 20:08

## 2018-10-06 RX ADMIN — SUCRALFATE 1 G: 1 SUSPENSION ORAL at 09:09

## 2018-10-06 RX ADMIN — MONTELUKAST SODIUM 10 MG: 10 TABLET, FILM COATED ORAL at 20:08

## 2018-10-06 RX ADMIN — CARBIDOPA AND LEVODOPA 1 TABLET: 25; 250 TABLET ORAL at 18:22

## 2018-10-06 RX ADMIN — Medication 3 ML: at 10:01

## 2018-10-06 RX ADMIN — CARBIDOPA AND LEVODOPA 1 TABLET: 25; 250 TABLET ORAL at 11:47

## 2018-10-06 RX ADMIN — ACETAMINOPHEN 500 MG: 500 TABLET, FILM COATED ORAL at 09:44

## 2018-10-06 RX ADMIN — NYSTATIN 500000 UNITS: 100000 SUSPENSION ORAL at 11:47

## 2018-10-06 RX ADMIN — SUCRALFATE 1 G: 1 SUSPENSION ORAL at 15:59

## 2018-10-06 RX ADMIN — ATORVASTATIN CALCIUM 20 MG: 20 TABLET, FILM COATED ORAL at 09:09

## 2018-10-06 RX ADMIN — ACETAMINOPHEN 500 MG: 500 TABLET, FILM COATED ORAL at 18:02

## 2018-10-06 RX ADMIN — ARFORMOTEROL TARTRATE 15 MCG: 15 SOLUTION RESPIRATORY (INHALATION) at 08:51

## 2018-10-06 RX ADMIN — SUCRALFATE 1 G: 1 SUSPENSION ORAL at 20:08

## 2018-10-06 RX ADMIN — FLUTICASONE PROPIONATE 1 SPRAY: 50 SPRAY, METERED NASAL at 09:09

## 2018-10-06 RX ADMIN — CARBIDOPA AND LEVODOPA 1 TABLET: 25; 250 TABLET ORAL at 09:09

## 2018-10-06 RX ADMIN — DIVALPROEX SODIUM 500 MG: 500 TABLET, DELAYED RELEASE ORAL at 09:09

## 2018-10-06 RX ADMIN — SUCRALFATE 1 G: 1 SUSPENSION ORAL at 02:05

## 2018-10-06 RX ADMIN — DIVALPROEX SODIUM 500 MG: 500 TABLET, DELAYED RELEASE ORAL at 20:08

## 2018-10-06 RX ADMIN — ARFORMOTEROL TARTRATE 15 MCG: 15 SOLUTION RESPIRATORY (INHALATION) at 20:22

## 2018-10-06 RX ADMIN — OXYBUTYNIN CHLORIDE 5 MG: 5 TABLET, EXTENDED RELEASE ORAL at 20:08

## 2018-10-06 RX ADMIN — PANTOPRAZOLE SODIUM 40 MG: 40 TABLET, DELAYED RELEASE ORAL at 17:12

## 2018-10-06 RX ADMIN — PANTOPRAZOLE SODIUM 40 MG: 40 TABLET, DELAYED RELEASE ORAL at 06:02

## 2018-10-06 RX ADMIN — NYSTATIN 500000 UNITS: 100000 SUSPENSION ORAL at 09:09

## 2018-10-06 RX ADMIN — NYSTATIN 500000 UNITS: 100000 SUSPENSION ORAL at 17:12

## 2018-10-06 NOTE — DISCHARGE SUMMARY
[facility]  [phone]    DISCHARGE SUMMARY  [facility]    Patient Identification:  Name: Victor Manuel Issa  Age: 73 y.o.  Sex: male  :  1945  MRN: [id number]  Primary Care Physician: Shira Pierce APRN    Admit date: 10/2/2018  Discharge date and time:      Discharge Diagnoses:Principal Problem:    Chest pain  Active Problems:    History of CVA (cerebrovascular accident)    COPD (chronic obstructive pulmonary disease)    Coronary artery disease    Parkinson's disease    Hypothyroidism    Hip pain, chronic, right    Hematemesis    Hematemesis with nausea    H/O medication noncompliance    Hyperlipidemia       History of present illness from H&P:    Patient is a 73 y.o. male came in because of chest pain. he describes chest pressure in the middle of his chest with radiation and numbness in his arms and it lasted at least an hour when he was in the ED. he was given nitroglycerin once and he says it helped the pain. a couple days ago he had dark red spit up after some nausea and vomiting. he has intermittent spitting up blood since then. he denies any coughing up blood. he had partial hip surgery in May and was in rehab for some time in [place] and afterwards fell and has pain in his right hip since. He is less active now due to the pain. He also has had penis biopsy and was to follow up with his urologist tomorrow for the result.      Hospital Course:     This is a 73-year-old gentleman who presents with chest discomfort as well as spitting up blood.  There is concern for coronary artery disease as a source for the patient's chest pain.  But the patient's troponins remained negative.  The chest pain was fairly consistent during hospitalization.  Cardiology saw the patient did not feel that the chest discomfort was related to a cardiac source.  The patient ended up having worsening chest pain and troponins remained negative.  He also had some  difficulty with swallowing food.  He underwent endoscopic evaluation which showed severe esophagitis.  The chest discomfort was ultimately thought to be from his esophagitis.  The patient is now doing quite well.  And the patient is anxious for discharge.    The patient during the middle part of the hospitalization did have some facial asymmetry according to the nurse and a rapid was called.  But by the wife's description she did not notice facial droop.  I saw the patient early the following morning and also did not note any facial droop.  The patient has had no neurologic deficits during the hospitalization he does have slightly masked faces related to parkinsonism.  The patient remains on Sinemet for this.        Hospital course by problem list:        UGIB due to severe erosive esophagitis  -more than 1/2 cup day prior to admission and 4 oz on the day of admission  -no more bleeding   -carafate ×6 weeks on discharge and the patient needs follow-up with gastroenterology   -PPI indefinitely for now       Chest pain-likely from esophageal ulcers, is now improved        Coronary artery disease  -chest pain that brought him to the ER, troponins negative  -MI 2002 (bypass) and 2007 bypass     Hypernatremia-up to 147 during hospitalization but has improved. f CVA (cerebrovascular accident)  -rapid called 10/3 for chest pain and possible facial droop.  But ultimately patient was not thought to have facial droop.    -no concerning symptoms on my review 10/4       COPD (chronic obstructive pulmonary disease)       Stroke in 2007       Parkinson's disease        Hypothyroidism  -TSH is 20  -on levothyroxine 175  -Endocrinology to follow up outpatient or with primary care doctor outpatient.       Hypercalcemia has improved       Hip pain, chronic, right- broke hip may 3rd  -Discussed with Dr. Paredes  and the patient may need a conversion to a total hip replacement.  If the patient undergoes steroid injection than he is  unable to get hip replacement for a couple months.  Patient is to follow-up with orthopedics outpatient.        Weight loss 30-40 pounds this year, hopefully with quitting smoking and with treatment of the patient's severe esophagitis this will improve.         Still smokes, discussed cessation with the patient      Bipolar - zyprexa and buspar    QTC is prolonged by last EKG but on the monitor QTC has improved to less than 400    Consults:   Consults     Date and Time Order Name Status Description    10/4/2018 1609 Inpatient Vascular Surgery Consult Completed     10/3/2018 1452 Inpatient Endocrinology Consult Completed     10/2/2018 1950 Inpatient Urology Consult      10/2/2018 1950 Inpatient Orthopedic Surgery Consult Completed     10/2/2018 1950 Inpatient Gastroenterology Consult Completed     10/2/2018 1950 Inpatient Cardiology Consult Completed     10/2/2018 1603 [facility] (on-call MD unless specified) Completed             Results from last 7 days  Lab Units 10/05/18  0931   WBC 10*3/mm3 9.96   HEMOGLOBIN g/dL 9.4*   HEMATOCRIT % 30.3*   PLATELETS 10*3/mm3 319         Results from last 7 days  Lab Units 10/06/18  0448   SODIUM mmol/L 140   POTASSIUM mmol/L 3.6   CHLORIDE mmol/L 108*   CO2 mmol/L 23.4   BUN mg/dL 12   CREATININE mg/dL 0.72*   GLUCOSE mg/dL 114*   CALCIUM mg/dL 9.6       Significant Diagnostic Studies:   Lab Results   Component Value Date    WBC 9.96 10/05/2018    HGB 9.4 (L) 10/05/2018    HCT 30.3 (L) 10/05/2018     10/05/2018     Lab Results   Component Value Date     10/06/2018    K 3.6 10/06/2018     (H) 10/06/2018    CO2 23.4 10/06/2018    BUN 12 10/06/2018    CREATININE 0.72 (L) 10/06/2018    GLUCOSE 114 (H) 10/06/2018     Lab Results   Component Value Date    CALCIUM 9.6 10/06/2018     No results found for: AST, ALT, ALKPHOS  No results found for: APTT, INR  No results found for: COLORU, CLARITYU, SPECGRAV, PHUR, PROTEINUR, GLUCOSEU, KETONESU, BLOODU, NITRITE, LEUKOCYTESUR,  BILIRUBINUR, UROBILINOGEN, RBCUA, WBCUA, BACTERIA, UACOMMENT  Lab Results   Component Value Date    TROPONINT <0.010 10/04/2018     No components found for: HGBA1C;2  No components found for: TSH;2    Imaging Results (all)     Procedure Component Value Units Date/Time    XR Hip With or Without Pelvis 2 - 3 View Right [[id number]] Collected:  10/02/18 2048     Updated:  10/02/18 2054    Narrative:       PELVIS AND RIGHT HIP X-RAYS     CLINICAL HISTORY: Right hip pain.     AP view the pelvis and AP and frog-leg lateral views of the right hip  were obtained. There is mild to moderate diffuse osteopenia. A bipolar  right hip hemiarthroplasty is in place in satisfactory position. There  is no evidence of fracture or dislocation. There is moderate narrowing  of the right hip joint space. There is no bony erosion. The left hip  joint is only mildly narrowed.     This report was finalized on 10/2/2018 8:51 PM by Dr. Kuldip Ross M.D.       XR Chest 1 View [[id number]] Collected:  10/02/18 1504     Updated:  10/02/18 1614    Narrative:       EMERGENCY PORTABLE VIEW OF THE CHEST  - 10/02/2018     HISTORY:  Chest pain.     This is correlated to prior chest x-ray from [facility]  05/06/2018.     FINDINGS: There are multiple sternal wires and mediastinal markers from  previous cardiac surgery.  The patient is slightly rotated on this exam,  slightly limits evaluation.  There is an old healed fracture deformity  of the midshaft of the right clavicle. Cardiomediastinal silhouette and  pulmonary vasculature are within normal limits. The lungs are clear, the  costophrenic angles are sharp, and there is mild pulmonary  hyperexpansion with some flattening of the diaphragms.       Impression:       No definite active disease is seen in the chest. There has  been evidence of previous CABG, old healed fracture deformity of the  midshaft of the right clavicle, mild pulmonary hyperexpansion. Patient  rotation slightly  limits evaluation.  Vague opacity over the lateral  aspect of the left midlung zone most likely overlying soft tissues  secondary to patient rotation.     This report was finalized on 10/2/2018 4:11 PM by Dr. Tu Perea M.D.       CT Angiogram Chest With Contrast [[id number]] Collected:  10/02/18 1549     Updated:  10/02/18 1604    Narrative:       CT ANGIOGRAM CHEST W CONTRAST-     CLINICAL HISTORY: Chest pain. Elevated d-dimer.     TECHNIQUE: Spiral CT images were obtained through the chest during rapid  IV injection of contrast and were reconstructed in 2 mm thick axial  slices. Multiple coronal and sagittal and 3-D reconstructions were  obtained.     Radiation dose reduction techniques were utilized, including automated  exposure control and exposure modulation based on body size.     COMPARISON: CT scan of the chest without contrast dated 3/25/2016.     FINDINGS: The main pulmonary arteries and their lobar and segmental  branches are fairly well opacified and demonstrate no filling defects.  There is no CT evidence of pulmonary embolism. The thoracic aorta is  mildly ectatic and calcified, and is otherwise unremarkable. There is no  mediastinal or hilar or axillary lymphadenopathy. The entire thoracic  esophagus is moderately distended and filled with fluid. This is likely  due to chronic esophageal dysmotility and gastroesophageal reflux. This  is a new finding since the preceding CT scan. Mild smooth pleural  thickening is noted in both hemithoraces. Lung window images show no  parenchymal infiltrates or noncalcified nodules. There are no pleural  effusions. Images through the upper abdomen show pneumobilia and a small  right renal cyst, and are otherwise unremarkable.       Impression:       Diffusely dilated esophagus as noted. Otherwise unremarkable  CTA of the chest. There is no CT evidence of pulmonary embolism or other  acute process within the chest.     This report was finalized on 10/2/2018 4:01 PM  by Dr. Kuldip Ross M.D.         No results found for: SITE, ALLENTEST, PHART, QEX8TUY, PO2ART, PGF3QOR, BASEEXCESS, R4AOPGVB, HGBBG, HCTABG, OXYHEMOGLOBI, METHHGBN, CARBOXYHGB, CO2CT, BAROMETRIC, MODALITY, FIO2       Discharge Medications      New Medications      Instructions Start Date   nystatin 353465 UNIT/ML suspension  Commonly known as:  MYCOSTATIN   5 mL, Oral, 4 Times Daily      pantoprazole 40 MG EC tablet  Commonly known as:  PROTONIX   40 mg, Oral, 2 Times Daily Before Meals      sucralfate 1 GM/10ML suspension  Commonly known as:  CARAFATE   1 g, Oral, Every 6 Hours Scheduled         Continue These Medications      Instructions Start Date   acetaminophen 325 MG tablet  Commonly known as:  TYLENOL   650 mg, Oral, Every 4 Hours PRN      albuterol (2.5 MG/3ML) 0.083% nebulizer solution  Commonly known as:  PROVENTIL   2.5 mg, Nebulization, Every 6 Hours PRN      arformoterol 15 MCG/2ML nebulizer solution  Commonly known as:  BROVANA   15 mcg, Nebulization, 2 Times Daily - RT      aspirin 325 MG EC tablet   325 mg, Oral, Daily      busPIRone 5 MG tablet  Commonly known as:  BUSPAR   5 mg, Oral, 3 Times Daily PRN      carbidopa-levodopa  MG per tablet  Commonly known as:  SINEMET   1 tablet, Oral, 3 Times Daily With Meals      cholecalciferol 1000 units tablet  Commonly known as:  VITAMIN D3   1,000 Units, Oral, Daily      diclofenac 1 % gel gel  Commonly known as:  VOLTAREN   4 g, Topical, Daily PRN      divalproex 500 MG DR tablet  Commonly known as:  DEPAKOTE   500 mg, Oral, 2 Times Daily      donepezil 10 MG tablet  Commonly known as:  ARICEPT   20 mg, Oral, Nightly      fluticasone 50 MCG/ACT nasal spray  Commonly known as:  FLONASE   1 spray, Nasal, Daily      levothyroxine 175 MCG tablet  Commonly known as:  SYNTHROID, LEVOTHROID   175 mcg, Oral, Daily      montelukast 10 MG tablet  Commonly known as:  SINGULAIR   10 mg, Oral, Nightly      multivitamin with minerals tablet tablet   1 tablet,  Oral, Daily      OLANZapine 10 MG tablet  Commonly known as:  zyPREXA   10 mg, Oral, Nightly      oxybutynin XL 5 MG 24 hr tablet  Commonly known as:  DITROPAN-XL   5 mg, Oral, 2 Times Daily      promethazine 25 MG tablet  Commonly known as:  PHENERGAN   25 mg, Oral, Every 6 Hours PRN      simvastatin 40 MG tablet  Commonly known as:  ZOCOR   40 mg, Oral, Nightly         Stop These Medications    baclofen 10 MG tablet  Commonly known as:  LIORESAL     cefuroxime 500 MG tablet  Commonly known as:  CEFTIN     esomeprazole 40 MG capsule  Commonly known as:  nexIUM     fexofenadine 180 MG tablet  Commonly known as:  ALLEGRA              Patient Instructions:       No future appointments.      Contact information for follow-up providers     David Chilel MD Follow up in 4 month(s).    Specialty:  Urology  Contact information:  [address] [facility] [place] [address]  [phone]             Shira Pierce, APRN .    Specialty:  Family Medicine  Contact information:  [address]  [facility] [id number] [phone]             Shira Pierce, APRN .    Specialty:  Family Medicine  Contact information:  [address] [facility] [id number] [phone]                   Contact information for after-discharge care     Home Medical Care     [facility] .    Specialty:  Home Health Services  Contact information:  [address] [facility] [facility] [address]  [facility] [id number] [phone]                             Discharge Order     Start     Ordered    10/06/18 1008  Discharge patient  Once     Expected Discharge Date:  10/06/18    Discharge Disposition:  Home or Self Care    Physician of Record for Attribution - Please select from Treatment Team:  JUAN WESTON [[id number]]    Review needed by CMO to determine Physician of Record:  No       Question Answer Comment   Physician of Record for Attribution - Please select from Treatment Team JUAN WESTON    Review needed by CMO to  determine Physician of Record No        10/06/18 1010          -Soft diet only until repeat egd  -Recommend repeat egd in 6-8 weeks to check healing and dilate esophagus  -F/u with GI in 3-4 weeks  F/u 4 months Dr. Chilel for penile biopsy    Total time spent discharging patient including evaluation, post hospitalization follow up,  medication and post hospitalization instructions and education, total time exceeds 30 minutes.    Signed:  Jeovany Mcmahon MD  10/6/2018  10:13 AM

## 2018-10-06 NOTE — PLAN OF CARE
Problem: Patient Care Overview  Goal: Plan of Care Review  Outcome: Ongoing (interventions implemented as appropriate)   10/06/18 6793   Coping/Psychosocial   Plan of Care Reviewed With patient;spouse   Plan of Care Review   Progress improving   OTHER   Outcome Summary Pt able to ambulate a total of 6ft away from and towards the bed with Modx2 and use of RWx. His B knees remain flexed when in standing and during ambulation, with verbal/tactile cues to improve, but pt unable to maintain. He required one sitting rest break at the EOB in between attempts. He will benefit from continuing skilled PT to improve his activity tolerance and independence with functional mobility.

## 2018-10-06 NOTE — PLAN OF CARE
Problem: Patient Care Overview  Goal: Plan of Care Review  Outcome: Ongoing (interventions implemented as appropriate)   10/06/18 0413   Coping/Psychosocial   Plan of Care Reviewed With patient;spouse   Plan of Care Review   Progress no change   OTHER   Outcome Summary Pt a&ox4. Protonix gtt d/c'ed friday, 10-5-18. Earlier pt c/o pain but refused pain medication. Possible d/c 10/6/18 (?) VS stable. Spouse currently @ bedside. Will continue to monitor.     Goal: Discharge Needs Assessment  Outcome: Ongoing (interventions implemented as appropriate)    Goal: Interprofessional Rounds/Family Conf  Outcome: Ongoing (interventions implemented as appropriate)      Problem: Fall Risk (Adult)  Goal: Identify Related Risk Factors and Signs and Symptoms  Outcome: Ongoing (interventions implemented as appropriate)    Goal: Absence of Fall  Outcome: Ongoing (interventions implemented as appropriate)      Problem: Skin Injury Risk (Adult)  Goal: Identify Related Risk Factors and Signs and Symptoms  Outcome: Ongoing (interventions implemented as appropriate)    Goal: Skin Health and Integrity  Outcome: Ongoing (interventions implemented as appropriate)

## 2018-10-06 NOTE — PROGRESS NOTES
"  ENDOCRINE    Subjective   AND PLANS  Vitcor Manuel Issa is a 73 y.o. male.     Follow-up thyroid.    No nausea, vomiting or hematemesis.  Patient was advised to take the levothyroxine 175 µg daily and to have a repeat thyroid function tests in 4-6 weeks.    Serum calcium normal.    LDL mildly elevated at 109.  HDL low.  Continue Lipitor 20 mg once a day.  LDL should improve further after thyroid hormone levels are normalized.  Reevaluate lipids after thyroid hormone levels have normalized.    Objective   BP 92/51 (BP Location: Right arm, Patient Position: Lying)   Pulse 68   Temp 97.7 °F (36.5 °C) (Oral)   Resp 18   Ht 180.3 cm (71\")   Wt 73 kg (161 lb)   SpO2 98%   BMI 22.45 kg/m²   Physical Exam    Awake, alert, not in distress.  No rales or wheezes.  Regular heart rate and rhythm.  Abdomen soft, nontender.  No cyanosis or pedal edema.  No clubbing.      Lab Results (last 24 hours)     Procedure Component Value Units Date/Time    Basic Metabolic Panel [[id number]]  (Abnormal) Collected:  10/06/18 0448    Specimen:  Blood Updated:  10/06/18 0550     Glucose 114 (H) mg/dL      BUN 12 mg/dL      Creatinine 0.72 (L) mg/dL      Sodium 140 mmol/L      Potassium 3.6 mmol/L      Chloride 108 (H) mmol/L      CO2 23.4 mmol/L      Calcium 9.6 mg/dL      eGFR Non African Amer 106 mL/min/1.73      BUN/Creatinine Ratio 16.7     Anion Gap 8.6 mmol/L     Narrative:       The MDRD GFR formula is only valid for adults with stable renal function between ages 18 and 70.            Principal Problem:    Chest pain  Active Problems:    History of CVA (cerebrovascular accident)    COPD (chronic obstructive pulmonary disease)    Coronary artery disease    Parkinson's disease    Hypothyroidism    Hip pain, chronic, right    Hematemesis    Hematemesis with nausea    H/O medication noncompliance    Hyperlipidemia    Continue levothyroxine 175 µg per day.    Continue Lipitor.  Recommendations and plans discussed with patient's " wife.

## 2018-10-06 NOTE — THERAPY TREATMENT NOTE
Acute Care - Physical Therapy Treatment Note  [facility]     Patient Name: Victor Manuel Issa  : 1945  MRN: [id number]  Today's Date: 10/6/2018  Onset of Illness/Injury or Date of Surgery: 10/02/18  Date of Referral to PT: 10/03/18  Referring Physician: Heather    Admit Date: 10/2/2018    Visit Dx:    ICD-10-CM ICD-9-CM   1. Chest pain, unspecified type R07.9 786.50   2. Hematemesis with nausea K92.0 578.0     787.02   3. Generalized weakness R53.1 780.79     Patient Active Problem List   Diagnosis   • Thigh pain, musculoskeletal   • Left leg weakness   • History of CVA (cerebrovascular accident)   • COPD (chronic obstructive pulmonary disease)   • Coronary artery disease   • Cervical myelopathy (CMS/[facility])   • Parkinson's disease   • Debility   • Cerebrovascular disease   • Abnormal TSH   • Hypothyroidism   • Hip fracture requiring operative repair, right, closed, initial encounter (CMS/[facility])   • Urinary tract infection without hematuria   • Hypoxia   • Sinus bradycardia   • Chest pain   • Hip pain, chronic, right   • Hematemesis   • Hematemesis with nausea   • H/O medication noncompliance   • Hyperlipidemia       Therapy Treatment          Rehabilitation Treatment Summary     Row Name 10/06/18 165             Treatment Time/Intention    Discipline physical therapist  -DO      Document Type therapy note (daily note)  -DO      Subjective Information complains of;weakness  -DO      Mode of Treatment physical therapy  -DO      Patient/Family Observations Pt supine in no apparent distress; wife present.  -DO      Total Minutes, Physical Therapy Treatment 15  -DO      Therapy Frequency (PT Clinical Impression) daily  -DO      Patient Effort good  -DO      Existing Precautions/Restrictions fall  -DO      Patient Response to Treatment Pt tolerated session well with no adverse s/s noted; VSS throughout.   -DO      Recorded by [DO] Guanakito Horne, PT 10/06/18 1711      Row Name 10/06/18 1650             Cognitive  Assessment/Intervention- PT/OT    Orientation Status (Cognition) oriented x 3  -DO      Follows Commands (Cognition) WFL  -DO      Personal Safety Interventions fall prevention program maintained;gait belt;muscle strengthening facilitated;nonskid shoes/slippers when out of bed  -DO      Recorded by [DO] Guanakito Horne, PT 10/06/18 1711      Row Name 10/06/18 1650             Bed Mobility Assessment/Treatment    Supine-Sit [place] (Bed Mobility) moderate assist (50% patient effort);2 person assist;verbal cues;nonverbal cues (demo/gesture)  -DO      Sit-Supine [place] (Bed Mobility) moderate assist (50% patient effort);2 person assist;verbal cues;nonverbal cues (demo/gesture)  -DO      Bed Mobility, Safety Issues decreased use of legs for bridging/pushing  -DO      Assistive Device (Bed Mobility) draw sheet;head of bed elevated  -DO      Recorded by [DO] Guanakito Horne, PT 10/06/18 1711      Row Name 10/06/18 1650             Sit-Stand Transfer    Sit-Stand [place] (Transfers) moderate assist (50% patient effort);2 person assist;verbal cues;nonverbal cues (demo/gesture)  -DO      Assistive Device (Sit-Stand Transfers) walker, front-wheeled  -DO      Recorded by [DO] Guanakito Horne, PT 10/06/18 1711      Row Name 10/06/18 1650             Stand-Sit Transfer    Stand-Sit [place] (Transfers) moderate assist (50% patient effort);2 person assist;verbal cues;nonverbal cues (demo/gesture)  -DO      Assistive Device (Stand-Sit Transfers) walker, front-wheeled  -DO      Recorded by [DO] Guanakito Horne, PT 10/06/18 1711      Row Name 10/06/18 1650             Gait/Stairs Assessment/Training    [place] Level (Gait) moderate assist (50% patient effort);2 person assist;verbal cues;nonverbal cues (demo/gesture)  -DO      Assistive Device (Gait) walker, front-wheeled  -DO      Distance in Feet (Gait) 6   steps away from and back to bed.   -DO      Pattern (Gait) step-to  -DO      Deviations/Abnormal  Patterns (Gait) dayanna decreased;gait speed decreased;stride length decreased  -DO      Bilateral Gait Deviations forward flexed posture;heel strike decreased;weight shift ability decreased   B knees remain flexed throughout.   -DO      [place] Level (Stairs) not tested  -DO      Recorded by [DO] Guanakito Horne, PT 10/06/18 1711      Row Name 10/06/18 1650             Positioning and Restraints    Pre-Treatment Position in bed  -DO      Post Treatment Position bed  -DO      In Bed supine;call light within reach;encouraged to call for assist;with family/caregiver;notified nsg;SCD pump applied;heels elevated  -DO      Recorded by [DO] Guanakito Horne, PT 10/06/18 1711      Row Name 10/06/18 1650             Pain Scale: Numbers Pre/Post-Treatment    Pain Scale: Numbers, Pretreatment 0/10 - no pain  -DO      Pain Scale: Numbers, Post-Treatment 0/10 - no pain  -DO      Recorded by [DO] Guanakito Horne, PT 10/06/18 1711      Row Name                Wound 10/02/18 1912 Left upper hip pressure injury    Wound - Properties Group Date first assessed: 10/02/18 [BF] Time first assessed: 1912 [BF] Present On Admission : yes [BF] Side: Left [BF] Orientation: upper [BF] Location: hip [BF] Type: pressure injury [BF] Stage, Pressure Injury: Stage 1 [BF] Recorded by:  [BF] Charmaine Santiago RN 10/02/18 1913    Row Name                Wound 10/02/18 1927 Bilateral medial coccyx pressure injury    Wound - Properties Group Date first assessed: 10/02/18 [BF] Time first assessed: 1927 [BF] Present On Admission : yes [BF] Side: Bilateral [BF] Orientation: medial [BF] Location: coccyx [BF] Type: pressure injury [BF] Stage, Pressure Injury: Stage 2 [BF] Recorded by:  [BF] Charmaine Santiago RN 10/02/18 1928    Row Name 10/06/18 1650             Outcome Summary/Treatment Plan (PT)    Daily Summary of Progress (PT) progress toward functional goals as expected  -DO      Recorded by [DO] Guanakito Horne, PT 10/06/18 1711        User Key   (r) = Recorded By, (t) = Taken By, (c) = Cosigned By    Initials Name Effective Dates Discipline    DO Guanakito Horne, PT 03/07/18 -  PT    Charmaine Arguello RN 10/30/17 -  Nurse          Wound 10/02/18 1912 Left upper hip pressure injury (Active)   Dressing Appearance dry;intact 10/6/2018  2:04 PM   Closure HARDIK 10/6/2018  2:04 PM   Base dressing in place, unable to visualize 10/6/2018  2:04 PM   Periwound intact;dry 10/6/2018 12:54 AM   Periwound Temperature cool 10/6/2018 12:54 AM   Periwound Skin Turgor soft 10/6/2018 12:54 AM   Drainage Amount none 10/6/2018  2:04 PM   Dressing Care, Wound border dressing;low-adherent 10/6/2018  8:51 AM       Wound 10/02/18 1927 Bilateral medial coccyx pressure injury (Active)   Dressing Appearance open to air 10/6/2018  2:04 PM   Closure HARDIK 10/6/2018 12:54 AM   Base pink 10/6/2018  2:04 PM   Periwound redness;blanchable 10/6/2018  2:04 PM   Periwound Temperature warm 10/6/2018  2:04 PM   Periwound Skin Turgor soft 10/6/2018  2:04 PM   Drainage Amount none 10/6/2018  2:04 PM   Care, Wound barrier applied 10/6/2018  2:04 PM   Dressing Care, Wound open to air 10/6/2018  8:51 AM             Physical Therapy Education     Title: PT OT SLP Therapies (Done)     Topic: Physical Therapy (Done)     Point: Mobility training (Done)    Learning Progress Summary     Learner Status Readiness Method Response Comment Documented by    Patient Done Acceptance E VU,DU  DO 10/06/18 1711     Done Acceptance E,TB,D KANU,NR   10/05/18 1613     Done Acceptance E,D ENMANUEL BOBBY,NR   10/04/18 1141     Active Acceptance ED NR   10/03/18 1405    Family Done Acceptance E VU,DU  DO 10/06/18 1711     Done Acceptance E,D ENMANUEL BOBBY,NR   10/04/18 1141          Point: Home exercise program (Done)    Learning Progress Summary     Learner Status Readiness Method Response Comment Documented by    Patient Done Acceptance E VU,DU  DO 10/06/18 1711     Done Acceptance E,TB,D YANNICK BOBBY   10/05/18 1613    Family Done  Acceptance E VU,DU  DO 10/06/18 1711          Point: Body mechanics (Done)    Learning Progress Summary     Learner Status Readiness Method Response Comment Documented by    Patient Done Acceptance E VU,DU  DO 10/06/18 1711     Done Acceptance E,TB,D VU,NR   10/05/18 1613     Done Acceptance E,D VU,DU,NR   10/04/18 1141     Active Acceptance E,D NR   10/03/18 1405    Family Done Acceptance E VU,DU  DO 10/06/18 1711     Done Acceptance E,D VU,DU,NR   10/04/18 1141          Point: Precautions (Done)    Learning Progress Summary     Learner Status Readiness Method Response Comment Documented by    Patient Done Acceptance E VU,DU  DO 10/06/18 1711     Done Acceptance E,TB,D VU,NR   10/05/18 1613    Family Done Acceptance E VU,DU  DO 10/06/18 1711                      User Key     Initials Effective Dates Name Provider Type Discipline     03/07/18 -  Chelsi Maldonado, PTA Physical Therapy Assistant PT     03/07/18 -  Guanakito Horne, PT Physical Therapist PT     09/17/18 -  Rica Lemos, KHANG Student PT Student PT                    PT Recommendation and Plan  Therapy Frequency (PT Clinical Impression): daily  Outcome Summary/Treatment Plan (PT)  Daily Summary of Progress (PT): progress toward functional goals as expected  Plan of Care Reviewed With: patient, spouse  Progress: improving  Outcome Summary: Pt able to ambulate a total of 6ft away from and towards the bed with Modx2 and use of RWx. His B knees remain flexed when in standing and during ambulation, with verbal/tactile cues to improve, but pt unable to maintain. He required one sitting rest break at the EOB in between attempts. He will benefit from continuing skilled PT to improve his activity tolerance and independence with functional mobility.           Outcome Measures     Row Name 10/06/18 1700 10/05/18 1600 10/04/18 1100       How much help from another person do you currently need...    Turning from your back to your side while in  flat bed without using bedrails? 3  -DO 3  -SM 3  -LH (r) KH (t) LH (c)    Moving from lying on back to sitting on the side of a flat bed without bedrails? 2  -DO 2  -SM 2  -LH (r) KH (t) LH (c)    Moving to and from a bed to a chair (including a wheelchair)? 2  -DO 2  -SM 2  -LH (r) KH (t) LH (c)    Standing up from a chair using your arms (e.g., wheelchair, bedside chair)? 2  -DO 2  -SM 3  -LH (r) KH (t) LH (c)    Climbing 3-5 steps with a railing? 1  -DO 1  -SM 1  -LH (r) KH (t) LH (c)    To walk in hospital room? 2  -DO 2  -SM 3  -LH (r) KH (t) LH (c)    AM-PAC 6 Clicks Score 12  -DO 12  -SM 14  -LH (r) KH (t)       Functional Assessment    Outcome Measure Options AM-PAC 6 Clicks Basic Mobility (PT)  -DO AM-PAC 6 Clicks Basic Mobility (PT)  -SM AM-PAC 6 Clicks Basic Mobility (PT)  -LH (r) KH (t) LH (c)      User Key  (r) = Recorded By, (t) = Taken By, (c) = Cosigned By    Initials Name Provider Type     Elisa Hannon, PT Physical Therapist    Chelsi Crook, PTA Physical Therapy Assistant    DO Guanakito Horne, PT Physical Therapist    Rica Malagon, PT Student PT Student           Time Calculation:         PT Charges     Row Name 10/06/18 1713             Time Calculation    Start Time 1650  -DO      Stop Time 1705  -DO      Time Calculation (min) 15 min  -DO      PT Received On 10/06/18  -DO      PT - Next Appointment 10/07/18  -DO        User Key  (r) = Recorded By, (t) = Taken By, (c) = Cosigned By    Initials Name Provider Type    DO Guanakito Horne, PT Physical Therapist        Therapy Suggested Charges     Code   Minutes Charges    None           Therapy Charges for Today     Code Description Service Date Service Provider Modifiers Qty    [id number] HC PT THER PROC EA 15 MIN 10/6/2018 Guanakito Horne, PT GP 1          PT G-Codes  Outcome Measure Options: AM-PAC 6 Clicks Basic Mobility (PT)  AM-PAC 6 Clicks Score: 12    Guanakito Horne, PT  10/6/2018

## 2018-10-07 ENCOUNTER — READMISSION MANAGEMENT (OUTPATIENT)
Dept: CALL CENTER | Facility: HOSPITAL | Age: 73
End: 2018-10-07

## 2018-10-07 NOTE — OUTREACH NOTE
Prep Survey      Responses   Facility patient discharged from?  [facility]   Is patient eligible?  Yes   Discharge diagnosis  Chest pain   Does the patient have one of the following disease processes/diagnoses(primary or secondary)?  Other   Does the patient have Home health ordered?  Yes   What is the Home health agency?   [facility]   Is there a DME ordered?  No   Prep survey completed?  Yes          Meeta Sharpe RN

## 2018-10-08 NOTE — PROGRESS NOTES
Case Management Discharge Note    Final Note: Home with [facility]    Destination     No service has been selected for the patient.      Durable Medical Equipment     No service has been selected for the patient.      Dialysis/Infusion     No service has been selected for the patient.      Home Medical Care - Selection Complete     Service Request Status Selected Specialties Address Phone Number Fax Number    [facility] Selected [facility] Health Services [address] [place], [place] [id number] [phone]      Social Care     No service has been selected for the patient.             Final Discharge Disposition Code: 06 - home with home health care

## 2018-10-09 ENCOUNTER — TELEPHONE (OUTPATIENT)
Dept: GASTROENTEROLOGY | Facility: CLINIC | Age: 73
End: 2018-10-09

## 2018-10-09 DIAGNOSIS — K92.0 HEMATEMESIS, PRESENCE OF NAUSEA NOT SPECIFIED: Primary | ICD-10-CM

## 2018-10-09 NOTE — TELEPHONE ENCOUNTER
Per [facility], need EGD case request placed - to be completed in 6-8 weeks.  (see Dr Winn note of 10/5).   Order placed - message to Dr Duncan.

## 2018-10-11 ENCOUNTER — READMISSION MANAGEMENT (OUTPATIENT)
Dept: CALL CENTER | Facility: HOSPITAL | Age: 73
End: 2018-10-11

## 2018-10-11 NOTE — OUTREACH NOTE
Medical Week 1 Survey      Responses   Facility patient discharged from?  [facility]   Does the patient have one of the following disease processes/diagnoses(primary or secondary)?  Other   Is there a successful TCM telephone encounter documented?  No   Week 1 attempt successful?  No   Unsuccessful attempts  Attempt 1          Jazzy Razo RN

## 2018-10-15 ENCOUNTER — READMISSION MANAGEMENT (OUTPATIENT)
Dept: CALL CENTER | Facility: HOSPITAL | Age: 73
End: 2018-10-15

## 2018-10-15 NOTE — OUTREACH NOTE
Medical Week 1 Survey      Responses   Facility patient discharged from?  [facility]   Does the patient have one of the following disease processes/diagnoses(primary or secondary)?  Other   Is there a successful TCM telephone encounter documented?  No   Week 1 attempt successful?  Yes   Call start time  1239   Call end time  1245   Discharge diagnosis  Chest pain   Person spoke with today (if not patient) and relationship  Spouse Evelyn Chen reviewed with patient/caregiver?  Yes   Is the patient having any side effects they believe may be caused by any medication additions or changes?  No   Does the patient have all medications ordered at discharge?  No   What is keeping the patient from filling the prescriptions?  Script on hold per patient   Nursing Interventions  Nurse provided patient education   Prescription comments  They have not received Nystatin Swish and Swallow but will call for the RX today.   Is the patient taking all medications as directed (includes completed medication regime)?  Yes   Does the patient have a primary care provider?   Yes   Does the patient have an appointment with their PCP within 7 days of discharge?  Yes   Has the patient kept scheduled appointments due by today?  Yes   Comments  She is going for a 2nd opinion about his leg.   What is the Home health agency?   [facility]   Has home health visited the patient within 72 hours of discharge?  Yes   Psychosocial issues?  No   Did the patient receive a copy of their discharge instructions?  Yes   Nursing interventions  Reviewed instructions with patient   What is the patient's perception of their health status since discharge?  Improving   Is the patient/caregiver able to teach back signs and symptoms related to disease process for when to call PCP?  Yes   Is the patient/caregiver able to teach back signs and symptoms related to disease process for when to call 911?  Yes   Is the patient/caregiver able to teach back the hierarchy  of who to call/visit for symptoms/problems? PCP, Specialist, Home health nurse, Urgent Care, ED, 911  Yes   Week 1 call completed?  Yes          Renae Green RN

## 2018-10-19 ENCOUNTER — OFFICE VISIT (OUTPATIENT)
Dept: ORTHOPEDIC SURGERY | Facility: CLINIC | Age: 73
End: 2018-10-19

## 2018-10-19 ENCOUNTER — TELEPHONE (OUTPATIENT)
Dept: GASTROENTEROLOGY | Facility: CLINIC | Age: 73
End: 2018-10-19

## 2018-10-19 VITALS — WEIGHT: 145 LBS | HEIGHT: 71 IN | BODY MASS INDEX: 20.3 KG/M2 | TEMPERATURE: 97.8 F

## 2018-10-19 DIAGNOSIS — M79.604 RIGHT LEG PAIN: Primary | ICD-10-CM

## 2018-10-19 PROCEDURE — 99213 OFFICE O/P EST LOW 20 MIN: CPT | Performed by: ORTHOPAEDIC SURGERY

## 2018-10-19 RX ORDER — TRAMADOL HYDROCHLORIDE 50 MG/1
50 TABLET ORAL EVERY 4 HOURS PRN
Qty: 60 TABLET | Refills: 0 | Status: SHIPPED | OUTPATIENT
Start: 2018-10-19 | End: 2019-01-22 | Stop reason: HOSPADM

## 2018-10-19 NOTE — TELEPHONE ENCOUNTER
Call to pt.  Advise per op note of 10/4 that reflux esophagitis, hiatal hernia, esophageal stenosis.  Normal stomach and duodenum.  No specimens taken.  Pt verb understanding.  For f/u EGD on 11/30.

## 2018-10-19 NOTE — TELEPHONE ENCOUNTER
----- Message from Darshan Brooks sent at 10/18/2018  2:01 PM EDT -----  Regarding: scope report   Contact: [phone]  Pt called for egd report

## 2018-10-21 NOTE — PROGRESS NOTES
Patient:Victor Manuel Issa    YOB: 1945    Medical Record Number:[id number]    Chief Complaints:  Right leg pain    History of Present Illness:     73 y.o. male patient who presents for his right leg.  He is now roughly 5 months out from a right hip hemiarthroplasty.  He tells me that he was doing well up until September.  He tells me that in therapy, one of the assistants, push down on his knee and he felt a sharp stabbing pain in his groin.  Prior to this incident, he tells me that he was ambulating with a walker.  Since this incident, he is now maximal assist and only able to transfer.  He is primarily using a wheelchair.  The pain shoots from his groin down the inner aspect of his thigh.  He denies any weakness, bowel or bladder dysfunction.  He has been experiencing intermittent numbness and tingling in his foot.  He rates his pain as 8-9 out of 10.  He describes it as stabbing, throbbing, constant and sharp with any movement.  He cannot bear weight on the right leg.  He is unable to take anti-inflammatories because of esophagitis.  He does take Tylenol and has gotten some relief with that medicine.    Allergies:  Allergies   Allergen Reactions   • Beet [Beta Vulgaris] Unknown (See Comments)     Pt cant remember       Home Medications:    Current Outpatient Prescriptions:   •  acetaminophen (TYLENOL) 325 MG tablet, Take 2 tablets by mouth Every 4 (Four) Hours As Needed for Mild Pain , Headache or Fever., Disp: , Rfl:   •  albuterol (PROVENTIL) (2.5 MG/3ML) 0.083% nebulizer solution, Take 2.5 mg by nebulization Every 6 (Six) Hours As Needed for Wheezing., Disp: , Rfl: 12  •  arformoterol (BROVANA) 15 MCG/2ML nebulizer solution, Take 15 mcg by nebulization 2 (Two) Times a Day., Disp: , Rfl:   •  aspirin  MG EC tablet, Take 1 tablet by mouth Daily., Disp: , Rfl:   •  busPIRone (BUSPAR) 5 MG tablet, Take 1 tablet by mouth 3 (Three) Times a Day As Needed (anxiety)., Disp: , Rfl:   •   carbidopa-levodopa (SINEMET)  MG per tablet, Take 1 tablet by mouth 3 (Three) Times a Day With Meals., Disp: 90 tablet, Rfl: 0  •  cholecalciferol (VITAMIN D3) 1000 UNITS tablet, Take 1,000 Units by mouth daily., Disp: , Rfl:   •  diclofenac (VOLTAREN) 1 % gel gel, Apply 4 g topically Daily As Needed (apply to the knees)., Disp: , Rfl:   •  divalproex (DEPAKOTE) 500 MG DR tablet, Take 500 mg by mouth 2 (Two) Times a Day., Disp: , Rfl:   •  donepezil (ARICEPT) 10 MG tablet, Take 20 mg by mouth Every Night., Disp: , Rfl:   •  fluticasone (FLONASE) 50 MCG/ACT nasal spray, 1 spray into each nostril Daily., Disp: , Rfl:   •  levothyroxine (SYNTHROID, LEVOTHROID) 175 MCG tablet, Take 175 mcg by mouth Daily., Disp: , Rfl:   •  montelukast (SINGULAIR) 10 MG tablet, Take 10 mg by mouth Every Night., Disp: , Rfl:   •  Multiple Vitamins-Minerals (MULTIVITAMIN WITH MINERALS) tablet tablet, Take 1 tablet by mouth Daily., Disp: , Rfl:   •  nystatin (MYCOSTATIN) 245802 UNIT/ML suspension, Take 5 mL by mouth 4 (Four) Times a Day., Disp: , Rfl:   •  OLANZapine (ZYPREXA) 10 MG tablet, Take 10 mg by mouth Every Night., Disp: , Rfl:   •  oxybutynin XL (DITROPAN-XL) 5 MG 24 hr tablet, Take 5 mg by mouth 2 (Two) Times a Day., Disp: , Rfl:   •  pantoprazole (PROTONIX) 40 MG EC tablet, Take 1 tablet by mouth 2 (Two) Times a Day Before Meals., Disp: 180 tablet, Rfl: 1  •  promethazine (PHENERGAN) 25 MG tablet, Take 25 mg by mouth Every 6 (Six) Hours As Needed for Nausea or Vomiting., Disp: , Rfl:   •  simvastatin (ZOCOR) 40 MG tablet, Take 40 mg by mouth Every Night., Disp: , Rfl:   •  sucralfate (CARAFATE) 1 GM/10ML suspension, Take 10 mL by mouth Every 6 (Six) Hours for 60 days., Disp: 2400 mL, Rfl: 0  •  traMADol (ULTRAM) 50 MG tablet, Take 1 tablet by mouth Every 4 (Four) Hours As Needed for Moderate Pain ., Disp: 60 tablet, Rfl: 0    Past Medical History:   Diagnosis Date   • Arthritis    • Bipolar 1 disorder (CMS/HCC)    • COPD  (chronic obstructive pulmonary disease) (CMS/HCC)    • Coronary artery disease    • Disease of thyroid gland    • Hyperlipidemia    • Myocardial infarction (CMS/HCC)    • Stroke (CMS/HCC)        Past Surgical History:   Procedure Laterality Date   • ABDOMINAL SURGERY     • CARDIAC SURGERY     • CHOLECYSTECTOMY     • CORONARY ARTERY BYPASS GRAFT      x2   • ENDOSCOPY N/A 10/4/2018    Procedure: ESOPHAGOGASTRODUODENOSCOPY;  Surgeon: Glenna Winn MD;  Location: [facility] ENDOSCOPY;  Service: Gastroenterology   • EYE SURGERY      cataracts   • HIP ENDOPROSTHESIS Right 5/5/2018    Procedure: RIGHT HIP HEMIARTHROPLASTY;  Surgeon: Winston Vallejo MD;  Location: [facility] MAIN OR;  Service: Orthopedics   • MUSCLE BIOPSY Left 3/24/2016    Procedure: LT THIGH MUSCLE BIOPSY;  Surgeon: Fausto Mack MD;  Location: [facility] MAIN OR;  Service:    • SKIN BIOPSY     • THYROID SURGERY     • TURP / TRANSURETHRAL INCISION / DRAINAGE PROSTATE     • VASCULAR SURGERY         Social History     Occupational History   • Not on file.     Social History Main Topics   • Smoking status: Current Every Day Smoker     Packs/day: 0.50     Types: Cigarettes     Start date: 1960   • Smokeless tobacco: Never Used      Comment: Educated pt on quitting   • Alcohol use Yes      Comment: occasionally   • Drug use: No   • Sexual activity: Defer      Social History     Social History Narrative   • No narrative on file       Family History   Problem Relation Age of Onset   • Cancer Mother    • Arthritis Father    • Heart disease Father    • Early death Brother    • Heart disease Brother        Review of Systems:      Constitutional: Denies fever, shaking or chills   Eyes: Denies change in visual acuity   HEENT: Denies nasal congestion or sore throat   Respiratory: Denies cough or shortness of breath   Cardiovascular: Denies chest pain or edema  Endocrine: Denies tremors, palpitations, intolerance of heat or cold, polyuria, polydipsia.  GI: Denies  "abdominal pain, nausea, vomiting, bloody stools or diarrhea  : Denies frequency, urgency, incontinence, retention, or nocturia.  Musculoskeletal: Denies numbness, tingling or loss of motor function except as above  Integument: Denies rash, lesion or ulceration   Neurologic: Denies headache or focal weakness, deficits  Heme: Denies spontaneous or excessive bleeding, epistaxis, hematuria, melena, fatigue, enlarged or tender lymph nodes.      All other pertinent positives and negatives as noted above in HPI.    Physical Exam:73 y.o. male  Vitals:    10/19/18 1637   Temp: 97.8 °F (36.6 °C)   TempSrc: Temporal Artery    Weight: 65.8 kg (145 lb)   Height: 180.3 cm (71\")       General:  Patient is awake and alert.  Appears in no acute distress or discomfort.    Psych:  Affect and demeanor are appropriate.    Extremities:  The hip and back are examined.  No tenderness or step-offs along the back.  A straight leg raise does reproduce groin and inner thigh pain.  Negative cross straight leg raise.  He has moderate tenderness over the trochanteric bursa on the right.  His incision is healed.  Logrolling the hip does not cause him any pain.  Passive and active flexion are both very uncomfortable for him.  Markedly positive Stinchfield maneuver.  He can extend his knee plantarflexion dorsal flex the ankle and toes with symmetric strength to the contralateral side although he does seem to have some weakness bilaterally.  He reports intact sensation throughout the leg and foot.  Palpable pedal pulses.    Imaging:  Outside AP and lateral views of the right hip are reviewed.  These are from October 2.  I do not see any acute abnormalities, malalignment or other concerning findings.  An AP pelvis was ordered and reviewed today for comparison purposes.  I do not see any acute abnormalities.  The implants appear in unchanged position and alignment.    Assessment/Plan:  Right hip and leg pain of unclear etiology    There has been a " distinct change in his function.  He is totally nonambulatory as result of his symptoms.  He seems to have a component of trochanteric bursitis but not would not account for the shooting leg pain, severity of his pain or the neurologic symptoms.  I'm simply at a loss to explain this.  This may be radicular.  Alternatively, it may be a psoas tear or nondisplaced pelvic fracture.  I recommend a CT of the pelvis and an MRI of his back to work this up.  I will call him with the results.    Winston Vallejo MD    10/19/2018

## 2018-10-24 ENCOUNTER — READMISSION MANAGEMENT (OUTPATIENT)
Dept: CALL CENTER | Facility: HOSPITAL | Age: 73
End: 2018-10-24

## 2018-10-24 ENCOUNTER — TRANSCRIBE ORDERS (OUTPATIENT)
Dept: ADMINISTRATIVE | Facility: HOSPITAL | Age: 73
End: 2018-10-24

## 2018-10-24 DIAGNOSIS — M25.551 RIGHT HIP PAIN: Primary | ICD-10-CM

## 2018-10-24 DIAGNOSIS — M25.551 ACUTE HIP PAIN, RIGHT: Primary | ICD-10-CM

## 2018-10-24 NOTE — OUTREACH NOTE
Medical Week 2 Survey      Responses   Facility patient discharged from?  [place]   Does the patient have one of the following disease processes/diagnoses(primary or secondary)?  Other   Week 2 attempt successful?  Yes   Call start time  1545   Discharge diagnosis  Chest pain   Call end time  1550   Meds reviewed with patient/caregiver?  Yes   Is the patient having any side effects they believe may be caused by any medication additions or changes?  No   Does the patient have all medications ordered at discharge?  Yes   Is the patient taking all medications as directed (includes completed medication regime)?  Yes   Has the patient kept scheduled appointments due by today?  Yes   What is the Home health agency?   [facility]   Psychosocial issues?  No   Did the patient receive a copy of their discharge instructions?  Yes   What is the patient's perception of their health status since discharge?  Same   Additional teach back comments  no chest pain   Week 2 Call Completed?  Yes          Miroslava Peterson RN

## 2018-10-26 ENCOUNTER — HOSPITAL ENCOUNTER (OUTPATIENT)
Dept: NUCLEAR MEDICINE | Facility: HOSPITAL | Age: 73
Discharge: HOME | End: 2018-10-26
Attending: ORTHOPAEDIC SURGERY

## 2018-10-26 DIAGNOSIS — M25.551 ACUTE HIP PAIN, RIGHT: ICD-10-CM

## 2018-10-26 PROCEDURE — 78315 BONE IMAGING 3 PHASE: CPT

## 2018-10-26 PROCEDURE — A9503 TC99M MEDRONATE: HCPCS | Performed by: ORTHOPAEDIC SURGERY

## 2018-10-26 PROCEDURE — 0 TECHNETIUM MEDRONATE KIT: Performed by: ORTHOPAEDIC SURGERY

## 2018-10-26 RX ORDER — TC 99M MEDRONATE 20 MG/10ML
20 INJECTION, POWDER, LYOPHILIZED, FOR SOLUTION INTRAVENOUS
Status: COMPLETED | OUTPATIENT
Start: 2018-10-26 | End: 2018-10-26

## 2018-10-26 RX ADMIN — Medication 20 MILLICURIE: at 10:42

## 2018-10-31 ENCOUNTER — HOSPITAL ENCOUNTER (OUTPATIENT)
Dept: GENERAL RADIOLOGY | Facility: HOSPITAL | Age: 73
Discharge: HOME | End: 2018-10-31
Attending: ORTHOPAEDIC SURGERY | Admitting: ORTHOPAEDIC SURGERY

## 2018-10-31 DIAGNOSIS — M25.551 RIGHT HIP PAIN: ICD-10-CM

## 2018-10-31 PROCEDURE — 25010000002 IOPAMIDOL 61 % SOLUTION: Performed by: RADIOLOGY

## 2018-10-31 PROCEDURE — 25010000002 METHYLPREDNISOLONE PER 125 MG: Performed by: RADIOLOGY

## 2018-10-31 PROCEDURE — 77002 NEEDLE LOCALIZATION BY XRAY: CPT

## 2018-10-31 RX ORDER — LIDOCAINE HYDROCHLORIDE 10 MG/ML
10 INJECTION, SOLUTION INFILTRATION; PERINEURAL ONCE
Status: COMPLETED | OUTPATIENT
Start: 2018-10-31 | End: 2018-10-31

## 2018-10-31 RX ORDER — BUPIVACAINE HYDROCHLORIDE 2.5 MG/ML
10 INJECTION, SOLUTION EPIDURAL; INFILTRATION; INTRACAUDAL ONCE
Status: COMPLETED | OUTPATIENT
Start: 2018-10-31 | End: 2018-10-31

## 2018-10-31 RX ORDER — METHYLPREDNISOLONE SODIUM SUCCINATE 125 MG/2ML
80 INJECTION, POWDER, LYOPHILIZED, FOR SOLUTION INTRAMUSCULAR; INTRAVENOUS
Status: COMPLETED | OUTPATIENT
Start: 2018-10-31 | End: 2018-10-31

## 2018-10-31 RX ADMIN — IOPAMIDOL 4 ML: 612 INJECTION, SOLUTION INTRAVENOUS at 11:33

## 2018-10-31 RX ADMIN — LIDOCAINE HYDROCHLORIDE 2 ML: 10 INJECTION, SOLUTION INFILTRATION; PERINEURAL at 11:33

## 2018-10-31 RX ADMIN — METHYLPREDNISOLONE SODIUM SUCCINATE 80 MG: 125 INJECTION, POWDER, LYOPHILIZED, FOR SOLUTION INTRAMUSCULAR; INTRAVENOUS at 11:33

## 2018-10-31 RX ADMIN — BUPIVACAINE HYDROCHLORIDE 5 ML: 2.5 INJECTION, SOLUTION EPIDURAL; INFILTRATION; INTRACAUDAL; PERINEURAL at 11:33

## 2018-11-01 ENCOUNTER — READMISSION MANAGEMENT (OUTPATIENT)
Dept: CALL CENTER | Facility: HOSPITAL | Age: 73
End: 2018-11-01

## 2018-11-01 NOTE — OUTREACH NOTE
Medical Week 4 Survey      Responses   Facility patient discharged from?  [place]   Does the patient have one of the following disease processes/diagnoses(primary or secondary)?  Other   Week 4 attempt successful?  Yes   Call start time  1342   Call end time  1344   Discharge diagnosis  Chest pain   Is patient permission given to speak with other caregiver?  Yes   Person spoke with today (if not patient) and relationship  Pt + spouse was listening in on conversation.   Meds reviewed with patient/caregiver?  Yes   Is the patient having any side effects they believe may be caused by any medication additions or changes?  No   Is the patient taking all medications as directed (includes completed medication regime)?  Yes   Has the patient kept scheduled appointments due by today?  Yes   Comments  Reviewed appts. Needs to see Urology. GI appt on the 8th.   Is the patient still receiving Home Health Services?  N/A   Psychosocial issues?  No   Comments  No complaints.   What is the patient's perception of their health status since discharge?  Improving   Is the patient/caregiver able to teach back signs and symptoms related to disease process for when to call PCP?  Yes   Is the patient/caregiver able to teach back signs and symptoms related to disease process for when to call 911?  Yes   Is the patient/caregiver able to teach back the hierarchy of who to call/visit for symptoms/problems? PCP, Specialist, Home health nurse, Urgent Care, ED, 911  Yes   Week 4 Call Completed?  Yes          Kuldip Montaño RN

## 2018-11-08 ENCOUNTER — OFFICE VISIT (OUTPATIENT)
Dept: GASTROENTEROLOGY | Facility: CLINIC | Age: 73
End: 2018-11-08

## 2018-11-08 VITALS
HEIGHT: 71 IN | WEIGHT: 145 LBS | BODY MASS INDEX: 20.3 KG/M2 | TEMPERATURE: 97.8 F | SYSTOLIC BLOOD PRESSURE: 118 MMHG | DIASTOLIC BLOOD PRESSURE: 70 MMHG

## 2018-11-08 DIAGNOSIS — D62 ACUTE BLOOD LOSS ANEMIA: ICD-10-CM

## 2018-11-08 DIAGNOSIS — K22.2 ESOPHAGEAL STRICTURE: ICD-10-CM

## 2018-11-08 DIAGNOSIS — K22.10 EROSIVE ESOPHAGITIS: ICD-10-CM

## 2018-11-08 DIAGNOSIS — K92.2 UPPER GI BLEED: Primary | ICD-10-CM

## 2018-11-08 LAB
ALBUMIN SERPL-MCNC: 3.5 G/DL (ref 3.5–5.2)
ALBUMIN/GLOB SERPL: 1.1 G/DL
ALP SERPL-CCNC: 108 U/L (ref 39–117)
ALT SERPL-CCNC: <5 U/L (ref 1–41)
AST SERPL-CCNC: 6 U/L (ref 1–40)
BASOPHILS # BLD AUTO: 0.01 10*3/MM3 (ref 0–0.2)
BASOPHILS NFR BLD AUTO: 0.1 % (ref 0–1.5)
BILIRUB SERPL-MCNC: 0.2 MG/DL (ref 0.1–1.2)
BUN SERPL-MCNC: 18 MG/DL (ref 8–23)
BUN/CREAT SERPL: 21.4 (ref 7–25)
CALCIUM SERPL-MCNC: 11.2 MG/DL (ref 8.6–10.5)
CHLORIDE SERPL-SCNC: 103 MMOL/L (ref 98–107)
CO2 SERPL-SCNC: 25.7 MMOL/L (ref 22–29)
CREAT SERPL-MCNC: 0.84 MG/DL (ref 0.76–1.27)
EOSINOPHIL # BLD AUTO: 0.59 10*3/MM3 (ref 0–0.7)
EOSINOPHIL NFR BLD AUTO: 4.3 % (ref 0.3–6.2)
ERYTHROCYTE [DISTWIDTH] IN BLOOD BY AUTOMATED COUNT: 15.1 % (ref 11.5–14.5)
GLOBULIN SER CALC-MCNC: 3.1 GM/DL
GLUCOSE SERPL-MCNC: 85 MG/DL (ref 65–99)
HCT VFR BLD AUTO: 37.5 % (ref 40.4–52.2)
HGB BLD-MCNC: 11.6 G/DL (ref 13.7–17.6)
IMM GRANULOCYTES # BLD: 0.09 10*3/MM3 (ref 0–0.03)
IMM GRANULOCYTES NFR BLD: 0.7 % (ref 0–0.5)
LYMPHOCYTES # BLD AUTO: 3.45 10*3/MM3 (ref 0.9–4.8)
LYMPHOCYTES NFR BLD AUTO: 25.3 % (ref 19.6–45.3)
MCH RBC QN AUTO: 29.6 PG (ref 27–32.7)
MCHC RBC AUTO-ENTMCNC: 30.9 G/DL (ref 32.6–36.4)
MCV RBC AUTO: 95.7 FL (ref 79.8–96.2)
MONOCYTES # BLD AUTO: 1.05 10*3/MM3 (ref 0.2–1.2)
MONOCYTES NFR BLD AUTO: 7.7 % (ref 5–12)
NEUTROPHILS # BLD AUTO: 8.53 10*3/MM3 (ref 1.9–8.1)
NEUTROPHILS NFR BLD AUTO: 62.6 % (ref 42.7–76)
PLATELET # BLD AUTO: 354 10*3/MM3 (ref 140–500)
POTASSIUM SERPL-SCNC: 4.5 MMOL/L (ref 3.5–5.2)
PROT SERPL-MCNC: 6.6 G/DL (ref 6–8.5)
RBC # BLD AUTO: 3.92 10*6/MM3 (ref 4.6–6)
SODIUM SERPL-SCNC: 141 MMOL/L (ref 136–145)
WBC # BLD AUTO: 13.63 10*3/MM3 (ref 4.5–10.7)

## 2018-11-08 PROCEDURE — 99214 OFFICE O/P EST MOD 30 MIN: CPT | Performed by: NURSE PRACTITIONER

## 2018-11-08 RX ORDER — SUCRALFATE 1 G/1
1 TABLET ORAL DAILY
Refills: 0 | COMMUNITY
Start: 2018-10-05 | End: 2019-01-01 | Stop reason: HOSPADM

## 2018-11-08 NOTE — PROGRESS NOTES
Chief Complaint   Patient presents with   • Follow-up     hospital follow up   • Chest Pain   • GI Bleeding       Victor Manuel Issa is a  73 y.o. male here for a hospital follow up visit for upper GI bleed.     HPI  72 yo WC bound M presents today accompanied by his wife for hospital follow up visit for upper GI bleed, erosive esophagitis, esophageal stricture and acute blood loss anemia. He is a patient of Dr. Duncan. He was seen by our service at [facility] on 10/2/18. He was having chest pain, epigastric pain, hematemesis and dysphagia. He underwent EGD on 10/4/18 that showed LA GRADE D reflux esophagitis, 4 cm HH, esophageal stenosis, normal stomach, duodenitis. Hgb on admission was 9.6. He was started on GI soft diet, protonix 40 mg BID and carafate QID.     Since being home he has done better and admits he is swallowing better now. He is still on the Protonix 40 mg BID and taking the Carafate TID. He denies any dysphagia, reflux, abd pain, N&V, diarrhea, constipation, rectal bleeding or melena. He admits his appetite is ok and his weight has decreased by 16 lbs since his hospital stay.     Past Medical History:   Diagnosis Date   • Arthritis    • Bipolar 1 disorder (CMS/HCC)    • COPD (chronic obstructive pulmonary disease) (CMS/HCC)    • Coronary artery disease    • Disease of thyroid gland    • Hyperlipidemia    • Myocardial infarction (CMS/HCC)    • Stroke (CMS/HCC)        Past Surgical History:   Procedure Laterality Date   • ABDOMINAL SURGERY     • CARDIAC SURGERY     • CHOLECYSTECTOMY     • CORONARY ARTERY BYPASS GRAFT      x2   • ENDOSCOPY N/A 10/4/2018    LA grade D reflux esophagitis, 4cm hiatal hernia, esohageal stenosis, normal stomach, erythematous duodenopathy, normal second portion of duodenum, no specimens collected   • EYE SURGERY      cataracts   • HIP ENDOPROSTHESIS Right 5/5/2018    Procedure: RIGHT HIP HEMIARTHROPLASTY;  Surgeon: Winston Vallejo MD;  Location:  [facility] MAIN OR;  Service: Orthopedics   • MUSCLE BIOPSY Left 3/24/2016    Procedure: LT THIGH MUSCLE BIOPSY;  Surgeon: Fausto Mack MD;  Location: [facility] MAIN OR;  Service:    • SKIN BIOPSY     • THYROID SURGERY     • TURP / TRANSURETHRAL INCISION / DRAINAGE PROSTATE     • VASCULAR SURGERY         Scheduled Meds:    Continuous Infusions:  No current facility-administered medications for this visit.     PRN Meds:.    Allergies   Allergen Reactions   • Beet [Beta Vulgaris] Unknown (See Comments)     Pt cant remember       Social History     Social History   • Marital status:      Spouse name: N/A   • Number of children: N/A   • Years of education: N/A     Occupational History   • Not on file.     Social History Main Topics   • Smoking status: Current Every Day Smoker     Packs/day: 0.50     Types: Cigarettes     Start date: 1960   • Smokeless tobacco: Never Used      Comment: Educated pt on quitting   • Alcohol use Yes      Comment: occasionally   • Drug use: No   • Sexual activity: Defer     Other Topics Concern   • Not on file     Social History Narrative   • No narrative on file       Family History   Problem Relation Age of Onset   • Cancer Mother    • Arthritis Father    • Heart disease Father    • Early death Brother    • Heart disease Brother        Review of Systems   Constitutional: Negative for appetite change, chills, diaphoresis, fatigue, fever and unexpected weight change.   HENT: Negative for nosebleeds, postnasal drip, sore throat, trouble swallowing and voice change.    Respiratory: Negative for cough, choking, chest tightness, shortness of breath and wheezing.    Cardiovascular: Negative for chest pain.   Gastrointestinal: Negative for abdominal distention, abdominal pain, anal bleeding, blood in stool, constipation, diarrhea, nausea, rectal pain and vomiting.   Endocrine: Negative for polydipsia, polyphagia and polyuria.   Musculoskeletal: Negative for gait problem.   Skin:  Negative for rash and wound.   Allergic/Immunologic: Negative for food allergies.   Neurological: Negative for dizziness, speech difficulty and light-headedness.   Psychiatric/Behavioral: Negative for confusion, self-injury, sleep disturbance and suicidal ideas.       Vitals:    11/08/18 1037   BP: 118/70   Temp: 97.8 °F (36.6 °C)       Physical Exam   Constitutional: He is oriented to person, place, and time. He appears well-developed and well-nourished. He does not appear ill. No distress.   HENT:   Head: Normocephalic.   Eyes: Pupils are equal, round, and reactive to light.   Cardiovascular: Normal rate, regular rhythm and normal heart sounds.    Pulmonary/Chest: Effort normal and breath sounds normal.   Abdominal: Soft. Bowel sounds are normal. He exhibits no distension and no mass. There is no hepatosplenomegaly. There is no tenderness. There is no rebound and no guarding. No hernia.   Musculoskeletal: Normal range of motion.   Neurological: He is alert and oriented to person, place, and time.   Skin: Skin is warm and dry.   Psychiatric: He has a normal mood and affect. His speech is normal and behavior is normal. Judgment normal.       No images are attached to the encounter.    Assessment & plan     1. Upper GI bleed  - CBC & Differential  - Comprehensive Metabolic Panel    2. Erosive esophagitis    3. Esophageal stricture    4. Acute blood loss anemia  - CBC & Differential  - Comprehensive Metabolic Panel    I reviewed hospital records with the patient. No signs of GI bleeding Noted. Will check labs today. Continue Protonix 40 mg BID and Carafate PRN. Patient is scheduled for repeat EGD with dilation with Dr. Duncan on 11/30/18. Patient to call office with any issues. Continue a soft, bland diet for now.

## 2018-11-09 ENCOUNTER — TELEPHONE (OUTPATIENT)
Dept: GASTROENTEROLOGY | Facility: CLINIC | Age: 73
End: 2018-11-09

## 2018-11-30 ENCOUNTER — ANESTHESIA EVENT (OUTPATIENT)
Dept: GASTROENTEROLOGY | Facility: HOSPITAL | Age: 73
End: 2018-11-30

## 2018-11-30 ENCOUNTER — HOSPITAL ENCOUNTER (OUTPATIENT)
Facility: HOSPITAL | Age: 73
Setting detail: HOSPITAL OUTPATIENT SURGERY
Discharge: HOME | End: 2018-11-30
Attending: INTERNAL MEDICINE | Admitting: INTERNAL MEDICINE

## 2018-11-30 ENCOUNTER — ANESTHESIA (OUTPATIENT)
Dept: GASTROENTEROLOGY | Facility: HOSPITAL | Age: 73
End: 2018-11-30

## 2018-11-30 VITALS
TEMPERATURE: 98 F | HEART RATE: 65 BPM | WEIGHT: 145 LBS | HEIGHT: 71 IN | BODY MASS INDEX: 20.3 KG/M2 | SYSTOLIC BLOOD PRESSURE: 120 MMHG | RESPIRATION RATE: 18 BRPM | OXYGEN SATURATION: 98 % | DIASTOLIC BLOOD PRESSURE: 68 MMHG

## 2018-11-30 DIAGNOSIS — K92.0 HEMATEMESIS, PRESENCE OF NAUSEA NOT SPECIFIED: ICD-10-CM

## 2018-11-30 PROCEDURE — 88305 TISSUE EXAM BY PATHOLOGIST: CPT | Performed by: INTERNAL MEDICINE

## 2018-11-30 PROCEDURE — S0260 H&P FOR SURGERY: HCPCS | Performed by: INTERNAL MEDICINE

## 2018-11-30 PROCEDURE — 25010000002 PROPOFOL 1000 MG/ML EMULSION: Performed by: ANESTHESIOLOGY

## 2018-11-30 PROCEDURE — 43239 EGD BIOPSY SINGLE/MULTIPLE: CPT | Performed by: INTERNAL MEDICINE

## 2018-11-30 PROCEDURE — 87081 CULTURE SCREEN ONLY: CPT | Performed by: INTERNAL MEDICINE

## 2018-11-30 PROCEDURE — 25010000002 PROPOFOL 10 MG/ML EMULSION: Performed by: ANESTHESIOLOGY

## 2018-11-30 RX ORDER — PROPOFOL 10 MG/ML
VIAL (ML) INTRAVENOUS AS NEEDED
Status: DISCONTINUED | OUTPATIENT
Start: 2018-11-30 | End: 2018-11-30 | Stop reason: SURG

## 2018-11-30 RX ORDER — SODIUM CHLORIDE, SODIUM LACTATE, POTASSIUM CHLORIDE, CALCIUM CHLORIDE 600; 310; 30; 20 MG/100ML; MG/100ML; MG/100ML; MG/100ML
INJECTION, SOLUTION INTRAVENOUS CONTINUOUS PRN
Status: DISCONTINUED | OUTPATIENT
Start: 2018-11-30 | End: 2018-11-30 | Stop reason: SURG

## 2018-11-30 RX ORDER — LIDOCAINE HYDROCHLORIDE 20 MG/ML
INJECTION, SOLUTION INFILTRATION; PERINEURAL AS NEEDED
Status: DISCONTINUED | OUTPATIENT
Start: 2018-11-30 | End: 2018-11-30 | Stop reason: SURG

## 2018-11-30 RX ADMIN — LIDOCAINE HYDROCHLORIDE 60 MG: 20 INJECTION, SOLUTION INFILTRATION; PERINEURAL at 10:26

## 2018-11-30 RX ADMIN — SODIUM CHLORIDE, POTASSIUM CHLORIDE, SODIUM LACTATE AND CALCIUM CHLORIDE: 600; 310; 30; 20 INJECTION, SOLUTION INTRAVENOUS at 10:12

## 2018-11-30 RX ADMIN — PROPOFOL 100 MG: 10 INJECTION, EMULSION INTRAVENOUS at 10:26

## 2018-11-30 RX ADMIN — PROPOFOL 130 MCG/KG/MIN: 10 INJECTION, EMULSION INTRAVENOUS at 10:26

## 2018-11-30 NOTE — ANESTHESIA POSTPROCEDURE EVALUATION
"Patient: Victor Manuel Issa    Procedure Summary     Date:  11/30/18 Room / Location:  [facility] ENDOSCOPY 9 /  [facility]    Anesthesia Start:  1020 Anesthesia Stop:  1035    Procedure:  ESOPHAGOGASTRODUODENOSCOPY WITH DILATATION (N/A Esophagus) Diagnosis:       Esophagitis      Hiatal hernia      Gastritis      (Hematemesis, presence of nausea not specified [K92.0])    Surgeon:  Paul Duncan MD Provider:  Bella Munoz MD    Anesthesia Type:  MAC ASA Status:  3          Anesthesia Type: MAC  Last vitals  BP   120/68 (11/30/18 1059)   Temp   36.7 °C (98 °F) (11/30/18 1039)   Pulse   65 (11/30/18 1059)   Resp   18 (11/30/18 1059)     SpO2   98 % (11/30/18 1059)     Post Anesthesia Care and Evaluation    Patient location during evaluation: bedside  Patient participation: complete - patient participated  Level of consciousness: awake and alert  Pain management: adequate  Airway patency: patent  Anesthetic complications: No anesthetic complications  PONV Status: none  Cardiovascular status: acceptable  Respiratory status: acceptable  Hydration status: acceptable    Comments: /68 (BP Location: Right arm, Patient Position: Sitting)   Pulse 65   Temp 36.7 °C (98 °F) (Oral)   Resp 18   Ht 180.3 cm (71\")   Wt 65.8 kg (145 lb)   SpO2 98%   BMI 20.22 kg/m²         "

## 2018-11-30 NOTE — ANESTHESIA PREPROCEDURE EVALUATION
Anesthesia Evaluation     Patient summary reviewed   NPO Solid Status: > 8 hours  NPO Liquid Status: > 8 hours           Airway   Mallampati: III  TM distance: >3 FB  Dental      Pulmonary    (+) a smoker Current Smoked day of surgery, COPD,   Cardiovascular     Rhythm: regular  Rate: normal    (+) past MI  >12 months, CAD, CABG >6 Months, hyperlipidemia,       Neuro/Psych  (+) CVA, psychiatric history Bipolar and Anxiety,     GI/Hepatic/Renal/Endo    (+)  GERD, GI bleeding, hypothyroidism,     Musculoskeletal     Abdominal    Substance History      OB/GYN          Other   (+) arthritis                     Anesthesia Plan    ASA 3     MAC   total IV anesthesia  Anesthetic plan, all risks, benefits, and alternatives have been provided, discussed and informed consent has been obtained with: patient.

## 2018-12-01 LAB — UREASE TISS QL: NEGATIVE

## 2018-12-03 LAB
CYTO UR: NORMAL
LAB AP CASE REPORT: NORMAL
PATH REPORT.FINAL DX SPEC: NORMAL
PATH REPORT.GROSS SPEC: NORMAL

## 2018-12-05 ENCOUNTER — TELEPHONE (OUTPATIENT)
Dept: GASTROENTEROLOGY | Facility: CLINIC | Age: 73
End: 2018-12-05

## 2018-12-05 NOTE — TELEPHONE ENCOUNTER
Called pt and advised per Dr Duncan that the duodenum bx was benign.  The stomach bx was normal and was neg for h pylori.  The ge junction bx showed mild chronic active inflammation.  He recommends to continue ppi and if dysphagia persists can offer a esopahgeal manometry.     Pt verb understanding and states he wants to think about the other test.

## 2018-12-05 NOTE — TELEPHONE ENCOUNTER
----- Message from Paul DANIELS MD sent at 12/4/2018  5:31 PM EST -----  Regarding: Biopsy results  Okay to call results, recommend continue PPI.  If dysphagia persists, can offer esophageal manometry.  ----- Message -----  From: Lab, Background User  Sent: 12/1/2018   1:34 PM  To: Paul DANIELS MD

## 2018-12-05 NOTE — TELEPHONE ENCOUNTER
----- Message from Paul DANIELS MD sent at 12/4/2018  5:41 PM EST -----  Regarding: Biopsy results  Okay to go results, continue PPI.  If dysphagia persist can offer gel manometry.  ----- Message -----  From: Lab, Background User  Sent: 12/1/2018   1:34 PM  To: Paul DANIELS MD

## 2019-01-01 ENCOUNTER — APPOINTMENT (OUTPATIENT)
Dept: GENERAL RADIOLOGY | Facility: HOSPITAL | Age: 74
End: 2019-01-01

## 2019-01-01 ENCOUNTER — HOSPITAL ENCOUNTER (OUTPATIENT)
Dept: INFUSION THERAPY | Facility: HOSPITAL | Age: 74
Discharge: HOME | End: 2019-10-29
Admitting: PHYSICAL MEDICINE & REHABILITATION

## 2019-01-01 ENCOUNTER — TRANSCRIBE ORDERS (OUTPATIENT)
Dept: ADMINISTRATIVE | Facility: HOSPITAL | Age: 74
End: 2019-01-01

## 2019-01-01 ENCOUNTER — HOSPITAL ENCOUNTER (INPATIENT)
Facility: HOSPITAL | Age: 74
LOS: 7 days | Discharge: NURSING, REHAB OR LONG-TERM CARE | End: 2019-12-14
Attending: EMERGENCY MEDICINE | Admitting: HOSPITALIST

## 2019-01-01 ENCOUNTER — APPOINTMENT (OUTPATIENT)
Dept: CT IMAGING | Facility: HOSPITAL | Age: 74
End: 2019-01-01

## 2019-01-01 VITALS
BODY MASS INDEX: 20.56 KG/M2 | OXYGEN SATURATION: 95 % | HEIGHT: 71 IN | SYSTOLIC BLOOD PRESSURE: 103 MMHG | DIASTOLIC BLOOD PRESSURE: 66 MMHG | WEIGHT: 146.9 LBS | TEMPERATURE: 98.4 F | RESPIRATION RATE: 20 BRPM | HEART RATE: 55 BPM

## 2019-01-01 VITALS
SYSTOLIC BLOOD PRESSURE: 104 MMHG | TEMPERATURE: 95.6 F | HEART RATE: 56 BPM | OXYGEN SATURATION: 95 % | RESPIRATION RATE: 20 BRPM | DIASTOLIC BLOOD PRESSURE: 61 MMHG

## 2019-01-01 DIAGNOSIS — R53.1 GENERALIZED WEAKNESS: ICD-10-CM

## 2019-01-01 DIAGNOSIS — I63.9 PROGRESSING STROKE (HCC): Primary | ICD-10-CM

## 2019-01-01 DIAGNOSIS — E83.39 HYPOPHOSPHATEMIA: ICD-10-CM

## 2019-01-01 DIAGNOSIS — M62.838 MUSCLE SPASTICITY: Primary | ICD-10-CM

## 2019-01-01 DIAGNOSIS — J44.1 COPD EXACERBATION (HCC): Primary | ICD-10-CM

## 2019-01-01 LAB
ALBUMIN SERPL-MCNC: 3.1 G/DL (ref 3.5–5.2)
ALBUMIN/GLOB SERPL: 1 G/DL
ALP SERPL-CCNC: 79 U/L (ref 39–117)
ALT SERPL W P-5'-P-CCNC: 7 U/L (ref 1–41)
ANION GAP SERPL CALCULATED.3IONS-SCNC: 10.1 MMOL/L (ref 5–15)
ANION GAP SERPL CALCULATED.3IONS-SCNC: 12.5 MMOL/L (ref 5–15)
ANION GAP SERPL CALCULATED.3IONS-SCNC: 13.8 MMOL/L (ref 5–15)
ANION GAP SERPL CALCULATED.3IONS-SCNC: 6.7 MMOL/L (ref 5–15)
ANION GAP SERPL CALCULATED.3IONS-SCNC: 8.9 MMOL/L (ref 5–15)
ANION GAP SERPL CALCULATED.3IONS-SCNC: 9 MMOL/L (ref 5–15)
ANION GAP SERPL CALCULATED.3IONS-SCNC: 9.2 MMOL/L (ref 5–15)
AST SERPL-CCNC: 6 U/L (ref 1–40)
B PARAPERT DNA SPEC QL NAA+PROBE: NOT DETECTED
B PERT DNA SPEC QL NAA+PROBE: NOT DETECTED
BACTERIA SPEC AEROBE CULT: ABNORMAL
BACTERIA SPEC AEROBE CULT: NORMAL
BACTERIA SPEC AEROBE CULT: NORMAL
BACTERIA UR QL AUTO: ABNORMAL /HPF
BASOPHILS # BLD AUTO: 0.01 10*3/MM3 (ref 0–0.2)
BASOPHILS # BLD AUTO: 0.01 10*3/MM3 (ref 0–0.2)
BASOPHILS # BLD AUTO: 0.04 10*3/MM3 (ref 0–0.2)
BASOPHILS # BLD AUTO: 0.05 10*3/MM3 (ref 0–0.2)
BASOPHILS # BLD AUTO: 0.06 10*3/MM3 (ref 0–0.2)
BASOPHILS # BLD AUTO: 0.07 10*3/MM3 (ref 0–0.2)
BASOPHILS # BLD AUTO: 0.07 10*3/MM3 (ref 0–0.2)
BASOPHILS NFR BLD AUTO: 0.1 % (ref 0–1.5)
BASOPHILS NFR BLD AUTO: 0.1 % (ref 0–1.5)
BASOPHILS NFR BLD AUTO: 0.4 % (ref 0–1.5)
BASOPHILS NFR BLD AUTO: 0.5 % (ref 0–1.5)
BASOPHILS NFR BLD AUTO: 0.5 % (ref 0–1.5)
BASOPHILS NFR BLD AUTO: 0.6 % (ref 0–1.5)
BASOPHILS NFR BLD AUTO: 0.9 % (ref 0–1.5)
BILIRUB SERPL-MCNC: 0.3 MG/DL (ref 0.2–1.2)
BILIRUB UR QL STRIP: NEGATIVE
BUN BLD-MCNC: 11 MG/DL (ref 8–23)
BUN BLD-MCNC: 14 MG/DL (ref 8–23)
BUN BLD-MCNC: 20 MG/DL (ref 8–23)
BUN BLD-MCNC: 26 MG/DL (ref 8–23)
BUN BLD-MCNC: 27 MG/DL (ref 8–23)
BUN BLD-MCNC: 9 MG/DL (ref 8–23)
BUN BLD-MCNC: 9 MG/DL (ref 8–23)
BUN/CREAT SERPL: 10.6 (ref 7–25)
BUN/CREAT SERPL: 13 (ref 7–25)
BUN/CREAT SERPL: 15.7 (ref 7–25)
BUN/CREAT SERPL: 20.3 (ref 7–25)
BUN/CREAT SERPL: 22.5 (ref 7–25)
BUN/CREAT SERPL: 26.5 (ref 7–25)
BUN/CREAT SERPL: 38 (ref 7–25)
C PNEUM DNA NPH QL NAA+NON-PROBE: NOT DETECTED
CALCIUM SPEC-SCNC: 10.1 MG/DL (ref 8.6–10.5)
CALCIUM SPEC-SCNC: 8.9 MG/DL (ref 8.6–10.5)
CALCIUM SPEC-SCNC: 9 MG/DL (ref 8.6–10.5)
CALCIUM SPEC-SCNC: 9.1 MG/DL (ref 8.6–10.5)
CALCIUM SPEC-SCNC: 9.2 MG/DL (ref 8.6–10.5)
CALCIUM SPEC-SCNC: 9.4 MG/DL (ref 8.6–10.5)
CALCIUM SPEC-SCNC: 9.7 MG/DL (ref 8.6–10.5)
CHLORIDE SERPL-SCNC: 106 MMOL/L (ref 98–107)
CHLORIDE SERPL-SCNC: 106 MMOL/L (ref 98–107)
CHLORIDE SERPL-SCNC: 107 MMOL/L (ref 98–107)
CHLORIDE SERPL-SCNC: 108 MMOL/L (ref 98–107)
CHLORIDE SERPL-SCNC: 108 MMOL/L (ref 98–107)
CHLORIDE SERPL-SCNC: 111 MMOL/L (ref 98–107)
CHLORIDE SERPL-SCNC: 114 MMOL/L (ref 98–107)
CHOLEST SERPL-MCNC: 111 MG/DL (ref 0–200)
CK SERPL-CCNC: 28 U/L (ref 20–200)
CLARITY UR: ABNORMAL
CO2 SERPL-SCNC: 21.9 MMOL/L (ref 22–29)
CO2 SERPL-SCNC: 22.1 MMOL/L (ref 22–29)
CO2 SERPL-SCNC: 22.2 MMOL/L (ref 22–29)
CO2 SERPL-SCNC: 22.5 MMOL/L (ref 22–29)
CO2 SERPL-SCNC: 22.8 MMOL/L (ref 22–29)
CO2 SERPL-SCNC: 23 MMOL/L (ref 22–29)
CO2 SERPL-SCNC: 23.3 MMOL/L (ref 22–29)
COLOR UR: ABNORMAL
CORTIS SERPL-MCNC: 1.38 MCG/DL
CORTIS SERPL-MCNC: 1.78 MCG/DL
CORTIS SERPL-MCNC: 12.45 MCG/DL
CORTIS SERPL-MCNC: 14.6 MCG/DL
CORTIS SERPL-MCNC: 16.74 MCG/DL
CORTIS SERPL-MCNC: 17.57 MCG/DL
CORTIS SERPL-MCNC: 2.78 MCG/DL
CREAT BLD-MCNC: 0.69 MG/DL (ref 0.76–1.27)
CREAT BLD-MCNC: 0.69 MG/DL (ref 0.76–1.27)
CREAT BLD-MCNC: 0.7 MG/DL (ref 0.76–1.27)
CREAT BLD-MCNC: 0.71 MG/DL (ref 0.76–1.27)
CREAT BLD-MCNC: 0.85 MG/DL (ref 0.76–1.27)
CREAT BLD-MCNC: 0.89 MG/DL (ref 0.76–1.27)
CREAT BLD-MCNC: 0.98 MG/DL (ref 0.76–1.27)
D-LACTATE SERPL-SCNC: 1.1 MMOL/L (ref 0.5–2)
D-LACTATE SERPL-SCNC: 1.9 MMOL/L (ref 0.5–2)
D-LACTATE SERPL-SCNC: 3.5 MMOL/L (ref 0.5–2)
D-LACTATE SERPL-SCNC: 6 MMOL/L (ref 0.5–2)
DEPRECATED RDW RBC AUTO: 41.1 FL (ref 37–54)
DEPRECATED RDW RBC AUTO: 41.7 FL (ref 37–54)
DEPRECATED RDW RBC AUTO: 41.7 FL (ref 37–54)
DEPRECATED RDW RBC AUTO: 42.3 FL (ref 37–54)
DEPRECATED RDW RBC AUTO: 42.8 FL (ref 37–54)
DEPRECATED RDW RBC AUTO: 43 FL (ref 37–54)
DEPRECATED RDW RBC AUTO: 43.5 FL (ref 37–54)
EOSINOPHIL # BLD AUTO: 0 10*3/MM3 (ref 0–0.4)
EOSINOPHIL # BLD AUTO: 0 10*3/MM3 (ref 0–0.4)
EOSINOPHIL # BLD AUTO: 0.03 10*3/MM3 (ref 0–0.4)
EOSINOPHIL # BLD AUTO: 0.1 10*3/MM3 (ref 0–0.4)
EOSINOPHIL # BLD AUTO: 0.29 10*3/MM3 (ref 0–0.4)
EOSINOPHIL # BLD AUTO: 0.38 10*3/MM3 (ref 0–0.4)
EOSINOPHIL # BLD AUTO: 0.44 10*3/MM3 (ref 0–0.4)
EOSINOPHIL NFR BLD AUTO: 0 % (ref 0.3–6.2)
EOSINOPHIL NFR BLD AUTO: 0 % (ref 0.3–6.2)
EOSINOPHIL NFR BLD AUTO: 0.2 % (ref 0.3–6.2)
EOSINOPHIL NFR BLD AUTO: 1.2 % (ref 0.3–6.2)
EOSINOPHIL NFR BLD AUTO: 3.8 % (ref 0.3–6.2)
EOSINOPHIL NFR BLD AUTO: 3.9 % (ref 0.3–6.2)
EOSINOPHIL NFR BLD AUTO: 4.6 % (ref 0.3–6.2)
ERYTHROCYTE [DISTWIDTH] IN BLOOD BY AUTOMATED COUNT: 12.1 % (ref 12.3–15.4)
ERYTHROCYTE [DISTWIDTH] IN BLOOD BY AUTOMATED COUNT: 12.1 % (ref 12.3–15.4)
ERYTHROCYTE [DISTWIDTH] IN BLOOD BY AUTOMATED COUNT: 12.2 % (ref 12.3–15.4)
ERYTHROCYTE [DISTWIDTH] IN BLOOD BY AUTOMATED COUNT: 12.2 % (ref 12.3–15.4)
ERYTHROCYTE [DISTWIDTH] IN BLOOD BY AUTOMATED COUNT: 12.3 % (ref 12.3–15.4)
ERYTHROCYTE [DISTWIDTH] IN BLOOD BY AUTOMATED COUNT: 12.3 % (ref 12.3–15.4)
ERYTHROCYTE [DISTWIDTH] IN BLOOD BY AUTOMATED COUNT: 12.5 % (ref 12.3–15.4)
FLUAV AG NPH QL: NEGATIVE
FLUAV H1 2009 PAND RNA NPH QL NAA+PROBE: NOT DETECTED
FLUAV H1 HA GENE NPH QL NAA+PROBE: NOT DETECTED
FLUAV H3 RNA NPH QL NAA+PROBE: NOT DETECTED
FLUAV SUBTYP SPEC NAA+PROBE: NOT DETECTED
FLUBV AG NPH QL IA: NEGATIVE
FLUBV RNA ISLT QL NAA+PROBE: NOT DETECTED
FOLATE SERPL-MCNC: 4.64 NG/ML (ref 4.78–24.2)
GFR SERPL CREATININE-BSD FRML MDRD: 108 ML/MIN/1.73
GFR SERPL CREATININE-BSD FRML MDRD: 110 ML/MIN/1.73
GFR SERPL CREATININE-BSD FRML MDRD: 111 ML/MIN/1.73
GFR SERPL CREATININE-BSD FRML MDRD: 111 ML/MIN/1.73
GFR SERPL CREATININE-BSD FRML MDRD: 74 ML/MIN/1.73
GFR SERPL CREATININE-BSD FRML MDRD: 83 ML/MIN/1.73
GFR SERPL CREATININE-BSD FRML MDRD: 88 ML/MIN/1.73
GLOBULIN UR ELPH-MCNC: 3.2 GM/DL
GLUCOSE BLD-MCNC: 113 MG/DL (ref 65–99)
GLUCOSE BLD-MCNC: 121 MG/DL (ref 65–99)
GLUCOSE BLD-MCNC: 153 MG/DL (ref 65–99)
GLUCOSE BLD-MCNC: 155 MG/DL (ref 65–99)
GLUCOSE BLD-MCNC: 90 MG/DL (ref 65–99)
GLUCOSE BLD-MCNC: 93 MG/DL (ref 65–99)
GLUCOSE BLD-MCNC: 99 MG/DL (ref 65–99)
GLUCOSE UR STRIP-MCNC: NEGATIVE MG/DL
HADV DNA SPEC NAA+PROBE: NOT DETECTED
HCOV 229E RNA SPEC QL NAA+PROBE: NOT DETECTED
HCOV HKU1 RNA SPEC QL NAA+PROBE: NOT DETECTED
HCOV NL63 RNA SPEC QL NAA+PROBE: NOT DETECTED
HCOV OC43 RNA SPEC QL NAA+PROBE: NOT DETECTED
HCT VFR BLD AUTO: 29.1 % (ref 37.5–51)
HCT VFR BLD AUTO: 29.5 % (ref 37.5–51)
HCT VFR BLD AUTO: 30.1 % (ref 37.5–51)
HCT VFR BLD AUTO: 31.9 % (ref 37.5–51)
HCT VFR BLD AUTO: 32.6 % (ref 37.5–51)
HCT VFR BLD AUTO: 34.7 % (ref 37.5–51)
HCT VFR BLD AUTO: 41.8 % (ref 37.5–51)
HDLC SERPL-MCNC: 24 MG/DL (ref 40–60)
HGB BLD-MCNC: 10 G/DL (ref 13–17.7)
HGB BLD-MCNC: 10.2 G/DL (ref 13–17.7)
HGB BLD-MCNC: 10.7 G/DL (ref 13–17.7)
HGB BLD-MCNC: 11.1 G/DL (ref 13–17.7)
HGB BLD-MCNC: 11.6 G/DL (ref 13–17.7)
HGB BLD-MCNC: 14 G/DL (ref 13–17.7)
HGB BLD-MCNC: 9.8 G/DL (ref 13–17.7)
HGB UR QL STRIP.AUTO: ABNORMAL
HMPV RNA NPH QL NAA+NON-PROBE: NOT DETECTED
HOLD SPECIMEN: NORMAL
HPIV1 RNA SPEC QL NAA+PROBE: NOT DETECTED
HPIV2 RNA SPEC QL NAA+PROBE: NOT DETECTED
HPIV3 RNA NPH QL NAA+PROBE: NOT DETECTED
HPIV4 P GENE NPH QL NAA+PROBE: NOT DETECTED
HYALINE CASTS UR QL AUTO: ABNORMAL /LPF
IMM GRANULOCYTES # BLD AUTO: 0.05 10*3/MM3 (ref 0–0.05)
IMM GRANULOCYTES # BLD AUTO: 0.08 10*3/MM3 (ref 0–0.05)
IMM GRANULOCYTES # BLD AUTO: 0.11 10*3/MM3 (ref 0–0.05)
IMM GRANULOCYTES # BLD AUTO: 0.12 10*3/MM3 (ref 0–0.05)
IMM GRANULOCYTES # BLD AUTO: 0.13 10*3/MM3 (ref 0–0.05)
IMM GRANULOCYTES # BLD AUTO: 0.15 10*3/MM3 (ref 0–0.05)
IMM GRANULOCYTES # BLD AUTO: 0.15 10*3/MM3 (ref 0–0.05)
IMM GRANULOCYTES NFR BLD AUTO: 0.6 % (ref 0–0.5)
IMM GRANULOCYTES NFR BLD AUTO: 0.6 % (ref 0–0.5)
IMM GRANULOCYTES NFR BLD AUTO: 0.7 % (ref 0–0.5)
IMM GRANULOCYTES NFR BLD AUTO: 1.1 % (ref 0–0.5)
IMM GRANULOCYTES NFR BLD AUTO: 1.1 % (ref 0–0.5)
IMM GRANULOCYTES NFR BLD AUTO: 1.6 % (ref 0–0.5)
IMM GRANULOCYTES NFR BLD AUTO: 1.8 % (ref 0–0.5)
KETONES UR QL STRIP: ABNORMAL
LDLC SERPL CALC-MCNC: 60 MG/DL (ref 0–100)
LDLC/HDLC SERPL: 2.52 {RATIO}
LEUKOCYTE ESTERASE UR QL STRIP.AUTO: ABNORMAL
LYMPHOCYTES # BLD AUTO: 0.95 10*3/MM3 (ref 0.7–3.1)
LYMPHOCYTES # BLD AUTO: 1.18 10*3/MM3 (ref 0.7–3.1)
LYMPHOCYTES # BLD AUTO: 1.18 10*3/MM3 (ref 0.7–3.1)
LYMPHOCYTES # BLD AUTO: 1.67 10*3/MM3 (ref 0.7–3.1)
LYMPHOCYTES # BLD AUTO: 2.06 10*3/MM3 (ref 0.7–3.1)
LYMPHOCYTES # BLD AUTO: 2.15 10*3/MM3 (ref 0.7–3.1)
LYMPHOCYTES # BLD AUTO: 2.42 10*3/MM3 (ref 0.7–3.1)
LYMPHOCYTES NFR BLD AUTO: 12 % (ref 19.6–45.3)
LYMPHOCYTES NFR BLD AUTO: 14.3 % (ref 19.6–45.3)
LYMPHOCYTES NFR BLD AUTO: 18.7 % (ref 19.6–45.3)
LYMPHOCYTES NFR BLD AUTO: 27.7 % (ref 19.6–45.3)
LYMPHOCYTES NFR BLD AUTO: 29.2 % (ref 19.6–45.3)
LYMPHOCYTES NFR BLD AUTO: 5.8 % (ref 19.6–45.3)
LYMPHOCYTES NFR BLD AUTO: 9.2 % (ref 19.6–45.3)
M PNEUMO IGG SER IA-ACNC: NOT DETECTED
MAGNESIUM SERPL-MCNC: 1.8 MG/DL (ref 1.6–2.4)
MCH RBC QN AUTO: 31.5 PG (ref 26.6–33)
MCH RBC QN AUTO: 31.6 PG (ref 26.6–33)
MCH RBC QN AUTO: 31.8 PG (ref 26.6–33)
MCH RBC QN AUTO: 32 PG (ref 26.6–33)
MCH RBC QN AUTO: 32.2 PG (ref 26.6–33)
MCHC RBC AUTO-ENTMCNC: 33.2 G/DL (ref 31.5–35.7)
MCHC RBC AUTO-ENTMCNC: 33.4 G/DL (ref 31.5–35.7)
MCHC RBC AUTO-ENTMCNC: 33.5 G/DL (ref 31.5–35.7)
MCHC RBC AUTO-ENTMCNC: 33.5 G/DL (ref 31.5–35.7)
MCHC RBC AUTO-ENTMCNC: 33.9 G/DL (ref 31.5–35.7)
MCHC RBC AUTO-ENTMCNC: 34 G/DL (ref 31.5–35.7)
MCHC RBC AUTO-ENTMCNC: 34.4 G/DL (ref 31.5–35.7)
MCV RBC AUTO: 93.2 FL (ref 79–97)
MCV RBC AUTO: 93.6 FL (ref 79–97)
MCV RBC AUTO: 94.5 FL (ref 79–97)
MCV RBC AUTO: 94.9 FL (ref 79–97)
MCV RBC AUTO: 94.9 FL (ref 79–97)
MCV RBC AUTO: 95.9 FL (ref 79–97)
MCV RBC AUTO: 96.1 FL (ref 79–97)
MONOCYTES # BLD AUTO: 0.17 10*3/MM3 (ref 0.1–0.9)
MONOCYTES # BLD AUTO: 0.47 10*3/MM3 (ref 0.1–0.9)
MONOCYTES # BLD AUTO: 0.51 10*3/MM3 (ref 0.1–0.9)
MONOCYTES # BLD AUTO: 0.55 10*3/MM3 (ref 0.1–0.9)
MONOCYTES # BLD AUTO: 0.57 10*3/MM3 (ref 0.1–0.9)
MONOCYTES # BLD AUTO: 0.85 10*3/MM3 (ref 0.1–0.9)
MONOCYTES # BLD AUTO: 0.94 10*3/MM3 (ref 0.1–0.9)
MONOCYTES NFR BLD AUTO: 1.3 % (ref 5–12)
MONOCYTES NFR BLD AUTO: 4.4 % (ref 5–12)
MONOCYTES NFR BLD AUTO: 5.7 % (ref 5–12)
MONOCYTES NFR BLD AUTO: 5.8 % (ref 5–12)
MONOCYTES NFR BLD AUTO: 6.1 % (ref 5–12)
MONOCYTES NFR BLD AUTO: 6.9 % (ref 5–12)
MONOCYTES NFR BLD AUTO: 7.4 % (ref 5–12)
NEUTROPHILS # BLD AUTO: 11.19 10*3/MM3 (ref 1.7–7)
NEUTROPHILS # BLD AUTO: 11.4 10*3/MM3 (ref 1.7–7)
NEUTROPHILS # BLD AUTO: 14.17 10*3/MM3 (ref 1.7–7)
NEUTROPHILS # BLD AUTO: 4.34 10*3/MM3 (ref 1.7–7)
NEUTROPHILS # BLD AUTO: 4.83 10*3/MM3 (ref 1.7–7)
NEUTROPHILS # BLD AUTO: 6.3 10*3/MM3 (ref 1.7–7)
NEUTROPHILS # BLD AUTO: 8.18 10*3/MM3 (ref 1.7–7)
NEUTROPHILS NFR BLD AUTO: 58.1 % (ref 42.7–76)
NEUTROPHILS NFR BLD AUTO: 58.5 % (ref 42.7–76)
NEUTROPHILS NFR BLD AUTO: 71.5 % (ref 42.7–76)
NEUTROPHILS NFR BLD AUTO: 76.5 % (ref 42.7–76)
NEUTROPHILS NFR BLD AUTO: 80.7 % (ref 42.7–76)
NEUTROPHILS NFR BLD AUTO: 87.1 % (ref 42.7–76)
NEUTROPHILS NFR BLD AUTO: 88.8 % (ref 42.7–76)
NITRITE UR QL STRIP: NEGATIVE
NRBC BLD AUTO-RTO: 0 /100 WBC (ref 0–0.2)
PH UR STRIP.AUTO: 6 [PH] (ref 5–8)
PHOSPHATE SERPL-MCNC: 1.8 MG/DL (ref 2.5–4.5)
PHOSPHATE SERPL-MCNC: 2.1 MG/DL (ref 2.5–4.5)
PHOSPHATE SERPL-MCNC: 2.5 MG/DL (ref 2.5–4.5)
PHOSPHATE SERPL-MCNC: 3.2 MG/DL (ref 2.5–4.5)
PLATELET # BLD AUTO: 160 10*3/MM3 (ref 140–450)
PLATELET # BLD AUTO: 189 10*3/MM3 (ref 140–450)
PLATELET # BLD AUTO: 191 10*3/MM3 (ref 140–450)
PLATELET # BLD AUTO: 193 10*3/MM3 (ref 140–450)
PLATELET # BLD AUTO: 208 10*3/MM3 (ref 140–450)
PLATELET # BLD AUTO: 212 10*3/MM3 (ref 140–450)
PLATELET # BLD AUTO: 230 10*3/MM3 (ref 140–450)
PMV BLD AUTO: 10 FL (ref 6–12)
PMV BLD AUTO: 10.3 FL (ref 6–12)
PMV BLD AUTO: 9.5 FL (ref 6–12)
PMV BLD AUTO: 9.7 FL (ref 6–12)
PMV BLD AUTO: 9.9 FL (ref 6–12)
POTASSIUM BLD-SCNC: 4.1 MMOL/L (ref 3.5–5.2)
POTASSIUM BLD-SCNC: 4.2 MMOL/L (ref 3.5–5.2)
POTASSIUM BLD-SCNC: 4.3 MMOL/L (ref 3.5–5.2)
POTASSIUM BLD-SCNC: 4.5 MMOL/L (ref 3.5–5.2)
POTASSIUM BLD-SCNC: 4.6 MMOL/L (ref 3.5–5.2)
PROCALCITONIN SERPL-MCNC: 0.09 NG/ML (ref 0.1–0.25)
PROCALCITONIN SERPL-MCNC: 0.12 NG/ML (ref 0.1–0.25)
PROCALCITONIN SERPL-MCNC: 0.13 NG/ML (ref 0.1–0.25)
PROT SERPL-MCNC: 6.3 G/DL (ref 6–8.5)
PROT UR QL STRIP: ABNORMAL
RBC # BLD AUTO: 3.11 10*6/MM3 (ref 4.14–5.8)
RBC # BLD AUTO: 3.11 10*6/MM3 (ref 4.14–5.8)
RBC # BLD AUTO: 3.23 10*6/MM3 (ref 4.14–5.8)
RBC # BLD AUTO: 3.36 10*6/MM3 (ref 4.14–5.8)
RBC # BLD AUTO: 3.45 10*6/MM3 (ref 4.14–5.8)
RBC # BLD AUTO: 3.62 10*6/MM3 (ref 4.14–5.8)
RBC # BLD AUTO: 4.35 10*6/MM3 (ref 4.14–5.8)
RBC # UR: ABNORMAL /HPF
REF LAB TEST METHOD: ABNORMAL
RHINOVIRUS RNA SPEC NAA+PROBE: NOT DETECTED
RSV RNA NPH QL NAA+NON-PROBE: NOT DETECTED
SODIUM BLD-SCNC: 137 MMOL/L (ref 136–145)
SODIUM BLD-SCNC: 140 MMOL/L (ref 136–145)
SODIUM BLD-SCNC: 140 MMOL/L (ref 136–145)
SODIUM BLD-SCNC: 141 MMOL/L (ref 136–145)
SODIUM BLD-SCNC: 142 MMOL/L (ref 136–145)
SODIUM BLD-SCNC: 143 MMOL/L (ref 136–145)
SODIUM BLD-SCNC: 145 MMOL/L (ref 136–145)
SP GR UR STRIP: 1.02 (ref 1–1.03)
SQUAMOUS #/AREA URNS HPF: ABNORMAL /HPF
T4 FREE SERPL-MCNC: 1.49 NG/DL (ref 0.93–1.7)
TRIGL SERPL-MCNC: 133 MG/DL (ref 0–150)
TROPONIN T SERPL-MCNC: <0.01 NG/ML (ref 0–0.03)
TSH SERPL DL<=0.05 MIU/L-ACNC: 0.7 UIU/ML (ref 0.27–4.2)
UROBILINOGEN UR QL STRIP: ABNORMAL
VIT B12 BLD-MCNC: 504 PG/ML (ref 211–946)
VLDLC SERPL-MCNC: 26.6 MG/DL (ref 5–40)
WBC NRBC COR # BLD: 11.47 10*3/MM3 (ref 3.4–10.8)
WBC NRBC COR # BLD: 12.84 10*3/MM3 (ref 3.4–10.8)
WBC NRBC COR # BLD: 13.87 10*3/MM3 (ref 3.4–10.8)
WBC NRBC COR # BLD: 16.27 10*3/MM3 (ref 3.4–10.8)
WBC NRBC COR # BLD: 7.43 10*3/MM3 (ref 3.4–10.8)
WBC NRBC COR # BLD: 8.24 10*3/MM3 (ref 3.4–10.8)
WBC NRBC COR # BLD: 8.3 10*3/MM3 (ref 3.4–10.8)
WBC UR QL AUTO: ABNORMAL /HPF

## 2019-01-01 PROCEDURE — 36415 COLL VENOUS BLD VENIPUNCTURE: CPT | Performed by: HOSPITALIST

## 2019-01-01 PROCEDURE — 94799 UNLISTED PULMONARY SVC/PX: CPT

## 2019-01-01 PROCEDURE — 63710000001 PREDNISONE PER 1 MG: Performed by: HOSPITALIST

## 2019-01-01 PROCEDURE — 25010000002 PIPERACILLIN SOD-TAZOBACTAM PER 1 G: Performed by: INTERNAL MEDICINE

## 2019-01-01 PROCEDURE — 94760 N-INVAS EAR/PLS OXIMETRY 1: CPT

## 2019-01-01 PROCEDURE — 92611 MOTION FLUOROSCOPY/SWALLOW: CPT

## 2019-01-01 PROCEDURE — 99222 1ST HOSP IP/OBS MODERATE 55: CPT | Performed by: INTERNAL MEDICINE

## 2019-01-01 PROCEDURE — 85025 COMPLETE CBC W/AUTO DIFF WBC: CPT | Performed by: HOSPITALIST

## 2019-01-01 PROCEDURE — 99285 EMERGENCY DEPT VISIT HI MDM: CPT

## 2019-01-01 PROCEDURE — 25010000002 ONABOTULINUMTOXINA 100 UNITS RECONSTITUTED SOLUTION: Performed by: PHYSICAL MEDICINE & REHABILITATION

## 2019-01-01 PROCEDURE — 84100 ASSAY OF PHOSPHORUS: CPT | Performed by: INTERNAL MEDICINE

## 2019-01-01 PROCEDURE — 80048 BASIC METABOLIC PNL TOTAL CA: CPT | Performed by: HOSPITALIST

## 2019-01-01 PROCEDURE — 87804 INFLUENZA ASSAY W/OPTIC: CPT | Performed by: EMERGENCY MEDICINE

## 2019-01-01 PROCEDURE — 84484 ASSAY OF TROPONIN QUANT: CPT | Performed by: HOSPITALIST

## 2019-01-01 PROCEDURE — 81001 URINALYSIS AUTO W/SCOPE: CPT | Performed by: NURSE PRACTITIONER

## 2019-01-01 PROCEDURE — 84145 PROCALCITONIN (PCT): CPT | Performed by: EMERGENCY MEDICINE

## 2019-01-01 PROCEDURE — 84145 PROCALCITONIN (PCT): CPT | Performed by: INTERNAL MEDICINE

## 2019-01-01 PROCEDURE — 71045 X-RAY EXAM CHEST 1 VIEW: CPT

## 2019-01-01 PROCEDURE — 87086 URINE CULTURE/COLONY COUNT: CPT | Performed by: NURSE PRACTITIONER

## 2019-01-01 PROCEDURE — 74230 X-RAY XM SWLNG FUNCJ C+: CPT

## 2019-01-01 PROCEDURE — 97110 THERAPEUTIC EXERCISES: CPT

## 2019-01-01 PROCEDURE — 25010000002 COSYNTROPIN PER 0.25 MG: Performed by: INTERNAL MEDICINE

## 2019-01-01 PROCEDURE — 83605 ASSAY OF LACTIC ACID: CPT | Performed by: INTERNAL MEDICINE

## 2019-01-01 PROCEDURE — 0100U HC BIOFIRE FILMARRAY RESP PANEL 2: CPT | Performed by: EMERGENCY MEDICINE

## 2019-01-01 PROCEDURE — 85025 COMPLETE CBC W/AUTO DIFF WBC: CPT | Performed by: EMERGENCY MEDICINE

## 2019-01-01 PROCEDURE — 80061 LIPID PANEL: CPT | Performed by: INTERNAL MEDICINE

## 2019-01-01 PROCEDURE — 82533 TOTAL CORTISOL: CPT | Performed by: INTERNAL MEDICINE

## 2019-01-01 PROCEDURE — 99231 SBSQ HOSP IP/OBS SF/LOW 25: CPT | Performed by: INTERNAL MEDICINE

## 2019-01-01 PROCEDURE — 83735 ASSAY OF MAGNESIUM: CPT | Performed by: EMERGENCY MEDICINE

## 2019-01-01 PROCEDURE — 0 IOPAMIDOL PER 1 ML: Performed by: INTERNAL MEDICINE

## 2019-01-01 PROCEDURE — 82607 VITAMIN B-12: CPT | Performed by: HOSPITALIST

## 2019-01-01 PROCEDURE — 80053 COMPREHEN METABOLIC PANEL: CPT | Performed by: HOSPITALIST

## 2019-01-01 PROCEDURE — 82746 ASSAY OF FOLIC ACID SERUM: CPT | Performed by: HOSPITALIST

## 2019-01-01 PROCEDURE — 84100 ASSAY OF PHOSPHORUS: CPT | Performed by: EMERGENCY MEDICINE

## 2019-01-01 PROCEDURE — 93010 ELECTROCARDIOGRAM REPORT: CPT | Performed by: INTERNAL MEDICINE

## 2019-01-01 PROCEDURE — 99232 SBSQ HOSP IP/OBS MODERATE 35: CPT | Performed by: INTERNAL MEDICINE

## 2019-01-01 PROCEDURE — 97162 PT EVAL MOD COMPLEX 30 MIN: CPT

## 2019-01-01 PROCEDURE — 71275 CT ANGIOGRAPHY CHEST: CPT

## 2019-01-01 PROCEDURE — 71046 X-RAY EXAM CHEST 2 VIEWS: CPT

## 2019-01-01 PROCEDURE — 25010000002 METHYLPREDNISOLONE PER 125 MG: Performed by: EMERGENCY MEDICINE

## 2019-01-01 PROCEDURE — 95874 GUIDE NERV DESTR NEEDLE EMG: CPT

## 2019-01-01 PROCEDURE — 84439 ASSAY OF FREE THYROXINE: CPT | Performed by: HOSPITALIST

## 2019-01-01 PROCEDURE — 84443 ASSAY THYROID STIM HORMONE: CPT | Performed by: HOSPITALIST

## 2019-01-01 PROCEDURE — 87077 CULTURE AEROBIC IDENTIFY: CPT | Performed by: NURSE PRACTITIONER

## 2019-01-01 PROCEDURE — 99233 SBSQ HOSP IP/OBS HIGH 50: CPT | Performed by: INTERNAL MEDICINE

## 2019-01-01 PROCEDURE — 87186 SC STD MICRODIL/AGAR DIL: CPT | Performed by: NURSE PRACTITIONER

## 2019-01-01 PROCEDURE — 83605 ASSAY OF LACTIC ACID: CPT | Performed by: EMERGENCY MEDICINE

## 2019-01-01 PROCEDURE — 94640 AIRWAY INHALATION TREATMENT: CPT

## 2019-01-01 PROCEDURE — 87040 BLOOD CULTURE FOR BACTERIA: CPT | Performed by: EMERGENCY MEDICINE

## 2019-01-01 PROCEDURE — 93005 ELECTROCARDIOGRAM TRACING: CPT | Performed by: NURSE PRACTITIONER

## 2019-01-01 PROCEDURE — 82550 ASSAY OF CK (CPK): CPT | Performed by: EMERGENCY MEDICINE

## 2019-01-01 PROCEDURE — 84100 ASSAY OF PHOSPHORUS: CPT | Performed by: HOSPITALIST

## 2019-01-01 PROCEDURE — 80048 BASIC METABOLIC PNL TOTAL CA: CPT | Performed by: EMERGENCY MEDICINE

## 2019-01-01 PROCEDURE — 92610 EVALUATE SWALLOWING FUNCTION: CPT

## 2019-01-01 PROCEDURE — 93005 ELECTROCARDIOGRAM TRACING: CPT | Performed by: HOSPITALIST

## 2019-01-01 RX ORDER — ACETAMINOPHEN 160 MG/5ML
650 SOLUTION ORAL EVERY 4 HOURS PRN
Status: DISCONTINUED | OUTPATIENT
Start: 2019-01-01 | End: 2019-01-01 | Stop reason: HOSPADM

## 2019-01-01 RX ORDER — SODIUM CHLORIDE, SODIUM LACTATE, POTASSIUM CHLORIDE, CALCIUM CHLORIDE 600; 310; 30; 20 MG/100ML; MG/100ML; MG/100ML; MG/100ML
125 INJECTION, SOLUTION INTRAVENOUS CONTINUOUS
Status: DISCONTINUED | OUTPATIENT
Start: 2019-01-01 | End: 2019-01-01

## 2019-01-01 RX ORDER — COSYNTROPIN 0.25 MG/ML
0.25 INJECTION, POWDER, FOR SOLUTION INTRAMUSCULAR; INTRAVENOUS ONCE
Status: COMPLETED | OUTPATIENT
Start: 2019-01-01 | End: 2019-01-01

## 2019-01-01 RX ORDER — LEVOTHYROXINE SODIUM 175 UG/1
175 TABLET ORAL
Status: DISCONTINUED | OUTPATIENT
Start: 2019-01-01 | End: 2019-01-01

## 2019-01-01 RX ORDER — ATORVASTATIN CALCIUM 20 MG/1
20 TABLET, FILM COATED ORAL DAILY
Status: DISCONTINUED | OUTPATIENT
Start: 2019-01-01 | End: 2019-01-01 | Stop reason: HOSPADM

## 2019-01-01 RX ORDER — METHYLPREDNISOLONE SODIUM SUCCINATE 125 MG/2ML
125 INJECTION, POWDER, LYOPHILIZED, FOR SOLUTION INTRAMUSCULAR; INTRAVENOUS ONCE
Status: COMPLETED | OUTPATIENT
Start: 2019-01-01 | End: 2019-01-01

## 2019-01-01 RX ORDER — DOXYCYCLINE 100 MG/1
100 CAPSULE ORAL ONCE
Status: COMPLETED | OUTPATIENT
Start: 2019-01-01 | End: 2019-01-01

## 2019-01-01 RX ORDER — MONTELUKAST SODIUM 10 MG/1
10 TABLET ORAL NIGHTLY
Status: DISCONTINUED | OUTPATIENT
Start: 2019-01-01 | End: 2019-01-01 | Stop reason: HOSPADM

## 2019-01-01 RX ORDER — SENNA AND DOCUSATE SODIUM 50; 8.6 MG/1; MG/1
2 TABLET, FILM COATED ORAL 2 TIMES DAILY
Status: DISCONTINUED | OUTPATIENT
Start: 2019-01-01 | End: 2019-01-01 | Stop reason: HOSPADM

## 2019-01-01 RX ORDER — MIDODRINE HYDROCHLORIDE 2.5 MG/1
2.5 TABLET ORAL
Status: DISCONTINUED | OUTPATIENT
Start: 2019-01-01 | End: 2019-01-01 | Stop reason: HOSPADM

## 2019-01-01 RX ORDER — CARBIDOPA/LEVODOPA 25MG-250MG
1 TABLET ORAL
Status: DISCONTINUED | OUTPATIENT
Start: 2019-01-01 | End: 2019-01-01

## 2019-01-01 RX ORDER — IPRATROPIUM BROMIDE AND ALBUTEROL SULFATE 2.5; .5 MG/3ML; MG/3ML
3 SOLUTION RESPIRATORY (INHALATION) ONCE
Status: COMPLETED | OUTPATIENT
Start: 2019-01-01 | End: 2019-01-01

## 2019-01-01 RX ORDER — SODIUM CHLORIDE 0.9 % (FLUSH) 0.9 %
10 SYRINGE (ML) INJECTION AS NEEDED
Status: DISCONTINUED | OUTPATIENT
Start: 2019-01-01 | End: 2019-01-01 | Stop reason: HOSPADM

## 2019-01-01 RX ORDER — POLYETHYLENE GLYCOL 3350 17 G/17G
17 POWDER, FOR SOLUTION ORAL DAILY
Status: DISCONTINUED | OUTPATIENT
Start: 2019-01-01 | End: 2019-01-01 | Stop reason: HOSPADM

## 2019-01-01 RX ORDER — PREDNISONE 20 MG/1
20 TABLET ORAL 2 TIMES DAILY WITH MEALS
Status: DISCONTINUED | OUTPATIENT
Start: 2019-01-01 | End: 2019-01-01

## 2019-01-01 RX ORDER — LEVOFLOXACIN 750 MG/1
750 TABLET ORAL DAILY
Qty: 5 TABLET | Refills: 0
Start: 2019-01-01 | End: 2019-01-01

## 2019-01-01 RX ORDER — SODIUM CHLORIDE 9 MG/ML
75 INJECTION, SOLUTION INTRAVENOUS CONTINUOUS
Status: DISCONTINUED | OUTPATIENT
Start: 2019-01-01 | End: 2019-01-01

## 2019-01-01 RX ORDER — LEVOTHYROXINE SODIUM 0.1 MG/1
200 TABLET ORAL
Status: DISCONTINUED | OUTPATIENT
Start: 2019-01-01 | End: 2019-01-01 | Stop reason: HOSPADM

## 2019-01-01 RX ORDER — ASPIRIN 325 MG
325 TABLET, DELAYED RELEASE (ENTERIC COATED) ORAL DAILY
Status: DISCONTINUED | OUTPATIENT
Start: 2019-01-01 | End: 2019-01-01 | Stop reason: HOSPADM

## 2019-01-01 RX ORDER — SODIUM CHLORIDE 0.9 % (FLUSH) 0.9 %
10 SYRINGE (ML) INJECTION EVERY 12 HOURS SCHEDULED
Status: DISCONTINUED | OUTPATIENT
Start: 2019-01-01 | End: 2019-01-01 | Stop reason: HOSPADM

## 2019-01-01 RX ORDER — IPRATROPIUM BROMIDE AND ALBUTEROL SULFATE 2.5; .5 MG/3ML; MG/3ML
3 SOLUTION RESPIRATORY (INHALATION)
Qty: 360 ML
Start: 2019-01-01

## 2019-01-01 RX ORDER — BISACODYL 10 MG
10 SUPPOSITORY, RECTAL RECTAL DAILY PRN
Status: DISCONTINUED | OUTPATIENT
Start: 2019-01-01 | End: 2019-01-01 | Stop reason: HOSPADM

## 2019-01-01 RX ORDER — OLANZAPINE 10 MG/1
10 TABLET ORAL NIGHTLY
Status: DISCONTINUED | OUTPATIENT
Start: 2019-01-01 | End: 2019-01-01

## 2019-01-01 RX ORDER — OLANZAPINE 5 MG/1
5 TABLET ORAL NIGHTLY
Status: DISCONTINUED | OUTPATIENT
Start: 2019-01-01 | End: 2019-01-01 | Stop reason: HOSPADM

## 2019-01-01 RX ORDER — IPRATROPIUM BROMIDE AND ALBUTEROL SULFATE 2.5; .5 MG/3ML; MG/3ML
3 SOLUTION RESPIRATORY (INHALATION)
Status: DISCONTINUED | OUTPATIENT
Start: 2019-01-01 | End: 2019-01-01 | Stop reason: HOSPADM

## 2019-01-01 RX ORDER — DIVALPROEX SODIUM 500 MG/1
500 TABLET, DELAYED RELEASE ORAL 2 TIMES DAILY
Status: DISCONTINUED | OUTPATIENT
Start: 2019-01-01 | End: 2019-01-01 | Stop reason: HOSPADM

## 2019-01-01 RX ORDER — MIDODRINE HYDROCHLORIDE 2.5 MG/1
2.5 TABLET ORAL
Start: 2019-01-01

## 2019-01-01 RX ORDER — OLANZAPINE 5 MG/1
5 TABLET ORAL NIGHTLY
Start: 2019-01-01

## 2019-01-01 RX ORDER — POLYETHYLENE GLYCOL 3350 17 G/17G
17 POWDER, FOR SOLUTION ORAL DAILY
Start: 2019-01-01

## 2019-01-01 RX ORDER — OXYBUTYNIN CHLORIDE 5 MG/1
5 TABLET, EXTENDED RELEASE ORAL 2 TIMES DAILY
Status: DISCONTINUED | OUTPATIENT
Start: 2019-01-01 | End: 2019-01-01 | Stop reason: HOSPADM

## 2019-01-01 RX ORDER — DONEPEZIL HYDROCHLORIDE 10 MG/1
20 TABLET, FILM COATED ORAL NIGHTLY
Status: DISCONTINUED | OUTPATIENT
Start: 2019-01-01 | End: 2019-01-01 | Stop reason: HOSPADM

## 2019-01-01 RX ORDER — SENNA AND DOCUSATE SODIUM 50; 8.6 MG/1; MG/1
2 TABLET, FILM COATED ORAL 2 TIMES DAILY
Start: 2019-01-01

## 2019-01-01 RX ORDER — ACETAMINOPHEN 325 MG/1
650 TABLET ORAL EVERY 4 HOURS PRN
Status: DISCONTINUED | OUTPATIENT
Start: 2019-01-01 | End: 2019-01-01 | Stop reason: HOSPADM

## 2019-01-01 RX ORDER — FOLIC ACID 1 MG/1
1 TABLET ORAL DAILY
Status: DISCONTINUED | OUTPATIENT
Start: 2019-01-01 | End: 2019-01-01 | Stop reason: HOSPADM

## 2019-01-01 RX ORDER — BUSPIRONE HYDROCHLORIDE 5 MG/1
5 TABLET ORAL 3 TIMES DAILY PRN
Status: DISCONTINUED | OUTPATIENT
Start: 2019-01-01 | End: 2019-01-01 | Stop reason: HOSPADM

## 2019-01-01 RX ORDER — PANTOPRAZOLE SODIUM 40 MG/1
40 TABLET, DELAYED RELEASE ORAL
Status: DISCONTINUED | OUTPATIENT
Start: 2019-01-01 | End: 2019-01-01 | Stop reason: HOSPADM

## 2019-01-01 RX ORDER — ACETAMINOPHEN 650 MG/1
650 SUPPOSITORY RECTAL EVERY 4 HOURS PRN
Status: DISCONTINUED | OUTPATIENT
Start: 2019-01-01 | End: 2019-01-01 | Stop reason: HOSPADM

## 2019-01-01 RX ADMIN — IPRATROPIUM BROMIDE AND ALBUTEROL SULFATE 3 ML: 2.5; .5 SOLUTION RESPIRATORY (INHALATION) at 15:00

## 2019-01-01 RX ADMIN — SODIUM CHLORIDE 150 ML/HR: 9 INJECTION, SOLUTION INTRAVENOUS at 07:16

## 2019-01-01 RX ADMIN — BARIUM SULFATE 4 ML: 980 POWDER, FOR SUSPENSION ORAL at 09:31

## 2019-01-01 RX ADMIN — CARBIDOPA AND LEVODOPA 1 TABLET: 25; 250 TABLET ORAL at 10:11

## 2019-01-01 RX ADMIN — SODIUM CHLORIDE, PRESERVATIVE FREE 10 ML: 5 INJECTION INTRAVENOUS at 20:34

## 2019-01-01 RX ADMIN — SODIUM CHLORIDE, PRESERVATIVE FREE 10 ML: 5 INJECTION INTRAVENOUS at 00:43

## 2019-01-01 RX ADMIN — ATORVASTATIN CALCIUM 20 MG: 20 TABLET, FILM COATED ORAL at 09:36

## 2019-01-01 RX ADMIN — IPRATROPIUM BROMIDE AND ALBUTEROL SULFATE 3 ML: 2.5; .5 SOLUTION RESPIRATORY (INHALATION) at 11:09

## 2019-01-01 RX ADMIN — IPRATROPIUM BROMIDE AND ALBUTEROL SULFATE 3 ML: 2.5; .5 SOLUTION RESPIRATORY (INHALATION) at 12:11

## 2019-01-01 RX ADMIN — OXYBUTYNIN CHLORIDE 5 MG: 5 TABLET, EXTENDED RELEASE ORAL at 20:24

## 2019-01-01 RX ADMIN — SODIUM CHLORIDE, POTASSIUM CHLORIDE, SODIUM LACTATE AND CALCIUM CHLORIDE 1000 ML: 600; 310; 30; 20 INJECTION, SOLUTION INTRAVENOUS at 18:20

## 2019-01-01 RX ADMIN — IPRATROPIUM BROMIDE AND ALBUTEROL SULFATE 3 ML: 2.5; .5 SOLUTION RESPIRATORY (INHALATION) at 15:19

## 2019-01-01 RX ADMIN — MONTELUKAST SODIUM 10 MG: 10 TABLET, FILM COATED ORAL at 00:43

## 2019-01-01 RX ADMIN — DIVALPROEX SODIUM 500 MG: 500 TABLET, DELAYED RELEASE ORAL at 20:33

## 2019-01-01 RX ADMIN — SODIUM CHLORIDE, PRESERVATIVE FREE 10 ML: 5 INJECTION INTRAVENOUS at 10:14

## 2019-01-01 RX ADMIN — ASPIRIN 325 MG: 325 TABLET, COATED ORAL at 08:23

## 2019-01-01 RX ADMIN — PANTOPRAZOLE SODIUM 40 MG: 40 TABLET, DELAYED RELEASE ORAL at 17:49

## 2019-01-01 RX ADMIN — Medication 1 APPLICATION: at 20:12

## 2019-01-01 RX ADMIN — MIDODRINE HYDROCHLORIDE 2.5 MG: 2.5 TABLET ORAL at 08:23

## 2019-01-01 RX ADMIN — ONABOTULINUMTOXINA 200 UNITS: 100 INJECTION, POWDER, LYOPHILIZED, FOR SOLUTION INTRADERMAL; INTRAMUSCULAR at 13:30

## 2019-01-01 RX ADMIN — OLANZAPINE 10 MG: 10 TABLET, FILM COATED ORAL at 00:42

## 2019-01-01 RX ADMIN — PANTOPRAZOLE SODIUM 40 MG: 40 TABLET, DELAYED RELEASE ORAL at 17:57

## 2019-01-01 RX ADMIN — DIVALPROEX SODIUM 500 MG: 500 TABLET, DELAYED RELEASE ORAL at 10:13

## 2019-01-01 RX ADMIN — FOLIC ACID 1 MG: 1 TABLET ORAL at 08:57

## 2019-01-01 RX ADMIN — COSYNTROPIN 0.25 MG: 0.25 INJECTION, POWDER, LYOPHILIZED, FOR SOLUTION INTRAMUSCULAR; INTRAVENOUS at 11:16

## 2019-01-01 RX ADMIN — SODIUM CHLORIDE, PRESERVATIVE FREE 10 ML: 5 INJECTION INTRAVENOUS at 10:12

## 2019-01-01 RX ADMIN — IPRATROPIUM BROMIDE AND ALBUTEROL SULFATE 3 ML: 2.5; .5 SOLUTION RESPIRATORY (INHALATION) at 19:45

## 2019-01-01 RX ADMIN — DONEPEZIL HYDROCHLORIDE 20 MG: 10 TABLET, FILM COATED ORAL at 20:33

## 2019-01-01 RX ADMIN — IPRATROPIUM BROMIDE AND ALBUTEROL SULFATE 3 ML: 2.5; .5 SOLUTION RESPIRATORY (INHALATION) at 14:58

## 2019-01-01 RX ADMIN — CARBIDOPA AND LEVODOPA 1 TABLET: 25; 250 TABLET ORAL at 17:57

## 2019-01-01 RX ADMIN — Medication 1 APPLICATION: at 10:13

## 2019-01-01 RX ADMIN — CARBIDOPA AND LEVODOPA 1 TABLET: 25; 250 TABLET ORAL at 13:39

## 2019-01-01 RX ADMIN — TAZOBACTAM SODIUM AND PIPERACILLIN SODIUM 3.38 G: 375; 3 INJECTION, SOLUTION INTRAVENOUS at 06:37

## 2019-01-01 RX ADMIN — OLANZAPINE 10 MG: 10 TABLET, FILM COATED ORAL at 20:24

## 2019-01-01 RX ADMIN — SODIUM CHLORIDE 150 ML/HR: 9 INJECTION, SOLUTION INTRAVENOUS at 17:57

## 2019-01-01 RX ADMIN — TAZOBACTAM SODIUM AND PIPERACILLIN SODIUM 3.38 G: 375; 3 INJECTION, SOLUTION INTRAVENOUS at 21:49

## 2019-01-01 RX ADMIN — POLYETHYLENE GLYCOL 3350 17 G: 17 POWDER, FOR SOLUTION ORAL at 08:23

## 2019-01-01 RX ADMIN — MONTELUKAST SODIUM 10 MG: 10 TABLET, FILM COATED ORAL at 20:24

## 2019-01-01 RX ADMIN — IPRATROPIUM BROMIDE AND ALBUTEROL SULFATE 3 ML: 2.5; .5 SOLUTION RESPIRATORY (INHALATION) at 20:35

## 2019-01-01 RX ADMIN — CARBIDOPA AND LEVODOPA 1 TABLET: 25; 250 TABLET ORAL at 08:55

## 2019-01-01 RX ADMIN — IPRATROPIUM BROMIDE AND ALBUTEROL SULFATE 3 ML: 2.5; .5 SOLUTION RESPIRATORY (INHALATION) at 07:16

## 2019-01-01 RX ADMIN — CARBIDOPA AND LEVODOPA 1 TABLET: 25; 250 TABLET ORAL at 13:00

## 2019-01-01 RX ADMIN — COSYNTROPIN 0.25 MG: 0.25 INJECTION, POWDER, LYOPHILIZED, FOR SOLUTION INTRAMUSCULAR; INTRAVENOUS at 14:54

## 2019-01-01 RX ADMIN — POLYETHYLENE GLYCOL 3350 17 G: 17 POWDER, FOR SOLUTION ORAL at 10:12

## 2019-01-01 RX ADMIN — PANTOPRAZOLE SODIUM 40 MG: 40 TABLET, DELAYED RELEASE ORAL at 17:50

## 2019-01-01 RX ADMIN — Medication 1 APPLICATION: at 15:03

## 2019-01-01 RX ADMIN — DIVALPROEX SODIUM 500 MG: 500 TABLET, DELAYED RELEASE ORAL at 10:10

## 2019-01-01 RX ADMIN — DIVALPROEX SODIUM 500 MG: 500 TABLET, DELAYED RELEASE ORAL at 20:24

## 2019-01-01 RX ADMIN — DIVALPROEX SODIUM 500 MG: 500 TABLET, DELAYED RELEASE ORAL at 20:27

## 2019-01-01 RX ADMIN — SENNOSIDES AND DOCUSATE SODIUM 2 TABLET: 8.6; 5 TABLET ORAL at 01:13

## 2019-01-01 RX ADMIN — TAZOBACTAM SODIUM AND PIPERACILLIN SODIUM 3.38 G: 375; 3 INJECTION, SOLUTION INTRAVENOUS at 05:00

## 2019-01-01 RX ADMIN — PANTOPRAZOLE SODIUM 40 MG: 40 TABLET, DELAYED RELEASE ORAL at 17:59

## 2019-01-01 RX ADMIN — TAZOBACTAM SODIUM AND PIPERACILLIN SODIUM 3.38 G: 375; 3 INJECTION, SOLUTION INTRAVENOUS at 15:44

## 2019-01-01 RX ADMIN — IPRATROPIUM BROMIDE AND ALBUTEROL SULFATE 3 ML: 2.5; .5 SOLUTION RESPIRATORY (INHALATION) at 07:04

## 2019-01-01 RX ADMIN — DONEPEZIL HYDROCHLORIDE 20 MG: 10 TABLET, FILM COATED ORAL at 20:01

## 2019-01-01 RX ADMIN — IPRATROPIUM BROMIDE AND ALBUTEROL SULFATE 3 ML: 2.5; .5 SOLUTION RESPIRATORY (INHALATION) at 07:55

## 2019-01-01 RX ADMIN — FOLIC ACID 1 MG: 1 TABLET ORAL at 10:10

## 2019-01-01 RX ADMIN — IPRATROPIUM BROMIDE AND ALBUTEROL SULFATE 3 ML: 2.5; .5 SOLUTION RESPIRATORY (INHALATION) at 19:43

## 2019-01-01 RX ADMIN — IPRATROPIUM BROMIDE AND ALBUTEROL SULFATE 3 ML: 2.5; .5 SOLUTION RESPIRATORY (INHALATION) at 07:06

## 2019-01-01 RX ADMIN — SODIUM CHLORIDE, PRESERVATIVE FREE 10 ML: 5 INJECTION INTRAVENOUS at 20:44

## 2019-01-01 RX ADMIN — Medication 1 APPLICATION: at 20:15

## 2019-01-01 RX ADMIN — LEVOTHYROXINE SODIUM 200 MCG: 100 TABLET ORAL at 08:25

## 2019-01-01 RX ADMIN — SODIUM CHLORIDE, PRESERVATIVE FREE 10 ML: 5 INJECTION INTRAVENOUS at 09:38

## 2019-01-01 RX ADMIN — TAZOBACTAM SODIUM AND PIPERACILLIN SODIUM 3.38 G: 375; 3 INJECTION, SOLUTION INTRAVENOUS at 21:57

## 2019-01-01 RX ADMIN — ASPIRIN 325 MG: 325 TABLET, COATED ORAL at 08:57

## 2019-01-01 RX ADMIN — DONEPEZIL HYDROCHLORIDE 20 MG: 10 TABLET, FILM COATED ORAL at 20:40

## 2019-01-01 RX ADMIN — PANTOPRAZOLE SODIUM 40 MG: 40 TABLET, DELAYED RELEASE ORAL at 06:30

## 2019-01-01 RX ADMIN — SODIUM CHLORIDE, PRESERVATIVE FREE 10 ML: 5 INJECTION INTRAVENOUS at 20:02

## 2019-01-01 RX ADMIN — ATORVASTATIN CALCIUM 20 MG: 20 TABLET, FILM COATED ORAL at 10:11

## 2019-01-01 RX ADMIN — DIVALPROEX SODIUM 500 MG: 500 TABLET, DELAYED RELEASE ORAL at 09:38

## 2019-01-01 RX ADMIN — OXYBUTYNIN CHLORIDE 5 MG: 5 TABLET, EXTENDED RELEASE ORAL at 10:11

## 2019-01-01 RX ADMIN — MONTELUKAST SODIUM 10 MG: 10 TABLET, FILM COATED ORAL at 20:27

## 2019-01-01 RX ADMIN — TAZOBACTAM SODIUM AND PIPERACILLIN SODIUM 3.38 G: 375; 3 INJECTION, SOLUTION INTRAVENOUS at 13:41

## 2019-01-01 RX ADMIN — TAZOBACTAM SODIUM AND PIPERACILLIN SODIUM 3.38 G: 375; 3 INJECTION, SOLUTION INTRAVENOUS at 06:50

## 2019-01-01 RX ADMIN — IPRATROPIUM BROMIDE AND ALBUTEROL SULFATE 3 ML: 2.5; .5 SOLUTION RESPIRATORY (INHALATION) at 20:24

## 2019-01-01 RX ADMIN — LEVOTHYROXINE SODIUM 175 MCG: 175 TABLET ORAL at 05:10

## 2019-01-01 RX ADMIN — ASPIRIN 325 MG: 325 TABLET, COATED ORAL at 09:36

## 2019-01-01 RX ADMIN — TAZOBACTAM SODIUM AND PIPERACILLIN SODIUM 3.38 G: 375; 3 INJECTION, SOLUTION INTRAVENOUS at 14:45

## 2019-01-01 RX ADMIN — BARIUM SULFATE 50 ML: 400 SUSPENSION ORAL at 09:31

## 2019-01-01 RX ADMIN — SODIUM CHLORIDE 150 ML/HR: 9 INJECTION, SOLUTION INTRAVENOUS at 02:22

## 2019-01-01 RX ADMIN — NYSTATIN 500000 UNITS: 100000 SUSPENSION ORAL at 17:58

## 2019-01-01 RX ADMIN — SODIUM CHLORIDE 125 ML/HR: 9 INJECTION, SOLUTION INTRAVENOUS at 23:00

## 2019-01-01 RX ADMIN — SODIUM CHLORIDE 75 ML/HR: 9 INJECTION, SOLUTION INTRAVENOUS at 13:45

## 2019-01-01 RX ADMIN — TAZOBACTAM SODIUM AND PIPERACILLIN SODIUM 3.38 G: 375; 3 INJECTION, SOLUTION INTRAVENOUS at 15:03

## 2019-01-01 RX ADMIN — CARBIDOPA AND LEVODOPA 1 TABLET: 25; 250 TABLET ORAL at 12:08

## 2019-01-01 RX ADMIN — OXYBUTYNIN CHLORIDE 5 MG: 5 TABLET, EXTENDED RELEASE ORAL at 08:23

## 2019-01-01 RX ADMIN — ATORVASTATIN CALCIUM 20 MG: 20 TABLET, FILM COATED ORAL at 10:13

## 2019-01-01 RX ADMIN — DIVALPROEX SODIUM 500 MG: 500 TABLET, DELAYED RELEASE ORAL at 08:23

## 2019-01-01 RX ADMIN — DONEPEZIL HYDROCHLORIDE 20 MG: 10 TABLET, FILM COATED ORAL at 20:27

## 2019-01-01 RX ADMIN — MIDODRINE HYDROCHLORIDE 2.5 MG: 2.5 TABLET ORAL at 12:34

## 2019-01-01 RX ADMIN — FOLIC ACID 1 MG: 1 TABLET ORAL at 08:52

## 2019-01-01 RX ADMIN — Medication 1 APPLICATION: at 20:25

## 2019-01-01 RX ADMIN — CARBIDOPA AND LEVODOPA 1 TABLET: 25; 250 TABLET ORAL at 17:49

## 2019-01-01 RX ADMIN — CARBIDOPA AND LEVODOPA 1 TABLET: 25; 250 TABLET ORAL at 14:28

## 2019-01-01 RX ADMIN — DIVALPROEX SODIUM 500 MG: 500 TABLET, DELAYED RELEASE ORAL at 08:52

## 2019-01-01 RX ADMIN — IPRATROPIUM BROMIDE AND ALBUTEROL SULFATE 3 ML: 2.5; .5 SOLUTION RESPIRATORY (INHALATION) at 11:36

## 2019-01-01 RX ADMIN — CARBIDOPA AND LEVODOPA 1 TABLET: 25; 250 TABLET ORAL at 08:52

## 2019-01-01 RX ADMIN — SODIUM CHLORIDE, PRESERVATIVE FREE 10 ML: 5 INJECTION INTRAVENOUS at 08:23

## 2019-01-01 RX ADMIN — OLANZAPINE 5 MG: 5 TABLET, FILM COATED ORAL at 20:27

## 2019-01-01 RX ADMIN — DOXYCYCLINE 100 MG: 100 CAPSULE ORAL at 19:51

## 2019-01-01 RX ADMIN — ATORVASTATIN CALCIUM 20 MG: 20 TABLET, FILM COATED ORAL at 08:52

## 2019-01-01 RX ADMIN — SODIUM CHLORIDE 150 ML/HR: 9 INJECTION, SOLUTION INTRAVENOUS at 00:24

## 2019-01-01 RX ADMIN — CARBIDOPA AND LEVODOPA 1 TABLET: 25; 250 TABLET ORAL at 17:50

## 2019-01-01 RX ADMIN — SODIUM CHLORIDE, POTASSIUM CHLORIDE, SODIUM LACTATE AND CALCIUM CHLORIDE 125 ML/HR: 600; 310; 30; 20 INJECTION, SOLUTION INTRAVENOUS at 15:07

## 2019-01-01 RX ADMIN — IOPAMIDOL 100 ML: 755 INJECTION, SOLUTION INTRAVENOUS at 10:15

## 2019-01-01 RX ADMIN — OXYBUTYNIN CHLORIDE 5 MG: 5 TABLET, EXTENDED RELEASE ORAL at 09:36

## 2019-01-01 RX ADMIN — MONTELUKAST SODIUM 10 MG: 10 TABLET, FILM COATED ORAL at 20:01

## 2019-01-01 RX ADMIN — SODIUM CHLORIDE 500 ML: 9 INJECTION, SOLUTION INTRAVENOUS at 22:30

## 2019-01-01 RX ADMIN — MIDODRINE HYDROCHLORIDE 2.5 MG: 2.5 TABLET ORAL at 10:10

## 2019-01-01 RX ADMIN — OLANZAPINE 5 MG: 5 TABLET, FILM COATED ORAL at 20:40

## 2019-01-01 RX ADMIN — TAZOBACTAM SODIUM AND PIPERACILLIN SODIUM 3.38 G: 375; 3 INJECTION, SOLUTION INTRAVENOUS at 05:55

## 2019-01-01 RX ADMIN — OXYBUTYNIN CHLORIDE 5 MG: 5 TABLET, EXTENDED RELEASE ORAL at 20:01

## 2019-01-01 RX ADMIN — SENNOSIDES AND DOCUSATE SODIUM 2 TABLET: 8.6; 5 TABLET ORAL at 10:12

## 2019-01-01 RX ADMIN — OXYBUTYNIN CHLORIDE 5 MG: 5 TABLET, EXTENDED RELEASE ORAL at 08:57

## 2019-01-01 RX ADMIN — PANTOPRAZOLE SODIUM 40 MG: 40 TABLET, DELAYED RELEASE ORAL at 09:36

## 2019-01-01 RX ADMIN — IPRATROPIUM BROMIDE AND ALBUTEROL SULFATE 3 ML: 2.5; .5 SOLUTION RESPIRATORY (INHALATION) at 20:58

## 2019-01-01 RX ADMIN — Medication 1 APPLICATION: at 20:41

## 2019-01-01 RX ADMIN — CARBIDOPA AND LEVODOPA 1 TABLET: 25; 250 TABLET ORAL at 17:24

## 2019-01-01 RX ADMIN — IPRATROPIUM BROMIDE AND ALBUTEROL SULFATE 3 ML: 2.5; .5 SOLUTION RESPIRATORY (INHALATION) at 15:38

## 2019-01-01 RX ADMIN — IPRATROPIUM BROMIDE AND ALBUTEROL SULFATE 3 ML: 2.5; .5 SOLUTION RESPIRATORY (INHALATION) at 15:34

## 2019-01-01 RX ADMIN — PANTOPRAZOLE SODIUM 40 MG: 40 TABLET, DELAYED RELEASE ORAL at 10:15

## 2019-01-01 RX ADMIN — FOLIC ACID 1 MG: 1 TABLET ORAL at 18:54

## 2019-01-01 RX ADMIN — POLYETHYLENE GLYCOL 3350 17 G: 17 POWDER, FOR SOLUTION ORAL at 13:00

## 2019-01-01 RX ADMIN — IPRATROPIUM BROMIDE AND ALBUTEROL SULFATE 3 ML: 2.5; .5 SOLUTION RESPIRATORY (INHALATION) at 11:02

## 2019-01-01 RX ADMIN — OXYBUTYNIN CHLORIDE 5 MG: 5 TABLET, EXTENDED RELEASE ORAL at 08:52

## 2019-01-01 RX ADMIN — IPRATROPIUM BROMIDE AND ALBUTEROL SULFATE 3 ML: 2.5; .5 SOLUTION RESPIRATORY (INHALATION) at 07:48

## 2019-01-01 RX ADMIN — LEVOTHYROXINE SODIUM 175 MCG: 175 TABLET ORAL at 06:33

## 2019-01-01 RX ADMIN — BARIUM SULFATE 55 ML: 0.81 POWDER, FOR SUSPENSION ORAL at 09:31

## 2019-01-01 RX ADMIN — IPRATROPIUM BROMIDE AND ALBUTEROL SULFATE 3 ML: 2.5; .5 SOLUTION RESPIRATORY (INHALATION) at 10:58

## 2019-01-01 RX ADMIN — PANTOPRAZOLE SODIUM 40 MG: 40 TABLET, DELAYED RELEASE ORAL at 08:23

## 2019-01-01 RX ADMIN — ATORVASTATIN CALCIUM 20 MG: 20 TABLET, FILM COATED ORAL at 08:57

## 2019-01-01 RX ADMIN — DONEPEZIL HYDROCHLORIDE 20 MG: 10 TABLET, FILM COATED ORAL at 20:12

## 2019-01-01 RX ADMIN — SODIUM CHLORIDE 500 ML: 9 INJECTION, SOLUTION INTRAVENOUS at 01:45

## 2019-01-01 RX ADMIN — IPRATROPIUM BROMIDE AND ALBUTEROL SULFATE 3 ML: 2.5; .5 SOLUTION RESPIRATORY (INHALATION) at 16:16

## 2019-01-01 RX ADMIN — MIDODRINE HYDROCHLORIDE 2.5 MG: 2.5 TABLET ORAL at 16:24

## 2019-01-01 RX ADMIN — SODIUM CHLORIDE, PRESERVATIVE FREE 10 ML: 5 INJECTION INTRAVENOUS at 08:53

## 2019-01-01 RX ADMIN — OXYBUTYNIN CHLORIDE 5 MG: 5 TABLET, EXTENDED RELEASE ORAL at 00:43

## 2019-01-01 RX ADMIN — OXYBUTYNIN CHLORIDE 5 MG: 5 TABLET, EXTENDED RELEASE ORAL at 20:40

## 2019-01-01 RX ADMIN — SODIUM CHLORIDE, PRESERVATIVE FREE 10 ML: 5 INJECTION INTRAVENOUS at 20:13

## 2019-01-01 RX ADMIN — LEVOTHYROXINE SODIUM 200 MCG: 100 TABLET ORAL at 10:11

## 2019-01-01 RX ADMIN — OXYBUTYNIN CHLORIDE 5 MG: 5 TABLET, EXTENDED RELEASE ORAL at 08:55

## 2019-01-01 RX ADMIN — TAZOBACTAM SODIUM AND PIPERACILLIN SODIUM 3.38 G: 375; 3 INJECTION, SOLUTION INTRAVENOUS at 07:26

## 2019-01-01 RX ADMIN — TAZOBACTAM SODIUM AND PIPERACILLIN SODIUM 3.38 G: 375; 3 INJECTION, SOLUTION INTRAVENOUS at 06:32

## 2019-01-01 RX ADMIN — SENNOSIDES AND DOCUSATE SODIUM 2 TABLET: 8.6; 5 TABLET ORAL at 08:23

## 2019-01-01 RX ADMIN — PANTOPRAZOLE SODIUM 40 MG: 40 TABLET, DELAYED RELEASE ORAL at 16:24

## 2019-01-01 RX ADMIN — ATORVASTATIN CALCIUM 20 MG: 20 TABLET, FILM COATED ORAL at 08:23

## 2019-01-01 RX ADMIN — FOLIC ACID 1 MG: 1 TABLET ORAL at 08:23

## 2019-01-01 RX ADMIN — ASPIRIN 325 MG: 325 TABLET, COATED ORAL at 10:11

## 2019-01-01 RX ADMIN — PANTOPRAZOLE SODIUM 40 MG: 40 TABLET, DELAYED RELEASE ORAL at 06:37

## 2019-01-01 RX ADMIN — LEVOTHYROXINE SODIUM 175 MCG: 175 TABLET ORAL at 06:18

## 2019-01-01 RX ADMIN — ATORVASTATIN CALCIUM 20 MG: 20 TABLET, FILM COATED ORAL at 08:55

## 2019-01-01 RX ADMIN — PREDNISONE 20 MG: 20 TABLET ORAL at 00:43

## 2019-01-01 RX ADMIN — DIVALPROEX SODIUM 500 MG: 500 TABLET, DELAYED RELEASE ORAL at 08:55

## 2019-01-01 RX ADMIN — POLYETHYLENE GLYCOL 3350 17 G: 17 POWDER, FOR SOLUTION ORAL at 10:13

## 2019-01-01 RX ADMIN — TAZOBACTAM SODIUM AND PIPERACILLIN SODIUM 3.38 G: 375; 3 INJECTION, SOLUTION INTRAVENOUS at 22:51

## 2019-01-01 RX ADMIN — CARBIDOPA AND LEVODOPA 1 TABLET: 25; 250 TABLET ORAL at 09:36

## 2019-01-01 RX ADMIN — POTASSIUM PHOSPHATE, MONOBASIC AND POTASSIUM PHOSPHATE, DIBASIC 15 MMOL: 224; 236 INJECTION, SOLUTION INTRAVENOUS at 10:34

## 2019-01-01 RX ADMIN — OXYBUTYNIN CHLORIDE 5 MG: 5 TABLET, EXTENDED RELEASE ORAL at 20:27

## 2019-01-01 RX ADMIN — Medication 1 APPLICATION: at 08:53

## 2019-01-01 RX ADMIN — Medication 1 APPLICATION: at 20:27

## 2019-01-01 RX ADMIN — ASPIRIN 325 MG: 325 TABLET, COATED ORAL at 10:12

## 2019-01-01 RX ADMIN — SENNOSIDES AND DOCUSATE SODIUM 2 TABLET: 8.6; 5 TABLET ORAL at 20:27

## 2019-01-01 RX ADMIN — PANTOPRAZOLE SODIUM 40 MG: 40 TABLET, DELAYED RELEASE ORAL at 06:18

## 2019-01-01 RX ADMIN — ASPIRIN 325 MG: 325 TABLET, COATED ORAL at 08:55

## 2019-01-01 RX ADMIN — MIDODRINE HYDROCHLORIDE 2.5 MG: 2.5 TABLET ORAL at 12:11

## 2019-01-01 RX ADMIN — LEVOTHYROXINE SODIUM 175 MCG: 175 TABLET ORAL at 06:37

## 2019-01-01 RX ADMIN — Medication 1 APPLICATION: at 10:14

## 2019-01-01 RX ADMIN — LEVOTHYROXINE SODIUM 200 MCG: 100 TABLET ORAL at 06:30

## 2019-01-01 RX ADMIN — OLANZAPINE 10 MG: 10 TABLET, FILM COATED ORAL at 20:01

## 2019-01-01 RX ADMIN — DIVALPROEX SODIUM 500 MG: 500 TABLET, DELAYED RELEASE ORAL at 20:12

## 2019-01-01 RX ADMIN — DONEPEZIL HYDROCHLORIDE 20 MG: 10 TABLET, FILM COATED ORAL at 00:42

## 2019-01-01 RX ADMIN — Medication 1 APPLICATION: at 09:08

## 2019-01-01 RX ADMIN — CARBIDOPA AND LEVODOPA 1 TABLET: 25; 250 TABLET ORAL at 10:13

## 2019-01-01 RX ADMIN — DIVALPROEX SODIUM 500 MG: 500 TABLET, DELAYED RELEASE ORAL at 00:43

## 2019-01-01 RX ADMIN — IPRATROPIUM BROMIDE AND ALBUTEROL SULFATE 3 ML: 2.5; .5 SOLUTION RESPIRATORY (INHALATION) at 20:07

## 2019-01-01 RX ADMIN — OLANZAPINE 10 MG: 10 TABLET, FILM COATED ORAL at 20:33

## 2019-01-01 RX ADMIN — TAZOBACTAM SODIUM AND PIPERACILLIN SODIUM 3.38 G: 375; 3 INJECTION, SOLUTION INTRAVENOUS at 22:10

## 2019-01-01 RX ADMIN — IPRATROPIUM BROMIDE AND ALBUTEROL SULFATE 3 ML: 2.5; .5 SOLUTION RESPIRATORY (INHALATION) at 08:12

## 2019-01-01 RX ADMIN — Medication 1 APPLICATION: at 08:25

## 2019-01-01 RX ADMIN — SODIUM CHLORIDE, PRESERVATIVE FREE 10 ML: 5 INJECTION INTRAVENOUS at 08:55

## 2019-01-01 RX ADMIN — OXYBUTYNIN CHLORIDE 5 MG: 5 TABLET, EXTENDED RELEASE ORAL at 10:12

## 2019-01-01 RX ADMIN — SENNOSIDES AND DOCUSATE SODIUM 2 TABLET: 8.6; 5 TABLET ORAL at 13:00

## 2019-01-01 RX ADMIN — SODIUM CHLORIDE, PRESERVATIVE FREE 10 ML: 5 INJECTION INTRAVENOUS at 20:27

## 2019-01-01 RX ADMIN — MONTELUKAST SODIUM 10 MG: 10 TABLET, FILM COATED ORAL at 20:33

## 2019-01-01 RX ADMIN — DIVALPROEX SODIUM 500 MG: 500 TABLET, DELAYED RELEASE ORAL at 01:13

## 2019-01-01 RX ADMIN — ASPIRIN 325 MG: 325 TABLET, COATED ORAL at 08:52

## 2019-01-01 RX ADMIN — MIDODRINE HYDROCHLORIDE 2.5 MG: 2.5 TABLET ORAL at 20:38

## 2019-01-01 RX ADMIN — OLANZAPINE 10 MG: 10 TABLET, FILM COATED ORAL at 20:12

## 2019-01-01 RX ADMIN — TAZOBACTAM SODIUM AND PIPERACILLIN SODIUM 3.38 G: 375; 3 INJECTION, SOLUTION INTRAVENOUS at 16:24

## 2019-01-01 RX ADMIN — DONEPEZIL HYDROCHLORIDE 20 MG: 10 TABLET, FILM COATED ORAL at 20:24

## 2019-01-01 RX ADMIN — TAZOBACTAM SODIUM AND PIPERACILLIN SODIUM 3.38 G: 375; 3 INJECTION, SOLUTION INTRAVENOUS at 21:01

## 2019-01-01 RX ADMIN — METHYLPREDNISOLONE SODIUM SUCCINATE 125 MG: 125 INJECTION, POWDER, FOR SOLUTION INTRAMUSCULAR; INTRAVENOUS at 19:51

## 2019-01-01 RX ADMIN — ACETAMINOPHEN 650 MG: 325 TABLET, FILM COATED ORAL at 05:20

## 2019-01-01 RX ADMIN — PREDNISONE 20 MG: 20 TABLET ORAL at 09:36

## 2019-01-01 RX ADMIN — TAZOBACTAM SODIUM AND PIPERACILLIN SODIUM 3.38 G: 375; 3 INJECTION, SOLUTION INTRAVENOUS at 21:09

## 2019-01-01 RX ADMIN — DIVALPROEX SODIUM 500 MG: 500 TABLET, DELAYED RELEASE ORAL at 08:57

## 2019-01-01 RX ADMIN — MONTELUKAST SODIUM 10 MG: 10 TABLET, FILM COATED ORAL at 20:12

## 2019-01-01 RX ADMIN — SODIUM CHLORIDE, PRESERVATIVE FREE 10 ML: 5 INJECTION INTRAVENOUS at 20:24

## 2019-01-01 RX ADMIN — OXYBUTYNIN CHLORIDE 5 MG: 5 TABLET, EXTENDED RELEASE ORAL at 20:33

## 2019-01-01 RX ADMIN — FOLIC ACID 1 MG: 1 TABLET ORAL at 08:55

## 2019-01-01 RX ADMIN — CARBIDOPA AND LEVODOPA 1 TABLET: 25; 250 TABLET ORAL at 12:56

## 2019-01-01 RX ADMIN — CARBIDOPA AND LEVODOPA 1 TABLET: 25; 250 TABLET ORAL at 08:57

## 2019-01-01 RX ADMIN — FOLIC ACID 1 MG: 1 TABLET ORAL at 10:13

## 2019-01-01 RX ADMIN — SENNOSIDES AND DOCUSATE SODIUM 2 TABLET: 8.6; 5 TABLET ORAL at 20:12

## 2019-01-01 RX ADMIN — OXYBUTYNIN CHLORIDE 5 MG: 5 TABLET, EXTENDED RELEASE ORAL at 20:12

## 2019-01-01 RX ADMIN — IPRATROPIUM BROMIDE AND ALBUTEROL SULFATE 3 ML: 2.5; .5 SOLUTION RESPIRATORY (INHALATION) at 11:22

## 2019-01-01 RX ADMIN — TAZOBACTAM SODIUM AND PIPERACILLIN SODIUM 3.38 G: 375; 3 INJECTION, SOLUTION INTRAVENOUS at 14:28

## 2019-01-01 RX ADMIN — PANTOPRAZOLE SODIUM 40 MG: 40 TABLET, DELAYED RELEASE ORAL at 06:33

## 2019-01-01 RX ADMIN — MONTELUKAST SODIUM 10 MG: 10 TABLET, FILM COATED ORAL at 20:40

## 2019-01-01 RX ADMIN — DIVALPROEX SODIUM 500 MG: 500 TABLET, DELAYED RELEASE ORAL at 20:01

## 2019-01-01 RX ADMIN — PANTOPRAZOLE SODIUM 40 MG: 40 TABLET, DELAYED RELEASE ORAL at 17:24

## 2019-01-01 RX ADMIN — CARBIDOPA AND LEVODOPA 1 TABLET: 25; 250 TABLET ORAL at 12:11

## 2019-01-10 ENCOUNTER — HOSPITAL ENCOUNTER (OUTPATIENT)
Dept: GENERAL RADIOLOGY | Facility: HOSPITAL | Age: 74
Discharge: HOME | End: 2019-01-10
Admitting: ORTHOPAEDIC SURGERY

## 2019-01-10 ENCOUNTER — APPOINTMENT (OUTPATIENT)
Dept: PREADMISSION TESTING | Facility: HOSPITAL | Age: 74
End: 2019-01-10

## 2019-01-10 ENCOUNTER — HOSPITAL ENCOUNTER (OUTPATIENT)
Dept: GENERAL RADIOLOGY | Facility: HOSPITAL | Age: 74
Discharge: HOME | End: 2019-01-10

## 2019-01-10 VITALS
WEIGHT: 155 LBS | HEART RATE: 82 BPM | SYSTOLIC BLOOD PRESSURE: 111 MMHG | OXYGEN SATURATION: 97 % | DIASTOLIC BLOOD PRESSURE: 77 MMHG | BODY MASS INDEX: 21.7 KG/M2 | RESPIRATION RATE: 16 BRPM | HEIGHT: 71 IN | TEMPERATURE: 97.8 F

## 2019-01-10 LAB
ALBUMIN SERPL-MCNC: 3.1 G/DL (ref 3.5–5.2)
ALBUMIN/GLOB SERPL: 0.7 G/DL
ALP SERPL-CCNC: 122 U/L (ref 39–117)
ALT SERPL W P-5'-P-CCNC: 6 U/L (ref 1–41)
ANION GAP SERPL CALCULATED.3IONS-SCNC: 8.7 MMOL/L
APTT PPP: 30.6 SECONDS (ref 22.7–35.4)
AST SERPL-CCNC: 12 U/L (ref 1–40)
BASOPHILS # BLD AUTO: 0.03 10*3/MM3 (ref 0–0.2)
BASOPHILS NFR BLD AUTO: 0.4 % (ref 0–1.5)
BILIRUB SERPL-MCNC: 0.4 MG/DL (ref 0.1–1.2)
BUN BLD-MCNC: 19 MG/DL (ref 8–23)
BUN/CREAT SERPL: 24.7 (ref 7–25)
CALCIUM SPEC-SCNC: 10.9 MG/DL (ref 8.6–10.5)
CHLORIDE SERPL-SCNC: 103 MMOL/L (ref 98–107)
CO2 SERPL-SCNC: 27.3 MMOL/L (ref 22–29)
CREAT BLD-MCNC: 0.77 MG/DL (ref 0.76–1.27)
DEPRECATED RDW RBC AUTO: 51.9 FL (ref 37–54)
EOSINOPHIL # BLD AUTO: 0.17 10*3/MM3 (ref 0–0.7)
EOSINOPHIL NFR BLD AUTO: 2 % (ref 0.3–6.2)
ERYTHROCYTE [DISTWIDTH] IN BLOOD BY AUTOMATED COUNT: 14.6 % (ref 11.5–14.5)
GFR SERPL CREATININE-BSD FRML MDRD: 98 ML/MIN/1.73
GLOBULIN UR ELPH-MCNC: 4.2 GM/DL
GLUCOSE BLD-MCNC: 99 MG/DL (ref 65–99)
HCT VFR BLD AUTO: 40.9 % (ref 40.4–52.2)
HGB BLD-MCNC: 12.7 G/DL (ref 13.7–17.6)
IMM GRANULOCYTES # BLD AUTO: 0.02 10*3/MM3 (ref 0–0.03)
IMM GRANULOCYTES NFR BLD AUTO: 0.2 % (ref 0–0.5)
INR PPP: 1.09 (ref 0.9–1.1)
LYMPHOCYTES # BLD AUTO: 2.48 10*3/MM3 (ref 0.9–4.8)
LYMPHOCYTES NFR BLD AUTO: 29 % (ref 19.6–45.3)
MCH RBC QN AUTO: 30 PG (ref 27–32.7)
MCHC RBC AUTO-ENTMCNC: 31.1 G/DL (ref 32.6–36.4)
MCV RBC AUTO: 96.7 FL (ref 79.8–96.2)
MONOCYTES # BLD AUTO: 0.71 10*3/MM3 (ref 0.2–1.2)
MONOCYTES NFR BLD AUTO: 8.3 % (ref 5–12)
NEUTROPHILS # BLD AUTO: 5.15 10*3/MM3 (ref 1.9–8.1)
NEUTROPHILS NFR BLD AUTO: 60.1 % (ref 42.7–76)
PLATELET # BLD AUTO: 292 10*3/MM3 (ref 140–500)
PMV BLD AUTO: 9.2 FL (ref 6–12)
POTASSIUM BLD-SCNC: 4.1 MMOL/L (ref 3.5–5.2)
PROT SERPL-MCNC: 7.3 G/DL (ref 6–8.5)
PROTHROMBIN TIME: 13.9 SECONDS (ref 11.7–14.2)
RBC # BLD AUTO: 4.23 10*6/MM3 (ref 4.6–6)
SODIUM BLD-SCNC: 139 MMOL/L (ref 136–145)
WBC NRBC COR # BLD: 8.56 10*3/MM3 (ref 4.5–10.7)

## 2019-01-10 PROCEDURE — 71046 X-RAY EXAM CHEST 2 VIEWS: CPT

## 2019-01-10 PROCEDURE — 73502 X-RAY EXAM HIP UNI 2-3 VIEWS: CPT

## 2019-01-10 PROCEDURE — 85730 THROMBOPLASTIN TIME PARTIAL: CPT | Performed by: ORTHOPAEDIC SURGERY

## 2019-01-10 PROCEDURE — 85025 COMPLETE CBC W/AUTO DIFF WBC: CPT | Performed by: ORTHOPAEDIC SURGERY

## 2019-01-10 PROCEDURE — 36415 COLL VENOUS BLD VENIPUNCTURE: CPT

## 2019-01-10 PROCEDURE — 85610 PROTHROMBIN TIME: CPT | Performed by: ORTHOPAEDIC SURGERY

## 2019-01-10 PROCEDURE — 80053 COMPREHEN METABOLIC PANEL: CPT | Performed by: ORTHOPAEDIC SURGERY

## 2019-01-10 RX ORDER — CHLORHEXIDINE GLUCONATE 500 MG/1
CLOTH TOPICAL
Status: ON HOLD | COMMUNITY
End: 2019-01-18

## 2019-01-10 ASSESSMENT — HOOS JR
HOOS JR SCORE: 12
HOOS JR SCORE: 52.965

## 2019-01-10 NOTE — DISCHARGE INSTRUCTIONS
Take the following medications the morning of surgery with a small sip of water:    SINEMET, DEPAKOKE, PROTONIX, NEBULIZER    General Instructions:  • Do not eat solid food after midnight the night before surgery.  • You may drink clear liquids day of surgery but must stop at least one hour before your hospital arrival time.  • It is beneficial for you to have a clear drink that contains carbohydrates the day of surgery.  We suggest a 12 to 20 ounce bottle of Gatorade or Powerade for non-diabetic patients or a 12 to 20 ounce bottle of G2 or Powerade Zero for diabetic patients. (Pediatric patients, are not advised to drink a 12 to 20 ounce carbohydrate drink)    Clear liquids are liquids you can see through.  Nothing red in color.     Plain water                               Sports drinks  Sodas                                   Gelatin (Jell-O)  Fruit juices without pulp such as white grape juice and apple juice  Popsicles that contain no fruit or yogurt  Tea or coffee (no cream or milk added)  Gatorade / Powerade  G2 / Powerade Zero    • Infants may have breast milk up to four hours before surgery.  • Infants drinking formula may drink formula up to six hours before surgery.   • Patients who avoid smoking, chewing tobacco and alcohol for 4 weeks prior to surgery have a reduced risk of post-operative complications.  Quit smoking as many days before surgery as you can.  • Do not smoke, use chewing tobacco or drink alcohol the day of surgery.   • If applicable bring your C-PAP/ BI-PAP machine.  • Bring any papers given to you in the doctor’s office.  • Wear clean comfortable clothes and socks.  • Do not wear contact lenses or make-up.  Bring a case for your glasses.   • Bring crutches or walker if applicable.  • Remove all piercings.  Leave jewelry and any other valuables at home.  • Hair extensions with metal clips must be removed prior to surgery.  • The Pre-Admission Testing nurse will instruct you to bring  medications if unable to obtain an accurate list in Pre-Admission Testing.        If you were given a blood bank ID arm band remember to bring it with you the day of surgery.    Preventing a Surgical Site Infection:  • For 2 to 3 days before surgery, avoid shaving with a razor because the razor can irritate skin and make it easier to develop an infection.    • Any areas of open skin can increase the risk of a post-operative wound infection by allowing bacteria to enter and travel throughout the body.  Notify your surgeon if you have any skin wounds / rashes even if it is not near the expected surgical site.  The area will need assessed to determine if surgery should be delayed until it is healed.  • The night prior to surgery sleep in a clean bed with clean clothing.  Do not allow pets to sleep with you.  • Shower on the morning of surgery using a fresh bar of anti-bacterial soap (such as Dial) and clean washcloth.  Dry with a clean towel and dress in clean clothing.  • Ask your surgeon if you will be receiving antibiotics prior to surgery.  • Make sure you, your family, and all healthcare providers clean their hands with soap and water or an alcohol based hand  before caring for you or your wound.    Day of surgery:  Upon arrival, a Pre-op nurse and Anesthesiologist will review your health history, obtain vital signs, and answer questions you may have.  The only belongings needed at this time will be your home medications and if applicable your C-PAP/BI-PAP machine.  If you are staying overnight your family can leave the rest of your belongings in the car and bring them to your room later.  A Pre-op nurse will start an IV and you may receive medication in preparation for surgery, including something to help you relax.  Your family will be able to see you in the Pre-op area.  While you are in surgery your family should notify the waiting room  if they leave the waiting room area and provide a  contact phone number.    Please be aware that surgery does come with discomfort.  We want to make every effort to control your discomfort so please discuss any uncontrolled symptoms with your nurse.   Your doctor will most likely have prescribed pain medications.      If you are going home after surgery you will receive individualized written care instructions before being discharged.  A responsible adult must drive you to and from the hospital on the day of your surgery and stay with you for 24 hours.    If you are staying overnight following surgery, you will be transported to your hospital room following the recovery period.  [facility] has all private rooms.    You have received a list of surgical assistants for your reference.  If you have any questions please call Pre-Admission Testing at [phone].  Deductibles and co-payments are collected on the day of service. Please be prepared to pay the required co-pay, deductible or deposit on the day of service as defined by your plan.    2% CHLORAHEXIDINE GLUCONATE* CLOTH  Preparing or “prepping” skin before surgery can reduce the risk of infection at the surgical site. To make the process easier, [facility] has chosen disposable cloths moistened with a rinse-free, 2% Chlorhexidine Gluconate (CHG) antiseptic solution. The steps below outline the prepping process and should be carefully followed.        Use the prep cloth on the area that is circled in the diagram             Directions Night before Surgery  1) Shower using a fresh bar of anti-bacterial soap (such as Dial) and clean washcloth.  Use a clean towel to completely dry your skin.  2) Do not use any lotions, oils or creams on your skin.  3) Open the package and remove 1 cloth, wipe your skin for 30 seconds in a circular motion.  Allow to dry for 3 minutes.  4) Repeat #3 with second cloth.  5) Do not touch your eyes, ears, or mouth with the prep cloth.  6) Allow the wet prep  solution to air dry.  7) Discard the prep cloth and wash your hands with soap and water.   8) Dress in clean bed clothes and sleep on fresh clean bed sheets.   9) You may experience some temporary itching after the prep.    Directions Day of Surgery  1) Repeat steps 1,2,3,4,5,6,7, and 9.   2) Dress in clean clothes before coming to the hospital.    BACTROBAN NASAL OINTMENT  There are many germs normally in your nose. Bactroban is an ointment that will help reduce these germs. Please follow these instructions for Bactroban use:      ____The day before surgery in the morning  Date________    ____The day before surgery in the evening              Date________    ____The day of surgery in the morning    Date________    **Squirt ½ package of Bactroban Ointment onto a cotton applicator and apply to inside of 1st nostril.  Squirt the remaining Bactroban and apply to the inside of the other nostril.  .

## 2019-01-18 ENCOUNTER — HOSPITAL ENCOUNTER (INPATIENT)
Facility: HOSPITAL | Age: 74
LOS: 4 days | Discharge: NURSING, REHAB OR LONG-TERM CARE | End: 2019-01-22
Attending: ORTHOPAEDIC SURGERY | Admitting: ORTHOPAEDIC SURGERY

## 2019-01-18 ENCOUNTER — ANESTHESIA (OUTPATIENT)
Dept: PERIOP | Facility: HOSPITAL | Age: 74
End: 2019-01-18

## 2019-01-18 ENCOUNTER — APPOINTMENT (OUTPATIENT)
Dept: GENERAL RADIOLOGY | Facility: HOSPITAL | Age: 74
End: 2019-01-18

## 2019-01-18 ENCOUNTER — ANESTHESIA EVENT (OUTPATIENT)
Dept: PERIOP | Facility: HOSPITAL | Age: 74
End: 2019-01-18

## 2019-01-18 DIAGNOSIS — M25.551 CHRONIC HIP PAIN AFTER TOTAL REPLACEMENT OF RIGHT HIP JOINT: ICD-10-CM

## 2019-01-18 DIAGNOSIS — G89.29 CHRONIC HIP PAIN AFTER TOTAL REPLACEMENT OF RIGHT HIP JOINT: ICD-10-CM

## 2019-01-18 DIAGNOSIS — Z96.641 CHRONIC HIP PAIN AFTER TOTAL REPLACEMENT OF RIGHT HIP JOINT: ICD-10-CM

## 2019-01-18 DIAGNOSIS — R26.89 DECREASED MOBILITY: Primary | ICD-10-CM

## 2019-01-18 PROBLEM — M25.559 HIP PAIN: Status: ACTIVE | Noted: 2019-01-18

## 2019-01-18 PROBLEM — M16.51 POST-TRAUMATIC OSTEOARTHRITIS OF RIGHT HIP: Chronic | Status: ACTIVE | Noted: 2019-01-18

## 2019-01-18 LAB
ANION GAP SERPL CALCULATED.3IONS-SCNC: 10.9 MMOL/L
BUN BLD-MCNC: 16 MG/DL (ref 8–23)
BUN/CREAT SERPL: 17.6 (ref 7–25)
CALCIUM SPEC-SCNC: 10 MG/DL (ref 8.6–10.5)
CHLORIDE SERPL-SCNC: 103 MMOL/L (ref 98–107)
CO2 SERPL-SCNC: 23.1 MMOL/L (ref 22–29)
CREAT BLD-MCNC: 0.91 MG/DL (ref 0.76–1.27)
GFR SERPL CREATININE-BSD FRML MDRD: 81 ML/MIN/1.73
GLUCOSE BLD-MCNC: 130 MG/DL (ref 65–99)
POTASSIUM BLD-SCNC: 4.5 MMOL/L (ref 3.5–5.2)
SODIUM BLD-SCNC: 137 MMOL/L (ref 136–145)

## 2019-01-18 PROCEDURE — 25010000002 FENTANYL CITRATE (PF) 100 MCG/2ML SOLUTION: Performed by: NURSE ANESTHETIST, CERTIFIED REGISTERED

## 2019-01-18 PROCEDURE — 0SP90JZ REMOVAL OF SYNTHETIC SUBSTITUTE FROM RIGHT HIP JOINT, OPEN APPROACH: ICD-10-PCS | Performed by: ORTHOPAEDIC SURGERY

## 2019-01-18 PROCEDURE — 97110 THERAPEUTIC EXERCISES: CPT

## 2019-01-18 PROCEDURE — 25010000002 KETOROLAC TROMETHAMINE PER 15 MG: Performed by: ORTHOPAEDIC SURGERY

## 2019-01-18 PROCEDURE — 88305 TISSUE EXAM BY PATHOLOGIST: CPT | Performed by: ORTHOPAEDIC SURGERY

## 2019-01-18 PROCEDURE — 25010000002 ONDANSETRON PER 1 MG: Performed by: NURSE ANESTHETIST, CERTIFIED REGISTERED

## 2019-01-18 PROCEDURE — 88332 PATH CONSLTJ SURG EA ADD BLK: CPT | Performed by: ORTHOPAEDIC SURGERY

## 2019-01-18 PROCEDURE — 86140 C-REACTIVE PROTEIN: CPT | Performed by: ORTHOPAEDIC SURGERY

## 2019-01-18 PROCEDURE — C1776 JOINT DEVICE (IMPLANTABLE): HCPCS | Performed by: ORTHOPAEDIC SURGERY

## 2019-01-18 PROCEDURE — 25010000003 CEFAZOLIN IN DEXTROSE 2-4 GM/100ML-% SOLUTION: Performed by: ORTHOPAEDIC SURGERY

## 2019-01-18 PROCEDURE — 25010000002 VANCOMYCIN 1 G RECONSTITUTED SOLUTION: Performed by: ORTHOPAEDIC SURGERY

## 2019-01-18 PROCEDURE — 80048 BASIC METABOLIC PNL TOTAL CA: CPT | Performed by: ORTHOPAEDIC SURGERY

## 2019-01-18 PROCEDURE — 87070 CULTURE OTHR SPECIMN AEROBIC: CPT | Performed by: ORTHOPAEDIC SURGERY

## 2019-01-18 PROCEDURE — 25010000002 HYDROMORPHONE PER 4 MG: Performed by: NURSE ANESTHETIST, CERTIFIED REGISTERED

## 2019-01-18 PROCEDURE — 25010000002 ROPIVACAINE PER 1 MG: Performed by: ORTHOPAEDIC SURGERY

## 2019-01-18 PROCEDURE — 87176 TISSUE HOMOGENIZATION CULTR: CPT | Performed by: ORTHOPAEDIC SURGERY

## 2019-01-18 PROCEDURE — 87205 SMEAR GRAM STAIN: CPT | Performed by: ORTHOPAEDIC SURGERY

## 2019-01-18 PROCEDURE — 88331 PATH CONSLTJ SURG 1 BLK 1SPC: CPT | Performed by: ORTHOPAEDIC SURGERY

## 2019-01-18 PROCEDURE — 25010000002 PROPOFOL 10 MG/ML EMULSION: Performed by: NURSE ANESTHETIST, CERTIFIED REGISTERED

## 2019-01-18 PROCEDURE — 94640 AIRWAY INHALATION TREATMENT: CPT

## 2019-01-18 PROCEDURE — 87075 CULTR BACTERIA EXCEPT BLOOD: CPT | Performed by: ORTHOPAEDIC SURGERY

## 2019-01-18 PROCEDURE — 25010000002 VANCOMYCIN PER 500 MG: Performed by: ORTHOPAEDIC SURGERY

## 2019-01-18 PROCEDURE — 25010000002 DEXAMETHASONE PER 1 MG: Performed by: NURSE ANESTHETIST, CERTIFIED REGISTERED

## 2019-01-18 PROCEDURE — 94799 UNLISTED PULMONARY SVC/PX: CPT

## 2019-01-18 PROCEDURE — 73501 X-RAY EXAM HIP UNI 1 VIEW: CPT

## 2019-01-18 PROCEDURE — 25010000002 CLONIDINE PER 1 MG: Performed by: ORTHOPAEDIC SURGERY

## 2019-01-18 PROCEDURE — 0SR902Z REPLACEMENT OF RIGHT HIP JOINT WITH METAL ON POLYETHYLENE SYNTHETIC SUBSTITUTE, OPEN APPROACH: ICD-10-PCS | Performed by: ORTHOPAEDIC SURGERY

## 2019-01-18 PROCEDURE — 25010000002 VANCOMYCIN 1 G RECONSTITUTED SOLUTION 1 EACH VIAL: Performed by: ORTHOPAEDIC SURGERY

## 2019-01-18 PROCEDURE — 97162 PT EVAL MOD COMPLEX 30 MIN: CPT

## 2019-01-18 DEVICE — PINNACLE GRIPTION ACETABULAR SHELL SECTOR 56MM OD
Type: IMPLANTABLE DEVICE | Site: HIP | Status: FUNCTIONAL
Brand: PINNACLE GRIPTION

## 2019-01-18 DEVICE — PINNACLE HIP SOLUTIONS ALTRX POLYETHYLENE ACETABULAR LINER NEUTRAL 40MM ID 56MM OD
Type: IMPLANTABLE DEVICE | Site: HIP | Status: FUNCTIONAL
Brand: PINNACLE ALTRX

## 2019-01-18 DEVICE — SELF CENTERING BI-POLAR HEAD 28MM ID 40MM OD
Type: IMPLANTABLE DEVICE | Site: HIP | Status: FUNCTIONAL
Brand: SELF CENTERING

## 2019-01-18 DEVICE — ARTICUL/EZE FEMORAL HEAD DIAMETER 28MM +8.5 12/14 TAPER
Type: IMPLANTABLE DEVICE | Site: HIP | Status: FUNCTIONAL
Brand: ARTICUL/EZE

## 2019-01-18 DEVICE — SUT FW #2 W/TPR NDL 1/2 CIR 38IN 97CM 26.5MM BLU: Type: IMPLANTABLE DEVICE | Site: HIP | Status: FUNCTIONAL

## 2019-01-18 RX ORDER — VANCOMYCIN HYDROCHLORIDE 1 G/200ML
15 INJECTION, SOLUTION INTRAVENOUS EVERY 12 HOURS
Status: COMPLETED | OUTPATIENT
Start: 2019-01-18 | End: 2019-01-18

## 2019-01-18 RX ORDER — ASPIRIN 325 MG
325 TABLET, DELAYED RELEASE (ENTERIC COATED) ORAL DAILY
Status: DISCONTINUED | OUTPATIENT
Start: 2019-01-19 | End: 2019-01-22 | Stop reason: HOSPADM

## 2019-01-18 RX ORDER — SUCRALFATE 1 G/1
1 TABLET ORAL DAILY
Status: DISCONTINUED | OUTPATIENT
Start: 2019-01-18 | End: 2019-01-22 | Stop reason: HOSPADM

## 2019-01-18 RX ORDER — MEPERIDINE HYDROCHLORIDE 25 MG/ML
12.5 INJECTION INTRAMUSCULAR; INTRAVENOUS; SUBCUTANEOUS
Status: DISCONTINUED | OUTPATIENT
Start: 2019-01-18 | End: 2019-01-18 | Stop reason: HOSPADM

## 2019-01-18 RX ORDER — SODIUM CHLORIDE 0.9 % (FLUSH) 0.9 %
1-10 SYRINGE (ML) INJECTION AS NEEDED
Status: DISCONTINUED | OUTPATIENT
Start: 2019-01-18 | End: 2019-01-18 | Stop reason: HOSPADM

## 2019-01-18 RX ORDER — SODIUM CHLORIDE 0.9 % (FLUSH) 0.9 %
1-10 SYRINGE (ML) INJECTION AS NEEDED
Status: DISCONTINUED | OUTPATIENT
Start: 2019-01-18 | End: 2019-01-22 | Stop reason: HOSPADM

## 2019-01-18 RX ORDER — LIDOCAINE HYDROCHLORIDE 10 MG/ML
0.5 INJECTION, SOLUTION EPIDURAL; INFILTRATION; INTRACAUDAL; PERINEURAL ONCE AS NEEDED
Status: DISCONTINUED | OUTPATIENT
Start: 2019-01-18 | End: 2019-01-18 | Stop reason: HOSPADM

## 2019-01-18 RX ORDER — UREA 10 %
1 LOTION (ML) TOPICAL NIGHTLY PRN
Status: DISCONTINUED | OUTPATIENT
Start: 2019-01-18 | End: 2019-01-22 | Stop reason: HOSPADM

## 2019-01-18 RX ORDER — DIVALPROEX SODIUM 500 MG/1
500 TABLET, DELAYED RELEASE ORAL 2 TIMES DAILY
Status: DISCONTINUED | OUTPATIENT
Start: 2019-01-18 | End: 2019-01-22 | Stop reason: HOSPADM

## 2019-01-18 RX ORDER — BISACODYL 10 MG
10 SUPPOSITORY, RECTAL RECTAL DAILY PRN
Status: DISCONTINUED | OUTPATIENT
Start: 2019-01-18 | End: 2019-01-22 | Stop reason: HOSPADM

## 2019-01-18 RX ORDER — OXYCODONE HCL 10 MG/1
20 TABLET, FILM COATED, EXTENDED RELEASE ORAL ONCE
Status: DISCONTINUED | OUTPATIENT
Start: 2019-01-18 | End: 2019-01-18

## 2019-01-18 RX ORDER — OXYBUTYNIN CHLORIDE 5 MG/1
5 TABLET, EXTENDED RELEASE ORAL 2 TIMES DAILY
Status: DISCONTINUED | OUTPATIENT
Start: 2019-01-18 | End: 2019-01-22 | Stop reason: HOSPADM

## 2019-01-18 RX ORDER — DOCUSATE SODIUM 100 MG/1
100 CAPSULE, LIQUID FILLED ORAL 2 TIMES DAILY PRN
Status: DISCONTINUED | OUTPATIENT
Start: 2019-01-18 | End: 2019-01-22 | Stop reason: HOSPADM

## 2019-01-18 RX ORDER — CARBIDOPA/LEVODOPA 25MG-250MG
1 TABLET ORAL
Status: DISCONTINUED | OUTPATIENT
Start: 2019-01-18 | End: 2019-01-22 | Stop reason: HOSPADM

## 2019-01-18 RX ORDER — DIPHENHYDRAMINE HCL 25 MG
25 CAPSULE ORAL
Status: DISCONTINUED | OUTPATIENT
Start: 2019-01-18 | End: 2019-01-18 | Stop reason: HOSPADM

## 2019-01-18 RX ORDER — ACETAMINOPHEN 325 MG/1
650 TABLET ORAL ONCE AS NEEDED
Status: DISCONTINUED | OUTPATIENT
Start: 2019-01-18 | End: 2019-01-18 | Stop reason: HOSPADM

## 2019-01-18 RX ORDER — SODIUM CHLORIDE 0.9 % (FLUSH) 0.9 %
3 SYRINGE (ML) INJECTION EVERY 12 HOURS SCHEDULED
Status: DISCONTINUED | OUTPATIENT
Start: 2019-01-18 | End: 2019-01-22 | Stop reason: HOSPADM

## 2019-01-18 RX ORDER — CEFAZOLIN SODIUM 2 G/100ML
2 INJECTION, SOLUTION INTRAVENOUS ONCE
Status: COMPLETED | OUTPATIENT
Start: 2019-01-18 | End: 2019-01-18

## 2019-01-18 RX ORDER — MONTELUKAST SODIUM 10 MG/1
10 TABLET ORAL NIGHTLY
Status: DISCONTINUED | OUTPATIENT
Start: 2019-01-18 | End: 2019-01-22 | Stop reason: HOSPADM

## 2019-01-18 RX ORDER — FLUMAZENIL 0.1 MG/ML
0.2 INJECTION INTRAVENOUS AS NEEDED
Status: DISCONTINUED | OUTPATIENT
Start: 2019-01-18 | End: 2019-01-18 | Stop reason: HOSPADM

## 2019-01-18 RX ORDER — ONDANSETRON 2 MG/ML
4 INJECTION INTRAMUSCULAR; INTRAVENOUS ONCE AS NEEDED
Status: DISCONTINUED | OUTPATIENT
Start: 2019-01-18 | End: 2019-01-18 | Stop reason: HOSPADM

## 2019-01-18 RX ORDER — ONDANSETRON 2 MG/ML
INJECTION INTRAMUSCULAR; INTRAVENOUS AS NEEDED
Status: DISCONTINUED | OUTPATIENT
Start: 2019-01-18 | End: 2019-01-18 | Stop reason: SURG

## 2019-01-18 RX ORDER — NALOXONE HCL 0.4 MG/ML
0.1 VIAL (ML) INJECTION
Status: DISCONTINUED | OUTPATIENT
Start: 2019-01-18 | End: 2019-01-22 | Stop reason: HOSPADM

## 2019-01-18 RX ORDER — HYDROMORPHONE HYDROCHLORIDE 1 MG/ML
0.5 INJECTION, SOLUTION INTRAMUSCULAR; INTRAVENOUS; SUBCUTANEOUS
Status: DISCONTINUED | OUTPATIENT
Start: 2019-01-18 | End: 2019-01-18 | Stop reason: HOSPADM

## 2019-01-18 RX ORDER — PROMETHAZINE HYDROCHLORIDE 25 MG/1
25 SUPPOSITORY RECTAL ONCE AS NEEDED
Status: DISCONTINUED | OUTPATIENT
Start: 2019-01-18 | End: 2019-01-18 | Stop reason: HOSPADM

## 2019-01-18 RX ORDER — BUSPIRONE HYDROCHLORIDE 5 MG/1
5 TABLET ORAL 3 TIMES DAILY PRN
Status: DISCONTINUED | OUTPATIENT
Start: 2019-01-18 | End: 2019-01-22 | Stop reason: HOSPADM

## 2019-01-18 RX ORDER — LABETALOL HYDROCHLORIDE 5 MG/ML
5 INJECTION, SOLUTION INTRAVENOUS
Status: DISCONTINUED | OUTPATIENT
Start: 2019-01-18 | End: 2019-01-18 | Stop reason: HOSPADM

## 2019-01-18 RX ORDER — HYDRALAZINE HYDROCHLORIDE 20 MG/ML
5 INJECTION INTRAMUSCULAR; INTRAVENOUS
Status: DISCONTINUED | OUTPATIENT
Start: 2019-01-18 | End: 2019-01-18 | Stop reason: HOSPADM

## 2019-01-18 RX ORDER — DONEPEZIL HYDROCHLORIDE 10 MG/1
20 TABLET, FILM COATED ORAL NIGHTLY
Status: DISCONTINUED | OUTPATIENT
Start: 2019-01-18 | End: 2019-01-22 | Stop reason: HOSPADM

## 2019-01-18 RX ORDER — PROMETHAZINE HYDROCHLORIDE 25 MG/ML
12.5 INJECTION, SOLUTION INTRAMUSCULAR; INTRAVENOUS ONCE AS NEEDED
Status: DISCONTINUED | OUTPATIENT
Start: 2019-01-18 | End: 2019-01-18 | Stop reason: HOSPADM

## 2019-01-18 RX ORDER — PROPOFOL 10 MG/ML
VIAL (ML) INTRAVENOUS AS NEEDED
Status: DISCONTINUED | OUTPATIENT
Start: 2019-01-18 | End: 2019-01-18 | Stop reason: SURG

## 2019-01-18 RX ORDER — ROCURONIUM BROMIDE 10 MG/ML
INJECTION, SOLUTION INTRAVENOUS AS NEEDED
Status: DISCONTINUED | OUTPATIENT
Start: 2019-01-18 | End: 2019-01-18 | Stop reason: SURG

## 2019-01-18 RX ORDER — CEFAZOLIN SODIUM 2 G/100ML
2 INJECTION, SOLUTION INTRAVENOUS EVERY 8 HOURS
Status: DISPENSED | OUTPATIENT
Start: 2019-01-18 | End: 2019-01-20

## 2019-01-18 RX ORDER — LIDOCAINE HYDROCHLORIDE 20 MG/ML
INJECTION, SOLUTION INFILTRATION; PERINEURAL AS NEEDED
Status: DISCONTINUED | OUTPATIENT
Start: 2019-01-18 | End: 2019-01-18 | Stop reason: SURG

## 2019-01-18 RX ORDER — PANTOPRAZOLE SODIUM 40 MG/1
40 TABLET, DELAYED RELEASE ORAL
Status: DISCONTINUED | OUTPATIENT
Start: 2019-01-18 | End: 2019-01-22 | Stop reason: HOSPADM

## 2019-01-18 RX ORDER — BISACODYL 5 MG/1
10 TABLET, DELAYED RELEASE ORAL DAILY PRN
Status: DISCONTINUED | OUTPATIENT
Start: 2019-01-18 | End: 2019-01-22 | Stop reason: HOSPADM

## 2019-01-18 RX ORDER — ACETAMINOPHEN 500 MG
1000 TABLET ORAL ONCE
Status: COMPLETED | OUTPATIENT
Start: 2019-01-18 | End: 2019-01-18

## 2019-01-18 RX ORDER — FLUTICASONE PROPIONATE 50 MCG
1 SPRAY, SUSPENSION (ML) NASAL DAILY
Status: DISCONTINUED | OUTPATIENT
Start: 2019-01-18 | End: 2019-01-22 | Stop reason: HOSPADM

## 2019-01-18 RX ORDER — VANCOMYCIN HYDROCHLORIDE 1 G/20ML
INJECTION, POWDER, LYOPHILIZED, FOR SOLUTION INTRAVENOUS AS NEEDED
Status: DISCONTINUED | OUTPATIENT
Start: 2019-01-18 | End: 2019-01-18 | Stop reason: HOSPADM

## 2019-01-18 RX ORDER — ALBUTEROL SULFATE 2.5 MG/3ML
2.5 SOLUTION RESPIRATORY (INHALATION) EVERY 6 HOURS PRN
Status: DISCONTINUED | OUTPATIENT
Start: 2019-01-18 | End: 2019-01-22 | Stop reason: HOSPADM

## 2019-01-18 RX ORDER — DEXAMETHASONE SODIUM PHOSPHATE 10 MG/ML
INJECTION INTRAMUSCULAR; INTRAVENOUS AS NEEDED
Status: DISCONTINUED | OUTPATIENT
Start: 2019-01-18 | End: 2019-01-18 | Stop reason: SURG

## 2019-01-18 RX ORDER — FAMOTIDINE 10 MG/ML
20 INJECTION, SOLUTION INTRAVENOUS ONCE
Status: COMPLETED | OUTPATIENT
Start: 2019-01-18 | End: 2019-01-18

## 2019-01-18 RX ORDER — HYDROCODONE BITARTRATE AND ACETAMINOPHEN 7.5; 325 MG/1; MG/1
1 TABLET ORAL ONCE AS NEEDED
Status: DISCONTINUED | OUTPATIENT
Start: 2019-01-18 | End: 2019-01-18 | Stop reason: HOSPADM

## 2019-01-18 RX ORDER — EPHEDRINE SULFATE 50 MG/ML
5 INJECTION, SOLUTION INTRAVENOUS ONCE AS NEEDED
Status: DISCONTINUED | OUTPATIENT
Start: 2019-01-18 | End: 2019-01-18 | Stop reason: HOSPADM

## 2019-01-18 RX ORDER — LEVOTHYROXINE SODIUM 175 UG/1
175 TABLET ORAL
Status: DISCONTINUED | OUTPATIENT
Start: 2019-01-19 | End: 2019-01-22 | Stop reason: HOSPADM

## 2019-01-18 RX ORDER — PROMETHAZINE HYDROCHLORIDE 25 MG/1
25 TABLET ORAL EVERY 6 HOURS PRN
Status: DISCONTINUED | OUTPATIENT
Start: 2019-01-18 | End: 2019-01-22 | Stop reason: HOSPADM

## 2019-01-18 RX ORDER — OXYCODONE AND ACETAMINOPHEN 7.5; 325 MG/1; MG/1
1 TABLET ORAL ONCE AS NEEDED
Status: DISCONTINUED | OUTPATIENT
Start: 2019-01-18 | End: 2019-01-18 | Stop reason: HOSPADM

## 2019-01-18 RX ORDER — OLANZAPINE 10 MG/1
10 TABLET ORAL NIGHTLY
Status: DISCONTINUED | OUTPATIENT
Start: 2019-01-18 | End: 2019-01-22 | Stop reason: HOSPADM

## 2019-01-18 RX ORDER — ARFORMOTEROL TARTRATE 15 UG/2ML
15 SOLUTION RESPIRATORY (INHALATION)
Status: DISCONTINUED | OUTPATIENT
Start: 2019-01-18 | End: 2019-01-22 | Stop reason: HOSPADM

## 2019-01-18 RX ORDER — DIPHENHYDRAMINE HYDROCHLORIDE 50 MG/ML
12.5 INJECTION INTRAMUSCULAR; INTRAVENOUS
Status: DISCONTINUED | OUTPATIENT
Start: 2019-01-18 | End: 2019-01-18 | Stop reason: HOSPADM

## 2019-01-18 RX ORDER — NALOXONE HCL 0.4 MG/ML
0.2 VIAL (ML) INJECTION AS NEEDED
Status: DISCONTINUED | OUTPATIENT
Start: 2019-01-18 | End: 2019-01-18 | Stop reason: HOSPADM

## 2019-01-18 RX ORDER — PROMETHAZINE HYDROCHLORIDE 25 MG/1
25 TABLET ORAL ONCE AS NEEDED
Status: DISCONTINUED | OUTPATIENT
Start: 2019-01-18 | End: 2019-01-18 | Stop reason: HOSPADM

## 2019-01-18 RX ORDER — HYDROMORPHONE HYDROCHLORIDE 1 MG/ML
2 INJECTION, SOLUTION INTRAMUSCULAR; INTRAVENOUS; SUBCUTANEOUS EVERY 4 HOURS PRN
Status: DISCONTINUED | OUTPATIENT
Start: 2019-01-18 | End: 2019-01-22 | Stop reason: HOSPADM

## 2019-01-18 RX ORDER — ACETAMINOPHEN 325 MG/1
325 TABLET ORAL EVERY 4 HOURS PRN
Status: DISCONTINUED | OUTPATIENT
Start: 2019-01-18 | End: 2019-01-22 | Stop reason: HOSPADM

## 2019-01-18 RX ORDER — TRAMADOL HYDROCHLORIDE 50 MG/1
50 TABLET ORAL EVERY 4 HOURS PRN
Status: DISCONTINUED | OUTPATIENT
Start: 2019-01-18 | End: 2019-01-22 | Stop reason: HOSPADM

## 2019-01-18 RX ORDER — FENTANYL CITRATE 50 UG/ML
50 INJECTION, SOLUTION INTRAMUSCULAR; INTRAVENOUS
Status: DISCONTINUED | OUTPATIENT
Start: 2019-01-18 | End: 2019-01-18 | Stop reason: HOSPADM

## 2019-01-18 RX ORDER — SODIUM CHLORIDE, SODIUM LACTATE, POTASSIUM CHLORIDE, CALCIUM CHLORIDE 600; 310; 30; 20 MG/100ML; MG/100ML; MG/100ML; MG/100ML
9 INJECTION, SOLUTION INTRAVENOUS CONTINUOUS
Status: DISCONTINUED | OUTPATIENT
Start: 2019-01-18 | End: 2019-01-18 | Stop reason: HOSPADM

## 2019-01-18 RX ORDER — FENTANYL CITRATE 50 UG/ML
INJECTION, SOLUTION INTRAMUSCULAR; INTRAVENOUS AS NEEDED
Status: DISCONTINUED | OUTPATIENT
Start: 2019-01-18 | End: 2019-01-18 | Stop reason: SURG

## 2019-01-18 RX ORDER — SODIUM CHLORIDE 9 MG/ML
100 INJECTION, SOLUTION INTRAVENOUS CONTINUOUS
Status: ACTIVE | OUTPATIENT
Start: 2019-01-18 | End: 2019-01-19

## 2019-01-18 RX ADMIN — DEXAMETHASONE SODIUM PHOSPHATE 8 MG: 10 INJECTION INTRAMUSCULAR; INTRAVENOUS at 10:50

## 2019-01-18 RX ADMIN — MONTELUKAST SODIUM 10 MG: 10 TABLET, FILM COATED ORAL at 21:27

## 2019-01-18 RX ADMIN — SODIUM CHLORIDE, POTASSIUM CHLORIDE, SODIUM LACTATE AND CALCIUM CHLORIDE: 600; 310; 30; 20 INJECTION, SOLUTION INTRAVENOUS at 09:59

## 2019-01-18 RX ADMIN — LIDOCAINE HYDROCHLORIDE 100 MG: 20 INJECTION, SOLUTION INFILTRATION; PERINEURAL at 10:06

## 2019-01-18 RX ADMIN — OXYBUTYNIN CHLORIDE 5 MG: 5 TABLET, EXTENDED RELEASE ORAL at 21:27

## 2019-01-18 RX ADMIN — FAMOTIDINE 20 MG: 10 INJECTION, SOLUTION INTRAVENOUS at 08:59

## 2019-01-18 RX ADMIN — DIVALPROEX SODIUM 500 MG: 500 TABLET, DELAYED RELEASE ORAL at 21:29

## 2019-01-18 RX ADMIN — SODIUM CHLORIDE, POTASSIUM CHLORIDE, SODIUM LACTATE AND CALCIUM CHLORIDE 9 ML/HR: 600; 310; 30; 20 INJECTION, SOLUTION INTRAVENOUS at 08:47

## 2019-01-18 RX ADMIN — ONDANSETRON 4 MG: 2 INJECTION INTRAMUSCULAR; INTRAVENOUS at 11:48

## 2019-01-18 RX ADMIN — ARFORMOTEROL TARTRATE 15 MCG: 15 SOLUTION RESPIRATORY (INHALATION) at 21:50

## 2019-01-18 RX ADMIN — OLANZAPINE 10 MG: 10 TABLET, FILM COATED ORAL at 21:28

## 2019-01-18 RX ADMIN — SODIUM CHLORIDE, POTASSIUM CHLORIDE, SODIUM LACTATE AND CALCIUM CHLORIDE: 600; 310; 30; 20 INJECTION, SOLUTION INTRAVENOUS at 12:22

## 2019-01-18 RX ADMIN — FENTANYL CITRATE 100 MCG: 50 INJECTION INTRAMUSCULAR; INTRAVENOUS at 10:06

## 2019-01-18 RX ADMIN — TRAMADOL HYDROCHLORIDE 50 MG: 50 TABLET, FILM COATED ORAL at 21:40

## 2019-01-18 RX ADMIN — ACETAMINOPHEN 1000 MG: 500 TABLET, FILM COATED ORAL at 08:47

## 2019-01-18 RX ADMIN — FENTANYL CITRATE 50 MCG: 50 INJECTION, SOLUTION INTRAMUSCULAR; INTRAVENOUS at 13:20

## 2019-01-18 RX ADMIN — SUGAMMADEX 200 MG: 100 INJECTION, SOLUTION INTRAVENOUS at 11:49

## 2019-01-18 RX ADMIN — CEFAZOLIN SODIUM 2 G: 2 INJECTION, SOLUTION INTRAVENOUS at 18:37

## 2019-01-18 RX ADMIN — SODIUM CHLORIDE, PRESERVATIVE FREE 3 ML: 5 INJECTION INTRAVENOUS at 21:28

## 2019-01-18 RX ADMIN — CEFAZOLIN SODIUM 2 G: 2 INJECTION, SOLUTION INTRAVENOUS at 10:02

## 2019-01-18 RX ADMIN — CARBIDOPA AND LEVODOPA 1 TABLET: 25; 250 TABLET ORAL at 18:37

## 2019-01-18 RX ADMIN — FENTANYL CITRATE 50 MCG: 50 INJECTION, SOLUTION INTRAMUSCULAR; INTRAVENOUS at 13:11

## 2019-01-18 RX ADMIN — PROPOFOL 150 MG: 10 INJECTION, EMULSION INTRAVENOUS at 10:06

## 2019-01-18 RX ADMIN — VANCOMYCIN HYDROCHLORIDE 1000 MG: 1 INJECTION, SOLUTION INTRAVENOUS at 08:49

## 2019-01-18 RX ADMIN — FENTANYL CITRATE 50 MCG: 50 INJECTION INTRAMUSCULAR; INTRAVENOUS at 11:16

## 2019-01-18 RX ADMIN — HYDROMORPHONE HYDROCHLORIDE 0.5 MG: 1 INJECTION, SOLUTION INTRAMUSCULAR; INTRAVENOUS; SUBCUTANEOUS at 13:45

## 2019-01-18 RX ADMIN — SODIUM CHLORIDE 100 ML/HR: 9 INJECTION, SOLUTION INTRAVENOUS at 21:40

## 2019-01-18 RX ADMIN — ROCURONIUM BROMIDE 40 MG: 10 INJECTION INTRAVENOUS at 10:06

## 2019-01-18 RX ADMIN — HYDROMORPHONE HYDROCHLORIDE 0.5 MG: 1 INJECTION, SOLUTION INTRAMUSCULAR; INTRAVENOUS; SUBCUTANEOUS at 13:30

## 2019-01-18 RX ADMIN — HYDROMORPHONE HYDROCHLORIDE 0.5 MG: 1 INJECTION, SOLUTION INTRAMUSCULAR; INTRAVENOUS; SUBCUTANEOUS at 12:51

## 2019-01-18 RX ADMIN — DONEPEZIL HYDROCHLORIDE 20 MG: 10 TABLET, FILM COATED ORAL at 21:27

## 2019-01-18 NOTE — PLAN OF CARE
Problem: Patient Care Overview  Goal: Plan of Care Review  Outcome: Ongoing (interventions implemented as appropriate)   01/18/19 7609   OTHER   Outcome Summary S/p RTHA. Still drowsy from sx. BP low 90's/50's. Sat on edge of bed with PT. BLE contractures. Accumax, Q2 turns, waffle boots applies. Will cont to monitor   Coping/Psychosocial   Plan of Care Reviewed With patient   Plan of Care Review   Progress improving       Problem: Fall Risk (Adult)  Goal: Absence of Fall  Outcome: Ongoing (interventions implemented as appropriate)      Problem: Skin Injury Risk (Adult)  Goal: Skin Health and Integrity  Outcome: Ongoing (interventions implemented as appropriate)      Problem: Hip Arthroplasty (Total, Partial) (Adult)  Goal: Anesthesia/Sedation Recovery  Outcome: Ongoing (interventions implemented as appropriate)

## 2019-01-18 NOTE — THERAPY EVALUATION
Acute Care - Physical Therapy Initial Evaluation  [facility]     Patient Name: Victor Manuel Issa  : 1945  MRN: [id number]  Today's Date: 2019   Onset of Illness/Injury or Date of Surgery: 19     Referring Physician: Reggie       Admit Date: 2019    Visit Dx:     ICD-10-CM ICD-9-CM   1. Decreased mobility R26.89 781.99   2. 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 M25.551 719.45    G89.29 338.29    Z96.641 V43.64     Patient Active Problem List   Diagnosis   • Thigh pain, musculoskeletal   • Left leg weakness   • History of CVA (cerebrovascular accident)   • COPD (chronic obstructive pulmonary disease)   • Coronary artery disease   • Cervical myelopathy (CMS/HCC)   • Parkinson's disease   • Debility   • Cerebrovascular disease   • Abnormal TSH   • Hypothyroidism   • Hip fracture requiring operative repair, right, closed, initial encounter (CMS/[facility])   • Urinary tract infection without hematuria   • Hypoxia   • Sinus bradycardia   • Chest pain   • Hip pain, chronic, right   • Hematemesis   • Hematemesis with nausea   • H/O medication noncompliance   • Hyperlipidemia   • Post-traumatic osteoarthritis of right hip   • Hip pain   • 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     Past Medical History:   Diagnosis Date   • Anxiety    • Arthritis    • Bipolar 1 disorder (CMS/HCC)    • Coronary artery disease    • Disease of thyroid gland    • GERD (gastroesophageal reflux disease)    • Hyperlipidemia    • Mobility impaired    • Myocardial infarction (CMS/HCC)    • Overactive bladder    • Parkinsonian syndrome (CMS/HCC)    • Prostatitis    • Restrictive pattern present on pulmonary function testing    • Seasonal allergies    • Stroke (CMS/HCC)     RESIDUAL WEAKNESS RIGHT LOWER EXTREMITY   • Urinary incontinence    • Urinary retention      Past Surgical History:   Procedure Laterality Date   • ABDOMINAL SURGERY     • CARDIAC SURGERY     • CHOLECYSTECTOMY     •  CORONARY ARTERY BYPASS GRAFT      x2   • ENDOSCOPY N/A 10/4/2018    LA grade D reflux esophagitis, 4cm hiatal hernia, esohageal stenosis, normal stomach, erythematous duodenopathy, normal second portion of duodenum, no specimens collected   • ENDOSCOPY N/A 11/30/2018    Procedure: ESOPHAGOGASTRODUODENOSCOPY WITH DILATATION;  Surgeon: Paul Duncan MD;  Location: [facility] ENDOSCOPY;  Service: Gastroenterology   • EYE SURGERY      cataracts   • HIP ENDOPROSTHESIS Right 5/5/2018    Procedure: RIGHT HIP HEMIARTHROPLASTY;  Surgeon: Winston Vallejo MD;  Location: [facility] OR;  Service: Orthopedics   • HIP FRACTURE SURGERY Right     PARTIAL HIP REPLACEMENT   • MUSCLE BIOPSY Left 3/24/2016    Procedure: LT THIGH MUSCLE BIOPSY;  Surgeon: Fausto Mack MD;  Location: [facility] OR;  Service:    • SKIN BIOPSY     • THYROID SURGERY     • TURP / TRANSURETHRAL INCISION / DRAINAGE PROSTATE     • VASCULAR SURGERY          PT ASSESSMENT (last 12 hours)      Physical Therapy Evaluation     Row Name 01/18/19 1720          PT Evaluation Time/Intention    Subjective Information  no complaints  -MA     Document Type  evaluation;therapy note (daily note)  -MA     Mode of Treatment  physical therapy;individual therapy  -MA     Patient Effort  adequate  -MA     Symptoms Noted During/After Treatment  none  -MA     Row Name 01/18/19 1720          General Information    Patient Profile Reviewed?  yes  -MA     Onset of Illness/Injury or Date of Surgery  01/18/19  -MA     Referring Physician  Reggie   -MA     Patient Observations  alert;cooperative;agree to therapy  -MA     Patient/Family Observations  supine in bed, no acute distress, wife present, pt's LEs contracted in flexion   -MA     General Observations of Patient  Pt had initial hip replacement in May 2018. Went to rehab and was ambulatory. Non-ambulatory since ~August. No PT in home since August. Slideboard to w/c maxA from wife. All mobility maxA from wife    (Significant)   -MA     Prior Level of Function  max assist:;dependent:;all household mobility  -MA     Equipment Currently Used at Home  wheelchair;slide board  -MA     Pertinent History of Current Functional Problem  CONVERSION RT POSTERIOR HIP BIPOLAR TO TOTAL HIP ARTHROPLASTY Hx Parkinsons  -MA     Existing Precautions/Restrictions  fall;hip, posterior;right  -MA     Barriers to Rehab  previous functional deficit  -MA     Row Name 01/18/19 1720          Relationship/Environment    Lives With  spouse  -MA     Row Name 01/18/19 1720          Cognitive Assessment/Interventions    Additional Documentation  Cognitive Assessment/Intervention (Group)  -MA     Row Name 01/18/19 1720          Cognitive Assessment/Intervention- PT/OT    Affect/Mental Status (Cognitive)  WNL  -MA     Orientation Status (Cognition)  oriented to;person;place;situation  -MA     Follows Commands (Cognition)  follows one step commands;over 90% accuracy;increased processing time needed;verbal cues/prompting required  -MA     Personal Safety Interventions  fall prevention program maintained;gait belt;muscle strengthening facilitated;nonskid shoes/slippers when out of bed;supervised activity  -MA     Row Name 01/18/19 1720          Safety Issues, Functional Mobility    Impairments Affecting Function (Mobility)  balance;endurance/activity tolerance;pain;range of motion (ROM);strength  -MA     Row Name 01/18/19 1720          Bed Mobility Assessment/Treatment    Bed Mobility Assessment/Treatment  supine-sit;sit-supine  -MA     Supine-Sit [place] (Bed Mobility)  moderate assist (50% patient effort);2 person assist  -MA     Sit-Supine [place] (Bed Mobility)  moderate assist (50% patient effort);2 person assist  -MA     Bed Mobility, Safety Issues  decreased use of legs for bridging/pushing  -MA     Assistive Device (Bed Mobility)  bed rails;draw sheet;head of bed elevated  -MA     Row Name 01/18/19 1720          Gait/Stairs  Assessment/Training    Comment (Gait/Stairs)  pt non-ambulatory. Unable to stand/walk due to LE contractures  -[facility] Name 01/18/19 1720          General ROM    GENERAL ROM COMMENTS  B LEs contracted (hip flexion and knee flexion ~90deg)  -[facility] Name 01/18/19 1720          MMT (Manual Muscle Testing)    General MMT Comments  B LEs 2+/5  -MA     [facility] Name 01/18/19 1720          Motor Assessment/Intervention    Additional Documentation  Balance (Group);Therapeutic Exercise Interventions (Group)  -[facility] Name 01/18/19 1720          Therapeutic Exercise    Comment (Therapeutic Exercise)  ankle pumps, glute sets x10   -MA     [facility] Name 01/18/19 1720          Balance    Balance  static sitting balance  -[facility] Name 01/18/19 1720          Static Sitting Balance    Level of [place] (Unsupported Sitting, Static Balance)  contact guard assist  -MA     Sitting Position (Unsupported Sitting, Static Balance)  sitting on edge of bed  -MA     Time Able to Maintain Position (Unsupported Sitting, Static Balance)  1 to 2 minutes  -MA     Row Name 01/18/19 1720          Pain Assessment    Additional Documentation  Pain Scale: Word Pre/Post-Treatment (Group)  -MA     Row Name 01/18/19 1720          Pain Scale: Numbers Pre/Post-Treatment    Pain Location - Side  Right  -MA     Pain Location  hip  -MA     Pain Intervention(s)  Repositioned;Ambulation/increased activity  -[facility] Name 01/18/19 1720          Pain Scale: Word Pre/Post-Treatment    Pain: Word Scale, Pretreatment  2 - mild pain  -MA     Pain: Word Scale, Post-Treatment  2 - mild pain  -MA     Row Name             Wound 01/18/19 1108 Right hip incision    Wound - Properties Group Date first assessed: 01/18/19  -EG Time first assessed: 1108  -EG Side: Right  -EG Location: hip  -EG Type: incision  -EG    Row Name             Wound 01/18/19 1646 Left other (see notes) pressure injury    Wound - Properties Group Date first assessed: 01/18/19  -JACKELIN Time first  assessed: 1646  - Side: Left  - Location: other (see notes)  - Type: pressure injury  - Stage, Pressure Injury: Stage 2  -JACKELIN    Row Name 01/18/19 1720          Physical Therapy Clinical Impression    Patient/Family Goals Statement (PT Clinical Impression)  discharge to rehab  -MA     Criteria for Skilled Interventions Met (PT Clinical Impression)  yes;treatment indicated  -MA     Impairments Found (describe specific impairments)  aerobic capacity/endurance;gait, locomotion, and balance;muscle performance;ROM  -MA     Rehab Potential (PT Clinical Summary)  fair, will monitor progress closely  -MA     Row Name 01/18/19 1720          Vital Signs    O2 Delivery Pre Treatment  room air  -MA     Row Name 01/18/19 1720          Physical Therapy Goals    Bed Mobility Goal Selection (PT)  bed mobility, PT goal 1  -MA     Transfer Goal Selection (PT)  transfer, PT goal 1  -MA     Row Name 01/18/19 1720          Bed Mobility Goal 1 (PT)    Activity/Assistive Device (Bed Mobility Goal 1, PT)  bed mobility activities, all  -MA     [place] Level/Cues Needed (Bed Mobility Goal 1, PT)  moderate assist (50-74% patient effort)  -MA     Time Frame (Bed Mobility Goal 1, PT)  1 week  -MA     Row Name 01/18/19 1720          Transfer Goal 1 (PT)    Activity/Assistive Device (Transfer Goal 1, PT)  bed-to-chair/chair-to-bed;sliding board  -MA     [place] Level/Cues Needed (Transfer Goal 1, PT)  moderate assist (50-74% patient effort)  -MA     Time Frame (Transfer Goal 1, PT)  1 week  -MA     Row Name 01/18/19 1720          Positioning and Restraints    Pre-Treatment Position  in bed  -MA     Post Treatment Position  bed  -MA     In Bed  supine;call light within reach;encouraged to call for assist;exit alarm on;with nsg RN getting pt settled in bed  -MA     Row Name 01/18/19 1720          Living Environment    Home Accessibility  wheelchair accessible  -MA       User Key  (r) = Recorded By, (t) = Taken By, (c) = Cosigned By  "   Initials Name Provider Type    Munira Eason, RN Registered Nurse    Kathy Butt, RN Registered Nurse    Christiana Cardenas, PT Physical Therapist        Physical Therapy Education     Title: PT OT SLP Therapies (Done)     Topic: Physical Therapy (Done)     Point: Mobility training (Done)     Learning Progress Summary           Patient Acceptance, E, VU,NR by MA at 1/18/2019  5:27 PM                   Point: Home exercise program (Done)     Learning Progress Summary           Patient Acceptance, E, VU,NR by MA at 1/18/2019  5:27 PM                   Point: Body mechanics (Done)     Learning Progress Summary           Patient Acceptance, E, VU,NR by MA at 1/18/2019  5:27 PM                   Point: Precautions (Done)     Learning Progress Summary           Patient Acceptance, E, VU,NR by MA at 1/18/2019  5:27 PM                               User Key     Initials Effective Dates Name Provider Type Discipline    MA 10/19/18 -  Christiana Wilson, PT Physical Therapist PT              PT Recommendation and Plan  Anticipated Discharge Disposition (PT): skilled nursing facility  Planned Therapy Interventions (PT Eval): balance training, bed mobility training, home exercise program, patient/family education, ROM (range of motion), strengthening, transfer training  Therapy Frequency (PT Clinical Impression): 2 times/day  Outcome Summary/Treatment Plan (PT)  Anticipated Discharge Disposition (PT): skilled nursing facility  Outcome Summary: Pt POD0 R conversion of R posterior hip bipolar to SHANTHI. Posterior hip precautions. Please see \"flowsheet\" for details of medical/hip surgery. Will see pt to improve mobility. Contractures in B LEs, fair sitting balance, decreased LE strength. Will see pt to improve mobility and independence. Anticipate d/c to SNF when medically able.   Outcome Measures     Row Name 01/18/19 1700             How much help from another person do you currently need...    Turning from your back to " your side while in flat bed without using bedrails?  2  -MA      Moving from lying on back to sitting on the side of a flat bed without bedrails?  2  -MA      Moving to and from a bed to a chair (including a wheelchair)?  1  -MA      Standing up from a chair using your arms (e.g., wheelchair, bedside chair)?  1  -MA      Climbing 3-5 steps with a railing?  1  -MA      To walk in hospital room?  1  -MA      AM-PAC 6 Clicks Score  8  -MA         Functional Assessment    Outcome Measure Options  AM-PAC 6 Clicks Basic Mobility (PT)  -MA        User Key  (r) = Recorded By, (t) = Taken By, (c) = Cosigned By    Initials Name Provider Type    Christiana Cardenas PT Physical Therapist         Time Calculation:   PT Charges     Row Name 01/18/19 1729             Time Calculation    Start Time  1703  -MA      Stop Time  1717  -MA      Time Calculation (min)  14 min  -MA      PT Received On  01/18/19  -MA      PT - Next Appointment  01/19/19  -MA      PT Goal Re-Cert Due Date  01/25/19  -MA         Time Calculation- PT    Total Timed Code Minutes- PT  8 minute(s)  -MA        User Key  (r) = Recorded By, (t) = Taken By, (c) = Cosigned By    Initials Name Provider Type    Christiana Cardenas PT Physical Therapist        Therapy Suggested Charges     Code   Minutes Charges    None           Therapy Charges for Today     Code Description Service Date Service Provider Modifiers Qty    22934773690 HC PT EVAL MOD COMPLEXITY 2 1/18/2019 Christiana Wilson, PT GP 1    77997345337 HC PT THER PROC EA 15 MIN 1/18/2019 Christiana Wilson, PT GP 1          PT G-Codes  Outcome Measure Options: AM-PAC 6 Clicks Basic Mobility (PT)  AM-PAC 6 Clicks Score: 8      Christiana Wilson, PT  1/18/2019

## 2019-01-18 NOTE — BRIEF OP NOTE
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]    Procedure/CPT® Codes:      Procedure(s):  CONVERSION RT POSTERIOR HIP BIPOLAR TO TOTAL HIP ARTHROPLASTY    Surgeon(s):  Radha Paredes MD Chakour, Kenneth Saad, MD    Anesthesia: General    Staff:   Circulator: Kathy Rush RN  Scrub Person: Alfonso Babin William  Vendor Representative: Buster Martin  Assistant: Gretchen Danielson RNFA    Estimated Blood Loss: 200 mL    Urine Voided: * No values recorded between 1/18/2019  9:59 AM and 1/18/2019 12:15 PM *    Specimens:                ID Type Source Tests Collected by Time   1 : tissue from right hip- please run STAT and call [phone] with results Tissue Hip, Right ANAEROBIC CULTURE, GRAM STAIN, TISSUE / BONE CULTURE Radha Paredes MD 1/18/2019 1124   A : tissue from right hip for frozen section for acute inflammation Tissue Hip, Right TISSUE PATHOLOGY EXAM Radha Paredes MD 1/18/2019 1050         Drains:   Closed/Suction Drain Right Hip Accordion 10 Fr. (Active)       Findings: SEE DICT     Complications: ANDRAE Paredes MD     Date: 1/18/2019  Time: 12:15 PM

## 2019-01-18 NOTE — ANESTHESIA POSTPROCEDURE EVALUATION
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      (Osteoarthritis of right hip)    Surgeon:  Radha Paredes MD Provider:  Bella Munoz MD    Anesthesia Type:  general ASA Status:  3          Anesthesia Type: general  Last vitals  BP   117/65 (01/18/19 1430)   Temp   36.4 °C (97.6 °F) (01/18/19 1230)   Pulse   58 (01/18/19 1430)   Resp   16 (01/18/19 1430)     SpO2   100 % (01/18/19 1430)     Post Anesthesia Care and Evaluation    Patient location during evaluation: PACU  Pain management: adequate  Airway patency: patent  Anesthetic complications: No anesthetic complications    Cardiovascular status: acceptable  Respiratory status: acceptable  Hydration status: acceptable

## 2019-01-18 NOTE — PLAN OF CARE
"Problem: Patient Care Overview  Goal: Plan of Care Review   01/18/19 1727   OTHER   Outcome Summary Pt POD0 R conversion of R posterior hip bipolar to SHANTHI. Posterior hip precautions. Please see \"flowsheet\" for details of medical/hip surgery. Will see pt to improve mobility. Contractures in B LEs, fair sitting balance, decreased LE strength. Will see pt to improve mobility and independence. Anticipate d/c to SNF when medically able.          "

## 2019-01-18 NOTE — H&P
LORE VALLEJO is a pleasant 73 year old male here today for follow up  pain in the right hip. The pt underwent a right hemiarthroplasty back in May of 2018 for a femoral neck fracture. He initially did well following the surgery, but progressively developed pain in the right hip.   He underwent a 3-phase bone scan  .evaluation. He is here to discuss the results  He underwent a fluroscopic guided corticosteroid injection on 10/31, he reports minimal if any improvement in his pain following the injection. He has remained minimally ambulatory and is using a wheelchair today. He was recently diagnosed with esophagitis, and is unable to take ASA due to this. He is scheduled to have a follow up EGD in the near future. He has continued to smoke 1 ppd. He had a pulmonologist, but that physician left recently and they have not followed up with anyone since.   With respect to his hip pain it is present with ROM of the hip as well as weightbearing. It is localized to the groin with minimal migration into the thigh.   He does have a history of heart attack and heart disease.  He is accompanied by his wife in the office.  Review of Systems:  Positive for: Chest Pain and Shortness of Breath.    Patient denies: Abdominal Pain, Bleeding, Convulsions/Seizure, Decreased Motion, Depression, Difficulty Swallowing, Easy Bruisability, Emotional Disturbances, Eyes or Vision Problems, Fecal Incontinence, Fever/Chills, Headaches, Increased Thirst, Increased Hunger, Insomnia, Joint Pain, Nausea/Vomiting, Night Sweats, Poor Balance, Persistent Cough, Rash, Shortness of Breath While Lying down, Skin Problems, Urinary Retention and Weakness.  Allergies:  * beets (critical)  Medications:  pantoprazole sodium tablet delayed release (pantoprazole sodium tbec)   oxybutynin chloride tablet (oxybutynin chloride tabs)   olanzapine tablet (olanzapine tabs)   multi vitamin oral tablet (multiple vitamin)   montelukast sodium tablet (montelukast sodium  tabs)   levothyroxine sodium tablet (levothyroxine sodium tabs)   fexofenadine hcl tablet (fexofenadine hcl tabs)   donepezil hcl tablet (donepezil hcl tabs)   divalproex sodium er tablet extended release 24 hour (divalproex sodium xr24h-tab)   brovana nebulization solution (arformoterol tartrate nebu)   albuterol sulfate tablet (albuterol sulfate tabs)   docusate sodium capsule (docusate sodium caps)   carbidopa tablet (carbidopa tabs)   buspirone hcl tablet (buspirone hcl tabs)   simvastatin tablet (simvastatin tabs)   adult aspirin ec low strength 81 mg oral tablet delayed release (aspirin)   acetaminophen er tablet extended release (acetaminophen cr-tabs)   Patient History of:  PROSTATE  HEART DISEASE -  SHORTNESS OF BREATH  CHEST PAIN  GERD  COPD  BLOOD CLOTS/EMBOLISM - NEGATIVE  BLADDER INFECTIONS  HEART DISEASE -  HEART ATTACK  STROKE  Surgical History:  THYROID-   COLONDODUOJUOSOPY-   PROSTATE-   CABBAGE-   2 OPEN HEART-   Gallbladder/Cholecystectomy-[CPT-28289]   Cataract removal-[CPT-15051]   RT. HIP-   Known Family History of:  heart disease-sibling  heart disease-father  Social History:  Social history taken on 11/26/2018 states YONI PEREZ is a  73 year old male.  He currently uses tobacco products.  He has fallen more than once or has been injured because of the fall that occured in the last 12 months.      Past medical, social, family histories and ROS reviewed today with the patient and changes documented in the chart (11/26/2018).  PCP KATY Astorga    Physical Exam  Height:  71 in.    Weight:  140 lbs.     BMI:  19.60  Pulse:  83  Blood pressure:  126 / 84 mm Hg  Mental/HEENT/Cardio/Skin  The patient's general appearance was well nourished, well hydrated, no acute distress.  Orientation was alert and oritented x 3.  The patient's mood was normal.  A head exam revealed normocephalic/atraumatic.  An eye exam revealed pupils equal.  Pulmonary exam shows normal air exchange, no  labored breathing, or shortness of breath.    Right Hip/Pelvis  The right leg is 10 mm shorter than the left.  Normal:   Neurovascular status is intact.  Sensation in hip is normal.  DP Pulse is 3+.  PT PULSE is 3+.  Capillary Refill is normal.  Reflexes are normal.    ROM  Internal Rotation: <30  External Rotation: <40  Abduction: <45  Adduction: <15  Flexion: <100  Extension: <5    Tenderness  Location: groin  Radiation of Pain: anterior thigh  Pain w/ Palpation: none  Fide Test: negative  Impingement: negative  Straight Leg Raise: negative    Strength  Quad: normal  Abduction: normal  Adduction: normal  Flexion: normal  Extension: normal  He has difficulty straight leg raising.  The RIGHT.  Positive Stinchfield sign attempted movements of the RIGHT hip is painful and restricted, especially rotations      Imaging/Diagnostic Studies  X-rays of the Right Hip [AP;AP pelvis;Frog Lateral] were ordered and reviewed today.    X-ray of the right hip demonstrates a hemiarthroplasty without evidence of complication. There is evidence of degenerative changes in the acetabulum.     3 phase bone scan with increased uptake c/w synovitis. There is no abnormal uptake around the femoral stem.   Impression  Right hip pain (EDE37-V21.551)  Right hip secondary osteoarthritis (LPT24-I38.7)  RIGHT hemiparesis, stroke  Aftercare following joint replacement surgery (NDK58-P43.1)  Right artificial hip joint presence (DSZ38-S76.641)    Plan  Options and alternatives were discussed in detail with the patient.  The patient has reached the point of disability and failed nonoperative management.   The patient is indicated for a total hip replacement .     The likely Risks and benefits of the procedure including but not limited to infection, DVT, pulmonary embolism,  recurrent dislocation, Leg length discrepancy, periprosthetic fractures, possibility of injury to nerves or vessels, tendons, possibility of morbidity and mortality likely   medical risks for stroke and heart attack,  have been discussed in detail.     Despite the risks involved the patient would like to proceed.  The patient  is being scheduled for a Conversion of failed RIGHT hip bipolar arthroplasty to a  Total HIP  arthroplasty By posterior approach   at [facility] on January 18, 2019.  I will request for Medical and cardiac clearance from Dr. Shira Pierce, LILIAM p.r.jeremy and Dr Yvette MD , Cardiology.  Postoperative DVT prophylaxis - Patient has no high risk factors  Plan for Aspirin.   Preoperative antibiotic prophylaxis - Plan for SCIP protocol with Cephazolin weight based.  Will give Vancomycin in addition due to Comorbidities.    We discussed the benefits of surgical intervention, as well as alternative treatments.  Potential surgical risks and complications include but are not limited to DVT, infection, neurovascular injury, fracture, implant wear, failure, possible need for revision surgery, loss of motion, dislocation, limb length changes.  Sufficient opportunity was given to discuss the condition and treatment plan with the doctor, and all questions were answered for the patient.  Nonsurgical measures such as injections, medications, or physical therapy may not offer significant relief to this patient.  The discussion lasted 30 minutes.      Victor Manuel should follow up with BREANA MORFIN MD post op.

## 2019-01-18 NOTE — PERIOPERATIVE NURSING NOTE
Dr oquendo at bedside states aware of uti on keflex since Wednesday. Dr oquendo states office has urine culture.

## 2019-01-18 NOTE — OP NOTE
ORTHOPAEDIC OPERATIVE NOTE    Facility: [facility]    Patient Name: Victor Manuel Issa     YOB: 1945    Date: 1/18/2019    Medical Record Number: [id number]    Attending Physician: Radha Paredes,*    Date of Service: 1/18/2019    Pre-op Diagnosis:   Osteoarthritis of right hip  Failed right hip bipolar prosthesis    Post-Op Diagnosis Codes:  Osteoarthritis of right hip [M16.11]  Suspected  Infection and inflammatory reaction due to internal right hip prosthesis [T84.51]  Acute Chondrolysis right femoral head    PROCEDURES PERFORMED: CONVERSION RIGHT POSTERIOR HIP BIPOLAR TO TOTAL HIP ARTHROPLASTY  utilizing a  posterior approach with     Depuy   Elfrida Gription cluster hole sector shell size 56 mm outer diameter   Neutral ALTRX  polyethylene insert- 40 mm internal diameter,   Bipolar head 40 mm outer diameter  Articuleze Metal femoral head- 28 mm outer diameter, + 8.5 neck length (Depuy).     Implant Name Type Inv. Item Serial No.  Lot No. LRB No. Used   SUT FW #2 W/TPR NDL 1/2 CIR 38IN 97CM 26.5MM DORA - [id number] Implant SUT FW #2 W/TPR NDL 1/2 CIR 38IN 97CM 26.5MM DORA  [id number] Right 1   SUT FW #2 W/TPR NDL 1/2 CIR 38IN 97CM 26.5MM DORA - [id number] Implant SUT FW #2 W/TPR NDL 1/2 CIR 38IN 97CM 26.5MM DORA  ARTHREX [id number] Right 2   LINER ACET ALTRX NTRL OD [id number] - [id number] Implant LINER ACET ALTRX NTRL OD [id number]  DEPUY [id number] Right 1   CUP ACET PINN SECTOR W GRIPTN 56MM - [id number] Implant CUP ACET PINN SECTOR W GRIPTN 56MM  DEPUY [id number] Right 1   HD FEM BIPOL S/CTR [id number] - [id number] Implant HD FEM BIPOL S/CTR [id number]  DEPUY [id number] Right 1   HD FEM ULTAMA/ART/MOD COCR 12/14 28MM PLS8.5 - [id number] Implant HD FEM ULTAMA/ART/MOD COCR 12/14 28MM PLS8.5  DEPUY [id number] Right 1       Surgeon(s):  Yakkanti, MD Katelyn Vital Kenneth Saad, MD    Anesthesia: General  Anesthesiologist: Bella Munoz MD  CRNA: Chelsey Tompkins  AUTUMN Andersen    Staff:   Circulator: Kathy Rush RN  Scrub Person: Alfonso Babin; Victor Manuel Carvalho  Vendor Representative: Buster Martin  Assistant: Gretchen Danielson RNFA    Assistants : Isra Zepeda MD, Fellow    LESVIA Leung    The services of a skilled first assistant were necessary for performing the procedure safely and expeditiously.  The first assist was present for the entire duration of the case and helped with positioning, retraction and closure of the incision.     Estimated Blood Loss: 200 mL    Specimens:   Order Name Source Comment Collection Info Order Time   ANAEROBIC CULTURE Hip, Right  Collected By: Radha Paredes MD 1/18/2019 11:25 AM   TISSUE / BONE CULTURE Hip, Right  Collected By: Radha Paredes MD 1/18/2019 11:25 AM   TISSUE PATHOLOGY EXAM Hip, Right  Collected By: Radha Paredes MD 1/18/2019 10:50 AM       COMPLICATIONS: Nil.      DRAINS: Nil.     INDICATIONS:  73 y.o. male who presented to my office with progressively worsening pain hip. He has a history of stroke and had a right bipolar arthroplasty by Dr Catherine around May 2018.   The patient has reached a point of disability and failed nonoperative treatment.  Evaluation was made with x rays and physical examination.  The patient's history was reviewed and preoperative medications were reviewed specifically for anticoagulants. A risk assessment was made for any recent DVT or PE and infection risk.      The patient's options and alternatives were discussed in detail with the patient and  family . The patient is indicated for a conversion of his bipolar arthroplasty to a  total hip replacement. The patient elected to proceed with a total hip arthroplasty. Likely risks and benefits of the procedure including, but not limited to infection, DVT, pulmonary embolism, leg length discrepancy, recurrent dislocation, periprosthetic fractures, possibility of injury to nerves or vessels, risk for cardiac  events, MI, stroke, post operative delirium,  mortality, morbidity, and immobility syndrome have been discussed in detail. Despite the risks involved, the patient and family elected to proceed. An informed consent has been obtained, and patient  has been scheduled for surgery.   Patient was seen in the preoperative holding area and the operative site was marked.      DESCRIPTION OF PROCEDURE: The patient has been transferred to [facility]. Preoperative antibiotics were given in the form of Vancomycin and  Kefzol  IV according to SCIP protocol prior to the incision.  After achieving adequate general anesthesia, the patient was placed in the lateral position utilizing a pegboard. All bony prominences were padded well. The operative hip was prepped and draped in the usual sterile fashion. Surgical time-out was done. Correct patient, surgical side and site were identified. Tranexamic acid was given topically during surgery.      A skin incision was made centering over the greater trochanter for a posterior  Approach incorporating the previous surgical scar. Skin and subcutaneous tissue were incised and the deep fascia was incised in line with the skin incision. The gluteus lucio muscle fibers were split in their direction. Posterior aspect of the hip joint was identified. An arthrotomy was made along the  posterior aspect of the greater trochanter. The capsule was released. There was a small effusion. The femoral head was dislocated. There was an effusion. The bipolar head measured 48 mm in its outer diameter. This was a Depuy stem and stem was stable without loosening. There was synovitis. I sent tissue for frozen section for acute inflammation and also for cultures. The tissue was positive for acute inflammation. The patient had previous 3 phase bone scan which was positive for synovitis and no suspicion for loosening. The acetabular cavity was inspected. He had low CRP, ESR and WBC  count, There was no lelo pus. The acetabular cartilage was showing signs of chondrolysis and bone exposed. I elected to proceed with conversion to a total hip despite the positive acute inflammation. The cavity was irrigated with 3 liters of saline and vacomycin.     The Acetabular cavity was appropriately exposed with retractors.  Labrum was excised and pulvinar was excised. The cavity was progressively reamed maintaining the correct inclination and version. Adequate fit was found with a size 57 reamer. A   Acetabular hemispherical cluster hole shell size 58 was obtained and seated maintaining correct inclination and version. There was excellent stability.  The trial liner was placed and attention was directed to the proximal femur.      Trial reduction was performed. Reduction was found to be satisfactory with good restoration of leg lengths.  Range of motion was checked and there was excellent range of motion with good stability throughout the range for flexion, adduction , internal rotation and external rotation. There was no sign of impingement.  The trial was dislocated and the trial liner was replaced with a zero degree polyethylene liner 40 mm internal diameter. I decided to go with a large diameter head to minimize the risk of dislocation. The metal femur head 28 mm outer diameter with a +8.5 mm neck length was seated into position, checked again for stability and the hip was reduced. Reduction was found to be satisfactory. The hip joint was thoroughly irrigated with saline and soft tissue hemostasis was secured. The posterior capsule was re-anchored to the greater trochanter with the help of FiberWire sutures and drill holes made into the greater trochanter. The sponge count and needle count was correct.  The incision was closed in layers with Ethibond, vicryl and staples. Sterile dressings were placed and the patient was transferred to the recovery room in a stable condition.      The patient prior to  closure received periarticular injection of  Naropin, clonidine, and ketorolac mixture. The patient tolerated the procedure well and had adequate distal pulses and good capillary refill. The patient was then transferred to the recovery room and then later to the floor without any complications.     The patient will receive postoperative antibiotics in the form of  Kefzol IV q.8 h. For 48 hours due to the positive acute inflammation, await final culture reports, and possibly place him on oral antibiotics for 6-8 weeks.     ASA for DVT prophylaxis will begin in the morning.      I discussed these satisfactory performance of the procedure with the patient's family and discussed with them the postoperative management.    Radha Paredes MD     Date: 1/18/2019  Time: 12:16 PM    CC: Shira Pierce APRN; MD Reggie Almendarez, Radha BEAN,*

## 2019-01-19 LAB
ANION GAP SERPL CALCULATED.3IONS-SCNC: 10.8 MMOL/L
BASOPHILS # BLD AUTO: 0.01 10*3/MM3 (ref 0–0.2)
BASOPHILS NFR BLD AUTO: 0.1 % (ref 0–1.5)
BUN BLD-MCNC: 17 MG/DL (ref 8–23)
BUN/CREAT SERPL: 22.7 (ref 7–25)
CALCIUM SPEC-SCNC: 9.7 MG/DL (ref 8.6–10.5)
CHLORIDE SERPL-SCNC: 105 MMOL/L (ref 98–107)
CO2 SERPL-SCNC: 21.2 MMOL/L (ref 22–29)
CREAT BLD-MCNC: 0.75 MG/DL (ref 0.76–1.27)
CRP SERPL-MCNC: 3.25 MG/DL (ref 0–0.5)
CYTO UR: NORMAL
DEPRECATED RDW RBC AUTO: 49.7 FL (ref 37–54)
EOSINOPHIL # BLD AUTO: 0 10*3/MM3 (ref 0–0.7)
EOSINOPHIL NFR BLD AUTO: 0 % (ref 0.3–6.2)
ERYTHROCYTE [DISTWIDTH] IN BLOOD BY AUTOMATED COUNT: 14.1 % (ref 11.5–14.5)
ERYTHROCYTE [SEDIMENTATION RATE] IN BLOOD: 61 MM/HR (ref 0–20)
GFR SERPL CREATININE-BSD FRML MDRD: 102 ML/MIN/1.73
GLUCOSE BLD-MCNC: 152 MG/DL (ref 65–99)
HCT VFR BLD AUTO: 30.5 % (ref 40.4–52.2)
HGB BLD-MCNC: 9.4 G/DL (ref 13.7–17.6)
IMM GRANULOCYTES # BLD AUTO: 0.03 10*3/MM3 (ref 0–0.03)
IMM GRANULOCYTES NFR BLD AUTO: 0.3 % (ref 0–0.5)
LAB AP CASE REPORT: NORMAL
LYMPHOCYTES # BLD AUTO: 1.59 10*3/MM3 (ref 0.9–4.8)
LYMPHOCYTES NFR BLD AUTO: 13.5 % (ref 19.6–45.3)
Lab: NORMAL
MCH RBC QN AUTO: 29.5 PG (ref 27–32.7)
MCHC RBC AUTO-ENTMCNC: 30.8 G/DL (ref 32.6–36.4)
MCV RBC AUTO: 95.6 FL (ref 79.8–96.2)
MONOCYTES # BLD AUTO: 0.78 10*3/MM3 (ref 0.2–1.2)
MONOCYTES NFR BLD AUTO: 6.6 % (ref 5–12)
NEUTROPHILS # BLD AUTO: 9.41 10*3/MM3 (ref 1.9–8.1)
NEUTROPHILS NFR BLD AUTO: 79.5 % (ref 42.7–76)
PATH REPORT.FINAL DX SPEC: NORMAL
PATH REPORT.GROSS SPEC: NORMAL
PLATELET # BLD AUTO: 299 10*3/MM3 (ref 140–500)
PMV BLD AUTO: 9.5 FL (ref 6–12)
POTASSIUM BLD-SCNC: 4.4 MMOL/L (ref 3.5–5.2)
RBC # BLD AUTO: 3.19 10*6/MM3 (ref 4.6–6)
SODIUM BLD-SCNC: 137 MMOL/L (ref 136–145)
WBC NRBC COR # BLD: 11.82 10*3/MM3 (ref 4.5–10.7)

## 2019-01-19 PROCEDURE — 80048 BASIC METABOLIC PNL TOTAL CA: CPT | Performed by: ORTHOPAEDIC SURGERY

## 2019-01-19 PROCEDURE — 85652 RBC SED RATE AUTOMATED: CPT | Performed by: ORTHOPAEDIC SURGERY

## 2019-01-19 PROCEDURE — 94799 UNLISTED PULMONARY SVC/PX: CPT

## 2019-01-19 PROCEDURE — 25010000003 CEFAZOLIN IN DEXTROSE 2-4 GM/100ML-% SOLUTION: Performed by: ORTHOPAEDIC SURGERY

## 2019-01-19 PROCEDURE — 85025 COMPLETE CBC W/AUTO DIFF WBC: CPT | Performed by: ORTHOPAEDIC SURGERY

## 2019-01-19 PROCEDURE — 97110 THERAPEUTIC EXERCISES: CPT

## 2019-01-19 RX ADMIN — TRAMADOL HYDROCHLORIDE 50 MG: 50 TABLET, FILM COATED ORAL at 13:38

## 2019-01-19 RX ADMIN — CARBIDOPA AND LEVODOPA 1 TABLET: 25; 250 TABLET ORAL at 17:28

## 2019-01-19 RX ADMIN — TRAMADOL HYDROCHLORIDE 50 MG: 50 TABLET, FILM COATED ORAL at 17:28

## 2019-01-19 RX ADMIN — BUSPIRONE HYDROCHLORIDE 5 MG: 5 TABLET ORAL at 16:09

## 2019-01-19 RX ADMIN — DIVALPROEX SODIUM 500 MG: 500 TABLET, DELAYED RELEASE ORAL at 08:05

## 2019-01-19 RX ADMIN — OLANZAPINE 10 MG: 10 TABLET, FILM COATED ORAL at 20:23

## 2019-01-19 RX ADMIN — TRAMADOL HYDROCHLORIDE 50 MG: 50 TABLET, FILM COATED ORAL at 22:00

## 2019-01-19 RX ADMIN — ASPIRIN 325 MG: 325 TABLET, DELAYED RELEASE ORAL at 08:05

## 2019-01-19 RX ADMIN — PANTOPRAZOLE SODIUM 40 MG: 40 TABLET, DELAYED RELEASE ORAL at 17:28

## 2019-01-19 RX ADMIN — CARBIDOPA AND LEVODOPA 1 TABLET: 25; 250 TABLET ORAL at 12:09

## 2019-01-19 RX ADMIN — CARBIDOPA AND LEVODOPA 1 TABLET: 25; 250 TABLET ORAL at 08:05

## 2019-01-19 RX ADMIN — LEVOTHYROXINE SODIUM 175 MCG: 175 TABLET ORAL at 06:53

## 2019-01-19 RX ADMIN — DIVALPROEX SODIUM 500 MG: 500 TABLET, DELAYED RELEASE ORAL at 20:23

## 2019-01-19 RX ADMIN — ARFORMOTEROL TARTRATE 15 MCG: 15 SOLUTION RESPIRATORY (INHALATION) at 08:16

## 2019-01-19 RX ADMIN — TRAMADOL HYDROCHLORIDE 50 MG: 50 TABLET, FILM COATED ORAL at 06:53

## 2019-01-19 RX ADMIN — MONTELUKAST SODIUM 10 MG: 10 TABLET, FILM COATED ORAL at 20:23

## 2019-01-19 RX ADMIN — SODIUM CHLORIDE, PRESERVATIVE FREE 3 ML: 5 INJECTION INTRAVENOUS at 20:23

## 2019-01-19 RX ADMIN — SUCRALFATE 1 G: 1 TABLET ORAL at 08:05

## 2019-01-19 RX ADMIN — CEFAZOLIN SODIUM 2 G: 2 INJECTION, SOLUTION INTRAVENOUS at 17:28

## 2019-01-19 RX ADMIN — OXYBUTYNIN CHLORIDE 5 MG: 5 TABLET, EXTENDED RELEASE ORAL at 08:05

## 2019-01-19 RX ADMIN — DONEPEZIL HYDROCHLORIDE 20 MG: 10 TABLET, FILM COATED ORAL at 20:23

## 2019-01-19 RX ADMIN — CEFAZOLIN SODIUM 2 G: 2 INJECTION, SOLUTION INTRAVENOUS at 01:43

## 2019-01-19 RX ADMIN — SODIUM CHLORIDE, PRESERVATIVE FREE 3 ML: 5 INJECTION INTRAVENOUS at 08:04

## 2019-01-19 RX ADMIN — PANTOPRAZOLE SODIUM 40 MG: 40 TABLET, DELAYED RELEASE ORAL at 06:53

## 2019-01-19 RX ADMIN — OXYBUTYNIN CHLORIDE 5 MG: 5 TABLET, EXTENDED RELEASE ORAL at 20:23

## 2019-01-19 NOTE — THERAPY TREATMENT NOTE
Acute Care - Physical Therapy Treatment Note  [facility]     Patient Name: Victor Manuel Issa  : 1945  MRN: [id number]  Today's Date: 2019  Onset of Illness/Injury or Date of Surgery: 19     Referring Physician: Reggie     Admit Date: 2019    Visit Dx:    ICD-10-CM ICD-9-CM   1. Decreased mobility R26.89 781.99   2. 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 M25.551 719.45    G89.29 338.29    Z96.641 V43.64     Patient Active Problem List   Diagnosis   • Thigh pain, musculoskeletal   • Left leg weakness   • History of CVA (cerebrovascular accident)   • COPD (chronic obstructive pulmonary disease)   • Coronary artery disease   • Cervical myelopathy (CMS/[facility])   • Parkinson's disease   • Debility   • Cerebrovascular disease   • Abnormal TSH   • Hypothyroidism   • Hip fracture requiring operative repair, right, closed, initial encounter (CMS/[facility])   • Urinary tract infection without hematuria   • Hypoxia   • Sinus bradycardia   • Chest pain   • Hip pain, chronic, right   • Hematemesis   • Hematemesis with nausea   • H/O medication noncompliance   • Hyperlipidemia   • Post-traumatic osteoarthritis of right hip   • Hip pain   • 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       Therapy Treatment    Rehabilitation Treatment Summary     Row Name 19 1600 19 1049          Treatment Time/Intention    Discipline  physical therapist  -CS  physical therapist  -CA     Document Type  therapy note (daily note)  -CS  therapy note (daily note)  -CA     Subjective Information  no complaints  -CS  no complaints  -CA     Mode of Treatment  physical therapy;individual therapy  -CS  physical therapy;individual therapy  -CA     Patient/Family Observations  Pt supine in bed, no acute distress  -CS  Pt supine in bed upon entering room, no acute distress noted at rest, family present at bedside  -CA     Therapy Frequency (PT Clinical Impression)  2 times/day  -CS  --      Patient Effort  good  -CS  good  -CA     Existing Precautions/Restrictions  fall;hip, posterior;right  -CS  fall;hip, posterior;right  -CA     Treatment Considerations/Comments  --  B LE contractures  (Significant)   -CA     Recorded by [CS] Pedro Luis Martin, PT 01/19/19 1621 [CA] Malu Vasquez, PT 01/19/19 1057     Row Name 01/19/19 1600 01/19/19 1049          Cognitive Assessment/Intervention- PT/OT    Affect/Mental Status (Cognitive)  --  confused  -CA     Orientation Status (Cognition)  oriented to;person  -CS  oriented to;person  -CA     Follows Commands (Cognition)  follows one step commands;over 90% accuracy;increased processing time needed;verbal cues/prompting required  -CS  follows one step commands;over 90% accuracy;increased processing time needed;verbal cues/prompting required  -CA     Personal Safety Interventions  --  fall prevention program maintained;gait belt;nonskid shoes/slippers when out of bed  -CA     Recorded by [CS] Pedro Luis Martin, PT 01/19/19 1621 [CA] Malu Vasquez, PT 01/19/19 1057     Row Name 01/19/19 1600 01/19/19 1049          Mobility Assessment/Intervention    Extremity Weight-bearing Status  right lower extremity  -CS  right lower extremity  -CA     Right Lower Extremity (Weight-bearing Status)  weight-bearing as tolerated (WBAT)  -CS  weight-bearing as tolerated (WBAT)  -CA     Recorded by [CS] Pedro Luis Martin, PT 01/19/19 1621 [CA] Malu Vasquez, PT 01/19/19 1057     Row Name 01/19/19 1600 01/19/19 1049          Bed Mobility Assessment/Treatment    Bed Mobility Assessment/Treatment  supine-sit;sit-supine  -CS  --     Supine-Sit [place] (Bed Mobility)  moderate assist (50% patient effort);2 person assist  -CS  moderate assist (50% patient effort);2 person assist  -CA     Sit-Supine [place] (Bed Mobility)  moderate assist (50% patient effort);2 person assist  -CS  moderate assist (50% patient effort);2 person assist  -CA     Bed Mobility, Safety Issues   decreased use of legs for bridging/pushing  -CS  decreased use of legs for bridging/pushing  -CA     Assistive Device (Bed Mobility)  bed rails;draw sheet;head of bed elevated  -CS  bed rails;draw sheet;head of bed elevated  -CA     Recorded by [CS] Pedro Luis Martin, PT 01/19/19 1621 [CA] Malu Vasquez, PT 01/19/19 1057     Row Name 01/19/19 1600 01/19/19 1049          Transfer Assessment/Treatment    Transfer Assessment/Treatment  sit-stand transfer;stand-sit transfer  -CS  sit-stand transfer;stand-sit transfer  -CA     Comment (Transfers)  --  STS transfers x 3 with assist to block R LE and max cues throughout for upright posture, glute/quad engagement, tactile cues for quad engagement  -CA     Recorded by [CS] Pedro Luis Martin, PT 01/19/19 1700 [CA] Malu Vasquez, PT 01/19/19 1057     Row Name 01/19/19 1600 01/19/19 1049          Sit-Stand Transfer    Sit-Stand [place] (Transfers)  moderate assist (50% patient effort);2 person assist  -CS  moderate assist (50% patient effort);2 person assist  -CA     Assistive Device (Sit-Stand Transfers)  walker, front-wheeled  -CS  walker, front-wheeled  -CA     Recorded by [CS] Pedro Luis Martin, PT 01/19/19 1700 [CA] Malu Vasquez, PT 01/19/19 1057     Row Name 01/19/19 1600 01/19/19 1049          Stand-Sit Transfer    Stand-Sit [place] (Transfers)  minimum assist (75% patient effort);2 person assist  -CS  minimum assist (75% patient effort);2 person assist  -CA     Assistive Device (Stand-Sit Transfers)  walker, front-wheeled  -CS  walker, front-wheeled  -CA     Recorded by [CS] Pedro Luis Martin, PT 01/19/19 1700 [CA] Malu Vasquez, PT 01/19/19 1057     Row Name 01/19/19 1049             Gait/Stairs Assessment/Training    [place] Level (Gait)  not tested  -CA      Recorded by [CA] Malu Vasquez, PT 01/19/19 1100      Row Name 01/19/19 1600 01/19/19 1049          Therapeutic Exercise    Comment (Therapeutic Exercise)  THR protocol x10  -CS  AP,  GS, Hip ABD, and heel slides x 10 ea R LE  -CA     Recorded by [CS] Pedro Luis Martin, PT 01/19/19 1700 [CA] Malu Vasquez, PT 01/19/19 1100     Row Name 01/19/19 1600 01/19/19 1049          Positioning and Restraints    Pre-Treatment Position  in bed  -CS  in bed  -CA     Post Treatment Position  bed  -CS  bed  -CA     In Bed  supine;call light within reach;with family/caregiver;encouraged to call for assist  -CS  supine;call light within reach;encouraged to call for assist;with family/caregiver;side rails up x2;pillow between legs sheet roll under R LE to encourage passive R knee ext  -CA     Recorded by [CS] Pedro Luis Martin, PT 01/19/19 1700 [CA] Malu Vasquez, PT 01/19/19 1100     Row Name 01/19/19 1049             Pain Assessment    Additional Documentation  Pain Scale: Numbers Pre/Post-Treatment (Group)  -CA      Recorded by [CA] Malu Vasquez, PT 01/19/19 1100      Row Name 01/19/19 1049             Pain Scale: Numbers Pre/Post-Treatment    Pre/Post Treatment Pain Comment  Pt verbalizes pain but does not rate  -CA      Recorded by [CA] Malu Vasquez, PT 01/19/19 1100      Row Name                Wound 01/18/19 1108 Right hip incision    Wound - Properties Group Date first assessed: 01/18/19 [EG] Time first assessed: 1108 [EG] Side: Right [EG] Location: hip [EG] Type: incision [EG] Recorded by:  [EG] Kathy Rush, RN 01/18/19 1108    Row Name                Wound 01/18/19 1646 Left other (see notes) pressure injury    Wound - Properties Group Date first assessed: 01/18/19 [JACKELIN] Time first assessed: 1646 [JACKELIN] Side: Left [JACKELIN] Location: other (see notes) [JACKELIN] Type: pressure injury [JACKELIN] Stage, Pressure Injury: Stage 2 [JACKELIN] Recorded by:  [JACKELIN] Munira Dee, RN 01/18/19 1647    Row Name 01/19/19 1600             Coping    Observed Emotional State  calm  -CS      Verbalized Emotional State  acceptance  -CS      Recorded by [CS] Pedro Luis Martin, PT 01/19/19 1700      Row Name 01/19/19 1600              Plan of Care Review    Plan of Care Reviewed With  patient  -CS      Recorded by [CS] Pedro Luis Martin, PT 01/19/19 1700      Row Name 01/19/19 1600 01/19/19 1049          Outcome Summary/Treatment Plan (PT)    Daily Summary of Progress (PT)  --  progress toward functional goals is gradual  -CA     Anticipated Discharge Disposition (PT)  skilled nursing facility  -CS  skilled nursing facility  -CA     Recorded by [CS] Pedro Luis Martin, PT 01/19/19 1700 [CA] Malu Vasquez, PT 01/19/19 1100       User Key  (r) = Recorded By, (t) = Taken By, (c) = Cosigned By    Initials Name Effective Dates Discipline    JACKELIN Munira Dee, RN 06/25/18 -  Nurse    Kathy Butt RN 07/10/17 -  Nurse    Pedro Luis Morgan, PT 05/14/18 -  PT    CA Malu Vasquez, PT 06/13/18 -  PT          Wound 01/18/19 1108 Right hip incision (Active)   Dressing Appearance dry;intact;no drainage 1/19/2019 12:10 PM   Closure HARDIK 1/19/2019 12:10 PM   Base dressing in place, unable to visualize 1/19/2019 12:10 PM   Drainage Amount none 1/19/2019  4:05 PM       Wound 01/18/19 1646 Left other (see notes) pressure injury (Active)   Dressing Appearance dry;intact 1/19/2019 12:10 PM           Physical Therapy Education     Title: PT OT SLP Therapies (In Progress)     Topic: Physical Therapy (In Progress)     Point: Mobility training (In Progress)     Learning Progress Summary           Patient Acceptance, E,TB, VU,NR by CS at 1/19/2019  5:15 PM    Acceptance, E, NR by CA at 1/19/2019 11:01 AM    Comment:  Discussed positioning for contractures    Acceptance, E, VU,NR by MA at 1/18/2019  5:27 PM   Family Acceptance, E, NR by CA at 1/19/2019 11:01 AM    Comment:  Discussed positioning for contractures                   Point: Home exercise program (In Progress)     Learning Progress Summary           Patient Acceptance, E,TB, VU,NR by JEANNE at 1/19/2019  5:15 PM    Acceptance, E, NR by CA at 1/19/2019 11:01 AM    Comment:  Discussed positioning for  contractures    Acceptance, E, VU,NR by MA at 1/18/2019  5:27 PM   Family Acceptance, E, NR by CA at 1/19/2019 11:01 AM    Comment:  Discussed positioning for contractures                   Point: Body mechanics (In Progress)     Learning Progress Summary           Patient Acceptance, E,TB, VU,NR by  at 1/19/2019  5:15 PM    Acceptance, E, NR by CA at 1/19/2019 11:01 AM    Comment:  Discussed positioning for contractures    Acceptance, E, VU,NR by MA at 1/18/2019  5:27 PM   Family Acceptance, E, NR by CA at 1/19/2019 11:01 AM    Comment:  Discussed positioning for contractures                   Point: Precautions (In Progress)     Learning Progress Summary           Patient Acceptance, E,TB, VU,NR by  at 1/19/2019  5:15 PM    Acceptance, E, NR by CA at 1/19/2019 11:01 AM    Comment:  Discussed positioning for contractures    Acceptance, E, VU,NR by MA at 1/18/2019  5:27 PM   Family Acceptance, E, NR by CA at 1/19/2019 11:01 AM    Comment:  Discussed positioning for contractures                               User Key     Initials Effective Dates Name Provider Type Discipline    MA 10/19/18 -  Christiana Wilson, PT Physical Therapist PT     05/14/18 -  Pedro Luis Martin, PT Physical Therapist PT    CA 06/13/18 -  Malu Vasquez, PT Physical Therapist PT                PT Recommendation and Plan  Anticipated Discharge Disposition (PT): skilled nursing facility  Therapy Frequency (PT Clinical Impression): 2 times/day  Outcome Summary/Treatment Plan (PT)  Anticipated Discharge Disposition (PT): skilled nursing facility  Plan of Care Reviewed With: patient  Outcome Measures     Row Name 01/19/19 1100 01/18/19 1700          How much help from another person do you currently need...    Turning from your back to your side while in flat bed without using bedrails?  2  -CA  2  -MA     Moving from lying on back to sitting on the side of a flat bed without bedrails?  2  -CA  2  -MA     Moving to and from a bed to a chair  (including a wheelchair)?  2  -CA  1  -MA     Standing up from a chair using your arms (e.g., wheelchair, bedside chair)?  2  -CA  1  -MA     Climbing 3-5 steps with a railing?  1  -CA  1  -MA     To walk in hospital room?  1  -CA  1  -MA     AM-PAC 6 Clicks Score  10  -CA  8  -MA        Functional Assessment    Outcome Measure Options  AM-PAC 6 Clicks Basic Mobility (PT)  -CA  AM-PAC 6 Clicks Basic Mobility (PT)  -MA       User Key  (r) = Recorded By, (t) = Taken By, (c) = Cosigned By    Initials Name Provider Type    Christiana Cardenas, PT Physical Therapist    Malu Hollins, PT Physical Therapist         Time Calculation:   PT Charges     Row Name 01/19/19 1716 01/19/19 1103          Time Calculation    Start Time  1600  -CS  1023  -CA     Stop Time  1623  -  1050  -CA     Time Calculation (min)  23 min  -CS  27 min  -CA     PT Received On  01/19/19  -  01/19/19  -CA     PT - Next Appointment  01/20/19  -  01/19/19  -CA        Time Calculation- PT    Total Timed Code Minutes- PT  --  27 minute(s)  -CA       User Key  (r) = Recorded By, (t) = Taken By, (c) = Cosigned By    Initials Name Provider Type    Pedro Luis Morgan, PT Physical Therapist    Malu Hollins, KHANG Physical Therapist        Therapy Suggested Charges     Code   Minutes Charges    None           Therapy Charges for Today     Code Description Service Date Service Provider Modifiers Qty    [id number] HC PT THER PROC EA 15 MIN 1/19/2019 Pedro Luis Martin, PT GP 2          PT G-Codes  Outcome Measure Options: AM-PAC 6 Clicks Basic Mobility (PT)  AM-PAC 6 Clicks Score: 10    Pedro Luis Martin, PT  1/19/2019

## 2019-01-19 NOTE — PLAN OF CARE
Problem: Patient Care Overview  Goal: Plan of Care Review   01/19/19 1101   OTHER   Outcome Summary Pt demos good progress towards PT goals today, able to attempt STS transfers x3, mod Ax2 with FWW and assist to block R LE. Plan is SNF at [facility].   Coping/Psychosocial   Plan of Care Reviewed With patient   Plan of Care Review   Progress improving

## 2019-01-19 NOTE — PLAN OF CARE
Problem: Patient Care Overview  Goal: Plan of Care Review  Outcome: Ongoing (interventions implemented as appropriate)   01/19/19 0159   OTHER   Outcome Summary POD 1 s/p RTHA. Vitals stable. Dressing dry and intact. Pain controlled with pain meds. Neuro vascular checks intact. Incontinent of urine. IN and out cath x1. Patient confused at times. Educated on importance of using inhalers with h/o copd. Will continue to monitor.   Coping/Psychosocial   Plan of Care Reviewed With patient   Plan of Care Review   Progress improving     Goal: Individualization and Mutuality  Outcome: Ongoing (interventions implemented as appropriate)    Goal: Discharge Needs Assessment  Outcome: Ongoing (interventions implemented as appropriate)    Goal: Interprofessional Rounds/Family Conf  Outcome: Ongoing (interventions implemented as appropriate)      Problem: Fall Risk (Adult)  Goal: Identify Related Risk Factors and Signs and Symptoms  Outcome: Outcome(s) achieved Date Met: 01/19/19    Goal: Absence of Fall  Outcome: Ongoing (interventions implemented as appropriate)      Problem: Hip Arthroplasty (Total, Partial) (Adult)  Goal: Signs and Symptoms of Listed Potential Problems Will be Absent, Minimized or Managed (Hip Arthroplasty)  Outcome: Ongoing (interventions implemented as appropriate)

## 2019-01-19 NOTE — CONSULTS
Patient Name:  Victor Manuel Issa  YOB: 1945  MRN:  [id number]  Date of Admission:  1/18/2019  Date of Consult:  1/18/2019  Patient Care Team:  Shira Pierce APRN as PCP - General (Family Medicine)    Inpatient Hospitalist Consult  Consult performed by: Cruz Hardin MD  Consult ordered by: Radha Paredes MD  Reason for consult: Evaluate status and make recommendations regarding treatment for diabetes and hypertension.        Subjective   History of Present Illness  Mr. Issa is a 73 y.o. male that has been admitted to [facility] following elective CONVERSION RT POSTERIOR HIP BIPOLAR TO TOTAL HIP ARTHROPLASTY. He has been admitted to an orthopedic floor following surgery and we were asked to see and assist with his medical problems, specifically relating to his blood pressure. At the time of my visit he denies any chest pain, SOA, nausea, vomiting or diarrhea. He has tolerated a diet. He does complain of expected postoperative discomfort. He reports being in a normal state of health leading up to surgery.      Past Medical History:   Diagnosis Date   • Anxiety    • Arthritis    • Bipolar 1 disorder (CMS/HCC)    • Coronary artery disease    • Disease of thyroid gland    • GERD (gastroesophageal reflux disease)    • Hyperlipidemia    • Mobility impaired    • Myocardial infarction (CMS/HCC)    • Overactive bladder    • Parkinsonian syndrome (CMS/HCC)    • Prostatitis    • Restrictive pattern present on pulmonary function testing    • Seasonal allergies    • Stroke (CMS/HCC)     RESIDUAL WEAKNESS RIGHT LOWER EXTREMITY   • Urinary incontinence    • Urinary retention      Past Surgical History:   Procedure Laterality Date   • ABDOMINAL SURGERY     • CARDIAC SURGERY     • CHOLECYSTECTOMY     • CORONARY ARTERY BYPASS GRAFT      x2   • ENDOSCOPY N/A 10/4/2018    LA grade D reflux esophagitis, 4cm hiatal hernia, esohageal stenosis, normal stomach, erythematous duodenopathy,  normal second portion of duodenum, no specimens collected   • ENDOSCOPY N/A 11/30/2018    Procedure: ESOPHAGOGASTRODUODENOSCOPY WITH DILATATION;  Surgeon: Paul Duncan MD;  Location: [facility] ENDOSCOPY;  Service: Gastroenterology   • EYE SURGERY      cataracts   • HIP ENDOPROSTHESIS Right 5/5/2018    Procedure: RIGHT HIP HEMIARTHROPLASTY;  Surgeon: Winston Vallejo MD;  Location: [facility] MAIN OR;  Service: Orthopedics   • HIP FRACTURE SURGERY Right     PARTIAL HIP REPLACEMENT   • MUSCLE BIOPSY Left 3/24/2016    Procedure: LT THIGH MUSCLE BIOPSY;  Surgeon: Fausto Mack MD;  Location: [facility] MAIN OR;  Service:    • SKIN BIOPSY     • THYROID SURGERY     • TURP / TRANSURETHRAL INCISION / DRAINAGE PROSTATE     • VASCULAR SURGERY       Family History   Problem Relation Age of Onset   • Cancer Mother    • Arthritis Father    • Heart disease Father    • Early death Brother    • Heart disease Brother    • Malig Hyperthermia Neg Hx      Social History     Tobacco Use   • Smoking status: Current Every Day Smoker     Packs/day: 1.00     Types: Cigarettes     Start date: 1960   • Smokeless tobacco: Never Used   • Tobacco comment: Educated pt on quitting   Substance Use Topics   • Alcohol use: Yes     Comment: occasionally   • Drug use: No     Medications Prior to Admission   Medication Sig Dispense Refill Last Dose   • acetaminophen (TYLENOL) 325 MG tablet Take 2 tablets by mouth Every 4 (Four) Hours As Needed for Mild Pain , Headache or Fever.   1/17/2019 at Unknown time   • albuterol (PROVENTIL) (2.5 MG/3ML) 0.083% nebulizer solution Take 2.5 mg by nebulization Every 6 (Six) Hours As Needed for Wheezing.  12 Past Week at Unknown time   • arformoterol (BROVANA) 15 MCG/2ML nebulizer solution Take 15 mcg by nebulization 2 (Two) Times a Day.   1/17/2019 at Unknown time   • busPIRone (BUSPAR) 5 MG tablet Take 1 tablet by mouth 3 (Three) Times a Day As Needed (anxiety).   1/17/2019 at Unknown time   •  carbidopa-levodopa (SINEMET)  MG per tablet Take 1 tablet by mouth 3 (Three) Times a Day With Meals. 90 tablet 0 1/17/2019 at Unknown time   • cholecalciferol (VITAMIN D3) 1000 UNITS tablet Take 1,000 Units by mouth daily.   1/17/2019 at Unknown time   • diclofenac (VOLTAREN) 1 % gel gel Apply 4 g topically Daily As Needed (apply to the knees).   1/17/2019 at Unknown time   • divalproex (DEPAKOTE) 500 MG DR tablet Take 500 mg by mouth 2 (Two) Times a Day.   1/17/2019 at Unknown time   • donepezil (ARICEPT) 10 MG tablet Take 20 mg by mouth Every Night.   1/17/2019 at Unknown time   • fluticasone (FLONASE) 50 MCG/ACT nasal spray 1 spray into each nostril Daily.   1/17/2019 at Unknown time   • levothyroxine (SYNTHROID, LEVOTHROID) 175 MCG tablet Take 175 mcg by mouth Daily.   1/17/2019 at Unknown time   • montelukast (SINGULAIR) 10 MG tablet Take 10 mg by mouth Every Night.   1/17/2019 at Unknown time   • Multiple Vitamins-Minerals (MULTIVITAMIN WITH MINERALS) tablet tablet Take 1 tablet by mouth Daily.   Past Week at Unknown time   • nystatin (MYCOSTATIN) 956581 UNIT/ML suspension Take 5 mL by mouth 4 (Four) Times a Day. (Patient taking differently: Take 5 mL by mouth As Needed.)   1/17/2019 at Unknown time   • OLANZapine (ZYPREXA) 10 MG tablet Take 10 mg by mouth Every Night.   1/17/2019 at Unknown time   • oxybutynin XL (DITROPAN-XL) 5 MG 24 hr tablet Take 5 mg by mouth 2 (Two) Times a Day.   1/17/2019 at Unknown time   • pantoprazole (PROTONIX) 40 MG EC tablet Take 1 tablet by mouth 2 (Two) Times a Day Before Meals. 180 tablet 1 1/17/2019 at Unknown time   • promethazine (PHENERGAN) 25 MG tablet Take 25 mg by mouth Every 6 (Six) Hours As Needed for Nausea or Vomiting.   Past Week at Unknown time   • simvastatin (ZOCOR) 40 MG tablet Take 40 mg by mouth Every Night.   1/17/2019 at Unknown time   • sucralfate (CARAFATE) 1 g tablet Take 1 g by mouth Daily.  0 1/17/2019 at Unknown time   • traMADol (ULTRAM) 50 MG  tablet Take 1 tablet by mouth Every 4 (Four) Hours As Needed for Moderate Pain . 60 tablet 0 1/17/2019 at Unknown time   • aspirin  MG EC tablet Take 1 tablet by mouth Daily.   1/4/2019     Allergies:  Beet [beta vulgaris]    Review of Systems   Constitutional: Negative.    HENT: Negative.    Eyes: Negative.    Respiratory: Negative.    Gastrointestinal: Negative.    Endocrine: Negative.    Genitourinary: Negative.    Musculoskeletal: Negative.    Skin: Negative.    Neurological: Negative.    Hematological: Negative.    Psychiatric/Behavioral: Negative.        Objective      Vital Signs  Temp:  [97 °F (36.1 °C)-98.4 °F (36.9 °C)] 97 °F (36.1 °C)  Heart Rate:  [57-96] 59  Resp:  [14-20] 16  BP: ()/(53-74) 91/56  Body mass index is 20.5 kg/m².    Physical Exam   Constitutional: He is oriented to person, place, and time. He appears well-developed and well-nourished.   HENT:   Head: Normocephalic and atraumatic.   Eyes: Conjunctivae and EOM are normal. No scleral icterus.   Neck: Normal range of motion. Neck supple. No JVD present.   Cardiovascular: Normal rate and regular rhythm.   No murmur heard.  Pulmonary/Chest: Effort normal and breath sounds normal. No respiratory distress.   Abdominal: Soft. Bowel sounds are normal. He exhibits no distension. There is no tenderness.   Musculoskeletal: He exhibits no edema.   Neurological: He is alert and oriented to person, place, and time. No cranial nerve deficit.   Skin: Skin is warm and dry.   Psychiatric: He has a normal mood and affect. His behavior is normal.   Vitals reviewed.      Results Review:   I reviewed the patient's new clinical results.  I reviewed the patient's new imaging results and agree with the interpretation.  I reviewed the patient's other test results and agree with the interpretation  I personally viewed and interpreted the patient's EKG/Telemetry data      Assessment/Plan     Active Hospital Problems    Diagnosis Date Noted   •  **Post-traumatic osteoarthritis of right hip [M16.51] 01/18/2019   • Hip pain [M25.559] 01/18/2019   • 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 [M25.551, G89.29, Z96.641] 01/18/2019   • Hyperlipidemia [E78.5] 10/03/2018   • Hypothyroidism [E03.9] 10/01/2017   • Parkinson's disease [G20] 09/29/2017   • COPD (chronic obstructive pulmonary disease) [J44.9] 09/28/2017   • Coronary artery disease [I25.10] 09/28/2017   • History of CVA (cerebrovascular accident) [Z86.73] 09/28/2017      Resolved Hospital Problems   No resolved problems to display.       Mr. Issa is a 73 y.o. male who is POD#0 CONVERSION RT POSTERIOR HIP BIPOLAR TO TOTAL HIP ARTHROPLASTY.    · He seems to be doing well thus far postoperatively. Much better than last time he had surgery where family reports he slept for 2-3 days.  · Anticipate fluctuations in BP due to blood loss, hypovolemia, anesthesia, narcotics etc.   · Continue IVFs as ordered.    · Monitor renal function.  · Seems stable from a cardiac and pulmonary standpoint. No audible wheezing.  · SCDs have been ordered for DVT prophylaxis per surgery.  · He was encouraged to use incentive spirometer as instructed.      Thank you very much for asking [facility] to be involved in this patient's care. We will follow along with you.      Cruz Hardin MD  [facility]  01/18/19  7:16 PM

## 2019-01-19 NOTE — PLAN OF CARE
Problem: Patient Care Overview  Goal: Plan of Care Review  Outcome: Ongoing (interventions implemented as appropriate)   01/19/19 1411   OTHER   Outcome Summary Pain well controlled with PO pain medication. Ambulates with assist x1 and walker. DC to rehab when ready. VSS. Voids per brief.    Coping/Psychosocial   Plan of Care Reviewed With patient   Plan of Care Review   Progress improving     Goal: Individualization and Mutuality  Outcome: Ongoing (interventions implemented as appropriate)    Goal: Discharge Needs Assessment  Outcome: Ongoing (interventions implemented as appropriate)    Goal: Interprofessional Rounds/Family Conf  Outcome: Ongoing (interventions implemented as appropriate)      Problem: Fall Risk (Adult)  Goal: Absence of Fall  Outcome: Ongoing (interventions implemented as appropriate)      Problem: Skin Injury Risk (Adult)  Goal: Identify Related Risk Factors and Signs and Symptoms  Outcome: Ongoing (interventions implemented as appropriate)    Goal: Skin Health and Integrity  Outcome: Ongoing (interventions implemented as appropriate)      Problem: Hip Arthroplasty (Total, Partial) (Adult)  Goal: Signs and Symptoms of Listed Potential Problems Will be Absent, Minimized or Managed (Hip Arthroplasty)  Outcome: Ongoing (interventions implemented as appropriate)    Goal: Anesthesia/Sedation Recovery  Outcome: Ongoing (interventions implemented as appropriate)

## 2019-01-19 NOTE — PROGRESS NOTES
Patient: Victor Manuel Issa  YOB: 1945     Date of Admission: 1/18/2019  6:56 AM Medical Record Number: [id number]     Attending Physician: Radha Paredes,*    Status Post:  Procedure(s):  CONVERSION RT POSTERIOR HIP BIPOLAR TO TOTAL HIP ARTHROPLASTYPost Operative Day Number: 1    Subjective : No new orthopaedic complaints     Pain Relief: some relief with present medication.     Systemic Complaints: No Complaints  Vitals:    01/19/19 0729 01/19/19 0816 01/19/19 1057 01/19/19 1059   BP: 101/55  (!) 88/51 90/51   BP Location: Left arm  Left arm Right arm   Patient Position: Lying  Lying Lying   Pulse: 53 53 63    Resp: 16 16 16    Temp: 97.6 °F (36.4 °C)  97.2 °F (36.2 °C)    TempSrc: Oral  Oral    SpO2: 96% 96% 97%    Weight:       Height:           Physical Exam: 73 y.o. male    General Appearance:       Alert, cooperative, in no acute distress                  Extremities:    Dressing Clean, Dry and Intact         Incision healthy without signs or symptoms of infections         No clinical sign of DVT        Able to do good movements of digits    Pulses:   Pulses palpable and equal bilaterally           Diagnostic Tests:     Results from last 7 days   Lab Units 01/19/19  0420   WBC 10*3/mm3 11.82*   HEMOGLOBIN g/dL 9.4*   HEMATOCRIT % 30.5*   PLATELETS 10*3/mm3 299     Results from last 7 days   Lab Units 01/19/19  0420 01/18/19  1635   SODIUM mmol/L 137 137   POTASSIUM mmol/L 4.4 4.5   CHLORIDE mmol/L 105 103   CO2 mmol/L 21.2* 23.1   BUN mg/dL 17 16   CREATININE mg/dL 0.75* 0.91   GLUCOSE mg/dL 152* 130*   CALCIUM mg/dL 9.7 10.0         Lab Results   Component Value Date    CRP 3.25 (H) 01/18/2019     Lab Results   Component Value Date    SEDRATE 61 (H) 01/19/2019     No results found for: URICACID  No results found for: CRYSTAL  Microbiology Results (last 10 days)     Procedure Component Value - Date/Time    Tissue / Bone Culture - Tissue, Hip, Right [[id number]] Collected:   01/18/19 1124    Lab Status:  Preliminary result Specimen:  Tissue from Hip, Right Updated:  01/19/19 0904     Tissue Culture No growth     Gram Stain No WBCs or organisms seen        Xr Hip With Or Without Pelvis 1 View Right    Result Date: 1/18/2019   Postsurgical changes.    This report was finalized on 1/18/2019 3:08 PM by Dr. Mendoza Womack M.D.              Current Medications:  Scheduled Meds:  arformoterol 15 mcg Nebulization BID - RT   aspirin 325 mg Oral Daily   carbidopa-levodopa 1 tablet Oral TID With Meals   ceFAZolin 2 g Intravenous Q8H   divalproex 500 mg Oral BID   donepezil 20 mg Oral Nightly   fluticasone 1 spray Nasal Daily   levothyroxine 175 mcg Oral Q AM   montelukast 10 mg Oral Nightly   OLANZapine 10 mg Oral Nightly   oxybutynin XL 5 mg Oral BID   pantoprazole 40 mg Oral BID AC   sodium chloride 3 mL Intravenous Q12H   sucralfate 1 g Oral Daily     Continuous Infusions:  sodium chloride 100 mL/hr Last Rate: 100 mL/hr (01/18/19 2140)     PRN Meds:.•  acetaminophen  •  albuterol  •  bisacodyl  •  bisacodyl  •  busPIRone  •  docusate sodium  •  HYDROmorphone **AND** naloxone  •  melatonin  •  promethazine  •  sodium chloride  •  traMADol    Assessment: Status post  Procedure(s):  CONVERSION RT POSTERIOR HIP BIPOLAR TO TOTAL HIP ARTHROPLASTY    Patient Active Problem List   Diagnosis   • Thigh pain, musculoskeletal   • Left leg weakness   • History of CVA (cerebrovascular accident)   • COPD (chronic obstructive pulmonary disease)   • Coronary artery disease   • Cervical myelopathy (CMS/[facility])   • Parkinson's disease   • Debility   • Cerebrovascular disease   • Abnormal TSH   • Hypothyroidism   • Hip fracture requiring operative repair, right, closed, initial encounter (CMS/[facility])   • Urinary tract infection without hematuria   • Hypoxia   • Sinus bradycardia   • Chest pain   • Hip pain, chronic, right   • Hematemesis   • Hematemesis with nausea   • H/O medication noncompliance   • Hyperlipidemia    • Post-traumatic osteoarthritis of right hip   • Hip pain   • 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       PLAN:   Continues current post-op course  Anticoagulation: Aspirin started  Hemovac Drain to be removed tomorrow  Dressing Change prn  Mobilize with PT as tolerated per protocol    Weight Bearing: WBAT  Discharge Plan: OK to plan for discharge in  tomorrow to home, home health and SNF  from orthopadic perspective.    Radha Paredes MD    Date: 1/19/2019    Time: 11:12 AM

## 2019-01-19 NOTE — PROGRESS NOTES
Name: Victor Manuel Issa ADMIT: 2019   : 1945  PCP: Stan Shira CUONG    MRN: [id number] LOS: 1 days   AGE/SEX: 73 y.o. male  ROOM: [facility]     Subjective   Doing okay except pain. Wife reports some confusion last night.    Objective   Vital Signs  Temp:  [96.7 °F (35.9 °C)-97.6 °F (36.4 °C)] 97.6 °F (36.4 °C)  Heart Rate:  [51-71] 58  Resp:  [16-18] 16  BP: ()/(45-59) 90/51  SpO2:  [96 %-100 %] 97 %  on   ;   Device (Oxygen Therapy): room air  Body mass index is 20.5 kg/m².    Physical Exam   Constitutional: He is oriented to person, place, and time. No distress.   Cardiovascular: Normal rate and regular rhythm.   No murmur heard.  Pulmonary/Chest: Effort normal and breath sounds normal.   Abdominal: Soft. Bowel sounds are normal. He exhibits no distension. There is no tenderness.   Musculoskeletal: Normal range of motion. He exhibits no edema.   Neurological: He is alert and oriented to person, place, and time.   Skin: Skin is warm and dry. He is not diaphoretic.       Results Review:       I reviewed the patient's new clinical results.  Results from last 7 days   Lab Units 19  0420   WBC 10*3/mm3 11.82*   HEMOGLOBIN g/dL 9.4*   PLATELETS 10*3/mm3 299     Results from last 7 days   Lab Units 19  0420 19  1635   SODIUM mmol/L 137 137   POTASSIUM mmol/L 4.4 4.5   CHLORIDE mmol/L 105 103   CO2 mmol/L 21.2* 23.1   BUN mg/dL 17 16   CREATININE mg/dL 0.75* 0.91   GLUCOSE mg/dL 152* 130*   Estimated Creatinine Clearance: 75.3 mL/min (A) (by C-G formula based on SCr of 0.75 mg/dL (L)).  Results from last 7 days   Lab Units 19  0420 19  1635   CALCIUM mg/dL 9.7 10.0     Lab Results   Component Value Date    HGBA1C 4.87 2017   No results found for: POCGLU      arformoterol 15 mcg Nebulization BID - RT   aspirin 325 mg Oral Daily   carbidopa-levodopa 1 tablet Oral TID With Meals   ceFAZolin 2 g Intravenous Q8H   divalproex 500 mg Oral BID   donepezil 20 mg Oral  Nightly   fluticasone 1 spray Nasal Daily   levothyroxine 175 mcg Oral Q AM   montelukast 10 mg Oral Nightly   OLANZapine 10 mg Oral Nightly   oxybutynin XL 5 mg Oral BID   pantoprazole 40 mg Oral BID AC   sodium chloride 3 mL Intravenous Q12H   sucralfate 1 g Oral Daily            Assessment/Plan      Active Hospital Problems    Diagnosis Date Noted   • **Post-traumatic osteoarthritis of right hip [M16.51] 01/18/2019   • Hip pain [M25.559] 01/18/2019   • 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 [M25.551, G89.29, Z96.641] 01/18/2019   • Hyperlipidemia [E78.5] 10/03/2018   • Hypothyroidism [E03.9] 10/01/2017   • Parkinson's disease [G20] 09/29/2017   • COPD (chronic obstructive pulmonary disease) [J44.9] 09/28/2017   • Coronary artery disease [I25.10] 09/28/2017   • History of CVA (cerebrovascular accident) [Z86.73] 09/28/2017      Resolved Hospital Problems   No resolved problems to display.       Mr. Issa is a 73 y.o. male who is POD#1 CONVERSION RT POSTERIOR HIP BIPOLAR TO TOTAL HIP ARTHROPLASTY.    · He seems to be doing well thus far postoperatively.   · BP running low. Asymptomatic. Continue monitoring.  · Seems stable from a cardiac and pulmonary standpoint. No audible wheezing.  · SCDs have been ordered for DVT prophylaxis per surgery.  · He was encouraged to use incentive spirometer as instructed.      Cruz Hardin MD  [facility]  01/19/19  5:49 PM

## 2019-01-19 NOTE — THERAPY TREATMENT NOTE
Acute Care - Physical Therapy Treatment Note  [facility]     Patient Name: Victor Manuel Issa  : 1945  MRN: [id number]  Today's Date: 2019  Onset of Illness/Injury or Date of Surgery: 19     Referring Physician: Reggie     Admit Date: 2019    Visit Dx:    ICD-10-CM ICD-9-CM   1. Decreased mobility R26.89 781.99   2. 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 M25.551 719.45    G89.29 338.29    Z96.641 V43.64     Patient Active Problem List   Diagnosis   • Thigh pain, musculoskeletal   • Left leg weakness   • History of CVA (cerebrovascular accident)   • COPD (chronic obstructive pulmonary disease)   • Coronary artery disease   • Cervical myelopathy (CMS/Formerly [facility])   • Parkinson's disease   • Debility   • Cerebrovascular disease   • Abnormal TSH   • Hypothyroidism   • Hip fracture requiring operative repair, right, closed, initial encounter (CMS/Formerly [facility])   • Urinary tract infection without hematuria   • Hypoxia   • Sinus bradycardia   • Chest pain   • Hip pain, chronic, right   • Hematemesis   • Hematemesis with nausea   • H/O medication noncompliance   • Hyperlipidemia   • Post-traumatic osteoarthritis of right hip   • Hip pain   • 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       Therapy Treatment    Rehabilitation Treatment Summary     Row Name 19 1049             Treatment Time/Intention    Discipline  physical therapist  -CA      Document Type  therapy note (daily note)  -CA      Subjective Information  no complaints  -CA      Mode of Treatment  physical therapy;individual therapy  -CA      Patient/Family Observations  Pt supine in bed upon entering room, no acute distress noted at rest, family present at bedside  -CA      Patient Effort  good  -CA      Existing Precautions/Restrictions  fall;hip, posterior;right  -CA      Treatment Considerations/Comments  B LE contractures  (Significant)   -CA      Recorded by [CA] Malu Vasquez, PT 19 9093       Row Name 01/19/19 1049             Cognitive Assessment/Intervention- PT/OT    Affect/Mental Status (Cognitive)  confused  -CA      Orientation Status (Cognition)  oriented to;person  -CA      Follows Commands (Cognition)  follows one step commands;over 90% accuracy;increased processing time needed;verbal cues/prompting required  -CA      Personal Safety Interventions  fall prevention program maintained;gait belt;nonskid shoes/slippers when out of bed  -CA      Recorded by [CA] Malu Vasquez, PT 01/19/19 1057      Row Name 01/19/19 1049             Mobility Assessment/Intervention    Extremity Weight-bearing Status  right lower extremity  -CA      Right Lower Extremity (Weight-bearing Status)  weight-bearing as tolerated (WBAT)  -CA      Recorded by [CA] Malu Vasquez, PT 01/19/19 1057      Row Name 01/19/19 1049             Bed Mobility Assessment/Treatment    Supine-Sit Ed[place] (Bed Mobility)  moderate assist (50% patient effort);2 person assist  -CA      Sit-Supine [place] (Bed Mobility)  moderate assist (50% patient effort);2 person assist  -CA      Bed Mobility, Safety Issues  decreased use of legs for bridging/pushing  -CA      Assistive Device (Bed Mobility)  bed rails;draw sheet;head of bed elevated  -CA      Recorded by [CA] Malu Vasquez, PT 01/19/19 1057      Row Name 01/19/19 1049             Transfer Assessment/Treatment    Transfer Assessment/Treatment  sit-stand transfer;stand-sit transfer  -CA      Comment (Transfers)  STS transfers x 3 with assist to block R LE and max cues throughout for upright posture, glute/quad engagement, tactile cues for quad engagement  -CA      Recorded by [CA] Malu Vasquez, PT 01/19/19 1057      Row Name 01/19/19 1049             Sit-Stand Transfer    Sit-Stand Edna (Transfers)  moderate assist (50% patient effort);2 person assist  -CA      Assistive Device (Sit-Stand Transfers)  walker, front-wheeled  -CA      Recorded by [CA] Pedro  aMlu, PT 01/19/19 1057      Row Name 01/19/19 1049             Stand-Sit Transfer    Stand-Sit Reading (Transfers)  minimum assist (75% patient effort);2 person assist  -CA      Assistive Device (Stand-Sit Transfers)  walker, front-wheeled  -CA      Recorded by [CA] Malu Vasquez, PT 01/19/19 1057      Row Name 01/19/19 1049             Gait/Stairs Assessment/Training    Reading Level (Gait)  not tested  -CA      Recorded by [CA] Malu Vasquez, PT 01/19/19 1100      Row Name 01/19/19 1049             Therapeutic Exercise    Comment (Therapeutic Exercise)  AP, GS, Hip ABD, and heel slides x 10 ea R LE  -CA      Recorded by [CA] Malu Vasquez, PT 01/19/19 1100      Row Name 01/19/19 1049             Positioning and Restraints    Pre-Treatment Position  in bed  -CA      Post Treatment Position  bed  -CA      In Bed  supine;call light within reach;encouraged to call for assist;with family/caregiver;side rails up x2;pillow between legs sheet roll under R LE to encourage passive R knee ext  -CA      Recorded by [CA] Malu Vasquez, PT 01/19/19 1100      Row Name 01/19/19 1049             Pain Assessment    Additional Documentation  Pain Scale: Numbers Pre/Post-Treatment (Group)  -CA      Recorded by [CA] Malu Vasquez, PT 01/19/19 1100      Row Name 01/19/19 1049             Pain Scale: Numbers Pre/Post-Treatment    Pre/Post Treatment Pain Comment  Pt verbalizes pain but does not rate  -CA      Recorded by [CA] Malu Vasquez, PT 01/19/19 1100      Row Name                Wound 01/18/19 1108 Right hip incision    Wound - Properties Group Date first assessed: 01/18/19 [EG] Time first assessed: 1108 [EG] Side: Right [EG] Location: hip [EG] Type: incision [EG] Recorded by:  [EG] Kathy Rush RN 01/18/19 1108    Row Name                Wound 01/18/19 1646 Left other (see notes) pressure injury    Wound - Properties Group Date first assessed: 01/18/19 [JACKELIN] Time first assessed: 1646 [JACKELIN]  Side: Left [JACKELIN] Location: other (see notes) [JACKELIN] Type: pressure injury [JACKELIN] Stage, Pressure Injury: Stage 2 [JACKELIN] Recorded by:  [JACKELIN] Munira Dee, RN 01/18/19 1647    Row Name 01/19/19 1049             Outcome Summary/Treatment Plan (PT)    Daily Summary of Progress (PT)  progress toward functional goals is gradual  -CA      Anticipated Discharge Disposition (PT)  skilled nursing facility  -CA      Recorded by [CA] Malu Vasquez, PT 01/19/19 1100        User Key  (r) = Recorded By, (t) = Taken By, (c) = Cosigned By    Initials Name Effective Dates Discipline    Munira Eason, RN 06/25/18 -  Nurse    Kathy Butt RN 07/10/17 -  Nurse    Malu oHllins, PT 06/13/18 -  PT          Wound 01/18/19 1108 Right hip incision (Active)   Dressing Appearance dry;intact;no drainage 1/19/2019  8:16 AM   Closure HARDIK 1/19/2019  8:16 AM   Base dressing in place, unable to visualize 1/19/2019  8:16 AM   Drainage Amount none 1/19/2019  8:16 AM   Dressing Care, Wound border dressing 1/18/2019  3:00 PM       Wound 01/18/19 1646 Left other (see notes) pressure injury (Active)   Wound Image   1/18/2019  4:00 PM   Dressing Appearance dry;intact 1/19/2019  8:16 AM           Physical Therapy Education     Title: PT OT SLP Therapies (In Progress)     Topic: Physical Therapy (In Progress)     Point: Mobility training (In Progress)     Learning Progress Summary           Patient Acceptance, E, NR by CA at 1/19/2019 11:01 AM    Comment:  Discussed positioning for contractures    Acceptance, E, VU,NR by MA at 1/18/2019  5:27 PM   Family Acceptance, E, NR by CA at 1/19/2019 11:01 AM    Comment:  Discussed positioning for contractures                   Point: Home exercise program (In Progress)     Learning Progress Summary           Patient Acceptance, E, NR by CA at 1/19/2019 11:01 AM    Comment:  Discussed positioning for contractures    Acceptance, E, VU,NR by MA at 1/18/2019  5:27 PM   Family Acceptance, E, NR by CA at  1/19/2019 11:01 AM    Comment:  Discussed positioning for contractures                   Point: Body mechanics (In Progress)     Learning Progress Summary           Patient Acceptance, E, NR by CA at 1/19/2019 11:01 AM    Comment:  Discussed positioning for contractures    Acceptance, E, VU,NR by MA at 1/18/2019  5:27 PM   Family Acceptance, E, NR by CA at 1/19/2019 11:01 AM    Comment:  Discussed positioning for contractures                   Point: Precautions (In Progress)     Learning Progress Summary           Patient Acceptance, E, NR by CA at 1/19/2019 11:01 AM    Comment:  Discussed positioning for contractures    Acceptance, E, VU,NR by MA at 1/18/2019  5:27 PM   Family Acceptance, E, NR by CA at 1/19/2019 11:01 AM    Comment:  Discussed positioning for contractures                               User Key     Initials Effective Dates Name Provider Type Discipline    MA 10/19/18 -  Christiana Wilson, PT Physical Therapist PT    CA 06/13/18 -  Malu Vasquez, PT Physical Therapist PT                PT Recommendation and Plan  Anticipated Discharge Disposition (PT): skilled nursing facility  Outcome Summary/Treatment Plan (PT)  Daily Summary of Progress (PT): progress toward functional goals is gradual  Anticipated Discharge Disposition (PT): skilled nursing facility  Plan of Care Reviewed With: patient  Progress: improving  Outcome Summary: Pt demos good progress towards PT goals today, able to attempt STS transfers x3, mod Ax2 with FWW and assist to block R LE. Plan [place]  Outcome Measures     Row Name 01/19/19 1100 01/18/19 1700          How much help from another person do you currently need...    Turning from your back to your side while in flat bed without using bedrails?  2  -CA  2  -MA     Moving from lying on back to sitting on the side of a flat bed without bedrails?  2  -CA  2  -MA     Moving to and from a bed to a chair (including a wheelchair)?  2  -CA  1  -MA     Standing up from a chair  using your arms (e.g., wheelchair, bedside chair)?  2  -CA  1  -MA     Climbing 3-5 steps with a railing?  1  -CA  1  -MA     To walk in hospital room?  1  -CA  1  -MA     AM-PAC 6 Clicks Score  10  -CA  8  -MA        Functional Assessment    Outcome Measure Options  AM-PAC 6 Clicks Basic Mobility (PT)  -CA  AM-PAC 6 Clicks Basic Mobility (PT)  -MA       User Key  (r) = Recorded By, (t) = Taken By, (c) = Cosigned By    Initials Name Provider Type    Christiana Cardenas, PT Physical Therapist    Malu Hollins, PT Physical Therapist         Time Calculation:   PT Charges     Row Name 01/19/19 1103             Time Calculation    Start Time  1023  -CA      Stop Time  1050  -CA      Time Calculation (min)  27 min  -CA      PT Received On  01/19/19  -CA      PT - Next Appointment  01/19/19  -CA         Time Calculation- PT    Total Timed Code Minutes- PT  27 minute(s)  -CA        User Key  (r) = Recorded By, (t) = Taken By, (c) = Cosigned By    Initials Name Provider Type    Malu Hollins, KHANG Physical Therapist        Therapy Suggested Charges     Code   Minutes Charges    None           Therapy Charges for Today     Code Description Service Date Service Provider Modifiers Qty    42743692229 HC PT THER PROC EA 15 MIN 1/19/2019 Malu Vasquez, PT GP 2          PT G-Codes  Outcome Measure Options: AM-PAC 6 Clicks Basic Mobility (PT)  AM-PAC 6 Clicks Score: 10    Malu Vasquez, PT  1/19/2019        details… detailed exam

## 2019-01-20 LAB
ANION GAP SERPL CALCULATED.3IONS-SCNC: 9.2 MMOL/L
BASOPHILS # BLD AUTO: 0.02 10*3/MM3 (ref 0–0.2)
BASOPHILS NFR BLD AUTO: 0.2 % (ref 0–1.5)
BUN BLD-MCNC: 14 MG/DL (ref 8–23)
BUN/CREAT SERPL: 20.9 (ref 7–25)
CALCIUM SPEC-SCNC: 9.2 MG/DL (ref 8.6–10.5)
CHLORIDE SERPL-SCNC: 108 MMOL/L (ref 98–107)
CO2 SERPL-SCNC: 22.8 MMOL/L (ref 22–29)
CREAT BLD-MCNC: 0.67 MG/DL (ref 0.76–1.27)
DEPRECATED RDW RBC AUTO: 50.7 FL (ref 37–54)
EOSINOPHIL # BLD AUTO: 0.11 10*3/MM3 (ref 0–0.7)
EOSINOPHIL NFR BLD AUTO: 1 % (ref 0.3–6.2)
ERYTHROCYTE [DISTWIDTH] IN BLOOD BY AUTOMATED COUNT: 14.5 % (ref 11.5–14.5)
GFR SERPL CREATININE-BSD FRML MDRD: 116 ML/MIN/1.73
GLUCOSE BLD-MCNC: 114 MG/DL (ref 65–99)
HCT VFR BLD AUTO: 28.3 % (ref 40.4–52.2)
HGB BLD-MCNC: 8.8 G/DL (ref 13.7–17.6)
IMM GRANULOCYTES # BLD AUTO: 0.03 10*3/MM3 (ref 0–0.03)
IMM GRANULOCYTES NFR BLD AUTO: 0.3 % (ref 0–0.5)
LYMPHOCYTES # BLD AUTO: 2.64 10*3/MM3 (ref 0.9–4.8)
LYMPHOCYTES NFR BLD AUTO: 24.6 % (ref 19.6–45.3)
MCH RBC QN AUTO: 29.7 PG (ref 27–32.7)
MCHC RBC AUTO-ENTMCNC: 31.1 G/DL (ref 32.6–36.4)
MCV RBC AUTO: 95.6 FL (ref 79.8–96.2)
MONOCYTES # BLD AUTO: 0.86 10*3/MM3 (ref 0.2–1.2)
MONOCYTES NFR BLD AUTO: 8 % (ref 5–12)
NEUTROPHILS # BLD AUTO: 7.08 10*3/MM3 (ref 1.9–8.1)
NEUTROPHILS NFR BLD AUTO: 66.2 % (ref 42.7–76)
PLATELET # BLD AUTO: 291 10*3/MM3 (ref 140–500)
PMV BLD AUTO: 9.3 FL (ref 6–12)
POTASSIUM BLD-SCNC: 3.9 MMOL/L (ref 3.5–5.2)
RBC # BLD AUTO: 2.96 10*6/MM3 (ref 4.6–6)
SODIUM BLD-SCNC: 140 MMOL/L (ref 136–145)
WBC NRBC COR # BLD: 10.71 10*3/MM3 (ref 4.5–10.7)

## 2019-01-20 PROCEDURE — 97110 THERAPEUTIC EXERCISES: CPT

## 2019-01-20 PROCEDURE — 94640 AIRWAY INHALATION TREATMENT: CPT

## 2019-01-20 PROCEDURE — 85025 COMPLETE CBC W/AUTO DIFF WBC: CPT | Performed by: ORTHOPAEDIC SURGERY

## 2019-01-20 PROCEDURE — 94799 UNLISTED PULMONARY SVC/PX: CPT

## 2019-01-20 PROCEDURE — 25010000003 CEFAZOLIN IN DEXTROSE 2-4 GM/100ML-% SOLUTION: Performed by: ORTHOPAEDIC SURGERY

## 2019-01-20 PROCEDURE — 80048 BASIC METABOLIC PNL TOTAL CA: CPT | Performed by: ORTHOPAEDIC SURGERY

## 2019-01-20 PROCEDURE — 25010000002 ONDANSETRON PER 1 MG: Performed by: ORTHOPAEDIC SURGERY

## 2019-01-20 RX ORDER — ONDANSETRON 2 MG/ML
4 INJECTION INTRAMUSCULAR; INTRAVENOUS EVERY 6 HOURS PRN
Status: DISCONTINUED | OUTPATIENT
Start: 2019-01-20 | End: 2019-01-22 | Stop reason: HOSPADM

## 2019-01-20 RX ORDER — SULFAMETHOXAZOLE AND TRIMETHOPRIM 800; 160 MG/1; MG/1
1 TABLET ORAL EVERY 12 HOURS SCHEDULED
Status: DISCONTINUED | OUTPATIENT
Start: 2019-01-20 | End: 2019-01-21

## 2019-01-20 RX ADMIN — CEFAZOLIN SODIUM 2 G: 2 INJECTION, SOLUTION INTRAVENOUS at 02:28

## 2019-01-20 RX ADMIN — FLUTICASONE PROPIONATE 1 SPRAY: 50 SPRAY, METERED NASAL at 08:42

## 2019-01-20 RX ADMIN — TRAMADOL HYDROCHLORIDE 50 MG: 50 TABLET, FILM COATED ORAL at 04:01

## 2019-01-20 RX ADMIN — CARBIDOPA AND LEVODOPA 1 TABLET: 25; 250 TABLET ORAL at 08:40

## 2019-01-20 RX ADMIN — ACETAMINOPHEN 325 MG: 325 TABLET, FILM COATED ORAL at 16:58

## 2019-01-20 RX ADMIN — DOCUSATE SODIUM 100 MG: 100 CAPSULE, LIQUID FILLED ORAL at 08:40

## 2019-01-20 RX ADMIN — ONDANSETRON 4 MG: 2 INJECTION INTRAMUSCULAR; INTRAVENOUS at 13:42

## 2019-01-20 RX ADMIN — DIVALPROEX SODIUM 500 MG: 500 TABLET, DELAYED RELEASE ORAL at 21:27

## 2019-01-20 RX ADMIN — OLANZAPINE 10 MG: 10 TABLET, FILM COATED ORAL at 21:27

## 2019-01-20 RX ADMIN — OXYBUTYNIN CHLORIDE 5 MG: 5 TABLET, EXTENDED RELEASE ORAL at 08:40

## 2019-01-20 RX ADMIN — DIVALPROEX SODIUM 500 MG: 500 TABLET, DELAYED RELEASE ORAL at 08:41

## 2019-01-20 RX ADMIN — PANTOPRAZOLE SODIUM 40 MG: 40 TABLET, DELAYED RELEASE ORAL at 17:00

## 2019-01-20 RX ADMIN — OXYBUTYNIN CHLORIDE 5 MG: 5 TABLET, EXTENDED RELEASE ORAL at 21:27

## 2019-01-20 RX ADMIN — DONEPEZIL HYDROCHLORIDE 20 MG: 10 TABLET, FILM COATED ORAL at 21:27

## 2019-01-20 RX ADMIN — SODIUM CHLORIDE, PRESERVATIVE FREE 3 ML: 5 INJECTION INTRAVENOUS at 08:42

## 2019-01-20 RX ADMIN — SODIUM CHLORIDE, PRESERVATIVE FREE 3 ML: 5 INJECTION INTRAVENOUS at 21:28

## 2019-01-20 RX ADMIN — ACETAMINOPHEN 325 MG: 325 TABLET, FILM COATED ORAL at 08:40

## 2019-01-20 RX ADMIN — SULFAMETHOXAZOLE AND TRIMETHOPRIM 160 MG: 800; 160 TABLET ORAL at 22:10

## 2019-01-20 RX ADMIN — TRAMADOL HYDROCHLORIDE 50 MG: 50 TABLET, FILM COATED ORAL at 16:58

## 2019-01-20 RX ADMIN — ASPIRIN 325 MG: 325 TABLET, DELAYED RELEASE ORAL at 08:40

## 2019-01-20 RX ADMIN — TRAMADOL HYDROCHLORIDE 50 MG: 50 TABLET, FILM COATED ORAL at 12:27

## 2019-01-20 RX ADMIN — LEVOTHYROXINE SODIUM 175 MCG: 175 TABLET ORAL at 06:28

## 2019-01-20 RX ADMIN — CARBIDOPA AND LEVODOPA 1 TABLET: 25; 250 TABLET ORAL at 16:58

## 2019-01-20 RX ADMIN — PANTOPRAZOLE SODIUM 40 MG: 40 TABLET, DELAYED RELEASE ORAL at 06:38

## 2019-01-20 RX ADMIN — ARFORMOTEROL TARTRATE 15 MCG: 15 SOLUTION RESPIRATORY (INHALATION) at 07:57

## 2019-01-20 RX ADMIN — MONTELUKAST SODIUM 10 MG: 10 TABLET, FILM COATED ORAL at 21:27

## 2019-01-20 RX ADMIN — ARFORMOTEROL TARTRATE 15 MCG: 15 SOLUTION RESPIRATORY (INHALATION) at 15:54

## 2019-01-20 RX ADMIN — CEFAZOLIN SODIUM 2 G: 2 INJECTION, SOLUTION INTRAVENOUS at 10:16

## 2019-01-20 RX ADMIN — TRAMADOL HYDROCHLORIDE 50 MG: 50 TABLET, FILM COATED ORAL at 08:40

## 2019-01-20 RX ADMIN — CARBIDOPA AND LEVODOPA 1 TABLET: 25; 250 TABLET ORAL at 12:25

## 2019-01-20 RX ADMIN — SUCRALFATE 1 G: 1 TABLET ORAL at 08:40

## 2019-01-20 RX ADMIN — BUSPIRONE HYDROCHLORIDE 5 MG: 5 TABLET ORAL at 08:41

## 2019-01-20 NOTE — PLAN OF CARE
Problem: Patient Care Overview  Goal: Plan of Care Review  Outcome: Ongoing (interventions implemented as appropriate)   01/20/19 0138   OTHER   Outcome Summary pain under control with pain meds. vitals stable. Dressing dry and intact. Voiding per brief. Neuro vascular checks intact. D/C to rehab when ready. Will continue to monitor.   Coping/Psychosocial   Plan of Care Reviewed With patient   Plan of Care Review   Progress improving     Goal: Individualization and Mutuality  Outcome: Ongoing (interventions implemented as appropriate)    Goal: Discharge Needs Assessment  Outcome: Ongoing (interventions implemented as appropriate)    Goal: Interprofessional Rounds/Family Conf  Outcome: Ongoing (interventions implemented as appropriate)      Problem: Fall Risk (Adult)  Goal: Absence of Fall  Outcome: Ongoing (interventions implemented as appropriate)      Problem: Hip Arthroplasty (Total, Partial) (Adult)  Goal: Signs and Symptoms of Listed Potential Problems Will be Absent, Minimized or Managed (Hip Arthroplasty)  Outcome: Ongoing (interventions implemented as appropriate)

## 2019-01-20 NOTE — THERAPY TREATMENT NOTE
Acute Care - Physical Therapy Treatment Note  [facility]     Patient Name: Victor Manuel Issa  : 1945  MRN: [id number]  Today's Date: 2019  Onset of Illness/Injury or Date of Surgery: 19     Referring Physician: Reggie     Admit Date: 2019    Visit Dx:    ICD-10-CM ICD-9-CM   1. Decreased mobility R26.89 781.99   2. 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 M25.551 719.45    G89.29 338.29    Z96.641 V43.64     Patient Active Problem List   Diagnosis   • Thigh pain, musculoskeletal   • Left leg weakness   • History of CVA (cerebrovascular accident)   • COPD (chronic obstructive pulmonary disease)   • Coronary artery disease   • Cervical myelopathy (CMS/[facility])   • Parkinson's disease   • Debility   • Cerebrovascular disease   • Abnormal TSH   • Hypothyroidism   • Hip fracture requiring operative repair, right, closed, initial encounter (CMS/[facility])   • Urinary tract infection without hematuria   • Hypoxia   • Sinus bradycardia   • Chest pain   • Hip pain, chronic, right   • Hematemesis   • Hematemesis with nausea   • H/O medication noncompliance   • Hyperlipidemia   • Post-traumatic osteoarthritis of right hip   • Hip pain   • 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       Therapy Treatment    Rehabilitation Treatment Summary     Row Name 19 1324 19 0908          Treatment Time/Intention    Discipline  physical therapist  -CS  physical therapist  -CS     Document Type  therapy note (daily note)  -CS  therapy note (daily note)  -CS     Subjective Information  no complaints  -CS  no complaints  -CS     Mode of Treatment  physical therapy;individual therapy  -CS  physical therapy;individual therapy  -CS     Patient/Family Observations  Pt supine in bed, no acute distress  -CS  Pt supine in bed, no acute distress  -CS     Therapy Frequency (PT Clinical Impression)  2 times/day  -CS  2 times/day  -CS     Patient Effort  good  -CS  good  -CS      Existing Precautions/Restrictions  fall;hip, posterior;right  -CS  fall;hip, posterior;right  -CS     Recorded by [CS] Pedro Luis Martin, PT 01/20/19 1331 [CS] Pedro Luis Martin, PT 01/20/19 0911     Row Name 01/20/19 1324 01/20/19 0908          Cognitive Assessment/Intervention- PT/OT    Affect/Mental Status (Cognitive)  confused  -CS  confused  -CS     Orientation Status (Cognition)  oriented to;person  -CS  oriented to;person  -CS     Follows Commands (Cognition)  follows one step commands;over 90% accuracy;increased processing time needed;verbal cues/prompting required  -CS  follows one step commands;over 90% accuracy;increased processing time needed;verbal cues/prompting required  -CS     Personal Safety Interventions  fall prevention program maintained;gait belt;supervised activity;nonskid shoes/slippers when out of bed  -CS  fall prevention program maintained;gait belt;nonskid shoes/slippers when out of bed;supervised activity  -CS     Recorded by [CS] Pedro Luis Martin, PT 01/20/19 1331 [CS] Pedro Luis Martin, PT 01/20/19 0911     Row Name 01/20/19 1324 01/20/19 0908          Mobility Assessment/Intervention    Extremity Weight-bearing Status  right lower extremity  -CS  right lower extremity  -CS     Right Lower Extremity (Weight-bearing Status)  weight-bearing as tolerated (WBAT)  -CS  weight-bearing as tolerated (WBAT)  -CS     Recorded by [CS] Pedro Luis Martin, PT 01/20/19 1331 [CS] Pedro Luis Martin, PT 01/20/19 0911     Row Name 01/20/19 1324 01/20/19 0908          Bed Mobility Assessment/Treatment    Bed Mobility Assessment/Treatment  supine-sit  -CS  supine-sit  -CS     Supine-Sit [place] (Bed Mobility)  minimum assist (75% patient effort)  -CS  minimum assist (75% patient effort)  -CS     Assistive Device (Bed Mobility)  --  bed rails;head of bed elevated  -CS     Recorded by [CS] Pedro Luis Martin, PT 01/20/19 1331 [CS] Pedro Luis Martin, PT 01/20/19 0911     Row Name 01/20/19 1324 01/20/19 0908           Transfer Assessment/Treatment    Transfer Assessment/Treatment  sit-stand transfer;stand-sit transfer  -CS  sit-stand transfer;stand-sit transfer;bed-chair transfer  -CS     Recorded by [CS] Pedro Luis Martin, PT 01/20/19 1401 [CS] Pedro Luis Martin, PT 01/20/19 0911     Row Name 01/20/19 0908             Bed-Chair Transfer    Bed-Chair [place] (Transfers)  maximum assist (25% patient effort)  -CS      Assistive Device (Bed-Chair Transfers)  walker, front-wheeled  -CS      Recorded by [CS] Pedro Luis Martin, PT 01/20/19 0911      Row Name 01/20/19 1324 01/20/19 0908          Sit-Stand Transfer    Sit-Stand [place] (Transfers)  moderate assist (50% patient effort)  -CS  moderate assist (50% patient effort)  -CS     Assistive Device (Sit-Stand Transfers)  walker, front-wheeled  -CS  walker, front-wheeled  -CS     Recorded by [CS] Pedro Luis Martin, PT 01/20/19 1401 [CS] Pedro Luis Martin, PT 01/20/19 0911     Row Name 01/20/19 1324 01/20/19 0908          Stand-Sit Transfer    Stand-Sit [place] (Transfers)  moderate assist (50% patient effort)  -CS  moderate assist (50% patient effort)  -CS     Assistive Device (Stand-Sit Transfers)  walker, front-wheeled  -JEANNE  walker, front-wheeled  -CS     Recorded by [CS] Pedro Luis Martin, PT 01/20/19 1401 [CS] Pedro Luis Martin, PT 01/20/19 0911     Row Name 01/20/19 1324 01/20/19 0908          Gait/Stairs Assessment/Training    Gait/Stairs Assessment/Training  --  gait/ambulation assistive device  -CS     [place] Level (Gait)  --  maximum assist (25% patient effort)  -CS     Assistive Device (Gait)  --  walker, front-wheeled  -JEANNE     Distance in Feet (Gait)  --  5  -CS     Pattern (Gait)  --  step-to  -CS     Deviations/Abnormal Patterns (Gait)  --  festinating/shuffling;gait speed decreased;stride length decreased;dayanna decreased  -CS     Bilateral Gait Deviations  --  weight shift ability decreased;forward flexed posture  -CS     Comment (Gait/Stairs)  too  tired this PM  -CS  --     Recorded by [CS] Pedro Luis Martin, PT 01/20/19 1401 [CS] Pedro Luis Martin, PT 01/20/19 0911     Row Name 01/20/19 1324 01/20/19 0908          Therapeutic Exercise    Comment (Therapeutic Exercise)  AP, LAQ x10 and sit<>stand 4x  -CS  THR protocol x10  -CS     Recorded by [CS] Pedro Luis Martin, PT 01/20/19 1401 [CS] Pedro Luis Martin, PT 01/20/19 0911     Row Name 01/20/19 1324 01/20/19 0908          Positioning and Restraints    Pre-Treatment Position  in bed  -CS  in bed  -CS     Post Treatment Position  bed  -CS  chair  -CS     In Bed  supine;call light within reach;encouraged to call for assist;with family/caregiver  -CS  --     In Chair  --  reclined;call light within reach;encouraged to call for assist;with family/caregiver  -CS     Recorded by [CS] Pedro Luis Martin, PT 01/20/19 1401 [CS] Pedro Luis Martin, PT 01/20/19 0911     Row Name 01/20/19 0908             Pain Scale: Numbers Pre/Post-Treatment    Pain Intervention(s)  Repositioned;Ambulation/increased activity  -CS      Recorded by [CS] Pedro Luis Martin, PT 01/20/19 0911      Row Name 01/20/19 1324 01/20/19 0908          Pain Scale: Word Pre/Post-Treatment    Pain: Word Scale, Pretreatment  2 - mild pain  -CS  2 - mild pain  -CS     Pain: Word Scale, Post-Treatment  4 - moderate pain  -CS  4 - moderate pain  -CS     Recorded by [CS] Pedro Luis Martin, PT 01/20/19 1401 [CS] Pedro Luis Martin, PT 01/20/19 0911     Row Name                Wound 01/18/19 1108 Right hip incision    Wound - Properties Group Date first assessed: 01/18/19 [EG] Time first assessed: 1108 [EG] Side: Right [EG] Location: hip [EG] Type: incision [EG] Recorded by:  [EG] Kathy Rush RN 01/18/19 1108    Row Name                Wound 01/18/19 1646 Left other (see notes) pressure injury    Wound - Properties Group Date first assessed: 01/18/19 [JACKELIN] Time first assessed: 1646 [JACKELIN] Side: Left [JACKELIN] Location: other (see notes) [JACKELIN] Type: pressure injury [JACKELIN]  Stage, Pressure Injury: Stage 2 [JACKELIN] Recorded by:  [JACKELIN] Munira Dee, RN 01/18/19 1647    Row Name 01/20/19 1324 01/20/19 0908          Coping    Observed Emotional State  calm  -CS  calm  -CS     Verbalized Emotional State  acceptance  -CS  acceptance  -CS     Recorded by [CS] Pedro Luis Martin, PT 01/20/19 1401 [CS] Pedro Luis Martin, PT 01/20/19 0911     Row Name 01/20/19 1324 01/20/19 0908          Plan of Care Review    Plan of Care Reviewed With  patient  -CS  patient;spouse  -CS     Recorded by [CS] Pedro Luis Martin, PT 01/20/19 1401 [CS] Pedro Luis Martin, PT 01/20/19 0911     Row Name 01/20/19 1324 01/20/19 0908          Outcome Summary/Treatment Plan (PT)    Anticipated Discharge Disposition (PT)  skilled nursing facility  -CS  skilled nursing facility  -CS     Recorded by [CS] Pedro Luis Martin, PT 01/20/19 1401 [CS] Pedro Luis Martin, PT 01/20/19 0911       User Key  (r) = Recorded By, (t) = Taken By, (c) = Cosigned By    Initials Name Effective Dates Discipline    JACKELIN Munira Dee, RN 06/25/18 -  Nurse    Kathy Butt RN 07/10/17 -  Nurse    Pedro Luis Morgan, PT 05/14/18 -  PT          Wound 01/18/19 1108 Right hip incision (Active)   Dressing Appearance dry;intact;no drainage 1/20/2019 12:14 PM   Closure HARDIK 1/20/2019 12:14 PM   Base dressing in place, unable to visualize 1/20/2019 12:14 PM   Drainage Amount none 1/20/2019 12:14 PM   Dressing Care, Wound other (see comments) 1/20/2019 12:14 PM       Wound 01/18/19 1646 Left other (see notes) pressure injury (Active)   Dressing Appearance dry;intact 1/20/2019 12:14 PM           Physical Therapy Education     Title: PT OT SLP Therapies (In Progress)     Topic: Physical Therapy (In Progress)     Point: Mobility training (In Progress)     Learning Progress Summary           Patient Acceptance, E,TB, NR,VU by CS at 1/20/2019  2:01 PM    Acceptance, E,TB, NR by CS at 1/20/2019  9:11 AM    Acceptance, E,TB, KANU,NR by CS at 1/19/2019  5:15 PM     Acceptance, E, NR by CA at 1/19/2019 11:01 AM    Comment:  Discussed positioning for contractures    Acceptance, E, VU,NR by MA at 1/18/2019  5:27 PM   Family Acceptance, E, NR by CA at 1/19/2019 11:01 AM    Comment:  Discussed positioning for contractures                   Point: Home exercise program (In Progress)     Learning Progress Summary           Patient Acceptance, E,TB, NR,VU by CS at 1/20/2019  2:01 PM    Acceptance, E,TB, NR by CS at 1/20/2019  9:11 AM    Acceptance, E,TB, VU,NR by CS at 1/19/2019  5:15 PM    Acceptance, E, NR by CA at 1/19/2019 11:01 AM    Comment:  Discussed positioning for contractures    Acceptance, E, VU,NR by MA at 1/18/2019  5:27 PM   Family Acceptance, E, NR by CA at 1/19/2019 11:01 AM    Comment:  Discussed positioning for contractures                   Point: Body mechanics (In Progress)     Learning Progress Summary           Patient Acceptance, E,TB, NR,VU by CS at 1/20/2019  2:01 PM    Acceptance, E,TB, NR by CS at 1/20/2019  9:11 AM    Acceptance, E,TB, VU,NR by CS at 1/19/2019  5:15 PM    Acceptance, E, NR by CA at 1/19/2019 11:01 AM    Comment:  Discussed positioning for contractures    Acceptance, E, VU,NR by MA at 1/18/2019  5:27 PM   Family Acceptance, E, NR by CA at 1/19/2019 11:01 AM    Comment:  Discussed positioning for contractures                   Point: Precautions (In Progress)     Learning Progress Summary           Patient Acceptance, E,TB, NR,VU by CS at 1/20/2019  2:01 PM    Acceptance, E,TB, NR by CS at 1/20/2019  9:11 AM    Acceptance, E,TB, VU,NR by JEANNE at 1/19/2019  5:15 PM    Acceptance, E, NR by CA at 1/19/2019 11:01 AM    Comment:  Discussed positioning for contractures    Acceptance, E, VU,NR by MA at 1/18/2019  5:27 PM   Family Acceptance, E, NR by CA at 1/19/2019 11:01 AM    Comment:  Discussed positioning for contractures                               User Key     Initials Effective Dates Name Provider Type Discipline    MA 10/19/18 -  Steve,  Christiana, PT Physical Therapist PT    CS 05/14/18 -  Pedro Luis Martin, PT Physical Therapist PT    CA 06/13/18 -  Malu Vasquez, PT Physical Therapist PT                PT Recommendation and Plan  Anticipated Discharge Disposition (PT): skilled nursing facility  Therapy Frequency (PT Clinical Impression): 2 times/day  Outcome Summary/Treatment Plan (PT)  Anticipated Discharge Disposition (PT): skilled nursing facility  Plan of Care Reviewed With: patient  Outcome Summary: Pt able to take a few steps to the chair this visit and did exercises x10. Tolerated well.   Outcome Measures     Row Name 01/20/19 0900 01/19/19 1100 01/18/19 1700       How much help from another person do you currently need...    Turning from your back to your side while in flat bed without using bedrails?  2  -CS  2  -CA  2  -MA    Moving from lying on back to sitting on the side of a flat bed without bedrails?  2  -CS  2  -CA  2  -MA    Moving to and from a bed to a chair (including a wheelchair)?  2  -CS  2  -CA  1  -MA    Standing up from a chair using your arms (e.g., wheelchair, bedside chair)?  2  -CS  2  -CA  1  -MA    Climbing 3-5 steps with a railing?  1  -CS  1  -CA  1  -MA    To walk in hospital room?  1  -CS  1  -CA  1  -MA    AM-PAC 6 Clicks Score  10  -CS  10  -CA  8  -MA       Functional Assessment    Outcome Measure Options  AM-PAC 6 Clicks Basic Mobility (PT)  -CS  AM-PAC 6 Clicks Basic Mobility (PT)  -CA  AM-PAC 6 Clicks Basic Mobility (PT)  -MA      User Key  (r) = Recorded By, (t) = Taken By, (c) = Cosigned By    Initials Name Provider Type    Christiana Cardenas, PT Physical Therapist    CS Pedro Luis Martin, PT Physical Therapist    CA Malu Vasquez, PT Physical Therapist         Time Calculation:   PT Charges     Row Name 01/20/19 1401 01/20/19 0913          Time Calculation    Start Time  1320  -CS  0841  -CS     Stop Time  1330  -CS  0908  -CS     Time Calculation (min)  10 min  -CS  27 min  -CS     PT Received On   01/20/19  -CS  01/20/19  -JEANNE     PT - Next Appointment  01/21/19  -CS  01/20/19  -CS       User Key  (r) = Recorded By, (t) = Taken By, (c) = Cosigned By    Initials Name Provider Type    Pedro Luis Morgan, PT Physical Therapist        Therapy Suggested Charges     Code   Minutes Charges    None           Therapy Charges for Today     Code Description Service Date Service Provider Modifiers Qty    88885491490 HC PT THER PROC EA 15 MIN 1/19/2019 Pedro Luis Martin, PT GP 2    99872119683 HC PT THER PROC EA 15 MIN 1/20/2019 Pedro Luis Martin, PT GP 2    63663632060 HC PT THER PROC EA 15 MIN 1/20/2019 Pedro Luis Martin, PT GP 1          PT G-Codes  Outcome Measure Options: AM-PAC 6 Clicks Basic Mobility (PT)  AM-PAC 6 Clicks Score: 10    Pedro Luis Martin, PT  1/20/2019

## 2019-01-20 NOTE — PROGRESS NOTES
Name: Victor Manuel Issa ADMIT: 2019   : 1945  PCP: Stan Shira APRLESLEE    MRN: [id number] LOS: 2 days   AGE/SEX: 73 y.o. male  ROOM: [facility]     Subjective   Doing okay except pain.     Objective   Vital Signs  Temp:  [97.2 °F (36.2 °C)-97.9 °F (36.6 °C)] 97.6 °F (36.4 °C)  Heart Rate:  [58-67] 67  Resp:  [16] 16  BP: (75-91)/(45-52) 91/52  SpO2:  [97 %-98 %] 98 %  on   ;   Device (Oxygen Therapy): room air  Body mass index is 20.5 kg/m².    Physical Exam   Constitutional: He is oriented to person, place, and time. No distress.   Cardiovascular: Normal rate and regular rhythm.   No murmur heard.  Pulmonary/Chest: Effort normal and breath sounds normal.   Abdominal: Soft. Bowel sounds are normal. He exhibits no distension. There is no tenderness.   Musculoskeletal: Normal range of motion. He exhibits no edema.   Neurological: He is alert and oriented to person, place, and time.   Skin: Skin is warm and dry. He is not diaphoretic.       Results Review:       I reviewed the patient's new clinical results.  Results from last 7 days   Lab Units 19  03319  0420   WBC 10*3/mm3 10.71* 11.82*   HEMOGLOBIN g/dL 8.8* 9.4*   PLATELETS 10*3/mm3 291 299     Results from last 7 days   Lab Units 19  0331 19  0420 19  1635   SODIUM mmol/L 140 137 137   POTASSIUM mmol/L 3.9 4.4 4.5   CHLORIDE mmol/L 108* 105 103   CO2 mmol/L 22.8 21.2* 23.1   BUN mg/dL 14 17 16   CREATININE mg/dL 0.67* 0.75* 0.91   GLUCOSE mg/dL 114* 152* 130*   Estimated Creatinine Clearance: 75.3 mL/min (A) (by C-G formula based on SCr of 0.67 mg/dL (L)).  Results from last 7 days   Lab Units 19  0331 19  0420 19  1635   CALCIUM mg/dL 9.2 9.7 10.0     Lab Results   Component Value Date    HGBA1C 4.87 2017   No results found for: POCGLU      arformoterol 15 mcg Nebulization BID - RT   aspirin 325 mg Oral Daily   carbidopa-levodopa 1 tablet Oral TID With Meals   ceFAZolin 2 g Intravenous Q8H    divalproex 500 mg Oral BID   donepezil 20 mg Oral Nightly   fluticasone 1 spray Nasal Daily   levothyroxine 175 mcg Oral Q AM   montelukast 10 mg Oral Nightly   OLANZapine 10 mg Oral Nightly   oxybutynin XL 5 mg Oral BID   pantoprazole 40 mg Oral BID AC   sodium chloride 3 mL Intravenous Q12H   sucralfate 1 g Oral Daily            Assessment/Plan      Active Hospital Problems    Diagnosis Date Noted   • **Post-traumatic osteoarthritis of right hip [M16.51] 01/18/2019   • Hip pain [M25.559] 01/18/2019   • 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 [M25.551, G89.29, Z96.641] 01/18/2019   • Hyperlipidemia [E78.5] 10/03/2018   • Hypothyroidism [E03.9] 10/01/2017   • Parkinson's disease [G20] 09/29/2017   • COPD (chronic obstructive pulmonary disease) [J44.9] 09/28/2017   • Coronary artery disease [I25.10] 09/28/2017   • History of CVA (cerebrovascular accident) [Z86.73] 09/28/2017      Resolved Hospital Problems   No resolved problems to display.       Mr. Issa is a 73 y.o. male who is POD#2 CONVERSION RT POSTERIOR HIP BIPOLAR TO TOTAL HIP ARTHROPLASTY.    · He seems to be doing well thus far postoperatively.   · BP running low. Asymptomatic. Continue monitoring.  · Seems stable from a cardiac and pulmonary standpoint. No audible wheezing.  · SCDs have been ordered for DVT prophylaxis per surgery.  · He was encouraged to use incentive spirometer as instructed.      Cruz Hardin MD  [facility]  01/20/19  9:25 AM

## 2019-01-20 NOTE — PROGRESS NOTES
Discharge Planning Assessment  [facility]     Patient Name: Victor Manuel Issa  MRN: [id number]  Today's Date: 1/20/2019    Admit Date: 1/18/2019    Discharge Needs Assessment     Row Name 01/20/19 1000       Living Environment    Lives With  spouse    Current Living Arrangements  home/apartment/condo    Primary Care Provided by  spouse/significant other    Provides Primary Care For  no one, unable/limited ability to care for self    Family Caregiver if Needed  spouse    Family Caregiver Names  Spouse Evelyn Issa    Quality of Family Relationships  helpful;involved;supportive    Able to Return to Prior Arrangements  no    Living Arrangement Comments  spouse feels pt will need rehab and she would like to get more info on [facility]--she and pt think he would be able to handle the 3+hrs of therapy a day. Consult in EPIC for them to evaluate per pt/spouse request.       Resource/Environmental Concerns    Resource/Environmental Concerns  none    Transportation Concerns  car, none       Transition Planning    Patient/Family Anticipates Transition to  inpatient rehabilitation facility    Patient/Family Anticipated Services at Transition  rehabilitation services    Transportation Anticipated  family or friend will provide       Discharge Needs Assessment    Readmission Within the Last 30 Days  no previous admission in last 30 days    Concerns to be Addressed  discharge planning;adjustment to diagnosis/illness;home safety    Equipment Currently Used at Home  wheelchair;walker, rolling;shower chair;grab bar;ramp;lift device;commode    Anticipated Changes Related to Illness  inability to care for self    Equipment Needed After Discharge  none    Outpatient/Agency/Support Group Needs  homecare agency;inpatient rehabilitation facility pt was current with [facility] prior to admission. Pt had been at [facility] [place] in the recent past. Spouse states he had been home for about 3 months    Discharge  Facility/Level of Care Needs  nursing facility, skilled;rehabilitation facility;home with home health    Offered/Gave Vendor List  no    Patient's Choice of Community Agency(s)  if home with HH would again use [facility]; if SNF would like to return to [facility] (family lives near there and can visit often); pt/spouse want info on [facility]    Current Discharge Risk  chronically ill;dependent with mobility/activities of daily living;physical impairment        Discharge Plan     Row Name 01/20/19 1008       Plan    Plan  Consult as requested for [facility] to evaluate    Patient/Family in Agreement with Plan  yes spoke with pt and his spouse Evelyn at bedside    Plan Comments  Introduced self/explained role of CCP. Face sheet data confirmed. IMM letter checked. Pt lives at home with his spouse. He has a chair lift to get him from the basement and garage entry to the home to the main floor. He has a WC from [facility] [employer] that they have recently had in for repairs. Spouse states pt is eligible for a new one in a few months and plans to use the one they have until then. There are 3 bathrooms in the home and they have the high toilets, and 2 of them have raised seats above that. He has a bath chair and grab bars in the shower. Pt was recently at [facility] [place] for SNF. Spouse states pt has family that lives near there and can visit pt daily. Spouse states if pt needs SNF at [place] that is where they would like him to go. Left [facility] for [facility] to follow up with [facility]CP this week. [facility] and they would like them to follow pt as well. Referral called to Lisa[facility]. Spouse had many questions with regard to [facility] and wants to see if pt meets their criteria for admission. Consult for [facility] in EPIC. Called and spoke with Teresita and they will be able to discuss acute rehab with pt/spouse. CCP will continue to follow.............JW        Destination      Service  Provider Request Status Selected Services Address Phone Number Fax Number    [facility] Pending - No Request Sent N/A [address] [place] [address] [phone] --       Nataliia Montes, RN 1/20/2019 0958    Family requested information on [facility] Pending - No Request Sent N/A [address] [place] [address] [phone]       Nataliia Montes, RN 1/20/2019 1026    Left VMM for Jayna to follow for possible SNF at [place]--left 7P phone number [phone] for Jayna for follow up this week                 Durable Medical Equipment      No service coordination in this encounter.      Dialysis/Infusion      No service coordination in this encounter.      Home Medical Care      Service Provider Request Status Selected Services Address Phone Number Fax Number    [facility] Pending - No Request Sent N/A centralized phone line, [phone]       Nataliia Montes, RN 1/20/2019 1035    Referral called to Lisa to follow. Lisa updated that pt said he was current with [facility] prior to hospitalization                 Community Resources      No service coordination in this encounter.          Demographic Summary     Row Name 01/20/19 0958       General Information    Admission Type  inpatient    Arrived From  home    Required Notices Provided  Important Message from Medicare    Referral Source  admission list;physician    Reason for Consult  discharge planning    Preferred Language  English     Used During This Interaction  no       Contact Information    Permission Granted to Share Info With  ;family/designee        Functional Status     Row Name 01/20/19 0959       Functional Status    Usual Activity Tolerance  fair    Current Activity Tolerance  poor       Functional Status, IADL    Medications  completely dependent    Meal Preparation  assistive person    Housekeeping  assistive person    Laundry   assistive person    Shopping  assistive person       Mental Status    General Appearance WDL  WDL       Mental Status Summary    Recent Changes in Mental Status/Cognitive Functioning  decision making/judgment spouse states pt has been a little confused d/t the anesthesia        Psychosocial    No documentation.       Abuse/Neglect    No documentation.       Legal    No documentation.       Substance Abuse    No documentation.       Patient Forms    No documentation.           Nataliia Montes RN

## 2019-01-20 NOTE — PROGRESS NOTES
Patients Name:  Victor Manuel Issa  YOB: 1945  Medical Records Number:  [id number]    HPI:  No complaints or issues.  Tolerating regular diet.  Pain adequately controlled.    Exam:    Vitals:    01/19/19 2300 01/20/19 0300 01/20/19 0712 01/20/19 0758   BP: 91/49 90/49 91/52    BP Location: Right arm Right arm Left arm    Patient Position: Lying Lying Lying    Pulse: 61 60 67 67   Resp: 16 16 16 16   Temp: 97.8 °F (36.6 °C) 97.9 °F (36.6 °C) 97.6 °F (36.4 °C)    TempSrc: Oral Oral Oral    SpO2: 98% 97% 98% 98%   Weight:       Height:           Lab Results (last 48 hours)     Procedure Component Value Units Date/Time    Tissue / Bone Culture - Tissue, Hip, Right [[id number]] Collected:  01/18/19 1124    Specimen:  Tissue from Hip, Right Updated:  01/20/19 0635     Tissue Culture No growth at 2 days     Gram Stain No WBCs or organisms seen    Basic Metabolic Panel [[id number]]  (Abnormal) Collected:  01/20/19 0331    Specimen:  Blood Updated:  01/20/19 0405     Glucose 114 mg/dL      BUN 14 mg/dL      Creatinine 0.67 mg/dL      Sodium 140 mmol/L      Potassium 3.9 mmol/L      Chloride 108 mmol/L      CO2 22.8 mmol/L      Calcium 9.2 mg/dL      eGFR Non African Amer 116 mL/min/1.73      BUN/Creatinine Ratio 20.9     Anion Gap 9.2 mmol/L     Narrative:       The MDRD GFR formula is only valid for adults with stable renal function between ages 18 and 70.    CBC & Differential [[id number]] Collected:  01/20/19 0331    Specimen:  Blood Updated:  01/20/19 0344    Narrative:       The following orders were created for panel order CBC & Differential.  Procedure                               Abnormality         Status                     ---------                               -----------         ------                     CBC Auto Differential[[id number]]        Abnormal            Final result                 Please view results for these tests on the individual orders.    CBC Auto Differential [[id number]]   (Abnormal) Collected:  01/20/19 0331    Specimen:  Blood Updated:  01/20/19 0344     WBC 10.71 10*3/mm3      RBC 2.96 10*6/mm3      Hemoglobin 8.8 g/dL      Hematocrit 28.3 %      MCV 95.6 fL      MCH 29.7 pg      MCHC 31.1 g/dL      RDW 14.5 %      RDW-SD 50.7 fl      MPV 9.3 fL      Platelets 291 10*3/mm3      Neutrophil % 66.2 %      Lymphocyte % 24.6 %      Monocyte % 8.0 %      Eosinophil % 1.0 %      Basophil % 0.2 %      Immature Grans % 0.3 %      Neutrophils, Absolute 7.08 10*3/mm3      Lymphocytes, Absolute 2.64 10*3/mm3      Monocytes, Absolute 0.86 10*3/mm3      Eosinophils, Absolute 0.11 10*3/mm3      Basophils, Absolute 0.02 10*3/mm3      Immature Grans, Absolute 0.03 10*3/mm3     Tissue Pathology Exam [[id number]] Collected:  01/18/19 1050    Specimen:  Tissue from Hip, Right Updated:  01/19/19 1405     Case Report --     Surgical Pathology Report                         Case: [id number]                                  Authorizing Provider:  Radha Paredes,   Collected:           01/18/2019 10:50 AM                                 MD                                                                           Ordering Location:     [facility]  Received:            01/18/2019 11:07 AM                                 MAIN OR                                                                      Pathologist:           Rosemarie Weiner MD                                                          Specimen:    Hip, Right, tissue from right hip for frozen section for acute inflammation                 Final Diagnosis --     1. Synovium, Right Hip, Excision:   A. Synovial and subsynovial tissue with marked mixed but predominantly acute inflammation.   B. Negative for neoplasm.    mec/jse        Intraoperative Consultation --     Positive for significant acute inflammation up to 100 neutrophils per high power field. Results are reported to Dr. Paredes at 11:20 a.m.    [facility]/kds       Gross  "Description --     1. Received fresh for frozen section diagnosis labeled \"tissue from right hip for frozen section for acute inflammation\" is a 3.0 x 2.5 x 1.0 cm aggregate of tan-red, rubbery tissue fragments which is entirely submitted for frozen section diagnosis as 1AFS-1BFS.    [facility]/mec/kds       Microscopic Description --     Performed, incorporated in diagnosis.      Sedimentation Rate [[id number]]  (Abnormal) Collected:  01/19/19 0420    Specimen:  Blood Updated:  01/19/19 0515     Sed Rate 61 mm/hr     Basic Metabolic Panel [[id number]]  (Abnormal) Collected:  01/19/19 0420    Specimen:  Blood Updated:  01/19/19 0514     Glucose 152 mg/dL      BUN 17 mg/dL      Creatinine 0.75 mg/dL      Sodium 137 mmol/L      Potassium 4.4 mmol/L      Chloride 105 mmol/L      CO2 21.2 mmol/L      Calcium 9.7 mg/dL      eGFR Non African Amer 102 mL/min/1.73      BUN/Creatinine Ratio 22.7     Anion Gap 10.8 mmol/L     Narrative:       The MDRD GFR formula is only valid for adults with stable renal function between ages 18 and 70.    CBC & Differential [[id number]] Collected:  01/19/19 0420    Specimen:  Blood Updated:  01/19/19 0457    Narrative:       The following orders were created for panel order CBC & Differential.  Procedure                               Abnormality         Status                     ---------                               -----------         ------                     CBC Auto Differential[[id number]]        Abnormal            Final result                 Please view results for these tests on the individual orders.    CBC Auto Differential [[id number]]  (Abnormal) Collected:  01/19/19 0420    Specimen:  Blood Updated:  01/19/19 0457     WBC 11.82 10*3/mm3      RBC 3.19 10*6/mm3      Hemoglobin 9.4 g/dL      Hematocrit 30.5 %      MCV 95.6 fL      MCH 29.5 pg      MCHC 30.8 g/dL      RDW 14.1 %      RDW-SD 49.7 fl      MPV 9.5 fL      Platelets 299 10*3/mm3      Neutrophil % 79.5 %      Lymphocyte % " 13.5 %      Monocyte % 6.6 %      Eosinophil % 0.0 %      Basophil % 0.1 %      Immature Grans % 0.3 %      Neutrophils, Absolute 9.41 10*3/mm3      Lymphocytes, Absolute 1.59 10*3/mm3      Monocytes, Absolute 0.78 10*3/mm3      Eosinophils, Absolute 0.00 10*3/mm3      Basophils, Absolute 0.01 10*3/mm3      Immature Grans, Absolute 0.03 10*3/mm3     C-reactive Protein [[id number]]  (Abnormal) Collected:  01/18/19 2324    Specimen:  Blood Updated:  01/19/19 0007     C-Reactive Protein 3.25 mg/dL     Basic Metabolic Panel [[id number]]  (Abnormal) Collected:  01/18/19 1635    Specimen:  Blood Updated:  01/18/19 1718     Glucose 130 mg/dL      BUN 16 mg/dL      Creatinine 0.91 mg/dL      Sodium 137 mmol/L      Potassium 4.5 mmol/L      Chloride 103 mmol/L      CO2 23.1 mmol/L      Calcium 10.0 mg/dL      eGFR Non African Amer 81 mL/min/1.73      BUN/Creatinine Ratio 17.6     Anion Gap 10.9 mmol/L     Narrative:       The MDRD GFR formula is only valid for adults with stable renal function between ages 18 and 70.          Incision: Clean, benign appearing.    Extremity:  Calf soft.  Negative Homans sign.  Good motor in his foot.  He does report slightly diminished sensation in his toes which he says has been there since the surgery.  Good pedal pulses with brisk cap refill.    Plan:   1.  POD#2 s/p conversion of right hip bipolar to total hip   2.  Continue PT for mobilization, ROM.   3.  Continue DVT prophylaxis   4.  Discharge disposition pending    Winston Vallejo MD

## 2019-01-20 NOTE — PLAN OF CARE
Problem: Patient Care Overview  Goal: Plan of Care Review  Outcome: Ongoing (interventions implemented as appropriate)   01/20/19 0911   OTHER   Outcome Summary Pt able to take a few steps to the chair this visit and did exercises x10. Tolerated well.    Coping/Psychosocial   Plan of Care Reviewed With patient

## 2019-01-20 NOTE — THERAPY TREATMENT NOTE
Acute Care - Physical Therapy Treatment Note  [facility]     Patient Name: Victor Manuel Issa  : 1945  MRN: [id number]  Today's Date: 2019  Onset of Illness/Injury or Date of Surgery: 19     Referring Physician: Reggie     Admit Date: 2019    Visit Dx:    ICD-10-CM ICD-9-CM   1. Decreased mobility R26.89 781.99   2. 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 M25.551 719.45    G89.29 338.29    Z96.641 V43.64     Patient Active Problem List   Diagnosis   • Thigh pain, musculoskeletal   • Left leg weakness   • History of CVA (cerebrovascular accident)   • COPD (chronic obstructive pulmonary disease)   • Coronary artery disease   • Cervical myelopathy (CMS/[facility])   • Parkinson's disease   • Debility   • Cerebrovascular disease   • Abnormal TSH   • Hypothyroidism   • Hip fracture requiring operative repair, right, closed, initial encounter (CMS/[facility])   • Urinary tract infection without hematuria   • Hypoxia   • Sinus bradycardia   • Chest pain   • Hip pain, chronic, right   • Hematemesis   • Hematemesis with nausea   • H/O medication noncompliance   • Hyperlipidemia   • Post-traumatic osteoarthritis of right hip   • Hip pain   • 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       Therapy Treatment    Rehabilitation Treatment Summary     Row Name 19 0908             Treatment Time/Intention    Discipline  physical therapist  -CS      Document Type  therapy note (daily note)  -CS      Subjective Information  no complaints  -CS      Mode of Treatment  physical therapy;individual therapy  -CS      Patient/Family Observations  Pt supine in bed, no acute distress  -CS      Therapy Frequency (PT Clinical Impression)  2 times/day  -CS      Patient Effort  good  -CS      Existing Precautions/Restrictions  fall;hip, posterior;right  -CS      Recorded by [CS] Pedro Luis Martin, PT 19 0911      Row Name 19 0908             Cognitive Assessment/Intervention-  PT/OT    Affect/Mental Status (Cognitive)  confused  -CS      Orientation Status (Cognition)  oriented to;person  -CS      Follows Commands (Cognition)  follows one step commands;over 90% accuracy;increased processing time needed;verbal cues/prompting required  -CS      Personal Safety Interventions  fall prevention program maintained;gait belt;nonskid shoes/slippers when out of bed;supervised activity  -CS      Recorded by [CS] Pedro Luis Martin, PT 01/20/19 0911      Row Name 01/20/19 0908             Mobility Assessment/Intervention    Extremity Weight-bearing Status  right lower extremity  -CS      Right Lower Extremity (Weight-bearing Status)  weight-bearing as tolerated (WBAT)  -CS      Recorded by [CS] Pedro Luis Martin, PT 01/20/19 0911      Row Name 01/20/19 0908             Bed Mobility Assessment/Treatment    Bed Mobility Assessment/Treatment  supine-sit  -CS      Supine-Sit [place] (Bed Mobility)  minimum assist (75% patient effort)  -CS      Assistive Device (Bed Mobility)  bed rails;head of bed elevated  -CS      Recorded by [CS] Pedro Luis Martin, PT 01/20/19 0911      Row Name 01/20/19 0908             Transfer Assessment/Treatment    Transfer Assessment/Treatment  sit-stand transfer;stand-sit transfer;bed-chair transfer  -CS      Recorded by [CS] Pedro Luis Martin, PT 01/20/19 0911      Row Name 01/20/19 0908             Bed-Chair Transfer    Bed-Chair [place] (Transfers)  maximum assist (25% patient effort)  -CS      Assistive Device (Bed-Chair Transfers)  walker, front-wheeled  -CS      Recorded by [CS] Pedro Luis Martin, PT 01/20/19 0911      Row Name 01/20/19 0908             Sit-Stand Transfer    Sit-Stand [place] (Transfers)  moderate assist (50% patient effort)  -CS      Assistive Device (Sit-Stand Transfers)  walker, front-wheeled  -CS      Recorded by [CS] Pedro Luis Martin, PT 01/20/19 0911      Row Name 01/20/19 0908             Stand-Sit Transfer    Stand-Sit [place]  (Transfers)  moderate assist (50% patient effort)  -CS      Assistive Device (Stand-Sit Transfers)  walker, front-wheeled  -CS      Recorded by [CS] Pedro Luis Martin, PT 01/20/19 0911      Row Name 01/20/19 0908             Gait/Stairs Assessment/Training    Gait/Stairs Assessment/Training  gait/ambulation assistive device  -CS      [place] Level (Gait)  maximum assist (25% patient effort)  -CS      Assistive Device (Gait)  walker, front-wheeled  -CS      Distance in Feet (Gait)  5  -CS      Pattern (Gait)  step-to  -CS      Deviations/Abnormal Patterns (Gait)  festinating/shuffling;gait speed decreased;stride length decreased;[place] decreased  -CS      Bilateral Gait Deviations  weight shift ability decreased;forward flexed posture  -CS      Recorded by [CS] Pedro Luis Martin, PT 01/20/19 0911      Row Name 01/20/19 0908             Therapeutic Exercise    Comment (Therapeutic Exercise)  THR protocol x10  -CS      Recorded by [CS] Pedro Luis Martin, PT 01/20/19 0911      Row Name 01/20/19 0908             Positioning and Restraints    Pre-Treatment Position  in bed  -CS      Post Treatment Position  chair  -CS      In Chair  reclined;call light within reach;encouraged to call for assist;with family/caregiver  -CS      Recorded by [CS] Pedro Luis Martin, PT 01/20/19 0911      Row Name 01/20/19 0908             Pain Scale: Numbers Pre/Post-Treatment    Pain Intervention(s)  Repositioned;Ambulation/increased activity  -CS      Recorded by [CS] Pedro Luis Martin, PT 01/20/19 0911      Row Name 01/20/19 0908             Pain Scale: Word Pre/Post-Treatment    Pain: Word Scale, Pretreatment  2 - mild pain  -CS      Pain: Word Scale, Post-Treatment  4 - moderate pain  -CS      Recorded by [CS] Pedro Luis Martin, PT 01/20/19 0911      Row Name                Wound 01/18/19 1108 Right hip incision    Wound - Properties Group Date first assessed: 01/18/19 [EG] Time first assessed: 1108 [EG] Side: Right [EG] Location: hip  [EG] Type: incision [EG] Recorded by:  [EG] Kathy Rush RN 01/18/19 1108    Row Name                Wound 01/18/19 1646 Left other (see notes) pressure injury    Wound - Properties Group Date first assessed: 01/18/19 [JACKELIN] Time first assessed: 1646 [JACKELIN] Side: Left [JACKELIN] Location: other (see notes) [JACKELIN] Type: pressure injury [JACKELIN] Stage, Pressure Injury: Stage 2 [JACKELIN] Recorded by:  [JACKELIN] Munira Dee RN 01/18/19 1647    Row Name 01/20/19 0908             Coping    Observed Emotional State  calm  -CS      Verbalized Emotional State  acceptance  -CS      Recorded by [CS] Pedro Luis Martin, PT 01/20/19 0911      Row Name 01/20/19 0908             Plan of Care Review    Plan of Care Reviewed With  patient;spouse  -CS      Recorded by [CS] Pedro Luis Martin, PT 01/20/19 0911      Row Name 01/20/19 0908             Outcome Summary/Treatment Plan (PT)    Anticipated Discharge Disposition (PT)  skilled nursing facility  -CS      Recorded by [CS] Pedro Luis Martin, PT 01/20/19 0911        User Key  (r) = Recorded By, (t) = Taken By, (c) = Cosigned By    Initials Name Effective Dates Discipline    JACKELIN Munira Dee, RN 06/25/18 -  Nurse    Kathy Butt RN 07/10/17 -  Nurse    Pedro Luis Morgan, PT 05/14/18 -  PT          Wound 01/18/19 1108 Right hip incision (Active)   Dressing Appearance dry;intact;no drainage 1/20/2019  3:32 AM   Closure HARDIK 1/20/2019  3:32 AM   Base dressing in place, unable to visualize 1/20/2019  3:32 AM   Drainage Amount none 1/20/2019  3:32 AM       Wound 01/18/19 1646 Left other (see notes) pressure injury (Active)   Dressing Appearance dry;intact 1/20/2019  3:32 AM           Physical Therapy Education     Title: PT OT SLP Therapies (In Progress)     Topic: Physical Therapy (In Progress)     Point: Mobility training (In Progress)     Learning Progress Summary           Patient Acceptance, E,TB, NR by CS at 1/20/2019  9:11 AM    Acceptance, E,TB, VU,NR by CS at 1/19/2019  5:15 PM     Acceptance, E, NR by CA at 1/19/2019 11:01 AM    Comment:  Discussed positioning for contractures    Acceptance, E, VU,NR by MA at 1/18/2019  5:27 PM   Family Acceptance, E, NR by CA at 1/19/2019 11:01 AM    Comment:  Discussed positioning for contractures                   Point: Home exercise program (In Progress)     Learning Progress Summary           Patient Acceptance, E,TB, NR by  at 1/20/2019  9:11 AM    Acceptance, E,TB, VU,NR by CS at 1/19/2019  5:15 PM    Acceptance, E, NR by CA at 1/19/2019 11:01 AM    Comment:  Discussed positioning for contractures    Acceptance, E, VU,NR by MA at 1/18/2019  5:27 PM   Family Acceptance, E, NR by CA at 1/19/2019 11:01 AM    Comment:  Discussed positioning for contractures                   Point: Body mechanics (In Progress)     Learning Progress Summary           Patient Acceptance, E,TB, NR by  at 1/20/2019  9:11 AM    Acceptance, E,TB, VU,NR by  at 1/19/2019  5:15 PM    Acceptance, E, NR by CA at 1/19/2019 11:01 AM    Comment:  Discussed positioning for contractures    Acceptance, E, VU,NR by MA at 1/18/2019  5:27 PM   Family Acceptance, E, NR by CA at 1/19/2019 11:01 AM    Comment:  Discussed positioning for contractures                   Point: Precautions (In Progress)     Learning Progress Summary           Patient Acceptance, E,TB, NR by  at 1/20/2019  9:11 AM    Acceptance, E,TB, VU,NR by  at 1/19/2019  5:15 PM    Acceptance, E, NR by CA at 1/19/2019 11:01 AM    Comment:  Discussed positioning for contractures    Acceptance, E, VU,NR by MA at 1/18/2019  5:27 PM   Family Acceptance, E, NR by CA at 1/19/2019 11:01 AM    Comment:  Discussed positioning for contractures                               User Key     Initials Effective Dates Name Provider Type Discipline    MA 10/19/18 -  Christiana Wilson, PT Physical Therapist PT     05/14/18 -  Pedro Luis Martin, PT Physical Therapist PT    CA 06/13/18 -  Malu Vasquez, PT Physical Therapist PT                 PT Recommendation and Plan  Anticipated Discharge Disposition (PT): skilled nursing facility  Therapy Frequency (PT Clinical Impression): 2 times/day  Outcome Summary/Treatment Plan (PT)  Anticipated Discharge Disposition (PT): skilled nursing facility  Plan of Care Reviewed With: patient  Outcome Summary: Pt able to take a few steps to the chair this visit and did exercises x10. Tolerated well.   Outcome Measures     Row Name 01/20/19 0900 01/19/19 1100 01/18/19 1700       How much help from another person do you currently need...    Turning from your back to your side while in flat bed without using bedrails?  2  -CS  2  -CA  2  -MA    Moving from lying on back to sitting on the side of a flat bed without bedrails?  2  -CS  2  -CA  2  -MA    Moving to and from a bed to a chair (including a wheelchair)?  2  -CS  2  -CA  1  -MA    Standing up from a chair using your arms (e.g., wheelchair, bedside chair)?  2  -CS  2  -CA  1  -MA    Climbing 3-5 steps with a railing?  1  -CS  1  -CA  1  -MA    To walk in hospital room?  1  -CS  1  -CA  1  -MA    AM-PAC 6 Clicks Score  10  -CS  10  -CA  8  -MA       Functional Assessment    Outcome Measure Options  AM-PAC 6 Clicks Basic Mobility (PT)  -CS  AM-PAC 6 Clicks Basic Mobility (PT)  -CA  AM-PAC 6 Clicks Basic Mobility (PT)  -MA      User Key  (r) = Recorded By, (t) = Taken By, (c) = Cosigned By    Initials Name Provider Type    Christiana Cardenas, PT Physical Therapist    Pedro Luis Morgan, PT Physical Therapist    Malu Hollins, PT Physical Therapist         Time Calculation:   PT Charges     Row Name 01/20/19 0913             Time Calculation    Start Time  0841  -CS      Stop Time  0908  -CS      Time Calculation (min)  27 min  -CS      PT Received On  01/20/19  -CS      PT - Next Appointment  01/20/19  -CS        User Key  (r) = Recorded By, (t) = Taken By, (c) = Cosigned By    Initials Name Provider Type    Pedro Luis Morgan, PT Physical Therapist         Therapy Suggested Charges     Code   Minutes Charges    None           Therapy Charges for Today     Code Description Service Date Service Provider Modifiers Qty    01779230021 HC PT THER PROC EA 15 MIN 1/19/2019 Pedro Luis Martin, PT GP 2    20880179997 HC PT THER PROC EA 15 MIN 1/20/2019 Pedro Luis Martin, PT GP 2          PT G-Codes  Outcome Measure Options: AM-PAC 6 Clicks Basic Mobility (PT)  AM-PAC 6 Clicks Score: 10    Pedro Luis Martin, PT  1/20/2019

## 2019-01-20 NOTE — NURSING NOTE
Met with patient and spouse to discuss acute rehab admission criteria. Patient was non-ambulatory PTA, and admits that he is unlikely to tolerate 3 hours of therapy per day. Preference at this time is [facility] in [place], which is more appropriate. Will sign off.

## 2019-01-21 PROBLEM — M25.551 CHRONIC HIP PAIN AFTER TOTAL REPLACEMENT OF RIGHT HIP JOINT: Status: RESOLVED | Noted: 2019-01-18 | Resolved: 2019-01-21

## 2019-01-21 PROBLEM — M16.51 POST-TRAUMATIC OSTEOARTHRITIS OF RIGHT HIP: Chronic | Status: RESOLVED | Noted: 2019-01-18 | Resolved: 2019-01-21

## 2019-01-21 PROBLEM — G89.29 CHRONIC HIP PAIN AFTER TOTAL REPLACEMENT OF RIGHT HIP JOINT: Status: RESOLVED | Noted: 2019-01-18 | Resolved: 2019-01-21

## 2019-01-21 PROBLEM — Z96.641 CHRONIC HIP PAIN AFTER TOTAL REPLACEMENT OF RIGHT HIP JOINT: Status: RESOLVED | Noted: 2019-01-18 | Resolved: 2019-01-21

## 2019-01-21 PROBLEM — M25.559 HIP PAIN: Status: RESOLVED | Noted: 2019-01-18 | Resolved: 2019-01-21

## 2019-01-21 LAB
BACTERIA SPEC AEROBE CULT: NORMAL
BASOPHILS # BLD AUTO: 0.03 10*3/MM3 (ref 0–0.2)
BASOPHILS NFR BLD AUTO: 0.3 % (ref 0–1.5)
DEPRECATED RDW RBC AUTO: 51.5 FL (ref 37–54)
EOSINOPHIL # BLD AUTO: 0.16 10*3/MM3 (ref 0–0.7)
EOSINOPHIL NFR BLD AUTO: 1.6 % (ref 0.3–6.2)
ERYTHROCYTE [DISTWIDTH] IN BLOOD BY AUTOMATED COUNT: 14.6 % (ref 11.5–14.5)
GRAM STN SPEC: NORMAL
HCT VFR BLD AUTO: 28.9 % (ref 40.4–52.2)
HGB BLD-MCNC: 8.9 G/DL (ref 13.7–17.6)
IMM GRANULOCYTES # BLD AUTO: 0.02 10*3/MM3 (ref 0–0.03)
IMM GRANULOCYTES NFR BLD AUTO: 0.2 % (ref 0–0.5)
LYMPHOCYTES # BLD AUTO: 2.55 10*3/MM3 (ref 0.9–4.8)
LYMPHOCYTES NFR BLD AUTO: 25.2 % (ref 19.6–45.3)
MCH RBC QN AUTO: 29.7 PG (ref 27–32.7)
MCHC RBC AUTO-ENTMCNC: 30.8 G/DL (ref 32.6–36.4)
MCV RBC AUTO: 96.3 FL (ref 79.8–96.2)
MONOCYTES # BLD AUTO: 0.87 10*3/MM3 (ref 0.2–1.2)
MONOCYTES NFR BLD AUTO: 8.6 % (ref 5–12)
NEUTROPHILS # BLD AUTO: 6.5 10*3/MM3 (ref 1.9–8.1)
NEUTROPHILS NFR BLD AUTO: 64.1 % (ref 42.7–76)
PLATELET # BLD AUTO: 272 10*3/MM3 (ref 140–500)
PMV BLD AUTO: 9.4 FL (ref 6–12)
RBC # BLD AUTO: 3 10*6/MM3 (ref 4.6–6)
WBC NRBC COR # BLD: 10.13 10*3/MM3 (ref 4.5–10.7)

## 2019-01-21 PROCEDURE — 94799 UNLISTED PULMONARY SVC/PX: CPT

## 2019-01-21 PROCEDURE — 85025 COMPLETE CBC W/AUTO DIFF WBC: CPT | Performed by: ORTHOPAEDIC SURGERY

## 2019-01-21 PROCEDURE — 97110 THERAPEUTIC EXERCISES: CPT

## 2019-01-21 RX ORDER — ONDANSETRON 4 MG/1
4 TABLET, ORALLY DISINTEGRATING ORAL EVERY 8 HOURS PRN
Qty: 30 TABLET | Refills: 0 | Status: SHIPPED | OUTPATIENT
Start: 2019-01-21

## 2019-01-21 RX ORDER — BISACODYL 5 MG/1
10 TABLET, DELAYED RELEASE ORAL DAILY PRN
Qty: 30 TABLET | Refills: 0 | Status: SHIPPED | OUTPATIENT
Start: 2019-01-21

## 2019-01-21 RX ORDER — CEPHALEXIN 500 MG/1
500 CAPSULE ORAL EVERY 8 HOURS SCHEDULED
Status: DISCONTINUED | OUTPATIENT
Start: 2019-01-21 | End: 2019-01-22 | Stop reason: HOSPADM

## 2019-01-21 RX ORDER — OLANZAPINE 5 MG/1
5 TABLET ORAL DAILY PRN
Status: DISCONTINUED | OUTPATIENT
Start: 2019-01-21 | End: 2019-01-22 | Stop reason: HOSPADM

## 2019-01-21 RX ORDER — TRAMADOL HYDROCHLORIDE 50 MG/1
50 TABLET ORAL EVERY 4 HOURS PRN
Qty: 60 TABLET | Refills: 0 | Status: SHIPPED | OUTPATIENT
Start: 2019-01-21 | End: 2019-01-01 | Stop reason: HOSPADM

## 2019-01-21 RX ORDER — SULFAMETHOXAZOLE AND TRIMETHOPRIM 800; 160 MG/1; MG/1
1 TABLET ORAL EVERY 12 HOURS SCHEDULED
Qty: 60 TABLET | Refills: 0 | Status: SHIPPED | OUTPATIENT
Start: 2019-01-21 | End: 2019-01-21 | Stop reason: HOSPADM

## 2019-01-21 RX ORDER — CEPHALEXIN 500 MG/1
500 CAPSULE ORAL EVERY 8 HOURS SCHEDULED
Qty: 29 CAPSULE | Refills: 0 | Status: SHIPPED | OUTPATIENT
Start: 2019-01-22 | End: 2019-02-01

## 2019-01-21 RX ADMIN — PANTOPRAZOLE SODIUM 40 MG: 40 TABLET, DELAYED RELEASE ORAL at 06:00

## 2019-01-21 RX ADMIN — ARFORMOTEROL TARTRATE 15 MCG: 15 SOLUTION RESPIRATORY (INHALATION) at 21:35

## 2019-01-21 RX ADMIN — OXYBUTYNIN CHLORIDE 5 MG: 5 TABLET, EXTENDED RELEASE ORAL at 08:13

## 2019-01-21 RX ADMIN — DIVALPROEX SODIUM 500 MG: 500 TABLET, DELAYED RELEASE ORAL at 22:18

## 2019-01-21 RX ADMIN — CEPHALEXIN 500 MG: 500 CAPSULE ORAL at 22:16

## 2019-01-21 RX ADMIN — SODIUM CHLORIDE, PRESERVATIVE FREE 3 ML: 5 INJECTION INTRAVENOUS at 08:16

## 2019-01-21 RX ADMIN — DONEPEZIL HYDROCHLORIDE 20 MG: 10 TABLET, FILM COATED ORAL at 22:16

## 2019-01-21 RX ADMIN — TRAMADOL HYDROCHLORIDE 50 MG: 50 TABLET, FILM COATED ORAL at 08:14

## 2019-01-21 RX ADMIN — ARFORMOTEROL TARTRATE 15 MCG: 15 SOLUTION RESPIRATORY (INHALATION) at 07:59

## 2019-01-21 RX ADMIN — OXYBUTYNIN CHLORIDE 5 MG: 5 TABLET, EXTENDED RELEASE ORAL at 22:17

## 2019-01-21 RX ADMIN — PANTOPRAZOLE SODIUM 40 MG: 40 TABLET, DELAYED RELEASE ORAL at 18:01

## 2019-01-21 RX ADMIN — CARBIDOPA AND LEVODOPA 1 TABLET: 25; 250 TABLET ORAL at 08:13

## 2019-01-21 RX ADMIN — SULFAMETHOXAZOLE AND TRIMETHOPRIM 160 MG: 800; 160 TABLET ORAL at 08:14

## 2019-01-21 RX ADMIN — TRAMADOL HYDROCHLORIDE 50 MG: 50 TABLET, FILM COATED ORAL at 12:48

## 2019-01-21 RX ADMIN — ASPIRIN 325 MG: 325 TABLET, DELAYED RELEASE ORAL at 08:13

## 2019-01-21 RX ADMIN — MONTELUKAST SODIUM 10 MG: 10 TABLET, FILM COATED ORAL at 22:17

## 2019-01-21 RX ADMIN — CARBIDOPA AND LEVODOPA 1 TABLET: 25; 250 TABLET ORAL at 12:06

## 2019-01-21 RX ADMIN — DOCUSATE SODIUM 100 MG: 100 CAPSULE, LIQUID FILLED ORAL at 08:14

## 2019-01-21 RX ADMIN — LEVOTHYROXINE SODIUM 175 MCG: 175 TABLET ORAL at 06:00

## 2019-01-21 RX ADMIN — BUSPIRONE HYDROCHLORIDE 5 MG: 5 TABLET ORAL at 10:09

## 2019-01-21 RX ADMIN — FLUTICASONE PROPIONATE 1 SPRAY: 50 SPRAY, METERED NASAL at 08:14

## 2019-01-21 RX ADMIN — CARBIDOPA AND LEVODOPA 1 TABLET: 25; 250 TABLET ORAL at 18:01

## 2019-01-21 RX ADMIN — SODIUM CHLORIDE, PRESERVATIVE FREE 3 ML: 5 INJECTION INTRAVENOUS at 22:18

## 2019-01-21 RX ADMIN — SUCRALFATE 1 G: 1 TABLET ORAL at 08:14

## 2019-01-21 RX ADMIN — DIVALPROEX SODIUM 500 MG: 500 TABLET, DELAYED RELEASE ORAL at 08:13

## 2019-01-21 RX ADMIN — OLANZAPINE 10 MG: 10 TABLET, FILM COATED ORAL at 22:17

## 2019-01-21 NOTE — PROGRESS NOTES
Patient: Victor Manuel Issa  YOB: 1945     Date of Admission: 1/18/2019  6:56 AM Medical Record Number: [id number]     Attending Physician: Radha Paredes,*    Status Post:  Procedure(s):  CONVERSION RT POSTERIOR HIP BIPOLAR TO TOTAL HIP ARTHROPLASTYPost Operative Day Number: 3    Subjective : No new orthopaedic complaints. Has not ambulated  well with PT. Planning for discharge to rehab    Pain Relief: some relief with present medication.     Systemic Complaints: No Complaints  Vitals:    01/21/19 0759 01/21/19 0804 01/21/19 1120 01/21/19 1130   BP:   (!) 77/46  Comment: notified RN (!) 82/40   BP Location:   Right arm Left arm   Patient Position:   Lying Lying   Pulse: 63 64 60    Resp: 16 16 14    Temp:   97.2 °F (36.2 °C)    TempSrc:   Oral    SpO2: 93%  98%    Weight:       Height:           Physical Exam: 73 y.o. male    General Appearance:       Alert, cooperative, in no acute distress                  Extremities:    Dressing Clean, Dry and Intact         Incision healthy without signs or symptoms of infections         No clinical sign of DVT        Able to do good movements of digits    Pulses:   Pulses palpable and equal bilaterally           Diagnostic Tests:     Results from last 7 days   Lab Units 01/21/19  0403 01/20/19  0331 01/19/19  0420   WBC 10*3/mm3 10.13 10.71* 11.82*   HEMOGLOBIN g/dL 8.9* 8.8* 9.4*   HEMATOCRIT % 28.9* 28.3* 30.5*   PLATELETS 10*3/mm3 272 291 299     Results from last 7 days   Lab Units 01/20/19  0331 01/19/19  0420 01/18/19  1635   SODIUM mmol/L 140 137 137   POTASSIUM mmol/L 3.9 4.4 4.5   CHLORIDE mmol/L 108* 105 103   CO2 mmol/L 22.8 21.2* 23.1   BUN mg/dL 14 17 16   CREATININE mg/dL 0.67* 0.75* 0.91   GLUCOSE mg/dL 114* 152* 130*   CALCIUM mg/dL 9.2 9.7 10.0         Lab Results   Component Value Date    CRP 3.25 (H) 01/18/2019     Lab Results   Component Value Date    SEDRATE 61 (H) 01/19/2019     No results found for: URICACID  No results  found for: CRYSTAL  Microbiology Results (last 10 days)     Procedure Component Value - Date/Time    Anaerobic Culture - Tissue, Hip, Right [[id number]] Collected:  01/18/19 1124    Lab Status:  Preliminary result Specimen:  Tissue from Hip, Right Updated:  01/21/19 1030     Culture No anaerobes isolated at 3 days    Tissue / Bone Culture - Tissue, Hip, Right [[id number]] Collected:  01/18/19 1124    Lab Status:  Final result Specimen:  Tissue from Hip, Right Updated:  01/21/19 0855     Tissue Culture No growth at 3 days     Gram Stain No WBCs or organisms seen        Xr Hip With Or Without Pelvis 1 View Right    Result Date: 1/18/2019   Postsurgical changes.    This report was finalized on 1/18/2019 3:08 PM by Dr. Mendoza Womack M.D.              Current Medications:  Scheduled Meds:    arformoterol 15 mcg Nebulization BID - RT   aspirin 325 mg Oral Daily   carbidopa-levodopa 1 tablet Oral TID With Meals   divalproex 500 mg Oral BID   donepezil 20 mg Oral Nightly   fluticasone 1 spray Nasal Daily   levothyroxine 175 mcg Oral Q AM   montelukast 10 mg Oral Nightly   OLANZapine 10 mg Oral Nightly   oxybutynin XL 5 mg Oral BID   pantoprazole 40 mg Oral BID AC   sodium chloride 3 mL Intravenous Q12H   sucralfate 1 g Oral Daily   sulfamethoxazole-trimethoprim 1 tablet Oral Q12H     Continuous Infusions:   PRN Meds:.•  acetaminophen  •  albuterol  •  bisacodyl  •  bisacodyl  •  busPIRone  •  docusate sodium  •  HYDROmorphone **AND** naloxone  •  melatonin  •  OLANZapine  •  ondansetron  •  promethazine  •  sodium chloride  •  traMADol    Assessment: Status post  Procedure(s):  CONVERSION RT POSTERIOR HIP BIPOLAR TO TOTAL HIP ARTHROPLASTY    Patient Active Problem List   Diagnosis   • Thigh pain, musculoskeletal   • Left leg weakness   • History of CVA (cerebrovascular accident)   • COPD (chronic obstructive pulmonary disease)   • Coronary artery disease   • Cervical myelopathy (CMS/HCC)   • Parkinson's disease   •  Debility   • Cerebrovascular disease   • Abnormal TSH   • Hypothyroidism   • Hip fracture requiring operative repair, right, closed, initial encounter (CMS/[facility])   • Urinary tract infection without hematuria   • Hypoxia   • Sinus bradycardia   • Chest pain   • Hip pain, chronic, right   • Hematemesis   • Hematemesis with nausea   • H/O medication noncompliance   • Hyperlipidemia   • Post-traumatic osteoarthritis of right hip   • Hip pain   • 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       PLAN:   Continues current post-op course  Anticoagulation: Aspirin started, 325 mg daily for 21 days  Bactrim DS for 30 days due to acute inflammation on frozen sections, cultures with no growth to date  Dressing Change prn  Mobilize with PT as tolerated per protocol    Weight Bearing: WBAT  Discharge Plan: OK to plan for discharge today to rehab  from orthopadic perspective.    Isra Zepeda MD    Date: 1/21/2019    Time: 12:44 PM    Addendum:    I have personally examined this patient. I agree with the above findings and treatment  plan.     Radha Paredes MD  1/21/2019

## 2019-01-21 NOTE — PLAN OF CARE
Problem: Patient Care Overview  Goal: Plan of Care Review  Outcome: Ongoing (interventions implemented as appropriate)   01/21/19 8700   OTHER   Outcome Summary VSS, DSG C/D/I, NEUROVASCULAR STATUS WNL, VOIDING, REPORTS FAIR PAIN CONTROL, BUTTOCKS PINK BUT BLANCHABLE AND BARRIER CREAM APPLIED, ON PO ABX FOR UTI, DISCUSSED MONITORIING VITAL SIGNS DUE TO HX COPD, WILL CONTINUE TO MONITOR   Coping/Psychosocial   Plan of Care Reviewed With patient   Plan of Care Review   Progress improving     Goal: Individualization and Mutuality  Outcome: Ongoing (interventions implemented as appropriate)    Goal: Discharge Needs Assessment  Outcome: Ongoing (interventions implemented as appropriate)    Goal: Interprofessional Rounds/Family Conf  Outcome: Ongoing (interventions implemented as appropriate)      Problem: Fall Risk (Adult)  Goal: Absence of Fall  Outcome: Ongoing (interventions implemented as appropriate)      Problem: Skin Injury Risk (Adult)  Goal: Identify Related Risk Factors and Signs and Symptoms  Outcome: Ongoing (interventions implemented as appropriate)    Goal: Skin Health and Integrity  Outcome: Ongoing (interventions implemented as appropriate)      Problem: Hip Arthroplasty (Total, Partial) (Adult)  Goal: Signs and Symptoms of Listed Potential Problems Will be Absent, Minimized or Managed (Hip Arthroplasty)  Outcome: Ongoing (interventions implemented as appropriate)    Goal: Anesthesia/Sedation Recovery  Outcome: Ongoing (interventions implemented as appropriate)

## 2019-01-21 NOTE — NURSING NOTE
Pt seen for Stage 1 on left hip; mild erythema, blancheable to perirectal/buttocks area.  Pt incontinent of urine and sometimes stool; wears briefs.  Pt on accumax mattress with pump and has heel boots on.  Pt thin, has lost quite a bit of weight in past few months; eating poorly.  encouraged to drink protein drinks/supplements, foods high in protein to aid in healing.  Wife is a retired RN and states understanding but states pt picky and doesn't like most of the supplements.  Has silicone border drsg to left hip and staff using Z guard to buttocks.  No further recommendations at this time

## 2019-01-21 NOTE — PROGRESS NOTES
Name: Victor Manuel Issa ADMIT: 2019   : 1945  PCP: Stan Shira CUONG    MRN: [id number] LOS: 3 days   AGE/SEX: 73 y.o. male  ROOM: [facility]     Subjective   Doing okay except pain.     Objective   Vital Signs  Temp:  [96.6 °F (35.9 °C)-97.4 °F (36.3 °C)] 97.4 °F (36.3 °C)  Heart Rate:  [51-64] 64  Resp:  [14-16] 16  BP: (81-94)/(44-64) 90/44  SpO2:  [93 %-100 %] 93 %  on   ;   Device (Oxygen Therapy): room air  Body mass index is 20.5 kg/m².    Physical Exam   Constitutional: He is oriented to person, place, and time. No distress.   Cardiovascular: Normal rate and regular rhythm.   No murmur heard.  Pulmonary/Chest: Effort normal and breath sounds normal.   Abdominal: Soft. Bowel sounds are normal. He exhibits no distension. There is no tenderness.   Musculoskeletal: Normal range of motion. He exhibits no edema.   Neurological: He is alert and oriented to person, place, and time.   Skin: Skin is warm and dry. He is not diaphoretic.       Results Review:       I reviewed the patient's new clinical results.  Results from last 7 days   Lab Units 19  0403 19  0331 19  0420   WBC 10*3/mm3 10.13 10.71* 11.82*   HEMOGLOBIN g/dL 8.9* 8.8* 9.4*   PLATELETS 10*3/mm3 272 291 299     Results from last 7 days   Lab Units 19  0331 19  0420 19  1635   SODIUM mmol/L 140 137 137   POTASSIUM mmol/L 3.9 4.4 4.5   CHLORIDE mmol/L 108* 105 103   CO2 mmol/L 22.8 21.2* 23.1   BUN mg/dL 14 17 16   CREATININE mg/dL 0.67* 0.75* 0.91   GLUCOSE mg/dL 114* 152* 130*   Estimated Creatinine Clearance: 75.3 mL/min (A) (by C-G formula based on SCr of 0.67 mg/dL (L)).  Results from last 7 days   Lab Units 19  0331 19  0420 19  1635   CALCIUM mg/dL 9.2 9.7 10.0     Lab Results   Component Value Date    HGBA1C 4.87 2017   No results found for: POCGLU      arformoterol 15 mcg Nebulization BID - RT   aspirin 325 mg Oral Daily   carbidopa-levodopa 1 tablet Oral TID With Meals    divalproex 500 mg Oral BID   donepezil 20 mg Oral Nightly   fluticasone 1 spray Nasal Daily   levothyroxine 175 mcg Oral Q AM   montelukast 10 mg Oral Nightly   OLANZapine 10 mg Oral Nightly   oxybutynin XL 5 mg Oral BID   pantoprazole 40 mg Oral BID AC   sodium chloride 3 mL Intravenous Q12H   sucralfate 1 g Oral Daily   sulfamethoxazole-trimethoprim 1 tablet Oral Q12H            Assessment/Plan      Active Hospital Problems    Diagnosis Date Noted   • **Post-traumatic osteoarthritis of right hip [M16.51] 01/18/2019   • Hip pain [M25.559] 01/18/2019   • 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 [M25.551, G89.29, Z96.641] 01/18/2019   • Hyperlipidemia [E78.5] 10/03/2018   • Hypothyroidism [E03.9] 10/01/2017   • Parkinson's disease [G20] 09/29/2017   • COPD (chronic obstructive pulmonary disease) [J44.9] 09/28/2017   • Coronary artery disease [I25.10] 09/28/2017   • History of CVA (cerebrovascular accident) [Z86.73] 09/28/2017      Resolved Hospital Problems   No resolved problems to display.       Mr. Issa is a 73 y.o. male who is POD#3 CONVERSION RT POSTERIOR HIP BIPOLAR TO TOTAL HIP ARTHROPLASTY.    · BP running low. Asymptomatic. Continue monitoring.  · Seems stable from a cardiac and pulmonary standpoint. No audible wheezing.  · Wife concerned about his bipolar disorder and states she gives small dose zyprexa during day as needed. Will order.  · On bactrim per ortho.    ** stable from my standpoint to discharge.      Cruz Hardin MD  [facility]  01/21/19  11:22 AM

## 2019-01-21 NOTE — PROGRESS NOTES
Adult Nutrition  Assessment/PES    Patient Name:  Victor Manuel Issa  YOB: 1945  MRN: [id number]  Admit Date:  1/18/2019    Assessment Date:  1/21/2019    Nutrition assessment triggered by skin-pressure injury stage 2 coccyx. Spoke with patient and spouse-decreased po intake for the past 3 months-he was in a nursing home at that time.  Provided nutrition therapy. Encouraged adequate po intake. He agreed to protein shake daily. Patient's spouse asked about an appetite stimulant. Spoke with RN regarding this.   RD to monitor/follow per protocol    Reason for Assessment     Row Name 01/21/19 1148          Reason for Assessment    Reason For Assessment  identified at risk by screening criteria     Diagnosis   Primary Problem:  Post-traumatic osteoarthritis of right hip; COPD, CAD, Parkinson's     Identified At Risk by Screening Criteria  large or nonhealing wound, burn or pressure injury         Nutrition/Diet History     Row Name 01/21/19 1148          Nutrition/Diet History    Typical Food/Fluid Intake  decreased appetite in the past 3 months due to being in nursing home         Anthropometrics     Row Name 01/21/19 1149          Body Mass Index (BMI)    BMI Assessment  BMI 18.5-24.9: normal         Labs/Tests/Procedures/Meds     Row Name 01/21/19 1149          Labs/Procedures/Meds    Lab Results Reviewed  reviewed, pertinent        Diagnostic Tests/Procedures    Diagnostic Test/Procedure Reviewed  reviewed, pertinent        Medications    Pertinent Medications Reviewed  reviewed, pertinent     Pertinent Medications Comments  PPI, NaCl, carafate         Physical Findings     Row Name 01/21/19 1150          Physical Findings    Overall Physical Appearance  -- B=16     Skin  pressure injury coccyx stage 2         Estimated/Assessed Needs     Row Name 01/21/19 1153          Calculation Measurements    Weight Used For Calculations  66.7 kg (147 lb 0.8 oz)        Estimated/Assessed Needs    Additional  Documentation  KCAL/KG (Group);Protein Requirements (Group);Fluid Requirements (Group)        KCAL/KG                                                                kcal/kg (Specify)  -- 9434-2295        [place]-St. Jeor Equation    RMR ([place]-St. Jeor Equation)  1424.13        Protein Requirements    Est Protein Requirement Amount (gms/kg)  1.2 gm protein     Estimated Protein Requirements (gms/day)  80.04        Fluid Requirements    Estimated Fluid Requirements (mL/day)  1700     RDA Method (mL)  1700     [place]-Segar Method (over 20 kg)  2834         Nutrition Prescription Ordered     Row Name 01/21/19 1154          Nutrition Prescription PO    Current PO Diet  Regular         Evaluation of Received Nutrient/Fluid Intake     Row Name 01/21/19 1154 01/21/19 1153       Calculation Measurements    Weight Used For Calculations  --  66.7 kg (147 lb 0.8 oz)       PO Evaluation    Number of Meals  2  --    % PO Intake  75  --        Evaluation of Prescribed Nutrient/Fluid Intake     Row Name 01/21/19 1153          Calculation Measurements    Weight Used For Calculations  66.7 kg (147 lb 0.8 oz)             Problem/Interventions:  Problem 1     Row Name 01/21/19 1154          Nutrition Diagnoses Problem 1    Problem 1  Increased Nutrient Needs     Macronutrient  Protein     Etiology (related to)  MNT for Treatment/Condition     Signs/Symptoms (evidenced by)  Other (comment) skin integrity and wound healing                 Intervention Goal     Row Name 01/21/19 1152          Intervention Goal    General  Maintain nutrition;Meet nutritional needs for age/condition     PO  Tolerate PO;Increase intake     Weight  Appropriate weight gain         Nutrition Intervention     Row Name 01/21/19 1150          Nutrition Intervention    RD/Tech Action  Interview for preference;Follow Tx progress;Care plan reviewd;Encourage intake;Recommend/ordered;Supplement provided     Recommended/Ordered  Supplement         Nutrition  Prescription     Row Name 01/21/19 1155          Nutrition Prescription PO    PO Prescription  Begin/change supplement     Supplement  PRO powder;Milkshake     Supplement Frequency  Daily     New PO Prescription Ordered?  Yes         Education/Evaluation     Row Name 01/21/19 1155          Education    Education  Will Instruct as appropriate        Monitor/Evaluation    Monitor  Per protocol           Electronically signed by:  Saira Simpson RD  01/21/19 11:55 AM

## 2019-01-21 NOTE — PROGRESS NOTES
Continued Stay Note  [facility]     Patient Name: Victor Manuel Issa  MRN: [id number]  Today's Date: 1/21/2019    Admit Date: 1/18/2019    Discharge Plan     Row Name 01/21/19 1530       Plan    Plan  [facility] to transport to [facility], in [place], [place] on Tue 1/22 @ 1300     Plan Comments  S/w M[place], [facility] will accept and bed is ready. Pt/family are agreeable w/ d/c to [facility]. [facility] scheduled to pick-up, first available ambulance is on Tue 1/22 @ 1300 ([place] is approx 70 miles from [place]). Transfer packet in [facility].         Discharge Codes    No documentation.       Expected Discharge Date and Time     Expected Discharge Date Expected Discharge Time    Jan 21, 2019             Gill Brown RN

## 2019-01-21 NOTE — PLAN OF CARE
Problem: Patient Care Overview  Goal: Plan of Care Review   01/21/19 3639   OTHER   Outcome Summary Patient voiced soreness and fatigue today. Performed a bed<>chair transfer with maxA and tolerated exs protocol. Plans for rehab tomorrow. Will continue to advance as able.   Coping/Psychosocial   Plan of Care Reviewed With patient

## 2019-01-21 NOTE — THERAPY TREATMENT NOTE
Acute Care - Physical Therapy Treatment Note  [facility]     Patient Name: Victor Manuel Issa  : 1945  MRN: [id number]  Today's Date: 2019  Onset of Illness/Injury or Date of Surgery: 19     Referring Physician: Reggie     Admit Date: 2019    Visit Dx:    ICD-10-CM ICD-9-CM   1. Decreased mobility R26.89 781.99   2. 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 M25.551 719.45    G89.29 338.29    Z96.641 V43.64     Patient Active Problem List   Diagnosis   • Thigh pain, musculoskeletal   • Left leg weakness   • History of CVA (cerebrovascular accident)   • COPD (chronic obstructive pulmonary disease)   • Coronary artery disease   • Cervical myelopathy (CMS/[facility])   • Parkinson's disease   • Debility   • Cerebrovascular disease   • Abnormal TSH   • Hypothyroidism   • Hip fracture requiring operative repair, right, closed, initial encounter (CMS/[facility])   • Urinary tract infection without hematuria   • Hypoxia   • Sinus bradycardia   • Chest pain   • Hip pain, chronic, right   • Hematemesis   • Hematemesis with nausea   • H/O medication noncompliance   • Hyperlipidemia   • Post-traumatic osteoarthritis of right hip   • Hip pain   • 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       Therapy Treatment    Rehabilitation Treatment Summary     Row Name 19 1705             Treatment Time/Intention    Discipline  physical therapist  -MA      Document Type  therapy note (daily note)  -MA      Subjective Information  no complaints very sore from yesterday's PT  -MA      Mode of Treatment  physical therapy  -MA      Patient/Family Observations  Supine in bed with HOB elevated, NAD, SCD applied  -MA      Care Plan Review  patient/other agree to care plan  -MA      Therapy Frequency (PT Clinical Impression)  2 times/day  -MA      Patient Effort  adequate  -MA      Existing Precautions/Restrictions  fall;hip, posterior  -MA      Treatment Considerations/Comments  B RADHA  contractures  -MA      Recorded by [MA] Radha Young, PT 01/21/19 1707      Row Name 01/21/19 1705             Vital Signs    O2 Delivery Pre Treatment  room air  -MA      Recorded by [MA] Radha Young, PT 01/21/19 1707      Row Name 01/21/19 1705             Cognitive Assessment/Intervention- PT/OT    Affect/Mental Status (Cognitive)  confused  -MA      Orientation Status (Cognition)  oriented to;person;place  -MA      Follows Commands (Cognition)  follows two step commands;physical/tactile prompts required;repetition of directions required;verbal cues/prompting required  -MA      Personal Safety Interventions  fall prevention program maintained;gait belt;nonskid shoes/slippers when out of bed  -MA      Recorded by [MA] Radha Young, PT 01/21/19 1707      Row Name 01/21/19 1705             Safety Issues, Functional Mobility    Impairments Affecting Function (Mobility)  balance;endurance/activity tolerance;strength;range of motion (ROM)  -MA      Recorded by [MA] Radha Young, PT 01/21/19 1707      Row Name 01/21/19 1705             Mobility Assessment/Intervention    Extremity Weight-bearing Status  right lower extremity  -MA      Right Lower Extremity (Weight-bearing Status)  weight-bearing as tolerated (WBAT)  -MA      Recorded by [MA] Radha Young, PT 01/21/19 1707      Row Name 01/21/19 1705             Bed Mobility Assessment/Treatment    Supine-Sit [place] (Bed Mobility)  minimum assist (75% patient effort);verbal cues  -MA      Sit-Supine [place] (Bed Mobility)  not tested  -MA      Bed Mobility, Safety Issues  decreased use of legs for bridging/pushing;impaired trunk control for bed mobility  -MA      Assistive Device (Bed Mobility)  bed rails;head of bed elevated  -MA      Recorded by [MA] Radha Young, PT 01/21/19 1707      Row Name 01/21/19 1705             Transfer Assessment/Treatment    Transfer Assessment/Treatment  sit-stand transfer;stand-sit  transfer  -MA      Comment (Transfers)  Stand pivot transfer performed with RW. Cues for all sequencing.  -MA      Recorded by [MA] Radha Young, PT 01/21/19 1707      Row Name 01/21/19 1705             Bed-Chair Transfer    Bed-Chair [place] (Transfers)  maximum assist (25% patient effort);verbal cues;nonverbal cues (demo/gesture)  -MA      Assistive Device (Bed-Chair Transfers)  walker, front-wheeled  -MA      Recorded by [MA] Radha Young, PT 01/21/19 1707      Row Name 01/21/19 1705             Therapeutic Exercise    Comment (Therapeutic Exercise)  AP, heel slides, glute sets x 20 reps  -MA      Recorded by [MA] Radha Young, PT 01/21/19 1709      Row Name 01/21/19 1705             Static Sitting Balance    Level of [place] (Unsupported Sitting, Static Balance)  contact guard assist  -MA      Sitting Position (Unsupported Sitting, Static Balance)  sitting on edge of bed  -MA      Recorded by [MA] Radha Young, PT 01/21/19 1707      Row Name 01/21/19 1705             Positioning and Restraints    Pre-Treatment Position  in bed  -MA      Post Treatment Position  chair  -MA      In Chair  sitting;call light within reach;encouraged to call for assist;legs elevated;with family/caregiver ice applied  -MA      Recorded by [MA] Radha Young, PT 01/21/19 1707      Row Name 01/21/19 1705             Pain Scale: Numbers Pre/Post-Treatment    Pain Location - Side  Right  -MA      Pain Location  hip  -MA      Pain Intervention(s)  Repositioned;Ambulation/increased activity;Rest  -MA      Recorded by [MA] Radha Young, PT 01/21/19 1707      Row Name 01/21/19 1705             Pain Scale: Word Pre/Post-Treatment    Pain: Word Scale, Pretreatment  4 - moderate pain  -MA      Pain: Word Scale, Post-Treatment  4 - moderate pain  -MA      Recorded by [MA] Radah Young, PT 01/21/19 1707      Row Name                Wound 01/18/19 1108 Right hip incision    Wound -  Properties Group Date first assessed: 01/18/19 [EG] Time first assessed: 1108 [EG] Side: Right [EG] Location: hip [EG] Type: incision [EG] Recorded by:  [EG] Kathy Rush RN 01/18/19 1108    Row Name                Wound 01/18/19 1646 Left other (see notes) pressure injury    Wound - Properties Group Date first assessed: 01/18/19 [JACKELIN] Time first assessed: 1646 [JACKELIN] Side: Left [JACKELIN] Location: other (see notes) [JACKELIN] Type: pressure injury [JACKELIN] Stage, Pressure Injury: Stage 2 [JACKELIN] Recorded by:  [JACKELIN] Munira Dee RN 01/18/19 1647    Row Name 01/21/19 1705             Plan of Care Review    Plan of Care Reviewed With  patient;spouse  -MA      Recorded by [MA] Radha Young, PT 01/21/19 1707      Row Name 01/21/19 1705             Outcome Summary/Treatment Plan (PT)    Daily Summary of Progress (PT)  progress toward functional goals is gradual  -MA      Anticipated Discharge Disposition (PT)  skilled nursing facility  -MA      Recorded by [MA] Radha Young, PT 01/21/19 1707        User Key  (r) = Recorded By, (t) = Taken By, (c) = Cosigned By    Initials Name Effective Dates Discipline    JACKELIN Munira Dee, RN 06/25/18 -  Nurse    Radha Amezcua, PT 04/03/18 -  PT    Kathy Butt RN 07/10/17 -  Nurse          Wound 01/18/19 1108 Right hip incision (Active)   Dressing Appearance dry;intact;no drainage 1/21/2019  7:52 AM   Closure HARDIK 1/21/2019  7:52 AM   Base dressing in place, unable to visualize 1/21/2019  7:52 AM   Drainage Amount none 1/21/2019  7:52 AM   Dressing Care, Wound border dressing;silicone 1/21/2019  7:52 AM           Physical Therapy Education     Title: PT OT SLP Therapies (In Progress)     Topic: Physical Therapy (In Progress)     Point: Mobility training (In Progress)     Learning Progress Summary           Patient Acceptance, E, NR by MA at 1/21/2019  5:08 PM    Acceptance, E,TB, NR,VU by CS at 1/20/2019  2:01 PM    Acceptance, E,TB, NR by CS at 1/20/2019  9:11 AM     Acceptance, E,TB, VU,NR by CS at 1/19/2019  5:15 PM    Acceptance, E, NR by CA at 1/19/2019 11:01 AM    Comment:  Discussed positioning for contractures    Acceptance, E, VU,NR by ROSE at 1/18/2019  5:27 PM   Family Acceptance, E, NR by CA at 1/19/2019 11:01 AM    Comment:  Discussed positioning for contractures                   Point: Home exercise program (In Progress)     Learning Progress Summary           Patient Acceptance, E, NR by MA at 1/21/2019  5:08 PM    Acceptance, E,TB, NR,VU by CS at 1/20/2019  2:01 PM    Acceptance, E,TB, NR by CS at 1/20/2019  9:11 AM    Acceptance, E,TB, VU,NR by CS at 1/19/2019  5:15 PM    Acceptance, E, NR by CA at 1/19/2019 11:01 AM    Comment:  Discussed positioning for contractures    Acceptance, E, VU,NR by ROSE at 1/18/2019  5:27 PM   Family Acceptance, E, NR by CA at 1/19/2019 11:01 AM    Comment:  Discussed positioning for contractures                   Point: Body mechanics (In Progress)     Learning Progress Summary           Patient Acceptance, E, NR by MA at 1/21/2019  5:08 PM    Acceptance, E,TB, NR,VU by CS at 1/20/2019  2:01 PM    Acceptance, E,TB, NR by CS at 1/20/2019  9:11 AM    Acceptance, E,TB, VU,NR by CS at 1/19/2019  5:15 PM    Acceptance, E, NR by CA at 1/19/2019 11:01 AM    Comment:  Discussed positioning for contractures    Acceptance, E, VU,NR by ROSE at 1/18/2019  5:27 PM   Family Acceptance, E, NR by CA at 1/19/2019 11:01 AM    Comment:  Discussed positioning for contractures                   Point: Precautions (In Progress)     Learning Progress Summary           Patient Acceptance, E, NR by MA at 1/21/2019  5:08 PM    Acceptance, E,TB, NR,VU by JEANNE at 1/20/2019  2:01 PM    Acceptance, E,TB, NR by CS at 1/20/2019  9:11 AM    Acceptance, E,TB, VU,NR by CS at 1/19/2019  5:15 PM    Acceptance, E, NR by CA at 1/19/2019 11:01 AM    Comment:  Discussed positioning for contractures    JACKIE Dodge VU, NR by ROSE at 1/18/2019  5:27 PM   Family AcceptanceJACKIE  NR by CA at 1/19/2019 11:01 AM    Comment:  Discussed positioning for contractures                               User Key     Initials Effective Dates Name Provider Type Discipline    MA 04/03/18 -  Radha Young, PT Physical Therapist PT    MA1 10/19/18 -  Christiana Wilson, PT Physical Therapist PT     05/14/18 -  Pedro Luis Martin, PT Physical Therapist PT    CA 06/13/18 -  Malu Vasquez, PT Physical Therapist PT                PT Recommendation and Plan  Anticipated Discharge Disposition (PT): skilled nursing facility  Therapy Frequency (PT Clinical Impression): 2 times/day  Outcome Summary/Treatment Plan (PT)  Daily Summary of Progress (PT): progress toward functional goals is gradual  Anticipated Discharge Disposition (PT): skilled nursing facility  Plan of Care Reviewed With: patient  Outcome Summary: Patient voiced soreness and fatigue today. Performed a bed<>chair transfer with maxA and tolerated exs protocol. Plans for rehab tomorrow. Will continue to advance as able.  Outcome Measures     Row Name 01/21/19 1700 01/20/19 0900 01/19/19 1100       How much help from another person do you currently need...    Turning from your back to your side while in flat bed without using bedrails?  2  -MA  2  -CS  2  -CA    Moving from lying on back to sitting on the side of a flat bed without bedrails?  2  -MA  2  -CS  2  -CA    Moving to and from a bed to a chair (including a wheelchair)?  2  -MA  2  -CS  2  -CA    Standing up from a chair using your arms (e.g., wheelchair, bedside chair)?  2  -MA  2  -CS  2  -CA    Climbing 3-5 steps with a railing?  1  -MA  1  -CS  1  -CA    To walk in hospital room?  1  -MA  1  -CS  1  -CA    AM-PAC 6 Clicks Score  10  -MA  10  -CS  10  -CA       Functional Assessment    Outcome Measure Options  AM-PAC 6 Clicks Basic Mobility (PT)  -MA  AM-PAC 6 Clicks Basic Mobility (PT)  -CS  AM-PAC 6 Clicks Basic Mobility (PT)  -CA      User Key  (r) = Recorded By, (t) = Taken By, (c) =  Cosigned By    Initials Name Provider Type    Radha Amezcua, PT Physical Therapist    Pedro Luis Morgan, PT Physical Therapist    Malu Hollins, KHANG Physical Therapist         Time Calculation:   PT Charges     Row Name 01/21/19 1704             Time Calculation    Start Time  1648  -MA      Stop Time  1704  -MA      Time Calculation (min)  16 min  -MA      PT Received On  01/21/19  -MA      PT - Next Appointment  01/22/19  -MA         Time Calculation- PT    Total Timed Code Minutes- PT  14 minute(s)  -MA        User Key  (r) = Recorded By, (t) = Taken By, (c) = Cosigned By    Initials Name Provider Type    Radha Amezcua, PT Physical Therapist        Therapy Suggested Charges     Code   Minutes Charges    None           Therapy Charges for Today     Code Description Service Date Service Provider Modifiers Qty    83468370433 HC PT THER PROC EA 15 MIN 1/21/2019 Radha Young, PT GP 1          PT G-Codes  Outcome Measure Options: AM-PAC 6 Clicks Basic Mobility (PT)  AM-PAC 6 Clicks Score: 10    Radha Young PT  1/21/2019

## 2019-01-22 ENCOUNTER — APPOINTMENT (OUTPATIENT)
Dept: GENERAL RADIOLOGY | Facility: HOSPITAL | Age: 74
End: 2019-01-22
Attending: HOSPITALIST

## 2019-01-22 VITALS
HEART RATE: 82 BPM | OXYGEN SATURATION: 96 % | TEMPERATURE: 98 F | RESPIRATION RATE: 16 BRPM | DIASTOLIC BLOOD PRESSURE: 55 MMHG | BODY MASS INDEX: 20.58 KG/M2 | SYSTOLIC BLOOD PRESSURE: 120 MMHG | HEIGHT: 71 IN | WEIGHT: 147 LBS

## 2019-01-22 PROCEDURE — 94799 UNLISTED PULMONARY SVC/PX: CPT

## 2019-01-22 PROCEDURE — 71046 X-RAY EXAM CHEST 2 VIEWS: CPT

## 2019-01-22 PROCEDURE — 97110 THERAPEUTIC EXERCISES: CPT

## 2019-01-22 RX ADMIN — DIVALPROEX SODIUM 500 MG: 500 TABLET, DELAYED RELEASE ORAL at 08:49

## 2019-01-22 RX ADMIN — SODIUM CHLORIDE, PRESERVATIVE FREE 3 ML: 5 INJECTION INTRAVENOUS at 08:50

## 2019-01-22 RX ADMIN — SUCRALFATE 1 G: 1 TABLET ORAL at 08:49

## 2019-01-22 RX ADMIN — CARBIDOPA AND LEVODOPA 1 TABLET: 25; 250 TABLET ORAL at 08:49

## 2019-01-22 RX ADMIN — CEPHALEXIN 500 MG: 500 CAPSULE ORAL at 08:49

## 2019-01-22 RX ADMIN — LEVOTHYROXINE SODIUM 175 MCG: 175 TABLET ORAL at 08:49

## 2019-01-22 RX ADMIN — PANTOPRAZOLE SODIUM 40 MG: 40 TABLET, DELAYED RELEASE ORAL at 08:49

## 2019-01-22 RX ADMIN — CEPHALEXIN 500 MG: 500 CAPSULE ORAL at 15:02

## 2019-01-22 RX ADMIN — FLUTICASONE PROPIONATE 1 SPRAY: 50 SPRAY, METERED NASAL at 08:50

## 2019-01-22 RX ADMIN — ARFORMOTEROL TARTRATE 15 MCG: 15 SOLUTION RESPIRATORY (INHALATION) at 08:00

## 2019-01-22 RX ADMIN — OXYBUTYNIN CHLORIDE 5 MG: 5 TABLET, EXTENDED RELEASE ORAL at 08:49

## 2019-01-22 RX ADMIN — ASPIRIN 325 MG: 325 TABLET, DELAYED RELEASE ORAL at 08:49

## 2019-01-22 NOTE — NURSING NOTE
PT DEVELOPED CONGESTED, NON PRODUCTIVE COUGH AND MILD CONFUSION LAST NIGHT.  AFEBRILE. SPOUSE REQUESTED CHEST XRAY A SENT TO REHAB TODAY. NOTIFIED DR. CAMPOS, SEE ORDERS.

## 2019-01-22 NOTE — PLAN OF CARE
Problem: Patient Care Overview  Goal: Plan of Care Review  Outcome: Ongoing (interventions implemented as appropriate)   01/22/19 3585   OTHER   Outcome Summary VSS, hyprotension resolved, asymptomatic, incontinent, pain control with po meds, started on keflex , discharge by ambulance today at 1300, pink buttocks blanchable,scab to left hip, discussed monitoirng blood pressure and skin intregrity after discharge.   Coping/Psychosocial   Plan of Care Reviewed With patient     Goal: Individualization and Mutuality  Outcome: Ongoing (interventions implemented as appropriate)    Goal: Discharge Needs Assessment  Outcome: Ongoing (interventions implemented as appropriate)      Problem: Fall Risk (Adult)  Goal: Absence of Fall  Outcome: Ongoing (interventions implemented as appropriate)      Problem: Skin Injury Risk (Adult)  Goal: Skin Health and Integrity  Outcome: Ongoing (interventions implemented as appropriate)      Problem: Hip Arthroplasty (Total, Partial) (Adult)  Goal: Signs and Symptoms of Listed Potential Problems Will be Absent, Minimized or Managed (Hip Arthroplasty)  Outcome: Ongoing (interventions implemented as appropriate)

## 2019-01-22 NOTE — PLAN OF CARE
Problem: Patient Care Overview  Goal: Plan of Care Review  Outcome: Ongoing (interventions implemented as appropriate)   01/21/19 1850   OTHER   Outcome Summary Patient is POD #3 of R hip conversion. Patient is able to transfer to chair with stand and pivot, x3 assist required. Patient has been hypotensive, but asymptomatic, MD aware, no orders recieved. Patient voids incontinently. Dietician and WOCN rounded on patient today d/t poor skin integrity and increased risk for breakdown. Patient and family educated on maintaining skin integrity and prevention. Pain controlled with po meds. Abx for UTI treatment and surgical prophylaxis. Patient anticipates d/c to snf tomorrow, via ambulance scheduled for 1300.    Coping/Psychosocial   Plan of Care Reviewed With patient   Plan of Care Review   Progress improving       Problem: Fall Risk (Adult)  Goal: Absence of Fall  Outcome: Ongoing (interventions implemented as appropriate)   01/21/19 1850   Fall Risk (Adult)   Absence of Fall achieves outcome       Problem: Skin Injury Risk (Adult)  Goal: Identify Related Risk Factors and Signs and Symptoms  Outcome: Outcome(s) achieved Date Met: 01/21/19 01/21/19 2051   Skin Injury Risk (Adult)   Related Risk Factors (Skin Injury Risk) advanced age;body weight extremes;mobility impaired;nutritional deficiencies;moisture;infection;fluid intake inadequate;mechanical forces     Goal: Skin Health and Integrity  Outcome: Ongoing (interventions implemented as appropriate)   01/21/19 1850   Skin Injury Risk (Adult)   Skin Health and Integrity making progress toward outcome       Problem: Hip Arthroplasty (Total, Partial) (Adult)  Goal: Signs and Symptoms of Listed Potential Problems Will be Absent, Minimized or Managed (Hip Arthroplasty)  Outcome: Ongoing (interventions implemented as appropriate)   01/21/19 1850   Goal/Outcome Evaluation   Problems Assessed (Hip Arthroplasty) all   Problems Present (Hip Arthroplasty) functional  deficit;bowel motility decreased;pain;situational response     Goal: Anesthesia/Sedation Recovery  Outcome: Outcome(s) achieved Date Met: 01/21/19 01/21/19 9087   Goal/Outcome Evaluation   Anesthesia/Sedation Recovery recovered to baseline

## 2019-01-22 NOTE — PROGRESS NOTES
Name: Victor Manuel Issa ADMIT: 2019   : 1945  PCP: Stan Shira CUONG    MRN: [id number] LOS: 4 days   AGE/SEX: 73 y.o. male  [facility]     Subjective   Doing okay except pain. Sleepy yesterday and seems better today. Wife thinks his cough has increased. Really using his incentive spirometer as well.    Objective   Vital Signs  Temp:  [97 °F (36.1 °C)-99.5 °F (37.5 °C)] 98 °F (36.7 °C)  Heart Rate:  [65-90] 82  Resp:  [14-18] 16  BP: ()/(45-69) 120/55  SpO2:  [95 %-98 %] 96 %  on   ;   Device (Oxygen Therapy): room air  Body mass index is 20.5 kg/m².    Physical Exam   Constitutional: He is oriented to person, place, and time. No distress.   Cardiovascular: Normal rate and regular rhythm.   No murmur heard.  Pulmonary/Chest: Effort normal and breath sounds normal.   Abdominal: Soft. Bowel sounds are normal. He exhibits no distension. There is no tenderness.   Musculoskeletal: Normal range of motion. He exhibits no edema.   Neurological: He is alert and oriented to person, place, and time.   Skin: Skin is warm and dry. He is not diaphoretic.       Results Review:       I reviewed the patient's new clinical results.  Results from last 7 days   Lab Units 19  0403 19  0331 19  0420   WBC 10*3/mm3 10.13 10.71* 11.82*   HEMOGLOBIN g/dL 8.9* 8.8* 9.4*   PLATELETS 10*3/mm3 272 291 299     Results from last 7 days   Lab Units 19  0331 19  0420 19  1635   SODIUM mmol/L 140 137 137   POTASSIUM mmol/L 3.9 4.4 4.5   CHLORIDE mmol/L 108* 105 103   CO2 mmol/L 22.8 21.2* 23.1   BUN mg/dL 14 17 16   CREATININE mg/dL 0.67* 0.75* 0.91   GLUCOSE mg/dL 114* 152* 130*   Estimated Creatinine Clearance: 75.3 mL/min (A) (by C-G formula based on SCr of 0.67 mg/dL (L)).  Results from last 7 days   Lab Units 19  0331 19  0420 19  1635   CALCIUM mg/dL 9.2 9.7 10.0     Lab Results   Component Value Date    HGBA1C 4.87 2017   No results found for:  POCGLU      arformoterol 15 mcg Nebulization BID - RT   aspirin 325 mg Oral Daily   carbidopa-levodopa 1 tablet Oral TID With Meals   cephalexin 500 mg Oral Q8H   divalproex 500 mg Oral BID   donepezil 20 mg Oral Nightly   fluticasone 1 spray Nasal Daily   levothyroxine 175 mcg Oral Q AM   montelukast 10 mg Oral Nightly   OLANZapine 10 mg Oral Nightly   oxybutynin XL 5 mg Oral BID   pantoprazole 40 mg Oral BID AC   sodium chloride 3 mL Intravenous Q12H   sucralfate 1 g Oral Daily            Assessment/Plan      Active Hospital Problems    Diagnosis Date Noted   • Hyperlipidemia [E78.5] 10/03/2018   • Hypothyroidism [E03.9] 10/01/2017   • Parkinson's disease [G20] 09/29/2017   • COPD (chronic obstructive pulmonary disease) [J44.9] 09/28/2017   • Coronary artery disease [I25.10] 09/28/2017   • History of CVA (cerebrovascular accident) [Z86.73] 09/28/2017      Resolved Hospital Problems    Diagnosis Date Noted Date Resolved   • **Post-traumatic osteoarthritis of right hip [M16.51] 01/18/2019 01/21/2019   • Hip pain [M25.559] 01/18/2019 01/21/2019   • 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 [M25.551, G89.29, Z96.641] 01/18/2019 01/21/2019       Mr. Issa is a 73 y.o. male who is POD#4 CONVERSION RT POSTERIOR HIP BIPOLAR TO TOTAL HIP ARTHROPLASTY.    · BP running low. Asymptomatic. Continue monitoring.  · Seems stable from a cardiac and pulmonary standpoint. No audible wheezing. Chest x-ray obtained today is clear for my review.    ** stable from my standpoint to discharge.  + thrush, will add nystatin that should continue at [place]      Cruz Hardin MD  [facility]  01/22/19  1:56 PM

## 2019-01-22 NOTE — THERAPY TREATMENT NOTE
Acute Care - Physical Therapy Treatment Note  [facility]     Patient Name: Victor Manuel Issa  : 1945  MRN: [id number]  Today's Date: 2019  Onset of Illness/Injury or Date of Surgery: 19     Referring Physician: Reggie     Admit Date: 2019    Visit Dx:    ICD-10-CM ICD-9-CM   1. Decreased mobility R26.89 781.99   2. 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 M25.551 719.45    G89.29 338.29    Z96.641 V43.64     Patient Active Problem List   Diagnosis   • Thigh pain, musculoskeletal   • Left leg weakness   • History of CVA (cerebrovascular accident)   • COPD (chronic obstructive pulmonary disease)   • Coronary artery disease   • Cervical myelopathy (CMS/[facility])   • Parkinson's disease   • Debility   • Cerebrovascular disease   • Abnormal TSH   • Hypothyroidism   • Hip fracture requiring operative repair, right, closed, initial encounter (CMS/[facility])   • Urinary tract infection without hematuria   • Hypoxia   • Sinus bradycardia   • Chest pain   • Hip pain, chronic, right   • Hematemesis   • Hematemesis with nausea   • H/O medication noncompliance   • Hyperlipidemia       Therapy Treatment    Rehabilitation Treatment Summary     Row Name 19 1600             Treatment Time/Intention    Discipline  physical therapist  -      Document Type  therapy note (daily note)  -      Subjective Information  complains of;pain  -      Mode of Treatment  individual therapy;physical therapy  -      Patient Effort  adequate  -      Existing Precautions/Restrictions  fall;hip, posterior;right  -LH      Treatment Considerations/Comments  BLE contractures hxo of PD  -LH      Recorded by [] Elisa Hannon, PT 19 1633      Row Name 19 1600             Cognitive Assessment/Intervention- PT/OT    Orientation Status (Cognition)  oriented to;person;situation  -LH      Follows Commands (Cognition)  follows one step commands;75-90% accuracy;repetition of directions  required;physical/tactile prompts required;initiation impaired  -      Personal Safety Interventions  fall prevention program maintained;gait belt;nonskid shoes/slippers when out of bed;supervised activity  -      Recorded by [] Elisa Hannon, PT 01/22/19 1633      Row Name 01/22/19 1600             Bed Mobility Assessment/Treatment    Supine-Sit Hinds (Bed Mobility)  minimum assist (75% patient effort);verbal cues;nonverbal cues (demo/gesture)  -      Sit-Supine Hinds (Bed Mobility)  not tested  -      Recorded by [] Elisa Hannon, PT 01/22/19 1633      Row Name 01/22/19 1600             Bed-Chair Transfer    Bed-Chair Hinds (Transfers)  maximum assist (25% patient effort);2 person assist;verbal cues;nonverbal cues (demo/gesture)  -      Recorded by [] Elisa Hannon, PT 01/22/19 1633      Row Name 01/22/19 1600             Sit-Stand Transfer    Sit-Stand Hinds (Transfers)  maximum assist (25% patient effort);2 person assist  -      Recorded by [] Elisa Hannon, PT 01/22/19 1633      Row Name 01/22/19 1600             Stand-Sit Transfer    Stand-Sit Hinds (Transfers)  moderate assist (50% patient effort);maximum assist (25% patient effort);2 person assist  -      Recorded by [] Elisa Hannon, PT 01/22/19 1633      Row Name 01/22/19 1600             Gait/Stairs Assessment/Training    Comment (Gait/Stairs)  shuffled steps bed to chair  -      Recorded by [] Elisa Hannon, PT 01/22/19 1633      Row Name 01/22/19 1600             Motor Skills Assessment/Interventions    Additional Documentation  Therapeutic Exercise (Group);Therapeutic Exercise Interventions (Group)  -      Recorded by [] Elisa Hannon, PT 01/22/19 1633      Row Name 01/22/19 1600             Therapeutic Exercise    Additional Documentation  Therapeutic Exercise (Row)  -      Recorded by [] Elisa Hannon, PT 01/22/19 1633      Row Name 01/22/19 1600             Therapeutic Exercise     Comment (Therapeutic Exercise)  SHANTHI protocol 10 reps with assist  -LH      Recorded by [] Elisa Hannon, PT 01/22/19 1633      Row Name 01/22/19 1600             Positioning and Restraints    Pre-Treatment Position  in bed  -LH      Post Treatment Position  chair  -LH      In Chair  reclined;call light within reach;encouraged to call for assist;notified nsg;with family/caregiver  -LH      Recorded by [] Elisa Hannon, PT 01/22/19 1633      Row Name 01/22/19 1600             Pain Scale: Numbers Pre/Post-Treatment    Pain Location - Side  Right  -LH      Pain Location  hip  -LH      Pain Intervention(s)  Repositioned  -LH      Recorded by [] Elisa Hannon, PT 01/22/19 1633      Row Name 01/22/19 1600             Pain Scale: Word Pre/Post-Treatment    Pain: Word Scale, Pretreatment  4 - moderate pain  -LH      Pain: Word Scale, Post-Treatment  4 - moderate pain  -LH      Recorded by [] Elisa Hannon, PT 01/22/19 1633      Row Name                Wound 01/18/19 1108 Right hip incision    Wound - Properties Group Date first assessed: 01/18/19 [EG] Time first assessed: 1108 [EG] Side: Right [EG] Location: hip [EG] Type: incision [EG] Recorded by:  [EG] Kathy Rush RN 01/18/19 1108    Row Name                Wound 01/18/19 1646 Left other (see notes) pressure injury    Wound - Properties Group Date first assessed: 01/18/19 [JACKELIN] Time first assessed: 1646 [JACKELIN] Side: Left [JACKELIN] Location: other (see notes) [JACKELIN] Type: pressure injury [JACKELIN] Stage, Pressure Injury: Stage 2 [JACKELIN] Recorded by:  [JACKELIN] Munira Dee, TRINY 01/18/19 1647      User Key  (r) = Recorded By, (t) = Taken By, (c) = Cosigned By    Initials Name Effective Dates Discipline     Elisa Hannon, PT 04/03/18 -  PT    Munira Eason, RN 06/25/18 -  Nurse    EG Kathy Rush RN 07/10/17 -  Nurse          Wound 01/18/19 1108 Right hip incision (Active)   Dressing Appearance dry;intact;no drainage 1/22/2019  8:46 AM   Closure HARDIK 1/22/2019  3:30  AM   Base dressing in place, unable to visualize 1/22/2019  3:30 AM   Drainage Amount none 1/22/2019  3:30 AM   Dressing Care, Wound border dressing 1/21/2019  8:10 PM       Wound 01/18/19 1646 Left other (see notes) pressure injury (Active)   Dressing Appearance dry;intact 1/22/2019  8:46 AM           Physical Therapy Education     Title: PT OT SLP Therapies (In Progress)     Topic: Physical Therapy (In Progress)     Point: Mobility training (In Progress)     Learning Progress Summary           Patient Acceptance, E, NR by  at 1/22/2019  4:33 PM    Acceptance, E, NR by MA at 1/21/2019  5:08 PM    Acceptance, E,TB, NR,VU by JEANNE at 1/20/2019  2:01 PM    Acceptance, E,TB, NR by JEANNE at 1/20/2019  9:11 AM    Acceptance, E,TB, VU,NR by JEANNE at 1/19/2019  5:15 PM    Acceptance, E, NR by CA at 1/19/2019 11:01 AM    Comment:  Discussed positioning for contractures    Acceptance, E, VU,NR by ROSE at 1/18/2019  5:27 PM   Family Acceptance, E, NR by CA at 1/19/2019 11:01 AM    Comment:  Discussed positioning for contractures                   Point: Home exercise program (In Progress)     Learning Progress Summary           Patient Acceptance, E, NR by  at 1/22/2019  4:33 PM    Acceptance, E, NR by MA at 1/21/2019  5:08 PM    Acceptance, E,TB, NR,VU by JEANNE at 1/20/2019  2:01 PM    Acceptance, E,TB, NR by JEANNE at 1/20/2019  9:11 AM    Acceptance, E,TB, VU,NR by JEANNE at 1/19/2019  5:15 PM    Acceptance, E, NR by CA at 1/19/2019 11:01 AM    Comment:  Discussed positioning for contractures    Acceptance, E, VU,NR by ROSE at 1/18/2019  5:27 PM   Family Acceptance, E, NR by CA at 1/19/2019 11:01 AM    Comment:  Discussed positioning for contractures                   Point: Body mechanics (In Progress)     Learning Progress Summary           Patient Acceptance, E, NR by  at 1/22/2019  4:33 PM    Acceptance, E, NR by MA at 1/21/2019  5:08 PM    Acceptance, E,TB, NR,VU by JEANNE at 1/20/2019  2:01 PM    Acceptance, SEBAS MEJIA, NR by CS at 1/20/2019   9:11 AM    Acceptance, E,TB, VU,NR by CS at 1/19/2019  5:15 PM    Acceptance, E, NR by CA at 1/19/2019 11:01 AM    Comment:  Discussed positioning for contractures    Acceptance, E, VU,NR by MA at 1/18/2019  5:27 PM   Family Acceptance, E, NR by CA at 1/19/2019 11:01 AM    Comment:  Discussed positioning for contractures                   Point: Precautions (In Progress)     Learning Progress Summary           Patient Acceptance, E, NR by  at 1/22/2019  4:33 PM    Acceptance, E, NR by MA at 1/21/2019  5:08 PM    Acceptance, E,TB, NR,VU by  at 1/20/2019  2:01 PM    Acceptance, E,TB, NR by  at 1/20/2019  9:11 AM    Acceptance, E,TB, VU,NR by  at 1/19/2019  5:15 PM    Acceptance, E, NR by CA at 1/19/2019 11:01 AM    Comment:  Discussed positioning for contractures    Acceptance, E, VU,NR by [facility] at 1/18/2019  5:27 PM   Family Acceptance, E, NR by CA at 1/19/2019 11:01 AM    Comment:  Discussed positioning for contractures                               User Key     Initials Effective Dates Name Provider Type Discipline     04/03/18 -  Elisa Hannon, PT Physical Therapist PT    MA 04/03/18 -  Radha Young, PT Physical Therapist PT    [facility] 10/19/18 -  Christiana Wilson, PT Physical Therapist PT     05/14/18 -  Pedro Luis Martin, PT Physical Therapist PT    CA 06/13/18 -  Malu Vasquez, PT Physical Therapist PT                PT Recommendation and Plan     Plan of Care Reviewed With: patient, spouse  Outcome Summary: pt agreeable to pivot OOB to chair,  2 person assist. pt rigid with movement hx PD, to DC SNU today.   Outcome Measures     Row Name 01/22/19 1600 01/21/19 1700 01/20/19 0900       How much help from another person do you currently need...    Turning from your back to your side while in flat bed without using bedrails?  2  -  2  -MA  2  -CS    Moving from lying on back to sitting on the side of a flat bed without bedrails?  2  -  2  -MA  2  -CS    Moving to and from a bed to a chair  (including a wheelchair)?  2  -  2  -MA  2  -CS    Standing up from a chair using your arms (e.g., wheelchair, bedside chair)?  2  -  2  -MA  2  -CS    Climbing 3-5 steps with a railing?  1  -  1  -MA  1  -CS    To walk in hospital room?  2  -  1  -MA  1  -CS    AM-PAC 6 Clicks Score  11  -  10  -MA  10  -CS       Functional Assessment    Outcome Measure Options  AM-PAC 6 Clicks Basic Mobility (PT)  -  AM-PAC 6 Clicks Basic Mobility (PT)  -MA  AM-PAC 6 Clicks Basic Mobility (PT)  -CS      User Key  (r) = Recorded By, (t) = Taken By, (c) = Cosigned By    Initials Name Provider Type     Elisa Hannon, PT Physical Therapist    Radha Amezcua, PT Physical Therapist    Pedro Luis Morgan, PT Physical Therapist         Time Calculation:   PT Charges     Row Name 01/22/19 1634 01/22/19 1133          Time Calculation    Start Time  1622  -  --     Stop Time  1634  -  --     Time Calculation (min)  12 min  -  --     PT Received On  01/22/19  -  --     PT - Next Appointment  --  01/22/19  -       User Key  (r) = Recorded By, (t) = Taken By, (c) = Cosigned By    Initials Name Provider Type     Elisa Hannon, PT Physical Therapist    Danielle Mccain, LADI Physical Therapy Assistant        Therapy Suggested Charges     Code   Minutes Charges    None           Therapy Charges for Today     Code Description Service Date Service Provider Modifiers Qty    26621086304  PT THER SUPP EA 15 MIN 1/22/2019 Elisa Hannon, PT GP 1    23637403563 HC PT THER PROC EA 15 MIN 1/22/2019 Elisa Hannon, PT GP 1          PT G-Codes  Outcome Measure Options: AM-PAC 6 Clicks Basic Mobility (PT)  AM-PAC 6 Clicks Score: 11    Elisa Hannon, PT  1/22/2019

## 2019-01-22 NOTE — PLAN OF CARE
Problem: Patient Care Overview  Goal: Plan of Care Review   01/22/19 1639   OTHER   Outcome Summary pt agreeable to pivot OOB to chair, 2 person assist. pt rigid with movement hx PD, to DC SNU today.    Coping/Psychosocial   Plan of Care Reviewed With patient;spouse

## 2019-01-22 NOTE — DISCHARGE INSTRUCTIONS
Discharge and Follow up Instructions:      I. ACTIVITIES:  1. Exercises:  · Complete exercise program as taught by the hospital physical therapist 2 times per day  · Exercise program will be advanced by the physical therapist  · During the day be up ambulating every 2 hours (while awake) for short distances  · Complete the ankle pump exercises at least 10 times per hour (while awake)  · Elevate legs when in bed and for at least 30 minutes during the day.Use cold packs 20-30 minutes approximately 5 times per day. This should be done before and after completing your exercises and at any time you are experiencing pain/ stiffness in your operative extremity.        2. Activities of Daily Living:  · No tub baths, hot tubs, or swimming pools for 4 weeks  · May shower and let water run over the incision on post-operative day #5 if no drainage. Do not scrub or rub the incision. Simply let the water run over the incision and pat dry.     II. Restrictions  · Continue Posterior hip precautions as taught at the hospital  · Your surgeon will discuss with you when you will be able to drive again. Usual guidelines are you are to be off pain medications prior to driving.  · Weight bearing is as tolerated  · First week stay inside on even terrain. May go up and down stairs one stair at a time utilizing the hand rail once cleared by physical therapy to do so.  · After one week, you may venture outside (if cleared to do so by physical therapist).     III. Precautions:  · Everyone that comes near you should wash their hands  · No elective dental, genital-urinary, or colon procedures or surgical procedures for 12 weeks after surgery unless absolutely necessary.  ·  If dental work or surgical procedure is deemed absolutely necessary, you will need to contact your surgeon as you will need to take antibiotics 1 hour prior to any dental work (including teeth cleanings).  · Please discuss with your surgeon prophylactic antibiotics as the  length of time this intervention will be necessary for you varies with each patient’s health history and situation.  · Avoid sick people. If you must be around someone who is ill, they should wear a mask.  · Avoid visits to the Emergency Room or Urgent Care. If you feel you need to go to the Emergency Room, please notify your Surgeon's office.  · Stockings are to be worn for one week after surgery and are to be placed on in the morning and removed at night. Observe your skin when stocking is removed for any problems. Monitor the stockings to ensure that any swelling is not causing the stockings to become too tight. In this case, remove stockings immediately.        IV. INCISION CARE:  · Wash your hands prior to dressing changes  · Change the dressing as needed to keep incision clean and dry. Utilize dry gauze and paper tape. Avoid touching the side of the gauze that goes against the incision with your hands.  · No creams or ointments to the incision  · May remove dressing once the incision is free of drainage  · Do not touch or pick at the incision  · Check incision every day and notify surgeon immediately if any of the following signs or symptoms are noted:  ? Increase in redness  ? Increase in swelling around the incision and of the entire extremity  ? Increase in pain  ? Drainage oozing from the incision  ? Pulling apart of the edges of the incision  ? Increase in overall body temperature (greater than 100.5 degrees)     ·  Your Staples will be removed between 10-14 days postoperation.  This may be done by either the home health nurse, rehabilitation nurse or during your return visit to Dr. Paredes's office.  You will then be instructed on how to care for the incision.  (Please call the office if your staples have not been removed within 14 days after surgery).                   V. Medications:   1. Anticoagulants: You will be discharged on an anticoagulant. This is a prophylactic medication that helps prevent  blood clots during your post-operative period.  You will be on  Aspirin 325 mg Enteric Coated daily home dose.   · While taking the anticoagulant, you should avoid taking any additional aspirin, ibuprofen (Advil or Motrin), Aleve (Naprosyn) or other non-steroidal anti-inflammatory medications.   · Notify surgeon immediately if any lelo bleeding is noted in the urine, stool, emesis, or from the nose or the incision. Blood in the stool will often appear as black rather than red. Blood in urine may appear as pink. Blood in emesis may appear as brown/black like coffee grounds.  · You will need to apply pressure for longer periods of time to any cuts or abrasions to stop bleeding  · Avoid alcohol while taking anticoagulants     2. Stool Softeners: You will be at greater risk of constipation after surgery due to being less mobile and the pain medications.   · Take stool softeners as instructed by your surgeon while on pain medications. Over the counter Colace 100 mg 1-2 capsules twice daily.   · If stools become too loose or too frequent, please decreases the dosage or stop the stool softener.  · If constipation occurs despite use of stool softeners, you are to continue the stool softeners and add a laxative (Milk of Magnesia 1 ounce daily as needed).  · Dulcolax oral tabs or suppository, or a fleets enema can also be utilized for constipation and can be obtained over the counter.   · If above interventions are unsuccessful in inducing bowel movements, please contact your surgeon's office / family physician's office.  · Drink plenty of fluids, and eat fruits and vegetables during your recovery time     3. Pain Medications utilized after surgery are narcotics and the law requires that the following information be given to all patients that are prescribed narcotics:  · CLASSIFICATION: Pain medications are called Opioids and are narcotics  · LEGALITIES: It is illegal to share narcotics with others and to drive within 24  hours of taking narcotics  · POTENTIAL SIDE EFFECTS: Potential side effects of opioids include: nausea, vomiting, itching, dizziness, drowsiness, dry mouth, constipation, and difficulty urinating.  · POTENTIAL ADVERSE EFFECTS:   ? Opioid tolerance can develop with use of pain medications and this simply means that it requires more and more of the medication to control pain; however, this is seen more in patients that use opioids for longer periods of time.  ? Opioid dependence can develop with use of Opioids and this simply means that to stop the medication can cause withdrawal symptoms; however, this is seen with patients that use Opioids for longer periods of time.  ? Opioid addiction can develop with use of Opioids and the incidence of this is very unlikely in patients who take the medications as ordered and stop the medications as instructed.  ? Opioid overdose can be dangerous, but is unlikely when the medication is taken as ordered and stopped when ordered. It is important not to mix opioids with alcohol or with and type of sedative such as Benadryl as this can lead to over sedation and respiratory difficulty.  · DOSAGE:   ? Pain medications will need to be taken consistently for the first week to decrease pain and promote adequate pain relief and participation in physical therapy.  ? After the initial surgical pain begins to resolve, you may begin to decrease the pain medication. By the end of 6 weeks, you should be off of pain medications.  ? Refills will not be given by the office during evening hours, on weekends, or after 6 weeks post-op.  ? To seek refills on pain medications during the initial 6 week post-operative period, you must call the office 48 hours in advance to request the refill. The office will then notify you when to  the prescription. DO NOT wait until you are out of the medication to request a refill.     V. FOLLOW-UP VISITS:  · You will need to follow up in the office with your  surgeon on Feb 6 ,2019. Please call this number [phone]  to schedule this appointment.  If you have any concerns or suspected complications prior to your follow up visit, please call your surgeons office. Do not wait until your appointment time if you suspect complications. These will need to be addressed in the office promptly

## 2019-01-22 NOTE — DISCHARGE SUMMARY
Orthopedic Discharge Summary      Patient: iVctor Manuel Issa    YOB: 1945    Medical Record Number: [id number]    Attending Physician: Radha Paredes,*  Consulting Physician(s):   Date of Admission: 1/18/2019  6:56 AM  Date of Discharge:     Admitting Diagnosis: Hip pain [M25.559]  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 [M25.551, G89.29, Z96.641]    Procedures Performed  Procedure(s):  CONVERSION RT POSTERIOR HIP BIPOLAR TO TOTAL HIP ARTHROPLASTY       Procedure(s):  CONVERSION RT POSTERIOR HIP BIPOLAR TO TOTAL HIP ARTHROPLASTY    Patient Active Problem List   Diagnosis   • Thigh pain, musculoskeletal   • Left leg weakness   • History of CVA (cerebrovascular accident)   • COPD (chronic obstructive pulmonary disease)   • Coronary artery disease   • Cervical myelopathy (CMS/[facility])   • Parkinson's disease   • Debility   • Cerebrovascular disease   • Abnormal TSH   • Hypothyroidism   • Hip fracture requiring operative repair, right, closed, initial encounter (CMS/[facility])   • Urinary tract infection without hematuria   • Hypoxia   • Sinus bradycardia   • Chest pain   • Hip pain, chronic, right   • Hematemesis   • Hematemesis with nausea   • H/O medication noncompliance   • Hyperlipidemia          Allergies   Allergen Reactions   • Beet [Beta Vulgaris] Unknown (See Comments)     Pt cant remember          Discharge Medications      New Medications      Instructions Start Date   bisacodyl 5 MG EC tablet  Commonly known as:  DULCOLAX   10 mg, Oral, Daily PRN      cephalexin 500 MG capsule  Commonly known as:  KEFLEX   500 mg, Oral, Every 8 Hours Scheduled      ondansetron ODT 4 MG disintegrating tablet  Commonly known as:  ZOFRAN ODT   4 mg, Oral, Every 8 Hours PRN         Changes to Medications      Instructions Start Date   nystatin [id number] UNIT/ML suspension  Commonly known as:  MYCOSTATIN  What changed:    · when to take this  · reasons to take this  Notes to patient:  1/21/19 PM    5 mL, Oral, 4 Times Daily         Continue These Medications      Instructions Start Date   acetaminophen 325 MG tablet  Commonly known as:  TYLENOL   650 mg, Oral, Every 4 Hours PRN      albuterol (2.5 MG/3ML) 0.083% nebulizer solution  Commonly known as:  PROVENTIL   2.5 mg, Nebulization, Every 6 Hours PRN      arformoterol 15 MCG/2ML nebulizer solution  Commonly known as:  BROVANA  Notes to patient:  1/21/19 PM   15 mcg, Nebulization, 2 Times Daily - RT      aspirin 325 MG EC tablet   325 mg, Oral, Daily      busPIRone 5 MG tablet  Commonly known as:  BUSPAR   5 mg, Oral, 3 Times Daily PRN      carbidopa-levodopa  MG per tablet  Commonly known as:  SINEMET  Notes to patient:  1/21/19 PM   1 tablet, Oral, 3 Times Daily With Meals      cholecalciferol 1000 units tablet  Commonly known as:  VITAMIN D3   1,000 Units, Oral, Daily      diclofenac 1 % gel gel  Commonly known as:  VOLTAREN   4 g, Topical, Daily PRN      divalproex 500 MG DR tablet  Commonly known as:  DEPAKOTE  Notes to patient:  1/21/19 PM   500 mg, Oral, 2 Times Daily      donepezil 10 MG tablet  Commonly known as:  ARICEPT  Notes to patient:  1/21/19 PM   20 mg, Oral, Nightly      fluticasone 50 MCG/ACT nasal spray  Commonly known as:  FLONASE  Notes to patient:  1/22/19 AM   1 spray, Nasal, Daily      levothyroxine 175 MCG tablet  Commonly known as:  SYNTHROID, LEVOTHROID  Notes to patient:  1/22/19 AM   175 mcg, Oral, Daily      montelukast 10 MG tablet  Commonly known as:  SINGULAIR  Notes to patient:  1/21/19 PM   10 mg, Oral, Nightly      multivitamin with minerals tablet tablet   1 tablet, Oral, Daily      OLANZapine 10 MG tablet  Commonly known as:  zyPREXA  Notes to patient:  1/21/19 PM   10 mg, Oral, Nightly      oxybutynin XL 5 MG 24 hr tablet  Commonly known as:  DITROPAN-XL  Notes to patient:  1/21/19 PM   5 mg, Oral, 2 Times Daily      pantoprazole 40 MG EC tablet  Commonly known as:  PROTONIX  Notes to patient:  1/21/19 PM   40 mg,  Oral, 2 Times Daily Before Meals      promethazine 25 MG tablet  Commonly known as:  PHENERGAN   25 mg, Oral, Every 6 Hours PRN      simvastatin 40 MG tablet  Commonly known as:  ZOCOR  Notes to patient:  1/21/19 PM   40 mg, Oral, Nightly      sucralfate 1 g tablet  Commonly known as:  CARAFATE  Notes to patient:  1/22/19 AM   1 g, Oral, Daily      traMADol 50 MG tablet  Commonly known as:  ULTRAM  Notes to patient:  Available if needed to manage pain at 4:48 pm 1/21/19   50 mg, Oral, Every 4 Hours PRN                  Past Medical History:   Diagnosis Date   • Anxiety    • Arthritis    • Bipolar 1 disorder (CMS/HCC)    • Coronary artery disease    • Disease of thyroid gland    • GERD (gastroesophageal reflux disease)    • Hyperlipidemia    • Mobility impaired    • Myocardial infarction (CMS/HCC)    • Overactive bladder    • Parkinsonian syndrome (CMS/HCC)    • Prostatitis    • Restrictive pattern present on pulmonary function testing    • Seasonal allergies    • Stroke (CMS/HCC)     RESIDUAL WEAKNESS RIGHT LOWER EXTREMITY   • Urinary incontinence    • Urinary retention         Past Surgical History:   Procedure Laterality Date   • ABDOMINAL SURGERY     • CARDIAC SURGERY     • CHOLECYSTECTOMY     • CORONARY ARTERY BYPASS GRAFT      x2   • ENDOSCOPY N/A 10/4/2018    LA grade D reflux esophagitis, 4cm hiatal hernia, esohageal stenosis, normal stomach, erythematous duodenopathy, normal second portion of duodenum, no specimens collected   • ENDOSCOPY N/A 11/30/2018    Procedure: ESOPHAGOGASTRODUODENOSCOPY WITH DILATATION;  Surgeon: Paul Duncan MD;  Location: [facility] ENDOSCOPY;  Service: Gastroenterology   • EYE SURGERY      cataracts   • HIP ENDOPROSTHESIS Right 5/5/2018    Procedure: RIGHT HIP HEMIARTHROPLASTY;  Surgeon: Winston Vallejo MD;  Location: [facility] OR;  Service: Orthopedics   • HIP FRACTURE SURGERY Right     PARTIAL HIP REPLACEMENT   • MUSCLE BIOPSY Left 3/24/2016    Procedure: LT THIGH MUSCLE  BIOPSY;  Surgeon: Fausto Mack MD;  Location: [facility] OR;  Service:    • SKIN BIOPSY     • THYROID SURGERY     • TOTAL HIP ARTHROPLASTY REVISION Right 1/18/2019    Procedure: CONVERSION RT POSTERIOR HIP BIPOLAR TO TOTAL HIP ARTHROPLASTY;  Surgeon: Radha Paredes MD;  Location: [facility] OR;  Service: Orthopedics   • TURP / TRANSURETHRAL INCISION / DRAINAGE PROSTATE     • VASCULAR SURGERY          Social History     Occupational History   • Not on file   Tobacco Use   • Smoking status: Current Every Day Smoker     Packs/day: 1.00     Types: Cigarettes     Start date: 1960   • Smokeless tobacco: Never Used   • Tobacco comment: Educated pt on quitting   Substance and Sexual Activity   • Alcohol use: Yes     Comment: occasionally   • Drug use: No   • Sexual activity: Defer     Partners: Female      Social History     Social History Narrative   • Not on file        Family History   Problem Relation Age of Onset   • Cancer Mother    • Arthritis Father    • Heart disease Father    • Early death Brother    • Heart disease Brother    • Malig Hyperthermia Neg Hx        Physical Exam: 73 y.o. male  General Appearance:    Alert, cooperative, in no acute distress                      Vitals:    01/21/19 1547 01/21/19 1606 01/21/19 1921 01/21/19 2135   BP: (!) 79/45  Comment: notified RN 90/46 114/69    BP Location: Left arm Left arm Left arm    Patient Position: Lying Lying Lying    Pulse: 65  68 72   Resp: 14  16 18   Temp: 97 °F (36.1 °C)  98.4 °F (36.9 °C)    TempSrc: Oral  Oral    SpO2: 97%  98%    Weight:       Height:            Hospital Course:  73 y.o. male admitted to [facility] to services of Radha Paredes,* with Hip pain [M25.559]  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 [M25.551, G89.29, Z96.641] on 1/18/2019 and underwent a conversion of his bipolar arthroplasty to a  total hip arthroplasty Per Radha Paredes MD. Antibiotic  Kefzol  every 8  hours and VTE prophylaxis  Aspirin 325 mg daily orally  were per [facility]IP protocols. Post-operatively the patient transferred to the post-operative floor where the patient underwent mobilization therapy that included active ROM exercises. Opioids were titrated to achieve appropriate pain management to allow for participation in mobilization exercises. Vital signs are now stable. The incision is intact without signs or symptoms of infection. Operative extremity neurovascular status remains intact.     Appropriate education re: incision care, activity levels, medications, hip dislocation precautions, and follow up visits was completed and all questions were answered.     He was slow to mobilize with PT. He has a history of stroke. He had positive acute inflammation of frozen section, but cultures remained negative, he has been on Keflex for UTI.     The patient is now deemed stable for discharge.    DISCHARGE DISPOSITION AND PLAN:    The  Patient is being discharged to subacute rehabilitation facility for a short stay.     DIAGNOSTIC TESTS:     Admission on 01/18/2019   Component Date Value Ref Range Status   • Case Report 01/18/2019    Final                    Value:Surgical Pathology Report                         Case: [id number]                                  Authorizing Provider:  Radha Paredes,   Collected:           01/18/2019 10:50 AM                                 MD                                                                           Ordering Location:     [facility]  Received:            01/18/2019 11:07 AM                                 MAIN OR                                                                      Pathologist:           Rosemarie Weiner MD                                                          Specimen:    Hip, Right, tissue from right hip for frozen section for acute inflammation               • Final Diagnosis 01/18/2019    Final                     Value:This result contains rich text formatting which cannot be displayed here.   • Intraoperative Consultation 01/18/2019    Final                    Value:This result contains rich text formatting which cannot be displayed here.   • Gross Description 01/18/2019    Final                    Value:This result contains rich text formatting which cannot be displayed here.   • Microscopic Description 01/18/2019    Final                    Value:This result contains rich text formatting which cannot be displayed here.   • Culture 01/18/2019 No anaerobes isolated at 3 days   Preliminary   • Tissue Culture 01/18/2019 No growth at 3 days   Final   • Gram Stain 01/18/2019 No WBCs or organisms seen   Final   • Glucose 01/18/2019 130* 65 - 99 mg/dL Final   • BUN 01/18/2019 16  8 - 23 mg/dL Final   • Creatinine 01/18/2019 0.91  0.76 - 1.27 mg/dL Final   • Sodium 01/18/2019 137  136 - 145 mmol/L Final   • Potassium 01/18/2019 4.5  3.5 - 5.2 mmol/L Final   • Chloride 01/18/2019 103  98 - 107 mmol/L Final   • CO2 01/18/2019 23.1  22.0 - 29.0 mmol/L Final   • Calcium 01/18/2019 10.0  8.6 - 10.5 mg/dL Final   • eGFR Non African Amer 01/18/2019 81  >60 mL/min/1.73 Final   • BUN/Creatinine Ratio 01/18/2019 17.6  7.0 - 25.0 Final   • Anion Gap 01/18/2019 10.9  mmol/L Final   • C-Reactive Protein 01/18/2019 3.25* 0.00 - 0.50 mg/dL Final   • Glucose 01/19/2019 152* 65 - 99 mg/dL Final   • BUN 01/19/2019 17  8 - 23 mg/dL Final   • Creatinine 01/19/2019 0.75* 0.76 - 1.27 mg/dL Final   • Sodium 01/19/2019 137  136 - 145 mmol/L Final   • Potassium 01/19/2019 4.4  3.5 - 5.2 mmol/L Final   • Chloride 01/19/2019 105  98 - 107 mmol/L Final   • CO2 01/19/2019 21.2* 22.0 - 29.0 mmol/L Final   • Calcium 01/19/2019 9.7  8.6 - 10.5 mg/dL Final   • eGFR Non African Amer 01/19/2019 102  >60 mL/min/1.73 Final   • BUN/Creatinine Ratio 01/19/2019 22.7  7.0 - 25.0 Final   • Anion Gap 01/19/2019 10.8  mmol/L Final   • Sed Rate 01/19/2019 61* 0 - 20 mm/hr  Final   • WBC 01/19/2019 11.82* 4.50 - 10.70 10*3/mm3 Final   • RBC 01/19/2019 3.19* 4.60 - 6.00 10*6/mm3 Final   • Hemoglobin 01/19/2019 9.4* 13.7 - 17.6 g/dL Final   • Hematocrit 01/19/2019 30.5* 40.4 - 52.2 % Final   • MCV 01/19/2019 95.6  79.8 - 96.2 fL Final   • MCH 01/19/2019 29.5  27.0 - 32.7 pg Final   • MCHC 01/19/2019 30.8* 32.6 - 36.4 g/dL Final   • RDW 01/19/2019 14.1  11.5 - 14.5 % Final   • RDW-SD 01/19/2019 49.7  37.0 - 54.0 fl Final   • MPV 01/19/2019 9.5  6.0 - 12.0 fL Final   • Platelets 01/19/2019 299  140 - 500 10*3/mm3 Final   • Neutrophil % 01/19/2019 79.5* 42.7 - 76.0 % Final   • Lymphocyte % 01/19/2019 13.5* 19.6 - 45.3 % Final   • Monocyte % 01/19/2019 6.6  5.0 - 12.0 % Final   • Eosinophil % 01/19/2019 0.0* 0.3 - 6.2 % Final   • Basophil % 01/19/2019 0.1  0.0 - 1.5 % Final   • Immature Grans % 01/19/2019 0.3  0.0 - 0.5 % Final   • Neutrophils, Absolute 01/19/2019 9.41* 1.90 - 8.10 10*3/mm3 Final   • Lymphocytes, Absolute 01/19/2019 1.59  0.90 - 4.80 10*3/mm3 Final   • Monocytes, Absolute 01/19/2019 0.78  0.20 - 1.20 10*3/mm3 Final   • Eosinophils, Absolute 01/19/2019 0.00  0.00 - 0.70 10*3/mm3 Final   • Basophils, Absolute 01/19/2019 0.01  0.00 - 0.20 10*3/mm3 Final   • Immature Grans, Absolute 01/19/2019 0.03  0.00 - 0.03 10*3/mm3 Final   • Glucose 01/20/2019 114* 65 - 99 mg/dL Final   • BUN 01/20/2019 14  8 - 23 mg/dL Final   • Creatinine 01/20/2019 0.67* 0.76 - 1.27 mg/dL Final   • Sodium 01/20/2019 140  136 - 145 mmol/L Final   • Potassium 01/20/2019 3.9  3.5 - 5.2 mmol/L Final   • Chloride 01/20/2019 108* 98 - 107 mmol/L Final   • CO2 01/20/2019 22.8  22.0 - 29.0 mmol/L Final   • Calcium 01/20/2019 9.2  8.6 - 10.5 mg/dL Final   • eGFR Non  Amer 01/20/2019 116  >60 mL/min/1.73 Final   • BUN/Creatinine Ratio 01/20/2019 20.9  7.0 - 25.0 Final   • Anion Gap 01/20/2019 9.2  mmol/L Final   • WBC 01/20/2019 10.71* 4.50 - 10.70 10*3/mm3 Final   • RBC 01/20/2019 2.96* 4.60 - 6.00  10*6/mm3 Final   • Hemoglobin 01/20/2019 8.8* 13.7 - 17.6 g/dL Final   • Hematocrit 01/20/2019 28.3* 40.4 - 52.2 % Final   • MCV 01/20/2019 95.6  79.8 - 96.2 fL Final   • MCH 01/20/2019 29.7  27.0 - 32.7 pg Final   • MCHC 01/20/2019 31.1* 32.6 - 36.4 g/dL Final   • RDW 01/20/2019 14.5  11.5 - 14.5 % Final   • RDW-SD 01/20/2019 50.7  37.0 - 54.0 fl Final   • MPV 01/20/2019 9.3  6.0 - 12.0 fL Final   • Platelets 01/20/2019 291  140 - 500 10*3/mm3 Final   • Neutrophil % 01/20/2019 66.2  42.7 - 76.0 % Final   • Lymphocyte % 01/20/2019 24.6  19.6 - 45.3 % Final   • Monocyte % 01/20/2019 8.0  5.0 - 12.0 % Final   • Eosinophil % 01/20/2019 1.0  0.3 - 6.2 % Final   • Basophil % 01/20/2019 0.2  0.0 - 1.5 % Final   • Immature Grans % 01/20/2019 0.3  0.0 - 0.5 % Final   • Neutrophils, Absolute 01/20/2019 7.08  1.90 - 8.10 10*3/mm3 Final   • Lymphocytes, Absolute 01/20/2019 2.64  0.90 - 4.80 10*3/mm3 Final   • Monocytes, Absolute 01/20/2019 0.86  0.20 - 1.20 10*3/mm3 Final   • Eosinophils, Absolute 01/20/2019 0.11  0.00 - 0.70 10*3/mm3 Final   • Basophils, Absolute 01/20/2019 0.02  0.00 - 0.20 10*3/mm3 Final   • Immature Grans, Absolute 01/20/2019 0.03  0.00 - 0.03 10*3/mm3 Final   • WBC 01/21/2019 10.13  4.50 - 10.70 10*3/mm3 Final   • RBC 01/21/2019 3.00* 4.60 - 6.00 10*6/mm3 Final   • Hemoglobin 01/21/2019 8.9* 13.7 - 17.6 g/dL Final   • Hematocrit 01/21/2019 28.9* 40.4 - 52.2 % Final   • MCV 01/21/2019 96.3* 79.8 - 96.2 fL Final   • MCH 01/21/2019 29.7  27.0 - 32.7 pg Final   • MCHC 01/21/2019 30.8* 32.6 - 36.4 g/dL Final   • RDW 01/21/2019 14.6* 11.5 - 14.5 % Final   • RDW-SD 01/21/2019 51.5  37.0 - 54.0 fl Final   • MPV 01/21/2019 9.4  6.0 - 12.0 fL Final   • Platelets 01/21/2019 272  140 - 500 10*3/mm3 Final   • Neutrophil % 01/21/2019 64.1  42.7 - 76.0 % Final   • Lymphocyte % 01/21/2019 25.2  19.6 - 45.3 % Final   • Monocyte % 01/21/2019 8.6  5.0 - 12.0 % Final   • Eosinophil % 01/21/2019 1.6  0.3 - 6.2 % Final   •  Basophil % 01/21/2019 0.3  0.0 - 1.5 % Final   • Immature Grans % 01/21/2019 0.2  0.0 - 0.5 % Final   • Neutrophils, Absolute 01/21/2019 6.50  1.90 - 8.10 10*3/mm3 Final   • Lymphocytes, Absolute 01/21/2019 2.55  0.90 - 4.80 10*3/mm3 Final   • Monocytes, Absolute 01/21/2019 0.87  0.20 - 1.20 10*3/mm3 Final   • Eosinophils, Absolute 01/21/2019 0.16  0.00 - 0.70 10*3/mm3 Final   • Basophils, Absolute 01/21/2019 0.03  0.00 - 0.20 10*3/mm3 Final   • Immature Grans, Absolute 01/21/2019 0.02  0.00 - 0.03 10*3/mm3 Final     No results found for: URICACID  No results found for: CRYSTAL  Microbiology Results (last 10 days)     Procedure Component Value - Date/Time    Anaerobic Culture - Tissue, Hip, Right [[id number]] Collected:  01/18/19 1124    Lab Status:  Preliminary result Specimen:  Tissue from Hip, Right Updated:  01/21/19 1030     Culture No anaerobes isolated at 3 days    Tissue / Bone Culture - Tissue, Hip, Right [[id number]] Collected:  01/18/19 1124    Lab Status:  Final result Specimen:  Tissue from Hip, Right Updated:  01/21/19 0855     Tissue Culture No growth at 3 days     Gram Stain No WBCs or organisms seen        Xr Hip With Or Without Pelvis 1 View Right    Result Date: 1/18/2019   Postsurgical changes.    This report was finalized on 1/18/2019 3:08 PM by Dr. Mendoza Womack M.D.      Follow-up Appointments  No future appointments.      Discharge and Follow up Instructions:     I. ACTIVITIES:  1. Exercises:  ? Complete exercise program as taught by the hospital physical therapist 2 times per day  ? Exercise program will be advanced by the physical therapist  ? During the day be up ambulating every 2 hours (while awake) for short distances  ? Complete the ankle pump exercises at least 10 times per hour (while awake)  ? Elevate legs when in bed and for at least 30 minutes during the day.Use cold packs 20-30 minutes approximately 5 times per day. This should be done before and after completing your  exercises and at any time you are experiencing pain/ stiffness in your operative extremity.      2. Activities of Daily Living:  ? No tub baths, hot tubs, or swimming pools for 4 weeks  ? May shower and let water run over the incision on post-operative day #5 if no drainage. Do not scrub or rub the incision. Simply let the water run over the incision and pat dry.    II. Restrictions  ? Continue Posterior hip precautions as taught at the hospital  ? Your surgeon will discuss with you when you will be able to drive again. Usual guidelines are you are to be off pain medications prior to driving.  ? Weight bearing is as tolerated  ? First week stay inside on even terrain. May go up and down stairs one stair at a time utilizing the hand rail once cleared by physical therapy to do so.  ? After one week, you may venture outside (if cleared to do so by physical therapist).    III. Precautions:  ? Everyone that comes near you should wash their hands  ? No elective dental, genital-urinary, or colon procedures or surgical procedures for 12 weeks after surgery unless absolutely necessary.  ?  If dental work or surgical procedure is deemed absolutely necessary, you will need to contact your surgeon as you will need to take antibiotics 1 hour prior to any dental work (including teeth cleanings).  ? Please discuss with your surgeon prophylactic antibiotics as the length of time this intervention will be necessary for you varies with each patient’s health history and situation.  ? Avoid sick people. If you must be around someone who is ill, they should wear a mask.  ? Avoid visits to the Emergency Room or Urgent Care. If you feel you need to go to the Emergency Room, please notify your Surgeon's office.  ? Stockings are to be worn for one week after surgery and are to be placed on in the morning and removed at night. Observe your skin when stocking is removed for any problems. Monitor the stockings to ensure that any swelling is  not causing the stockings to become too tight. In this case, remove stockings immediately.      IV. INCISION CARE:  ? Wash your hands prior to dressing changes  ? Change the dressing as needed to keep incision clean and dry. Utilize dry gauze and paper tape. Avoid touching the side of the gauze that goes against the incision with your hands.  ? No creams or ointments to the incision  ? May remove dressing once the incision is free of drainage  ? Do not touch or pick at the incision  ? Check incision every day and notify surgeon immediately if any of the following signs or symptoms are noted:  o Increase in redness  o Increase in swelling around the incision and of the entire extremity  o Increase in pain  o Drainage oozing from the incision  o Pulling apart of the edges of the incision  o Increase in overall body temperature (greater than 100.5 degrees)    ?  Your Staples will be removed between 10-14 days postoperation.  This may be done by either the home health nurse, rehabilitation nurse or during your return visit to Dr. Paredes's office.  You will then be instructed on how to care for the incision.  (Please call the office if your staples have not been removed within 14 days after surgery).       V. Medications:   1. Anticoagulants: You will be discharged on an anticoagulant. This is a prophylactic medication that helps prevent blood clots during your post-operative period.  You will be on  Aspirin 325 mg Enteric Coated daily home dose.   ? While taking the anticoagulant, you should avoid taking any additional aspirin, ibuprofen (Advil or Motrin), Aleve (Naprosyn) or other non-steroidal anti-inflammatory medications.   ? Notify surgeon immediately if any lelo bleeding is noted in the urine, stool, emesis, or from the nose or the incision. Blood in the stool will often appear as black rather than red. Blood in urine may appear as pink. Blood in emesis may appear as brown/black like coffee grounds.  ? You will  need to apply pressure for longer periods of time to any cuts or abrasions to stop bleeding  ? Avoid alcohol while taking anticoagulants    2. Stool Softeners: You will be at greater risk of constipation after surgery due to being less mobile and the pain medications.   ? Take stool softeners as instructed by your surgeon while on pain medications. Over the counter Colace 100 mg 1-2 capsules twice daily.   ? If stools become too loose or too frequent, please decreases the dosage or stop the stool softener.  ? If constipation occurs despite use of stool softeners, you are to continue the stool softeners and add a laxative (Milk of Magnesia 1 ounce daily as needed).  ? Dulcolax oral tabs or suppository, or a fleets enema can also be utilized for constipation and can be obtained over the counter.   ? If above interventions are unsuccessful in inducing bowel movements, please contact your surgeon's office / family physician's office.  ? Drink plenty of fluids, and eat fruits and vegetables during your recovery time    3. Pain Medications utilized after surgery are narcotics and the law requires that the following information be given to all patients that are prescribed narcotics:  ? CLASSIFICATION: Pain medications are called Opioids and are narcotics  ? LEGALITIES: It is illegal to share narcotics with others and to drive within 24 hours of taking narcotics  ? POTENTIAL SIDE EFFECTS: Potential side effects of opioids include: nausea, vomiting, itching, dizziness, drowsiness, dry mouth, constipation, and difficulty urinating.  ? POTENTIAL ADVERSE EFFECTS:   o Opioid tolerance can develop with use of pain medications and this simply means that it requires more and more of the medication to control pain; however, this is seen more in patients that use opioids for longer periods of time.  o Opioid dependence can develop with use of Opioids and this simply means that to stop the medication can cause withdrawal symptoms;  however, this is seen with patients that use Opioids for longer periods of time.  o Opioid addiction can develop with use of Opioids and the incidence of this is very unlikely in patients who take the medications as ordered and stop the medications as instructed.  o Opioid overdose can be dangerous, but is unlikely when the medication is taken as ordered and stopped when ordered. It is important not to mix opioids with alcohol or with and type of sedative such as Benadryl as this can lead to over sedation and respiratory difficulty.  ? DOSAGE:   o Pain medications will need to be taken consistently for the first week to decrease pain and promote adequate pain relief and participation in physical therapy.  o After the initial surgical pain begins to resolve, you may begin to decrease the pain medication. By the end of 6 weeks, you should be off of pain medications.  o Refills will not be given by the office during evening hours, on weekends, or after 6 weeks post-op.  o To seek refills on pain medications during the initial 6 week post-operative period, you must call the office 48 hours in advance to request the refill. The office will then notify you when to  the prescription. DO NOT wait until you are out of the medication to request a refill.    V. FOLLOW-UP VISITS:  ? You will need to follow up in the office with your surgeon on Feb 6 ,2019. Please call this number [phone]  to schedule this appointment.  If you have any concerns or suspected complications prior to your follow up visit, please call your surgeons office. Do not wait until your appointment time if you suspect complications. These will need to be addressed in the office promptly.    Date: 1/21/2019    Radha Paredes MD    CC: Shira Pierce APRN; MD Reggie Almendarez, Radha BEAN,*

## 2019-01-22 NOTE — PROGRESS NOTES
Continued Stay Note  [facility]     Patient Name: Victor Manuel Issa  MRN: [id number]  Today's Date: 1/22/2019    Admit Date: 1/18/2019    Discharge Plan     Row Name 01/22/19 0919       Plan    Plan Comments  DC orders in EPIC.  Spoke with Masha/Jerod Jacob and facility is ready to accept today.  Transfer packet updated and dc summary faxed.   Scripts x4 copied and originals placed in transfer packet.  Updated patient and family in room and they are aware of scheduled  for 1300 by [facility].  Nurse aware.  CCP will follow as needed. Lolis Lopez RN        Discharge Codes    No documentation.       Expected Discharge Date and Time     Expected Discharge Date Expected Discharge Time    Jan 21, 2019             Lolis Lopez RN

## 2019-01-23 LAB — BACTERIA SPEC ANAEROBE CULT: NORMAL

## 2019-01-23 NOTE — PROGRESS NOTES
Continued Stay Note  [facility]     Patient Name: Victor Manuel Issa  MRN: [id number]  Today's Date: 1/23/2019    Admit Date: 1/18/2019    Discharge Plan     Row Name 01/23/19 0832       Plan    Final Discharge Disposition Code  03 - skilled nursing facility (SNF)    Final Note  DC'd to skilled bed at [facility] via [facility]. Lolis Lopez RN        Discharge Codes    No documentation.       Expected Discharge Date and Time     Expected Discharge Date Expected Discharge Time    Jan 21, 2019             Lolis Lopez RN

## 2019-07-12 RX ORDER — TRAMADOL HYDROCHLORIDE 50 MG/1
50 TABLET ORAL EVERY 4 HOURS PRN
Qty: 60 TABLET | OUTPATIENT
Start: 2019-07-12

## 2019-10-24 PROBLEM — M62.838 MUSCLE SPASTICITY: Status: ACTIVE | Noted: 2019-01-01

## 2019-12-07 PROBLEM — J44.1 COPD EXACERBATION (HCC): Status: ACTIVE | Noted: 2019-01-01

## 2019-12-07 PROBLEM — E83.39 HYPOPHOSPHATEMIA: Status: ACTIVE | Noted: 2019-01-01

## 2019-12-07 NOTE — ED TRIAGE NOTES
Pt arrived from home with complaints of body aches, cough and weakness. Pt is currently being treated for a UTI. Pt is alert to Self and location at baseline. Pt has a hx of COPD.

## 2019-12-08 PROBLEM — J18.9 PNEUMONIA DUE TO INFECTIOUS ORGANISM: Status: ACTIVE | Noted: 2019-01-01

## 2019-12-08 NOTE — NURSING NOTE
Patient found with heart rate in the 40s this am. Patient does not have any chest pain, hypotension, or Shortness of air on assessment. Cardiology has been consulted for  routine per Attending provider. Wife is at the bedside and says that patient has a history of bradycardia but was not sure if it ever got into the 40s. Will continue to monitor.

## 2019-12-08 NOTE — PLAN OF CARE
Pt has been resting this shift. Cardiology came to see patient they have signed off no issues with the atnvir and pt can have the fluids. Pts wife was here and got nauseated and is down in the ER please keep pt updated if possible.

## 2019-12-08 NOTE — H&P
Patient Care Team:  Provider, No Known as PCP - Shira Hirsch APRN as PCP - Claims Attributed    Chief complaint weakness    Subjective     History of Present Illness      74-year-old gentleman with a history of parkinsonism who comes in the hospital because of poor appetite for approximately 1 week.  The patient was initially diagnosed with urinary tract infection about a week and half ago and was started on nitrofurantoin.  Patient has now felt bad for at least the last week.  Patient had fevers over the last 2 days which the wife states have been as high as 102.  The patient has no fevers in the emergency room.    In the emergency room the patient's phosphorus levels found to be 1.8 but other electrolytes and kidney function are fairly unremarkable.  The patient is being started on phosphorus replacement by the ER.    Other than the nitrofurantoin the family medications states no cahnges.    According to the wife patient had gallbladder removal several years ago that had a bile leak.  He required procedures following that and since that time has not had any gallbladder type symptoms for many years.  Patient has a history of parkinsonism and has been seen by a neurologist for this and was placed on Sinemet for this.  The patient is followed by Dr. Read from cardiology and had a stress test prior to an initial hip replacement.  Reviewing old records the patient gets Botox injections through Dr. Ko.      Review of Systems   Constitutional: Positive for activity change, appetite change, fatigue and fever ( 101 this am).   HENT: Positive for congestion.    Respiratory: Positive for cough, chest tightness ( 2 days ago fleeting, no radiation) and shortness of breath.    Cardiovascular: Negative for chest pain.   Gastrointestinal: Negative for abdominal distention and abdominal pain.   Endocrine: Negative for cold intolerance and heat intolerance.   Genitourinary: Negative for difficulty urinating  and dysuria.        Incontinence   Musculoskeletal: Negative for arthralgias and back pain.   Neurological: Negative for dizziness and headaches.   Psychiatric/Behavioral: Positive for confusion. Negative for agitation.        Past Medical History:   Diagnosis Date   • Anxiety    • Arthritis    • Bipolar 1 disorder (CMS/HCC)    • Coronary artery disease    • Disease of thyroid gland    • GERD (gastroesophageal reflux disease)    • Hyperlipidemia    • Mobility impaired    • Myocardial infarction (CMS/HCC)    • Overactive bladder    • Parkinsonian syndrome (CMS/HCC)    • Prostatitis    • Restrictive pattern present on pulmonary function testing    • Seasonal allergies    • Stroke (CMS/HCC)     RESIDUAL WEAKNESS RIGHT LOWER EXTREMITY   • Urinary incontinence    • Urinary retention      Past Surgical History:   Procedure Laterality Date   • ABDOMINAL SURGERY     • CARDIAC SURGERY     • CHOLECYSTECTOMY     • CORONARY ARTERY BYPASS GRAFT      x2   • ENDOSCOPY N/A 10/4/2018    LA grade D reflux esophagitis, 4cm hiatal hernia, esohageal stenosis, normal stomach, erythematous duodenopathy, normal second portion of duodenum, no specimens collected   • ENDOSCOPY N/A 11/30/2018    Procedure: ESOPHAGOGASTRODUODENOSCOPY WITH DILATATION;  Surgeon: Paul Duncan MD;  Location: [facility] ENDOSCOPY;  Service: Gastroenterology   • EYE SURGERY      cataracts   • HIP ENDOPROSTHESIS Right 5/5/2018    Procedure: RIGHT HIP HEMIARTHROPLASTY;  Surgeon: Winston Vallejo MD;  Location: [facility] OR;  Service: Orthopedics   • HIP FRACTURE SURGERY Right     PARTIAL HIP REPLACEMENT   • MUSCLE BIOPSY Left 3/24/2016    Procedure: LT THIGH MUSCLE BIOPSY;  Surgeon: Fausto Mack MD;  Location: [facility] OR;  Service:    • SKIN BIOPSY     • THYROID SURGERY     • TOTAL HIP ARTHROPLASTY REVISION Right 1/18/2019    Procedure: CONVERSION RT POSTERIOR HIP BIPOLAR TO TOTAL HIP ARTHROPLASTY;  Surgeon: Radha Paredes MD;   Location: [facility] OR;  Service: Orthopedics   • TURP / TRANSURETHRAL INCISION / DRAINAGE PROSTATE     • VASCULAR SURGERY       Family History   Problem Relation Age of Onset   • Cancer Mother    • Arthritis Father    • Heart disease Father    • Early death Brother    • Heart disease Brother    • Malig Hyperthermia Neg Hx      Social History     Tobacco Use   • Smoking status: Current Every Day Smoker     Packs/day: 1.00     Types: Cigarettes     Start date: 1960   • Smokeless tobacco: Never Used   • Tobacco comment: Educated pt on quitting   Substance Use Topics   • Alcohol use: Yes     Comment: occasionally   • Drug use: No       (Not in a hospital admission)  Allergies:  Oxycodone; Promethazine; and Beet [beta vulgaris]    Objective      Vital Signs  Temp:  [98.8 °F (37.1 °C)] 98.8 °F (37.1 °C)  Heart Rate:  [] 81  Resp:  [18] 18  BP: (100-105)/(66-73) 100/73    Physical Exam   Constitutional: No distress.   Frail-appearing, not in any respiratory distress, slow with his responses, pleasant to talk with   HENT:   Head: Normocephalic and atraumatic.   Eyes: Conjunctivae and EOM are normal.   Cardiovascular: Normal rate, regular rhythm and normal heart sounds. Exam reveals no gallop and no friction rub.   No murmur heard.  Pulmonary/Chest: Effort normal and breath sounds normal. He has no wheezes. He has no rales.   Abdominal: Soft. Bowel sounds are normal. He exhibits no distension and no mass. There is no tenderness. There is no rebound and no guarding.   Musculoskeletal: He exhibits no edema.   Neurological:   Slow with responses, wife answers most of the questions for him.  Does provide some details about his history   Skin: Skin is warm and dry. He is not diaphoretic.   Psychiatric: He has a normal mood and affect. His behavior is normal.       Results Review:   I reviewed the patient's new clinical results.  I reviewed the patient's new imaging results and agree with the interpretation.  I personally  viewed and interpreted the patient's EKG/Telemetry data      Assessment/Plan       COPD exacerbation (CMS/HCC)    Hypothyroidism (acquired)    Coronary artery disease    Hypophosphatemia      Assessment & Plan      COPD exacerbation (CMS/HCC)  -Fairly mild at this point.  Will use oral steroids and duo nebs.  Currently it only any wheezing on exam but the patient does have a productive sounding cough, slightly wet voice    Fevers-but none here in the hospital white blood cell count is normal we will check procalcitonin level this possibly related to nitrofurantoin  -Pro-Demarco is normal     hypothyroidism (acquired)  -Continue with replacement and check thyroid levels    Confusion of the last several days which could B12 and folate and TSH      Coronary artery disease-patient had chest pain a couple days ago which was fleeting and sounds atypical.  Probably related to bronchitis type symptoms.  Check a troponin EKG      Hypophosphatemia-ordered in the emergency room    I discussed the patients findings and my recommendations with patient, family and consulting provider    Jeovany Mcmahon MD  12/07/19  8:36 PM    Time:

## 2019-12-08 NOTE — ED PROVIDER NOTES
EMERGENCY DEPARTMENT ENCOUNTER    Room Number:  S610/1  Date of encounter:  12/7/2019  PCP: Provider, No Known  Historian: patient, wife      HPI:  Chief Complaint: generalized weakness  A complete HPI/ROS/PMH/PSH/SH/FH are unobtainable due to: none    Context: Victor Manuel Issa is a 74 y.o. male who presents to the ED c/o generalized weakness with cough. Onset yesterday. Symptoms are constant. Improved by nothing. Worsened by nothing. Wife notes productive cough with yellow sputum. Tmax 102. He reports mild dyspnea related to cough. No chest pain.     Patient has h/o COPD not on oxygen at baseline. Also h/o prior stroke and Parkinson's and wife states he's normally rather weak. However, he is now much weaker than normal, specifically he has trouble transferring. Wife notes no focal weakness but rather generalized weakness. She notes he has lost a fair amount of weight recently due to poor nutrition.       PAST MEDICAL HISTORY  Active Ambulatory Problems     Diagnosis Date Noted   • Thigh pain, musculoskeletal 03/22/2016   • Left leg weakness 09/28/2017   • History of CVA (cerebrovascular accident) 09/28/2017   • COPD (chronic obstructive pulmonary disease) 09/28/2017   • Coronary artery disease 09/28/2017   • Cervical myelopathy (CMS/[facility]) 09/29/2017   • Parkinson's disease 09/29/2017   • Debility 09/29/2017   • Cerebrovascular disease 09/29/2017   • Abnormal TSH 09/30/2017   • Hypothyroidism (acquired) 10/01/2017   • Hip fracture requiring operative repair, right, closed, initial encounter (CMS/[facility]) 05/03/2018   • Urinary tract infection without hematuria 05/06/2018   • Hypoxia 05/06/2018   • Sinus bradycardia 05/12/2018   • Chest pain 10/02/2018   • Hip pain, chronic, right 10/02/2018   • Hematemesis 10/02/2018   • Hematemesis with nausea 10/02/2018   • H/O medication noncompliance 10/03/2018   • Hyperlipidemia 10/03/2018   • Muscle spasticity 10/24/2019     Resolved Ambulatory Problems     Diagnosis Date Noted    • Post-traumatic osteoarthritis of right hip 01/18/2019   • Hip pain 01/18/2019   • 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 01/18/2019     Past Medical History:   Diagnosis Date   • Anxiety    • Arthritis    • Bipolar 1 disorder (CMS/HCC)    • Disease of thyroid gland    • GERD (gastroesophageal reflux disease)    • Mobility impaired    • Myocardial infarction (CMS/HCC)    • Overactive bladder    • Parkinsonian syndrome (CMS/HCC)    • Prostatitis    • Restrictive pattern present on pulmonary function testing    • Seasonal allergies    • Stroke (CMS/HCC)    • Urinary incontinence    • Urinary retention          PAST SURGICAL HISTORY  Past Surgical History:   Procedure Laterality Date   • ABDOMINAL SURGERY     • CARDIAC SURGERY     • CHOLECYSTECTOMY     • CORONARY ARTERY BYPASS GRAFT      x2   • ENDOSCOPY N/A 10/4/2018    LA grade D reflux esophagitis, 4cm hiatal hernia, esohageal stenosis, normal stomach, erythematous duodenopathy, normal second portion of duodenum, no specimens collected   • ENDOSCOPY N/A 11/30/2018    Procedure: ESOPHAGOGASTRODUODENOSCOPY WITH DILATATION;  Surgeon: Paul Duncan MD;  Location: [facility] ENDOSCOPY;  Service: Gastroenterology   • EYE SURGERY      cataracts   • HIP ENDOPROSTHESIS Right 5/5/2018    Procedure: RIGHT HIP HEMIARTHROPLASTY;  Surgeon: Winston Vallejo MD;  Location: [facility] OR;  Service: Orthopedics   • HIP FRACTURE SURGERY Right     PARTIAL HIP REPLACEMENT   • MUSCLE BIOPSY Left 3/24/2016    Procedure: LT THIGH MUSCLE BIOPSY;  Surgeon: Fausto Mack MD;  Location: [facility] MAIN OR;  Service:    • SKIN BIOPSY     • THYROID SURGERY     • TOTAL HIP ARTHROPLASTY REVISION Right 1/18/2019    Procedure: CONVERSION RT POSTERIOR HIP BIPOLAR TO TOTAL HIP ARTHROPLASTY;  Surgeon: Radha Paredes MD;  Location: [facility] OR;  Service: Orthopedics   • TURP / TRANSURETHRAL INCISION / DRAINAGE PROSTATE     • VASCULAR SURGERY            FAMILY HISTORY  Family History   Problem Relation Age of Onset   • Cancer Mother    • Arthritis Father    • Heart disease Father    • Early death Brother    • Heart disease Brother    • Malig Hyperthermia Neg Hx          SOCIAL HISTORY  Social History     Socioeconomic History   • Marital status:      Spouse name: Not on file   • Number of children: Not on file   • Years of education: Not on file   • Highest education level: Not on file   Tobacco Use   • Smoking status: Current Every Day Smoker     Packs/day: 1.00     Types: Cigarettes     Start date: 1960   • Smokeless tobacco: Never Used   • Tobacco comment: Educated pt on quitting   Substance and Sexual Activity   • Alcohol use: Yes     Comment: occasionally   • Drug use: No   • Sexual activity: Defer     Partners: Female         ALLERGIES  Oxycodone; Promethazine; and Beet [beta vulgaris]        REVIEW OF SYSTEMS  Review of Systems     All systems reviewed and negative except for those discussed in HPI.       PHYSICAL EXAM    I have reviewed the triage vital signs and nursing notes.    ED Triage Vitals   Temp Heart Rate Resp BP SpO2   12/07/19 1651 12/07/19 1651 12/07/19 1651 12/07/19 1651 12/07/19 1651   98.8 °F (37.1 °C) 115 18 105/66 97 %      Temp src Heart Rate Source Patient Position BP Location FiO2 (%)   12/07/19 1651 12/07/19 1651 12/07/19 1705 12/07/19 1705 --   Tympanic Monitor Lying Right arm        Physical Exam  GENERAL: not distressed  HENT: nares patent, mucous membranes slightly dry, temporal wasting  EYES: no scleral icterus  CV: regular rhythm, regular rate  RESPIRATORY: normal effort faint expiratory wheezing bilaterally, poor air movement  ABDOMEN: soft, nontender  MUSCULOSKELETAL: no deformity  NEURO:   mased facies, AOx4 (president, holiday, [facility], year). He has 4/5 strength to bilateral uppers extremities diffusely and 3/5 strength to lower extremities bilaterally. SILT. Intact FNF with no dysmetria (out of proportion  for his weakness).    Symmetric smile, EOMI, PERRL, VFF, tongue protrudes midline, palate elevates midline, symmetric sensation to face, symmetric hearing bilaterally.   SKIN: warm, dry        LAB RESULTS  Recent Results (from the past 24 hour(s))   Basic Metabolic Panel    Collection Time: 12/07/19  6:16 PM   Result Value Ref Range    Glucose 99 65 - 99 mg/dL    BUN 20 8 - 23 mg/dL    Creatinine 0.89 0.76 - 1.27 mg/dL    Sodium 142 136 - 145 mmol/L    Potassium 4.2 3.5 - 5.2 mmol/L    Chloride 106 98 - 107 mmol/L    CO2 22.2 22.0 - 29.0 mmol/L    Calcium 10.1 8.6 - 10.5 mg/dL    eGFR Non African Amer 83 >60 mL/min/1.73    BUN/Creatinine Ratio 22.5 7.0 - 25.0    Anion Gap 13.8 5.0 - 15.0 mmol/L   CBC Auto Differential    Collection Time: 12/07/19  6:16 PM   Result Value Ref Range    WBC 16.27 (H) 3.40 - 10.80 10*3/mm3    RBC 4.35 4.14 - 5.80 10*6/mm3    Hemoglobin 14.0 13.0 - 17.7 g/dL    Hematocrit 41.8 37.5 - 51.0 %    MCV 96.1 79.0 - 97.0 fL    MCH 32.2 26.6 - 33.0 pg    MCHC 33.5 31.5 - 35.7 g/dL    RDW 12.2 (L) 12.3 - 15.4 %    RDW-SD 43.0 37.0 - 54.0 fl    MPV 10.3 6.0 - 12.0 fL    Platelets 230 140 - 450 10*3/mm3    Neutrophil % 87.1 (H) 42.7 - 76.0 %    Lymphocyte % 5.8 (L) 19.6 - 45.3 %    Monocyte % 5.8 5.0 - 12.0 %    Eosinophil % 0.2 (L) 0.3 - 6.2 %    Basophil % 0.4 0.0 - 1.5 %    Immature Grans % 0.7 (H) 0.0 - 0.5 %    Neutrophils, Absolute 14.17 (H) 1.70 - 7.00 10*3/mm3    Lymphocytes, Absolute 0.95 0.70 - 3.10 10*3/mm3    Monocytes, Absolute 0.94 (H) 0.10 - 0.90 10*3/mm3    Eosinophils, Absolute 0.03 0.00 - 0.40 10*3/mm3    Basophils, Absolute 0.07 0.00 - 0.20 10*3/mm3    Immature Grans, Absolute 0.11 (H) 0.00 - 0.05 10*3/mm3    nRBC 0.0 0.0 - 0.2 /100 WBC   Procalcitonin    Collection Time: 12/07/19  6:16 PM   Result Value Ref Range    Procalcitonin 0.09 (L) 0.10 - 0.25 ng/mL   CK    Collection Time: 12/07/19  6:16 PM   Result Value Ref Range    Creatine Kinase 28 20 - 200 U/L   Magnesium     Collection Time: 12/07/19  6:16 PM   Result Value Ref Range    Magnesium 1.8 1.6 - 2.4 mg/dL   Phosphorus    Collection Time: 12/07/19  6:16 PM   Result Value Ref Range    Phosphorus 1.8 (C) 2.5 - 4.5 mg/dL   Lactic Acid, Plasma    Collection Time: 12/07/19  6:17 PM   Result Value Ref Range    Lactate 1.1 0.5 - 2.0 mmol/L   Influenza Antigen, Rapid - Swab, Nasopharynx    Collection Time: 12/07/19  6:18 PM   Result Value Ref Range    Influenza A Ag, EIA Negative Negative    Influenza B Ag, EIA Negative Negative       Ordered the above labs and independently reviewed the results.        RADIOLOGY  Xr Chest 2 View    Result Date: 12/7/2019  EMERGENCY PA AND LATERAL CHEST X-RAY 12/07/2019  CLINICAL HISTORY: Cough, bodyaches, fever.  This is correlated to prior PA and lateral chest x-ray 01/22/2019.  FINDINGS: There are multiple sternal wires and mediastinal markers and clips from previous cardiac surgery. The cardiomediastinal silhouette and pulmonary vasculature are within normal limits. There are some chronic linear opacities in the lung bases felt to be linear areas of atelectasis or scarring unchanged since 06/22/2019. No new or focal infiltrate is seen. The costophrenic angles are sharp.      1. No definite active disease is seen in the chest. There is no significant change when compared to prior chest x-ray 01/22/2019. 2. Previous CABG and there is bibasilar linear atelectasis or scarring. There is an old healed fracture deformity mid to distal shaft of the right clavicle.        I ordered the above noted radiological studies. Reviewed by me and discussed with radiologist.  See dictation for official radiology interpretation.      PROCEDURES    Procedures      MEDICATIONS GIVEN IN ER    Medications   sodium chloride 0.9 % flush 10 mL (has no administration in time range)   potassium phosphate 45 mmol in sodium chloride 0.9 % 500 mL infusion (has no administration in time range)     Or   potassium phosphate 30 mmol  in sodium chloride 0.9 % 250 mL infusion (has no administration in time range)     Or   potassium phosphate 15 mmol in sodium chloride 0.9 % 100 mL infusion (has no administration in time range)     Or   sodium phosphates 40 mmol in sodium chloride 0.9 % 500 mL IVPB (has no administration in time range)     Or   sodium phosphates 20 mmol in sodium chloride 0.9 % 250 mL IVPB (has no administration in time range)   sodium chloride 0.9 % flush 10 mL (has no administration in time range)   sodium chloride 0.9 % flush 10 mL (has no administration in time range)   predniSONE (DELTASONE) tablet 20 mg (has no administration in time range)   ipratropium-albuterol (DUO-NEB) nebulizer solution 3 mL (3 mL Nebulization Not Given 12/7/19 2053)   acetaminophen (TYLENOL) tablet 650 mg (has no administration in time range)     Or   acetaminophen (TYLENOL) 160 MG/5ML solution 650 mg (has no administration in time range)     Or   acetaminophen (TYLENOL) suppository 650 mg (has no administration in time range)   lactated ringers bolus 1,000 mL (1,000 mL Intravenous New Bag 12/7/19 1820)   ipratropium-albuterol (DUO-NEB) nebulizer solution 3 mL (3 mL Nebulization Given 12/1943)   methylPREDNISolone sodium succinate (SOLU-Medrol) injection 125 mg (125 mg Intravenous Given 12/7/19 1951)   doxycycline (MONODOX) capsule 100 mg (100 mg Oral Given 12/7/19 1951)         PROGRESS, DATA ANALYSIS, CONSULTS, AND MEDICAL DECISION MAKING    All labs have been independently reviewed by me.  All radiology studies have been reviewed by me and discussed with radiologist dictating the report.   EKG's independently viewed and interpreted by me.  Discussion below represents my analysis of pertinent findings related to patient's condition, differential diagnosis, treatment plan and final disposition.    DDx includes viral syndrome, pneumonia, electrolyte abnormality, deconditioning, inflammatory myopathy, progression of Parkinson's. He has a nonfocal  exam with generalized weakness. This is not consistent with a stroke. Furthermore, he is not a tPA candidate due to last known well > 4.5 hours.     ED Course as of Dec 07 2101   Sat Dec 07, 2019   1755 On exam, patient has right sided crackles.      [TD]   1919 Phosphorus(!!): 1.8 [TD]   1919 WBC(!): 16.27 [TD]   1919 Lactate: 1.1 [TD]   1919 Creatine Kinase: 28 [TD]   1919 Influenza A Ag, EIA: Negative [TD]   1933 Patient is satting 96% on room air.    He has poor nutrition per his wife, which is consistent with his appearance given temporal wasting.     [TD]      ED Course User Index  [TD] Suhail Keita II, MD       Given his low phosphorus level and generalized weakness, I believe he needs admission for electrolyte replacement.     He really has rather mild respiratory symptoms. This seems to be primarily a URI. However, given his COPD with new productive cough, I prescribed him oral doxycycline.     I discussed case, labs, and workup with Dr. Mcmahon who will admit for [facility].    AS OF 9:01 PM VITALS:    BP - 100/73  HR - 81  TEMP - 98.8 °F (37.1 °C) (Tympanic)  02 SATS - 97%        DIAGNOSIS  Final diagnoses:   COPD exacerbation (CMS/[facility])   Hypophosphatemia   Generalized weakness         DISPOSITION  Admit           Suhail Keita II, MD  12/07/19 2114

## 2019-12-08 NOTE — PROGRESS NOTES
Name: Victor Manuel Issa ADMIT: 2019   : 1945  PCP: Provider, No Known    MRN: [id number] LOS: 1 days   AGE/SEX: 74 y.o. male  ROOM: [facility]     Subjective   Subjective   CC: generalized weakness/dyspnea/cough/confusion  No acute events.  Patient reports that he feels a little better.  Cough continues and is non-productive. Taking PO.  No CP/f/c/n/v/d.    Objective   Objective   Vital Signs  Temp:  [98.2 °F (36.8 °C)-98.9 °F (37.2 °C)] 98.2 °F (36.8 °C)  Heart Rate:  [40-96] 55  Resp:  [16-20] 20  BP: ()/(46-73) 84/47  SpO2:  [94 %-100 %] 100 %  on   ;   Device (Oxygen Therapy): room air  Body mass index is 20.49 kg/m².  Physical Exam   Constitutional: No distress.   HENT:   Head: Normocephalic and atraumatic.   Mouth/Throat: Oropharynx is clear and moist.   Eyes: Pupils are equal, round, and reactive to light. Conjunctivae and EOM are normal.   Neck: Normal range of motion. Neck supple.   Cardiovascular: Normal rate, regular rhythm and intact distal pulses.   Pulmonary/Chest: Effort normal. He has decreased breath sounds in the right lower field and the left lower field. He has no wheezes. He has rhonchi in the right lower field. He has no rales.   Abdominal: Soft. Bowel sounds are normal. There is no tenderness.   Musculoskeletal: He exhibits no edema or tenderness.   Neurological: He is alert. No cranial nerve deficit.   Skin: Skin is warm and dry. Capillary refill takes less than 2 seconds. He is not diaphoretic.   Psychiatric: He has a normal mood and affect. His behavior is normal.   Nursing note and vitals reviewed.    Results Review:       I reviewed the patient's new clinical results.  Results from last 7 days   Lab Units 19  0726 19  1816   WBC 10*3/mm3 12.84* 16.27*   HEMOGLOBIN g/dL 11.6* 14.0   PLATELETS 10*3/mm3 189 230     Results from last 7 days   Lab Units 19  0726 19  1816   SODIUM mmol/L 141 142   POTASSIUM mmol/L 4.6 4.2   CHLORIDE mmol/L 106 106    CO2 mmol/L 22.5 22.2   BUN mg/dL 26* 20   CREATININE mg/dL 0.98 0.89   GLUCOSE mg/dL 153* 99   Estimated Creatinine Clearance: 60.4 mL/min (by C-G formula based on SCr of 0.98 mg/dL).  Results from last 7 days   Lab Units 12/08/19  0726   ALBUMIN g/dL 3.10*   BILIRUBIN mg/dL 0.3   ALK PHOS U/L 79   AST (SGOT) U/L 6   ALT (SGPT) U/L 7     Results from last 7 days   Lab Units 12/08/19  1001 12/08/19  0726 12/07/19  1816   CALCIUM mg/dL  --  9.7 10.1   ALBUMIN g/dL  --  3.10*  --    MAGNESIUM mg/dL  --   --  1.8   PHOSPHORUS mg/dL 3.2  --  1.8*     Results from last 7 days   Lab Units 12/07/19  1817 12/07/19  1816   PROCALCITONIN ng/mL  --  0.09*   LACTATE mmol/L 1.1  --      No results found for: HGBA1C, POCGLU      aspirin 325 mg Oral Daily   atorvastatin 20 mg Oral Daily   carbidopa-levodopa 1 tablet Oral TID With Meals   divalproex 500 mg Oral BID   donepezil 20 mg Oral Nightly   folic acid 1 mg Oral Daily   ipratropium-albuterol 3 mL Nebulization 4x Daily - RT   levothyroxine 175 mcg Oral Q AM   montelukast 10 mg Oral Nightly   OLANZapine 10 mg Oral Nightly   oxybutynin XL 5 mg Oral BID   pantoprazole 40 mg Oral BID AC   piperacillin-tazobactam 3.375 g Intravenous Once   [START ON 12/9/2019] piperacillin-tazobactam 3.375 g Intravenous Q8H   sodium chloride 10 mL Intravenous Q12H       lactated ringers 125 mL/hr Last Rate: 125 mL/hr (12/08/19 1507)   Diet Regular       Assessment/Plan     Active Hospital Problems    Diagnosis  POA   • Pneumonia due to infectious organism [J18.9]  Yes   • COPD exacerbation (CMS/HCC) [J44.1]  Yes   • Hypophosphatemia [E83.39]  Yes   • Hypothyroidism (acquired) [E03.9]  Yes   • Parkinson's disease [G20]  Yes   • Debility [R53.81]  Yes   • Coronary artery disease [I25.10]  Yes   • History of CVA (cerebrovascular accident) [Z86.73]  Not Applicable      Resolved Hospital Problems   No resolved problems to display.   Cough/Dyspnea/PNA  - reported fevers at home but none here  - WBC 16k  on admission, improved  - with history and exam I am concerned about pneumonia-he is at risk for aspiration and will need SLP evaluation  - start on zosyn  - lung fields are clear on CXR and he has only mild rhonchi- will check CTA of the chest to rule out PE (for which he is at risk due to immobility) and PNA  - repeat procalcitonin in AM  - I do not hear wheezing-I will stop his steroids and see how he does-continue on scheduled bronchodilators  - IVF    COPD  - as above, continue bronchodilators    Hypophosphatemia  - resolved    Hypothyroidism  - continue synthroid  - TSH is okay    Confusion  - seems improved  - likely metabolic encephalopathy from pulmonary process  - B12, TSH okay  - monitor on above treatment and redirect as needed    Folic Acid Deficiency  - replace orally    Asymptomatic Bradycardia  - ecg reviewed, in sinus rhythm  - able to increase heart rate with activity  - appreciate cardiology recs    SCDs for DVT prophylaxis.  Full code.  Discussed with patient, family and nursing staff.  Disposition TBD-may need SNU      Fabian Daigle MD  [facility]  12/08/19  5:39 PM

## 2019-12-08 NOTE — CONSULTS
Patient Name: Victor Manuel Issa  Age/Sex: 74 y.o. male  : 1945  MRN: [id number]    Date of Admission: 2019  Date of Encounter Visit: 19  Encounter Provider: Misbah Wolfe MD  Place of Service: [facility] CARDIOLOGY      Referring Provider: Jeovany Mcmahon MD  Patient Care Team:  Provider, No Known as PCP - Shira Hirsch APRN as PCP - Claims Attributed    Subjective:     Admitted/Consulted for: Bradycardia    Chief Complaint:   Weakness     History of Present Illness:  74 y.o. male patient of Dr. Read ([facility]) with a history of CABG in  and again in , bradycardia, stroke with TPA administration-severe right sided weakness residual, parkinson's disease,  hyperlipidemia, COPD-not on home O2, continued tobacco abuse, hypercalcemia, and hypothyroidism. His last echo from 2019 showed an EF of 55%, hypokinesis of the intraventricular septum, a grade 1 diastolic dysfunction of the left ventricle and mild dilation of the ascending aorta. His Cardiolite stress test showed no ischemia and no infarction.     His last office visit with Dr. Read was 2018 and he was there for per op cardiac evaluation for a right total hip replacement. He had recently been hopsitalized with chest pain that was found to be esophagitis. The symptoms resolved after starting sucralfate. His blood pressure was 112/80 in the office that day but the patient reported that it usually ran 80/50's. He was on aspirin, Buspar, synthroid, and Zocor as well as a list of other non cardiac med's. At that time he was scheduled for an echo and a Cardiolite stress test. He was cleared for surgery following those tests.     He presented to the ER on 2019 complaining of worse than normal generalized weakness with cough that started on 2019. His wife reported productive yellow sputum and a tmax of 102.  White count was elevated at 16,000.  Patient was subsequently  admitted.    Noted to have intermittent sinus bradycardia with ventricular rates as low as the high 30s but mostly in the 40s when he is sleeping.  He is awake now and has a sinus rhythm with a ventricular rate of about 75 to 80 bpm.  He denies any recent issues with severe fatigue or syncope.  He has no chest pain.    Previous Cardiac Testing:  Echo 1/9/2019      Previous testing:       Past Medical History:  Past Medical History:   Diagnosis Date   • Anxiety    • Arthritis    • Bipolar 1 disorder (CMS/HCC)    • Coronary artery disease    • Disease of thyroid gland    • GERD (gastroesophageal reflux disease)    • Hyperlipidemia    • Mobility impaired    • Myocardial infarction (CMS/HCC)    • Overactive bladder    • Parkinsonian syndrome (CMS/HCC)    • Prostatitis    • Restrictive pattern present on pulmonary function testing    • Seasonal allergies    • Stroke (CMS/HCC)     RESIDUAL WEAKNESS RIGHT LOWER EXTREMITY   • Urinary incontinence    • Urinary retention        Past Surgical History:   Procedure Laterality Date   • ABDOMINAL SURGERY     • CARDIAC SURGERY     • CHOLECYSTECTOMY     • CORONARY ARTERY BYPASS GRAFT      x2   • ENDOSCOPY N/A 10/4/2018    LA grade D reflux esophagitis, 4cm hiatal hernia, esohageal stenosis, normal stomach, erythematous duodenopathy, normal second portion of duodenum, no specimens collected   • ENDOSCOPY N/A 11/30/2018    Procedure: ESOPHAGOGASTRODUODENOSCOPY WITH DILATATION;  Surgeon: Paul Duncan MD;  Location: [facility] ENDOSCOPY;  Service: Gastroenterology   • EYE SURGERY      cataracts   • HIP ENDOPROSTHESIS Right 5/5/2018    Procedure: RIGHT HIP HEMIARTHROPLASTY;  Surgeon: Winston Vallejo MD;  Location: [facility] OR;  Service: Orthopedics   • HIP FRACTURE SURGERY Right     PARTIAL HIP REPLACEMENT   • MUSCLE BIOPSY Left 3/24/2016    Procedure: LT THIGH MUSCLE BIOPSY;  Surgeon: Fausto Mack MD;  Location: [facility] OR;  Service:    • SKIN BIOPSY     •  THYROID SURGERY     • TOTAL HIP ARTHROPLASTY REVISION Right 1/18/2019    Procedure: CONVERSION RT POSTERIOR HIP BIPOLAR TO TOTAL HIP ARTHROPLASTY;  Surgeon: Radha Paredes MD;  Location: [facility];  Service: Orthopedics   • TURP / TRANSURETHRAL INCISION / DRAINAGE PROSTATE     • VASCULAR SURGERY         Home Medications:   Medications Prior to Admission   Medication Sig Dispense Refill Last Dose   • acetaminophen (TYLENOL) 325 MG tablet Take 2 tablets by mouth Every 4 (Four) Hours As Needed for Mild Pain , Headache or Fever.   Past Month at Unknown time   • albuterol (PROVENTIL) (2.5 MG/3ML) 0.083% nebulizer solution Take 2.5 mg by nebulization Every 6 (Six) Hours As Needed for Wheezing.  12 12/6/2019 at Unknown time   • arformoterol (BROVANA) 15 MCG/2ML nebulizer solution Take 15 mcg by nebulization 2 (Two) Times a Day.   12/6/2019 at Unknown time   • bisacodyl (DULCOLAX) 5 MG EC tablet Take 2 tablets by mouth Daily As Needed for Constipation. 30 tablet 0 Past Week at Unknown time   • busPIRone (BUSPAR) 5 MG tablet Take 1 tablet by mouth 3 (Three) Times a Day As Needed (anxiety).   12/6/2019 at Unknown time   • carbidopa-levodopa (SINEMET)  MG per tablet Take 1 tablet by mouth 3 (Three) Times a Day With Meals. 90 tablet 0 12/6/2019 at Unknown time   • cholecalciferol (VITAMIN D3) 1000 UNITS tablet Take 1,000 Units by mouth daily.   12/6/2019 at Unknown time   • diclofenac (VOLTAREN) 1 % gel gel Apply 4 g topically Daily As Needed (apply to the knees).   Past Week at Unknown time   • divalproex (DEPAKOTE) 500 MG DR tablet Take 500 mg by mouth 2 (Two) Times a Day.   12/6/2019 at Unknown time   • donepezil (ARICEPT) 10 MG tablet Take 20 mg by mouth Every Night.   12/6/2019 at Unknown time   • fluticasone (FLONASE) 50 MCG/ACT nasal spray 1 spray into each nostril Daily.   12/6/2019 at Unknown time   • levothyroxine (SYNTHROID, LEVOTHROID) 175 MCG tablet Take 175 mcg by mouth Daily.   12/7/2019 at  Unknown time   • montelukast (SINGULAIR) 10 MG tablet Take 10 mg by mouth Every Night.   12/6/2019 at Unknown time   • Multiple Vitamins-Minerals (MULTIVITAMIN WITH MINERALS) tablet tablet Take 1 tablet by mouth Daily.   12/6/2019 at Unknown time   • OLANZapine (ZYPREXA) 10 MG tablet Take 10 mg by mouth Every Night.   12/6/2019 at Unknown time   • oxybutynin XL (DITROPAN-XL) 5 MG 24 hr tablet Take 5 mg by mouth 2 (Two) Times a Day.   12/6/2019 at Unknown time   • pantoprazole (PROTONIX) 40 MG EC tablet Take 1 tablet by mouth 2 (Two) Times a Day Before Meals. 180 tablet 1 12/6/2019 at Unknown time   • simvastatin (ZOCOR) 40 MG tablet Take 40 mg by mouth Every Night.   12/6/2019 at Unknown time   • aspirin  MG EC tablet Take 1 tablet by mouth Daily.   Unknown at Unknown time   • nystatin (MYCOSTATIN) 659601 UNIT/ML suspension Take 5 mL by mouth 4 (Four) Times a Day. (Patient taking differently: Take 5 mL by mouth As Needed.)   1/17/2019 at Unknown time   • ondansetron ODT (ZOFRAN ODT) 4 MG disintegrating tablet Take 1 tablet by mouth Every 8 (Eight) Hours As Needed for Nausea or Vomiting. 30 tablet 0 Taking   • promethazine (PHENERGAN) 25 MG tablet Take 25 mg by mouth Every 6 (Six) Hours As Needed for Nausea or Vomiting.   Taking   • sucralfate (CARAFATE) 1 g tablet Take 1 g by mouth Daily.  0 1/17/2019 at Unknown time   • traMADol (ULTRAM) 50 MG tablet Take 1 tablet by mouth Every 4 (Four) Hours As Needed for Moderate Pain . 60 tablet 0 Taking       Allergies:  Allergies   Allergen Reactions   • Oxycodone Mental Status Change   • Promethazine Hallucinations   • Beet [Beta Vulgaris] Unknown (See Comments)     Pt cant remember       Past Social History:  Social History     Socioeconomic History   • Marital status:      Spouse name: Not on file   • Number of children: Not on file   • Years of education: Not on file   • Highest education level: Not on file   Tobacco Use   • Smoking status: Current Every Day  "Smoker     Packs/day: 0.50     Types: Cigarettes     Start date: 1960   • Smokeless tobacco: Never Used   • Tobacco comment: Educated pt on quitting   Substance and Sexual Activity   • Alcohol use: Never     Frequency: Never   • Drug use: No   • Sexual activity: Defer     Partners: Female        Past Family History:  Family History   Problem Relation Age of Onset   • Cancer Mother    • Arthritis Father    • Heart disease Father    • Early death Brother    • Heart disease Brother    • Malig Hyperthermia Neg Hx          Review of Systems:  All systems reviewed. Pertinent positives identified in HPI. All other systems are negative.       Objective:     Objective:  Temp:  [98.2 °F (36.8 °C)-98.9 °F (37.2 °C)] 98.2 °F (36.8 °C)  Heart Rate:  [] 62  Resp:  [16-20] 18  BP: ()/(46-73) 84/47    Intake/Output Summary (Last 24 hours) at 12/8/2019 1507  Last data filed at 12/8/2019 1340  Gross per 24 hour   Intake 600 ml   Output --   Net 600 ml     Body mass index is 20.49 kg/m².      12/07/19 1712 12/07/19 2109   Weight: 70.8 kg (156 lb) 66.6 kg (146 lb 14.4 oz)           Physical Exam:   Temp:  [98.2 °F (36.8 °C)-98.9 °F (37.2 °C)] 98.2 °F (36.8 °C)  Heart Rate:  [] 62  Resp:  [16-20] 18  BP: ()/(46-73) 84/47    Intake/Output Summary (Last 24 hours) at 12/8/2019 1507  Last data filed at 12/8/2019 1340  Gross per 24 hour   Intake 600 ml   Output --   Net 600 ml     Flowsheet Rows      First Filed Value   Admission Height  180.3 cm (71\") Documented at 12/07/2019 1712   Admission Weight  70.8 kg (156 lb) Documented at 12/07/2019 1712          General Appearance:    Alert, cooperative, in no acute distress.  Frail-appearing   Head:    Normocephalic, without obvious abnormality, atraumatic       Neck:   No adenopathy, supple, no thyromegaly, no carotid bruit, no    JVD   Lungs:     Clear to auscultation bilaterally, no wheezes, rales, or     rhonchi    Heart:    Normal rate, regular rhythm, no murmur, no " rub, no gallop   Chest Wall:    No abnormalities observed   Abdomen:     Normal bowel sounds, soft, nontender, nondistended,            no rebound tenderness   Extremities:   No cyanosis, clubbing, or edema   Pulses:   Pulses palpable and equal bilaterally   Skin:   No bleeding or rash       Neurologic:   Cranial nerves 2 - 12 grossly intact, sensation intact             Lab Review:     Results from last 7 days   Lab Units 12/08/19  0726 12/07/19  1816   SODIUM mmol/L 141 142   POTASSIUM mmol/L 4.6 4.2   CHLORIDE mmol/L 106 106   CO2 mmol/L 22.5 22.2   BUN mg/dL 26* 20   CREATININE mg/dL 0.98 0.89   GLUCOSE mg/dL 153* 99   CALCIUM mg/dL 9.7 10.1       Results from last 7 days   Lab Units 12/07/19  1816   CK TOTAL U/L 28   TROPONIN T ng/mL <0.010     Results from last 7 days   Lab Units 12/08/19  0726   WBC 10*3/mm3 12.84*   HEMOGLOBIN g/dL 11.6*   HEMATOCRIT % 34.7*   PLATELETS 10*3/mm3 189             Results from last 7 days   Lab Units 12/07/19  1816   MAGNESIUM mg/dL 1.8                 Results from last 7 days   Lab Units 12/07/19  1816   TSH uIU/mL 0.698       Imaging:    Imaging Results (Most Recent)     Procedure Component Value Units Date/Time    XR Chest 2 View [[id number]] Collected:  12/07/19 1908     Updated:  12/07/19 1908    Narrative:       EMERGENCY PA AND LATERAL CHEST X-RAY 12/07/2019     CLINICAL HISTORY: Cough, bodyaches, fever.     This is correlated to prior PA and lateral chest x-ray 01/22/2019.     FINDINGS: There are multiple sternal wires and mediastinal markers and  clips from previous cardiac surgery. The cardiomediastinal silhouette  and pulmonary vasculature are within normal limits. There are some  chronic linear opacities in the lung bases felt to be linear areas of  atelectasis or scarring unchanged since 06/22/2019. No new or focal  infiltrate is seen. The costophrenic angles are sharp.       Impression:       1. No definite active disease is seen in the chest. There is  no  significant change when compared to prior chest x-ray 2019.  2. Previous CABG and there is bibasilar linear atelectasis or scarring.  There is an old healed fracture deformity mid to distal shaft of the  right clavicle.             Results for orders placed during the hospital encounter of 17   Adult Transthoracic Echo Complete W/ Cont if Necessary Per Protocol (With Agitated Saline)    Narrative · Left ventricular systolic function is normal. Calculated EF = 57.4%.   Estimated EF was in agreement with the calculated EF. Normal left   ventricular cavity size noted. All left ventricular wall segments contract   normally. Left ventricular wall thickness is consistent with mild   concentric hypertrophy. Septal wall motion is abnormal, consistent with a   post-operative state. Left ventricular diastolic function is normal.  · Normal left atrial size noted. Saline test results are negative.        TELE from 2019 0329   HR 49      [id number]      BASELINE:   10/3/2018        I personally viewed and interpreted the patient's EKG/Telemetry data.    Assessment:   Assessment/Plan   1. Sinus bradycardia currently with heart rate at 80 bpm  2. AECOPD  3. Coronary disease s/p CABG  4. History of CABG: No angina  5.  Hyperlipidemia  6.  Tobacco abuse      -Patient has asymptomatic sinus bradycardia and no significant evidence of AV block.  He has clear chronotropic competence and even at rest can increase his heart rate when he is awake to 80 bpm which he is when I am seeing him.    I do not think he has significant conduction disease.  His bradycardia is likely combination of increased vagal tone along with symptomatic therapy.  No fatigue, syncope, or other symptoms that would be worrisome for symptomatic bradycardia    He is currently chest pain-free and has recently underwent evaluation this year with a normal ejection fraction and no evidence of ischemia.  No indication for additional cardiac  work-up    I will sign off    Thank you for allowing me to participate in the care of Victor Manuel Issa. Feel free to contact me directly with any further questions or concerns.    Misbah Wolfe MD  [facility]  12/08/19  3:07 PM

## 2019-12-09 PROBLEM — A41.9 SEPSIS WITH METABOLIC ENCEPHALOPATHY (HCC): Status: ACTIVE | Noted: 2019-01-01

## 2019-12-09 PROBLEM — A41.9 SEPSIS DUE TO PNEUMONIA (HCC): Status: ACTIVE | Noted: 2019-01-01

## 2019-12-09 PROBLEM — J18.9 SEPSIS DUE TO PNEUMONIA (HCC): Status: ACTIVE | Noted: 2019-01-01

## 2019-12-09 PROBLEM — I95.9 HYPOTENSION: Status: ACTIVE | Noted: 2019-01-01

## 2019-12-09 PROBLEM — G93.41 SEPSIS WITH METABOLIC ENCEPHALOPATHY (HCC): Status: ACTIVE | Noted: 2019-01-01

## 2019-12-09 PROBLEM — R65.20 SEPSIS WITH METABOLIC ENCEPHALOPATHY (HCC): Status: ACTIVE | Noted: 2019-01-01

## 2019-12-09 NOTE — NURSING NOTE
Notified Dr. Aaron of lactate level as well as updated on patient history, diagnosis and vitals. Orders received for fluid bolus and switch iv fluids to NS at 125 and to consult intensivist for further recommendations regarding blood pressures and lactate level.

## 2019-12-09 NOTE — NURSING NOTE
CWOCN consult for coccyx and right hip.  Patient with intertrigo to upper gluteal cleft related to moisture.  Partial thickness wound 2 x 0.3 cm linear in shape.  Surrounding skin with blanchable erythema.  Patient currently has an optifoam dressing in place.  I will request cream from pharmacy for patient use.  Right hip with optifoam dressing applied earlier today over a red area per spouse.  No sign of erythema or pressure at this assessment.

## 2019-12-09 NOTE — THERAPY EVALUATION
Acute Care - Speech Language Pathology   Swallow Initial Evaluation [facility]     Patient Name: Victor Manuel Issa  : 1945  MRN: [id number]  Today's Date: 2019               Admit Date: 2019    Visit Dx:     ICD-10-CM ICD-9-CM   1. COPD exacerbation (CMS/HCC) J44.1 491.21   2. Hypophosphatemia E83.39 275.3   3. Generalized weakness R53.1 780.79     Patient Active Problem List   Diagnosis   • Thigh pain, musculoskeletal   • Left leg weakness   • History of CVA (cerebrovascular accident)   • COPD (chronic obstructive pulmonary disease)   • Coronary artery disease   • Cervical myelopathy (CMS/HCC)   • Parkinson's disease   • Debility   • Cerebrovascular disease   • Abnormal TSH   • Hypothyroidism (acquired)   • Hip fracture requiring operative repair, right, closed, initial encounter (CMS/Formerly [facility])   • Urinary tract infection without hematuria   • Hypoxia   • Sinus bradycardia   • Chest pain   • Hip pain, chronic, right   • Hematemesis   • Hematemesis with nausea   • H/O medication noncompliance   • Hyperlipidemia   • Muscle spasticity   • COPD exacerbation (CMS/HCC)   • Hypophosphatemia   • Pneumonia of right upper lobe due to infectious organism (CMS/HCC)   • Sepsis due to pneumonia (CMS/HCC)   • Sepsis with metabolic encephalopathy (CMS/[facility])   • Hypotension     Past Medical History:   Diagnosis Date   • Anxiety    • Arthritis    • Bipolar 1 disorder (CMS/HCC)    • Coronary artery disease    • Disease of thyroid gland    • GERD (gastroesophageal reflux disease)    • Hyperlipidemia    • Mobility impaired    • Myocardial infarction (CMS/HCC)    • Overactive bladder    • Parkinsonian syndrome (CMS/[facility])    • Prostatitis    • Restrictive pattern present on pulmonary function testing    • Seasonal allergies    • Stroke (CMS/HCC)     RESIDUAL WEAKNESS RIGHT LOWER EXTREMITY   • Urinary incontinence    • Urinary retention      Past Surgical History:   Procedure Laterality Date   • ABDOMINAL SURGERY     •  "CARDIAC SURGERY     • CHOLECYSTECTOMY     • CORONARY ARTERY BYPASS GRAFT      x2   • ENDOSCOPY N/A 10/4/2018    LA grade D reflux esophagitis, 4cm hiatal hernia, esohageal stenosis, normal stomach, erythematous duodenopathy, normal second portion of duodenum, no specimens collected   • ENDOSCOPY N/A 11/30/2018    Procedure: ESOPHAGOGASTRODUODENOSCOPY WITH DILATATION;  Surgeon: Paul Duncan MD;  Location: [facility] ENDOSCOPY;  Service: Gastroenterology   • EYE SURGERY      cataracts   • HIP ENDOPROSTHESIS Right 5/5/2018    Procedure: RIGHT HIP HEMIARTHROPLASTY;  Surgeon: Winston Vallejo MD;  Location: [facility] MAIN OR;  Service: Orthopedics   • HIP FRACTURE SURGERY Right     PARTIAL HIP REPLACEMENT   • MUSCLE BIOPSY Left 3/24/2016    Procedure: LT THIGH MUSCLE BIOPSY;  Surgeon: Fausto Mack MD;  Location: [facility] MAIN OR;  Service:    • SKIN BIOPSY     • THYROID SURGERY     • TOTAL HIP ARTHROPLASTY REVISION Right 1/18/2019    Procedure: CONVERSION RT POSTERIOR HIP BIPOLAR TO TOTAL HIP ARTHROPLASTY;  Surgeon: Radha Paredes MD;  Location: [facility] OR;  Service: Orthopedics   • TURP / TRANSURETHRAL INCISION / DRAINAGE PROSTATE     • VASCULAR SURGERY          SWALLOW EVALUATION (last 72 hours)      [facility] Adult Swallow Evaluation     Row Name 12/09/19 1500          Document Type  evaluation  -OC    Subjective Information  no complaints  -OC    Patient Observations  alert;cooperative;agree to therapy  -OC    Patient Effort  good  -OC    Symptoms Noted During/After Treatment  none  -OC          Patient Profile Reviewed  yes  -OC    Pertinent History Of Current Problem  \"74-year-old gentleman with a history of parkinsonism who comes in the hospital because of poor appetite for approximately 1 week.Generalized weakness/dyspnea/cough/confusion. Patient had hypotension and elevated lactate yesterday evening and improved following a large fluid bolus. Patient reports that he feels much better.  Cough " "continues and is now productive of green sputum. \"  -OC    Current Method of Nutrition  regular textures;thin liquids  -OC    Precautions/Limitations, Vision  WFL  -OC    Precautions/Limitations, Hearing  WFL  -OC    Prior Level of Function-Communication  WFL  -OC    Prior Level of Function-Swallowing  no diet consistency restrictions  -OC    Plans/Goals Discussed with  patient;spouse/S.O.;agreed upon  -OC    Barriers to Rehab  none identified  -OC    Patient's Goals for Discharge  patient did not state  -OC    Family Goals for Discharge  family did not state  -OC          Pain: FACES Scale, Pretreatment  0-->no hurt  -OC    Pain: FACES Scale, Post-Treatment  0-->no hurt  -OC          Dentition Assessment  natural, present and adequate  -OC    Secretion Management  WNL/WFL  -OC    Mucosal Quality  moist, healthy  -OC    Volitional Swallow  WFL  -OC    Volitional Cough  WFL  -OC          Oral Motor General Assessment  WFL  -OC          Clinical Swallow Evaluation Summary  Bedside swallow eval completed. Pt demonstrated no overt s/s aspiration with thin via cup. Pt with immedaite coughing and throat clearing with thins via straw. No overt s/s aspiration with puree, mechanical soft, and regular texture. Pt with c/o difficulty masticating and swallowing dry solids, liquid wash aids in clearance.   -OC          SLP Swallowing Diagnosis  functional oral phase;suspected pharyngeal dysfunction  -OC    Functional Impact  risk of aspiration/pneumonia  -OC    Rehab Potential/Prognosis, Swallowing  good, to achieve stated therapy goals  -OC    Swallow Criteria for Skilled Therapeutic Interventions Met  demonstrates skilled criteria  -OC          Therapy Frequency (Swallow)  PRN  -OC    Predicted Duration Therapy Intervention (Days)  until discharge  -OC    SLP Diet Recommendation  regular textures;thin liquids  -OC    Recommended Precautions and Strategies  no straw;upright posture during/after eating;small bites of food and " sips of liquid;alternate between small bites of food and sips of liquid  -OC    SLP Rec. for Method of Medication Administration  meds whole;meds crushed;with thin liquids;with pudding or applesauce  -OC    Monitor for Signs of Aspiration  yes;notify SLP if any concerns  -OC    Anticipated Dischage Disposition  unknown  -OC          Oral Nutrition/Hydration Goal Selection (SLP)  oral nutrition/hydration, SLP goal 1  -OC          Oral Nutrition/Hydration Goal 1, SLP  Tolerate least restrictive diet with no overt s/s aspiration.   -OC    Time Frame (Oral Nutrition/Hydration Goal 1, SLP)  by discharge  -OC      User Key  (r) = Recorded By, (t) = Taken By, (c) = Cosigned By    Initials Name Effective Dates    OC Card, PARI So,CCC-SLP 06/08/18 -           EDUCATION  The patient has been educated in the following areas:   Dysphagia (Swallowing Impairment).    SLP Recommendation and Plan  SLP Swallowing Diagnosis: functional oral phase, suspected pharyngeal dysfunction  SLP Diet Recommendation: regular textures, thin liquids  Recommended Precautions and Strategies: no straw, upright posture during/after eating, small bites of food and sips of liquid, alternate between small bites of food and sips of liquid  SLP Rec. for Method of Medication Administration: meds whole, meds crushed, with thin liquids, with pudding or applesauce     Monitor for Signs of Aspiration: yes, notify SLP if any concerns     Swallow Criteria for Skilled Therapeutic Interventions Met: demonstrates skilled criteria  Anticipated Dischage Disposition: unknown  Rehab Potential/Prognosis, Swallowing: good, to achieve stated therapy goals  Therapy Frequency (Swallow): PRN  Predicted Duration Therapy Intervention (Days): until discharge       Plan of Care Reviewed With: patient    SLP GOALS     Row Name 12/09/19 1500             Oral Nutrition/Hydration Goal 1 (SLP)    Oral Nutrition/Hydration Goal 1, SLP  Tolerate least restrictive diet with no overt  s/s aspiration.   -OC      Time Frame (Oral Nutrition/Hydration Goal 1, SLP)  by discharge  -OC        User Key  (r) = Recorded By, (t) = Taken By, (c) = Cosigned By    Initials Name Provider Type    Saray Sherman MA,CCC-SLP Speech and Language Pathologist           SLP Outcome Measures (last 72 hours)      SLP Outcome Measures     Row Name 12/09/19 1500             SLP Outcome Measures    Outcome Measure Used?  Adult NOMS  -OC         Adult FCM Scores    FCM Chosen  Swallowing  -OC      Swallowing FCM Score  6  -OC        User Key  (r) = Recorded By, (t) = Taken By, (c) = Cosigned By    Initials Name Effective Dates    Saray Sherman MA,ENRRIQUE-SLP 06/08/18 -            Time Calculation:   Time Calculation- SLP     Row Name 12/09/19 1557             Time Calculation- SLP    SLP Start Time  1500  -OC      SLP Received On  12/09/19  -OC        User Key  (r) = Recorded By, (t) = Taken By, (c) = Cosigned By    Initials Name Provider Type    Saray Sherman MA,CCC-SLP Speech and Language Pathologist          Therapy Charges for Today     Code Description Service Date Service Provider Modifiers Qty    [id number]  ST EVAL ORAL PHARYNG SWALLOW 4 12/9/2019 Saray Roche MA,ENRRIQUE-SLP GN 1               Saray Roche MA, CCC-SLP  12/9/2019

## 2019-12-09 NOTE — NURSING NOTE
Notified Alejandrina Arreola on call for Dr. Wolfe of patient low bp.as patient is not presently on any blood pressure/ cardiac medications and cardiology was originally consulted for bradycardia, she requested that [facility] be contacted instead.

## 2019-12-09 NOTE — PROGRESS NOTES
Name: Victor Manuel Issa ADMIT: 2019   : 1945  PCP: Provider, No Known    MRN: [id number] LOS: 2 days   AGE/SEX: 74 y.o. male  ROOM: [facility]     Subjective   Subjective   CC: generalized weakness/dyspnea/cough/confusion  Patient had hypotension and elevated lactate yesterday evening and improved following a large fluid bolus.  Patient reports that he feels much better.  Cough continues and is now productive of green sputum. Taking PO.  No CP/f/c/n/v/d.  No dizziness/light-headedness.     Objective   Objective   Vital Signs  Temp:  [97.6 °F (36.4 °C)-97.8 °F (36.6 °C)] 97.6 °F (36.4 °C)  Heart Rate:  [37-80] 55  Resp:  [16-24] 24  BP: ()/(39-61) 86/55  SpO2:  [94 %-100 %] 96 %  on  Flow (L/min):  [2] 2;   Device (Oxygen Therapy): room air  Body mass index is 20.49 kg/m².  Physical Exam   Constitutional: No distress.   HENT:   Head: Normocephalic and atraumatic.   Mouth/Throat: Oropharynx is clear and moist.   Eyes: Pupils are equal, round, and reactive to light. Conjunctivae and EOM are normal.   Neck: Normal range of motion. Neck supple.   Cardiovascular: Normal rate, regular rhythm and intact distal pulses.   Pulmonary/Chest: Effort normal. He has decreased breath sounds in the right lower field and the left lower field. He has no wheezes. He has rhonchi in the right middle field. He has no rales.   Abdominal: Soft. Bowel sounds are normal. There is no tenderness.   Musculoskeletal: He exhibits no edema or tenderness.   Neurological: He is alert. No cranial nerve deficit.   Skin: Skin is warm and dry. Capillary refill takes less than 2 seconds. He is not diaphoretic.   Psychiatric: He has a normal mood and affect. His behavior is normal.   Nursing note and vitals reviewed.    Results Review:       I reviewed the patient's new clinical results.  Results from last 7 days   Lab Units 19  0548 19  0726 19  1816   WBC 10*3/mm3 13.87* 12.84* 16.27*   HEMOGLOBIN g/dL 10.0* 11.6*  14.0   PLATELETS 10*3/mm3 191 189 230     Results from last 7 days   Lab Units 12/09/19  0547 12/08/19  0726 12/07/19  1816   SODIUM mmol/L 140 141 142   POTASSIUM mmol/L 4.5 4.6 4.2   CHLORIDE mmol/L 108* 106 106   CO2 mmol/L 21.9* 22.5 22.2   BUN mg/dL 27* 26* 20   CREATININE mg/dL 0.71* 0.98 0.89   GLUCOSE mg/dL 155* 153* 99   Estimated Creatinine Clearance: 74 mL/min (A) (by C-G formula based on SCr of 0.71 mg/dL (L)).  Results from last 7 days   Lab Units 12/08/19  0726   ALBUMIN g/dL 3.10*   BILIRUBIN mg/dL 0.3   ALK PHOS U/L 79   AST (SGOT) U/L 6   ALT (SGPT) U/L 7     Results from last 7 days   Lab Units 12/09/19  0547 12/08/19  1001 12/08/19  0726 12/07/19  1816   CALCIUM mg/dL 9.4  --  9.7 10.1   ALBUMIN g/dL  --   --  3.10*  --    MAGNESIUM mg/dL  --   --   --  1.8   PHOSPHORUS mg/dL  --  3.2  --  1.8*     Results from last 7 days   Lab Units 12/09/19  0548 12/09/19  0547 12/09/19  0124 12/08/19  2121 12/07/19  1817 12/07/19  1816   PROCALCITONIN ng/mL  --  0.12  --  0.13  --  0.09*   LACTATE mmol/L 1.9  --  3.5* 6.0* 1.1  --      No results found for: HGBA1C, POCGLU      aspirin 325 mg Oral Daily   atorvastatin 20 mg Oral Daily   carbidopa-levodopa 1 tablet Oral TID With Meals   cosyntropin 0.25 mg Intravenous Once   divalproex 500 mg Oral BID   donepezil 20 mg Oral Nightly   folic acid 1 mg Oral Daily   hydrocortisone-bacitracin-zinc oxide-nystatin 1 application Topical BID   ipratropium-albuterol 3 mL Nebulization 4x Daily - RT   levothyroxine 175 mcg Oral Q AM   montelukast 10 mg Oral Nightly   OLANZapine 10 mg Oral Nightly   oxybutynin XL 5 mg Oral BID   pantoprazole 40 mg Oral BID AC   piperacillin-tazobactam 3.375 g Intravenous Q8H   sodium chloride 10 mL Intravenous Q12H       sodium chloride 150 mL/hr Last Rate: 150 mL/hr (12/09/19 0222)   Diet Regular       Assessment/Plan     Active Hospital Problems    Diagnosis  POA   • Sepsis due to pneumonia (CMS/HCC) [J18.9, A41.9]  Yes   • Sepsis with  metabolic encephalopathy (CMS/HCC) [A41.9, R65.20, G93.41]  Yes   • Hypotension [I95.9]  No   • Pneumonia of right upper lobe due to infectious organism (CMS/HCC) [J18.1]  Yes   • COPD exacerbation (CMS/HCC) [J44.1]  Yes   • Hypophosphatemia [E83.39]  Yes   • Hypothyroidism (acquired) [E03.9]  Yes   • Parkinson's disease [G20]  Yes   • Debility [R53.81]  Yes   • Coronary artery disease [I25.10]  Yes   • History of CVA (cerebrovascular accident) [Z86.73]  Not Applicable      Resolved Hospital Problems   No resolved problems to display.   Sepsis due to PNA  - leukocytosis, hypotension, lactic acidosis  - he is at risk for aspiration, awaiting SLP evaluation  - continue zosyn  - CTA chest reviewed, shows likely developing RUL PNA, no PE  - continue IVF  - appreciate LPC recs    Hypotension  - due to above  - I added a cortisol on to labs from this AM and it is inappropriately low-he has had steroid treatments in the past but not recently  - may have relative adrenal insufficiency and cosyntropin stimulation test has been ordered    COPD  - no exacerbation  - continue bronchodilators    Hypothyroidism  - continue synthroid  - TSH is okay    Confusion  - much improved-this was metabolic encephalopathy due to sepsis  - likely metabolic encephalopathy from pulmonary process  - B12, TSH okay  - monitor on above treatment and redirect as needed    Folic Acid Deficiency  - replace orally    Asymptomatic Sinus Bradycardia  - able to increase heart rate with activity  - appreciate cardiology recs-no further intervention at this time    SCDs for DVT prophylaxis.  Full code.  Discussed with patient, family and nursing staff.  Disposition TBD-may need SNU      Fabian Daigle MD  [facility]  12/09/19  2:46 PM

## 2019-12-09 NOTE — PROGRESS NOTES
[facility]  [phone]  Pino Vivar MD    Subjective:  LOS: 2    He feels substantially better.  Less weak.  Wants to go home.  Blood pressure runs in the 100s at home.  He is intermittently on steroids for his Parkinson's but not for a couple of years.  He has been having a lot of cough at home.  Was producing thick yellow-green phlegm.  Now improved today.  Denies any chest pain.    Objective   Vital Signs past 24hrs    Temp range: Temp (24hrs), Av.8 °F (36.6 °C), Min:97.6 °F (36.4 °C), Max:97.8 °F (36.6 °C)    BP range: BP: ()/(39-61) 86/55  Pulse range: Heart Rate:  [37-80] 55  Resp rate range: Resp:  [16-24] 24    Device (Oxygen Therapy): room airFlow (L/min):  [2] 2  Oxygen range:SpO2:  [94 %-100 %] 96 %      66.6 kg (146 lb 14.4 oz); Body mass index is 20.49 kg/m².    Intake/Output Summary (Last 24 hours) at 2019 1356  Last data filed at 2019 1257  Gross per 24 hour   Intake 2980.5 ml   Output 300 ml   Net 2680.5 ml       Physical Exam   HENT:   Head: Normocephalic.   Eyes: Pupils are equal, round, and reactive to light.   Cardiovascular: Normal rate and regular rhythm.   No murmur heard.  Pulmonary/Chest: He has decreased breath sounds. He has no wheezes. He has no rales.   Abdominal: Soft. Bowel sounds are normal. There is no tenderness.   Musculoskeletal: He exhibits no edema.   Neurological: He is alert.   Nursing note and vitals reviewed.    Results Review:    I have reviewed the laboratory and imaging data since the last note by [facility] physician.  My annotations are noted in assessment and plan.    Medication Review:  I have reviewed the current MAR.  My annotations are noted in assessment and plan.      sodium chloride 150 mL/hr Last Rate: 150 mL/hr (19 0222)     Plan   PCCM Problems  Relative hypotension, sepsis, adrenal insufficiency  Right upper lobe pneumonia  Dysphagia  UTI  Parkinson's disease  COPD without exacerbation  Lactic acidosis, improved  Relevant  Medical Diagnoses  History CVA  Electrolyte imbalance    THESE ARE NEW MEDICAL PROBLEMS TO ME.    Plan of Treatment  Relative hypotension with sepsis and adrenal insufficiency based on low cortisol levels on admission.  Will order ACTH stimulation test.  He has intermittently received steroids in the past for his Parkinson's disease.  If his ACTH is suggestive he may need formal input by endocrinology.  I am holding on giving him hydrocortisone for now.    Sepsis and infection likely also contributing to low blood pressures.  Agree with Zosyn as very likely aspiration resulting in pneumonia.    Dysphagia likely from Parkinson's disease.  Order speech evaluation.    UTI should be covered with present antibiotics.    COPD without exacerbation.  Currently on duo nebs 4 times a day which is adequate.  Systemic steroids not required at this point from the point of COPD.    Pino Vivar MD  12/09/19  1:56 PM    Part of this note may be an electronic transcription/translation of spoken language to printed text using the Dragon Dictation System.

## 2019-12-09 NOTE — PLAN OF CARE
Problem: Patient Care Overview  Goal: Plan of Care Review  Outcome: Ongoing (interventions implemented as appropriate)  Flowsheets (Taken 12/9/2019 2499)  Plan of Care Reviewed With: patient  Note:   Bedside swallow eval completed. Recommend continue with regular and thins. Meds whole with thin or whole/crushed puree. Recommend NO STRAWS, slow rate, alternate liquids/solids. ST to follow for diet tolerance and neef for instrumental eval.

## 2019-12-09 NOTE — NURSING NOTE
Notified Dr. Mora of most recent vital signs including bp and map. Received orders for procalcitonin and lactic acid and to call if values are abnormal. Was told to continue to monitor bps as patient is currently asymptomatic and already receiving fluids

## 2019-12-09 NOTE — NURSING NOTE
Notified Dr. Burdick of reflex lactic value. Received order for recheck of lactic acid with am labs.

## 2019-12-09 NOTE — THERAPY EVALUATION
Patient Name: Victor Manuel Issa  : 1945    MRN: [id number]                              Today's Date: 2019       Admit Date: 2019    Visit Dx:     ICD-10-CM ICD-9-CM   1. COPD exacerbation (CMS/[facility]) J44.1 491.21   2. Hypophosphatemia E83.39 275.3   3. Generalized weakness R53.1 780.79     Patient Active Problem List   Diagnosis   • Thigh pain, musculoskeletal   • Left leg weakness   • History of CVA (cerebrovascular accident)   • COPD (chronic obstructive pulmonary disease)   • Coronary artery disease   • Cervical myelopathy (CMS/[facility])   • Parkinson's disease   • Debility   • Cerebrovascular disease   • Abnormal TSH   • Hypothyroidism (acquired)   • Hip fracture requiring operative repair, right, closed, initial encounter (CMS/[facility])   • Urinary tract infection without hematuria   • Hypoxia   • Sinus bradycardia   • Chest pain   • Hip pain, chronic, right   • Hematemesis   • Hematemesis with nausea   • H/O medication noncompliance   • Hyperlipidemia   • Muscle spasticity   • COPD exacerbation (CMS/[facility])   • Hypophosphatemia   • Pneumonia of right upper lobe due to infectious organism (CMS/[facility])   • Sepsis due to pneumonia (CMS/[facility])   • Sepsis with metabolic encephalopathy (CMS/[facility])   • Hypotension     Past Medical History:   Diagnosis Date   • Anxiety    • Arthritis    • Bipolar 1 disorder (CMS/[facility])    • Coronary artery disease    • Disease of thyroid gland    • GERD (gastroesophageal reflux disease)    • Hyperlipidemia    • Mobility impaired    • Myocardial infarction (CMS/[facility])    • Overactive bladder    • Parkinsonian syndrome (CMS/[facility])    • Prostatitis    • Restrictive pattern present on pulmonary function testing    • Seasonal allergies    • Stroke (CMS/[facility])     RESIDUAL WEAKNESS RIGHT LOWER EXTREMITY   • Urinary incontinence    • Urinary retention      Past Surgical History:   Procedure Laterality Date   • ABDOMINAL SURGERY     • CARDIAC SURGERY     • CHOLECYSTECTOMY     • CORONARY ARTERY BYPASS  GRAFT      x2   • ENDOSCOPY N/A 10/4/2018    LA grade D reflux esophagitis, 4cm hiatal hernia, esohageal stenosis, normal stomach, erythematous duodenopathy, normal second portion of duodenum, no specimens collected   • ENDOSCOPY N/A 11/30/2018    Procedure: ESOPHAGOGASTRODUODENOSCOPY WITH DILATATION;  Surgeon: Paul Duncan MD;  Location: [facility] ENDOSCOPY;  Service: Gastroenterology   • EYE SURGERY      cataracts   • HIP ENDOPROSTHESIS Right 5/5/2018    Procedure: RIGHT HIP HEMIARTHROPLASTY;  Surgeon: Winston Vallejo MD;  Location: [facility] OR;  Service: Orthopedics   • HIP FRACTURE SURGERY Right     PARTIAL HIP REPLACEMENT   • MUSCLE BIOPSY Left 3/24/2016    Procedure: LT THIGH MUSCLE BIOPSY;  Surgeon: Fausto Mack MD;  Location: [facility] OR;  Service:    • SKIN BIOPSY     • THYROID SURGERY     • TOTAL HIP ARTHROPLASTY REVISION Right 1/18/2019    Procedure: CONVERSION RT POSTERIOR HIP BIPOLAR TO TOTAL HIP ARTHROPLASTY;  Surgeon: Radha Paredes MD;  Location: [facility] OR;  Service: Orthopedics   • TURP / TRANSURETHRAL INCISION / DRAINAGE PROSTATE     • VASCULAR SURGERY       General Information     Row Name 12/09/19 1511          PT Evaluation Time/Intention    Document Type  evaluation  -     Mode of Treatment  individual therapy;physical therapy  -     Row Name 12/09/19 1511          General Information    Prior Level of Function  independent:;gait;transfer;bed mobility walks with walker for short distance, also uses   -     Existing Precautions/Restrictions  fall  -     Barriers to Rehab  medically complex pt has history of parkisonianism  -     Row Name 12/09/19 1511          Relationship/Environment    Lives With  spouse  -     Row Name 12/09/19 1511          Resource/Environmental Concerns    Current Living Arrangements  home/apartment/condo  -     Row Name 12/09/19 1511          Cognitive Assessment/Intervention- PT/OT    Orientation Status (Cognition)   oriented x 3  -     Row Name 12/09/19 1511          Safety Issues, Functional Mobility    Impairments Affecting Function (Mobility)  balance;coordination;endurance/activity tolerance;range of motion (ROM);strength  -       User Key  (r) = Recorded By, (t) = Taken By, (c) = Cosigned By    Initials Name Provider Type     Brie Starr, PT Physical Therapist        Mobility     Row Name 12/09/19 1514          Bed Mobility Assessment/Treatment    Bed Mobility Assessment/Treatment  supine-sit;sit-supine  -     Supine-Sit [place] (Bed Mobility)  verbal cues;nonverbal cues (demo/gesture);moderate assist (50% patient effort)  -     Sit-Supine [place] (Bed Mobility)  verbal cues;nonverbal cues (demo/gesture);minimum assist (75% patient effort)  -     Row Name 12/09/19 1514          Sit-Stand Transfer    Sit-Stand [place] (Transfers)  verbal cues;nonverbal cues (demo/gesture);minimum assist (75% patient effort)  -     Assistive Device (Sit-Stand Transfers)  walker, front-wheeled  -     Row Name 12/09/19 1514          Gait/Stairs Assessment/Training    [place] Level (Gait)  verbal cues;nonverbal cues (demo/gesture);minimum assist (75% patient effort)  -     Assistive Device (Gait)  walker, front-wheeled  -     Distance in Feet (Gait)  5ft + 25ft, 1 seated rest break due to some fatigue  -     Deviations/Abnormal Patterns (Gait)  dayanna decreased;gait speed decreased;stride length decreased;festinating/shuffling  -     Bilateral Gait Deviations  forward flexed posture  -     Comment (Gait/Stairs)  pt with B knee flexion and shuffling gait, family reports some of that is his baseline  -       User Key  (r) = Recorded By, (t) = Taken By, (c) = Cosigned By    Initials Name Provider Type     Brie Starr, PT Physical Therapist        Obj/Interventions     Row Name 12/09/19 1523          General ROM    GENERAL ROM COMMENTS  B LE ROM limited, B knees do not appear to reach  full extension  -     Row Name 12/09/19 1523          MMT (Manual Muscle Testing)    General MMT Comments  generalized weakness noted with functional mobility, B LE grossly 3/5  -     Row Name 12/09/19 1523          Therapeutic Exercise    Comment (Therapeutic Exercise)  B LE 10 reps AP and LAQ  -     Row Name 12/09/19 1523          Static Standing Balance    Level of [place] (Supported Standing, Static Balance)  minimal assist, 75% patient effort  -CH     Assistive Device Utilized (Supported Standing, Static Balance)  walker, rolling  -CH     Row Name 12/09/19 1523          Dynamic Standing Balance    Level of [place], Reaches Outside Midline (Standing, Dynamic Balance)  minimal assist, 75% patient effort  -CH     Assistive Device Utilized (Supported Standing, Dynamic Balance)  walker, rolling  -       User Key  (r) = Recorded By, (t) = Taken By, (c) = Cosigned By    Initials Name Provider Type    CH Brie Starr, PT Physical Therapist        Goals/Plan     Row Name 12/09/19 1528          Bed Mobility Goal 1 (PT)    Activity/Assistive Device (Bed Mobility Goal 1, PT)  bed mobility activities, all  -CH     [place] Level/Cues Needed (Bed Mobility Goal 1, PT)  contact guard assist  -CH     Time Frame (Bed Mobility Goal 1, PT)  1 week  -     Row Name 12/09/19 1528          Transfer Goal 1 (PT)    Activity/Assistive Device (Transfer Goal 1, PT)  transfers, all;walker, rolling  -CH     [place] Level/Cues Needed (Transfer Goal 1, PT)  contact guard assist  -CH     Time Frame (Transfer Goal 1, PT)  1 week  -     Row Name 12/09/19 1528          Gait Training Goal 1 (PT)    Activity/Assistive Device (Gait Training Goal 1, PT)  gait (walking locomotion);walker, rolling  -CH     [place] Level (Gait Training Goal 1, PT)  contact guard assist  -CH     Distance (Gait Goal 1, PT)  100  -CH     Time Frame (Gait Training Goal 1, PT)  1 week  -       User Key  (r) = Recorded By, (t) =  Taken By, (c) = Cosigned By    Initials Name Provider Type    Brie Gomez, PT Physical Therapist        Clinical Impression     Row Name 12/09/19 1524          Pain Assessment    Additional Documentation  Pain Scale: FACES Pre/Post-Treatment (Group)  -     Row Name 12/09/19 1524          Pain Scale: FACES Pre/Post-Treatment    Pain: FACES Scale, Pretreatment  0-->no hurt  -CH     Pain: FACES Scale, Post-Treatment  0-->no hurt  -CH     Row Name 12/09/19 1524          Plan of Care Review    Plan of Care Reviewed With  patient  -CH     Outcome Summary  Pt is a 75 yo M who was admitted with cough and weakness, found to have lactic acidosis. Pt has a history of impaired mobility due to parkinson-like symptoms. Pt presents to PT with impaired functional mobiltiy and gait secondary to generalized weakness, impaired balance, and decreased activity tolerance. Pt may benefit from skilled PT to address strength, mobility, and gait.  -     Row Name 12/09/19 1524          Physical Therapy Clinical Impression    Patient/Family Goals Statement (PT Clinical Impression)  to return to [facility]  -     Criteria for Skilled Interventions Met (PT Clinical Impression)  treatment indicated  -     Rehab Potential (PT Clinical Summary)  good, to achieve stated therapy goals  -     Row Name 12/09/19 1524          Positioning and Restraints    Pre-Treatment Position  in bed  -     Post Treatment Position  bed  -CH     In Bed  supine;call light within reach;encouraged to call for assist;exit alarm on;with family/caregiver  -       User Key  (r) = Recorded By, (t) = Taken By, (c) = Cosigned By    Initials Name Provider Type    Brie Gomez, PT Physical Therapist        Outcome Measures     Row Name 12/09/19 1523          How much help from another person do you currently need...    Turning from your back to your side while in flat bed without using bedrails?  2  -     Moving from lying on back to sitting on the  side of a flat bed without bedrails?  2  -CH     Moving to and from a bed to a chair (including a wheelchair)?  3  -CH     Standing up from a chair using your arms (e.g., wheelchair, bedside chair)?  3  -CH     Climbing 3-5 steps with a railing?  1  -CH     To walk in hospital room?  3  -CH     AM-PAC 6 Clicks Score (PT)  14  -     Row Name 12/09/19 1529          Functional Assessment    Outcome Measure Options  AM-PAC 6 Clicks Basic Mobility (PT)  -       User Key  (r) = Recorded By, (t) = Taken By, (c) = Cosigned By    Initials Name Provider Type    Brie Gomez PT Physical Therapist          PT Recommendation and Plan  Planned Therapy Interventions (PT Eval): balance training, bed mobility training, gait training, home exercise program, patient/family education, strengthening, transfer training  Outcome Summary/Treatment Plan (PT)  Anticipated Discharge Disposition (PT): skilled nursing facility  Plan of Care Reviewed With: patient  Outcome Summary: Pt is a 75 yo M who was admitted with cough and weakness, found to have lactic acidosis. Pt has a history of impaired mobility due to parkinson-like symptoms. Pt presents to PT with impaired functional mobiltiy and gait secondary to generalized weakness, impaired balance, and decreased activity tolerance. Pt may benefit from skilled PT to address strength, mobility, and gait.     Time Calculation:   PT Charges     Row Name 12/09/19 1531             Time Calculation    Start Time  1352  -      Stop Time  1410  -      Time Calculation (min)  18 min  -      PT Received On  12/09/19  -      PT - Next Appointment  12/10/19  -      PT Goal Re-Cert Due Date  12/16/19  -         Time Calculation- PT    Total Timed Code Minutes- PT  10 minute(s)  -        User Key  (r) = Recorded By, (t) = Taken By, (c) = Cosigned By    Initials Name Provider Type    Brie Gomez PT Physical Therapist        Therapy Charges for Today     Code Description  Service Date Service Provider Modifiers Qty    53849857410 HC PT EVAL MOD COMPLEXITY 2 12/9/2019 Brie Starr, PT GP 1    45141425363 HC PT THER PROC EA 15 MIN 12/9/2019 Brie Starr, PT GP 1          PT G-Codes  Outcome Measure Options: AM-PAC 6 Clicks Basic Mobility (PT)  AM-PAC 6 Clicks Score (PT): 14    Brie Starr, PT  12/9/2019

## 2019-12-09 NOTE — CONSULTS
Group: [facility]         CONSULT NOTE    Patient Identification:  Victor Manuel Issa  74 y.o.  male  1945  [id number]            Requesting physician: Hospitalist Dr. Aaron    Reason for Consultation: Lactic acidosis    CC:     History of Present Illness:  74-year-old gentleman history of Parkinson's disease recently diagnosed with UTI admitted to the hospitalist service with COPD exacerbation 2 days ago.  We were asked to evaluate due to hypotension and lactic acidosis.  Patient has been initiated on IV fluids and bolus given.  Per patient's wife he is actually much better and improved after receiving IV fluids.  Currently denies any shortness of breath chest pain or cough ongoing.  No aspiration no fever chills were reported.  No abdominal pain nausea vomiting was reported.      Review of Systems  Review of Systems   Constitutional: Positive for activity change, appetite change, fatigue and fever ( 101 on admission now improved.   HENT: Positive for congestion improved.    Respiratory: Positive for cough, chest tightness ( 2 days ago fleeting, no radiation) and shortness of breath no improved.    Cardiovascular: Negative for chest pain.   Gastrointestinal: Negative for abdominal distention and abdominal pain.   Endocrine: Negative for cold intolerance and heat intolerance.   Genitourinary: Negative for difficulty urinating and dysuria.        Incontinence   Musculoskeletal: Negative for arthralgias and back pain.   Neurological: Negative for dizziness and headaches.   Psychiatric/Behavioral: Positive for confusion no improving. Negative for agitation.  Past Medical History:  Past Medical History:   Diagnosis Date   • Anxiety    • Arthritis    • Bipolar 1 disorder (CMS/HCC)    • Coronary artery disease    • Disease of thyroid gland    • GERD (gastroesophageal reflux disease)    • Hyperlipidemia    • Mobility impaired    • Myocardial infarction (CMS/HCC)    • Overactive bladder    • Parkinsonian  syndrome (CMS/HCC)    • Prostatitis    • Restrictive pattern present on pulmonary function testing    • Seasonal allergies    • Stroke (CMS/HCC)     RESIDUAL WEAKNESS RIGHT LOWER EXTREMITY   • Urinary incontinence    • Urinary retention        Past Surgical History:  Past Surgical History:   Procedure Laterality Date   • ABDOMINAL SURGERY     • CARDIAC SURGERY     • CHOLECYSTECTOMY     • CORONARY ARTERY BYPASS GRAFT      x2   • ENDOSCOPY N/A 10/4/2018    LA grade D reflux esophagitis, 4cm hiatal hernia, esohageal stenosis, normal stomach, erythematous duodenopathy, normal second portion of duodenum, no specimens collected   • ENDOSCOPY N/A 11/30/2018    Procedure: ESOPHAGOGASTRODUODENOSCOPY WITH DILATATION;  Surgeon: Paul Duncan MD;  Location: [facility] ENDOSCOPY;  Service: Gastroenterology   • EYE SURGERY      cataracts   • HIP ENDOPROSTHESIS Right 5/5/2018    Procedure: RIGHT HIP HEMIARTHROPLASTY;  Surgeon: Winston Vallejo MD;  Location: [facility] OR;  Service: Orthopedics   • HIP FRACTURE SURGERY Right     PARTIAL HIP REPLACEMENT   • MUSCLE BIOPSY Left 3/24/2016    Procedure: LT THIGH MUSCLE BIOPSY;  Surgeon: Fausto Mack MD;  Location: [facility] OR;  Service:    • SKIN BIOPSY     • THYROID SURGERY     • TOTAL HIP ARTHROPLASTY REVISION Right 1/18/2019    Procedure: CONVERSION RT POSTERIOR HIP BIPOLAR TO TOTAL HIP ARTHROPLASTY;  Surgeon: Radha Paredes MD;  Location: [facility] OR;  Service: Orthopedics   • TURP / TRANSURETHRAL INCISION / DRAINAGE PROSTATE     • VASCULAR SURGERY          Home Meds:  Medications Prior to Admission   Medication Sig Dispense Refill Last Dose   • acetaminophen (TYLENOL) 325 MG tablet Take 2 tablets by mouth Every 4 (Four) Hours As Needed for Mild Pain , Headache or Fever.   Past Month at Unknown time   • albuterol (PROVENTIL) (2.5 MG/3ML) 0.083% nebulizer solution Take 2.5 mg by nebulization Every 6 (Six) Hours As Needed for Wheezing.  12 12/6/2019  at Unknown time   • arformoterol (BROVANA) 15 MCG/2ML nebulizer solution Take 15 mcg by nebulization 2 (Two) Times a Day.   12/6/2019 at Unknown time   • bisacodyl (DULCOLAX) 5 MG EC tablet Take 2 tablets by mouth Daily As Needed for Constipation. 30 tablet 0 Past Week at Unknown time   • busPIRone (BUSPAR) 5 MG tablet Take 1 tablet by mouth 3 (Three) Times a Day As Needed (anxiety).   12/6/2019 at Unknown time   • carbidopa-levodopa (SINEMET)  MG per tablet Take 1 tablet by mouth 3 (Three) Times a Day With Meals. 90 tablet 0 12/6/2019 at Unknown time   • cholecalciferol (VITAMIN D3) 1000 UNITS tablet Take 1,000 Units by mouth daily.   12/6/2019 at Unknown time   • diclofenac (VOLTAREN) 1 % gel gel Apply 4 g topically Daily As Needed (apply to the knees).   Past Week at Unknown time   • divalproex (DEPAKOTE) 500 MG DR tablet Take 500 mg by mouth 2 (Two) Times a Day.   12/6/2019 at Unknown time   • donepezil (ARICEPT) 10 MG tablet Take 20 mg by mouth Every Night.   12/6/2019 at Unknown time   • fluticasone (FLONASE) 50 MCG/ACT nasal spray 1 spray into each nostril Daily.   12/6/2019 at Unknown time   • levothyroxine (SYNTHROID, LEVOTHROID) 175 MCG tablet Take 175 mcg by mouth Daily.   12/7/2019 at Unknown time   • montelukast (SINGULAIR) 10 MG tablet Take 10 mg by mouth Every Night.   12/6/2019 at Unknown time   • Multiple Vitamins-Minerals (MULTIVITAMIN WITH MINERALS) tablet tablet Take 1 tablet by mouth Daily.   12/6/2019 at Unknown time   • OLANZapine (ZYPREXA) 10 MG tablet Take 10 mg by mouth Every Night.   12/6/2019 at Unknown time   • oxybutynin XL (DITROPAN-XL) 5 MG 24 hr tablet Take 5 mg by mouth 2 (Two) Times a Day.   12/6/2019 at Unknown time   • pantoprazole (PROTONIX) 40 MG EC tablet Take 1 tablet by mouth 2 (Two) Times a Day Before Meals. 180 tablet 1 12/6/2019 at Unknown time   • simvastatin (ZOCOR) 40 MG tablet Take 40 mg by mouth Every Night.   12/6/2019 at Unknown time   • aspirin  MG EC  "tablet Take 1 tablet by mouth Daily.   Unknown at Unknown time   • nystatin (MYCOSTATIN) 724123 UNIT/ML suspension Take 5 mL by mouth 4 (Four) Times a Day. (Patient taking differently: Take 5 mL by mouth As Needed.)   1/17/2019 at Unknown time   • ondansetron ODT (ZOFRAN ODT) 4 MG disintegrating tablet Take 1 tablet by mouth Every 8 (Eight) Hours As Needed for Nausea or Vomiting. 30 tablet 0 Taking   • promethazine (PHENERGAN) 25 MG tablet Take 25 mg by mouth Every 6 (Six) Hours As Needed for Nausea or Vomiting.   Taking   • sucralfate (CARAFATE) 1 g tablet Take 1 g by mouth Daily.  0 1/17/2019 at Unknown time   • traMADol (ULTRAM) 50 MG tablet Take 1 tablet by mouth Every 4 (Four) Hours As Needed for Moderate Pain . 60 tablet 0 Taking       Allergies:  Allergies   Allergen Reactions   • Oxycodone Mental Status Change   • Promethazine Hallucinations   • Beet [Beta Vulgaris] Unknown (See Comments)     Pt cant remember       Social History:   Social History     Socioeconomic History   • Marital status:      Spouse name: Not on file   • Number of children: Not on file   • Years of education: Not on file   • Highest education level: Not on file   Tobacco Use   • Smoking status: Current Every Day Smoker     Packs/day: 0.50     Types: Cigarettes     Start date: 1960   • Smokeless tobacco: Never Used   • Tobacco comment: Educated pt on quitting   Substance and Sexual Activity   • Alcohol use: Never     Frequency: Never   • Drug use: No   • Sexual activity: Defer     Partners: Female       Family History:  Family History   Problem Relation Age of Onset   • Cancer Mother    • Arthritis Father    • Heart disease Father    • Early death Brother    • Heart disease Brother    • Malig Hyperthermia Neg Hx        Physical Exam:  BP 98/55 (BP Location: Left arm, Patient Position: Lying)   Pulse 73   Temp 97.8 °F (36.6 °C) (Oral)   Resp 16   Ht 180.3 cm (71\")   Wt 66.6 kg (146 lb 14.4 oz)   SpO2 97%   BMI 20.49 kg/m²  " Body mass index is 20.49 kg/m². 97% 66.6 kg (146 lb 14.4 oz)  Physical Exam  Elderly gentleman resting comfortably no distress no labored breathing  Eyes normal conjunctive a pupils reactive to light  ENT Mallampati between 3 and 4 normal nasal exam  Neck midline trachea no thyromegaly  Chest diminished breath sounds with occasional rhonchi wheeze on the bases no labored breathing  CVs regular rate and rhythm no lower extremity edema  Abdomen soft nontender no hepatosplenomegaly  CNS intact normal sensory exam  Skin no rashes no nodules  Psych slightly confused and poor memory  Musculoskeletal no cyanosis no clubbing normal range of motion      LABS:  Lab Results   Component Value Date    CALCIUM 9.7 12/08/2019    PHOS 3.2 12/08/2019     Results from last 7 days   Lab Units 12/08/19 2121 12/08/19 0726 12/07/19  1816   MAGNESIUM mg/dL  --   --  1.8   SODIUM mmol/L  --  141 142   POTASSIUM mmol/L  --  4.6 4.2   CHLORIDE mmol/L  --  106 106   CO2 mmol/L  --  22.5 22.2   BUN mg/dL  --  26* 20   CREATININE mg/dL  --  0.98 0.89   GLUCOSE mg/dL  --  153* 99   CALCIUM mg/dL  --  9.7 10.1   WBC 10*3/mm3  --  12.84* 16.27*   HEMOGLOBIN g/dL  --  11.6* 14.0   PLATELETS 10*3/mm3  --  189 230   ALT (SGPT) U/L  --  7  --    AST (SGOT) U/L  --  6  --    PROCALCITONIN ng/mL 0.13  --  0.09*     Lab Results   Component Value Date    CKTOTAL 28 12/07/2019    CKMB 6.5 03/21/2016    TROPONINT <0.010 12/07/2019     Results from last 7 days   Lab Units 12/07/19  1816   CK TOTAL U/L 28   TROPONIN T ng/mL <0.010     Results from last 7 days   Lab Units 12/07/19  1815   BLOODCX  No growth at 24 hours  No growth at 24 hours     Results from last 7 days   Lab Units 12/08/19 2121 12/07/19 1817 12/07/19  1816   PROCALCITONIN ng/mL 0.13  --  0.09*   LACTATE mmol/L 6.0* 1.1  --          Results from last 7 days   Lab Units 12/07/19  1818   ADENOVIRUS DETECTION BY PCR  Not Detected   CORONAVIRUS 229E  Not Detected   CORONAVIRUS HKU1  Not  Detected   CORONAVIRUS NL63  Not Detected   CORONAVIRUS OC43  Not Detected   HUMAN METAPNEUMOVIRUS  Not Detected   HUMAN RHINOVIRUS/ENTEROVIRUS  Not Detected   INFLUENZA B PCR  Not Detected   PARAINFLUENZA 1  Not Detected   PARAINFLUENZA VIRUS 2  Not Detected   PARAINFLUENZA VIRUS 3  Not Detected   PARAINFLUENZA VIRUS 4  Not Detected   BORDETELLA PERTUSSIS PCR  Not Detected   CHLAMYDOPHILA PNEUMONIAE PCR  Not Detected   MYCOPLAMA PNEUMO PCR  Not Detected   INFLUENZA A PCR  Not Detected   INFLUENZA A H3  Not Detected   INFLUENZA A H1  Not Detected   RSV, PCR  Not Detected         Results from last 7 days   Lab Units 12/07/19  1815   BLOODCX  No growth at 24 hours  No growth at 24 hours     Lab Results   Component Value Date    TSH 0.698 12/07/2019     Estimated Creatinine Clearance: 60.4 mL/min (by C-G formula based on SCr of 0.98 mg/dL).         Imaging: I personally visualized the images of scans/x-rays performed within last 3 days.      Assessment:  Lactic acidosis  Sepsis source unclear questionable pneumonia  Hypotension  COPD exacerbation  Parkinson's  History of CVA      Recommendations:  Continue IV fluids.  Noted improving lactic acidosis and blood pressure with IV fluids.  Continue Zosyn for now until clear source of infection is evident from work-up.  I will check CT chest no contrast to evaluate further and see if he has developed a pneumonia  Monitor for signs of volume overload  Bronchodilators to be continued.  Early ambulation encouraged  Physical therapy  We will follow along closely  Discussed plan of care with patient's wife              Adriel Burdick MD  12/8/2019  11:32 PM      Much of this encounter note is an electronic transcription/translation of spoken language to printed text using Dragon Software.

## 2019-12-09 NOTE — PLAN OF CARE
Problem: Patient Care Overview  Goal: Plan of Care Review  Outcome: Ongoing (interventions implemented as appropriate)  Flowsheets  Taken 12/9/2019 0825  Progress: improving  Outcome Summary: patient bp low at beginning of shift. md notified and received orders for labs. due to abnormal lab values bolus was given and fluids modified as well as intensivist consulted to evaluate patient per md orders. patient screening positive for sepsis and md's are aware. patient receiving iv fluids and iv antibiotics. cardiology aware of patient bradycardia and have signed off. wife remained at bedside throughout the entire shift. pressure ulcer present on coccyx. turning patient Q2 and checking brief frequently to prevent skin breakdown. will continue to monitor.  Taken 12/8/2019 0701  Plan of Care Reviewed With: patient

## 2019-12-09 NOTE — NURSING NOTE
Spoke with Dr. Burdick regarding patient bp and lactate level. Received orders to continue to monitor bp and continue iv fluids and that he would be by to evaluate patient tonight.

## 2019-12-09 NOTE — PROGRESS NOTES
Discharge Planning Assessment  [facility]     Patient Name: Victor Manuel Issa  MRN: [id number]  Today's Date: 12/9/2019    Admit Date: 12/7/2019    Discharge Needs Assessment     Row Name 12/09/19 0858       Living Environment    Lives With  spouse    Current Living Arrangements  home/apartment/condo    Primary Care Provided by  spouse/significant other    Provides Primary Care For  no one, unable/limited ability to care for self    Family Caregiver if Needed  spouse    Family Caregiver Names  Wife Evelyn [phone]    Quality of Family Relationships  helpful    Able to Return to Prior Arrangements  yes       Resource/Environmental Concerns    Resource/Environmental Concerns  none    Transportation Concerns  rides, unreliable from others       Transition Planning    Patient/Family Anticipates Transition to  home with help/services    Patient/Family Anticipated Services at Transition  home health care    Transportation Anticipated  -- Will need W/C van       Discharge Needs Assessment    Readmission Within the Last 30 Days  no previous admission in last 30 days    Concerns to be Addressed  basic needs    Equipment Currently Used at Home  wheelchair;walker, rolling;grab bar;shower chair    Anticipated Changes Related to Illness  none    Equipment Needed After Discharge  none    Discharge Facility/Level of Care Needs  home with home health        Discharge Plan     Row Name 12/09/19 0902       Plan    Plan  Return home with [email or web address]  following    Patient/Family in Agreement with Plan  yes    Plan Comments  Spoke with patient and wife Evelyn [phone] at home.  Patient uses a W/C and a walker, has grab bars in the BR and a shower chair.  Patient is current with [email or web address]  - left message for Antonia, and he has been to [facility] in the past for rehab.  They have a person that comes in 1-2 x's/week and helps with housekeeping and will drive to appointments and shopping.  Patient is current with PCP Radha  [facility].  Patient plans to return home at [place] with [facility] following.  CCP will follow.  BHumeniukRN             Expected Discharge Date and Time     Expected Discharge Date Expected Discharge Time    Dec 10, 2019         Demographic Summary     Row Name 12/09/19 0857       General Information    Admission Type  inpatient    Arrived From  home    Referral Source  admission list    Reason for Consult  discharge planning     Used During This Interaction  no        Functional Status     Row Name 12/09/19 0858       Functional Status, IADL    Medications  assistive person    Meal Preparation  assistive person    Housekeeping  assistive person    Laundry  assistive person    Shopping  assistive person       Mental Status    General Appearance WDL  WDL       Mental Status Summary    Recent Changes in Mental Status/Cognitive Functioning  no changes                Becky S. Humeniuk, RN

## 2019-12-09 NOTE — CONSULTS
Adult Nutrition  Assessment/PES    Patient Name:  Victor Manuel Issa  YOB: 1945  MRN: [id number]  Admit Date:  12/7/2019    Assessment Date:  12/9/2019    Comments:  Nutrition consult from nursing admission database. Pt and wife report decreased appetite since thanksgiving.  No significant recent weight loss.  Will add protein shake bid. Wife asked about foods high in phos. Provided information. Encouraged po.    Reason for Assessment     Row Name 12/09/19 1322          Reason for Assessment    Reason For Assessment  identified at risk by screening criteria;nurse/nurse practitioner consult     Diagnosis  pulmonary disease COPD, PNA, CAD, confusion     Identified At Risk by Screening Criteria  MST SCORE 2+         Nutrition/Diet History     Row Name 12/09/19 1323          Nutrition/Diet History    Typical Food/Fluid Intake  poor po x 1 week     Food Allergies  other (see comments) beets     Factors Affecting Nutritional Intake  impaired cognitive status/motor control         Anthropometrics     Row Name 12/09/19 1323          Admit Weight    Admit Weight  66.6 kg (146 lb 13.2 oz)        Usual Body Weight (UBW)    Weight Loss  --        Body Mass Index (BMI)    BMI Assessment  BMI 18.5-24.9: normal         Labs/Tests/Procedures/Meds     Row Name 12/09/19 1324          Labs/Procedures/Meds    Lab Results Reviewed  reviewed, pertinent     Lab Results Comments  Glu, BUN, Cr, wbc, h/h, lactate        Diagnostic Tests/Procedures    Diagnostic Test/Procedure Reviewed  reviewed, pertinent        Medications    Pertinent Medications Reviewed  reviewed, pertinent     Pertinent Medications Comments  asa, lipitor, folic acid, protonix, Abx, ivf         Physical Findings     Row Name 12/09/19 1325          Physical Findings    Oral/Mouth Cavity  poor dentition     Skin  other (see comments);pressure injury PI to coccyx, hip, B=14, MASD to gluteal fold         Estimated/Assessed Needs     Row Name 12/09/19 132           Calculation Measurements    Weight Used For Calculations  66.6 kg (146 lb 13.2 oz)        Estimated/Assessed Needs    Additional Documentation  Fluid Requirements (Group);Protein Requirements (Group);KCAL/KG (Group)        KCAL/KG    KCAL/KG  25 Kcal/Kg (kcal);30 Kcal/Kg (kcal)     25 Kcal/Kg (kcal)  1665     30 Kcal/Kg (kcal)  1998        Protein Requirements    Weight Used For Protein Calculations  66.6 kg (146 lb 13.2 oz)     Est Protein Requirement Amount (gms/kg)  1.2 gm protein     Estimated Protein Requirements (gms/day)  79.92        Fluid Requirements    Estimated Fluid Requirements (mL/day)  1800         Nutrition Prescription Ordered     Row Name 12/09/19 1329          Nutrition Prescription PO    Current PO Diet  Regular         Evaluation of Received Nutrient/Fluid Intake     Row Name 12/09/19 1330          PO Evaluation    Number of Days PO Intake Evaluated  2 days     Number of Meals  6     % PO Intake  %         Problem/Interventions:  Problem 1     Row Name 12/09/19 1330          Nutrition Diagnoses Problem 1    Problem 1  Inadequate Intake/Infusion     Inadequate Intake Type  Oral     Macronutrient  Kcal;Protein     Signs/Symptoms (evidenced by)  Report of Mnimal PO Intake         Intervention Goal     Row Name 12/09/19 1330          Intervention Goal    General  Maintain nutrition;Reduce/improve symptoms;Disease management/therapy;Meet nutritional needs for age/condition;Improved nutrition related lab(s)     PO  Tolerate PO;Increase intake;PO intake (%)     PO Intake %  80 %     Weight  Maintain weight         Nutrition Intervention     Row Name 12/09/19 1330          Nutrition Intervention    RD/Tech Action  Care plan reviewd;Follow Tx progress;Encourage intake;Interview for preference;Advise available snack;Advise alternate selection;Recommend/ordered     Recommended/Ordered  Supplement         Nutrition Prescription     Row Name 12/09/19 1341          Nutrition Prescription PO    PO  Prescription  Begin/change supplement     Supplement  Milkshake     Supplement Frequency  2 times a day     New PO Prescription Ordered?  Yes         Education/Evaluation     Row Name 12/09/19 1341          Education    Education  Provided education regarding;Education topics     Education Topics  Phosphorous per wife request        Monitor/Evaluation    Monitor  Per protocol;Weight;Skin status;I&O;Symptoms;PO intake;Pertinent labs           Electronically signed by:  Cheryl Keita RD  12/09/19 1:43 PM

## 2019-12-09 NOTE — PLAN OF CARE
Problem: Patient Care Overview  Goal: Plan of Care Review  Flowsheets (Taken 12/9/2019 7620)  Outcome Summary: Pt is a 75 yo M who was admitted with cough and weakness, found to have lactic acidosis. Pt has a history of impaired mobility due to parkinson-like symptoms. Pt presents to PT with impaired functional mobiltiy and gait secondary to generalized weakness, impaired balance, and decreased activity tolerance. Pt may benefit from skilled PT to address strength, mobility, and gait.

## 2019-12-10 NOTE — PLAN OF CARE
Pt has had no c/o of pain this shift.  Tolerating all oral meds. IVFs continued  @ 75mLs/hr. IV  abx. Cortisol levels drawn today. Q2 turns. Dressing on coccyx changed. VSS. Will continue to monitor.

## 2019-12-10 NOTE — PROGRESS NOTES
Name: Victor Manuel Issa ADMIT: 2019   : 1945  PCP: Provider, No Known    MRN: [id number] LOS: 3 days   AGE/SEX: 74 y.o. male  ROOM: [facility]     Subjective   Subjective   CC: generalized weakness/dyspnea/cough/confusion  No acute events.  Patient is feeling much better, wants to go home.  Having productive cough. Taking PO.  No CP/f/c/n/v/d.  No dizziness/light-headedness.     Objective   Objective   Vital Signs  Temp:  [97.6 °F (36.4 °C)-97.9 °F (36.6 °C)] 97.9 °F (36.6 °C)  Heart Rate:  [46-65] 57  Resp:  [16-24] 18  BP: ()/(43-58) 125/58  SpO2:  [93 %-100 %] 98 %  on   ;   Device (Oxygen Therapy): room air  Body mass index is 20.49 kg/m².  Physical Exam   Constitutional: No distress.   HENT:   Head: Normocephalic and atraumatic.   Mouth/Throat: Oropharynx is clear and moist.   Eyes: Pupils are equal, round, and reactive to light. Conjunctivae and EOM are normal.   Neck: Normal range of motion. Neck supple.   Cardiovascular: Normal rate, regular rhythm and intact distal pulses.   Pulmonary/Chest: Effort normal. He has decreased breath sounds in the right lower field and the left lower field. He has no wheezes. He has rhonchi in the right middle field. He has no rales.   Abdominal: Soft. Bowel sounds are normal. There is no tenderness.   Musculoskeletal: He exhibits no edema or tenderness.   Neurological: He is alert. No cranial nerve deficit.   Skin: Skin is warm and dry. Capillary refill takes less than 2 seconds. He is not diaphoretic.   Psychiatric: He has a normal mood and affect. His behavior is normal.   Nursing note and vitals reviewed.    Results Review:       I reviewed the patient's new clinical results.  Results from last 7 days   Lab Units 12/10/19  0628 19  0548 19  0726 19  1816   WBC 10*3/mm3 8.24 13.87* 12.84* 16.27*   HEMOGLOBIN g/dL 9.8* 10.0* 11.6* 14.0   PLATELETS 10*3/mm3 160 191 189 230     Results from last 7 days   Lab Units 12/10/19  0628  12/09/19  0547 12/08/19  0726 12/07/19  1816   SODIUM mmol/L 145 140 141 142   POTASSIUM mmol/L 4.2 4.5 4.6 4.2   CHLORIDE mmol/L 114* 108* 106 106   CO2 mmol/L 22.1 21.9* 22.5 22.2   BUN mg/dL 14 27* 26* 20   CREATININE mg/dL 0.69* 0.71* 0.98 0.89   GLUCOSE mg/dL 90 155* 153* 99   Estimated Creatinine Clearance: 74 mL/min (A) (by C-G formula based on SCr of 0.69 mg/dL (L)).  Results from last 7 days   Lab Units 12/08/19  0726   ALBUMIN g/dL 3.10*   BILIRUBIN mg/dL 0.3   ALK PHOS U/L 79   AST (SGOT) U/L 6   ALT (SGPT) U/L 7     Results from last 7 days   Lab Units 12/10/19  0628 12/09/19  0547 12/08/19  1001 12/08/19  0726 12/07/19  1816   CALCIUM mg/dL 9.1 9.4  --  9.7 10.1   ALBUMIN g/dL  --   --   --  3.10*  --    MAGNESIUM mg/dL  --   --   --   --  1.8   PHOSPHORUS mg/dL  --   --  3.2  --  1.8*     Results from last 7 days   Lab Units 12/09/19  0548 12/09/19  0547 12/09/19  0124 12/08/19  2121 12/07/19  1817 12/07/19  1816   PROCALCITONIN ng/mL  --  0.12  --  0.13  --  0.09*   LACTATE mmol/L 1.9  --  3.5* 6.0* 1.1  --      No results found for: HGBA1C, POCGLU      aspirin 325 mg Oral Daily   atorvastatin 20 mg Oral Daily   carbidopa-levodopa 1 tablet Oral TID With Meals   divalproex 500 mg Oral BID   donepezil 20 mg Oral Nightly   folic acid 1 mg Oral Daily   hydrocortisone-bacitracin-zinc oxide-nystatin 1 application Topical BID   ipratropium-albuterol 3 mL Nebulization 4x Daily - RT   levothyroxine 175 mcg Oral Q AM   montelukast 10 mg Oral Nightly   OLANZapine 10 mg Oral Nightly   oxybutynin XL 5 mg Oral BID   pantoprazole 40 mg Oral BID AC   piperacillin-tazobactam 3.375 g Intravenous Q8H   sodium chloride 10 mL Intravenous Q12H       sodium chloride 150 mL/hr Last Rate: 150 mL/hr (12/10/19 0716)   Diet Regular; Thin       Assessment/Plan     Active Hospital Problems    Diagnosis  POA   • Sepsis due to pneumonia (CMS/HCC) [J18.9, A41.9]  Yes   • Sepsis with metabolic encephalopathy (CMS/HCC) [A41.9, R65.20,  G93.41]  Yes   • Hypotension [I95.9]  No   • Pneumonia of right upper lobe due to infectious organism (CMS/HCC) [J18.1]  Yes   • COPD exacerbation (CMS/HCC) [J44.1]  Yes   • Hypophosphatemia [E83.39]  Yes   • Hypothyroidism (acquired) [E03.9]  Yes   • Parkinson's disease [G20]  Yes   • Debility [R53.81]  Yes   • Coronary artery disease [I25.10]  Yes   • History of CVA (cerebrovascular accident) [Z86.73]  Not Applicable      Resolved Hospital Problems   No resolved problems to display.   Sepsis due to PNA  - leukocytosis, hypotension, lactic acidosis  - possible aspiration PNA, continue zosyn  - CTA chest reviewed, shows likely developing RUL PNA, no PE  - continue IVF  - SLP has seen, recommending regular textures/thin liquids/no straws  - urine culture has been sent and is growing pseudomonas-I am unsure of the significance of this as this may be a colonization-he has no urinary symptoms but was recently treated outpatient for a UTI with macrobid  - appreciate LPC recs    Hypotension  - due to above  - AM cortisol yesterday was inappropriately low  - he did have a cosyntropin stimulation test yesterday which showed decreased response but the follow up cortisol levels were drawn at 48 and 110 minutes instead of Q30 minutes-will repeat today and if abnormal will ask for endocrinology evaluation  - BP have improved and will hold of on steroids for now    COPD  - no exacerbation  - continue bronchodilators    Hypothyroidism  - continue synthroid  - TSH is okay    Confusion  - much improved-this was metabolic encephalopathy due to sepsis  - likely metabolic encephalopathy from pulmonary process  - B12, TSH okay  - monitor on above treatment and redirect as needed    Folic Acid Deficiency  - replace orally    Asymptomatic Sinus Bradycardia  - able to increase heart rate with activity  - appreciate cardiology recs-no further intervention at this time    SCDs for DVT prophylaxis.  Full code.  Discussed with patient, family  and nursing staff.  Disposition TBD-may need SNU      Fabian Daigle MD  [facility]  12/10/19  12:40 PM

## 2019-12-10 NOTE — PROGRESS NOTES
[facility]  [phone]  Pino Vivar MD    Subjective:  LOS: 3    Feels better. Not coughing much. Had cortisol test. Borderline low. BP better (but also received ACTH)    Objective   Vital Signs past 24hrs    Temp range: Temp (24hrs), Av.9 °F (36.6 °C), Min:97.9 °F (36.6 °C), Max:97.9 °F (36.6 °C)    BP range: BP: ()/(43-58) 125/58  Pulse range: Heart Rate:  [46-63] 57  Resp rate range: Resp:  [16-18] 18    Device (Oxygen Therapy): room air   Oxygen range:SpO2:  [93 %-100 %] 98 %      66.6 kg (146 lb 14.4 oz); Body mass index is 20.49 kg/m².    Intake/Output Summary (Last 24 hours) at 12/10/2019 1535  Last data filed at 12/10/2019 1345  Gross per 24 hour   Intake 1450 ml   Output --   Net 1450 ml       Physical Exam   HENT:   Head: Normocephalic.   Eyes: Pupils are equal, round, and reactive to light.   Cardiovascular: Normal rate and regular rhythm.   No murmur heard.  Pulmonary/Chest: He has decreased breath sounds. He has no wheezes. He has no rales.   Abdominal: Soft. Bowel sounds are normal. There is no tenderness.   Musculoskeletal: He exhibits no edema.   Neurological: He is alert.   Nursing note and vitals reviewed.    Results Review:    I have reviewed the laboratory and imaging data since the last note by [facility] physician.  My annotations are noted in assessment and plan.    Medication Review:  I have reviewed the current MAR.  My annotations are noted in assessment and plan.      sodium chloride 75 mL/hr Last Rate: 75 mL/hr (12/10/19 1345)     Plan   PCCM Problems  Relative hypotension, sepsis, adrenal insufficiency  Right upper lobe pneumonia  Dysphagia  UTI  Parkinson's disease  COPD without exacerbation  Lactic acidosis, improved  Relevant Medical Diagnoses  History CVA  Electrolyte imbalance      Plan of Treatment  Relative hypotension with sepsis and adrenal insufficiency based on low cortisol levels on admission.  Unclear if significant cortisol stim test. Defer to primary  team for endocrine consult.    Sepsis and infection likely also contributing to low blood pressures.  Agree with Zosyn as very likely aspiration resulting in pneumonia.    Dysphagia likely from Parkinson's disease.  Note input by speech. I would prefer that a VFSS or FEES be performed.    UTI should be covered with present antibiotics.    COPD without exacerbation.  Currently on duo nebs 4 times a day which is adequate.  Systemic steroids not required at this point from the point of COPD.    Pino Vivar MD  12/10/19  3:35 PM    Part of this note may be an electronic transcription/translation of spoken language to printed text using the Dragon Dictation System.

## 2019-12-10 NOTE — PROGRESS NOTES
Continued Stay Note  [facility]     Patient Name: Victor Manuel Issa  MRN: [id number]  Today's Date: 12/10/2019    Admit Date: 12/7/2019    Discharge Plan     Row Name 12/10/19 1408       Plan    Plan  [facility] or [facility] vs. home with spouse and [facility]    Provided post acute provider list?  Yes    Post Acute Provider Lists  Nursing Home Wife has been to [facility] and patient has been to [facility] and they were given compare from medicare.gov    Post Acuite Provider List  Delivered    Delivered To  Support Person    Support Person  Wife    Method of Delivery  In person    Plan Comments  Spoke with patient and wife at bedside.  Patient realizes he may need SNF at [place].  He has been to [facility] -spoke with Jeanne and she will evaluate and wife has been to [facility] - spoke with Jaida and she will follow.  Packet started and in CCP office.  CCP will continue to follow.  BHumeniuk RN            Expected Discharge Date and Time     Expected Discharge Date Expected Discharge Time    Dec 11, 2019             Becky S. Humeniuk, RN

## 2019-12-11 NOTE — THERAPY TREATMENT NOTE
Acute Care - Physical Therapy Treatment Note  [facility]     Patient Name: Victor Manuel Issa  : 1945  MRN: [id number]  Today's Date: 2019             Admit Date: 2019    Visit Dx:    ICD-10-CM ICD-9-CM   1. COPD exacerbation (CMS/HCC) J44.1 491.21   2. Hypophosphatemia E83.39 275.3   3. Generalized weakness R53.1 780.79     Patient Active Problem List   Diagnosis   • Thigh pain, musculoskeletal   • Left leg weakness   • History of CVA (cerebrovascular accident)   • COPD (chronic obstructive pulmonary disease)   • Coronary artery disease   • Cervical myelopathy (CMS/[facility])   • Parkinson's disease   • Debility   • Cerebrovascular disease   • Abnormal TSH   • Hypothyroidism (acquired)   • Hip fracture requiring operative repair, right, closed, initial encounter (CMS/[facility])   • Urinary tract infection without hematuria   • Hypoxia   • Sinus bradycardia   • Chest pain   • Hip pain, chronic, right   • Hematemesis   • Hematemesis with nausea   • H/O medication noncompliance   • Hyperlipidemia   • Muscle spasticity   • COPD exacerbation (CMS/[facility])   • Hypophosphatemia   • Pneumonia of right upper lobe due to infectious organism (CMS/[facility])   • Sepsis due to pneumonia (CMS/[facility])   • Sepsis with metabolic encephalopathy (CMS/[facility])   • Hypotension       Therapy Treatment    Rehabilitation Treatment Summary     Row Name 19 0921             Treatment Time/Intention    Discipline  physical therapy assistant  -      Document Type  therapy note (daily note)  -KOREY      Subjective Information  complains of;weakness;fatigue;pain  -      Mode of Treatment  individual therapy;physical therapy  -      Care Plan Review  patient/other agree to care plan  -      Care Plan Review, Other Participant(s)  spouse  -      Comment  forw flexed, knees flexed  -2      Existing Precautions/Restrictions  fall  -2      Treatment Considerations/Comments  needs extra time due to Parkinsons  -JM2      Recorded by [KOREY]  IV nurse and john have been called. Pt has a PICC line that is not giving blood at this time although it is flushing. They are on the way for blood work.      Madelin Gutiérrez RN  12/18/17 1319       Madelin Gutiérrez RN  12/18/17 1329     Danielle Maldonado, [facility] 12/11/19 0926  [JM2] Danielle Maldonado, [facility] 12/11/19 1744      Row Name 12/11/19 0921             Bed Mobility Assessment/Treatment    Supine-Sit [place] (Bed Mobility)  2 person assist;moderate assist (50% patient effort)  -      Bed Mobility, Safety Issues  decreased use of arms for pushing/pulling;decreased use of legs for bridging/pushing;impaired trunk control for bed mobility  -      Assistive Device (Bed Mobility)  bed rails;draw sheet  -      Comment (Bed Mobility)  post lean  -JM      Recorded by [] Danielle Maldonado, [facility] 12/11/19 1744      Row Name 12/11/19 0921             Sit-Stand Transfer    Sit-Stand [place] (Transfers)  2 person assist;moderate assist (50% patient effort);maximum assist (25% patient effort) 2 attempts  -      Assistive Device (Sit-Stand Transfers)  walker, front-wheeled  -      Recorded by [] Danielle Maldonado, [facility] 12/11/19 1744      Row Name 12/11/19 0921             Gait/Stairs Assessment/Training    [place] Level (Gait)  2 person assist;minimum assist (75% patient effort);moderate assist (50% patient effort)  -      Assistive Device (Gait)  walker, front-wheeled  -      Distance in Feet (Gait)  12  -JM      Bilateral Gait Deviations  forward flexed posture;weight shift ability decreased  -      Comment (Gait/Stairs)  bilat knees flexed making amb unsafe, req assist of 2; had to perf pivot to chair initially as pt was sliding off EOB  -JM      Recorded by [JM] Danielle Maldonado, PTA 12/11/19 1744      Row Name 12/11/19 0921             Therapeutic Exercise    Lower Extremity (Therapeutic Exercise)  LAQ (long arc quad), bilateral  -JM      Sets/Reps (Therapeutic Exercise)  10 reps  -      Comment (Therapeutic Exercise)  bilat limited knee ext  -JM      Recorded by [] Danielle Maldonado, [facility] 12/11/19 1744      Row Name 12/11/19 0921             Positioning and Restraints    Pre-Treatment Position  in bed  -      In Chair   reclined;call light within reach;encouraged to call for assist;exit alarm on;with family/caregiver  -JM      Recorded by [] Danielle Maldonado, LADI 12/11/19 1744      Row Name 12/11/19 0921             Pain Scale 2: Numbers Pre/Post-Treatment    Pain Location 2 - Side  Right  -JM      Pain Location 2  knee  -JM      Pain Intervention(s) 2  Repositioned  -JM      Recorded by [] Danielle Maldonado, LADI 12/11/19 1744      Row Name 12/11/19 0921             Pain Scale 2: Word Pre/Post-Treatment    Pain 2: Word Scale, Pretreatment  4 - moderate pain  -JM      Recorded by [] Danielle Maldonado, LADI 12/11/19 1744      Row Name                Wound 10/02/18 1927 Bilateral medial coccyx pressure injury    Wound - Properties Group Date first assessed: 10/02/18 [BF] Time first assessed: 1927 [BF] Side: Bilateral [BF] Orientation: medial [BF] Location: coccyx [BF] Stage, Pressure Injury: Stage 2 [BF] Present On Admission : yes [BF] Type: pressure injury [BF] Recorded by:  [BF] Charmaine Santiago, RN 10/02/18 1928      User Key  (r) = Recorded By, (t) = Taken By, (c) = Cosigned By    Initials Name Effective Dates Discipline     Danielle Maldonado, LADI 03/07/18 -  PT    BF Charmaine Santiago RN 10/30/17 -  Nurse          Wound 10/02/18 1927 Bilateral medial coccyx pressure injury (Active)   Dressing Appearance dressing loose 12/11/2019  2:11 PM   Closure None 12/11/2019  2:11 PM   Base pink 12/11/2019  2:11 PM   Periwound intact;pink;blanchable 12/11/2019 12:30 AM   Periwound Temperature warm 12/11/2019 12:30 AM   Drainage Amount none 12/11/2019  2:11 PM   Care, Wound cleansed with;soap and water 12/11/2019 12:30 AM   Dressing Care, Wound dressing applied 12/11/2019 12:30 AM               PT Recommendation and Plan     Plan of Care Reviewed With: patient, spouse  Outcome Summary: pt req more assist today for mobility and amb, decr amb dist due to unsafe level of amb,knees flexed throughout; tonie up to chair after short amb; plans  SNU at [place] per wife  Outcome Measures     Row Name 12/11/19 1700             How much help from another person do you currently need...    Turning from your back to your side while in flat bed without using bedrails?  2  -JM      Moving from lying on back to sitting on the side of a flat bed without bedrails?  2  -JM      Moving to and from a bed to a chair (including a wheelchair)?  2  -JM      Standing up from a chair using your arms (e.g., wheelchair, bedside chair)?  2  -JM      Climbing 3-5 steps with a railing?  1  -JM      To walk in hospital room?  2  -JM      AM-PAC 6 Clicks Score (PT)  11  -        User Key  (r) = Recorded By, (t) = Taken By, (c) = Cosigned By    Initials Name Provider Type    Danielle Mccain PTA Physical Therapy Assistant         Time Calculation:   PT Charges     Row Name 12/11/19 0940             Time Calculation    Start Time  0925  -      Stop Time  0939  -      Time Calculation (min)  14 min  -      PT Received On  12/11/19  -KOREY      PT - Next Appointment  12/12/19  -KOREY        User Key  (r) = Recorded By, (t) = Taken By, (c) = Cosigned By    Initials Name Provider Type    Danielle Mccain PTA Physical Therapy Assistant        Therapy Charges for Today     Code Description Service Date Service Provider Modifiers Qty    49965523053 HC PT THER PROC EA 15 MIN 12/11/2019 Danielle Maldonado PTA GP 1    46642790463 HC PT THER SUPP EA 15 MIN 12/11/2019 Danielle Maldonado PTA GP 1          PT G-Codes  Outcome Measure Options: AM-PAC 6 Clicks Basic Mobility (PT)  AM-PAC 6 Clicks Score (PT): 11    Danielle Maldonado PTA  12/11/2019

## 2019-12-11 NOTE — CONSULTS
ENDOCRINOLOGY CONSULTATION    CONSULTING PHYSICIAN:  Carlos Gibson MD    REQUESTING PHYSICIAN:  Fabian Daigle MD    REASON FOR CONSULTATION:  Adrenal evaluation.    HISTORY OF PRESENT ILLNESS:  The patient is a 74-year-old male who was admitted to the hospital on 12/07/2019 who was complaining of poor appetite, generalized weakness, coughing and fever over the past week.  He is currently being treated for pneumonia with Zosyn.    On 12/07/2019, he was given Solu-Medrol 125 mcg once and on 12/08/2019, he was given prednisone 20 mg twice.  On 12/09/2019, he had random serum cortisol which was reported at 1.38 mcg/dL.  He had a Cortrosyn stimulation test done that day and was given Cortrosyn at 14:54.  Serum cortisol was drawn at 15:42 and was 12.45 mcg/dL and at 16:44 was 14.6 mcg/dL.    On 12/10/2019, he had a repeat Cortrosyn stimulation test.  Cortrosyn was given on 11:16.  A serum cortisol was drawn at 12:16 and was reported at 16.74 mcg/dL, and a serum cortisol was drawn at 12:34 and reported at 17.57 mcg/dL.  This is about 1 hour and 20 minutes after the Cortrosyn was given.    The patient had an episode of low blood pressure on 12/08/2019 and 12/09/2019 which was corrected with IV fluid infusion.  At that time, his BUN has risen to 27 from a baseline of 14.    Patient denies increase in salt craving.  He denies any change in skin pigmentation.    He has hypothyroidism.  He had a previous thyroidectomy for benign goiter.  His levothyroxine was increased to 200 mcg per day about a month before this admission.  TSH on admission is normal to 0.698 and free T4 is normal at 1.49 ng/dL.      PAST MEDICAL HISTORY:  1.  Arthritis.  2.  Bipolar disorder.  3.  COPD.  4.  Coronary artery disease and had previous myocardial infarction and two previous coronary artery bypass surgeries.  5.  He has hyperlipidemia and is on statin 40 mg per day.  He had a previous stroke with right hemiparesis and swallowing difficulty.  He  received PTA for the stroke in 2007.  6.  Previous cholecystectomy.  7.  Cataract surgery.  8.  Right hip prosthesis.  9.  Left thigh muscle biopsy.  10.  Penile skin biopsy.  11.  Previous TURP.    FAMILY HISTORY:  His mother had skin cancer.  His father had heart disease.  His brother had ruptured aortic aneurysm at age 47.    SOCIAL HISTORY:  He is .  He smoked one half pack of cigarettes per day for 50 years.  He continues to smoke.  Denies alcohol or drug abuse.    ALLERGIES:  No known drug allergies.    REVIEW OF SYSTEMS:  He is afebrile.  He denies any chills.  His appetite has improved.  He has occasional cough.  He has occasional cough.  He denies shortness of breath or chest pain.  He denies abdominal pain, melena, hematochezia.  He denies dysuria or hematuria.    PHYSICAL EXAMINATION:  VITAL SIGNS:  Blood pressure 114/66, respiratory rate 16, heart rate 64, temperature 98.4.  HEENT:  Pink conjunctivae.  Sclerae is anicteric.  No xanthelasma.  Full extraocular muscle movement.  No facial asymmetry.  Speech is fluent.  No carotid bruits.  The thyroid is not enlarged.  There is an old anterior cervical surgical scar.  CHEST:  Equal chest expansion.  No rales or wheezes.  HEART:  Regular heart rate and rhythm.  No gallop.  ABDOMEN:  Soft, nontender.  Bowel sounds are active.  EXTREMITIES:  Warm.  No cyanosis, pedal edema or clubbing.  No calf tenderness.  Light touch sensation grossly intact.    IMPRESSION AND PLANS  1.  I doubt patient has adrenal insufficiency.  His cortisol response 1 and a half hour after the Cortrosyn was given on 12/10/2019 is 17.57 mcg/dL.  The normal response during the first hour is 18 mcg/dL.  I am more inclined not to repeat the ACT stimulation test.  I think his hypotension is related to sepsis and dehydration.  2.  Patient has hypothyroidism and is adequately replaced on levothyroxine 200 mcg per day.  Patient's wife was instructed to get his thyroid function rechecked in  4-6 weeks.  Continue Zocor 40 mg per day and will recheck lipid profile in the morning.    Will defer antibiotic therapy to Dr. Daigle.    Discussed with Dr. Daigle and patient's wife.        Thank you for the referral.

## 2019-12-11 NOTE — PROGRESS NOTES
[facility]  [phone]  Pino Vivar MD    Subjective:  LOS: 4    Denies resp complaints. Worked with PT today. Having bed sore from wt loss and sitting in bed.    Objective   Vital Signs past 24hrs    Temp range: Temp (24hrs), Av.6 °F (36.4 °C), Min:97.3 °F (36.3 °C), Max:98.4 °F (36.9 °C)    BP range: BP: (109-131)/(65-90) 114/66  Pulse range: Heart Rate:  [45-65] 63  Resp rate range: Resp:  [14-18] 16    Device (Oxygen Therapy): room air   Oxygen range:SpO2:  [95 %-99 %] 97 %      66.6 kg (146 lb 14.4 oz); Body mass index is 20.49 kg/m².    Intake/Output Summary (Last 24 hours) at 2019 1600  Last data filed at 2019 1318  Gross per 24 hour   Intake 450 ml   Output --   Net 450 ml       Physical Exam   HENT:   Head: Normocephalic.   Eyes: Pupils are equal, round, and reactive to light.   Cardiovascular: Normal rate and regular rhythm.   No murmur heard.  Pulmonary/Chest: He has decreased breath sounds. He has no wheezes. He has no rales.   Abdominal: Soft. Bowel sounds are normal. There is no tenderness.   Musculoskeletal: He exhibits no edema.   Neurological: He is alert.   Nursing note and vitals reviewed.    Results Review:    I have reviewed the laboratory and imaging data since the last note by [facility] physician.  My annotations are noted in assessment and plan.    Medication Review:  I have reviewed the current MAR.  My annotations are noted in assessment and plan.       Plan   PCCM Problems  Relative hypotension, sepsis, adrenal insufficiency  Right upper lobe pneumonia  Dysphagia  UTI  Parkinson's disease  COPD without exacerbation  Lactic acidosis, improved  Relevant Medical Diagnoses  History CVA  Electrolyte imbalance      Plan of Treatment  Relative hypotension with sepsis and adrenal insufficiency based on low cortisol levels on admission.  Note plans for endocrine consult.    Continue with Zosyn as very likely aspiration resulting in pneumonia.    Dysphagia likely from  Parkinson's disease.  Note input by speech. I would prefer that a VFSS or FEES be performed.    COPD without exacerbation.  Currently on duo nebs 4 times a day which is adequate.  Systemic steroids not required at this point from the point of COPD.    Pino Vivar MD  12/11/19  4:00 PM    Part of this note may be an electronic transcription/translation of spoken language to printed text using the Dragon Dictation System.

## 2019-12-11 NOTE — PLAN OF CARE
Pt has had no c/o of pain this shift. Tolerating all oral meds. IV phosphorus replaced this shift according to protocol. Endocrine re-consulted, and still waiting to see patient. Speech consulted and will plan for video swallow tomorrow in AM. Q2 turns. VSS. Will continue to monitor.

## 2019-12-11 NOTE — PROGRESS NOTES
Name: Victor Manuel Issa ADMIT: 2019   : 1945  PCP: Provider, No Known    MRN: [id number] LOS: 4 days   AGE/SEX: 74 y.o. male  ROOM: [facility]     Subjective   Subjective   CC: generalized weakness/dyspnea/cough/confusion  No acute events.  Patient has no new complaints.  Productive cough continues. Taking PO.  No CP/f/c/n/v/d.      Objective   Objective   Vital Signs  Temp:  [97.3 °F (36.3 °C)-98.4 °F (36.9 °C)] 98.4 °F (36.9 °C)  Heart Rate:  [45-65] 63  Resp:  [14-18] 16  BP: (109-131)/(65-90) 114/66  SpO2:  [95 %-99 %] 97 %  on   ;   Device (Oxygen Therapy): room air  Body mass index is 20.49 kg/m².  Physical Exam   Constitutional: No distress.   HENT:   Head: Normocephalic and atraumatic.   Mouth/Throat: Oropharynx is clear and moist.   Eyes: Pupils are equal, round, and reactive to light. Conjunctivae and EOM are normal.   Neck: Normal range of motion. Neck supple.   Cardiovascular: Normal rate, regular rhythm and intact distal pulses.   Pulmonary/Chest: Effort normal. He has decreased breath sounds in the right lower field and the left lower field. He has no wheezes. He has rhonchi in the right middle field. He has no rales.   Abdominal: Soft. Bowel sounds are normal. There is no tenderness.   Musculoskeletal: He exhibits no edema or tenderness.   Neurological: He is alert. No cranial nerve deficit.   Skin: Skin is warm and dry. Capillary refill takes less than 2 seconds. He is not diaphoretic.   Psychiatric: He has a normal mood and affect. His behavior is normal.   Nursing note and vitals reviewed.    Results Review:       I reviewed the patient's new clinical results.  Results from last 7 days   Lab Units 19  0627 12/10/19  0628 19  0548 19  0726   WBC 10*3/mm3 7.43 8.24 13.87* 12.84*   HEMOGLOBIN g/dL 10.2* 9.8* 10.0* 11.6*   PLATELETS 10*3/mm3 212 160 191 189     Results from last 7 days   Lab Units 19  0627 12/10/19  0628 19  0547 19  0726   SODIUM  mmol/L 143 145 140 141   POTASSIUM mmol/L 4.1 4.2 4.5 4.6   CHLORIDE mmol/L 111* 114* 108* 106   CO2 mmol/L 23.0 22.1 21.9* 22.5   BUN mg/dL 11 14 27* 26*   CREATININE mg/dL 0.70* 0.69* 0.71* 0.98   GLUCOSE mg/dL 113* 90 155* 153*   Estimated Creatinine Clearance: 74 mL/min (A) (by C-G formula based on SCr of 0.7 mg/dL (L)).  Results from last 7 days   Lab Units 12/08/19  0726   ALBUMIN g/dL 3.10*   BILIRUBIN mg/dL 0.3   ALK PHOS U/L 79   AST (SGOT) U/L 6   ALT (SGPT) U/L 7     Results from last 7 days   Lab Units 12/11/19  0627 12/10/19  0628 12/09/19  0547 12/08/19  1001 12/08/19  0726 12/07/19  1816   CALCIUM mg/dL 9.0 9.1 9.4  --  9.7 10.1   ALBUMIN g/dL  --   --   --   --  3.10*  --    MAGNESIUM mg/dL  --   --   --   --   --  1.8   PHOSPHORUS mg/dL 2.1*  --   --  3.2  --  1.8*     Results from last 7 days   Lab Units 12/09/19  0548 12/09/19  0547 12/09/19  0124 12/08/19  2121 12/07/19  1817 12/07/19  1816   PROCALCITONIN ng/mL  --  0.12  --  0.13  --  0.09*   LACTATE mmol/L 1.9  --  3.5* 6.0* 1.1  --      No results found for: HGBA1C, POCGLU      aspirin 325 mg Oral Daily   atorvastatin 20 mg Oral Daily   carbidopa-levodopa 1 tablet Oral TID With Meals   divalproex 500 mg Oral BID   donepezil 20 mg Oral Nightly   folic acid 1 mg Oral Daily   hydrocortisone-bacitracin-zinc oxide-nystatin 1 application Topical BID   ipratropium-albuterol 3 mL Nebulization 4x Daily - RT   levothyroxine 175 mcg Oral Q AM   montelukast 10 mg Oral Nightly   OLANZapine 10 mg Oral Nightly   oxybutynin XL 5 mg Oral BID   pantoprazole 40 mg Oral BID AC   piperacillin-tazobactam 3.375 g Intravenous Q8H   polyethylene glycol 17 g Oral Daily   senna-docusate sodium 2 tablet Oral BID   sodium chloride 10 mL Intravenous Q12H      Diet Regular; Thin       Assessment/Plan     Active Hospital Problems    Diagnosis  POA   • Sepsis due to pneumonia (CMS/HCC) [J18.9, A41.9]  Yes   • Sepsis with metabolic encephalopathy (CMS/HCC) [A41.9, R65.20,  G93.41]  Yes   • Hypotension [I95.9]  No   • Pneumonia of right upper lobe due to infectious organism (CMS/HCC) [J18.1]  Yes   • COPD exacerbation (CMS/HCC) [J44.1]  Yes   • Hypophosphatemia [E83.39]  Yes   • Hypothyroidism (acquired) [E03.9]  Yes   • Parkinson's disease [G20]  Yes   • Debility [R53.81]  Yes   • Coronary artery disease [I25.10]  Yes   • History of CVA (cerebrovascular accident) [Z86.73]  Not Applicable      Resolved Hospital Problems   No resolved problems to display.   Sepsis due to PNA  - leukocytosis, hypotension, lactic acidosis  - possible aspiration PNA, continue zosyn  - CTA chest reviewed, shows developing RUL PNA, no PE  - SLIV  - SLP has seen, recommending regular textures/thin liquids/no straws-I have asked RN to contact them regarding VFSS/FEES  - urine culture has been sent and is growing pseudomonas-I am unsure of the significance of this as this may be a colonization-he has no urinary symptoms but was recently treated outpatient for a UTI with macrobid  - appreciate LPC recs    Hypotension  - due to above  - cortisol at the time was inappropriately low-follow up cosyntropin stimulation test which was more properly timed shows better response but this is borderline low-I have asked endocrinology to evaluate as he may have relative adrenal insufficiency  - BP has improved with treatment of the sepsis    COPD  - no exacerbation  - continue bronchodilators    Hypothyroidism  - continue synthroid  - TSH is okay    Confusion  - metabolic encephalopathy due to sepsis, resolved  - likely metabolic encephalopathy from pulmonary process  - B12, TSH okay    Folic Acid Deficiency  - replace orally    Asymptomatic Sinus Bradycardia  - able to increase heart rate with activity  - appreciate cardiology recs-no further intervention at this time    SCDs for DVT prophylaxis.  Full code.  Discussed with patient, family and nursing staff.  Disposition [facility] tomorrow if okay with  all.      Fabian Daigle MD  [facility]  12/11/19  3:42 PM

## 2019-12-11 NOTE — PLAN OF CARE
Problem: Patient Care Overview  Goal: Plan of Care Review  Outcome: Ongoing (interventions implemented as appropriate)  Flowsheets (Taken 12/11/2019 5912)  Plan of Care Reviewed With: patient; spouse  Outcome Summary: pt req more assist today for mobility and amb, decr amb dist due to unsafe level of amb,knees flexed throughout; tonie up to chair after short amb; plans SNU at [place] per wife

## 2019-12-11 NOTE — PLAN OF CARE
Problem: Patient Care Overview  Goal: Plan of Care Review  Outcome: Ongoing (interventions implemented as appropriate)  Flowsheets  Taken 12/11/2019 0543  Progress: improving  Outcome Summary: Pt slept throughout the night with no complaints. BP's went up, IVF's D/C. Q2 turns and dressing on coccyx changed. Called consult for endocrinology. VSS, will continue to monitor  Taken 12/11/2019 0030  Plan of Care Reviewed With: patient

## 2019-12-12 NOTE — THERAPY TREATMENT NOTE
Acute Care - Physical Therapy Treatment Note  [facility]     Patient Name: Victor Manuel Issa  : 1945  MRN: [id number]  Today's Date: 2019             Admit Date: 2019    Visit Dx:    ICD-10-CM ICD-9-CM   1. COPD exacerbation (CMS/HCC) J44.1 491.21   2. Hypophosphatemia E83.39 275.3   3. Generalized weakness R53.1 780.79     Patient Active Problem List   Diagnosis   • Thigh pain, musculoskeletal   • Left leg weakness   • History of CVA (cerebrovascular accident)   • COPD (chronic obstructive pulmonary disease)   • Coronary artery disease   • Cervical myelopathy (CMS/[facility])   • Parkinson's disease   • Debility   • Cerebrovascular disease   • Abnormal TSH   • Hypothyroidism (acquired)   • Hip fracture requiring operative repair, right, closed, initial encounter (CMS/[facility])   • Urinary tract infection without hematuria   • Hypoxia   • Sinus bradycardia   • Chest pain   • Hip pain, chronic, right   • Hematemesis   • Hematemesis with nausea   • H/O medication noncompliance   • Hyperlipidemia   • Muscle spasticity   • COPD exacerbation (CMS/[facility])   • Hypophosphatemia   • Pneumonia of right upper lobe due to infectious organism (CMS/[facility])   • Sepsis due to pneumonia (CMS/[facility])   • Sepsis with metabolic encephalopathy (CMS/[facility])   • Hypotension       Therapy Treatment    Rehabilitation Treatment Summary     Row Name 19 1700             Treatment Time/Intention    Discipline  physical therapy assistant  -      Document Type  therapy note (daily note)  -KOREY      Subjective Information  complains of;weakness;fatigue;pain  -KOREY      Mode of Treatment  individual therapy;physical therapy  -      Care Plan Review  patient/other agree to care plan  -      Care Plan Review, Other Participant(s)  spouse  -KOREY      Comment  pt w/incr lethargy ; very little conversation, easily distracted off task  -KOREY      Existing Precautions/Restrictions  fall;oxygen therapy device and L/min  -KOREY      Recorded by [KOREY] Joel  Danielle, [facility] 12/12/19 1724      Row Name 12/12/19 1700             Bed Mobility Assessment/Treatment    Supine-Sit [place] (Bed Mobility)  2 person assist;moderate assist (50% patient effort);maximum assist (25% patient effort)  -      Sit-Supine [place] (Bed Mobility)  2 person assist;maximum assist (25% patient effort)  -      Bed Mobility, Safety Issues  cognitive deficits limit understanding;decreased use of arms for pushing/pulling;decreased use of legs for bridging/pushing;impaired trunk control for bed mobility  -      Assistive Device (Bed Mobility)  bed rails;draw sheet  -      Comment (Bed Mobility)  post lean, req MOD assist at times due to inattention  -      Recorded by [] Danielle Maldonado, PTA 12/12/19 1724      Row Name 12/12/19 1700             Sit-Stand Transfer    Sit-Stand [place] (Transfers)  2 person assist;maximum assist (25% patient effort) slight incr in knee ext today, but only able to stand 20 sec  -      Assistive Device (Sit-Stand Transfers)  walker, front-wheeled  -      Recorded by [JM] Danielle Maldonado, PTA 12/12/19 1724      Row Name 12/12/19 1700             Gait/Stairs Assessment/Training    Comment (Gait/Stairs)  unsafe to attempt today,sitting bal req MOD assist to maintain  -      Recorded by [] Danielle Maldonado, [facility] 12/12/19 1724      Row Name 12/12/19 1700             Therapeutic Exercise    Lower Extremity (Therapeutic Exercise)  LAQ (long arc quad), bilateral assist to incr knee ext bilat  -      Sets/Reps (Therapeutic Exercise)  7 reps, pt lost focus  -      Comment (Therapeutic Exercise)  worked on trunk str w/ANT/ POST lean assisted, cerv rotation exer, head upright, elbow push-ups x5 reps  -      Recorded by [JM] Danielle Maldonado, PTA 12/12/19 1724      Row Name 12/12/19 1700             Positioning and Restraints    Pre-Treatment Position  in bed  -      In Bed  fowlers;call light within reach;encouraged to call for assist;exit  alarm on;with family/caregiver;with nsg pt needed brief change and new draw sheet  -JM      Recorded by [] Danielle Maldonado, PTA 12/12/19 1724      Row Name 12/12/19 1700             Pain Scale 2: Numbers Pre/Post-Treatment    Pain Location 2  back  -JM      Pain Intervention(s) 2  Repositioned  -JM      Recorded by [] Danielle Maldonado, PTA 12/12/19 1724      Row Name 12/12/19 1700             Pain Scale 2: Word Pre/Post-Treatment    Pain 2: Word Scale, Post-Treatment  4 - moderate pain  -JM      Recorded by [] Danielle Maldonado, PTA 12/12/19 1724      Row Name                Wound 10/02/18 1927 Bilateral medial coccyx pressure injury    Wound - Properties Group Date first assessed: 10/02/18 [BF] Time first assessed: 1927 [BF] Side: Bilateral [BF] Orientation: medial [BF] Location: coccyx [BF] Stage, Pressure Injury: Stage 2 [BF] Present On Admission : yes [BF] Type: pressure injury [BF] Recorded by:  [BF] Charmaine Santiago RN 10/02/18 1928      User Key  (r) = Recorded By, (t) = Taken By, (c) = Cosigned By    Initials Name Effective Dates Discipline     Danielle Maldonado, LADI 03/07/18 -  PT    BF Charmaine Santiago RN 10/30/17 -  Nurse          Wound 10/02/18 1927 Bilateral medial coccyx pressure injury (Active)   Dressing Appearance dressing loose 12/12/2019 12:25 AM   Closure None 12/12/2019 12:25 AM   Base pink 12/12/2019 12:25 AM   Periwound intact;pink;blanchable 12/12/2019 12:25 AM   Periwound Temperature warm 12/12/2019 12:25 AM   Drainage Amount none 12/12/2019 12:25 AM   Care, Wound cleansed with;soap and water 12/12/2019 12:25 AM   Dressing Care, Wound dressing applied 12/12/2019 12:25 AM       Rehab Goal Summary     Row Name 12/12/19 1040             Oral Nutrition/Hydration Goal 1 (SLP)    Oral Nutrition/Hydration Goal 1, SLP  Tolerate least restrictive diet with no overt s/s aspiration.   -OC      Time Frame (Oral Nutrition/Hydration Goal 1, SLP)  by discharge  -OC        User Key  (r) =  Recorded By, (t) = Taken By, (c) = Cosigned By    Initials Name Provider Type Discipline    Saray Sherman MA,CCC-SLP Speech and Language Pathologist SLP              PT Recommendation and Plan     Plan of Care Reviewed With: patient, spouse  Outcome Summary: pt more lethargic this visit, difficulty staying on task, unsafe to amb this visit, educ and exer on trunk control and posture, plans SNU at DC  Outcome Measures     Row Name 12/12/19 1700 12/11/19 1700          How much help from another person do you currently need...    Turning from your back to your side while in flat bed without using bedrails?  2  -JM  2  -JM     Moving from lying on back to sitting on the side of a flat bed without bedrails?  2  -JM  2  -JM     Moving to and from a bed to a chair (including a wheelchair)?  1  -JM  2  -JM     Standing up from a chair using your arms (e.g., wheelchair, bedside chair)?  2  -JM  2  -JM     Climbing 3-5 steps with a railing?  1  -JM  1  -JM     To walk in hospital room?  1  -JM  2  -JM     AM-PAC 6 Clicks Score (PT)  9  -JM  11  -       User Key  (r) = Recorded By, (t) = Taken By, (c) = Cosigned By    Initials Name Provider Type    Danielle Mccain PTA Physical Therapy Assistant         Time Calculation:   PT Charges     Row Name 12/12/19 1715             Time Calculation    Start Time  1631  -      Stop Time  1655  -      Time Calculation (min)  24 min  -      PT Received On  12/12/19  -KOREY      PT - Next Appointment  12/13/19  -KOREY        User Key  (r) = Recorded By, (t) = Taken By, (c) = Cosigned By    Initials Name Provider Type    Danielle Mccain PTA Physical Therapy Assistant        Therapy Charges for Today     Code Description Service Date Service Provider Modifiers Qty    96283268978 HC PT THER PROC EA 15 MIN 12/11/2019 Danielle Maldonado PTA GP 1    80402203519 HC PT THER SUPP EA 15 MIN 12/11/2019 Danielle Maldonado PTA GP 1    33223489995 HC PT THER PROC EA 15 MIN 12/12/2019 Joel  LADI Santos GP 2    60982055570  PT THER SUPP EA 15 MIN 12/12/2019 Danielle Maldonado PTA GP 2          PT G-Codes  Outcome Measure Options: AM-PAC 6 Clicks Basic Mobility (PT)  AM-PAC 6 Clicks Score (PT): 9    Danielle Maldonado PTA  12/12/2019

## 2019-12-12 NOTE — PLAN OF CARE
Problem: Patient Care Overview  Goal: Plan of Care Review  Outcome: Ongoing (interventions implemented as appropriate)  Flowsheets (Taken 12/12/2019 1045)  Plan of Care Reviewed With: patient  Note:   VFSS completed. Recommend downgrade to honey thick liquids with regular textures. Meds whole with honey thick or puree or crushed with puree. Water protocol ok. Recommend upright, NO STRAWS, slow rate, multiple swallows (3) per bolus, alternate liquids/solids. Recommend re-eval as FEES (after 2-4 weeks dysphagia therapy) secondary to posterior residue throughout eval. During VFSS shadowing in posterior laryngeal vestibule, difficult to r/o posterior aspiration.  ST to follow for education and dysphagia therapy. Recommend continued ST at next level of care.

## 2019-12-12 NOTE — PROGRESS NOTES
[facility]  [phone]  Pino Vivar MD    Subjective:  LOS: 5    He just is back from SS.  He was downgraded on diet consistency.  He denies any distress.  No cough or wheezing.  His overall respiratory status actually seems to be doing quite well.  He had a drop in his blood pressures last night.    Objective   Vital Signs past 24hrs    Temp range: Temp (24hrs), Av.1 °F (36.7 °C), Min:97.8 °F (36.6 °C), Max:98.4 °F (36.9 °C)    BP range: BP: ()/(44-66) 98/44  Pulse range: Heart Rate:  [51-68] 61  Resp rate range: Resp:  [16-20] 20    Device (Oxygen Therapy): room airFlow (L/min):  [1] 1  Oxygen range:SpO2:  [91 %-98 %] 95 %      66.6 kg (146 lb 14.4 oz); Body mass index is 20.49 kg/m².    Intake/Output Summary (Last 24 hours) at 2019 1134  Last data filed at 2019 1816  Gross per 24 hour   Intake 600 ml   Output --   Net 600 ml       Physical Exam   HENT:   Head: Normocephalic.   Eyes: Pupils are equal, round, and reactive to light.   Cardiovascular: Normal rate and regular rhythm.   No murmur heard.  Pulmonary/Chest: He has decreased breath sounds. He has no wheezes. He has no rales.   Abdominal: Soft. Bowel sounds are normal. There is no tenderness.   Musculoskeletal: He exhibits no edema.   Neurological: He is alert.   Nursing note and vitals reviewed.    Results Review:    I have reviewed the laboratory and imaging data since the last note by [facility] physician.  My annotations are noted in assessment and plan.    Medication Review:  I have reviewed the current MAR.  My annotations are noted in assessment and plan.       Plan   PCCM Problems  Relative hypotension, sepsis, adrenal insufficiency  Right upper lobe pneumonia  Dysphagia  UTI  Parkinson's disease  COPD without exacerbation  Lactic acidosis, improved  Relevant Medical Diagnoses  History CVA  Electrolyte imbalance      Plan of Treatment  Relative hypotension.  Overnight blood pressure is lower.  At this point I do not  see much evidence for ongoing respiratory infection as causing sepsis or lowering of blood pressures.  Other etiologies or infection sources should be looked for.  I will obtain a chest x-ray to confirm that his chest findings are not much worse than before.    Continue with Zosyn as very likely aspiration resulting in pneumonia.    Dysphagia likely from Parkinson's disease.  Note input by speech.  His VFSS shows posterior residue and he was downgraded on diet consistency.  He will need a FEES after 2-4 weeks of dysphagia therapy.    COPD without exacerbation.  Currently on duo nebs 4 times a day which is adequate.  Systemic steroids not required at this point from the point of COPD.    Pino Vivar MD  12/12/19  11:34 AM    Part of this note may be an electronic transcription/translation of spoken language to printed text using the Dragon Dictation System.

## 2019-12-12 NOTE — PROGRESS NOTES
Name: Victor Manuel Issa ADMIT: 2019   : 1945  PCP: Provider, No Known    MRN: [id number] LOS: 5 days   AGE/SEX: 74 y.o. male  ROOM: 10/     Subjective   Subjective   CC: generalized weakness/dyspnea/cough/confusion  Patient had low BP overnight and received IVF bolus.  Patient has no new complaints.  Productive cough continues. Had VFSS today. Taking PO.  No CP/f/c/n/v/d. He is not dizzy or light-headed. He is a little more tired today but did not sleep as well last night due to interventions related to hypotension.    Objective   Objective   Vital Signs  Temp:  [97.7 °F (36.5 °C)-98.4 °F (36.9 °C)] 97.7 °F (36.5 °C)  Heart Rate:  [51-72] 66  Resp:  [16-20] 18  BP: ()/(44-60) 90/52  SpO2:  [91 %-98 %] 95 %  on  Flow (L/min):  [1] 1;   Device (Oxygen Therapy): room air  Body mass index is 20.49 kg/m².  Physical Exam   Constitutional: No distress.   HENT:   Head: Normocephalic and atraumatic.   Mouth/Throat: Oropharynx is clear and moist.   Eyes: Pupils are equal, round, and reactive to light. Conjunctivae and EOM are normal.   Neck: Normal range of motion. Neck supple.   Cardiovascular: Normal rate, regular rhythm and intact distal pulses.   Pulmonary/Chest: Effort normal. He has decreased breath sounds in the right lower field and the left lower field. He has no wheezes. He has rhonchi in the right middle field. He has no rales.   Abdominal: Soft. Bowel sounds are normal. There is no tenderness.   Musculoskeletal: He exhibits no edema or tenderness.   Neurological: He is alert. No cranial nerve deficit.   Skin: Skin is warm and dry. Capillary refill takes less than 2 seconds. He is not diaphoretic.   Psychiatric: He has a normal mood and affect. His behavior is normal.   Nursing note and vitals reviewed.    Results Review:       I reviewed the patient's new clinical results.  Results from last 7 days   Lab Units 19  0651 19  0627 12/10/19  0628 19  0548   WBC 10*3/mm3  8.30 7.43 8.24 13.87*   HEMOGLOBIN g/dL 10.7* 10.2* 9.8* 10.0*   PLATELETS 10*3/mm3 193 212 160 191     Results from last 7 days   Lab Units 12/12/19  0651 12/11/19  0627 12/10/19  0628 12/09/19  0547   SODIUM mmol/L 140 143 145 140   POTASSIUM mmol/L 4.2 4.1 4.2 4.5   CHLORIDE mmol/L 108* 111* 114* 108*   CO2 mmol/L 22.8 23.0 22.1 21.9*   BUN mg/dL 9 11 14 27*   CREATININE mg/dL 0.69* 0.70* 0.69* 0.71*   GLUCOSE mg/dL 121* 113* 90 155*   Estimated Creatinine Clearance: 74 mL/min (A) (by C-G formula based on SCr of 0.69 mg/dL (L)).  Results from last 7 days   Lab Units 12/08/19  0726   ALBUMIN g/dL 3.10*   BILIRUBIN mg/dL 0.3   ALK PHOS U/L 79   AST (SGOT) U/L 6   ALT (SGPT) U/L 7     Results from last 7 days   Lab Units 12/12/19  0651 12/11/19  0627 12/10/19  0628 12/09/19  0547 12/08/19  1001 12/08/19  0726 12/07/19  1816   CALCIUM mg/dL 8.9 9.0 9.1 9.4  --  9.7 10.1   ALBUMIN g/dL  --   --   --   --   --  3.10*  --    MAGNESIUM mg/dL  --   --   --   --   --   --  1.8   PHOSPHORUS mg/dL 2.5 2.1*  --   --  3.2  --  1.8*     Results from last 7 days   Lab Units 12/09/19  0548 12/09/19  0547 12/09/19  0124 12/08/19  2121 12/07/19  1817 12/07/19  1816   PROCALCITONIN ng/mL  --  0.12  --  0.13  --  0.09*   LACTATE mmol/L 1.9  --  3.5* 6.0* 1.1  --      No results found for: HGBA1C, POCGLU      aspirin 325 mg Oral Daily   atorvastatin 20 mg Oral Daily   carbidopa-levodopa 1 tablet Oral TID With Meals   divalproex 500 mg Oral BID   donepezil 20 mg Oral Nightly   folic acid 1 mg Oral Daily   hydrocortisone-bacitracin-zinc oxide-nystatin 1 application Topical BID   ipratropium-albuterol 3 mL Nebulization 4x Daily - RT   levothyroxine 200 mcg Oral Q AM   midodrine 2.5 mg Oral TID AC   montelukast 10 mg Oral Nightly   OLANZapine 5 mg Oral Nightly   oxybutynin XL 5 mg Oral BID   pantoprazole 40 mg Oral BID AC   piperacillin-tazobactam 3.375 g Intravenous Q8H   polyethylene glycol 17 g Oral Daily   senna-docusate sodium 2  tablet Oral BID   sodium chloride 10 mL Intravenous Q12H      Diet Regular; Honey Thick       Assessment/Plan     Active Hospital Problems    Diagnosis  POA   • Sepsis due to pneumonia (CMS/HCC) [J18.9, A41.9]  Yes   • Sepsis with metabolic encephalopathy (CMS/HCC) [A41.9, R65.20, G93.41]  Yes   • Hypotension [I95.9]  No   • Pneumonia of right upper lobe due to infectious organism (CMS/HCC) [J18.1]  Yes   • COPD exacerbation (CMS/HCC) [J44.1]  Yes   • Hypophosphatemia [E83.39]  Yes   • Hypothyroidism (acquired) [E03.9]  Yes   • Parkinson's disease [G20]  Yes   • Debility [R53.81]  Yes   • Coronary artery disease [I25.10]  Yes   • History of CVA (cerebrovascular accident) [Z86.73]  Not Applicable      Resolved Hospital Problems   No resolved problems to display.   Sepsis due to PNA  - leukocytosis, hypotension, lactic acidosis  - possible aspiration PNA, continue zosyn  - CTA chest reviewed, shows developing RUL PNA, no PE  - had VFSS 12/12/19, SLP recommending regular textures with honey-thick liquids and no straws  - urine culture grew pseudomonas-I am unsure of the significance of this as this may be a colonization-he has no urinary symptoms but was recently treated outpatient for a UTI with macrobid-either way he is on day 5 of zosyn to which this is sensitive  - appreciate LPC recs    Hypotension  - due to above  - cortisol at the time was inappropriately low and had blunted responses on cosyntropin stimulation tests though there were timing issues  - I d/w Dr. Gibson yesterday and he does not think he has adrenal insufficiency  - BP improves when he gets IVF but mild hypotension is recurrent-I have reviewed previous notes and he has had some of this in the past-he gets light-headed occasionally on standing-combined with his bradycardia and history of Parkinson's I suspect he could have some autonomic neuropathy plus he is on sinemet-I doubt untreated/new infection given his treatment with zosyn and improving  respiratory symptoms as well as no urinary or GI symptoms-no e/o heart failure and he is taken 6300cc between IV and PO this hospitalization  - will start on low-dose midodrine and follow response    COPD  - no exacerbation  - continue bronchodilators    Hypothyroidism  - continue synthroid  - TSH is okay    Confusion  - metabolic encephalopathy due to sepsis, resolved  - likely metabolic encephalopathy from pulmonary process  - B12, TSH okay    Folic Acid Deficiency  - replace orally    Asymptomatic Sinus Bradycardia  - able to increase heart rate with activity  - appreciate cardiology recs-no further intervention at this time    Bipolar disorder  - takes zyprexa 10mg nightly which has previously been reduced from 20mg  - with weight loss over time and daytime sleepiness will decrease to 5mg nightly    SCDs for DVT prophylaxis.  Full code.  Discussed with patient, family and nursing staff.  Disposition [facility] tomorrow if BP improves and okay with all      Fabian Daigle MD  [facility]  12/12/19  5:02 PM

## 2019-12-12 NOTE — PROGRESS NOTES
"  ENDOCRINE    Subjective   AND PLANS  Victor Manuel Issa is a 74 y.o. male.     Follow-up adrenal    No nausea or vomiting.  Serum sodium normal.  Serum cortisol 1-1/2 hours after Cortrosyn is 17.57 mcg per DL.  Normal cortisol response is at least 18 mcg per DL within the first hour after Cortrosyn.  Doubt patient has adrenal insufficiency.    Patient has parkinsonism and is using Sinemet.  Both of which can cause orthostatic hypotension.  This was discussed with patient's wife.    Continue levothyroxine 200 mcg/day.  Repeat thyroid function tests in 4 to 6 weeks and reevaluate.    Objective   BP 90/52 (BP Location: Right arm, Patient Position: Lying)   Pulse 66   Temp 97.7 °F (36.5 °C) (Oral)   Resp 18   Ht 180.3 cm (71\")   Wt 66.6 kg (146 lb 14.4 oz)   SpO2 95%   BMI 20.49 kg/m²   Physical Exam    Awake, alert, not in distress.  Regular heart rate and rhythm.  No rales or wheezes  Abdomen soft, nontender.  Extremities warm.  No cyanosis or pedal edema.    Lab Results (last 24 hours)     Procedure Component Value Units Date/Time    Lipid Panel [[id number]]  (Abnormal) Collected:  12/12/19 0651    Specimen:  Blood Updated:  12/12/19 0918     Total Cholesterol 111 mg/dL      Triglycerides 133 mg/dL      HDL Cholesterol 24 mg/dL      LDL Cholesterol  60 mg/dL      VLDL Cholesterol 26.6 mg/dL      LDL/HDL Ratio 2.52    Narrative:       Cholesterol Reference Ranges  (U.S. Department of Health and Human Services ATP III Classifications)    Desirable          <200 mg/dL  Borderline High    200-239 mg/dL  High Risk          >240 mg/dL      Triglyceride Reference Ranges  (U.S. Department of Health and Human Services ATP III Classifications)    Normal           <150 mg/dL  Borderline High  150-199 mg/dL  High             200-499 mg/dL  Very High        >500 mg/dL    HDL Reference Ranges  (U.S. Department of Health and Human Services ATP III Classifcations)    Low     <40 mg/dl (major risk factor for CHD)  High    >60 " mg/dl ('negative' risk factor for CHD)        LDL Reference Ranges  (U.S. Department of Health and Human Services ATP III Classifcations)    Optimal          <100 mg/dL  Near Optimal     100-129 mg/dL  Borderline High  130-159 mg/dL  High             160-189 mg/dL  Very High        >189 mg/dL    Basic Metabolic Panel [[id number]]  (Abnormal) Collected:  12/12/19 0651    Specimen:  Blood Updated:  12/12/19 0747     Glucose 121 mg/dL      BUN 9 mg/dL      Creatinine 0.69 mg/dL      Sodium 140 mmol/L      Potassium 4.2 mmol/L      Chloride 108 mmol/L      CO2 22.8 mmol/L      Calcium 8.9 mg/dL      eGFR Non African Amer 111 mL/min/1.73      BUN/Creatinine Ratio 13.0     Anion Gap 9.2 mmol/L     Narrative:       GFR Normal >60  Chronic Kidney Disease <60  Kidney Failure <15      Phosphorus [[id number]]  (Normal) Collected:  12/12/19 0651    Specimen:  Blood Updated:  12/12/19 0747     Phosphorus 2.5 mg/dL     CBC & Differential [[id number]] Collected:  12/12/19 0651    Specimen:  Blood Updated:  12/12/19 0717    Narrative:       The following orders were created for panel order CBC & Differential.  Procedure                               Abnormality         Status                     ---------                               -----------         ------                     CBC Auto Differential[[id number]]        Abnormal            Final result                 Please view results for these tests on the individual orders.    CBC Auto Differential [[id number]]  (Abnormal) Collected:  12/12/19 0651    Specimen:  Blood Updated:  12/12/19 0717     WBC 8.30 10*3/mm3      RBC 3.36 10*6/mm3      Hemoglobin 10.7 g/dL      Hematocrit 31.9 %      MCV 94.9 fL      MCH 31.8 pg      MCHC 33.5 g/dL      RDW 12.2 %      RDW-SD 42.3 fl      MPV 9.7 fL      Platelets 193 10*3/mm3      Neutrophil % 58.1 %      Lymphocyte % 29.2 %      Monocyte % 5.7 %      Eosinophil % 4.6 %      Basophil % 0.6 %      Immature Grans % 1.8 %      Neutrophils,  Absolute 4.83 10*3/mm3      Lymphocytes, Absolute 2.42 10*3/mm3      Monocytes, Absolute 0.47 10*3/mm3      Eosinophils, Absolute 0.38 10*3/mm3      Basophils, Absolute 0.05 10*3/mm3      Immature Grans, Absolute 0.15 10*3/mm3      nRBC 0.0 /100 WBC     Blood Culture - Blood, Wrist, Left [[id number]] Collected:  12/07/19 1815    Specimen:  Blood from Wrist, Left Updated:  12/11/19 1845     Blood Culture No growth at 4 days    Blood Culture - Blood, Wrist, Right [[id number]] Collected:  12/07/19 1815    Specimen:  Blood from Wrist, Right Updated:  12/11/19 1845     Blood Culture No growth at 4 days              History of CVA (cerebrovascular accident)    Coronary artery disease    Parkinson's disease    Debility    Hypothyroidism (acquired)    COPD exacerbation (CMS/HCC)    Hypophosphatemia    Pneumonia of right upper lobe due to infectious organism (CMS/HCC)    Sepsis due to pneumonia (CMS/HCC)    Sepsis with metabolic encephalopathy (CMS/HCC)    Hypotension    Continue levothyroxine 200 mcg a day.  Patient does not require corticosteroid.  Watch out for orthostatic hypotension due to Parkinson's and Sinemet.

## 2019-12-12 NOTE — MBS/VFSS/FEES
Acute Care - Speech Language Pathology   Swallow Initial Evaluation [facility]     Patient Name: Victor Manuel Issa  : 1945  MRN: [id number]  Today's Date: 2019               Admit Date: 2019    Visit Dx:     ICD-10-CM ICD-9-CM   1. COPD exacerbation (CMS/HCC) J44.1 491.21   2. Hypophosphatemia E83.39 275.3   3. Generalized weakness R53.1 780.79     Patient Active Problem List   Diagnosis   • Thigh pain, musculoskeletal   • Left leg weakness   • History of CVA (cerebrovascular accident)   • COPD (chronic obstructive pulmonary disease)   • Coronary artery disease   • Cervical myelopathy (CMS/HCC)   • Parkinson's disease   • Debility   • Cerebrovascular disease   • Abnormal TSH   • Hypothyroidism (acquired)   • Hip fracture requiring operative repair, right, closed, initial encounter (CMS/[facility])   • Urinary tract infection without hematuria   • Hypoxia   • Sinus bradycardia   • Chest pain   • Hip pain, chronic, right   • Hematemesis   • Hematemesis with nausea   • H/O medication noncompliance   • Hyperlipidemia   • Muscle spasticity   • COPD exacerbation (CMS/HCC)   • Hypophosphatemia   • Pneumonia of right upper lobe due to infectious organism (CMS/HCC)   • Sepsis due to pneumonia (CMS/HCC)   • Sepsis with metabolic encephalopathy (CMS/[facility])   • Hypotension     Past Medical History:   Diagnosis Date   • Anxiety    • Arthritis    • Bipolar 1 disorder (CMS/HCC)    • Coronary artery disease    • Disease of thyroid gland    • GERD (gastroesophageal reflux disease)    • Hyperlipidemia    • Mobility impaired    • Myocardial infarction (CMS/HCC)    • Overactive bladder    • Parkinsonian syndrome (CMS/[facility])    • Prostatitis    • Restrictive pattern present on pulmonary function testing    • Seasonal allergies    • Stroke (CMS/HCC)     RESIDUAL WEAKNESS RIGHT LOWER EXTREMITY   • Urinary incontinence    • Urinary retention      Past Surgical History:   Procedure Laterality Date   • ABDOMINAL SURGERY     •  "CARDIAC SURGERY     • CHOLECYSTECTOMY     • CORONARY ARTERY BYPASS GRAFT      x2   • ENDOSCOPY N/A 10/4/2018    LA grade D reflux esophagitis, 4cm hiatal hernia, esohageal stenosis, normal stomach, erythematous duodenopathy, normal second portion of duodenum, no specimens collected   • ENDOSCOPY N/A 11/30/2018    Procedure: ESOPHAGOGASTRODUODENOSCOPY WITH DILATATION;  Surgeon: Paul Duncan MD;  Location: [facility] ENDOSCOPY;  Service: Gastroenterology   • EYE SURGERY      cataracts   • HIP ENDOPROSTHESIS Right 5/5/2018    Procedure: RIGHT HIP HEMIARTHROPLASTY;  Surgeon: Winston Vallejo MD;  Location: [facility] MAIN OR;  Service: Orthopedics   • HIP FRACTURE SURGERY Right     PARTIAL HIP REPLACEMENT   • MUSCLE BIOPSY Left 3/24/2016    Procedure: LT THIGH MUSCLE BIOPSY;  Surgeon: Fausto Mack MD;  Location: [facility] MAIN OR;  Service:    • SKIN BIOPSY     • THYROID SURGERY     • TOTAL HIP ARTHROPLASTY REVISION Right 1/18/2019    Procedure: CONVERSION RT POSTERIOR HIP BIPOLAR TO TOTAL HIP ARTHROPLASTY;  Surgeon: Radha Paredes MD;  Location: [facility] OR;  Service: Orthopedics   • TURP / TRANSURETHRAL INCISION / DRAINAGE PROSTATE     • VASCULAR SURGERY          SWALLOW EVALUATION (last 72 hours)      [facility] Adult Swallow Evaluation     Row Name 12/12/19 1040 12/09/19 1500          Document Type  evaluation  -OC  evaluation  -OC    Subjective Information  no complaints  -OC  no complaints  -OC    Patient Observations  alert;cooperative;agree to therapy  -OC  alert;cooperative;agree to therapy  -OC    Patient Effort  good  -OC  good  -OC    Symptoms Noted During/After Treatment  none  -OC  none  -OC          Patient Profile Reviewed  yes  -OC  yes  -OC    Pertinent History Of Current Problem  --  \"74-year-old gentleman with a history of parkinsonism who comes in the hospital because of poor appetite for approximately 1 week.Generalized weakness/dyspnea/cough/confusion. Patient had hypotension and " "elevated lactate yesterday evening and improved following a large fluid bolus. Patient reports that he feels much better.  Cough continues and is now productive of green sputum. \"  -OC    Current Method of Nutrition  regular textures;thin liquids  -OC  regular textures;thin liquids  -OC    Precautions/Limitations, Vision  WFL  -OC  WFL  -OC    Precautions/Limitations, Hearing  WFL  -OC  WFL  -OC    Prior Level of Function-Communication  WFL  -OC  WFL  -OC    Prior Level of Function-Swallowing  no diet consistency restrictions  -OC  no diet consistency restrictions  -OC    Plans/Goals Discussed with  patient  -OC  patient;spouse/S.O.;agreed upon  -OC    Barriers to Rehab  none identified  -OC  none identified  -OC    Patient's Goals for Discharge  patient did not state  -OC  patient did not state  -OC    Family Goals for Discharge  --  family did not state  -OC          Pain: FACES Scale, Pretreatment  0-->no hurt  -OC  0-->no hurt  -OC    Pain: FACES Scale, Post-Treatment  0-->no hurt  -OC  0-->no hurt  -OC          Dentition Assessment  --  natural, present and adequate  -OC    Secretion Management  --  WNL/WFL  -OC    Mucosal Quality  --  moist, healthy  -OC    Volitional Swallow  --  WFL  -OC    Volitional Cough  --  WFL  -OC          Oral Motor General Assessment  --  WFL  -OC          Clinical Swallow Evaluation Summary  --  Bedside swallow eval completed. Pt demonstrated no overt s/s aspiration with thin via cup. Pt with immedaite coughing and throat clearing with thins via straw. No overt s/s aspiration with puree, mechanical soft, and regular texture. Pt with c/o difficulty masticating and swallowing dry solids, liquid wash aids in clearance.   -OC          VFSS Summary  VFSS completed. Patient presents with mild-moderate oropharyngeal dysphagia characterized by mistiming, decreased base of tongue retraction, and reduced pharyngeal constriction. Patient demonstrated premature spillage of thin liquids to the " pyriforms, posterior silent penetration. Shadowing posterior laryngeal vestibule, difficult to r/o trace aspiration. Nectar thick liquids premature spillage to the vallecular. Mild pharyngeal residue pooling to the pyriforms. Penetration nectar thick liquids large bolus/consecutive swallows.  Premature spillage honey thick to the vallecular, moderate residue after the swallow. Pt able to partially clear residue with third swallow. Moderate diffuse residue puree, able to partially clear with multiple swallows. Prolonged mastication and premature spillage to the vallecula with mechanical soft. Mild residue pools to the pyriforms, clears with double swallow.  Timely swallow with regular solid, mild residue. Recommend downgrade to honey thick liquids with regular textures. Meds whole with honey thick or puree or crushed with puree. Recommend upright, slow rate, multiple swallows (3) per bolus, alternate liquids/solids. Recommend re-eval as FEES (after 2-4 weeks dysphagia therapy) secondary to posterior residue throughout eval. During VFSS shadowing in posterior laryngeal vestibule, difficult to r/o posterior aspiration of various consistencies. ST to follow for education and dysphagia therapy. Recommend continued ST at next level of care.   -OC  --          Summary Statement  VFSS completed. Patient presents with mild-moderate oropharyngeal dysphagia characterized by mistiming, decreased base of tongue retraction, and reduced pharyngeal constriction. Patient demonstrated premature spillage of thin liquids to the pyriforms, posterior silent penetration. Shadowing posterior laryngeal vestibule, difficult to r/o trace aspiration. Nectar thick liquids premature spillage to the vallecular. Mild pharyngeal residue pooling to the pyriforms. Penetration nectar thick liquids large bolus/consecutive swallows.  Premature spillage honey thick to the vallecular, moderate residue after the swallow. Pt able to partially clear residue  with third swallow. Moderate diffuse residue puree, able to partially clear with multiple swallows. Prolonged mastication and premature spillage to the vallecula with mechanical soft. Mild residue pools to the pyriforms, clears with double swallow.  Timely swallow with regular solid, mild residue.   -OC  --          SLP Swallowing Diagnosis  functional oral phase;mild-moderate;pharyngeal dysfunction  -OC  functional oral phase;suspected pharyngeal dysfunction  -OC    Functional Impact  risk of aspiration/pneumonia  -OC  risk of aspiration/pneumonia  -OC    Rehab Potential/Prognosis, Swallowing  good, to achieve stated therapy goals  -OC  good, to achieve stated therapy goals  -OC    Swallow Criteria for Skilled Therapeutic Interventions Met  demonstrates skilled criteria  -OC  demonstrates skilled criteria  -OC          Therapy Frequency (Swallow)  PRN  -OC  PRN  -OC    Predicted Duration Therapy Intervention (Days)  until discharge  -OC  until discharge  -OC    SLP Diet Recommendation  regular textures;honey thick liquids  -OC  regular textures;thin liquids  -OC    Recommended Diagnostics  reassess via FEES  -OC  --    Recommended Precautions and Strategies  no straw;upright posture during/after eating;small bites of food and sips of liquid;alternate between small bites of food and sips of liquid  -OC  no straw;upright posture during/after eating;small bites of food and sips of liquid;alternate between small bites of food and sips of liquid  -OC    SLP Rec. for Method of Medication Administration  meds whole;meds crushed;with thin liquids;with pudding or applesauce  -OC  meds whole;meds crushed;with thin liquids;with pudding or applesauce  -OC    Monitor for Signs of Aspiration  yes;notify SLP if any concerns  -OC  yes;notify SLP if any concerns  -OC    Anticipated Dischage Disposition  skilled nursing facility;anticipate therapy at next level of care  -OC  unknown  -OC          Oral Nutrition/Hydration Goal  Selection (SLP)  --  oral nutrition/hydration, SLP goal 1  -OC          Oral Nutrition/Hydration Goal 1, SLP  Tolerate least restrictive diet with no overt s/s aspiration.   -OC  Tolerate least restrictive diet with no overt s/s aspiration.   -OC    Time Frame (Oral Nutrition/Hydration Goal 1, SLP)  by discharge  -OC  by discharge  -OC      User Key  (r) = Recorded By, (t) = Taken By, (c) = Cosigned By    Initials Name Effective Dates    OC Saray Roche MA,[facility]-SLP 06/08/18 -           EDUCATION  The patient has been educated in the following areas:   Dysphagia (Swallowing Impairment).    SLP Recommendation and Plan  SLP Swallowing Diagnosis: functional oral phase, mild-moderate, pharyngeal dysfunction  SLP Diet Recommendation: regular textures, honey thick liquids  Recommended Precautions and Strategies: no straw, upright posture during/after eating, small bites of food and sips of liquid, alternate between small bites of food and sips of liquid  SLP Rec. for Method of Medication Administration: meds whole, meds crushed, with thin liquids, with pudding or applesauce     Monitor for Signs of Aspiration: yes, notify SLP if any concerns  Recommended Diagnostics: reassess via FEES  Swallow Criteria for Skilled Therapeutic Interventions Met: demonstrates skilled criteria  Anticipated Dischage Disposition: skilled nursing facility, anticipate therapy at next level of care  Rehab Potential/Prognosis, Swallowing: good, to achieve stated therapy goals  Therapy Frequency (Swallow): PRN  Predicted Duration Therapy Intervention (Days): until discharge       Plan of Care Reviewed With: patient    SLP GOALS     Row Name 12/12/19 1040 12/09/19 1500          Oral Nutrition/Hydration Goal 1 (SLP)    Oral Nutrition/Hydration Goal 1, SLP  Tolerate least restrictive diet with no overt s/s aspiration.   -OC  Tolerate least restrictive diet with no overt s/s aspiration.   -OC     Time Frame (Oral Nutrition/Hydration Goal 1, SLP)  by  discharge  -OC  by discharge  -OC       User Key  (r) = Recorded By, (t) = Taken By, (c) = Cosigned By    Initials Name Provider Type    Saray Sherman MA,CCC-SLP Speech and Language Pathologist           SLP Outcome Measures (last 72 hours)      SLP Outcome Measures     Row Name 12/12/19 1046 12/09/19 1500          SLP Outcome Measures    Outcome Measure Used?  Adult NOMS  -OC  Adult NOMS  -OC        Adult FCM Scores    FCM Chosen  Swallowing  -OC  Swallowing  -OC     Swallowing FCM Score  4  -OC  6  -OC       User Key  (r) = Recorded By, (t) = Taken By, (c) = Cosigned By    Initials Name Effective Dates    Saray Sherman MA,[facility]-SLP 06/08/18 -            Time Calculation:   Time Calculation- SLP     Row Name 12/12/19 1049             Time Calculation- SLP    SLP Start Time  0845  -OC      SLP Received On  12/12/19  -OC        User Key  (r) = Recorded By, (t) = Taken By, (c) = Cosigned By    Initials Name Provider Type    Saray Sherman MA,CCC-SLP Speech and Language Pathologist          Therapy Charges for Today     Code Description Service Date Service Provider Modifiers Qty    [id number] HC ST MOTION FLUORO EVAL SWALLOW 5 12/12/2019 Saray Roche MA,CCC-SLP GN 1               Saray Roche MA,[facility]-SLP  12/12/2019

## 2019-12-12 NOTE — PLAN OF CARE
Problem: Patient Care Overview  Goal: Plan of Care Review  Outcome: Ongoing (interventions implemented as appropriate)  Flowsheets (Taken 12/12/2019 1058)  Plan of Care Reviewed With: patient; spouse  Outcome Summary: pt more lethargic this visit, difficulty staying on task, unsafe to amb this visit, educ and exer on trunk control and posture, plans SNU at [place]

## 2019-12-12 NOTE — PROGRESS NOTES
Adult Nutrition  Assessment/PES    Patient Name:  Victor Manuel Issa  YOB: 1945  MRN: [id number]  Admit Date:  12/7/2019    Assessment Date:  12/12/2019    Comments:  Nutrition follow up. Swallow eval noted. Now on honey thick liquids. Will adjust shake to accommodate thickened restriction. Wife reports appetite good yesterday but poor today with drop in bp. Encouraged po, discussed food pref's. Will follow.     Reason for Assessment     Row Name 12/12/19 1156          Reason for Assessment    Reason For Assessment  follow-up protocol         Nutrition/Diet History     Row Name 12/12/19 1156          Nutrition/Diet History    Typical Food/Fluid Intake  ate well yesterday but poo this am, dislike the thick liquids     Factors Affecting Nutritional Intake  impaired cognitive status/motor control;difficulty/impaired swallowing           Labs/Tests/Procedures/Meds     Row Name 12/12/19 1157          Labs/Procedures/Meds    Lab Results Reviewed  reviewed, pertinent     Lab Results Comments  Glu, Cr, phos, h/h        Diagnostic Tests/Procedures    Diagnostic Test/Procedure Reviewed  reviewed, pertinent     Diagnostic Test/Procedures Comments  VFSS 12/12        Medications    Pertinent Medications Reviewed  reviewed, pertinent     Pertinent Medications Comments  lipitor, asa, folic acid, protonix, Abx, laxatives         Physical Findings     Row Name 12/12/19 1158          Physical Findings    Oral/Mouth Cavity  poor dentition     Skin  other (see comments);pressure injury;pallor MASDF to gluteal fold, PI to coccyx, hip           Nutrition Prescription Ordered     Row Name 12/12/19 1159          Nutrition Prescription PO    Current PO Diet  Regular     Fluid Consistency  Honey thick     Other Modifiers  No Straws         Evaluation of Received Nutrient/Fluid Intake     Row Name 12/12/19 1150          PO Evaluation    Number of Days PO Intake Evaluated  2 days     % PO Intake  25-50%          Problem/Interventions:    Intervention Goal     Row Name 12/12/19 1159          Intervention Goal    General  Maintain nutrition;Reduce/improve symptoms;Meet nutritional needs for age/condition;Disease management/therapy;Improved nutrition related lab(s)     PO  Tolerate PO;Increase intake;PO intake (%)     PO Intake %  80 %     Weight  Maintain weight         Nutrition Intervention     Row Name 12/12/19 1200          Nutrition Intervention    RD/Tech Action  Follow Tx progress;Care plan reviewd;Encourage intake;Interview for preference;Advise available snack;Advise alternate selection;Adjusted supplement     Recommended/Ordered  Supplement         Nutrition Prescription     Row Name 12/12/19 1200          Nutrition Prescription PO    Supplement  Magic Cup;Milkshake;PRO powder     Supplement Frequency  2 times a day     New PO Prescription Ordered?  Yes         Education/Evaluation     Row Name 12/12/19 1200          Education    Education  Will Instruct as appropriate        Monitor/Evaluation    Monitor  Per protocol;Skin status;Symptoms;I&O;PO intake;Supplement intake;Pertinent labs;Weight           Electronically signed by:  Cheryl Keita RD  12/12/19 12:00 PM

## 2019-12-12 NOTE — PLAN OF CARE
Problem: Patient Care Overview  Goal: Plan of Care Review  12/12/2019 0507 by Vanessa Mancilla RN  Outcome: Ongoing (interventions implemented as appropriate)  Flowsheets (Taken 12/12/2019 0501)  Outcome Summary: Pt BP dropped throughout the night to the 80's, received a 500mL bolus of NS now back up to 112. Assymptomatic and states he feels fine. Q2 turns. Video swallow test this morning, will continue to monitor.

## 2019-12-13 NOTE — PROGRESS NOTES
"  ENDOCRINE    Subjective   AND PLANS  Victor Manuel Issa is a 74 y.o. male.     Follow-up adrenal    No shortness of breath.  No nausea or vomiting or diarrhea  Patient was started on midodrine.    Continue levothyroxine 200 mcg/day.  Repeat thyroid function tests in 4 to 6 weeks and reevaluate.    Objective   BP 91/46 (BP Location: Right arm, Patient Position: Lying)   Pulse 56   Temp 98 °F (36.7 °C) (Oral)   Resp 20   Ht 180.3 cm (71\")   Wt 66.6 kg (146 lb 14.4 oz)   SpO2 94%   BMI 20.49 kg/m²   Physical Exam    Awake, alert, not in distress.  No buccal or palmar hyperpigmentation  No rales or wheezes  Regular heart rate and rhythm.  Abdomen soft, nontender.  Extremities warm.  No cyanosis or pedal edema.    Lab Results (last 24 hours)     Procedure Component Value Units Date/Time    Basic Metabolic Panel [[id number]]  (Normal) Collected:  12/13/19 0619    Specimen:  Blood Updated:  12/13/19 0710     Glucose 93 mg/dL      BUN 9 mg/dL      Creatinine 0.85 mg/dL      Sodium 137 mmol/L      Potassium 4.3 mmol/L      Chloride 107 mmol/L      CO2 23.3 mmol/L      Calcium 9.2 mg/dL      eGFR Non African Amer 88 mL/min/1.73      BUN/Creatinine Ratio 10.6     Anion Gap 6.7 mmol/L     Narrative:       GFR Normal >60  Chronic Kidney Disease <60  Kidney Failure <15      CBC & Differential [[id number]] Collected:  12/13/19 0619    Specimen:  Blood Updated:  12/13/19 0643    Narrative:       The following orders were created for panel order CBC & Differential.  Procedure                               Abnormality         Status                     ---------                               -----------         ------                     CBC Auto Differential[[id number]]        Abnormal            Final result                 Please view results for these tests on the individual orders.    CBC Auto Differential [[id number]]  (Abnormal) Collected:  12/13/19 0619    Specimen:  Blood Updated:  12/13/19 0643     WBC 11.47 10*3/mm3  "     RBC 3.45 10*6/mm3      Hemoglobin 11.1 g/dL      Hematocrit 32.6 %      MCV 94.5 fL      MCH 32.2 pg      MCHC 34.0 g/dL      RDW 12.5 %      RDW-SD 43.5 fl      MPV 9.5 fL      Platelets 208 10*3/mm3      Neutrophil % 71.5 %      Lymphocyte % 18.7 %      Monocyte % 4.4 %      Eosinophil % 3.8 %      Basophil % 0.5 %      Immature Grans % 1.1 %      Neutrophils, Absolute 8.18 10*3/mm3      Lymphocytes, Absolute 2.15 10*3/mm3      Monocytes, Absolute 0.51 10*3/mm3      Eosinophils, Absolute 0.44 10*3/mm3      Basophils, Absolute 0.06 10*3/mm3      Immature Grans, Absolute 0.13 10*3/mm3      nRBC 0.0 /100 WBC     Blood Culture - Blood, Wrist, Left [[id number]] Collected:  12/07/19 1815    Specimen:  Blood from Wrist, Left Updated:  12/12/19 1845     Blood Culture No growth at 5 days    Blood Culture - Blood, Wrist, Right [[id number]] Collected:  12/07/19 1815    Specimen:  Blood from Wrist, Right Updated:  12/12/19 1845     Blood Culture No growth at 5 days              History of CVA (cerebrovascular accident)    Coronary artery disease    Parkinson's disease    Debility    Hypothyroidism (acquired)    COPD exacerbation (CMS/HCC)    Hypophosphatemia    Pneumonia of right upper lobe due to infectious organism (CMS/HCC)    Sepsis due to pneumonia (CMS/HCC)    Sepsis with metabolic encephalopathy (CMS/HCC)    Hypotension    Continue levothyroxine.  Agree with midodrine

## 2019-12-13 NOTE — THERAPY TREATMENT NOTE
Acute Care - Physical Therapy Treatment Note  [facility]     Patient Name: Victor Manuel Issa  : 1945  MRN: [id number]  Today's Date: 2019             Admit Date: 2019    Visit Dx:    ICD-10-CM ICD-9-CM   1. COPD exacerbation (CMS/HCC) J44.1 491.21   2. Hypophosphatemia E83.39 275.3   3. Generalized weakness R53.1 780.79     Patient Active Problem List   Diagnosis   • Thigh pain, musculoskeletal   • Left leg weakness   • History of CVA (cerebrovascular accident)   • COPD (chronic obstructive pulmonary disease)   • Coronary artery disease   • Cervical myelopathy (CMS/[facility])   • Parkinson's disease   • Debility   • Cerebrovascular disease   • Abnormal TSH   • Hypothyroidism (acquired)   • Hip fracture requiring operative repair, right, closed, initial encounter (CMS/[facility])   • Urinary tract infection without hematuria   • Hypoxia   • Sinus bradycardia   • Chest pain   • Hip pain, chronic, right   • Hematemesis   • Hematemesis with nausea   • H/O medication noncompliance   • Hyperlipidemia   • Muscle spasticity   • COPD exacerbation (CMS/[facility])   • Hypophosphatemia   • Pneumonia of right upper lobe due to infectious organism (CMS/[facility])   • Sepsis due to pneumonia (CMS/[facility])   • Sepsis with metabolic encephalopathy (CMS/[facility])   • Hypotension       Therapy Treatment    Rehabilitation Treatment Summary     Row Name 19 8303             Treatment Time/Intention    Discipline  physical therapy assistant  -      Document Type  therapy note (daily note)  -KOREY      Subjective Information  complains of;weakness;fatigue  -KOREY      Mode of Treatment  individual therapy;physical therapy  -      Care Plan Review  patient/other agree to care plan  -      Care Plan Review, Other Participant(s)  spouse  -KOREY      Existing Precautions/Restrictions  fall  -KOREY      Recorded by [KOREY] Danielle Maldonado, LADI 19 1635      Row Name 19 1262             Bed Mobility Assessment/Treatment    Supine-Sit [place]  (Bed Mobility)  2 person assist;moderate assist (50% patient effort)  -      Bed Mobility, Safety Issues  decreased use of arms for pushing/pulling;decreased use of legs for bridging/pushing;impaired trunk control for bed mobility  -      Assistive Device (Bed Mobility)  bed rails;draw sheet  -      Comment (Bed Mobility)  post lean  -      Recorded by [] Danielle Maldonado, [facility] 12/13/19 1635      Row Name 12/13/19 1609             Sit-Stand Transfer    Sit-Stand [place] (Transfers)  2 person assist;moderate assist (50% patient effort);maximum assist (25% patient effort) 2 attempts  -      Assistive Device (Sit-Stand Transfers)  walker, front-wheeled  -      Recorded by [] Danielle Maldonado [facility] 12/13/19 1635      Row Name 12/13/19 1604             Gait/Stairs Assessment/Training    [place] Level (Gait)  2 person assist;minimum assist (75% patient effort);moderate assist (50% patient effort)  -      Assistive Device (Gait)  walker, front-wheeled  -      Distance in Feet (Gait)  12ft(around bed and into chair)  -      Deviations/Abnormal Patterns (Gait)  dayanna decreased;festinating/shuffling;stride length decreased;base of support, narrow  -      Bilateral Gait Deviations  forward flexed posture;weight shift ability decreased  -      Comment (Gait/Stairs)  bilat knee flex throughout  -      Recorded by [] Danielle Maldonado, [facility] 12/13/19 1635      Row Name 12/13/19 1605             Positioning and Restraints    Pre-Treatment Position  in bed  -      In Chair  reclined;call light within reach;encouraged to call for assist;exit alarm on;with family/caregiver;notified nsg  -      Recorded by [] Danielle Maldonado, [facility] 12/13/19 1635      Row Name 12/13/19 1600             Pain Scale: FACES Pre/Post-Treatment    Pain: FACES Scale, Pretreatment  0-->no hurt  -      Recorded by [] Danielle Maldonado, [facility] 12/13/19 1635      Row Name                Wound 10/02/18 1927 Bilateral  medial coccyx pressure injury    Wound - Properties Group Date first assessed: 10/02/18 [BF] Time first assessed: 1927 [BF] Side: Bilateral [BF] Orientation: medial [BF] Location: coccyx [BF] Stage, Pressure Injury: Stage 2 [BF] Present On Admission : yes [BF] Type: pressure injury [BF] Recorded by:  [BF] Charmaine Santiago RN 10/02/18 1928      User Key  (r) = Recorded By, (t) = Taken By, (c) = Cosigned By    Initials Name Effective Dates Discipline    Danielle Mccain, LADI 03/07/18 -  PT    BF Charmaine Santiago RN 10/30/17 -  Nurse          Wound 10/02/18 1927 Bilateral medial coccyx pressure injury (Active)   Dressing Appearance dry;intact 12/13/2019 12:00 PM   Closure Open to air 12/13/2019  6:40 AM   Base pink 12/13/2019  6:40 AM   Periwound intact;pink;blanchable 12/13/2019 12:00 PM   Periwound Temperature warm 12/13/2019 12:00 PM   Periwound Skin Turgor soft 12/13/2019 12:00 PM   Drainage Amount none 12/13/2019 12:00 PM   Care, Wound cleansed with;soap and water 12/13/2019 12:00 PM   Dressing Care, Wound dressing changed 12/13/2019 12:00 PM               PT Recommendation and Plan     Plan of Care Reviewed With: patient, spouse  Progress: improving  Outcome Summary: pt able to tonie amb short dist around bed to chair, wife present and very supportive; pt still w/forw flex and flexed knees but more alert this visit, plans SNU at [place]  Outcome Measures     Row Name 12/13/19 1600 12/12/19 1700 12/11/19 1700       How much help from another person do you currently need...    Turning from your back to your side while in flat bed without using bedrails?  2  -JM  2  -JM  2  -JM    Moving from lying on back to sitting on the side of a flat bed without bedrails?  2  -JM  2  -JM  2  -JM    Moving to and from a bed to a chair (including a wheelchair)?  2  -JM  1  -JM  2  -JM    Standing up from a chair using your arms (e.g., wheelchair, bedside chair)?  2  -JM  2  -JM  2  -JM    Climbing 3-5 steps with a railing?  1   -JM  1  -JM  1  -JM    To walk in hospital room?  2  -JM  1  -JM  2  -JM    AM-PAC 6 Clicks Score (PT)  11  -JM  9  -JM  11  -JM      User Key  (r) = Recorded By, (t) = Taken By, (c) = Cosigned By    Initials Name Provider Type    Danielle Mccain PTA Physical Therapy Assistant         Time Calculation:   PT Charges     Row Name 12/13/19 1625             Time Calculation    Start Time  1540  -      Stop Time  1601  -      Time Calculation (min)  21 min  -KOREY      PT Received On  12/13/19  -KOREY      PT - Next Appointment  12/14/19  -KOREY        User Key  (r) = Recorded By, (t) = Taken By, (c) = Cosigned By    Initials Name Provider Type    Danielle Mccain PTA Physical Therapy Assistant        Therapy Charges for Today     Code Description Service Date Service Provider Modifiers Qty    02591459511 HC PT THER PROC EA 15 MIN 12/12/2019 Danielle Maldonado PTA GP 2    55765684055 HC PT THER SUPP EA 15 MIN 12/12/2019 Danielle Maldonado PTA GP 2    07533203190 HC PT THER PROC EA 15 MIN 12/13/2019 Danielle Maldonado PTA GP 1    21796922371 HC PT THER SUPP EA 15 MIN 12/13/2019 Danielle Maldonado PTA GP 1          PT G-Codes  Outcome Measure Options: AM-PAC 6 Clicks Basic Mobility (PT)  AM-PAC 6 Clicks Score (PT): 11    Danielle Maldonado PTA  12/13/2019

## 2019-12-13 NOTE — PROGRESS NOTES
"    DAILY PROGRESS NOTE  [facility]    Patient Identification:  Name: Victor Manuel Issa  Age: 74 y.o.  Sex: male  :  1945  MRN: [id number]         Primary Care Physician: Provider, No Known    Subjective:  Interval History: Doing much better today.  Denies any chest pain.  Likes the suctioning as it helps to clear secretions.  Mentation back to baseline.  No syncope or loss of function.  No nausea or vomiting    Objective: Discussed with spouse at bedside as well as case discussed in multidisciplinary rounds    Scheduled Meds:  aspirin 325 mg Oral Daily   atorvastatin 20 mg Oral Daily   carbidopa-levodopa 1 tablet Oral TID With Meals   divalproex 500 mg Oral BID   donepezil 20 mg Oral Nightly   folic acid 1 mg Oral Daily   hydrocortisone-bacitracin-zinc oxide-nystatin 1 application Topical BID   ipratropium-albuterol 3 mL Nebulization 4x Daily - RT   levothyroxine 200 mcg Oral Q AM   midodrine 2.5 mg Oral TID AC   montelukast 10 mg Oral Nightly   OLANZapine 5 mg Oral Nightly   oxybutynin XL 5 mg Oral BID   pantoprazole 40 mg Oral BID AC   piperacillin-tazobactam 3.375 g Intravenous Q8H   polyethylene glycol 17 g Oral Daily   senna-docusate sodium 2 tablet Oral BID   sodium chloride 10 mL Intravenous Q12H     Continuous Infusions:     Vital signs in last 24 hours:  Temp:  [97.2 °F (36.2 °C)-97.7 °F (36.5 °C)] 97.5 °F (36.4 °C)  Heart Rate:  [55-78] 61  Resp:  [18-20] 20  BP: ()/(50-70) 87/54    Intake/Output:    Intake/Output Summary (Last 24 hours) at 2019 1220  Last data filed at 2019 0405  Gross per 24 hour   Intake 80 ml   Output --   Net 80 ml       Exam:  BP (!) 87/54   Pulse 61   Temp 97.5 °F (36.4 °C) (Oral)   Resp 20   Ht 180.3 cm (71\")   Wt 66.6 kg (146 lb 14.4 oz)   SpO2 95%   BMI 20.49 kg/m²     General Appearance:    Alert, cooperative, no distress, nontoxic-appearing, conversational, currently eating lunch with spouse at bedside to help assist with " feeding                         Throat:   Oral mucosa pink and moist                           Neck:   No JVD                         Lungs:    Decreased bases right greater than left to auscultation bilaterally, respirations unlabored                          Heart:    Regular rate and rhythm, S1 and S2 normal                  Abdomen:     Soft, non-tender, bowel sounds active                 Extremities: SCDs, no cyanosis or edema                        Pulses:   Pulses palpable in lower extremities                            Skin:   Skin is warm and dry                  Neurologic:   CNII-XII intact, slight tremor noted     Data Review:  Labs in chart were reviewed.    Assessment:  Active Hospital Problems    Diagnosis  POA   • Sepsis due to pneumonia (CMS/HCC) [J18.9, A41.9]  Yes   • Sepsis with metabolic encephalopathy (CMS/HCC) [A41.9, R65.20, G93.41]  Yes   • Hypotension [I95.9]  No   • Pneumonia of right upper lobe due to infectious organism (CMS/HCC) [J18.1]  Yes   • COPD exacerbation (CMS/HCC) [J44.1]  Yes   • Hypophosphatemia [E83.39]  Yes   • Hypothyroidism (acquired) [E03.9]  Yes   • Parkinson's disease [G20]  Yes   • Debility [R53.81]  Yes   • Coronary artery disease [I25.10]  Yes   • History of CVA (cerebrovascular accident) [Z86.73]  Not Applicable      Resolved Hospital Problems   No resolved problems to display.       Plan:    Sepsis secondary to aspiration pneumonia   -Zosyn day 5 - will switch to Levaquin to complete course which also gives coverage needs Pseudomonas noted on urine culture   -Counseled increased risk for reoccurrence patient and spouse    Hypotension persistent   -Midodrine initiated -not on IVF   -Endocrinology following - await further recommendations and agree with concerns for Sinemet worsening hypotension  So will hold overnight and monitor response    Metabolic encephalopathy resolved   -Zyprexa reduced further   -Seems to be at baseline      Long-term prognosis  poor    Case discussed in multidisciplinary rounds - bed should be available by tomorrow at rehab    Braden Onofre MD  12/13/2019  12:20 PM

## 2019-12-13 NOTE — PROGRESS NOTES
[facility]    Physicians Statement of Medical Necessity for Ambulance Transportation    It is medically necessary for:    Patient Name: Victor Manuel Issa    Insurance Information:  Medicare [id number]    To be transported by ambulance:    From [facility]     To: [facility]       Date of Service:  12/07/2019-12-        Diagnosis:    Patient Active Problem List    Diagnosis   • Sepsis due to pneumonia (CMS/[facility]) [J18.9, A41.9]   • Sepsis with metabolic encephalopathy (CMS/[facility]) [A41.9, R65.20, G93.41]   • Hypotension [I95.9]   • Pneumonia of right upper lobe due to infectious organism (CMS/[facility]) [J18.1]   • COPD exacerbation (CMS/[facility]) [J44.1]   • Hypophosphatemia [E83.39]   • Muscle spasticity [M62.838]   • H/O medication noncompliance [Z91.14]   • Hyperlipidemia [E78.5]   • Chest pain [R07.9]   • Hip pain, chronic, right [M25.551, G89.29]   • Hematemesis [K92.0]   • Hematemesis with nausea [K92.0]   • Sinus bradycardia [R00.1]   • Urinary tract infection without hematuria [N39.0]   • Hypoxia [R09.02]   • Hip fracture requiring operative repair, right, closed, initial encounter (CMS/HCC) [S72.001A]   • Hypothyroidism (acquired) [E03.9]   • Abnormal TSH [R79.89]   • Cervical myelopathy (CMS/[facility]) [G95.9]   • Parkinson's disease [G20]   • Debility [R53.81]   • Cerebrovascular disease [I67.9]   • Left leg weakness [R29.898]   • History of CVA (cerebrovascular accident) [Z86.73]   • COPD (chronic obstructive pulmonary disease) [J44.9]   • Coronary artery disease [I25.10]   • Thigh pain, musculoskeletal [M79.606]           Past Medical/Surgical History:  Past Medical History:   Diagnosis Date   • Anxiety    • Arthritis    • Bipolar 1 disorder (CMS/[facility])    • Coronary artery disease    • Disease of thyroid gland    • GERD (gastroesophageal reflux disease)    • Hyperlipidemia    • Mobility impaired    • Myocardial infarction (CMS/[facility])    • Overactive bladder    • Parkinsonian syndrome  (CMS/HCC)    • Prostatitis    • Restrictive pattern present on pulmonary function testing    • Seasonal allergies    • Stroke (CMS/HCC)     RESIDUAL WEAKNESS RIGHT LOWER EXTREMITY   • Urinary incontinence    • Urinary retention       Past Surgical History:   Procedure Laterality Date   • ABDOMINAL SURGERY     • CARDIAC SURGERY     • CHOLECYSTECTOMY     • CORONARY ARTERY BYPASS GRAFT      x2   • ENDOSCOPY N/A 10/4/2018    LA grade D reflux esophagitis, 4cm hiatal hernia, esohageal stenosis, normal stomach, erythematous duodenopathy, normal second portion of duodenum, no specimens collected   • ENDOSCOPY N/A 11/30/2018    Procedure: ESOPHAGOGASTRODUODENOSCOPY WITH DILATATION;  Surgeon: Paul Duncan MD;  Location: [facility] ENDOSCOPY;  Service: Gastroenterology   • EYE SURGERY      cataracts   • HIP ENDOPROSTHESIS Right 5/5/2018    Procedure: RIGHT HIP HEMIARTHROPLASTY;  Surgeon: Winston Vallejo MD;  Location: [facility] OR;  Service: Orthopedics   • HIP FRACTURE SURGERY Right     PARTIAL HIP REPLACEMENT   • MUSCLE BIOPSY Left 3/24/2016    Procedure: LT THIGH MUSCLE BIOPSY;  Surgeon: Fausto Mack MD;  Location: [facility] OR;  Service:    • SKIN BIOPSY     • THYROID SURGERY     • TOTAL HIP ARTHROPLASTY REVISION Right 1/18/2019    Procedure: CONVERSION RT POSTERIOR HIP BIPOLAR TO TOTAL HIP ARTHROPLASTY;  Surgeon: Radha Paredes MD;  Location: [facility] OR;  Service: Orthopedics   • TURP / TRANSURETHRAL INCISION / DRAINAGE PROSTATE     • VASCULAR SURGERY          Current Objective Medical Evidence(including physical exam finding to support reason for limitations):    Immobilization syndrome  Bedridden    Other:     Physician Signature:           (RN,NP,PA,CAN, Discharge Planner)  Anupama Justin RN   Date/Time: 3:56 PM  12/13/2019       Printed Name:    __________________________________    [facility]   Phone: [phone] Phone: [phone]   Fax: [phone] Fax: [phone]

## 2019-12-13 NOTE — PLAN OF CARE
Problem: Patient Care Overview  Goal: Plan of Care Review  Outcome: Ongoing (interventions implemented as appropriate)  Flowsheets (Taken 12/13/2019 9229)  Progress: improving  Plan of Care Reviewed With: patient; spouse  Outcome Summary: pt able to tonie amb short dist around bed to chair, wife present and very supportive; pt still w/forw flex and flexed knees but more alert this visit, plans SNU at [place]

## 2019-12-13 NOTE — PLAN OF CARE
Problem: Patient Care Overview  Goal: Plan of Care Review  Outcome: Ongoing (interventions implemented as appropriate)  Flowsheets (Taken 12/13/2019 6608)  Plan of Care Reviewed With: patient; spouse  Outcome Summary: Patient refused all turns overnight and dressing change on his bottom. IV abx given per order. Midodrine given for low BP. Possible d/c to [facility]. VSS. Will continue to monitor.

## 2019-12-13 NOTE — NURSING NOTE
Patient alert and oriented x4. No c/o pain. vss today. New for for HTL, no straws. Educated pt and wife not to use straws and for patient to sit up 90 degrees. Iv abx, q2hr turn and wound care continued. Will continue to monitor.

## 2019-12-13 NOTE — PROGRESS NOTES
[facility]  [phone]  Pino Vivar MD    Subjective:  LOS: 6    He denies any new respiratory symptoms.  He remains on a modified diet.  His blood pressure remains somewhat low.  However no signs of overwhelming sepsis.  Denies nausea vomiting diarrhea.    Objective   Vital Signs past 24hrs    Temp range: Temp (24hrs), Av.5 °F (36.4 °C), Min:97.2 °F (36.2 °C), Max:97.7 °F (36.5 °C)    BP range: BP: ()/(50-70) 87/54  Pulse range: Heart Rate:  [55-78] 61  Resp rate range: Resp:  [18-20] 20    Device (Oxygen Therapy): room air   Oxygen range:SpO2:  [92 %-98 %] 95 %      66.6 kg (146 lb 14.4 oz); Body mass index is 20.49 kg/m².    Intake/Output Summary (Last 24 hours) at 2019 1213  Last data filed at 2019 0405  Gross per 24 hour   Intake 80 ml   Output --   Net 80 ml       Physical Exam   HENT:   Head: Normocephalic.   Eyes: Pupils are equal, round, and reactive to light.   Cardiovascular: Normal rate and regular rhythm.   No murmur heard.  Pulmonary/Chest: He has decreased breath sounds. He has no wheezes. He has no rales.   Abdominal: Soft. Bowel sounds are normal. There is no tenderness.   Musculoskeletal: He exhibits no edema.   Neurological: He is alert.   Nursing note and vitals reviewed.    Results Review:    I have reviewed the laboratory and imaging data since the last note by [facility] physician.  My annotations are noted in assessment and plan.    Medication Review:  I have reviewed the current MAR.  My annotations are noted in assessment and plan.       Plan   PCCM Problems  Relative hypotension  Right upper lobe pneumonia  Dysphagia  UTI  Parkinson's disease  COPD without exacerbation  Lactic acidosis, improved  Relevant Medical Diagnoses  History CVA  Electrolyte imbalance      Plan of Treatment  Relative hypotension.   No signs of overwhelming sepsis.  Not adrenal insufficiency per endocrine.  Symptomatically patient has no problems.  Note midodrine was  started.    Continue with Zosyn as very likely aspiration resulting in pneumonia.  Chest x-ray reviewed.  Okay to switch to p.o. antibiotics by me.  Recommend follow-up chest x-ray in 6 to 8 weeks.    Dysphagia likely from Parkinson's disease.  Note input by speech.  His VFSS shows posterior residue and he was downgraded on diet consistency.  He will need a FEES after 2-4 weeks of dysphagia therapy.    COPD without exacerbation.  Currently on duo nebs 4 times a day which is adequate.  Systemic steroids not required at this point from the point of COPD.    Pino Vivar MD  12/13/19  12:13 PM    Part of this note may be an electronic transcription/translation of spoken language to printed text using the Dragon Dictation System.

## 2019-12-13 NOTE — PROGRESS NOTES
Discharge Planning Assessment  [facility]     Patient Name: Victor Manuel Issa  MRN: [id number]  Today's Date: 12/13/2019    Admit Date: 12/7/2019    Discharge Needs Assessment    No documentation.       Discharge Plan     Row Name 12/13/19 1542       Plan    Plan  [facility] tomorrow for skilled care [facility] arranged for [facility]PM     Plan Comments  [facility] bed available tomorrow Saturday December 14th. [facility] ambulance arranged for 1PM packet in [place]. [place] [facility]        Destination - Selection Complete      Service Provider Request Status Selected Services Address Phone Number Fax Number    [facility] Nursing [address] [facility] [facility] [address] [phone]    [facility] Accepted N/A [address] [facility] [address] [phone]      Durable Medical Equipment      Coordination has not been started for this encounter.      Dialysis/Infusion      Coordination has not been started for this encounter.      Home Medical Care      Service Provider Request Status Selected Services Address Phone Number Fax Number    [facility] Accepted N/A [id number] [address] [place] [id number] [phone]      Therapy      Coordination has not been started for this encounter.      Community Resources      Coordination has not been started for this encounter.        Expected Discharge Date and Time     Expected Discharge Date Expected Discharge Time    Dec 13, 2019         Demographic Summary    No documentation.       Functional Status    No documentation.       Psychosocial    No documentation.       Abuse/Neglect    No documentation.       Legal    No documentation.       Substance Abuse    No documentation.       Patient Forms    No documentation.           Anupama Justin, RN

## 2019-12-14 NOTE — PROGRESS NOTES
74 y.o.   LOS: 7 days   Patient Care Team:  Provider, No Known as PCP - Shira Hirsch APRN as PCP - Claims Attributed    Chief Complaint: Hypothyroidism and orthostasis    Chief Complaint   Patient presents with   • Generalized Body Aches   • Cough       Subjective     HPI  Adrenal insufficiency was ruled out  Patient had Cortrosyn stim testing x2  Patient is currently on midodrine and blood pressure appears stable today  He is also receiving levothyroxine 200 mcg daily.  Patient's TSH was 0.6 and free T4 1.4 on December 7, 2019    Interval History:  Patient is a 74-year-old male admitted to the hospital for poor appetite and generalized weakness and suspected to have pneumonia  He was also considered to have adrenal insufficiency and endocrinology was consulted  According to my colleague saw the patient he had at least 2 Cortrosyn stim testing done  Neither of them are confirmatory  Patient is currently not on steroids  He was started on midodrine and blood pressure is stabilizing  Postoperative hypothyroidism on levothyroxine replacement  Currently receiving 200 mcg daily.      Review of Systems:      Review of Systems   HENT: Positive for trouble swallowing.    Respiratory: Positive for cough and shortness of breath.    Cardiovascular: Negative.    Gastrointestinal: Positive for abdominal distention.   All other systems reviewed and are negative.    Objective     Vital Signs   Temp:  [97.5 °F (36.4 °C)-98.4 °F (36.9 °C)] 98.4 °F (36.9 °C)  Heart Rate:  [51-71] 55  Resp:  [16-24] 20  BP: ()/(46-65) 118/60    Physical Exam:  Physical Exam   Constitutional: He is oriented to person, place, and time.   Eyes: Pupils are equal, round, and reactive to light. EOM are normal.   No circumorbital puffiness   Neck: Normal range of motion. Neck supple. No thyromegaly present.   Cardiovascular: Normal rate, regular rhythm, normal heart sounds and intact distal pulses.   Pulmonary/Chest: Effort normal and  breath sounds normal.   Abdominal: Soft. Bowel sounds are normal.   Musculoskeletal: Normal range of motion. He exhibits no edema.   Neurological: He is alert and oriented to person, place, and time.   Skin: Skin is warm and dry.   Psychiatric: He has a normal mood and affect. His behavior is normal.   Nursing note and vitals reviewed.  Results Review:     I reviewed the patient's new clinical results.      Glucose   Date/Time Value Ref Range Status   12/13/2019 0619 93 65 - 99 mg/dL Final   12/12/2019 0651 121 (H) 65 - 99 mg/dL Final     Lab Results (last 72 hours)     Procedure Component Value Units Date/Time    Basic Metabolic Panel [[id number]]  (Normal) Collected:  12/13/19 0619    Specimen:  Blood Updated:  12/13/19 0710     Glucose 93 mg/dL      BUN 9 mg/dL      Creatinine 0.85 mg/dL      Sodium 137 mmol/L      Potassium 4.3 mmol/L      Chloride 107 mmol/L      CO2 23.3 mmol/L      Calcium 9.2 mg/dL      eGFR Non African Amer 88 mL/min/1.73      BUN/Creatinine Ratio 10.6     Anion Gap 6.7 mmol/L     Narrative:       GFR Normal >60  Chronic Kidney Disease <60  Kidney Failure <15      CBC & Differential [[id number]] Collected:  12/13/19 0619    Specimen:  Blood Updated:  12/13/19 0643    Narrative:       The following orders were created for panel order CBC & Differential.  Procedure                               Abnormality         Status                     ---------                               -----------         ------                     CBC Auto Differential[[id number]]        Abnormal            Final result                 Please view results for these tests on the individual orders.    CBC Auto Differential [[id number]]  (Abnormal) Collected:  12/13/19 0619    Specimen:  Blood Updated:  12/13/19 0643     WBC 11.47 10*3/mm3      RBC 3.45 10*6/mm3      Hemoglobin 11.1 g/dL      Hematocrit 32.6 %      MCV 94.5 fL      MCH 32.2 pg      MCHC 34.0 g/dL      RDW 12.5 %      RDW-SD 43.5 fl      MPV 9.5 fL       Platelets 208 10*3/mm3      Neutrophil % 71.5 %      Lymphocyte % 18.7 %      Monocyte % 4.4 %      Eosinophil % 3.8 %      Basophil % 0.5 %      Immature Grans % 1.1 %      Neutrophils, Absolute 8.18 10*3/mm3      Lymphocytes, Absolute 2.15 10*3/mm3      Monocytes, Absolute 0.51 10*3/mm3      Eosinophils, Absolute 0.44 10*3/mm3      Basophils, Absolute 0.06 10*3/mm3      Immature Grans, Absolute 0.13 10*3/mm3      nRBC 0.0 /100 WBC     Blood Culture - Blood, Wrist, Left [[id number]] Collected:  12/07/19 1815    Specimen:  Blood from Wrist, Left Updated:  12/12/19 1845     Blood Culture No growth at 5 days    Blood Culture - Blood, Wrist, Right [[id number]] Collected:  12/07/19 1815    Specimen:  Blood from Wrist, Right Updated:  12/12/19 1845     Blood Culture No growth at 5 days    Lipid Panel [[id number]]  (Abnormal) Collected:  12/12/19 0651    Specimen:  Blood Updated:  12/12/19 0918     Total Cholesterol 111 mg/dL      Triglycerides 133 mg/dL      HDL Cholesterol 24 mg/dL      LDL Cholesterol  60 mg/dL      VLDL Cholesterol 26.6 mg/dL      LDL/HDL Ratio 2.52    Narrative:       Cholesterol Reference Ranges  (U.S. Department of Health and Human Services ATP III Classifications)    Desirable          <200 mg/dL  Borderline High    200-239 mg/dL  High Risk          >240 mg/dL      Triglyceride Reference Ranges  (U.S. Department of Health and Human Services ATP III Classifications)    Normal           <150 mg/dL  Borderline High  150-199 mg/dL  High             200-499 mg/dL  Very High        >500 mg/dL    HDL Reference Ranges  (U.S. Department of Health and Human Services ATP III Classifcations)    Low     <40 mg/dl (major risk factor for CHD)  High    >60 mg/dl ('negative' risk factor for CHD)        LDL Reference Ranges  (U.S. Department of Health and Human Services ATP III Classifcations)    Optimal          <100 mg/dL  Near Optimal     100-129 mg/dL  Borderline High  130-159 mg/dL  High             160-189  mg/dL  Very High        >189 mg/dL    Basic Metabolic Panel [[id number]]  (Abnormal) Collected:  12/12/19 0651    Specimen:  Blood Updated:  12/12/19 0747     Glucose 121 mg/dL      BUN 9 mg/dL      Creatinine 0.69 mg/dL      Sodium 140 mmol/L      Potassium 4.2 mmol/L      Chloride 108 mmol/L      CO2 22.8 mmol/L      Calcium 8.9 mg/dL      eGFR Non African Amer 111 mL/min/1.73      BUN/Creatinine Ratio 13.0     Anion Gap 9.2 mmol/L     Narrative:       GFR Normal >60  Chronic Kidney Disease <60  Kidney Failure <15      Phosphorus [[id number]]  (Normal) Collected:  12/12/19 0651    Specimen:  Blood Updated:  12/12/19 0747     Phosphorus 2.5 mg/dL     CBC & Differential [[id number]] Collected:  12/12/19 0651    Specimen:  Blood Updated:  12/12/19 0717    Narrative:       The following orders were created for panel order CBC & Differential.  Procedure                               Abnormality         Status                     ---------                               -----------         ------                     CBC Auto Differential[[id number]]        Abnormal            Final result                 Please view results for these tests on the individual orders.    CBC Auto Differential [[id number]]  (Abnormal) Collected:  12/12/19 0651    Specimen:  Blood Updated:  12/12/19 0717     WBC 8.30 10*3/mm3      RBC 3.36 10*6/mm3      Hemoglobin 10.7 g/dL      Hematocrit 31.9 %      MCV 94.9 fL      MCH 31.8 pg      MCHC 33.5 g/dL      RDW 12.2 %      RDW-SD 42.3 fl      MPV 9.7 fL      Platelets 193 10*3/mm3      Neutrophil % 58.1 %      Lymphocyte % 29.2 %      Monocyte % 5.7 %      Eosinophil % 4.6 %      Basophil % 0.6 %      Immature Grans % 1.8 %      Neutrophils, Absolute 4.83 10*3/mm3      Lymphocytes, Absolute 2.42 10*3/mm3      Monocytes, Absolute 0.47 10*3/mm3      Eosinophils, Absolute 0.38 10*3/mm3      Basophils, Absolute 0.05 10*3/mm3      Immature Grans, Absolute 0.15 10*3/mm3      nRBC 0.0 /100 WBC          Imaging Results (Last 72 Hours)     Procedure Component Value Units Date/Time    XR Chest 1 View [[id number]] Collected:  12/12/19 1550     Updated:  12/12/19 1556    Narrative:       PORTABLE CHEST 12/12/2019 AT 12:25 PM     CLINICAL HISTORY: Dyspnea. Follow-up infiltrates.     COMPARISON: CTA of the chest dated 12/09/2019. Chest x-ray dated  12/07/2019.     The chest CT demonstrated minimal patchy airspace infiltrate in the  posterior aspect of right upper lobe that remains occult on plain film  imaging. There is minimal ill-defined increased density near the right  lung base that is most likely due to atelectasis. A tiny left pleural  effusion is new. The heart is normal in size. Stigmata of previous  coronary artery bypass procedure are present.     IMPRESSIONS: Tiny new left pleural effusion. Mild atelectasis near the  right lung base.     This report was finalized on 12/12/2019 3:53 PM by Dr. Kuldip Ross M.D.       FL Video Swallow With Speech [[id number]] Collected:  12/12/19 1110     Updated:  12/12/19 1114    Narrative:       VIDEO SWALLOWING EXAMINATION BY SPEECH PATHOLOGY     Clinical: Dysphasia     Video swallowing examination performed under the direction of speech  pathology. Imaging reviewed by radiologist who concurs with the  findings.     Speech pathology summary:Patient presents with mild-moderate  oropharyngeal dysphagia characterized by mistiming, decreased base of  tongue retraction, and reduced pharyngeal constriction. Patient  demonstrated premature spillage of thin liquids to the pyriforms,  posterior silent penetration. Shadowing posterior laryngeal vestibule,  difficult to r/o trace aspiration. Nectar thick liquids premature  spillage to the vallecular. Mild pharyngeal residue pooling to the  pyriforms. Penetration nectar thick liquids large bolus/consecutive  swallows. ?Premature spillage honey thick to the vallecular, moderate  residue after the swallow. Pt able to partially clear  residue with third  swallow. Moderate diffuse residue puree, able to partially clear with  multiple swallows. Prolonged mastication and premature spillage to the  vallecula with mechanical soft. Mild residue pools to the pyriforms,  clears with double swallow. ?Timely swallow with regular solid, mild  residue.     FLUOROSCOPY TIME: 4 minutes 27 second, 7268 images.     This report was finalized on 12/12/2019 11:11 AM by Dr. Ernst Srivastava M.D.           Medical record review  Pulmonary note reviewed  Patient has right upper lobe pneumonia  As well as Parkinson's disease  He is currently not on steroids    Medication Review:       Current Facility-Administered Medications:   •  acetaminophen (TYLENOL) tablet 650 mg, 650 mg, Oral, Q4H PRN, 650 mg at 12/08/19 0520 **OR** acetaminophen (TYLENOL) 160 MG/5ML solution 650 mg, 650 mg, Oral, Q4H PRN **OR** acetaminophen (TYLENOL) suppository 650 mg, 650 mg, Rectal, Q4H PRN, Jeovany Mcmahon MD  •  aspirin EC tablet 325 mg, 325 mg, Oral, Daily, Jeovany Mcmahon MD, 325 mg at 12/13/19 1011  •  atorvastatin (LIPITOR) tablet 20 mg, 20 mg, Oral, Daily, Jeovany Mcmahon MD, 20 mg at 12/13/19 1011  •  bisacodyl (DULCOLAX) suppository 10 mg, 10 mg, Rectal, Daily PRN, Fabian Daigle MD  •  busPIRone (BUSPAR) tablet 5 mg, 5 mg, Oral, TID PRN, Jeovany Mcmahon MD  •  diclofenac (VOLTAREN) 1 % gel 4 g, 4 g, Topical, Daily PRN, Jeovany Mcmahon MD  •  divalproex (DEPAKOTE) DR tablet 500 mg, 500 mg, Oral, BID, Jeovany Mcmahon MD, 500 mg at 12/13/19 2027  •  donepezil (ARICEPT) tablet 20 mg, 20 mg, Oral, Nightly, Jeovany Mcmahon MD, 20 mg at 12/13/19 2027  •  folic acid (FOLVITE) tablet 1 mg, 1 mg, Oral, Daily, Fabian Daigle MD, 1 mg at 12/13/19 1010  •  hydrocortisone-bacitracin-zinc oxide-nystatin (MAGIC BARRIER) cream 1 application, 1 application, Topical, BID, Fabian Daigle MD, 1 application at 12/13/19 2027  •  hydrocortisone-bacitracin-zinc  oxide-nystatin (MAGIC BARRIER) cream 1 application, 1 application, Topical, PRN, Fabian Daigle MD  •  ipratropium-albuterol (DUO-NEB) nebulizer solution 3 mL, 3 mL, Nebulization, 4x Daily - RT, Jeovany Mcmahon MD, 3 mL at 12/14/19 0812  •  levothyroxine (SYNTHROID, LEVOTHROID) tablet 200 mcg, 200 mcg, Oral, Q AM, Carlos Gibson MD, 200 mcg at 12/13/19 1011  •  midodrine (PROAMATINE) tablet 2.5 mg, 2.5 mg, Oral, TID AC, Fabian Daigle MD, 2.5 mg at 12/13/19 1624  •  montelukast (SINGULAIR) tablet 10 mg, 10 mg, Oral, Nightly, Jeovany Mcmahon MD, 10 mg at 12/13/19 2027  •  OLANZapine (zyPREXA) tablet 5 mg, 5 mg, Oral, Nightly, Fabian Daigle MD, 5 mg at 12/13/19 2027  •  oxybutynin XL (DITROPAN-XL) 24 hr tablet 5 mg, 5 mg, Oral, BID, Jeovany Mcmahon MD, 5 mg at 12/13/19 2027  •  pantoprazole (PROTONIX) EC tablet 40 mg, 40 mg, Oral, BID AC, Jeovany Mcmahon MD, 40 mg at 12/13/19 1624  •  piperacillin-tazobactam (ZOSYN) 3.375 g in iso-osmotic dextrose 50 ml (premix), 3.375 g, Intravenous, Q8H, Fabian Daigle MD, 3.375 g at 12/14/19 0650  •  polyethylene glycol (MIRALAX) packet 17 g, 17 g, Oral, Daily, Fabian Daigle MD, 17 g at 12/13/19 1012  •  potassium phosphate 45 mmol in sodium chloride 0.9 % 500 mL infusion, 45 mmol, Intravenous, PRN **OR** potassium phosphate 30 mmol in sodium chloride 0.9 % 250 mL infusion, 30 mmol, Intravenous, PRN **OR** potassium phosphate 15 mmol in sodium chloride 0.9 % 100 mL infusion, 15 mmol, Intravenous, PRN, 15 mmol at 12/11/19 1034 **OR** sodium phosphates 40 mmol in sodium chloride 0.9 % 500 mL IVPB, 40 mmol, Intravenous, PRN **OR** sodium phosphates 20 mmol in sodium chloride 0.9 % 250 mL IVPB, 20 mmol, Intravenous, PRN, Suhail Keita II, MD  •  senna-docusate sodium (SENOKOT-S) 8.6-50 MG tablet 2 tablet, 2 tablet, Oral, BID, Fabian Daigle MD, 2 tablet at 12/13/19 2027  •  [COMPLETED] Insert peripheral IV, , , Once **AND** sodium  "chloride 0.9 % flush 10 mL, 10 mL, Intravenous, PRN, Suhail Keita II, MD  •  sodium chloride 0.9 % flush 10 mL, 10 mL, Intravenous, Q12H, Jeovany Mcmahon MD, 10 mL at 12/13/19 2027  •  sodium chloride 0.9 % flush 10 mL, 10 mL, Intravenous, PRN, Jeovany Mcmahon MD    Assessment/Plan     Patient Active Problem List   Diagnosis   • Thigh pain, musculoskeletal   • Left leg weakness   • History of CVA (cerebrovascular accident)   • COPD (chronic obstructive pulmonary disease)   • Coronary artery disease   • Cervical myelopathy (CMS/[facility])   • Parkinson's disease   • Debility   • Cerebrovascular disease   • Abnormal TSH   • Hypothyroidism (acquired)   • Hip fracture requiring operative repair, right, closed, initial encounter (CMS/[facility])   • Urinary tract infection without hematuria   • Hypoxia   • Sinus bradycardia   • Chest pain   • Hip pain, chronic, right   • Hematemesis   • Hematemesis with nausea   • H/O medication noncompliance   • Hyperlipidemia   • Muscle spasticity   • COPD exacerbation (CMS/[facility])   • Hypophosphatemia   • Pneumonia of right upper lobe due to infectious organism (CMS/[facility])   • Sepsis due to pneumonia (CMS/[facility])   • Sepsis with metabolic encephalopathy (CMS/[facility])   • Hypotension   Probable adrenal insufficiency which was ruled out  Postoperative hypothyroidism currently on medication  Orthostatic hypotension  Currently receiving midodrine with some improvement  Sepsis syndrome    Adrenal insufficiency ruled out  Patient is currently on midodrine for orthostatic symptoms  He is also on levothyroxine 200 mcg daily  TSH and free T4 are stable    Will check labs in a.m.  Marc Garza MD FACE.  12/14/19  8:20 AM      EMR Dragon / transcription disclaimer:     \"Dictated utilizing Dragon dictation\".   "

## 2019-12-14 NOTE — DISCHARGE SUMMARY
[facility]    Date of Discharge:  12/14/2019    PCP: Provider, No Known    Discharge Diagnosis:   Active Hospital Problems    Diagnosis  POA   • **Pneumonia of right upper lobe due to infectious organism (CMS/HCC) [J18.1]  Yes   • Sepsis due to pneumonia (CMS/HCC) [J18.9, A41.9]  Yes   • Sepsis with metabolic encephalopathy (CMS/HCC) [A41.9, R65.20, G93.41]  Yes   • Hypotension [I95.9]  No   • COPD exacerbation (CMS/HCC) [J44.1]  Yes   • Hypophosphatemia [E83.39]  Yes   • Hypothyroidism (acquired) [E03.9]  Yes   • Parkinson's disease [G20]  Yes   • Debility [R53.81]  Yes   • Coronary artery disease [I25.10]  Yes   • History of CVA (cerebrovascular accident) [Z86.73]  Not Applicable      Resolved Hospital Problems   No resolved problems to display.     Procedures Performed       Consults     Date and Time Order Name Status Description    12/11/2019 0030 Inpatient Endocrinology Consult Completed     12/8/2019 2232 Inpatient Pulmonology Consult Completed     12/8/2019 0316 Inpatient Cardiology Consult Completed     12/7/2019 1927 [facility] (on-call MD unless specified) Details Completed         Hospital Course  Please see history and physical for details. Patient is a 74 y.o. male initially admitted for generalized weakness.  This patient has a previous past history of a parkinsonian-like syndrome but no definitive Parkinson's disease.  He ultimately was also complaining of shortness of breath and had recently been treated for a UTI with Macrobid.  Patient started to develop worsening cough and hypotension and was empirically started on Zosyn for concerns of aspiration pneumonia.  Chest x-ray imaging at admission was not the most impressive but CT showed better aspects of the right upper lobe aspiration pneumonia.  Speech therapy followed while here.  Ultimately I counseled patient as well as spouse at bedside on the day prior to admission that this will likely be a  recurrent theme for them.  Also counseled my concerns for use of heavy antibiotics which can lead to C. difficile colitis in the future.  Currently speech recommends honey thick liquids but otherwise regular consistency of diet.  Hypotension is not due to adrenal insufficiency per endocrinology and cosyntropin test.  We initiated midodrine with a positive response his current blood pressures noted to be 118/60.  We have also discontinued his Sinemet as this can worsen hypotension with no exacerbation of any tremor issues so patient will be discharged off this medication as well.  Patient clinically feels better at this juncture.  He will require additional Yonker suction upon transition to rehab.  We will transition his IV Zosyn over to p.o. Levaquin for an additional 5 days to complete a 10-day treatment course.  I will defer to PCP after patient completes treatment course to maybe consider keeping this patient on some type of probiotic.  No family present at bedside on day I saw the patient on discharge but I did discuss further with RN to try to ensure a smooth transition to rehab.  Patient Jose has ambulette set up at 1 PM this afternoon with the assistance of a CCP.      Condition on Discharge: Improved.     Temp:  [97.6 °F (36.4 °C)-98.4 °F (36.9 °C)] 98.4 °F (36.9 °C)  Heart Rate:  [51-71] 57  Resp:  [16-24] 20  BP: ()/(46-63) 118/60  Body mass index is 20.49 kg/m².    Physical Exam   Constitutional: He is oriented to person, place, and time. He appears well-developed and well-nourished.   Elderly/frail/flat though clinically much improved   HENT:   Head: Normocephalic.   Eyes: Conjunctivae are normal.   Neck: Neck supple. No JVD present.   Cardiovascular: Normal rate and regular rhythm.   Pulmonary/Chest: Effort normal. No respiratory distress.   Decreased bases with rhonchi   Abdominal: Soft. Bowel sounds are normal. He exhibits no distension.   Musculoskeletal: He exhibits no edema.   Neurological:  He is alert and oriented to person, place, and time. No cranial nerve deficit.   Psychiatric: His behavior is normal.        Discharge Medications      New Medications      Instructions Start Date   hydrocortisone-bacitracin-zinc oxide-nystatin  Commonly known as:  MAGIC BARRIER   1 application, Topical, 2 Times Daily      ipratropium-albuterol 0.5-2.5 mg/3 ml nebulizer  Commonly known as:  DUO-NEB   3 mL, Nebulization, 4 Times Daily - RT      levoFLOXacin 750 MG tablet  Commonly known as:  LEVAQUIN   750 mg, Oral, Daily      midodrine 2.5 MG tablet  Commonly known as:  PROAMATINE   2.5 mg, Oral, 3 Times Daily Before Meals      polyethylene glycol packet  Commonly known as:  MIRALAX   17 g, Oral, Daily   Start Date:  December 15, 2019     senna-docusate sodium 8.6-50 MG tablet  Commonly known as:  SENOKOT-S   2 tablets, Oral, 2 Times Daily         Changes to Medications      Instructions Start Date   OLANZapine 5 MG tablet  Commonly known as:  zyPREXA  What changed:    · medication strength  · how much to take   5 mg, Oral, Nightly         Continue These Medications      Instructions Start Date   acetaminophen 325 MG tablet  Commonly known as:  TYLENOL   650 mg, Oral, Every 4 Hours PRN      albuterol (2.5 MG/3ML) 0.083% nebulizer solution  Commonly known as:  PROVENTIL   2.5 mg, Nebulization, Every 6 Hours PRN      arformoterol 15 MCG/2ML nebulizer solution  Commonly known as:  BROVANA   15 mcg, Nebulization, 2 Times Daily - RT      aspirin 325 MG EC tablet   325 mg, Oral, Daily      bisacodyl 5 MG EC tablet  Commonly known as:  DULCOLAX   10 mg, Oral, Daily PRN      busPIRone 5 MG tablet  Commonly known as:  BUSPAR   5 mg, Oral, 3 Times Daily PRN      cholecalciferol 25 MCG (1000 UT) tablet  Commonly known as:  VITAMIN D3   1,000 Units, Oral, Daily      diclofenac 1 % gel gel  Commonly known as:  VOLTAREN   4 g, Topical, Daily PRN      divalproex 500 MG DR tablet  Commonly known as:  DEPAKOTE   500 mg, Oral, 2  Times Daily      donepezil 10 MG tablet  Commonly known as:  ARICEPT   20 mg, Oral, Nightly      fluticasone 50 MCG/ACT nasal spray  Commonly known as:  FLONASE   1 spray, Nasal, Daily      levothyroxine 175 MCG tablet  Commonly known as:  SYNTHROID, LEVOTHROID   175 mcg, Oral, Daily      montelukast 10 MG tablet  Commonly known as:  SINGULAIR   10 mg, Oral, Nightly      multivitamin with minerals tablet tablet   1 tablet, Oral, Daily      ondansetron ODT 4 MG disintegrating tablet  Commonly known as:  ZOFRAN ODT   4 mg, Oral, Every 8 Hours PRN      oxybutynin XL 5 MG 24 hr tablet  Commonly known as:  DITROPAN-XL   5 mg, Oral, 2 Times Daily      pantoprazole 40 MG EC tablet  Commonly known as:  PROTONIX   40 mg, Oral, 2 Times Daily Before Meals      simvastatin 40 MG tablet  Commonly known as:  ZOCOR   40 mg, Oral, Nightly         Stop These Medications    carbidopa-levodopa  MG per tablet  Commonly known as:  SINEMET     nystatin 803477 UNIT/ML suspension  Commonly known as:  MYCOSTATIN     promethazine 25 MG tablet  Commonly known as:  PHENERGAN     sucralfate 1 g tablet  Commonly known as:  CARAFATE     traMADol 50 MG tablet  Commonly known as:  ULTRAM             Additional Instructions for the Follow-ups that You Need to Schedule     Discharge Follow-up with PCP   As directed       Currently Documented PCP:    Provider, No Known    PCP Phone Number:    [phone]     Follow Up Details:  pcp post rehab. Endo/Pulm per their recs            Contact information for follow-up providers     Provider, No Known .    Why:  pcp post rehab. Endo/Pulm per their recs  Contact information:  [facility] [id number] [phone]                   Contact information for after-discharge care     Destination     [facility] .    Service:  Skilled Nursing  Contact information:  [address] [place] [id number] [phone]                           Test  Results Pending at Discharge     Braden Onofre MD  12/14/19  11:09 AM    Discharge time spent greater than 30 minutes.

## 2019-12-14 NOTE — PLAN OF CARE
Pt has had no c/o of pain this shift. No SOA. Tolerating all oral meds. Q2 turns. Assist x's 2. VSS and BP in 110s systolic. D/C to [facility] today. Will continue to monitor.

## 2019-12-14 NOTE — PLAN OF CARE
Problem: Patient Care Overview  Goal: Plan of Care Review  Outcome: Ongoing (interventions implemented as appropriate)  Flowsheets (Taken 12/14/2019 7134)  Progress: improving  Plan of Care Reviewed With: patient  Outcome Summary: VSS, am labs, turn Q2, BM tonight, IV antibx, TCDB, possible d/c rehab

## 2019-12-16 NOTE — PROGRESS NOTES
Discharge Planning Assessment  [facility]     Patient Name: Victor Manuel Issa  MRN: [id number]  Today's Date: 12/16/2019    Admit Date: 12/7/2019    Discharge Needs Assessment    No documentation.       Discharge Plan     Row Name 12/16/19 1225       Plan    Final Discharge Disposition Code  03 - skilled nursing facility (SNF)    Final Note  Signature east        Destination - Selection Complete      Service Provider Request Status Selected Services Address Phone Number Fax Number    SIGNATURE [facility] Selected Skilled Nursing [address] [facility] [address] [phone]    [facility] Accepted N/A [address] [facility] [address] [phone]      Durable Medical Equipment      Coordination has not been started for this encounter.      Dialysis/Infusion      Coordination has not been started for this encounter.      Home Medical Care      Service Provider Request Status Selected Services Address Phone Number Fax Number    [facility] Accepted N/A [id number] [address]LE[address] BAIN [address] [place] [id number] [phone]      Therapy      Coordination has not been started for this encounter.      Community Resources      Coordination has not been started for this encounter.        Expected Discharge Date and Time     Expected Discharge Date Expected Discharge Time    Dec 14, 2019         Demographic Summary    No documentation.       Functional Status    No documentation.       Psychosocial    No documentation.       Abuse/Neglect    No documentation.       Legal    No documentation.       Substance Abuse    No documentation.       Patient Forms    No documentation.           Anupama Justin, RN

## 2020-01-01 ENCOUNTER — ANESTHESIA (OUTPATIENT)
Dept: PERIOP | Facility: HOSPITAL | Age: 75
End: 2020-01-01

## 2020-01-01 ENCOUNTER — APPOINTMENT (OUTPATIENT)
Dept: NUCLEAR MEDICINE | Facility: HOSPITAL | Age: 75
End: 2020-01-01

## 2020-01-01 ENCOUNTER — APPOINTMENT (OUTPATIENT)
Dept: GENERAL RADIOLOGY | Facility: HOSPITAL | Age: 75
End: 2020-01-01

## 2020-01-01 ENCOUNTER — APPOINTMENT (OUTPATIENT)
Dept: CT IMAGING | Facility: HOSPITAL | Age: 75
End: 2020-01-01

## 2020-01-01 ENCOUNTER — APPOINTMENT (OUTPATIENT)
Dept: CARDIOLOGY | Facility: HOSPITAL | Age: 75
End: 2020-01-01

## 2020-01-01 ENCOUNTER — HOSPITAL ENCOUNTER (INPATIENT)
Facility: HOSPITAL | Age: 75
LOS: 1 days | End: 2020-09-03
Attending: EMERGENCY MEDICINE | Admitting: INTERNAL MEDICINE

## 2020-01-01 ENCOUNTER — HOSPITAL ENCOUNTER (OUTPATIENT)
Facility: HOSPITAL | Age: 75
Setting detail: OBSERVATION
LOS: 1 days | Discharge: HOME | End: 2020-02-14
Attending: EMERGENCY MEDICINE | Admitting: INTERNAL MEDICINE

## 2020-01-01 ENCOUNTER — ANESTHESIA EVENT (OUTPATIENT)
Dept: PERIOP | Facility: HOSPITAL | Age: 75
End: 2020-01-01

## 2020-01-01 VITALS
HEART RATE: 63 BPM | SYSTOLIC BLOOD PRESSURE: 133 MMHG | OXYGEN SATURATION: 100 % | DIASTOLIC BLOOD PRESSURE: 66 MMHG | WEIGHT: 159.7 LBS | TEMPERATURE: 97.8 F | RESPIRATION RATE: 16 BRPM | BODY MASS INDEX: 22.36 KG/M2 | HEIGHT: 71 IN

## 2020-01-01 VITALS
HEIGHT: 70 IN | OXYGEN SATURATION: 67 % | DIASTOLIC BLOOD PRESSURE: 20 MMHG | RESPIRATION RATE: 34 BRPM | SYSTOLIC BLOOD PRESSURE: 60 MMHG | BODY MASS INDEX: 22.31 KG/M2 | TEMPERATURE: 98.4 F | WEIGHT: 155.87 LBS

## 2020-01-01 DIAGNOSIS — E83.42 HYPOMAGNESEMIA: ICD-10-CM

## 2020-01-01 DIAGNOSIS — G93.40 ACUTE ENCEPHALOPATHY: ICD-10-CM

## 2020-01-01 DIAGNOSIS — R55 SYNCOPE, UNSPECIFIED SYNCOPE TYPE: Primary | ICD-10-CM

## 2020-01-01 DIAGNOSIS — E83.51 HYPOCALCEMIA: ICD-10-CM

## 2020-01-01 DIAGNOSIS — E87.0 HYPERNATREMIA: ICD-10-CM

## 2020-01-01 DIAGNOSIS — N20.1 LEFT URETERAL STONE: ICD-10-CM

## 2020-01-01 DIAGNOSIS — E87.6 HYPOKALEMIA: ICD-10-CM

## 2020-01-01 DIAGNOSIS — A41.9 SEPSIS, DUE TO UNSPECIFIED ORGANISM, UNSPECIFIED WHETHER ACUTE ORGAN DYSFUNCTION PRESENT (HCC): ICD-10-CM

## 2020-01-01 DIAGNOSIS — R00.1 BRADYCARDIA: ICD-10-CM

## 2020-01-01 DIAGNOSIS — J96.01 ACUTE RESPIRATORY FAILURE WITH HYPOXIA (HCC): Primary | ICD-10-CM

## 2020-01-01 DIAGNOSIS — A41.9 SEPSIS (HCC): ICD-10-CM

## 2020-01-01 DIAGNOSIS — D64.9 ACUTE ANEMIA: ICD-10-CM

## 2020-01-01 DIAGNOSIS — I95.9 HYPOTENSION, UNSPECIFIED HYPOTENSION TYPE: ICD-10-CM

## 2020-01-01 DIAGNOSIS — D64.9 CHRONIC ANEMIA: ICD-10-CM

## 2020-01-01 DIAGNOSIS — R94.31 ABNORMAL EKG: ICD-10-CM

## 2020-01-01 LAB
ABO GROUP BLD: NORMAL
ABO GROUP BLD: NORMAL
ALBUMIN SERPL-MCNC: 0.8 G/DL (ref 3.5–5.2)
ALBUMIN SERPL-MCNC: 0.9 G/DL (ref 3.5–5.2)
ALBUMIN SERPL-MCNC: 2.1 G/DL (ref 3.5–5.2)
ALBUMIN SERPL-MCNC: 2.2 G/DL (ref 3.5–5.2)
ALBUMIN SERPL-MCNC: 2.6 G/DL (ref 3.5–5.2)
ALBUMIN SERPL-MCNC: 3.4 G/DL (ref 3.5–5.2)
ALBUMIN/GLOB SERPL: 0.6 G/DL
ALBUMIN/GLOB SERPL: 0.8 G/DL
ALBUMIN/GLOB SERPL: 0.9 G/DL
ALBUMIN/GLOB SERPL: 0.9 G/DL
ALBUMIN/GLOB SERPL: 1 G/DL
ALBUMIN/GLOB SERPL: 1.1 G/DL
ALP SERPL-CCNC: 106 U/L (ref 39–117)
ALP SERPL-CCNC: 175 U/L (ref 39–117)
ALP SERPL-CCNC: 251 U/L (ref 39–117)
ALP SERPL-CCNC: 288 U/L (ref 39–117)
ALP SERPL-CCNC: 322 U/L (ref 39–117)
ALP SERPL-CCNC: 72 U/L (ref 39–117)
ALT SERPL W P-5'-P-CCNC: 24 U/L (ref 1–41)
ALT SERPL W P-5'-P-CCNC: 243 U/L (ref 1–41)
ALT SERPL W P-5'-P-CCNC: 258 U/L (ref 1–41)
ALT SERPL W P-5'-P-CCNC: 288 U/L (ref 1–41)
ALT SERPL W P-5'-P-CCNC: 92 U/L (ref 1–41)
ALT SERPL W P-5'-P-CCNC: <5 U/L (ref 1–41)
ANION GAP SERPL CALCULATED.3IONS-SCNC: 10 MMOL/L (ref 5–15)
ANION GAP SERPL CALCULATED.3IONS-SCNC: 10.1 MMOL/L (ref 5–15)
ANION GAP SERPL CALCULATED.3IONS-SCNC: 12.6 MMOL/L (ref 5–15)
ANION GAP SERPL CALCULATED.3IONS-SCNC: 19 MMOL/L (ref 5–15)
ANION GAP SERPL CALCULATED.3IONS-SCNC: 20.1 MMOL/L (ref 5–15)
ANION GAP SERPL CALCULATED.3IONS-SCNC: 20.8 MMOL/L (ref 5–15)
ANION GAP SERPL CALCULATED.3IONS-SCNC: 8.9 MMOL/L (ref 5–15)
ANION GAP SERPL CALCULATED.3IONS-SCNC: 9.8 MMOL/L (ref 5–15)
AORTIC DIMENSIONLESS INDEX: 0.7 (DI)
ARTERIAL PATENCY WRIST A: ABNORMAL
ARTERIAL PATENCY WRIST A: POSITIVE
AST SERPL-CCNC: 1007 U/L (ref 1–40)
AST SERPL-CCNC: 12 U/L (ref 1–40)
AST SERPL-CCNC: 329 U/L (ref 1–40)
AST SERPL-CCNC: 739 U/L (ref 1–40)
AST SERPL-CCNC: 88 U/L (ref 1–40)
AST SERPL-CCNC: 973 U/L (ref 1–40)
ATMOSPHERIC PRESS: 748 MMHG
ATMOSPHERIC PRESS: 748.1 MMHG
B PARAPERT DNA SPEC QL NAA+PROBE: NOT DETECTED
B PERT DNA SPEC QL NAA+PROBE: NOT DETECTED
BACTERIA BLD CULT: ABNORMAL
BASE EXCESS BLDA CALC-SCNC: -11.5 MMOL/L (ref 0–2)
BASE EXCESS BLDA CALC-SCNC: -15.5 MMOL/L (ref 0–2)
BASOPHILS # BLD AUTO: 0.04 10*3/MM3 (ref 0–0.2)
BASOPHILS # BLD AUTO: 0.05 10*3/MM3 (ref 0–0.2)
BASOPHILS NFR BLD AUTO: 0.4 % (ref 0–1.5)
BASOPHILS NFR BLD AUTO: 0.5 % (ref 0–1.5)
BDY SITE: ABNORMAL
BDY SITE: ABNORMAL
BH CV ECHO MEAS - AO MAX PG (FULL): 3.1 MMHG
BH CV ECHO MEAS - AO MAX PG: 7.1 MMHG
BH CV ECHO MEAS - AO MEAN PG (FULL): 1 MMHG
BH CV ECHO MEAS - AO MEAN PG: 3 MMHG
BH CV ECHO MEAS - AO ROOT AREA (BSA CORRECTED): 1.7
BH CV ECHO MEAS - AO ROOT AREA: 8 CM^2
BH CV ECHO MEAS - AO ROOT DIAM: 3.2 CM
BH CV ECHO MEAS - AO V2 MAX: 133 CM/SEC
BH CV ECHO MEAS - AO V2 MEAN: 83.7 CM/SEC
BH CV ECHO MEAS - AO V2 VTI: 31.7 CM
BH CV ECHO MEAS - AVA(I,A): 3 CM^2
BH CV ECHO MEAS - AVA(I,D): 3 CM^2
BH CV ECHO MEAS - AVA(V,A): 3.1 CM^2
BH CV ECHO MEAS - AVA(V,D): 3.1 CM^2
BH CV ECHO MEAS - BSA(HAYCOCK): 1.9 M^2
BH CV ECHO MEAS - BSA: 1.9 M^2
BH CV ECHO MEAS - BZI_BMI: 22.2 KILOGRAMS/M^2
BH CV ECHO MEAS - BZI_METRIC_HEIGHT: 180.3 CM
BH CV ECHO MEAS - BZI_METRIC_WEIGHT: 72.1 KG
BH CV ECHO MEAS - EDV(CUBED): 110.6 ML
BH CV ECHO MEAS - EDV(MOD-SP2): 80 ML
BH CV ECHO MEAS - EDV(MOD-SP4): 89 ML
BH CV ECHO MEAS - EDV(TEICH): 107.5 ML
BH CV ECHO MEAS - EF(CUBED): 82.2 %
BH CV ECHO MEAS - EF(MOD-BP): 61 %
BH CV ECHO MEAS - EF(MOD-SP2): 57.5 %
BH CV ECHO MEAS - EF(MOD-SP4): 61.8 %
BH CV ECHO MEAS - EF(TEICH): 74.9 %
BH CV ECHO MEAS - ESV(CUBED): 19.7 ML
BH CV ECHO MEAS - ESV(MOD-SP2): 34 ML
BH CV ECHO MEAS - ESV(MOD-SP4): 34 ML
BH CV ECHO MEAS - ESV(TEICH): 27 ML
BH CV ECHO MEAS - FS: 43.8 %
BH CV ECHO MEAS - IVS/LVPW: 1
BH CV ECHO MEAS - IVSD: 1 CM
BH CV ECHO MEAS - LAT PEAK E' VEL: 13.3 CM/SEC
BH CV ECHO MEAS - LV DIASTOLIC VOL/BSA (35-75): 46.5 ML/M^2
BH CV ECHO MEAS - LV MASS(C)D: 170.2 GRAMS
BH CV ECHO MEAS - LV MASS(C)DI: 89 GRAMS/M^2
BH CV ECHO MEAS - LV MAX PG: 4 MMHG
BH CV ECHO MEAS - LV MEAN PG: 2 MMHG
BH CV ECHO MEAS - LV SYSTOLIC VOL/BSA (12-30): 17.8 ML/M^2
BH CV ECHO MEAS - LV V1 MAX: 100 CM/SEC
BH CV ECHO MEAS - LV V1 MEAN: 62.5 CM/SEC
BH CV ECHO MEAS - LV V1 VTI: 22.6 CM
BH CV ECHO MEAS - LVIDD: 4.8 CM
BH CV ECHO MEAS - LVIDS: 2.7 CM
BH CV ECHO MEAS - LVLD AP2: 6.8 CM
BH CV ECHO MEAS - LVLD AP4: 7.2 CM
BH CV ECHO MEAS - LVLS AP2: 6 CM
BH CV ECHO MEAS - LVLS AP4: 6.1 CM
BH CV ECHO MEAS - LVOT AREA (M): 4.2 CM^2
BH CV ECHO MEAS - LVOT AREA: 4.2 CM^2
BH CV ECHO MEAS - LVOT DIAM: 2.3 CM
BH CV ECHO MEAS - LVPWD: 1 CM
BH CV ECHO MEAS - MED PEAK E' VEL: 5.3 CM/SEC
BH CV ECHO MEAS - MV A MAX VEL: 61.1 CM/SEC
BH CV ECHO MEAS - MV DEC SLOPE: 326.5 CM/SEC^2
BH CV ECHO MEAS - MV DEC TIME: 243 SEC
BH CV ECHO MEAS - MV E MAX VEL: 75.2 CM/SEC
BH CV ECHO MEAS - MV E/A: 1.2
BH CV ECHO MEAS - MV MAX PG: 3 MMHG
BH CV ECHO MEAS - MV MEAN PG: 1 MMHG
BH CV ECHO MEAS - MV P1/2T MAX VEL: 86.9 CM/SEC
BH CV ECHO MEAS - MV P1/2T: 78 MSEC
BH CV ECHO MEAS - MV V2 MAX: 86.2 CM/SEC
BH CV ECHO MEAS - MV V2 MEAN: 37.9 CM/SEC
BH CV ECHO MEAS - MV V2 VTI: 26.9 CM
BH CV ECHO MEAS - MVA P1/2T LCG: 2.5 CM^2
BH CV ECHO MEAS - MVA(P1/2T): 2.8 CM^2
BH CV ECHO MEAS - MVA(VTI): 3.5 CM^2
BH CV ECHO MEAS - RAP SYSTOLE: 3 MMHG
BH CV ECHO MEAS - SI(AO): 133.3 ML/M^2
BH CV ECHO MEAS - SI(CUBED): 47.5 ML/M^2
BH CV ECHO MEAS - SI(LVOT): 49.1 ML/M^2
BH CV ECHO MEAS - SI(MOD-SP2): 24 ML/M^2
BH CV ECHO MEAS - SI(MOD-SP4): 28.8 ML/M^2
BH CV ECHO MEAS - SI(TEICH): 42.1 ML/M^2
BH CV ECHO MEAS - SV(AO): 254.9 ML
BH CV ECHO MEAS - SV(CUBED): 90.9 ML
BH CV ECHO MEAS - SV(LVOT): 93.9 ML
BH CV ECHO MEAS - SV(MOD-SP2): 46 ML
BH CV ECHO MEAS - SV(MOD-SP4): 55 ML
BH CV ECHO MEAS - SV(TEICH): 80.5 ML
BH CV ECHO MEAS - TAPSE (>1.6): 1.6 CM2
BH CV ECHO MEASUREMENTS AVERAGE E/E' RATIO: 8.09
BH CV STRESS COMMENTS STAGE 1: NORMAL
BH CV STRESS DOSE REGADENOSON STAGE 1: 0.4
BH CV STRESS DURATION MIN STAGE 1: 0
BH CV STRESS DURATION SEC STAGE 1: 10
BH CV STRESS PROTOCOL 1: NORMAL
BH CV STRESS RECOVERY BP: NORMAL MMHG
BH CV STRESS RECOVERY HR: 75 BPM
BH CV STRESS STAGE 1: 1
BH CV XLRA - RV BASE: 3.4 CM
BH CV XLRA - TDI S': 9.1 CM/SEC
BILIRUB SERPL-MCNC: 0.3 MG/DL (ref 0.2–1.2)
BILIRUB SERPL-MCNC: 1.2 MG/DL (ref 0–1.2)
BILIRUB SERPL-MCNC: 1.6 MG/DL (ref 0–1.2)
BILIRUB SERPL-MCNC: 3.6 MG/DL (ref 0–1.2)
BILIRUB SERPL-MCNC: 3.8 MG/DL (ref 0–1.2)
BILIRUB SERPL-MCNC: 4.1 MG/DL (ref 0–1.2)
BLD GP AB SCN SERPL QL: NEGATIVE
BUN BLD-MCNC: 14 MG/DL (ref 8–23)
BUN BLD-MCNC: 16 MG/DL (ref 8–23)
BUN BLD-MCNC: 19 MG/DL (ref 8–23)
BUN SERPL-MCNC: 16 MG/DL (ref 8–23)
BUN SERPL-MCNC: 18 MG/DL (ref 8–23)
BUN SERPL-MCNC: 32 MG/DL (ref 8–23)
BUN SERPL-MCNC: 33 MG/DL (ref 8–23)
BUN SERPL-MCNC: 43 MG/DL (ref 8–23)
BUN/CREAT SERPL: 13 (ref 7–25)
BUN/CREAT SERPL: 13.6 (ref 7–25)
BUN/CREAT SERPL: 13.7 (ref 7–25)
BUN/CREAT SERPL: 13.8 (ref 7–25)
BUN/CREAT SERPL: 16.2 (ref 7–25)
BUN/CREAT SERPL: 17.9 (ref 7–25)
BUN/CREAT SERPL: 22.2 (ref 7–25)
BUN/CREAT SERPL: 26.4 (ref 7–25)
BURR CELLS BLD QL SMEAR: ABNORMAL
C PNEUM DNA NPH QL NAA+NON-PROBE: NOT DETECTED
CALCIUM SPEC-SCNC: 4 MG/DL (ref 8.6–10.5)
CALCIUM SPEC-SCNC: 4.6 MG/DL (ref 8.6–10.5)
CALCIUM SPEC-SCNC: 8.5 MG/DL (ref 8.6–10.5)
CALCIUM SPEC-SCNC: 9 MG/DL (ref 8.6–10.5)
CALCIUM SPEC-SCNC: 9 MG/DL (ref 8.6–10.5)
CALCIUM SPEC-SCNC: 9.1 MG/DL (ref 8.6–10.5)
CALCIUM SPEC-SCNC: 9.6 MG/DL (ref 8.6–10.5)
CALCIUM SPEC-SCNC: 9.8 MG/DL (ref 8.6–10.5)
CHLORIDE SERPL-SCNC: 108 MMOL/L (ref 98–107)
CHLORIDE SERPL-SCNC: 110 MMOL/L (ref 98–107)
CHLORIDE SERPL-SCNC: 111 MMOL/L (ref 98–107)
CHLORIDE SERPL-SCNC: 131 MMOL/L (ref 98–107)
CHLORIDE SERPL-SCNC: 131 MMOL/L (ref 98–107)
CO2 SERPL-SCNC: 13.9 MMOL/L (ref 22–29)
CO2 SERPL-SCNC: 14.2 MMOL/L (ref 22–29)
CO2 SERPL-SCNC: 16 MMOL/L (ref 22–29)
CO2 SERPL-SCNC: 21.9 MMOL/L (ref 22–29)
CO2 SERPL-SCNC: 23.2 MMOL/L (ref 22–29)
CO2 SERPL-SCNC: 24 MMOL/L (ref 22–29)
CO2 SERPL-SCNC: 6.1 MMOL/L (ref 22–29)
CO2 SERPL-SCNC: 7.4 MMOL/L (ref 22–29)
CREAT BLD-MCNC: 0.72 MG/DL (ref 0.76–1.27)
CREAT BLD-MCNC: 0.72 MG/DL (ref 0.76–1.27)
CREAT BLD-MCNC: 0.78 MG/DL (ref 0.76–1.27)
CREAT SERPL-MCNC: 1.11 MG/DL (ref 0.76–1.27)
CREAT SERPL-MCNC: 1.16 MG/DL (ref 0.76–1.27)
CREAT SERPL-MCNC: 2.42 MG/DL (ref 0.76–1.27)
CREAT SERPL-MCNC: 2.47 MG/DL (ref 0.76–1.27)
CREAT SERPL-MCNC: 3.14 MG/DL (ref 0.76–1.27)
CRP SERPL-MCNC: 5.28 MG/DL (ref 0–0.5)
D-LACTATE SERPL-SCNC: 5.9 MMOL/L (ref 0.5–2)
D-LACTATE SERPL-SCNC: 7.8 MMOL/L (ref 0.5–2)
DEPRECATED RDW RBC AUTO: 43.5 FL (ref 37–54)
DEPRECATED RDW RBC AUTO: 44.1 FL (ref 37–54)
DEPRECATED RDW RBC AUTO: 44.2 FL (ref 37–54)
DEPRECATED RDW RBC AUTO: 45.5 FL (ref 37–54)
DEPRECATED RDW RBC AUTO: 46 FL (ref 37–54)
DEPRECATED RDW RBC AUTO: 49.3 FL (ref 37–54)
EOSINOPHIL # BLD AUTO: 0.14 10*3/MM3 (ref 0–0.4)
EOSINOPHIL # BLD AUTO: 0.2 10*3/MM3 (ref 0–0.4)
EOSINOPHIL NFR BLD AUTO: 1.3 % (ref 0.3–6.2)
EOSINOPHIL NFR BLD AUTO: 1.9 % (ref 0.3–6.2)
ERYTHROCYTE [DISTWIDTH] IN BLOOD BY AUTOMATED COUNT: 12.3 % (ref 12.3–15.4)
ERYTHROCYTE [DISTWIDTH] IN BLOOD BY AUTOMATED COUNT: 12.5 % (ref 12.3–15.4)
ERYTHROCYTE [DISTWIDTH] IN BLOOD BY AUTOMATED COUNT: 12.7 % (ref 12.3–15.4)
ERYTHROCYTE [DISTWIDTH] IN BLOOD BY AUTOMATED COUNT: 13 % (ref 12.3–15.4)
ERYTHROCYTE [DISTWIDTH] IN BLOOD BY AUTOMATED COUNT: 13.2 % (ref 12.3–15.4)
ERYTHROCYTE [DISTWIDTH] IN BLOOD BY AUTOMATED COUNT: 13.5 % (ref 12.3–15.4)
EXPIRATION DATE: NORMAL
FECAL OCCULT BLOOD SCREEN, POC: NEGATIVE
FLUAV H1 2009 PAND RNA NPH QL NAA+PROBE: NOT DETECTED
FLUAV H1 HA GENE NPH QL NAA+PROBE: NOT DETECTED
FLUAV H3 RNA NPH QL NAA+PROBE: NOT DETECTED
FLUAV SUBTYP SPEC NAA+PROBE: NOT DETECTED
FLUBV RNA ISLT QL NAA+PROBE: NOT DETECTED
GFR SERPL CREATININE-BSD FRML MDRD: 106 ML/MIN/1.73
GFR SERPL CREATININE-BSD FRML MDRD: 106 ML/MIN/1.73
GFR SERPL CREATININE-BSD FRML MDRD: 19 ML/MIN/1.73
GFR SERPL CREATININE-BSD FRML MDRD: 26 ML/MIN/1.73
GFR SERPL CREATININE-BSD FRML MDRD: 26 ML/MIN/1.73
GFR SERPL CREATININE-BSD FRML MDRD: 61 ML/MIN/1.73
GFR SERPL CREATININE-BSD FRML MDRD: 64 ML/MIN/1.73
GFR SERPL CREATININE-BSD FRML MDRD: 97 ML/MIN/1.73
GLOBULIN UR ELPH-MCNC: 1 GM/DL
GLOBULIN UR ELPH-MCNC: 1.3 GM/DL
GLOBULIN UR ELPH-MCNC: 2.3 GM/DL
GLOBULIN UR ELPH-MCNC: 2.4 GM/DL
GLOBULIN UR ELPH-MCNC: 2.7 GM/DL
GLOBULIN UR ELPH-MCNC: 3.3 GM/DL
GLUCOSE BLD-MCNC: 112 MG/DL (ref 65–99)
GLUCOSE BLD-MCNC: 121 MG/DL (ref 65–99)
GLUCOSE BLD-MCNC: 95 MG/DL (ref 65–99)
GLUCOSE BLDC GLUCOMTR-MCNC: 107 MG/DL (ref 70–130)
GLUCOSE BLDC GLUCOMTR-MCNC: 113 MG/DL (ref 70–130)
GLUCOSE BLDC GLUCOMTR-MCNC: 88 MG/DL (ref 70–130)
GLUCOSE SERPL-MCNC: 105 MG/DL (ref 65–99)
GLUCOSE SERPL-MCNC: 124 MG/DL (ref 65–99)
GLUCOSE SERPL-MCNC: 147 MG/DL (ref 65–99)
GLUCOSE SERPL-MCNC: 42 MG/DL (ref 65–99)
GLUCOSE SERPL-MCNC: 70 MG/DL (ref 65–99)
HADV DNA SPEC NAA+PROBE: NOT DETECTED
HBA1C MFR BLD: 4.82 % (ref 4.8–5.6)
HCO3 BLDA-SCNC: 12.5 MMOL/L (ref 22–28)
HCO3 BLDA-SCNC: 14.6 MMOL/L (ref 22–28)
HCOV 229E RNA SPEC QL NAA+PROBE: NOT DETECTED
HCOV HKU1 RNA SPEC QL NAA+PROBE: NOT DETECTED
HCOV NL63 RNA SPEC QL NAA+PROBE: NOT DETECTED
HCOV OC43 RNA SPEC QL NAA+PROBE: NOT DETECTED
HCT VFR BLD AUTO: 14.1 % (ref 37.5–51)
HCT VFR BLD AUTO: 23 % (ref 37.5–51)
HCT VFR BLD AUTO: 32.4 % (ref 37.5–51)
HCT VFR BLD AUTO: 32.9 % (ref 37.5–51)
HCT VFR BLD AUTO: 35.1 % (ref 37.5–51)
HCT VFR BLD AUTO: 37.3 % (ref 37.5–51)
HGB BLD-MCNC: 10.8 G/DL (ref 13–17.7)
HGB BLD-MCNC: 10.9 G/DL (ref 13–17.7)
HGB BLD-MCNC: 12.2 G/DL (ref 13–17.7)
HGB BLD-MCNC: 12.6 G/DL (ref 13–17.7)
HGB BLD-MCNC: 4.6 G/DL (ref 13–17.7)
HGB BLD-MCNC: 7.4 G/DL (ref 13–17.7)
HMPV RNA NPH QL NAA+NON-PROBE: NOT DETECTED
HPIV1 RNA SPEC QL NAA+PROBE: NOT DETECTED
HPIV2 RNA SPEC QL NAA+PROBE: NOT DETECTED
HPIV3 RNA NPH QL NAA+PROBE: NOT DETECTED
HPIV4 P GENE NPH QL NAA+PROBE: NOT DETECTED
IMM GRANULOCYTES # BLD AUTO: 0.03 10*3/MM3 (ref 0–0.05)
IMM GRANULOCYTES # BLD AUTO: 0.04 10*3/MM3 (ref 0–0.05)
IMM GRANULOCYTES NFR BLD AUTO: 0.3 % (ref 0–0.5)
IMM GRANULOCYTES NFR BLD AUTO: 0.4 % (ref 0–0.5)
INHALED O2 CONCENTRATION: 100 %
INHALED O2 CONCENTRATION: 50 %
LACTATE HOLD SPECIMEN: NORMAL
LEFT ATRIUM VOLUME INDEX: 28 ML/M2
LV EF NUC BP: 60 %
LYMPHOCYTES # BLD AUTO: 2.12 10*3/MM3 (ref 0.7–3.1)
LYMPHOCYTES # BLD AUTO: 2.82 10*3/MM3 (ref 0.7–3.1)
LYMPHOCYTES # BLD MANUAL: 0.34 10*3/MM3 (ref 0.7–3.1)
LYMPHOCYTES # BLD MANUAL: 0.92 10*3/MM3 (ref 0.7–3.1)
LYMPHOCYTES # BLD MANUAL: 1.36 10*3/MM3 (ref 0.7–3.1)
LYMPHOCYTES NFR BLD AUTO: 19.4 % (ref 19.6–45.3)
LYMPHOCYTES NFR BLD AUTO: 27.2 % (ref 19.6–45.3)
LYMPHOCYTES NFR BLD MANUAL: 1 % (ref 5–12)
LYMPHOCYTES NFR BLD MANUAL: 16 % (ref 19.6–45.3)
LYMPHOCYTES NFR BLD MANUAL: 2 % (ref 19.6–45.3)
LYMPHOCYTES NFR BLD MANUAL: 2 % (ref 19.6–45.3)
LYMPHOCYTES NFR BLD MANUAL: 2 % (ref 5–12)
LYMPHOCYTES NFR BLD MANUAL: 5 % (ref 5–12)
Lab: 146
M PNEUMO IGG SER IA-ACNC: NOT DETECTED
MAGNESIUM SERPL-MCNC: 1 MG/DL (ref 1.6–2.4)
MAGNESIUM SERPL-MCNC: 2 MG/DL (ref 1.6–2.4)
MAGNESIUM SERPL-MCNC: 2 MG/DL (ref 1.6–2.4)
MAGNESIUM SERPL-MCNC: 2.1 MG/DL (ref 1.6–2.4)
MAGNESIUM SERPL-MCNC: 2.5 MG/DL (ref 1.6–2.4)
MAXIMAL PREDICTED HEART RATE: 144 BPM
MCH RBC QN AUTO: 31.4 PG (ref 26.6–33)
MCH RBC QN AUTO: 31.9 PG (ref 26.6–33)
MCH RBC QN AUTO: 31.9 PG (ref 26.6–33)
MCH RBC QN AUTO: 32.2 PG (ref 26.6–33)
MCH RBC QN AUTO: 32.3 PG (ref 26.6–33)
MCH RBC QN AUTO: 33 PG (ref 26.6–33)
MCHC RBC AUTO-ENTMCNC: 32.2 G/DL (ref 31.5–35.7)
MCHC RBC AUTO-ENTMCNC: 32.6 G/DL (ref 31.5–35.7)
MCHC RBC AUTO-ENTMCNC: 33.1 G/DL (ref 31.5–35.7)
MCHC RBC AUTO-ENTMCNC: 33.3 G/DL (ref 31.5–35.7)
MCHC RBC AUTO-ENTMCNC: 33.8 G/DL (ref 31.5–35.7)
MCHC RBC AUTO-ENTMCNC: 34.8 G/DL (ref 31.5–35.7)
MCV RBC AUTO: 94.2 FL (ref 79–97)
MCV RBC AUTO: 94.9 FL (ref 79–97)
MCV RBC AUTO: 95.4 FL (ref 79–97)
MCV RBC AUTO: 97.6 FL (ref 79–97)
MCV RBC AUTO: 97.9 FL (ref 79–97)
MCV RBC AUTO: 99.1 FL (ref 79–97)
METAMYELOCYTES NFR BLD MANUAL: 1 % (ref 0–0)
METAMYELOCYTES NFR BLD MANUAL: 6 % (ref 0–0)
METAMYELOCYTES NFR BLD MANUAL: 9 % (ref 0–0)
MODALITY: ABNORMAL
MODALITY: ABNORMAL
MONOCYTES # BLD AUTO: 0.09 10*3/MM3 (ref 0.1–0.9)
MONOCYTES # BLD AUTO: 0.34 10*3/MM3 (ref 0.1–0.9)
MONOCYTES # BLD AUTO: 0.54 10*3/MM3 (ref 0.1–0.9)
MONOCYTES # BLD AUTO: 0.58 10*3/MM3 (ref 0.1–0.9)
MONOCYTES # BLD AUTO: 2.29 10*3/MM3 (ref 0.1–0.9)
MONOCYTES NFR BLD AUTO: 4.9 % (ref 5–12)
MONOCYTES NFR BLD AUTO: 5.6 % (ref 5–12)
MYELOCYTES NFR BLD MANUAL: 14 % (ref 0–0)
MYELOCYTES NFR BLD MANUAL: 3 % (ref 0–0)
MYELOCYTES NFR BLD MANUAL: 3 % (ref 0–0)
NEGATIVE CONTROL: NEGATIVE
NEUTROPHILS # BLD AUTO: 15.51 10*3/MM3 (ref 1.7–7)
NEUTROPHILS # BLD AUTO: 38.44 10*3/MM3 (ref 1.7–7)
NEUTROPHILS # BLD AUTO: 5.12 10*3/MM3 (ref 1.7–7)
NEUTROPHILS # BLD AUTO: 6.7 10*3/MM3 (ref 1.7–7)
NEUTROPHILS # BLD AUTO: 8.06 10*3/MM3 (ref 1.7–7)
NEUTROPHILS NFR BLD AUTO: 64.5 % (ref 42.7–76)
NEUTROPHILS NFR BLD AUTO: 73.6 % (ref 42.7–76)
NEUTROPHILS NFR BLD MANUAL: 60 % (ref 42.7–76)
NEUTROPHILS NFR BLD MANUAL: 84 % (ref 42.7–76)
NEUTROPHILS NFR BLD MANUAL: 92 % (ref 42.7–76)
NEUTS VAC BLD QL SMEAR: ABNORMAL
NRBC BLD AUTO-RTO: 0 /100 WBC (ref 0–0.2)
NRBC BLD AUTO-RTO: 0 /100 WBC (ref 0–0.2)
NRBC SPEC MANUAL: 3 /100 WBC (ref 0–0.2)
NT-PROBNP SERPL-MCNC: 2221 PG/ML (ref 0–1800)
NT-PROBNP SERPL-MCNC: 260.7 PG/ML (ref 5–1800)
O2 A-A PPRESDIFF RESPIRATORY: 0.2 MMHG
O2 A-A PPRESDIFF RESPIRATORY: 0.5 MMHG
PCO2 BLDA: 33 MM HG (ref 35–45)
PCO2 BLDA: 36.4 MM HG (ref 35–45)
PEEP RESPIRATORY: 5 CM[H2O]
PEEP RESPIRATORY: 5 CM[H2O]
PERCENT MAX PREDICTED HR: 65.97 %
PH BLDA: 7.14 PH UNITS (ref 7.35–7.45)
PH BLDA: 7.25 PH UNITS (ref 7.35–7.45)
PHOSPHATE SERPL-MCNC: 4 MG/DL (ref 2.5–4.5)
PLAT MORPH BLD: NORMAL
PLATELET # BLD AUTO: 220 10*3/MM3 (ref 140–450)
PLATELET # BLD AUTO: 221 10*3/MM3 (ref 140–450)
PLATELET # BLD AUTO: 235 10*3/MM3 (ref 140–450)
PLATELET # BLD AUTO: 34 10*3/MM3 (ref 140–450)
PLATELET # BLD AUTO: 53 10*3/MM3 (ref 140–450)
PLATELET # BLD AUTO: 65 10*3/MM3 (ref 140–450)
PMV BLD AUTO: 10 FL (ref 6–12)
PMV BLD AUTO: 10 FL (ref 6–12)
PMV BLD AUTO: 10.3 FL (ref 6–12)
PMV BLD AUTO: 10.3 FL (ref 6–12)
PMV BLD AUTO: 10.8 FL (ref 6–12)
PMV BLD AUTO: 11.9 FL (ref 6–12)
PO2 BLDA: 325.1 MM HG (ref 80–100)
PO2 BLDA: 79.8 MM HG (ref 80–100)
POLYCHROMASIA BLD QL SMEAR: ABNORMAL
POSITIVE CONTROL: POSITIVE
POTASSIUM BLD-SCNC: 4.1 MMOL/L (ref 3.5–5.2)
POTASSIUM BLD-SCNC: 4.2 MMOL/L (ref 3.5–5.2)
POTASSIUM BLD-SCNC: 4.6 MMOL/L (ref 3.5–5.2)
POTASSIUM SERPL-SCNC: 1.9 MMOL/L (ref 3.5–5.2)
POTASSIUM SERPL-SCNC: 1.9 MMOL/L (ref 3.5–5.2)
POTASSIUM SERPL-SCNC: 4.2 MMOL/L (ref 3.5–5.2)
POTASSIUM SERPL-SCNC: 4.6 MMOL/L (ref 3.5–5.2)
POTASSIUM SERPL-SCNC: 4.7 MMOL/L (ref 3.5–5.2)
PROCALCITONIN SERPL-MCNC: 56.79 NG/ML (ref 0–0.25)
PROT SERPL-MCNC: 1.9 G/DL (ref 6–8.5)
PROT SERPL-MCNC: 2.1 G/DL (ref 6–8.5)
PROT SERPL-MCNC: 4.6 G/DL (ref 6–8.5)
PROT SERPL-MCNC: 4.8 G/DL (ref 6–8.5)
PROT SERPL-MCNC: 4.9 G/DL (ref 6–8.5)
PROT SERPL-MCNC: 6.7 G/DL (ref 6–8.5)
RBC # BLD AUTO: 1.44 10*6/MM3 (ref 4.14–5.8)
RBC # BLD AUTO: 2.32 10*6/MM3 (ref 4.14–5.8)
RBC # BLD AUTO: 3.37 10*6/MM3 (ref 4.14–5.8)
RBC # BLD AUTO: 3.44 10*6/MM3 (ref 4.14–5.8)
RBC # BLD AUTO: 3.7 10*6/MM3 (ref 4.14–5.8)
RBC # BLD AUTO: 3.91 10*6/MM3 (ref 4.14–5.8)
RBC MORPH BLD: NORMAL
RBC MORPH BLD: NORMAL
RH BLD: POSITIVE
RH BLD: POSITIVE
RHINOVIRUS RNA SPEC NAA+PROBE: NOT DETECTED
RSV RNA NPH QL NAA+NON-PROBE: NOT DETECTED
SAO2 % BLDCOA: 93.8 % (ref 92–99)
SAO2 % BLDCOA: 99.9 % (ref 92–99)
SARS-COV-2 RNA NPH QL NAA+NON-PROBE: NOT DETECTED
SET MECH RESP RATE: 20
SET MECH RESP RATE: 28
SODIUM BLD-SCNC: 142 MMOL/L (ref 136–145)
SODIUM BLD-SCNC: 142 MMOL/L (ref 136–145)
SODIUM BLD-SCNC: 143 MMOL/L (ref 136–145)
SODIUM SERPL-SCNC: 144 MMOL/L (ref 136–145)
SODIUM SERPL-SCNC: 145 MMOL/L (ref 136–145)
SODIUM SERPL-SCNC: 146 MMOL/L (ref 136–145)
SODIUM SERPL-SCNC: 146 MMOL/L (ref 136–145)
SODIUM SERPL-SCNC: 151 MMOL/L (ref 136–145)
STRESS BASELINE BP: NORMAL MMHG
STRESS BASELINE HR: 54 BPM
STRESS PERCENT HR: 78 %
STRESS POST PEAK BP: NORMAL MMHG
STRESS POST PEAK HR: 95 BPM
STRESS TARGET HR: 122 BPM
T&S EXPIRATION DATE: NORMAL
TOTAL RATE: 33 BREATHS/MINUTE
TOTAL RATE: 34 BREATHS/MINUTE
TROPONIN T SERPL-MCNC: <0.01 NG/ML (ref 0–0.03)
VALPROATE SERPL-MCNC: 19 MCG/ML (ref 50–125)
VALPROATE SERPL-MCNC: 30 MCG/ML (ref 50–125)
VENTILATOR MODE: AC
VENTILATOR MODE: AC
VT ON VENT VENT: 500 ML
VT ON VENT VENT: 550 ML
WBC # BLD AUTO: 16.86 10*3/MM3 (ref 3.4–10.8)
WBC # BLD AUTO: 45.76 10*3/MM3 (ref 3.4–10.8)
WBC # BLD AUTO: 8.53 10*3/MM3 (ref 3.4–10.8)
WBC MORPH BLD: NORMAL
WBC MORPH BLD: NORMAL
WBC NRBC COR # BLD: 10.38 10*3/MM3 (ref 3.4–10.8)
WBC NRBC COR # BLD: 10.94 10*3/MM3 (ref 3.4–10.8)
WBC NRBC COR # BLD: 7.65 10*3/MM3 (ref 3.4–10.8)

## 2020-01-01 PROCEDURE — 87086 URINE CULTURE/COLONY COUNT: CPT | Performed by: UROLOGY

## 2020-01-01 PROCEDURE — 5A1935Z RESPIRATORY VENTILATION, LESS THAN 24 CONSECUTIVE HOURS: ICD-10-PCS | Performed by: EMERGENCY MEDICINE

## 2020-01-01 PROCEDURE — 83735 ASSAY OF MAGNESIUM: CPT | Performed by: PHYSICIAN ASSISTANT

## 2020-01-01 PROCEDURE — G0378 HOSPITAL OBSERVATION PER HR: HCPCS

## 2020-01-01 PROCEDURE — 84484 ASSAY OF TROPONIN QUANT: CPT | Performed by: NURSE PRACTITIONER

## 2020-01-01 PROCEDURE — C2617 STENT, NON-COR, TEM W/O DEL: HCPCS | Performed by: UROLOGY

## 2020-01-01 PROCEDURE — 93018 CV STRESS TEST I&R ONLY: CPT | Performed by: INTERNAL MEDICINE

## 2020-01-01 PROCEDURE — 83735 ASSAY OF MAGNESIUM: CPT | Performed by: INTERNAL MEDICINE

## 2020-01-01 PROCEDURE — 87186 SC STD MICRODIL/AGAR DIL: CPT | Performed by: UROLOGY

## 2020-01-01 PROCEDURE — 36600 WITHDRAWAL OF ARTERIAL BLOOD: CPT

## 2020-01-01 PROCEDURE — 25010000002 VANCOMYCIN 10 G RECONSTITUTED SOLUTION: Performed by: EMERGENCY MEDICINE

## 2020-01-01 PROCEDURE — 93306 TTE W/DOPPLER COMPLETE: CPT

## 2020-01-01 PROCEDURE — 76000 FLUOROSCOPY <1 HR PHYS/QHP: CPT

## 2020-01-01 PROCEDURE — 80164 ASSAY DIPROPYLACETIC ACD TOT: CPT | Performed by: EMERGENCY MEDICINE

## 2020-01-01 PROCEDURE — 80053 COMPREHEN METABOLIC PANEL: CPT | Performed by: ANESTHESIOLOGY

## 2020-01-01 PROCEDURE — 96360 HYDRATION IV INFUSION INIT: CPT

## 2020-01-01 PROCEDURE — 96361 HYDRATE IV INFUSION ADD-ON: CPT

## 2020-01-01 PROCEDURE — 93005 ELECTROCARDIOGRAM TRACING: CPT | Performed by: NURSE PRACTITIONER

## 2020-01-01 PROCEDURE — 25010000002 PHENYLEPHRINE 10 MG/ML SOLUTION 5 ML VIAL: Performed by: INTERNAL MEDICINE

## 2020-01-01 PROCEDURE — 94799 UNLISTED PULMONARY SVC/PX: CPT

## 2020-01-01 PROCEDURE — 25010000003 POTASSIUM CHLORIDE 10 MEQ/100ML SOLUTION: Performed by: INTERNAL MEDICINE

## 2020-01-01 PROCEDURE — 93306 TTE W/DOPPLER COMPLETE: CPT | Performed by: INTERNAL MEDICINE

## 2020-01-01 PROCEDURE — 80053 COMPREHEN METABOLIC PANEL: CPT | Performed by: INTERNAL MEDICINE

## 2020-01-01 PROCEDURE — 83880 ASSAY OF NATRIURETIC PEPTIDE: CPT | Performed by: PHYSICIAN ASSISTANT

## 2020-01-01 PROCEDURE — C1758 CATHETER, URETERAL: HCPCS | Performed by: UROLOGY

## 2020-01-01 PROCEDURE — 84484 ASSAY OF TROPONIN QUANT: CPT | Performed by: PHYSICIAN ASSISTANT

## 2020-01-01 PROCEDURE — 80053 COMPREHEN METABOLIC PANEL: CPT | Performed by: PHYSICIAN ASSISTANT

## 2020-01-01 PROCEDURE — 25010000002 PIPERACILLIN SOD-TAZOBACTAM PER 1 G: Performed by: INTERNAL MEDICINE

## 2020-01-01 PROCEDURE — 83880 ASSAY OF NATRIURETIC PEPTIDE: CPT | Performed by: EMERGENCY MEDICINE

## 2020-01-01 PROCEDURE — 93017 CV STRESS TEST TRACING ONLY: CPT

## 2020-01-01 PROCEDURE — 74177 CT ABD & PELVIS W/CONTRAST: CPT

## 2020-01-01 PROCEDURE — A9500 TC99M SESTAMIBI: HCPCS | Performed by: INTERNAL MEDICINE

## 2020-01-01 PROCEDURE — 71045 X-RAY EXAM CHEST 1 VIEW: CPT

## 2020-01-01 PROCEDURE — 0 TECHNETIUM SESTAMIBI: Performed by: INTERNAL MEDICINE

## 2020-01-01 PROCEDURE — 80048 BASIC METABOLIC PNL TOTAL CA: CPT | Performed by: INTERNAL MEDICINE

## 2020-01-01 PROCEDURE — 86900 BLOOD TYPING SEROLOGIC ABO: CPT | Performed by: EMERGENCY MEDICINE

## 2020-01-01 PROCEDURE — 85025 COMPLETE CBC W/AUTO DIFF WBC: CPT | Performed by: INTERNAL MEDICINE

## 2020-01-01 PROCEDURE — 25010000002 PIPERACILLIN SOD-TAZOBACTAM PER 1 G: Performed by: EMERGENCY MEDICINE

## 2020-01-01 PROCEDURE — 86140 C-REACTIVE PROTEIN: CPT | Performed by: EMERGENCY MEDICINE

## 2020-01-01 PROCEDURE — 80053 COMPREHEN METABOLIC PANEL: CPT | Performed by: EMERGENCY MEDICINE

## 2020-01-01 PROCEDURE — 78452 HT MUSCLE IMAGE SPECT MULT: CPT | Performed by: INTERNAL MEDICINE

## 2020-01-01 PROCEDURE — 86923 COMPATIBILITY TEST ELECTRIC: CPT

## 2020-01-01 PROCEDURE — 87040 BLOOD CULTURE FOR BACTERIA: CPT | Performed by: EMERGENCY MEDICINE

## 2020-01-01 PROCEDURE — 99291 CRITICAL CARE FIRST HOUR: CPT

## 2020-01-01 PROCEDURE — 85027 COMPLETE CBC AUTOMATED: CPT | Performed by: INTERNAL MEDICINE

## 2020-01-01 PROCEDURE — 0296T HC EXT ECG > 48HR TO 21 DAY RCRD W/CONECT INTL RCRD: CPT

## 2020-01-01 PROCEDURE — 0BH17EZ INSERTION OF ENDOTRACHEAL AIRWAY INTO TRACHEA, VIA NATURAL OR ARTIFICIAL OPENING: ICD-10-PCS | Performed by: EMERGENCY MEDICINE

## 2020-01-01 PROCEDURE — 85025 COMPLETE CBC W/AUTO DIFF WBC: CPT | Performed by: EMERGENCY MEDICINE

## 2020-01-01 PROCEDURE — 86850 RBC ANTIBODY SCREEN: CPT | Performed by: EMERGENCY MEDICINE

## 2020-01-01 PROCEDURE — 0202U NFCT DS 22 TRGT SARS-COV-2: CPT | Performed by: EMERGENCY MEDICINE

## 2020-01-01 PROCEDURE — C1751 CATH, INF, PER/CENT/MIDLINE: HCPCS

## 2020-01-01 PROCEDURE — 94640 AIRWAY INHALATION TREATMENT: CPT

## 2020-01-01 PROCEDURE — 25010000002 HYDROCORTISONE SODIUM SUCCINATE 100 MG RECONSTITUTED SOLUTION: Performed by: INTERNAL MEDICINE

## 2020-01-01 PROCEDURE — 87088 URINE BACTERIA CULTURE: CPT | Performed by: UROLOGY

## 2020-01-01 PROCEDURE — 83735 ASSAY OF MAGNESIUM: CPT | Performed by: EMERGENCY MEDICINE

## 2020-01-01 PROCEDURE — 25010000003 POTASSIUM CHLORIDE 10 MEQ/100ML SOLUTION: Performed by: EMERGENCY MEDICINE

## 2020-01-01 PROCEDURE — 93005 ELECTROCARDIOGRAM TRACING: CPT

## 2020-01-01 PROCEDURE — 82962 GLUCOSE BLOOD TEST: CPT

## 2020-01-01 PROCEDURE — 85007 BL SMEAR W/DIFF WBC COUNT: CPT | Performed by: INTERNAL MEDICINE

## 2020-01-01 PROCEDURE — 93005 ELECTROCARDIOGRAM TRACING: CPT | Performed by: EMERGENCY MEDICINE

## 2020-01-01 PROCEDURE — 83605 ASSAY OF LACTIC ACID: CPT | Performed by: EMERGENCY MEDICINE

## 2020-01-01 PROCEDURE — 78452 HT MUSCLE IMAGE SPECT MULT: CPT

## 2020-01-01 PROCEDURE — 0T778DZ DILATION OF LEFT URETER WITH INTRALUMINAL DEVICE, VIA NATURAL OR ARTIFICIAL OPENING ENDOSCOPIC: ICD-10-PCS | Performed by: UROLOGY

## 2020-01-01 PROCEDURE — 87186 SC STD MICRODIL/AGAR DIL: CPT | Performed by: EMERGENCY MEDICINE

## 2020-01-01 PROCEDURE — 25010000002 MAGNESIUM SULFATE 2 GM/50ML SOLUTION: Performed by: EMERGENCY MEDICINE

## 2020-01-01 PROCEDURE — 87150 DNA/RNA AMPLIFIED PROBE: CPT | Performed by: EMERGENCY MEDICINE

## 2020-01-01 PROCEDURE — 82270 OCCULT BLOOD FECES: CPT | Performed by: EMERGENCY MEDICINE

## 2020-01-01 PROCEDURE — 0298T HOLTER MONITOR - 72 HOUR UP TO 21 DAY: CPT | Performed by: INTERNAL MEDICINE

## 2020-01-01 PROCEDURE — 99213 OFFICE O/P EST LOW 20 MIN: CPT | Performed by: INTERNAL MEDICINE

## 2020-01-01 PROCEDURE — 25010000002 REGADENOSON 0.4 MG/5ML SOLUTION: Performed by: INTERNAL MEDICINE

## 2020-01-01 PROCEDURE — 94003 VENT MGMT INPAT SUBQ DAY: CPT

## 2020-01-01 PROCEDURE — 99203 OFFICE O/P NEW LOW 30 MIN: CPT | Performed by: INTERNAL MEDICINE

## 2020-01-01 PROCEDURE — 70450 CT HEAD/BRAIN W/O DYE: CPT

## 2020-01-01 PROCEDURE — 80048 BASIC METABOLIC PNL TOTAL CA: CPT | Performed by: NURSE PRACTITIONER

## 2020-01-01 PROCEDURE — 83036 HEMOGLOBIN GLYCOSYLATED A1C: CPT | Performed by: INTERNAL MEDICINE

## 2020-01-01 PROCEDURE — 25010000002 IOPAMIDOL 61 % SOLUTION: Performed by: INTERNAL MEDICINE

## 2020-01-01 PROCEDURE — 86901 BLOOD TYPING SEROLOGIC RH(D): CPT | Performed by: EMERGENCY MEDICINE

## 2020-01-01 PROCEDURE — 99285 EMERGENCY DEPT VISIT HI MDM: CPT

## 2020-01-01 PROCEDURE — 25010000002 ALBUMIN HUMAN 5% PER 50 ML: Performed by: NURSE ANESTHETIST, CERTIFIED REGISTERED

## 2020-01-01 PROCEDURE — 84484 ASSAY OF TROPONIN QUANT: CPT | Performed by: EMERGENCY MEDICINE

## 2020-01-01 PROCEDURE — 94002 VENT MGMT INPAT INIT DAY: CPT

## 2020-01-01 PROCEDURE — 25010000003 HYDROCORTISONE SOD SUCCINATE PF 250 MG RECONSTITUTED SOLUTION: Performed by: EMERGENCY MEDICINE

## 2020-01-01 PROCEDURE — C1751 CATH, INF, PER/CENT/MIDLINE: HCPCS | Performed by: ANESTHESIOLOGY

## 2020-01-01 PROCEDURE — 80164 ASSAY DIPROPYLACETIC ACD TOT: CPT | Performed by: PHYSICIAN ASSISTANT

## 2020-01-01 PROCEDURE — 74018 RADEX ABDOMEN 1 VIEW: CPT

## 2020-01-01 PROCEDURE — 83605 ASSAY OF LACTIC ACID: CPT | Performed by: INTERNAL MEDICINE

## 2020-01-01 PROCEDURE — 93010 ELECTROCARDIOGRAM REPORT: CPT | Performed by: INTERNAL MEDICINE

## 2020-01-01 PROCEDURE — 84145 PROCALCITONIN (PCT): CPT | Performed by: EMERGENCY MEDICINE

## 2020-01-01 PROCEDURE — 82803 BLOOD GASES ANY COMBINATION: CPT

## 2020-01-01 PROCEDURE — 71260 CT THORAX DX C+: CPT

## 2020-01-01 PROCEDURE — 84100 ASSAY OF PHOSPHORUS: CPT | Performed by: INTERNAL MEDICINE

## 2020-01-01 PROCEDURE — 85025 COMPLETE CBC W/AUTO DIFF WBC: CPT | Performed by: PHYSICIAN ASSISTANT

## 2020-01-01 PROCEDURE — 31500 INSERT EMERGENCY AIRWAY: CPT

## 2020-01-01 PROCEDURE — 85007 BL SMEAR W/DIFF WBC COUNT: CPT | Performed by: EMERGENCY MEDICINE

## 2020-01-01 PROCEDURE — 36415 COLL VENOUS BLD VENIPUNCTURE: CPT | Performed by: INTERNAL MEDICINE

## 2020-01-01 PROCEDURE — 85025 COMPLETE CBC W/AUTO DIFF WBC: CPT | Performed by: NURSE PRACTITIONER

## 2020-01-01 PROCEDURE — C1769 GUIDE WIRE: HCPCS | Performed by: UROLOGY

## 2020-01-01 PROCEDURE — P9041 ALBUMIN (HUMAN),5%, 50ML: HCPCS | Performed by: NURSE ANESTHETIST, CERTIFIED REGISTERED

## 2020-01-01 PROCEDURE — 99221 1ST HOSP IP/OBS SF/LOW 40: CPT | Performed by: PSYCHIATRY & NEUROLOGY

## 2020-01-01 DEVICE — URETERAL STENT
Type: IMPLANTABLE DEVICE | Site: URETER | Status: FUNCTIONAL
Brand: CONTOUR™

## 2020-01-01 RX ORDER — SODIUM CHLORIDE 9 MG/ML
125 INJECTION, SOLUTION INTRAVENOUS CONTINUOUS
Status: ACTIVE | OUTPATIENT
Start: 2020-01-01 | End: 2020-01-01

## 2020-01-01 RX ORDER — LORAZEPAM 2 MG/ML
2 INJECTION INTRAMUSCULAR
Status: DISCONTINUED | OUTPATIENT
Start: 2020-01-01 | End: 2020-01-01 | Stop reason: HOSPADM

## 2020-01-01 RX ORDER — ACETAMINOPHEN 325 MG/1
650 TABLET ORAL EVERY 4 HOURS PRN
Status: DISCONTINUED | OUTPATIENT
Start: 2020-01-01 | End: 2020-01-01

## 2020-01-01 RX ORDER — LORAZEPAM 2 MG/ML
2 CONCENTRATE ORAL
Status: DISCONTINUED | OUTPATIENT
Start: 2020-01-01 | End: 2020-01-01 | Stop reason: HOSPADM

## 2020-01-01 RX ORDER — NOREPINEPHRINE BIT/0.9 % NACL 8 MG/250ML
INFUSION BOTTLE (ML) INTRAVENOUS
Status: COMPLETED
Start: 2020-01-01 | End: 2020-01-01

## 2020-01-01 RX ORDER — MORPHINE SULFATE 20 MG/ML
20 SOLUTION ORAL
Status: DISCONTINUED | OUTPATIENT
Start: 2020-01-01 | End: 2020-01-01 | Stop reason: HOSPADM

## 2020-01-01 RX ORDER — MULTIPLE VITAMINS W/ MINERALS TAB 9MG-400MCG
1 TAB ORAL DAILY
Status: DISCONTINUED | OUTPATIENT
Start: 2020-01-01 | End: 2020-01-01 | Stop reason: HOSPADM

## 2020-01-01 RX ORDER — MORPHINE SULFATE 2 MG/ML
4 INJECTION, SOLUTION INTRAMUSCULAR; INTRAVENOUS
Status: DISCONTINUED | OUTPATIENT
Start: 2020-01-01 | End: 2020-01-01 | Stop reason: HOSPADM

## 2020-01-01 RX ORDER — HYDROMORPHONE HYDROCHLORIDE 1 MG/ML
0.5 INJECTION, SOLUTION INTRAMUSCULAR; INTRAVENOUS; SUBCUTANEOUS
Status: DISCONTINUED | OUTPATIENT
Start: 2020-01-01 | End: 2020-01-01 | Stop reason: HOSPADM

## 2020-01-01 RX ORDER — SCOLOPAMINE TRANSDERMAL SYSTEM 1 MG/1
1 PATCH, EXTENDED RELEASE TRANSDERMAL
Status: DISCONTINUED | OUTPATIENT
Start: 2020-01-01 | End: 2020-01-01 | Stop reason: HOSPADM

## 2020-01-01 RX ORDER — ACETAMINOPHEN 650 MG/1
650 SUPPOSITORY RECTAL EVERY 4 HOURS PRN
Status: DISCONTINUED | OUTPATIENT
Start: 2020-01-01 | End: 2020-01-01

## 2020-01-01 RX ORDER — NOREPINEPHRINE BIT/0.9 % NACL 8 MG/250ML
.02-.3 INFUSION BOTTLE (ML) INTRAVENOUS
Status: DISCONTINUED | OUTPATIENT
Start: 2020-01-01 | End: 2020-01-01

## 2020-01-01 RX ORDER — MELATONIN
1000 DAILY
Status: DISCONTINUED | OUTPATIENT
Start: 2020-01-01 | End: 2020-01-01 | Stop reason: HOSPADM

## 2020-01-01 RX ORDER — PANTOPRAZOLE SODIUM 40 MG/1
40 TABLET, DELAYED RELEASE ORAL
Status: DISCONTINUED | OUTPATIENT
Start: 2020-01-01 | End: 2020-01-01 | Stop reason: HOSPADM

## 2020-01-01 RX ORDER — LORAZEPAM 2 MG/ML
0.5 INJECTION INTRAMUSCULAR
Status: DISCONTINUED | OUTPATIENT
Start: 2020-01-01 | End: 2020-01-01 | Stop reason: HOSPADM

## 2020-01-01 RX ORDER — LORAZEPAM 2 MG/ML
0.5 CONCENTRATE ORAL
Status: DISCONTINUED | OUTPATIENT
Start: 2020-01-01 | End: 2020-01-01 | Stop reason: HOSPADM

## 2020-01-01 RX ORDER — ONDANSETRON 4 MG/1
4 TABLET, FILM COATED ORAL EVERY 6 HOURS PRN
Status: DISCONTINUED | OUTPATIENT
Start: 2020-01-01 | End: 2020-01-01

## 2020-01-01 RX ORDER — BISACODYL 10 MG
10 SUPPOSITORY, RECTAL RECTAL DAILY PRN
Status: DISCONTINUED | OUTPATIENT
Start: 2020-01-01 | End: 2020-01-01

## 2020-01-01 RX ORDER — ALUMINA, MAGNESIA, AND SIMETHICONE 2400; 2400; 240 MG/30ML; MG/30ML; MG/30ML
15 SUSPENSION ORAL EVERY 6 HOURS PRN
Status: DISCONTINUED | OUTPATIENT
Start: 2020-01-01 | End: 2020-01-01

## 2020-01-01 RX ORDER — MORPHINE SULFATE 20 MG/ML
5 SOLUTION ORAL
Status: DISCONTINUED | OUTPATIENT
Start: 2020-01-01 | End: 2020-01-01 | Stop reason: HOSPADM

## 2020-01-01 RX ORDER — POTASSIUM CHLORIDE 7.45 MG/ML
10 INJECTION INTRAVENOUS
Status: DISCONTINUED | OUTPATIENT
Start: 2020-01-01 | End: 2020-01-01

## 2020-01-01 RX ORDER — ALBUMIN, HUMAN INJ 5% 5 %
SOLUTION INTRAVENOUS CONTINUOUS PRN
Status: DISCONTINUED | OUTPATIENT
Start: 2020-01-01 | End: 2020-01-01 | Stop reason: SURG

## 2020-01-01 RX ORDER — POTASSIUM CHLORIDE 7.45 MG/ML
10 INJECTION INTRAVENOUS ONCE
Status: COMPLETED | OUTPATIENT
Start: 2020-01-01 | End: 2020-01-01

## 2020-01-01 RX ORDER — LEVOTHYROXINE SODIUM 175 UG/1
175 TABLET ORAL
Status: DISCONTINUED | OUTPATIENT
Start: 2020-01-01 | End: 2020-01-01 | Stop reason: HOSPADM

## 2020-01-01 RX ORDER — MORPHINE SULFATE 10 MG/ML
6 INJECTION INTRAMUSCULAR; INTRAVENOUS; SUBCUTANEOUS
Status: DISCONTINUED | OUTPATIENT
Start: 2020-01-01 | End: 2020-01-01 | Stop reason: HOSPADM

## 2020-01-01 RX ORDER — EPHEDRINE SULFATE 50 MG/ML
5 INJECTION, SOLUTION INTRAVENOUS ONCE AS NEEDED
Status: CANCELLED | OUTPATIENT
Start: 2020-01-01

## 2020-01-01 RX ORDER — MIDODRINE HYDROCHLORIDE 5 MG/1
5 TABLET ORAL
Status: DISCONTINUED | OUTPATIENT
Start: 2020-01-01 | End: 2020-01-01 | Stop reason: HOSPADM

## 2020-01-01 RX ORDER — CALCIUM CHLORIDE 100 MG/ML
INJECTION INTRAVENOUS; INTRAVENTRICULAR AS NEEDED
Status: DISCONTINUED | OUTPATIENT
Start: 2020-01-01 | End: 2020-01-01 | Stop reason: SURG

## 2020-01-01 RX ORDER — MORPHINE SULFATE 2 MG/ML
2 INJECTION, SOLUTION INTRAMUSCULAR; INTRAVENOUS
Status: DISCONTINUED | OUTPATIENT
Start: 2020-01-01 | End: 2020-01-01 | Stop reason: HOSPADM

## 2020-01-01 RX ORDER — ACETAMINOPHEN 325 MG/1
650 TABLET ORAL EVERY 4 HOURS PRN
Status: DISCONTINUED | OUTPATIENT
Start: 2020-01-01 | End: 2020-01-01 | Stop reason: HOSPADM

## 2020-01-01 RX ORDER — DIVALPROEX SODIUM 500 MG/1
500 TABLET, DELAYED RELEASE ORAL 2 TIMES DAILY
Status: DISCONTINUED | OUTPATIENT
Start: 2020-01-01 | End: 2020-01-01 | Stop reason: HOSPADM

## 2020-01-01 RX ORDER — MORPHINE SULFATE 2 MG/ML
INJECTION, SOLUTION INTRAMUSCULAR; INTRAVENOUS
Status: DISCONTINUED
Start: 2020-01-01 | End: 2020-01-01 | Stop reason: HOSPADM

## 2020-01-01 RX ORDER — ATORVASTATIN CALCIUM 20 MG/1
20 TABLET, FILM COATED ORAL DAILY
Status: DISCONTINUED | OUTPATIENT
Start: 2020-01-01 | End: 2020-01-01 | Stop reason: HOSPADM

## 2020-01-01 RX ORDER — DIPHENHYDRAMINE HCL 25 MG
25 CAPSULE ORAL
Status: CANCELLED | OUTPATIENT
Start: 2020-01-01

## 2020-01-01 RX ORDER — IPRATROPIUM BROMIDE AND ALBUTEROL SULFATE 2.5; .5 MG/3ML; MG/3ML
3 SOLUTION RESPIRATORY (INHALATION)
Status: DISCONTINUED | OUTPATIENT
Start: 2020-01-01 | End: 2020-01-01 | Stop reason: HOSPADM

## 2020-01-01 RX ORDER — MAGNESIUM SULFATE HEPTAHYDRATE 40 MG/ML
2 INJECTION, SOLUTION INTRAVENOUS ONCE
Status: COMPLETED | OUTPATIENT
Start: 2020-01-01 | End: 2020-01-01

## 2020-01-01 RX ORDER — BUSPIRONE HYDROCHLORIDE 5 MG/1
5 TABLET ORAL 3 TIMES DAILY PRN
Status: DISCONTINUED | OUTPATIENT
Start: 2020-01-01 | End: 2020-01-01 | Stop reason: HOSPADM

## 2020-01-01 RX ORDER — ONDANSETRON 2 MG/ML
4 INJECTION INTRAMUSCULAR; INTRAVENOUS EVERY 6 HOURS PRN
Status: DISCONTINUED | OUTPATIENT
Start: 2020-01-01 | End: 2020-01-01

## 2020-01-01 RX ORDER — SENNA AND DOCUSATE SODIUM 50; 8.6 MG/1; MG/1
2 TABLET, FILM COATED ORAL 2 TIMES DAILY
Status: DISCONTINUED | OUTPATIENT
Start: 2020-01-01 | End: 2020-01-01 | Stop reason: HOSPADM

## 2020-01-01 RX ORDER — POTASSIUM CHLORIDE 20MEQ/15ML
40 LIQUID (ML) ORAL ONCE
Status: DISCONTINUED | OUTPATIENT
Start: 2020-01-01 | End: 2020-01-01 | Stop reason: CLARIF

## 2020-01-01 RX ORDER — BISACODYL 5 MG/1
5 TABLET, DELAYED RELEASE ORAL DAILY PRN
Status: DISCONTINUED | OUTPATIENT
Start: 2020-01-01 | End: 2020-01-01

## 2020-01-01 RX ORDER — ALBUTEROL SULFATE 2.5 MG/3ML
2.5 SOLUTION RESPIRATORY (INHALATION) EVERY 6 HOURS PRN
Status: DISCONTINUED | OUTPATIENT
Start: 2020-01-01 | End: 2020-01-01 | Stop reason: HOSPADM

## 2020-01-01 RX ORDER — MIRTAZAPINE 15 MG/1
15 TABLET, FILM COATED ORAL NIGHTLY
Status: DISCONTINUED | OUTPATIENT
Start: 2020-01-01 | End: 2020-01-01 | Stop reason: HOSPADM

## 2020-01-01 RX ORDER — ROCURONIUM BROMIDE 10 MG/ML
INJECTION, SOLUTION INTRAVENOUS AS NEEDED
Status: DISCONTINUED | OUTPATIENT
Start: 2020-01-01 | End: 2020-01-01 | Stop reason: SURG

## 2020-01-01 RX ORDER — BISACODYL 5 MG/1
10 TABLET, DELAYED RELEASE ORAL DAILY PRN
Status: DISCONTINUED | OUTPATIENT
Start: 2020-01-01 | End: 2020-01-01 | Stop reason: HOSPADM

## 2020-01-01 RX ORDER — MAGNESIUM HYDROXIDE 1200 MG/15ML
LIQUID ORAL AS NEEDED
Status: DISCONTINUED | OUTPATIENT
Start: 2020-01-01 | End: 2020-01-01 | Stop reason: HOSPADM

## 2020-01-01 RX ORDER — OXYBUTYNIN CHLORIDE 5 MG/1
5 TABLET, EXTENDED RELEASE ORAL DAILY
Status: DISCONTINUED | OUTPATIENT
Start: 2020-01-01 | End: 2020-01-01 | Stop reason: HOSPADM

## 2020-01-01 RX ORDER — HYDROMORPHONE HYDROCHLORIDE 1 MG/ML
0.5 INJECTION, SOLUTION INTRAMUSCULAR; INTRAVENOUS; SUBCUTANEOUS
Status: CANCELLED | OUTPATIENT
Start: 2020-01-01

## 2020-01-01 RX ORDER — MORPHINE SULFATE 20 MG/ML
10 SOLUTION ORAL
Status: DISCONTINUED | OUTPATIENT
Start: 2020-01-01 | End: 2020-01-01 | Stop reason: HOSPADM

## 2020-01-01 RX ORDER — FLUTICASONE PROPIONATE 50 MCG
1 SPRAY, SUSPENSION (ML) NASAL DAILY
Status: DISCONTINUED | OUTPATIENT
Start: 2020-01-01 | End: 2020-01-01 | Stop reason: HOSPADM

## 2020-01-01 RX ORDER — MONTELUKAST SODIUM 10 MG/1
10 TABLET ORAL NIGHTLY
Status: DISCONTINUED | OUTPATIENT
Start: 2020-01-01 | End: 2020-01-01 | Stop reason: HOSPADM

## 2020-01-01 RX ORDER — POTASSIUM CHLORIDE 1.5 G/1.77G
40 POWDER, FOR SOLUTION ORAL ONCE
Status: DISCONTINUED | OUTPATIENT
Start: 2020-01-01 | End: 2020-01-01

## 2020-01-01 RX ORDER — VANCOMYCIN HYDROCHLORIDE 1 G/200ML
1000 INJECTION, SOLUTION INTRAVENOUS EVERY 12 HOURS
Status: DISCONTINUED | OUTPATIENT
Start: 2020-01-01 | End: 2020-01-01

## 2020-01-01 RX ORDER — SODIUM BICARBONATE IN D5W 150/1000ML
150 PLASTIC BAG, INJECTION (ML) INTRAVENOUS CONTINUOUS
Status: DISCONTINUED | OUTPATIENT
Start: 2020-01-01 | End: 2020-01-01

## 2020-01-01 RX ORDER — ONDANSETRON 4 MG/1
4 TABLET, ORALLY DISINTEGRATING ORAL EVERY 8 HOURS PRN
Status: DISCONTINUED | OUTPATIENT
Start: 2020-01-01 | End: 2020-01-01 | Stop reason: HOSPADM

## 2020-01-01 RX ORDER — OLANZAPINE 5 MG/1
5 TABLET ORAL NIGHTLY
Status: DISCONTINUED | OUTPATIENT
Start: 2020-01-01 | End: 2020-01-01 | Stop reason: HOSPADM

## 2020-01-01 RX ORDER — LORAZEPAM 2 MG/ML
1 INJECTION INTRAMUSCULAR
Status: DISCONTINUED | OUTPATIENT
Start: 2020-01-01 | End: 2020-01-01 | Stop reason: HOSPADM

## 2020-01-01 RX ORDER — DONEPEZIL HYDROCHLORIDE 10 MG/1
20 TABLET, FILM COATED ORAL NIGHTLY
Status: DISCONTINUED | OUTPATIENT
Start: 2020-01-01 | End: 2020-01-01 | Stop reason: HOSPADM

## 2020-01-01 RX ORDER — FLUDROCORTISONE ACETATE 0.1 MG/1
50 TABLET ORAL DAILY
Status: DISCONTINUED | OUTPATIENT
Start: 2020-01-01 | End: 2020-01-01

## 2020-01-01 RX ORDER — POLYETHYLENE GLYCOL 3350 17 G/17G
17 POWDER, FOR SOLUTION ORAL DAILY
Status: DISCONTINUED | OUTPATIENT
Start: 2020-01-01 | End: 2020-01-01 | Stop reason: HOSPADM

## 2020-01-01 RX ORDER — ASPIRIN 325 MG
325 TABLET, DELAYED RELEASE (ENTERIC COATED) ORAL DAILY
Status: DISCONTINUED | OUTPATIENT
Start: 2020-01-01 | End: 2020-01-01 | Stop reason: HOSPADM

## 2020-01-01 RX ORDER — DEXTROSE MONOHYDRATE 25 G/50ML
25 INJECTION, SOLUTION INTRAVENOUS ONCE
Status: COMPLETED | OUTPATIENT
Start: 2020-01-01 | End: 2020-01-01

## 2020-01-01 RX ORDER — ACETAMINOPHEN 650 MG/1
650 SUPPOSITORY RECTAL EVERY 4 HOURS PRN
Status: DISCONTINUED | OUTPATIENT
Start: 2020-01-01 | End: 2020-01-01 | Stop reason: HOSPADM

## 2020-01-01 RX ORDER — DIPHENHYDRAMINE HYDROCHLORIDE 50 MG/ML
12.5 INJECTION INTRAMUSCULAR; INTRAVENOUS
Status: CANCELLED | OUTPATIENT
Start: 2020-01-01

## 2020-01-01 RX ORDER — DIPHENOXYLATE HYDROCHLORIDE AND ATROPINE SULFATE 2.5; .025 MG/1; MG/1
1 TABLET ORAL
Status: DISCONTINUED | OUTPATIENT
Start: 2020-01-01 | End: 2020-01-01 | Stop reason: HOSPADM

## 2020-01-01 RX ORDER — LORAZEPAM 2 MG/ML
1 CONCENTRATE ORAL
Status: DISCONTINUED | OUTPATIENT
Start: 2020-01-01 | End: 2020-01-01 | Stop reason: HOSPADM

## 2020-01-01 RX ORDER — ACETAMINOPHEN 160 MG/5ML
650 SOLUTION ORAL EVERY 4 HOURS PRN
Status: DISCONTINUED | OUTPATIENT
Start: 2020-01-01 | End: 2020-01-01 | Stop reason: HOSPADM

## 2020-01-01 RX ORDER — MIRTAZAPINE 15 MG/1
15 TABLET, FILM COATED ORAL NIGHTLY
COMMUNITY

## 2020-01-01 RX ORDER — ETOMIDATE 2 MG/ML
0.3 INJECTION INTRAVENOUS ONCE
Status: COMPLETED | OUTPATIENT
Start: 2020-01-01 | End: 2020-01-01

## 2020-01-01 RX ADMIN — PANTOPRAZOLE SODIUM 40 MG: 40 TABLET, DELAYED RELEASE ORAL at 09:56

## 2020-01-01 RX ADMIN — IPRATROPIUM BROMIDE AND ALBUTEROL SULFATE 3 ML: 2.5; .5 SOLUTION RESPIRATORY (INHALATION) at 08:43

## 2020-01-01 RX ADMIN — POTASSIUM CHLORIDE 10 MEQ: 7.46 INJECTION, SOLUTION INTRAVENOUS at 12:13

## 2020-01-01 RX ADMIN — FLUTICASONE PROPIONATE 1 SPRAY: 50 SPRAY, METERED NASAL at 21:30

## 2020-01-01 RX ADMIN — Medication 0.3 MCG/KG/MIN: at 08:21

## 2020-01-01 RX ADMIN — SENNOSIDES AND DOCUSATE SODIUM 2 TABLET: 8.6; 5 TABLET ORAL at 21:28

## 2020-01-01 RX ADMIN — Medication 1 APPLICATION: at 21:02

## 2020-01-01 RX ADMIN — Medication 0.3 MCG/KG/MIN: at 00:45

## 2020-01-01 RX ADMIN — MIDODRINE HYDROCHLORIDE 5 MG: 5 TABLET ORAL at 16:41

## 2020-01-01 RX ADMIN — CARBIDOPA AND LEVODOPA 1 TABLET: 25; 100 TABLET ORAL at 22:51

## 2020-01-01 RX ADMIN — DONEPEZIL HYDROCHLORIDE 20 MG: 10 TABLET, FILM COATED ORAL at 21:02

## 2020-01-01 RX ADMIN — Medication 8 MG: at 10:56

## 2020-01-01 RX ADMIN — MIRTAZAPINE 15 MG: 15 TABLET, FILM COATED ORAL at 21:02

## 2020-01-01 RX ADMIN — CARBIDOPA AND LEVODOPA 1 TABLET: 25; 100 TABLET ORAL at 17:59

## 2020-01-01 RX ADMIN — ETOMIDATE 19 MG: 2 INJECTION, SOLUTION INTRAVENOUS at 10:39

## 2020-01-01 RX ADMIN — SODIUM CHLORIDE 125 ML/HR: 9 INJECTION, SOLUTION INTRAVENOUS at 14:20

## 2020-01-01 RX ADMIN — DONEPEZIL HYDROCHLORIDE 20 MG: 10 TABLET, FILM COATED ORAL at 21:28

## 2020-01-01 RX ADMIN — MULTIPLE VITAMINS W/ MINERALS TAB 1 TABLET: TAB at 21:30

## 2020-01-01 RX ADMIN — SODIUM BICARBONATE 150 MEQ/1,000 ML IN DEXTROSE 5 % INTRAVENOUS 150 MEQ: SOLUTION at 15:06

## 2020-01-01 RX ADMIN — CARBIDOPA AND LEVODOPA 1 TABLET: 25; 100 TABLET ORAL at 16:41

## 2020-01-01 RX ADMIN — ROCURONIUM BROMIDE 30 MG: 10 INJECTION INTRAVENOUS at 16:14

## 2020-01-01 RX ADMIN — ALBUMIN HUMAN: 0.05 INJECTION, SOLUTION INTRAVENOUS at 16:16

## 2020-01-01 RX ADMIN — MONTELUKAST SODIUM 10 MG: 10 TABLET, FILM COATED ORAL at 21:28

## 2020-01-01 RX ADMIN — Medication 1 APPLICATION: at 21:28

## 2020-01-01 RX ADMIN — SODIUM BICARBONATE 50 MEQ: 84 INJECTION, SOLUTION INTRAVENOUS at 16:15

## 2020-01-01 RX ADMIN — IOPAMIDOL 85 ML: 612 INJECTION, SOLUTION INTRAVENOUS at 14:52

## 2020-01-01 RX ADMIN — TECHNETIUM TC 99M SESTAMIBI 1 DOSE: 1 INJECTION INTRAVENOUS at 06:20

## 2020-01-01 RX ADMIN — HYDROCORTISONE SODIUM SUCCINATE 50 MG: 100 INJECTION, POWDER, FOR SOLUTION INTRAMUSCULAR; INTRAVENOUS at 05:42

## 2020-01-01 RX ADMIN — MIDODRINE HYDROCHLORIDE 5 MG: 5 TABLET ORAL at 21:31

## 2020-01-01 RX ADMIN — DIVALPROEX SODIUM 500 MG: 500 TABLET, DELAYED RELEASE ORAL at 10:03

## 2020-01-01 RX ADMIN — ASPIRIN 325 MG: 325 TABLET, COATED ORAL at 21:30

## 2020-01-01 RX ADMIN — MONTELUKAST SODIUM 10 MG: 10 TABLET, FILM COATED ORAL at 21:02

## 2020-01-01 RX ADMIN — IPRATROPIUM BROMIDE AND ALBUTEROL SULFATE 3 ML: 2.5; .5 SOLUTION RESPIRATORY (INHALATION) at 20:01

## 2020-01-01 RX ADMIN — Medication 1 APPLICATION: at 09:56

## 2020-01-01 RX ADMIN — ALBUMIN HUMAN 12.5 G: 0.05 INJECTION, SOLUTION INTRAVENOUS at 16:42

## 2020-01-01 RX ADMIN — TAZOBACTAM SODIUM AND PIPERACILLIN SODIUM 4.5 G: 500; 4 INJECTION, SOLUTION INTRAVENOUS at 21:35

## 2020-01-01 RX ADMIN — IPRATROPIUM BROMIDE AND ALBUTEROL SULFATE 3 ML: 2.5; .5 SOLUTION RESPIRATORY (INHALATION) at 12:39

## 2020-01-01 RX ADMIN — VASOPRESSIN 0.03 UNITS/MIN: 20 INJECTION INTRAVENOUS at 02:33

## 2020-01-01 RX ADMIN — MAGNESIUM SULFATE 2 G: 2 INJECTION INTRAVENOUS at 12:51

## 2020-01-01 RX ADMIN — FLUTICASONE PROPIONATE 1 SPRAY: 50 SPRAY, METERED NASAL at 09:55

## 2020-01-01 RX ADMIN — SODIUM CHLORIDE 1000 ML: 9 INJECTION, SOLUTION INTRAVENOUS at 15:19

## 2020-01-01 RX ADMIN — REGADENOSON 0.4 MG: 0.08 INJECTION, SOLUTION INTRAVENOUS at 09:00

## 2020-01-01 RX ADMIN — LEVOTHYROXINE SODIUM 175 MCG: 175 TABLET ORAL at 09:53

## 2020-01-01 RX ADMIN — IPRATROPIUM BROMIDE AND ALBUTEROL SULFATE 3 ML: 2.5; .5 SOLUTION RESPIRATORY (INHALATION) at 17:00

## 2020-01-01 RX ADMIN — DIVALPROEX SODIUM 500 MG: 500 TABLET, DELAYED RELEASE ORAL at 21:02

## 2020-01-01 RX ADMIN — DIVALPROEX SODIUM 500 MG: 500 TABLET, DELAYED RELEASE ORAL at 21:28

## 2020-01-01 RX ADMIN — TAZOBACTAM SODIUM AND PIPERACILLIN SODIUM 3.38 G: 375; 3 INJECTION, SOLUTION INTRAVENOUS at 11:46

## 2020-01-01 RX ADMIN — MIDODRINE HYDROCHLORIDE 5 MG: 5 TABLET ORAL at 17:55

## 2020-01-01 RX ADMIN — PHENYLEPHRINE HYDROCHLORIDE 3 MCG/KG/MIN: 10 INJECTION INTRAVENOUS at 21:31

## 2020-01-01 RX ADMIN — SODIUM BICARBONATE 150 MEQ/1,000 ML IN DEXTROSE 5 % INTRAVENOUS 150 MEQ: SOLUTION at 02:21

## 2020-01-01 RX ADMIN — POTASSIUM CHLORIDE 10 MEQ: 7.46 INJECTION, SOLUTION INTRAVENOUS at 17:45

## 2020-01-01 RX ADMIN — TECHNETIUM TC 99M SESTAMIBI 1 DOSE: 1 INJECTION INTRAVENOUS at 09:00

## 2020-01-01 RX ADMIN — ASPIRIN 325 MG: 325 TABLET, COATED ORAL at 09:54

## 2020-01-01 RX ADMIN — MULTIPLE VITAMINS W/ MINERALS TAB 1 TABLET: TAB at 09:54

## 2020-01-01 RX ADMIN — OXYBUTYNIN CHLORIDE 5 MG: 5 TABLET, EXTENDED RELEASE ORAL at 21:31

## 2020-01-01 RX ADMIN — CARBIDOPA AND LEVODOPA 1 TABLET: 25; 100 TABLET ORAL at 21:28

## 2020-01-01 RX ADMIN — Medication 0.08 MCG/KG/MIN: at 11:05

## 2020-01-01 RX ADMIN — Medication 0.3 MCG/KG/MIN: at 17:44

## 2020-01-01 RX ADMIN — VITAMIN D, TAB 1000IU (100/BT) 1000 UNITS: 25 TAB at 21:31

## 2020-01-01 RX ADMIN — IPRATROPIUM BROMIDE AND ALBUTEROL SULFATE 3 ML: 2.5; .5 SOLUTION RESPIRATORY (INHALATION) at 21:58

## 2020-01-01 RX ADMIN — POTASSIUM CHLORIDE 10 MEQ: 7.46 INJECTION, SOLUTION INTRAVENOUS at 15:31

## 2020-01-01 RX ADMIN — ATORVASTATIN CALCIUM 20 MG: 20 TABLET, FILM COATED ORAL at 09:54

## 2020-01-01 RX ADMIN — VANCOMYCIN HYDROCHLORIDE 1250 MG: 10 INJECTION, POWDER, LYOPHILIZED, FOR SOLUTION INTRAVENOUS at 13:14

## 2020-01-01 RX ADMIN — DEXTROSE MONOHYDRATE 25 G: 25 INJECTION, SOLUTION INTRAVENOUS at 12:08

## 2020-01-01 RX ADMIN — OLANZAPINE 5 MG: 5 TABLET ORAL at 21:28

## 2020-01-01 RX ADMIN — HYDROCORTISONE SODIUM SUCCINATE 100 MG: 250 INJECTION, POWDER, FOR SOLUTION INTRAMUSCULAR; INTRAVENOUS at 12:32

## 2020-01-01 RX ADMIN — PHENYLEPHRINE HYDROCHLORIDE 3 MCG/KG/MIN: 10 INJECTION INTRAVENOUS at 02:24

## 2020-01-01 RX ADMIN — PHENYLEPHRINE HYDROCHLORIDE 2.5 MCG/KG/MIN: 10 INJECTION INTRAVENOUS at 08:21

## 2020-01-01 RX ADMIN — PHENYLEPHRINE HYDROCHLORIDE 1 MCG/KG/MIN: 10 INJECTION INTRAVENOUS at 14:22

## 2020-01-01 RX ADMIN — CALCIUM CHLORIDE 1 G: 100 INJECTION, SOLUTION INTRAVENOUS at 16:14

## 2020-01-01 RX ADMIN — CARBIDOPA AND LEVODOPA 1 TABLET: 25; 100 TABLET ORAL at 09:54

## 2020-01-01 RX ADMIN — TAZOBACTAM SODIUM AND PIPERACILLIN SODIUM 4.5 G: 500; 4 INJECTION, SOLUTION INTRAVENOUS at 05:42

## 2020-01-01 RX ADMIN — SENNOSIDES AND DOCUSATE SODIUM 2 TABLET: 8.6; 5 TABLET ORAL at 09:54

## 2020-01-01 RX ADMIN — SODIUM CHLORIDE 1000 ML: 9 INJECTION, SOLUTION INTRAVENOUS at 21:06

## 2020-01-01 RX ADMIN — POLYETHYLENE GLYCOL 3350 17 G: 17 POWDER, FOR SOLUTION ORAL at 10:03

## 2020-01-01 RX ADMIN — OLANZAPINE 5 MG: 5 TABLET ORAL at 21:02

## 2020-01-01 RX ADMIN — MIRTAZAPINE 15 MG: 15 TABLET, FILM COATED ORAL at 21:28

## 2020-01-01 RX ADMIN — Medication 1 APPLICATION: at 15:04

## 2020-01-01 RX ADMIN — POTASSIUM CHLORIDE 10 MEQ: 7.46 INJECTION, SOLUTION INTRAVENOUS at 16:45

## 2020-01-01 RX ADMIN — SENNOSIDES AND DOCUSATE SODIUM 2 TABLET: 8.6; 5 TABLET ORAL at 21:02

## 2020-01-01 RX ADMIN — MIDODRINE HYDROCHLORIDE 5 MG: 5 TABLET ORAL at 09:54

## 2020-01-01 RX ADMIN — ATORVASTATIN CALCIUM 20 MG: 20 TABLET, FILM COATED ORAL at 21:30

## 2020-01-01 RX ADMIN — POTASSIUM CHLORIDE 10 MEQ: 7.46 INJECTION, SOLUTION INTRAVENOUS at 16:04

## 2020-01-01 RX ADMIN — HYDROCORTISONE SODIUM SUCCINATE 50 MG: 100 INJECTION, POWDER, FOR SOLUTION INTRAMUSCULAR; INTRAVENOUS at 20:26

## 2020-01-01 RX ADMIN — VITAMIN D, TAB 1000IU (100/BT) 1000 UNITS: 25 TAB at 09:54

## 2020-02-12 PROBLEM — R55 SYNCOPE: Status: ACTIVE | Noted: 2020-01-01

## 2020-02-12 NOTE — ED TRIAGE NOTES
Pt was at MD office, had syncopal event.  Pt currently A&O.  Pt refused EMS IV.  Pt is bradycardic, which per family is baseline.

## 2020-02-12 NOTE — H&P
HISTORY AND PHYSICAL   [facility]        Patient Identification:  Name: Victor Manuel Issa  Age: 74 y.o.  Sex: male  :  1945  MRN: [id number]                     Primary Care Physician: Provider, No Known    Chief Complaint:  74 year old gentleman was at his cardiologist's office and had a syncopal episode; he was there for a routine visit; ems was called and he was taken to [facility] per family request; he is followed by dr leigh; he denies any recent illness; no fever or chills; no dizziness or headaches; no chest pain; he has a past history of cad and had a stress test about a year ago prior to knee surgery  History of Present Illness:   As above    Past Medical History:  Past Medical History:   Diagnosis Date   • Anxiety    • Arthritis    • Bipolar 1 disorder (CMS/HCC)    • Coronary artery disease    • Disease of thyroid gland    • GERD (gastroesophageal reflux disease)    • Hyperlipidemia    • Mobility impaired    • Myocardial infarction (CMS/HCC)    • Overactive bladder    • Parkinsonian syndrome (CMS/HCC)    • Prostatitis    • Restrictive pattern present on pulmonary function testing    • Seasonal allergies    • Stroke (CMS/HCC)     RESIDUAL WEAKNESS RIGHT LOWER EXTREMITY   • Urinary incontinence    • Urinary retention      Past Surgical History:  Past Surgical History:   Procedure Laterality Date   • ABDOMINAL SURGERY     • CARDIAC SURGERY     • CHOLECYSTECTOMY     • CORONARY ARTERY BYPASS GRAFT      x2   • ENDOSCOPY N/A 10/4/2018    LA grade D reflux esophagitis, 4cm hiatal hernia, esohageal stenosis, normal stomach, erythematous duodenopathy, normal second portion of duodenum, no specimens collected   • ENDOSCOPY N/A 2018    Procedure: ESOPHAGOGASTRODUODENOSCOPY WITH DILATATION;  Surgeon: Paul Duncan MD;  Location: [facility] ENDOSCOPY;  Service: Gastroenterology   • EYE SURGERY      cataracts   • HIP ENDOPROSTHESIS Right 2018    Procedure: RIGHT HIP  HEMIARTHROPLASTY;  Surgeon: Winston Vallejo MD;  Location: [facility] OR;  Service: Orthopedics   • HIP FRACTURE SURGERY Right     PARTIAL HIP REPLACEMENT   • MUSCLE BIOPSY Left 3/24/2016    Procedure: LT THIGH MUSCLE BIOPSY;  Surgeon: Fausto Mack MD;  Location: [facility] OR;  Service:    • SKIN BIOPSY     • THYROID SURGERY     • TOTAL HIP ARTHROPLASTY REVISION Right 1/18/2019    Procedure: CONVERSION RT POSTERIOR HIP BIPOLAR TO TOTAL HIP ARTHROPLASTY;  Surgeon: Radha Paredes MD;  Location: [facility] OR;  Service: Orthopedics   • TURP / TRANSURETHRAL INCISION / DRAINAGE PROSTATE     • VASCULAR SURGERY        Home Meds:    (Not in a hospital admission)    Allergies:  Allergies   Allergen Reactions   • Oxycodone Mental Status Change   • Promethazine Hallucinations   • Beet [Beta Vulgaris] Unknown (See Comments)     Pt cant remember     Immunizations:    There is no immunization history on file for this patient.  Social History:   Social History     Social History Narrative   • Not on file     Social History     Socioeconomic History   • Marital status:      Spouse name: Not on file   • Number of children: Not on file   • Years of education: Not on file   • Highest education level: Not on file   Tobacco Use   • Smoking status: Current Every Day Smoker     Packs/day: 0.50     Types: Cigarettes     Start date: 1960   • Smokeless tobacco: Never Used   • Tobacco comment: Educated pt on quitting   Substance and Sexual Activity   • Alcohol use: Never     Frequency: Never   • Drug use: No   • Sexual activity: Defer     Partners: Female       Family History:  Family History   Problem Relation Age of Onset   • Cancer Mother    • Arthritis Father    • Heart disease Father    • Early death Brother    • Heart disease Brother    • Malig Hyperthermia Neg Hx         Review of Systems  See history of present illness and past medical history.  Patient denies headache, dizziness,  falls, trauma,  "change in vision, change in hearing, change in taste, changes in weight, changes in appetite, focal weakness, numbness, or paresthesia.  Patient denies chest pain, palpitations, dyspnea, orthopnea, PND, cough, sinus pressure, rhinorrhea, epistaxis, hemoptysis, nausea, vomiting,hematemesis, diarrhea, constipation or hematchezia.  Denies cold or heat intolerance, polydipsia, polyuria, polyphagia. Denies hematuria, pyuria, dysuria, hesitancy, frequency or urgency. Denies consumption of raw and under cooked meats foods or change in water source.  Denies fever, chills, sweats, night sweats.  Denies missing any routine medications. Remainder of ROS is negative.    Objective:  tMax 24 hrs: Temp (24hrs), Av °F (36.1 °C), Min:96.4 °F (35.8 °C), Max:97.6 °F (36.4 °C)    Vitals Ranges:   Temp:  [96.4 °F (35.8 °C)-97.6 °F (36.4 °C)] 97.6 °F (36.4 °C)  Heart Rate:  [41-58] 48  Resp:  [16-18] 16  BP: (116-144)/(57-75) 133/68      Exam:  /68 (BP Location: Left arm, Patient Position: Lying)   Pulse (!) 48   Temp 97.6 °F (36.4 °C) (Oral)   Resp 16   Ht 180.3 cm (71\")   Wt 70 kg (154 lb 5.2 oz)   SpO2 97%   BMI 21.52 kg/m²     General Appearance:    Alert, cooperative, no distress, appears stated age; thin, chronically ill-appearing   Head:    Normocephalic, without obvious abnormality, atraumatic   Eyes:    PERRL, conjunctiva/corneas clear, EOM's intact, both eyes   Ears:    Normal external ear canals, both ears   Nose:   Nares normal, septum midline, mucosa normal, no drainage    or sinus tenderness   Throat:   Lips, mucosa, and tongue normal   Neck:   Supple, symmetrical, trachea midline, no adenopathy;     thyroid:  no enlargement/tenderness/nodules; no carotid    bruit or JVD   Back:     Symmetric, no curvature, ROM normal, no CVA tenderness   Lungs:     Decreased breath sounds bilaterally, respirations unlabored   Chest Wall:    No tenderness or deformity    Heart:    Regular rate and rhythm, S1 and S2 normal, " no murmur, rub   or gallop   Abdomen:     Soft, non-tender, bowel sounds active all four quadrants,     no masses, no hepatomegaly, no splenomegaly   Extremities:   Extremities normal, atraumatic, no cyanosis or edema   Pulses:   2+ and symmetric all extremities   Skin:   Skin color, texture, turgor normal, no rashes or lesions   Lymph nodes:   Cervical, supraclavicular, and axillary nodes normal   Neurologic:   CNII-XII intact, normal strength, sensation intact throughout      .    Data Review:  Labs in chart were reviewed.  WBC   Date Value Ref Range Status   02/12/2020 10.94 (H) 3.40 - 10.80 10*3/mm3 Final     Hemoglobin   Date Value Ref Range Status   02/12/2020 12.6 (L) 13.0 - 17.7 g/dL Final     Hematocrit   Date Value Ref Range Status   02/12/2020 37.3 (L) 37.5 - 51.0 % Final     Platelets   Date Value Ref Range Status   02/12/2020 221 140 - 450 10*3/mm3 Final     Sodium   Date Value Ref Range Status   02/12/2020 142 136 - 145 mmol/L Final     Potassium   Date Value Ref Range Status   02/12/2020 4.6 3.5 - 5.2 mmol/L Final     Chloride   Date Value Ref Range Status   02/12/2020 108 (H) 98 - 107 mmol/L Final     CO2   Date Value Ref Range Status   02/12/2020 24.0 22.0 - 29.0 mmol/L Final     BUN   Date Value Ref Range Status   02/12/2020 14 8 - 23 mg/dL Final     Creatinine   Date Value Ref Range Status   02/12/2020 0.78 0.76 - 1.27 mg/dL Final     Glucose   Date Value Ref Range Status   02/12/2020 112 (H) 65 - 99 mg/dL Final     Calcium   Date Value Ref Range Status   02/12/2020 9.6 8.6 - 10.5 mg/dL Final     Magnesium   Date Value Ref Range Status   02/12/2020 2.1 1.6 - 2.4 mg/dL Final     AST (SGOT)   Date Value Ref Range Status   02/12/2020 12 1 - 40 U/L Final     Comment:     Specimen hemolyzed.  Results may be affected.     ALT (SGPT)   Date Value Ref Range Status   02/12/2020 <5 1 - 41 U/L Final     Alkaline Phosphatase   Date Value Ref Range Status   02/12/2020 72 39 - 117 U/L Final     No results found  for: APTT, INR  No results found for: COLORU, CLARITYU, SPECGRAV, PHUR, PROTEINUR, GLUCOSEU, KETONESU, BLOODU, NITRITE, LEUKOCYTESUR, BILIRUBINUR, UROBILINOGEN, RBCUA, WBCUA, BACTERIA, UACOMMENT             Imaging Results (All)     Procedure Component Value Units Date/Time    XR Chest 1 View [[id number]] Collected:  02/12/20 1239     Updated:  02/12/20 1244    Narrative:       XR CHEST 1 VW-     HISTORY: Male who is 74 years-old,  syncope     TECHNIQUE: Frontal view of the chest     COMPARISON: 12/12/2019     FINDINGS: Heart size is borderline. Aorta is tortuous, calcified.  Sternotomy wires noted. Slight prominence of vascular and interstitial  markings.  No focal pulmonary consolidation, pleural effusion, or  pneumothorax. No acute osseous process.       Impression:       No focal pulmonary consolidation. Borderline heart size with  slight prominence of vascular and interstitial markings. Tortuous aorta.     This report was finalized on 2/12/2020 12:41 PM by Dr. Mendoza Womack M.D.           Patient Active Problem List   Diagnosis Code   • Thigh pain, musculoskeletal M79.606   • Left leg weakness R29.898   • History of CVA (cerebrovascular accident) Z86.73   • COPD (chronic obstructive pulmonary disease) J44.9   • Coronary artery disease I25.10   • Cervical myelopathy (CMS/[facility]) G95.9   • Parkinson's disease G20   • Debility R53.81   • Cerebrovascular disease I67.9   • Abnormal TSH R79.89   • Hypothyroidism (acquired) E03.9   • Hip fracture requiring operative repair, right, closed, initial encounter (CMS/[facility]) S72.001A   • Urinary tract infection without hematuria N39.0   • Hypoxia R09.02   • Sinus bradycardia R00.1   • Chest pain R07.9   • Hip pain, chronic, right M25.551, G89.29   • Hematemesis K92.0   • Hematemesis with nausea K92.0   • H/O medication noncompliance Z91.14   • Hyperlipidemia E78.5   • Muscle spasticity M62.838   • COPD exacerbation (CMS/[facility]) J44.1   • Hypophosphatemia E83.39   • Pneumonia of  right upper lobe due to infectious organism (CMS/HCC) J18.1   • Sepsis due to pneumonia (CMS/HCC) J18.9, A41.9   • Sepsis with metabolic encephalopathy (CMS/HCC) A41.9, R65.20, G93.41   • Hypotension I95.9   • Syncope R55       Assessment:    Syncope  cad  Underweight  H.o cva  Hyperlipidemia  hyperglycemia    Plan:  Get a second troponin  Ask cardiology to see him  Monitor on telemetry  Check echo  Ask neurology to see him as well  Family is worried about his parkinsonism  D.w patient, wife and daughter    Brandeeshahzad Bonilla MD  2/12/2020  5:33 PM     no

## 2020-02-12 NOTE — ED NOTES
Per EMS, pt was in his wheelchair getting an exam by the cardiologist when he had a syncopal episode and the staff lowered him to the ground and got ready to perform CPR when he came to.  Per EMS, the cardiology staff stated his blood pressure was in the 90's/60's and his pulse was in the 40's.  EMS stated the patient was sinus tanvir in the 40's, bp 135/60 and SaO2 in the mid 90's.     Rivka Thornton, RN  02/12/20 1132       Rivka Thornton, RN  02/12/20 1130

## 2020-02-12 NOTE — ED PROVIDER NOTES
"EMERGENCY DEPARTMENT ENCOUNTER    Room Number:  S422/1  PCP: Provider, No Known  Historian: pt      HPI:  Chief Complaint: Syncope  Context: Victor Manuel Issa is a 74 y.o. male with a hx of Parkinsonian Syndrome and MI who presents to the ED after a syncopal episode that occurred while at his cardiologists office PTA. Pt wife reports that Dr Read, cardiology, states the pt had a vasovagal episode and states the pt had EKGs performed before leaving the office. States pt's cardiologist recommended the pt go to the ED and be seen by neurology due to concern for worsening Parkinsonian symptoms.   Pt denies experiencing any dizziness, nausea, tunnel vision, and hearing disturbances prior to the syncope. Pt wife states the pt was unconscious for about five minutes, or \"long enough that the staff was about to start CPR\".   Pt denies hitting his head. Nurse states that per EMS, the pt was sitting in a wheelchair and the staff lowered the pt to the floor.  Pt daughter states the pt's HR typically runs low but pt wife disagrees.     Character: syncope  Duration: PTA  Severity: moderate  Progression: resolved  Aggravating Factors: none  Alleviating Factors: none            ALLERGIES  Oxycodone; Promethazine; and Beet [beta vulgaris]    PAST MEDICAL HISTORY  Active Ambulatory Problems     Diagnosis Date Noted   • Thigh pain, musculoskeletal 03/22/2016   • Left leg weakness 09/28/2017   • History of CVA (cerebrovascular accident) 09/28/2017   • COPD (chronic obstructive pulmonary disease) 09/28/2017   • Coronary artery disease 09/28/2017   • Cervical myelopathy (CMS/[facility]) 09/29/2017   • Parkinson's disease 09/29/2017   • Debility 09/29/2017   • Cerebrovascular disease 09/29/2017   • Abnormal TSH 09/30/2017   • Hypothyroidism (acquired) 10/01/2017   • Hip fracture requiring operative repair, right, closed, initial encounter (CMS/[facility]) 05/03/2018   • Urinary tract infection without hematuria 05/06/2018   • Hypoxia 05/06/2018   • " Sinus bradycardia 05/12/2018   • Chest pain 10/02/2018   • Hip pain, chronic, right 10/02/2018   • Hematemesis 10/02/2018   • Hematemesis with nausea 10/02/2018   • H/O medication noncompliance 10/03/2018   • Hyperlipidemia 10/03/2018   • Muscle spasticity 10/24/2019   • COPD exacerbation (CMS/[facility]) 12/07/2019   • Hypophosphatemia 12/07/2019   • Pneumonia of right upper lobe due to infectious organism (CMS/[facility]) 12/08/2019   • Sepsis due to pneumonia (CMS/[facility]) 12/09/2019   • Sepsis with metabolic encephalopathy (CMS/[facility]) 12/09/2019   • Hypotension 12/09/2019     Resolved Ambulatory Problems     Diagnosis Date Noted   • Post-traumatic osteoarthritis of right hip 01/18/2019   • Hip pain 01/18/2019   • 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 01/18/2019     Past Medical History:   Diagnosis Date   • Anxiety    • Arthritis    • Bipolar 1 disorder (CMS/[facility])    • Disease of thyroid gland    • GERD (gastroesophageal reflux disease)    • Mobility impaired    • Myocardial infarction (CMS/[facility])    • Overactive bladder    • Parkinsonian syndrome (CMS/[facility])    • Prostatitis    • Restrictive pattern present on pulmonary function testing    • Seasonal allergies    • Stroke (CMS/[facility])    • Urinary incontinence    • Urinary retention        PAST SURGICAL HISTORY  Past Surgical History:   Procedure Laterality Date   • ABDOMINAL SURGERY     • CARDIAC SURGERY     • CHOLECYSTECTOMY     • CORONARY ARTERY BYPASS GRAFT      x2   • ENDOSCOPY N/A 10/4/2018    LA grade D reflux esophagitis, 4cm hiatal hernia, esohageal stenosis, normal stomach, erythematous duodenopathy, normal second portion of duodenum, no specimens collected   • ENDOSCOPY N/A 11/30/2018    Procedure: ESOPHAGOGASTRODUODENOSCOPY WITH DILATATION;  Surgeon: Paul Duncan MD;  Location: [facility] ENDOSCOPY;  Service: Gastroenterology   • EYE SURGERY      cataracts   • HIP ENDOPROSTHESIS Right 5/5/2018    Procedure: RIGHT HIP HEMIARTHROPLASTY;  Surgeon: Winston  CARRIE Vallejo MD;  Location: [facility] MAIN OR;  Service: Orthopedics   • HIP FRACTURE SURGERY Right     PARTIAL HIP REPLACEMENT   • MUSCLE BIOPSY Left 3/24/2016    Procedure: LT THIGH MUSCLE BIOPSY;  Surgeon: Fausto Mack MD;  Location: [facility] MAIN OR;  Service:    • SKIN BIOPSY     • THYROID SURGERY     • TOTAL HIP ARTHROPLASTY REVISION Right 1/18/2019    Procedure: CONVERSION RT POSTERIOR HIP BIPOLAR TO TOTAL HIP ARTHROPLASTY;  Surgeon: Radha Paredes MD;  Location: [facility] MAIN OR;  Service: Orthopedics   • TURP / TRANSURETHRAL INCISION / DRAINAGE PROSTATE     • VASCULAR SURGERY         FAMILY HISTORY  Family History   Problem Relation Age of Onset   • Cancer Mother    • Arthritis Father    • Heart disease Father    • Early death Brother    • Heart disease Brother    • Malig Hyperthermia Neg Hx        SOCIAL HISTORY  Social History     Socioeconomic History   • Marital status:      Spouse name: Not on file   • Number of children: Not on file   • Years of education: Not on file   • Highest education level: Not on file   Tobacco Use   • Smoking status: Current Every Day Smoker     Packs/day: 0.50     Types: Cigarettes     Start date: 1960   • Smokeless tobacco: Never Used   • Tobacco comment: Educated pt on quitting   Substance and Sexual Activity   • Alcohol use: Never     Frequency: Never   • Drug use: No   • Sexual activity: Defer     Partners: Female           REVIEW OF SYSTEMS  Review of Systems   Constitutional: Negative for chills and fever.   HENT: Negative for hearing loss and sore throat.    Eyes: Negative for visual disturbance.   Respiratory: Negative for shortness of breath.    Cardiovascular: Negative for chest pain.   Gastrointestinal: Negative for nausea and vomiting.   Genitourinary: Negative for dysuria.   Musculoskeletal: Negative for back pain.   Skin: Negative for rash.   Neurological: Positive for syncope. Negative for dizziness and light-headedness.    Psychiatric/Behavioral: The patient is not nervous/anxious.        PHYSICAL EXAM  ED Triage Vitals [02/12/20 1121]   Temp Heart Rate Resp BP SpO2   96.4 °F (35.8 °C) (!) 43 18 135/65 99 %      Temp src Heart Rate Source Patient Position BP Location FiO2 (%)   Tympanic -- -- -- --     Physical Exam   Constitutional: He is oriented to person, place, and time. No distress.   HENT:   Head: Normocephalic and atraumatic.   Eyes: Pupils are equal, round, and reactive to light. EOM are normal.   Neck: Normal range of motion. Neck supple.   Cardiovascular: Regular rhythm and normal heart sounds. Bradycardia present.   Pulmonary/Chest: Effort normal. No respiratory distress. He has wheezes (mild).   Abdominal: Soft. There is no tenderness. There is no rebound and no guarding.   Musculoskeletal: Normal range of motion. He exhibits no edema.   Neurological: He is alert and oriented to person, place, and time. He has normal sensation. He displays weakness (BLE; chronic).   Skin: Skin is warm and dry.   Psychiatric: Mood and affect normal.   Nursing note and vitals reviewed.          LAB RESULTS  Recent Results (from the past 24 hour(s))   Comprehensive Metabolic Panel    Collection Time: 02/12/20 11:57 AM   Result Value Ref Range    Glucose 112 (H) 65 - 99 mg/dL    BUN 14 8 - 23 mg/dL    Creatinine 0.78 0.76 - 1.27 mg/dL    Sodium 142 136 - 145 mmol/L    Potassium 4.6 3.5 - 5.2 mmol/L    Chloride 108 (H) 98 - 107 mmol/L    CO2 24.0 22.0 - 29.0 mmol/L    Calcium 9.6 8.6 - 10.5 mg/dL    Total Protein 6.7 6.0 - 8.5 g/dL    Albumin 3.40 (L) 3.50 - 5.20 g/dL    ALT (SGPT) <5 1 - 41 U/L    AST (SGOT) 12 1 - 40 U/L    Alkaline Phosphatase 72 39 - 117 U/L    Total Bilirubin 0.3 0.2 - 1.2 mg/dL    eGFR Non African Amer 97 >60 mL/min/1.73    Globulin 3.3 gm/dL    A/G Ratio 1.0 g/dL    BUN/Creatinine Ratio 17.9 7.0 - 25.0    Anion Gap 10.0 5.0 - 15.0 mmol/L   Troponin    Collection Time: 02/12/20 11:57 AM   Result Value Ref Range     Troponin T <0.010 0.000 - 0.030 ng/mL   Magnesium    Collection Time: 02/12/20 11:57 AM   Result Value Ref Range    Magnesium 2.1 1.6 - 2.4 mg/dL   Valproic Acid Level, Total    Collection Time: 02/12/20 11:57 AM   Result Value Ref Range    Valproic Acid 30.0 (L) 50.0 - 125.0 mcg/mL   CBC Auto Differential    Collection Time: 02/12/20 11:57 AM   Result Value Ref Range    WBC 10.94 (H) 3.40 - 10.80 10*3/mm3    RBC 3.91 (L) 4.14 - 5.80 10*6/mm3    Hemoglobin 12.6 (L) 13.0 - 17.7 g/dL    Hematocrit 37.3 (L) 37.5 - 51.0 %    MCV 95.4 79.0 - 97.0 fL    MCH 32.2 26.6 - 33.0 pg    MCHC 33.8 31.5 - 35.7 g/dL    RDW 13.2 12.3 - 15.4 %    RDW-SD 46.0 37.0 - 54.0 fl    MPV 10.3 6.0 - 12.0 fL    Platelets 221 140 - 450 10*3/mm3    Neutrophil % 73.6 42.7 - 76.0 %    Lymphocyte % 19.4 (L) 19.6 - 45.3 %    Monocyte % 4.9 (L) 5.0 - 12.0 %    Eosinophil % 1.3 0.3 - 6.2 %    Basophil % 0.4 0.0 - 1.5 %    Immature Grans % 0.4 0.0 - 0.5 %    Neutrophils, Absolute 8.06 (H) 1.70 - 7.00 10*3/mm3    Lymphocytes, Absolute 2.12 0.70 - 3.10 10*3/mm3    Monocytes, Absolute 0.54 0.10 - 0.90 10*3/mm3    Eosinophils, Absolute 0.14 0.00 - 0.40 10*3/mm3    Basophils, Absolute 0.04 0.00 - 0.20 10*3/mm3    Immature Grans, Absolute 0.04 0.00 - 0.05 10*3/mm3    nRBC 0.0 0.0 - 0.2 /100 WBC   BNP    Collection Time: 02/12/20 11:57 AM   Result Value Ref Range    proBNP 260.7 5.0-1,800.0 pg/mL       I ordered the above labs and reviewed the results        RADIOLOGY  XR Chest 1 View   Final Result   No focal pulmonary consolidation. Borderline heart size with   slight prominence of vascular and interstitial markings. Tortuous aorta.       This report was finalized on 2/12/2020 12:41 PM by Dr. Mendoza Womack M.D.              I ordered the above noted radiological studies and reviewed the images on the PACS system.          MEDICATIONS GIVEN IN ER  Medications   sodium chloride 0.9 % infusion (125 mL/hr Intravenous Currently Infusing 2/12/20 1700)    acetaminophen (TYLENOL) tablet 650 mg (has no administration in time range)   albuterol (PROVENTIL) nebulizer solution 0.083% 2.5 mg/3mL (has no administration in time range)   aspirin EC tablet 325 mg (has no administration in time range)   bisacodyl (DULCOLAX) EC tablet 10 mg (has no administration in time range)   busPIRone (BUSPAR) tablet 5 mg (has no administration in time range)   carbidopa-levodopa (SINEMET)  MG per tablet 1 tablet (has no administration in time range)   cholecalciferol (VITAMIN D3) tablet 1,000 Units (has no administration in time range)   diclofenac (VOLTAREN) 1 % gel 4 g (has no administration in time range)   divalproex (DEPAKOTE) DR tablet 500 mg (has no administration in time range)   donepezil (ARICEPT) tablet 20 mg (has no administration in time range)   fluticasone (FLONASE) 50 MCG/ACT nasal spray 1 spray (has no administration in time range)   hydrocortisone-bacitracin-zinc oxide-nystatin (MAGIC BARRIER) cream 1 application (has no administration in time range)   ipratropium-albuterol (DUO-NEB) nebulizer solution 3 mL (has no administration in time range)   levothyroxine (SYNTHROID, LEVOTHROID) tablet 175 mcg (has no administration in time range)   midodrine (PROAMATINE) tablet 5 mg (has no administration in time range)   mirtazapine (REMERON) tablet 15 mg (has no administration in time range)   montelukast (SINGULAIR) tablet 10 mg (has no administration in time range)   multivitamin with minerals 1 tablet (has no administration in time range)   OLANZapine (zyPREXA) tablet 5 mg (has no administration in time range)   ondansetron ODT (ZOFRAN-ODT) disintegrating tablet 4 mg (has no administration in time range)   oxybutynin XL (DITROPAN-XL) 24 hr tablet 5 mg (has no administration in time range)   pantoprazole (PROTONIX) EC tablet 40 mg (has no administration in time range)   polyethylene glycol (MIRALAX) packet 17 g (has no administration in time range)   senna-docusate  sodium (SENOKOT-S) 8.6-50 MG tablet 2 tablet (has no administration in time range)   atorvastatin (LIPITOR) tablet 20 mg (has no administration in time range)           EKG  Interpreted by Dr. Mathews (ER physician). Please see his interpretation.         PROCEDURES  Procedures          PROGRESS AND CONSULTS    Progress Notes:    ED Course as of Feb 12 1908   Wed Feb 12, 2020   1205 Transfer of care from [facility].  Discussed the patient, relevant history, exam, diagnostics, ED findings/progress. Pending work up and disposition.    [JG]   1210 EKG independently viewed and contemporaneously interpreted by ED physician. Time: 11:52 AM.  Rate 56.  Interpretation: Sinus bradycardia, normal axis, normal QRS, no ST elevation or depression, T wave inversion in leads V3 through V6 and leads I and aVL, anterior lateral T wave inversion is new from prior, 12/8/2019    [JG]   1410 Syncope that occurred while patient was seated and had no prodrome.  Concern for cardiac etiology.  EKG shows new T wave inversions but otherwise no other changes.  Patient has been on cardiac monitor with bradycardia but per family that is baseline.  Troponin and BNP are negative.  Consulting hospitalist for admission for further evaluation of concerning syncope.    [JG]   1411 Patient reassessed.  Discussed ED findings, differential diagnosis, and the need for admission.  They are agreeable to admission and all questions were answered.        [JG]   1423 Phone call with Dr Bonilla.  Discussed the patient, relevant history, exam, diagnostics, ED findings/progress, and concerns. They agree to admit the patient to telemetry. Care assumed by the admitting physician at this time.        [JG]      ED Course User Index  [JG] Shreyas Mathews MD         1218  Reviewed pt's history and workup with Dr. Mathews (ER physician).   Turned pt care over to Dr Mathews.          Disposition vitals:  /57 (BP Location: Right arm, Patient Position: Lying)   " Pulse 50   Temp 97.3 °F (36.3 °C) (Oral)   Resp 18   Ht 180.3 cm (71\")   Wt 72.4 kg (159 lb 11.2 oz)   SpO2 97%   BMI 22.27 kg/m²           DIAGNOSIS  Final diagnoses:   Syncope, unspecified syncope type   Abnormal EKG   Chronic anemia   Bradycardia           DISPOSITION  Turned pt care over to Dr Mathews.        Documentation assistance provided by [employer] for Ms. Lacie Mcintyre PA-C.  Information recorded by the scribe was done at my direction and has been verified and validated by me.              Juanita Key  02/12/20 1218       Lacie Mcintyre PA-C  02/12/20 1908    "

## 2020-02-12 NOTE — ED NOTES
Per pt's wife, pt's lung medication was not paid for by insurance so he is no longer taking his duoneb or Brovana.     Rivka Thornton, RN  02/12/20 2978

## 2020-02-12 NOTE — ED PROVIDER NOTES
EMERGENCY DEPARTMENT ENCOUNTER    Room Number:  01/01  Date of encounter:  2/12/2020  PCP: Provider, No Known  Historian: Patient and patient's wife    HPI:  Chief Complaint: Syncope  Context: Victor Manuel Issa is a 74 y.o. male who presents to the ED c/o syncope.  Patient had a routine follow-up with his cardiologist Dr. Read today.  Patient was complaining of no symptoms at the time.  While being evaluated in Dr. Read's office, patient had sudden loss of consciousness.  Patient states he was sitting in the wheelchair at the time and had no prodrome.  Patient simply woke up on the floor.  The wife reports that patient suddenly began to drool and was unconscious.  Patient was laid on the floor and was unconscious for approximately 5 minutes.  Wife denies any seizure-like activity and states patient quickly returned to baseline upon waking up.  Wife does report that the staff was concerned and almost began CPR on him.  Patient denies any oral or lingual lacerations, no incontinence of bowel or bladder.  Patient denies any symptoms since that time.  Patient denies any chest pain, shortness of breath, or palpitations.  Patient is currently asymptomatic.  Patient denies any recent vomiting or diarrhea, no recent cough, no recent fever shakes chills or night sweats.  Patient has been eating and drinking normally.      MEDICAL HISTORY REVIEW  Reviewed in EPIC       PAST MEDICAL HISTORY  Active Ambulatory Problems     Diagnosis Date Noted   • Thigh pain, musculoskeletal 03/22/2016   • Left leg weakness 09/28/2017   • History of CVA (cerebrovascular accident) 09/28/2017   • COPD (chronic obstructive pulmonary disease) 09/28/2017   • Coronary artery disease 09/28/2017   • Cervical myelopathy (CMS/HCC) 09/29/2017   • Parkinson's disease 09/29/2017   • Debility 09/29/2017   • Cerebrovascular disease 09/29/2017   • Abnormal TSH 09/30/2017   • Hypothyroidism (acquired) 10/01/2017   • Hip fracture requiring operative repair,  right, closed, initial encounter (CMS/HCC) 05/03/2018   • Urinary tract infection without hematuria 05/06/2018   • Hypoxia 05/06/2018   • Sinus bradycardia 05/12/2018   • Chest pain 10/02/2018   • Hip pain, chronic, right 10/02/2018   • Hematemesis 10/02/2018   • Hematemesis with nausea 10/02/2018   • H/O medication noncompliance 10/03/2018   • Hyperlipidemia 10/03/2018   • Muscle spasticity 10/24/2019   • COPD exacerbation (CMS/HCC) 12/07/2019   • Hypophosphatemia 12/07/2019   • Pneumonia of right upper lobe due to infectious organism (CMS/HCC) 12/08/2019   • Sepsis due to pneumonia (CMS/HCC) 12/09/2019   • Sepsis with metabolic encephalopathy (CMS/HCC) 12/09/2019   • Hypotension 12/09/2019     Resolved Ambulatory Problems     Diagnosis Date Noted   • Post-traumatic osteoarthritis of right hip 01/18/2019   • Hip pain 01/18/2019   • 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 01/18/2019     Past Medical History:   Diagnosis Date   • Anxiety    • Arthritis    • Bipolar 1 disorder (CMS/[facility])    • Disease of thyroid gland    • GERD (gastroesophageal reflux disease)    • Mobility impaired    • Myocardial infarction (CMS/[facility])    • Overactive bladder    • Parkinsonian syndrome (CMS/HCC)    • Prostatitis    • Restrictive pattern present on pulmonary function testing    • Seasonal allergies    • Stroke (CMS/[facility])    • Urinary incontinence    • Urinary retention          PAST SURGICAL HISTORY  Past Surgical History:   Procedure Laterality Date   • ABDOMINAL SURGERY     • CARDIAC SURGERY     • CHOLECYSTECTOMY     • CORONARY ARTERY BYPASS GRAFT      x2   • ENDOSCOPY N/A 10/4/2018    LA grade D reflux esophagitis, 4cm hiatal hernia, esohageal stenosis, normal stomach, erythematous duodenopathy, normal second portion of duodenum, no specimens collected   • ENDOSCOPY N/A 11/30/2018    Procedure: ESOPHAGOGASTRODUODENOSCOPY WITH DILATATION;  Surgeon: Paul Duncan MD;  Location: [facility] ENDOSCOPY;  Service:  Gastroenterology   • EYE SURGERY      cataracts   • HIP ENDOPROSTHESIS Right 5/5/2018    Procedure: RIGHT HIP HEMIARTHROPLASTY;  Surgeon: Winston Vallejo MD;  Location: [facility] OR;  Service: Orthopedics   • HIP FRACTURE SURGERY Right     PARTIAL HIP REPLACEMENT   • MUSCLE BIOPSY Left 3/24/2016    Procedure: LT THIGH MUSCLE BIOPSY;  Surgeon: Fausto Mack MD;  Location: [facility] OR;  Service:    • SKIN BIOPSY     • THYROID SURGERY     • TOTAL HIP ARTHROPLASTY REVISION Right 1/18/2019    Procedure: CONVERSION RT POSTERIOR HIP BIPOLAR TO TOTAL HIP ARTHROPLASTY;  Surgeon: Radha Paredes MD;  Location: [facility] OR;  Service: Orthopedics   • TURP / TRANSURETHRAL INCISION / DRAINAGE PROSTATE     • VASCULAR SURGERY           FAMILY HISTORY  Family History   Problem Relation Age of Onset   • Cancer Mother    • Arthritis Father    • Heart disease Father    • Early death Brother    • Heart disease Brother    • Malig Hyperthermia Neg Hx          SOCIAL HISTORY  Social History     Socioeconomic History   • Marital status:      Spouse name: Not on file   • Number of children: Not on file   • Years of education: Not on file   • Highest education level: Not on file   Tobacco Use   • Smoking status: Current Every Day Smoker     Packs/day: 0.50     Types: Cigarettes     Start date: 1960   • Smokeless tobacco: Never Used   • Tobacco comment: Educated pt on quitting   Substance and Sexual Activity   • Alcohol use: Never     Frequency: Never   • Drug use: No   • Sexual activity: Defer     Partners: Female         ALLERGIES  Oxycodone; Promethazine; and Beet [beta vulgaris]    The patient's allergies have been reviewed    REVIEW OF SYSTEMS  All systems reviewed and negative except for those discussed in HPI.       PHYSICAL EXAM  I have reviewed the triage vital signs and nursing notes.    ED Triage Vitals [02/12/20 1121]   Temp Heart Rate Resp BP SpO2   96.4 °F (35.8 °C) (!) 43 18 135/65 99 %       Temp src Heart Rate Source Patient Position BP Location FiO2 (%)   Tympanic -- -- -- --       Physical Exam        LAB RESULTS  Recent Results (from the past 24 hour(s))   Comprehensive Metabolic Panel    Collection Time: 02/12/20 11:57 AM   Result Value Ref Range    Glucose 112 (H) 65 - 99 mg/dL    BUN 14 8 - 23 mg/dL    Creatinine 0.78 0.76 - 1.27 mg/dL    Sodium 142 136 - 145 mmol/L    Potassium 4.6 3.5 - 5.2 mmol/L    Chloride 108 (H) 98 - 107 mmol/L    CO2 24.0 22.0 - 29.0 mmol/L    Calcium 9.6 8.6 - 10.5 mg/dL    Total Protein 6.7 6.0 - 8.5 g/dL    Albumin 3.40 (L) 3.50 - 5.20 g/dL    ALT (SGPT) <5 1 - 41 U/L    AST (SGOT) 12 1 - 40 U/L    Alkaline Phosphatase 72 39 - 117 U/L    Total Bilirubin 0.3 0.2 - 1.2 mg/dL    eGFR Non African Amer 97 >60 mL/min/1.73    Globulin 3.3 gm/dL    A/G Ratio 1.0 g/dL    BUN/Creatinine Ratio 17.9 7.0 - 25.0    Anion Gap 10.0 5.0 - 15.0 mmol/L   Troponin    Collection Time: 02/12/20 11:57 AM   Result Value Ref Range    Troponin T <0.010 0.000 - 0.030 ng/mL   Magnesium    Collection Time: 02/12/20 11:57 AM   Result Value Ref Range    Magnesium 2.1 1.6 - 2.4 mg/dL   Valproic Acid Level, Total    Collection Time: 02/12/20 11:57 AM   Result Value Ref Range    Valproic Acid 30.0 (L) 50.0 - 125.0 mcg/mL   CBC Auto Differential    Collection Time: 02/12/20 11:57 AM   Result Value Ref Range    WBC 10.94 (H) 3.40 - 10.80 10*3/mm3    RBC 3.91 (L) 4.14 - 5.80 10*6/mm3    Hemoglobin 12.6 (L) 13.0 - 17.7 g/dL    Hematocrit 37.3 (L) 37.5 - 51.0 %    MCV 95.4 79.0 - 97.0 fL    MCH 32.2 26.6 - 33.0 pg    MCHC 33.8 31.5 - 35.7 g/dL    RDW 13.2 12.3 - 15.4 %    RDW-SD 46.0 37.0 - 54.0 fl    MPV 10.3 6.0 - 12.0 fL    Platelets 221 140 - 450 10*3/mm3    Neutrophil % 73.6 42.7 - 76.0 %    Lymphocyte % 19.4 (L) 19.6 - 45.3 %    Monocyte % 4.9 (L) 5.0 - 12.0 %    Eosinophil % 1.3 0.3 - 6.2 %    Basophil % 0.4 0.0 - 1.5 %    Immature Grans % 0.4 0.0 - 0.5 %    Neutrophils, Absolute 8.06 (H) 1.70 -  7.00 10*3/mm3    Lymphocytes, Absolute 2.12 0.70 - 3.10 10*3/mm3    Monocytes, Absolute 0.54 0.10 - 0.90 10*3/mm3    Eosinophils, Absolute 0.14 0.00 - 0.40 10*3/mm3    Basophils, Absolute 0.04 0.00 - 0.20 10*3/mm3    Immature Grans, Absolute 0.04 0.00 - 0.05 10*3/mm3    nRBC 0.0 0.0 - 0.2 /100 WBC   BNP    Collection Time: 02/12/20 11:57 AM   Result Value Ref Range    proBNP 260.7 5.0-1,800.0 pg/mL       I ordered the above labs and reviewed the results.      RADIOLOGY  Xr Chest 1 View    Result Date: 2/12/2020  XR CHEST 1 VW-  HISTORY: Male who is 74 years-old,  syncope  TECHNIQUE: Frontal view of the chest  COMPARISON: 12/12/2019  FINDINGS: Heart size is borderline. Aorta is tortuous, calcified. Sternotomy wires noted. Slight prominence of vascular and interstitial markings.  No focal pulmonary consolidation, pleural effusion, or pneumothorax. No acute osseous process.      No focal pulmonary consolidation. Borderline heart size with slight prominence of vascular and interstitial markings. Tortuous aorta.  This report was finalized on 2/12/2020 12:41 PM by Dr. Mendoza Womack M.D.        I ordered the above noted radiological studies. I reviewed the images and results. I agree with the radiologist interpretation.      PROCEDURES  Procedures      MEDICATIONS GIVEN IN ER  Medications   sodium chloride 0.9 % infusion (125 mL/hr Intravenous New Bag 2/12/20 1420)         PROGRESS, DATA ANALYSIS, CONSULTS, AND MEDICAL DECISION MAKING  A complete history and physical exam have been performed.  All available laboratory and imaging results have been reviewed by myself prior to disposition.  [facility]      ED Course as of Feb 12 1427   Wed Feb 12, 2020   1205 Transfer of care from [facility].  Discussed the patient, relevant history, exam, diagnostics, ED findings/progress. Pending work up and disposition.    [JG]   1210 EKG independently viewed and contemporaneously interpreted by ED physician. Time: 11:52 AM.  Rate 56.   Interpretation: Sinus bradycardia, normal axis, normal QRS, no ST elevation or depression, T wave inversion in leads V3 through V6 and leads I and aVL, anterior lateral T wave inversion is new from prior, 12/8/2019    [JG]   1410 Syncope that occurred while patient was seated and had no prodrome.  Concern for cardiac etiology.  EKG shows new T wave inversions but otherwise no other changes.  Patient has been on cardiac monitor with bradycardia but per family that is baseline.  Troponin and BNP are negative.  Consulting hospitalist for admission for further evaluation of concerning syncope.    [JG]   1411 Patient reassessed.  Discussed ED findings, differential diagnosis, and the need for admission.  They are agreeable to admission and all questions were answered.        [JG]   1423 Phone call with Dr Bonilla.  Discussed the patient, relevant history, exam, diagnostics, ED findings/progress, and concerns. They agree to admit the patient to telemetry. Care assumed by the admitting physician at this time.        [JG]      ED Course User Index  [JG] Shreyas Mathews MD       AS OF 2:27 PM VITALS:    BP - 144/66  HR - (!) 48  TEMP - 96.4 °F (35.8 °C) (Tympanic)  O2 SATS - 97%        DIAGNOSIS  Final diagnoses:   Syncope, unspecified syncope type   Abnormal EKG   Chronic anemia   Bradycardia         DISPOSITION  ADMISSION    Discussed treatment plan and reason for admission with pt/family and admitting physician.  Pt/family voiced understanding of the plan for admission for further testing/treatment as needed.            Shreyas Mathews MD  02/12/20 [id number]

## 2020-02-12 NOTE — ED NOTES
Per pt's wife, he did not have his midodrine this morning because they were rushing to get to the cardiologist's office.      Rivka Thornton, RN  02/12/20 1086

## 2020-02-13 NOTE — CONSULTS
Patient Identification:  NAME:  Victor Manuel Issa  Age:  74 y.o.   Sex:  male   :  1945   MRN:  [id number]       Chief complaint: He does not have one/ reason for consult syncope    History of present illness: This patient is a 74-year-old right-handed white male with history of Parkinson syndrome myocardial infarction in the past coronary artery disease thyroid disease history of stroke who comes to the hospital after he was at his heart doctor's office and had a syncopal episode he states he did not get any warning before this context the patient with a parkinsonian syndrome for which he is on Sinemet location was not pertinent duration not really known but he regained consciousness within 5 minutes per the notes.  Modifying factors lying him flat this did occur while he was sitting in his wheelchair.  Context patient his heart rate has been measured at least once during this hospitalization at 41 he has had blood pressures that do not appear to be significantly low.  He does have known cardiomyopathy with septal wall motion abnormality and decreased ejection fraction.  Quality was syncope without witnessed seizure activity or tongue biting    Past medical history:  Past Medical History:   Diagnosis Date   • Anxiety    • Arthritis    • Bipolar 1 disorder (CMS/HCC)    • Coronary artery disease    • Disease of thyroid gland    • GERD (gastroesophageal reflux disease)    • Hyperlipidemia    • Mobility impaired    • Myocardial infarction (CMS/HCC)    • Overactive bladder    • Parkinsonian syndrome (CMS/HCC)    • Prostatitis    • Restrictive pattern present on pulmonary function testing    • Seasonal allergies    • Stroke (CMS/HCC)     RESIDUAL WEAKNESS RIGHT LOWER EXTREMITY   • Urinary incontinence    • Urinary retention        Past surgical history:  Past Surgical History:   Procedure Laterality Date   • ABDOMINAL SURGERY     • CARDIAC SURGERY     • CHOLECYSTECTOMY     • CORONARY ARTERY BYPASS GRAFT       x2   • ENDOSCOPY N/A 10/4/2018    LA grade D reflux esophagitis, 4cm hiatal hernia, esohageal stenosis, normal stomach, erythematous duodenopathy, normal second portion of duodenum, no specimens collected   • ENDOSCOPY N/A 11/30/2018    Procedure: ESOPHAGOGASTRODUODENOSCOPY WITH DILATATION;  Surgeon: Palu Duncan MD;  Location: [facility] ENDOSCOPY;  Service: Gastroenterology   • EYE SURGERY      cataracts   • HIP ENDOPROSTHESIS Right 5/5/2018    Procedure: RIGHT HIP HEMIARTHROPLASTY;  Surgeon: Winston Vallejo MD;  Location: [facility] MAIN OR;  Service: Orthopedics   • HIP FRACTURE SURGERY Right     PARTIAL HIP REPLACEMENT   • MUSCLE BIOPSY Left 3/24/2016    Procedure: LT THIGH MUSCLE BIOPSY;  Surgeon: Fausto Mack MD;  Location: [facility] MAIN OR;  Service:    • SKIN BIOPSY     • THYROID SURGERY     • TOTAL HIP ARTHROPLASTY REVISION Right 1/18/2019    Procedure: CONVERSION RT POSTERIOR HIP BIPOLAR TO TOTAL HIP ARTHROPLASTY;  Surgeon: Radha Paredes MD;  Location: [facility] MAIN OR;  Service: Orthopedics   • TURP / TRANSURETHRAL INCISION / DRAINAGE PROSTATE     • VASCULAR SURGERY         Allergies:  Oxycodone; Promethazine; and Beet [beta vulgaris]    Home medications:  Medications Prior to Admission   Medication Sig Dispense Refill Last Dose   • acetaminophen (TYLENOL) 325 MG tablet Take 2 tablets by mouth Every 4 (Four) Hours As Needed for Mild Pain , Headache or Fever.   2/11/2020 at Unknown time   • albuterol (PROVENTIL) (2.5 MG/3ML) 0.083% nebulizer solution Take 2.5 mg by nebulization Every 6 (Six) Hours As Needed for Wheezing.  12 2/11/2020 at Unknown time   • aspirin  MG EC tablet Take 1 tablet by mouth Daily.   2/11/2020 at Unknown time   • bisacodyl (DULCOLAX) 5 MG EC tablet Take 2 tablets by mouth Daily As Needed for Constipation. 30 tablet 0 Past Week at Unknown time   • busPIRone (BUSPAR) 5 MG tablet Take 1 tablet by mouth 3 (Three) Times a Day As Needed (anxiety).   2/11/2020  at Unknown time   • carbidopa-levodopa (SINEMET)  MG per tablet Take 1 tablet by mouth 3 (Three) Times a Day.   2/11/2020 at Unknown time   • cholecalciferol (VITAMIN D3) 1000 UNITS tablet Take 1,000 Units by mouth daily.   2/11/2020 at Unknown time   • diclofenac (VOLTAREN) 1 % gel gel Apply 4 g topically Daily As Needed (apply to the knees).   2/11/2020 at Unknown time   • divalproex (DEPAKOTE) 500 MG DR tablet Take 500 mg by mouth 2 (Two) Times a Day.   2/11/2020 at Unknown time   • donepezil (ARICEPT) 10 MG tablet Take 20 mg by mouth Every Night.   2/11/2020 at Unknown time   • fluticasone (FLONASE) 50 MCG/ACT nasal spray 1 spray into each nostril Daily.   2/11/2020 at Unknown time   • levothyroxine (SYNTHROID, LEVOTHROID) 175 MCG tablet Take 175 mcg by mouth Daily.   2/11/2020 at Unknown time   • midodrine (PROAMATINE) 2.5 MG tablet Take 1 tablet by mouth 3 (Three) Times a Day Before Meals. (Patient taking differently: Take 5 mg by mouth 3 (Three) Times a Day Before Meals.)   2/11/2020 at Unknown time   • mirtazapine (REMERON) 15 MG tablet Take 15 mg by mouth Every Night.   2/11/2020 at Unknown time   • montelukast (SINGULAIR) 10 MG tablet Take 10 mg by mouth Every Night.   2/11/2020 at Unknown time   • Multiple Vitamins-Minerals (MULTIVITAMIN WITH MINERALS) tablet tablet Take 1 tablet by mouth Daily.   2/11/2020 at Unknown time   • OLANZapine (zyPREXA) 5 MG tablet Take 1 tablet by mouth Every Night.   2/11/2020 at Unknown time   • ondansetron ODT (ZOFRAN ODT) 4 MG disintegrating tablet Take 1 tablet by mouth Every 8 (Eight) Hours As Needed for Nausea or Vomiting. 30 tablet 0 Past Month at Unknown time   • oxybutynin XL (DITROPAN-XL) 5 MG 24 hr tablet Take 5 mg by mouth Daily.   Patient Taking Differently at Unknown time   • pantoprazole (PROTONIX) 40 MG EC tablet Take 1 tablet by mouth 2 (Two) Times a Day Before Meals. (Patient taking differently: Take 40 mg by mouth Daily.) 180 tablet 1 Patient Taking  Differently at Unknown time   • polyethylene glycol (MIRALAX) packet Take 17 g by mouth Daily.   Past Week at Unknown time   • senna-docusate sodium (SENOKOT-S) 8.6-50 MG tablet Take 2 tablets by mouth 2 (Two) Times a Day.   Past Week at Unknown time   • simvastatin (ZOCOR) 40 MG tablet Take 40 mg by mouth Every Night.   2/11/2020 at Unknown time   • arformoterol (BROVANA) 15 MCG/2ML nebulizer solution Take 15 mcg by nebulization 2 (Two) Times a Day.   12/6/2019 at Unknown time   • hydrocortisone-bacitracin-zinc oxide-nystatin (MAGIC BARRIER) Apply 1 application topically to the appropriate area as directed 2 (Two) Times a Day.   Unknown at Unknown time   • ipratropium-albuterol (DUO-NEB) 0.5-2.5 mg/3 ml nebulizer Take 3 mL by nebulization 4 (Four) Times a Day. 360 mL  Unknown at Unknown time        Hospital medications:    aspirin 325 mg Oral Daily   atorvastatin 20 mg Oral Daily   carbidopa-levodopa 1 tablet Oral TID   cholecalciferol 1,000 Units Oral Daily   divalproex 500 mg Oral BID   donepezil 20 mg Oral Nightly   fluticasone 1 spray Nasal Daily   hydrocortisone-bacitracin-zinc oxide-nystatin 1 application Topical BID   ipratropium-albuterol 3 mL Nebulization 4x Daily - RT   levothyroxine 175 mcg Oral Q AM   midodrine 5 mg Oral TID AC   mirtazapine 15 mg Oral Nightly   montelukast 10 mg Oral Nightly   multivitamin with minerals 1 tablet Oral Daily   OLANZapine 5 mg Oral Nightly   oxybutynin XL 5 mg Oral Daily   pantoprazole 40 mg Oral Q AM   polyethylene glycol 17 g Oral Daily   senna-docusate sodium 2 tablet Oral BID        •  acetaminophen  •  albuterol  •  bisacodyl  •  busPIRone  •  diclofenac  •  ondansetron ODT    Family history:  Family History   Problem Relation Age of Onset   • Cancer Mother    • Arthritis Father    • Heart disease Father    • Early death Brother    • Heart disease Brother    • Malig Hyperthermia Neg Hx        Social history:  Social History     Tobacco Use   • Smoking status: Current  Every Day Smoker     Packs/day: 0.50     Types: Cigarettes     Start date: 1960   • Smokeless tobacco: Never Used   • Tobacco comment: Educated pt on quitting   Substance Use Topics   • Alcohol use: Never     Frequency: Never   • Drug use: No       Review of systems:    He denies just about everything no headache hair eyes ears nose throat skin bone joint  lymphatic hematologic or oncologic complaints no neck pain chest pain abdominal pain bowel bladder incontinence with this no fever chills or rash.  He states he did not get a warning before the syncope occurred in the doctor's office.  He has had a history of stroke affecting his right side according to his wife but that did not appear to be any longstanding effect.    Objective:  Vitals Ranges:   Temp:  [97.3 °F (36.3 °C)-98.1 °F (36.7 °C)] 98.1 °F (36.7 °C)  Heart Rate:  [41-68] 56  Resp:  [16-20] 18  BP: (116-144)/(57-75) 118/60      Physical Exam:  Awake alert oriented x3 he is slow to answer questions fund of knowledge fair attention span concentration good recent remote memory fair only language functions soft dysarthria no aphasia pupils 2 constricting one 1/2 unable to visualize disc retinas extraocular movements full horizontally pretty good upgaze nasolabial folds palate tongue symmetrical he does have a masked face ease.  No other cranial nerves what he follow motor equal  strength no definite drift.  Thinning of the distal extremities no resting tremor very mild rigidity but he is on Sinemet at this time reflexes absent throughout toes downgoing bilaterally sensation normal light touch face arms and legs coordination slow but normal in the upper extremity Station gait was impossible I thought he would fall or pass out heart is regular without murmur neck supple without bruits extremities no clubbing cyanosis edema visual acuity normal at 3 feet    Results review:   I reviewed the patient's new clinical results.    Data review:  Lab Results (last  24 hours)     Procedure Component Value Units Date/Time    Basic Metabolic Panel [[id number]]  (Abnormal) Collected:  02/13/20 0448    Specimen:  Blood Updated:  02/13/20 0711     Glucose 95 mg/dL      BUN 16 mg/dL      Creatinine 0.72 mg/dL      Sodium 142 mmol/L      Potassium 4.2 mmol/L      Chloride 110 mmol/L      CO2 21.9 mmol/L      Calcium 9.0 mg/dL      eGFR Non African Amer 106 mL/min/1.73      BUN/Creatinine Ratio 22.2     Anion Gap 10.1 mmol/L     Narrative:       GFR Normal >60  Chronic Kidney Disease <60  Kidney Failure <15      Magnesium [[id number]]  (Normal) Collected:  02/13/20 0448    Specimen:  Blood Updated:  02/13/20 0711     Magnesium 2.0 mg/dL     Hemoglobin A1c [[id number]]  (Normal) Collected:  02/13/20 0448    Specimen:  Blood Updated:  02/13/20 0606     Hemoglobin A1C 4.82 %     Narrative:       Hemoglobin A1C Ranges:    Increased Risk for Diabetes  5.7% to 6.4%  Diabetes                     >= 6.5%  Diabetic Goal                < 7.0%    CBC (No Diff) [[id number]]  (Abnormal) Collected:  02/13/20 0448    Specimen:  Blood Updated:  02/13/20 0557     WBC 7.65 10*3/mm3      RBC 3.44 10*6/mm3      Hemoglobin 10.8 g/dL      Hematocrit 32.4 %      MCV 94.2 fL      MCH 31.4 pg      MCHC 33.3 g/dL      RDW 13.0 %      RDW-SD 44.2 fl      MPV 10.0 fL      Platelets 220 10*3/mm3     Comprehensive Metabolic Panel [[id number]]  (Abnormal) Collected:  02/12/20 1157    Specimen:  Blood Updated:  02/12/20 1244     Glucose 112 mg/dL      BUN 14 mg/dL      Creatinine 0.78 mg/dL      Sodium 142 mmol/L      Potassium 4.6 mmol/L      Chloride 108 mmol/L      CO2 24.0 mmol/L      Calcium 9.6 mg/dL      Total Protein 6.7 g/dL      Albumin 3.40 g/dL      ALT (SGPT) <5 U/L      AST (SGOT) 12 U/L      Comment: Specimen hemolyzed.  Results may be affected.        Alkaline Phosphatase 72 U/L      Total Bilirubin 0.3 mg/dL      eGFR Non African Amer 97 mL/min/1.73      Globulin 3.3 gm/dL      A/G Ratio 1.0 g/dL       BUN/Creatinine Ratio 17.9     Anion Gap 10.0 mmol/L     Narrative:       GFR Normal >60  Chronic Kidney Disease <60  Kidney Failure <15      Troponin [[id number]]  (Normal) Collected:  02/12/20 1157    Specimen:  Blood Updated:  02/12/20 1242     Troponin T <0.010 ng/mL     Narrative:       Troponin T Reference Range:  <= 0.03 ng/mL-   Negative for AMI  >0.03 ng/mL-     Abnormal for myocardial necrosis.  Clinicians would have to utilize clinical acumen, EKG, Troponin and serial changes to determine if it is an Acute Myocardial Infarction or myocardial injury due to an underlying chronic condition.       Results may be falsely decreased if patient taking Biotin.      Magnesium [[id number]]  (Normal) Collected:  02/12/20 1157    Specimen:  Blood Updated:  02/12/20 1242     Magnesium 2.1 mg/dL     Valproic Acid Level, Total [[id number]]  (Abnormal) Collected:  02/12/20 1157    Specimen:  Blood Updated:  02/12/20 1242     Valproic Acid 30.0 mcg/mL     BNP [[id number]]  (Normal) Collected:  02/12/20 1157    Specimen:  Blood Updated:  02/12/20 1238     proBNP 260.7 pg/mL     Narrative:       Among patients with dyspnea, NT-proBNP is highly sensitive for the detection of acute congestive heart failure. In addition NT-proBNP of <300 pg/ml effectively rules out acute congestive heart failure with 99% negative predictive value.    Results may be falsely decreased if patient taking Biotin.      CBC & Differential [[id number]] Collected:  02/12/20 1157    Specimen:  Blood Updated:  02/12/20 1214    Narrative:       The following orders were created for panel order CBC & Differential.  Procedure                               Abnormality         Status                     ---------                               -----------         ------                     CBC Auto Differential[[id number]]        Abnormal            Final result                 Please view results for these tests on the individual orders.    CBC Auto Differential  [[id number]]  (Abnormal) Collected:  02/12/20 1157    Specimen:  Blood Updated:  02/12/20 1214     WBC 10.94 10*3/mm3      RBC 3.91 10*6/mm3      Hemoglobin 12.6 g/dL      Hematocrit 37.3 %      MCV 95.4 fL      MCH 32.2 pg      MCHC 33.8 g/dL      RDW 13.2 %      RDW-SD 46.0 fl      MPV 10.3 fL      Platelets 221 10*3/mm3      Neutrophil % 73.6 %      Lymphocyte % 19.4 %      Monocyte % 4.9 %      Eosinophil % 1.3 %      Basophil % 0.4 %      Immature Grans % 0.4 %      Neutrophils, Absolute 8.06 10*3/mm3      Lymphocytes, Absolute 2.12 10*3/mm3      Monocytes, Absolute 0.54 10*3/mm3      Eosinophils, Absolute 0.14 10*3/mm3      Basophils, Absolute 0.04 10*3/mm3      Immature Grans, Absolute 0.04 10*3/mm3      nRBC 0.0 /100 WBC            Imaging:  Imaging Results (Last 24 Hours)     Procedure Component Value Units Date/Time    XR Chest 1 View [[id number]] Collected:  02/12/20 1239     Updated:  02/12/20 1244    Narrative:       XR CHEST 1 VW-     HISTORY: Male who is 74 years-old,  syncope     TECHNIQUE: Frontal view of the chest     COMPARISON: 12/12/2019     FINDINGS: Heart size is borderline. Aorta is tortuous, calcified.  Sternotomy wires noted. Slight prominence of vascular and interstitial  markings.  No focal pulmonary consolidation, pleural effusion, or  pneumothorax. No acute osseous process.       Impression:       No focal pulmonary consolidation. Borderline heart size with  slight prominence of vascular and interstitial markings. Tortuous aorta.     This report was finalized on 2/12/2020 12:41 PM by Dr. Mendoza Womack M.D.                Assessment and Plan:       Syncope    This patient has had at least 1 heart rate measured at 41 and I strongly suspect this patient has intermittent bradycardia I would note that he definitely has dysautonomia related to his parkinsonism with a tendency towards orthostasis.  Nonetheless I would not change his Sinemet and there is no further neurologic work-up that  will help the patient at this time.  I would recommend a ZIO/prolonged Holter type of cardiac monitoring to see if how much bradycardia he has and how often it occurs.  I will follow-up PRN reconsult thanks      Wilner Petreson MD  02/13/20  11:26 AM

## 2020-02-13 NOTE — PLAN OF CARE
Patient alert and oriented x 3 this shift. Patient had echo today. Patient to have stress test on Friday 2/14/2020. Patient to discharge to home with holter monitor. Patient is on a regular diet with nector thick liquids. Continue to monitor.

## 2020-02-13 NOTE — PROGRESS NOTES
"DAILY PROGRESS NOTE  [facility]    Patient Identification:  Name: Victor Manuel Issa  Age: 74 y.o.  Sex: male  :  1945  MRN: [id number]         Primary Care Physician: Provider, No Known    Subjective: patient is resting; wife is at the bedside;   Interval History: follow up for syncope, orthostasis, parkinsonism, bipolar disorder    Objective:    Scheduled Meds:  aspirin 325 mg Oral Daily   atorvastatin 20 mg Oral Daily   carbidopa-levodopa 1 tablet Oral TID   cholecalciferol 1,000 Units Oral Daily   divalproex 500 mg Oral BID   donepezil 20 mg Oral Nightly   fluticasone 1 spray Nasal Daily   hydrocortisone-bacitracin-zinc oxide-nystatin 1 application Topical BID   ipratropium-albuterol 3 mL Nebulization 4x Daily - RT   levothyroxine 175 mcg Oral Q AM   midodrine 5 mg Oral TID AC   mirtazapine 15 mg Oral Nightly   montelukast 10 mg Oral Nightly   multivitamin with minerals 1 tablet Oral Daily   OLANZapine 5 mg Oral Nightly   oxybutynin XL 5 mg Oral Daily   pantoprazole 40 mg Oral Q AM   polyethylene glycol 17 g Oral Daily   senna-docusate sodium 2 tablet Oral BID     Continuous Infusions:     Vital signs in last 24 hours:  Temp:  [97.3 °F (36.3 °C)-98.1 °F (36.7 °C)] 97.6 °F (36.4 °C)  Heart Rate:  [50-68] 66  Resp:  [18-20] 18  BP: (117-144)/(54-61) 117/54    Intake/Output:    Intake/Output Summary (Last 24 hours) at 2020 1842  Last data filed at 2020 1751  Gross per 24 hour   Intake 570 ml   Output --   Net 570 ml       Exam:  /54 (BP Location: Left arm, Patient Position: Lying)   Pulse 66   Temp 97.6 °F (36.4 °C) (Oral)   Resp 18   Ht 180.3 cm (71\")   Wt 72.4 kg (159 lb 11.2 oz)   SpO2 96%   BMI 22.27 kg/m²     General Appearance:    Alert, cooperative, no distress, AAOx3                          Head:    Normocephalic, without obvious abnormality, atraumatic                           Eyes:    PERRL, conjunctiva/corneas clear, EOM's intact, both eyes                  "        Throat:   Lips, tongue, gums normal; oral mucosa pink and moist                           Neck:   Supple, symmetrical, trachea midline, no JVD                         Lungs:    Decreased breath sounds bilaterally, respirations unlabored                 Chest Wall:    No tenderness or deformity                          Heart:    Regular rate and rhythm, S1 and S2 normal, no murmur,no  Rub                                      or gallop                  Abdomen:     Soft, non-tender, bowel sounds active, no masses, no                                                        organomegaly                  Extremities:   Extremities normal, atraumatic, no cyanosis or edema                        Pulses:   Pulses palpable in all extremities                            Skin:   Skin is warm and dry,  no rashes or palpable lesions                  Neurologic:   CNII-XII intact, motor strength grossly intact, sensation grossly                                         intact to light touch, no focal deficits noted       Data Review:  Labs in chart were reviewed.  WBC   Date Value Ref Range Status   02/13/2020 7.65 3.40 - 10.80 10*3/mm3 Final     Hemoglobin   Date Value Ref Range Status   02/13/2020 10.8 (L) 13.0 - 17.7 g/dL Final     Hematocrit   Date Value Ref Range Status   02/13/2020 32.4 (L) 37.5 - 51.0 % Final     Platelets   Date Value Ref Range Status   02/13/2020 220 140 - 450 10*3/mm3 Final     Sodium   Date Value Ref Range Status   02/13/2020 142 136 - 145 mmol/L Final     Potassium   Date Value Ref Range Status   02/13/2020 4.2 3.5 - 5.2 mmol/L Final     Chloride   Date Value Ref Range Status   02/13/2020 110 (H) 98 - 107 mmol/L Final     CO2   Date Value Ref Range Status   02/13/2020 21.9 (L) 22.0 - 29.0 mmol/L Final     BUN   Date Value Ref Range Status   02/13/2020 16 8 - 23 mg/dL Final     Creatinine   Date Value Ref Range Status   02/13/2020 0.72 (L) 0.76 - 1.27 mg/dL Final     Glucose   Date Value Ref Range  Status   02/13/2020 95 65 - 99 mg/dL Final     Calcium   Date Value Ref Range Status   02/13/2020 9.0 8.6 - 10.5 mg/dL Final     Magnesium   Date Value Ref Range Status   02/13/2020 2.0 1.6 - 2.4 mg/dL Final     AST (SGOT)   Date Value Ref Range Status   02/12/2020 12 1 - 40 U/L Final     Comment:     Specimen hemolyzed.  Results may be affected.     ALT (SGPT)   Date Value Ref Range Status   02/12/2020 <5 1 - 41 U/L Final     Alkaline Phosphatase   Date Value Ref Range Status   02/12/2020 72 39 - 117 U/L Final     No results found for: APTT, INR  Patient Active Problem List   Diagnosis Code   • Thigh pain, musculoskeletal M79.606   • Left leg weakness R29.898   • History of CVA (cerebrovascular accident) Z86.73   • COPD (chronic obstructive pulmonary disease) J44.9   • Coronary artery disease I25.10   • Cervical myelopathy (CMS/[facility]) G95.9   • Parkinson's disease G20   • Debility R53.81   • Cerebrovascular disease I67.9   • Abnormal TSH R79.89   • Hypothyroidism (acquired) E03.9   • Hip fracture requiring operative repair, right, closed, initial encounter (CMS/[facility]) S72.001A   • Urinary tract infection without hematuria N39.0   • Hypoxia R09.02   • Sinus bradycardia R00.1   • Chest pain R07.9   • Hip pain, chronic, right M25.551, G89.29   • Hematemesis K92.0   • Hematemesis with nausea K92.0   • H/O medication noncompliance Z91.14   • Hyperlipidemia E78.5   • Muscle spasticity M62.838   • COPD exacerbation (CMS/[facility]) J44.1   • Hypophosphatemia E83.39   • Pneumonia of right upper lobe due to infectious organism (CMS/[facility]) J18.1   • Sepsis due to pneumonia (CMS/[facility]) J18.9, A41.9   • Sepsis with metabolic encephalopathy (CMS/[facility]) A41.9, R65.20, G93.41   • Hypotension I95.9   • Syncope R55       Assessment:    Syncope  copd  dysautonomia  Parkinsonism  Dementia  anemia    Plan:  Will continue to monitor  Stress test is planned for am  Appreciate input from all  Neurology does not plan any further workup  Medium risk  D.w  patient and wife    Brandee Bonilla MD  2/13/2020  6:42 PM

## 2020-02-13 NOTE — DISCHARGE PLACEMENT REQUEST
"Luis Manuel Victor Manuel SEWELL (74 y.o. Male)     Date of Birth Social Security Number Address Home Phone MRN    1945  [id number] [facility] [address] [phone] [id number]    Hindu Marital Status          Mormonism        Admission Date Admission Type Admitting Provider Attending Provider Department, Room/Bed    2/12/20 Emergency Brandee Bonilla MD Stingl, Renate Andrea, MD [address], [address]    Discharge Date Discharge Disposition Discharge Destination                       Attending Provider:  Brandee Bonilla MD    Allergies:  Oxycodone, Promethazine, Beet [Beta Vulgaris]    Isolation:  None   Infection:  None   Code Status:  Prior    Ht:  180.3 cm (71\")   Wt:  72.4 kg (159 lb 11.2 oz)    Admission Cmt:  None   Principal Problem:  None                Active Insurance as of 2/12/2020     Primary Coverage     Payor Plan Insurance Group Employer/Plan Group    MEDICARE MEDICARE A & B      Payor Plan Address Payor Plan Phone Number Payor Plan Fax Number Effective Dates    [address] [phone]  6/1/2008 - None Entered    [facility] [id number]       Subscriber Name Subscriber Birth Date Member ID       VICTOR MANUEL PEREZ 1945 [id number]           Secondary Coverage     Payor Plan Insurance Group Employer/Plan Group    [facility] SUPP [id number]     Payor Plan Address Payor Plan Phone Number Payor Plan Fax Number Effective Dates    [address]   12/1/2016 - None Entered    [facility] [id number]       Subscriber Name Subscriber Birth Date Member ID       VICTOR MANUEL PEREZ 1945 [id number]                 Emergency Contacts      (Rel.) Home Phone Work Phone Mobile Phone    uLis ManuelEvelyn (Spouse) [phone] -- [phone]    Agatha Garcia (Daughter) -- -- [phone]              "

## 2020-02-13 NOTE — CONSULTS
[facility]  Cardiology Consult Note    Patient Identification:  Name: Victor Manuel Issa  Age: 74 y.o.  Sex: male  :  1945  MRN: [id number]             Requesting Physician: Dr. TROY Bonilla  Reason for Consultation / Chief Complaint: syncopal episode    History of Present Illness:   Victor Manuel Issa is a 74-year-old male, who is new to our service.   He has a history of anxiety, arthritis, bipolar 1 disorder, coronary artery disease with history of CABG (,) COPD,disease of thyroid gland, GERD, hyperlipidemia, myocardial infarction, Parkinson's disease, and history of stroke.  Yesterday he was at his cardiologist's office, Dr. Mendoza Read,  for a visit and he had a syncopal episode which lasted around 5 minutes. It was noted he did not take his midodrine in the morning.  He came [facility] via EMS.  Labs revealed troponin negative x's one, hgb 12.6, platelets 221, mag. 2.1, bnp 260.7, glucose 112, na 142, K+ 4.6, creatinine 0.78, gfr 97, and alt/ast wnl. CXR showed no focal pulmonary consolidation. Borderline heart size with slight [prominence of vascular and interstitial markings. Tortuous aorta.    He had an echo 2017 which revealed EF 57.4%, mild LVH, septal wall motion is abnormal, consistent with postoperative state, see full report as below. Previous stress test on 2019 was a normal pharmacological stress with no evidence of ischemia.     Comorbid cardiac risk factors: CAD, hyperlipidemia, tobacco abuse     Past Medical History:  Past Medical History:   Diagnosis Date   • Anxiety    • Arthritis    • Bipolar 1 disorder (CMS/HCC)    • Coronary artery disease    • Disease of thyroid gland    • GERD (gastroesophageal reflux disease)    • Hyperlipidemia    • Mobility impaired    • Myocardial infarction (CMS/HCC)    • Overactive bladder    • Parkinsonian syndrome (CMS/HCC)    • Prostatitis    • Restrictive pattern present on pulmonary function testing    • Seasonal  allergies    • Stroke (CMS/HCC)     RESIDUAL WEAKNESS RIGHT LOWER EXTREMITY   • Urinary incontinence    • Urinary retention      Past Surgical History:  Past Surgical History:   Procedure Laterality Date   • ABDOMINAL SURGERY     • CARDIAC SURGERY     • CHOLECYSTECTOMY     • CORONARY ARTERY BYPASS GRAFT      x2   • ENDOSCOPY N/A 10/4/2018    LA grade D reflux esophagitis, 4cm hiatal hernia, esohageal stenosis, normal stomach, erythematous duodenopathy, normal second portion of duodenum, no specimens collected   • ENDOSCOPY N/A 11/30/2018    Procedure: ESOPHAGOGASTRODUODENOSCOPY WITH DILATATION;  Surgeon: Paul Duncan MD;  Location: [facility] ENDOSCOPY;  Service: Gastroenterology   • EYE SURGERY      cataracts   • HIP ENDOPROSTHESIS Right 5/5/2018    Procedure: RIGHT HIP HEMIARTHROPLASTY;  Surgeon: Winston Vallejo MD;  Location: [facility] MAIN OR;  Service: Orthopedics   • HIP FRACTURE SURGERY Right     PARTIAL HIP REPLACEMENT   • MUSCLE BIOPSY Left 3/24/2016    Procedure: LT THIGH MUSCLE BIOPSY;  Surgeon: Fausto Mack MD;  Location: [facility] MAIN OR;  Service:    • SKIN BIOPSY     • THYROID SURGERY     • TOTAL HIP ARTHROPLASTY REVISION Right 1/18/2019    Procedure: CONVERSION RT POSTERIOR HIP BIPOLAR TO TOTAL HIP ARTHROPLASTY;  Surgeon: Radha Paredes MD;  Location: [facility] MAIN OR;  Service: Orthopedics   • TURP / TRANSURETHRAL INCISION / DRAINAGE PROSTATE     • VASCULAR SURGERY        Allergies:  Allergies   Allergen Reactions   • Oxycodone Mental Status Change   • Promethazine Hallucinations   • Beet [Beta Vulgaris] Unknown (See Comments)     Pt cant remember     Home Meds:  Medications Prior to Admission   Medication Sig Dispense Refill Last Dose   • acetaminophen (TYLENOL) 325 MG tablet Take 2 tablets by mouth Every 4 (Four) Hours As Needed for Mild Pain , Headache or Fever.   2/11/2020 at Unknown time   • albuterol (PROVENTIL) (2.5 MG/3ML) 0.083% nebulizer solution Take 2.5 mg by  nebulization Every 6 (Six) Hours As Needed for Wheezing.  12 2/11/2020 at Unknown time   • aspirin  MG EC tablet Take 1 tablet by mouth Daily.   2/11/2020 at Unknown time   • bisacodyl (DULCOLAX) 5 MG EC tablet Take 2 tablets by mouth Daily As Needed for Constipation. 30 tablet 0 Past Week at Unknown time   • busPIRone (BUSPAR) 5 MG tablet Take 1 tablet by mouth 3 (Three) Times a Day As Needed (anxiety).   2/11/2020 at Unknown time   • carbidopa-levodopa (SINEMET)  MG per tablet Take 1 tablet by mouth 3 (Three) Times a Day.   2/11/2020 at Unknown time   • cholecalciferol (VITAMIN D3) 1000 UNITS tablet Take 1,000 Units by mouth daily.   2/11/2020 at Unknown time   • diclofenac (VOLTAREN) 1 % gel gel Apply 4 g topically Daily As Needed (apply to the knees).   2/11/2020 at Unknown time   • divalproex (DEPAKOTE) 500 MG DR tablet Take 500 mg by mouth 2 (Two) Times a Day.   2/11/2020 at Unknown time   • donepezil (ARICEPT) 10 MG tablet Take 20 mg by mouth Every Night.   2/11/2020 at Unknown time   • fluticasone (FLONASE) 50 MCG/ACT nasal spray 1 spray into each nostril Daily.   2/11/2020 at Unknown time   • levothyroxine (SYNTHROID, LEVOTHROID) 175 MCG tablet Take 175 mcg by mouth Daily.   2/11/2020 at Unknown time   • midodrine (PROAMATINE) 2.5 MG tablet Take 1 tablet by mouth 3 (Three) Times a Day Before Meals. (Patient taking differently: Take 5 mg by mouth 3 (Three) Times a Day Before Meals.)   2/11/2020 at Unknown time   • mirtazapine (REMERON) 15 MG tablet Take 15 mg by mouth Every Night.   2/11/2020 at Unknown time   • montelukast (SINGULAIR) 10 MG tablet Take 10 mg by mouth Every Night.   2/11/2020 at Unknown time   • Multiple Vitamins-Minerals (MULTIVITAMIN WITH MINERALS) tablet tablet Take 1 tablet by mouth Daily.   2/11/2020 at Unknown time   • OLANZapine (zyPREXA) 5 MG tablet Take 1 tablet by mouth Every Night.   2/11/2020 at Unknown time   • ondansetron ODT (ZOFRAN ODT) 4 MG disintegrating tablet  Take 1 tablet by mouth Every 8 (Eight) Hours As Needed for Nausea or Vomiting. 30 tablet 0 Past Month at Unknown time   • oxybutynin XL (DITROPAN-XL) 5 MG 24 hr tablet Take 5 mg by mouth Daily.   Patient Taking Differently at Unknown time   • pantoprazole (PROTONIX) 40 MG EC tablet Take 1 tablet by mouth 2 (Two) Times a Day Before Meals. (Patient taking differently: Take 40 mg by mouth Daily.) 180 tablet 1 Patient Taking Differently at Unknown time   • polyethylene glycol (MIRALAX) packet Take 17 g by mouth Daily.   Past Week at Unknown time   • senna-docusate sodium (SENOKOT-S) 8.6-50 MG tablet Take 2 tablets by mouth 2 (Two) Times a Day.   Past Week at Unknown time   • simvastatin (ZOCOR) 40 MG tablet Take 40 mg by mouth Every Night.   2/11/2020 at Unknown time   • arformoterol (BROVANA) 15 MCG/2ML nebulizer solution Take 15 mcg by nebulization 2 (Two) Times a Day.   12/6/2019 at Unknown time   • hydrocortisone-bacitracin-zinc oxide-nystatin (MAGIC BARRIER) Apply 1 application topically to the appropriate area as directed 2 (Two) Times a Day.   Unknown at Unknown time   • ipratropium-albuterol (DUO-NEB) 0.5-2.5 mg/3 ml nebulizer Take 3 mL by nebulization 4 (Four) Times a Day. 360 mL  Unknown at Unknown time     Current Meds:      Scheduled     Medication Dose/Rate, Route, Frequency Last Action   aspirin EC tablet 325 mg 325 mg, PO, Daily Given: 02/13 0954   atorvastatin (LIPITOR) tablet 20 mg 20 mg, PO, Daily Given: 02/13 0954   carbidopa-levodopa (SINEMET)  MG per tablet 1 tablet 1 tablet, PO, TID Given: 02/13 0954   cholecalciferol (VITAMIN D3) tablet 1,000 Units 1,000 Units, PO, Daily Given: 02/13 0954   divalproex (DEPAKOTE) DR tablet 500 mg 500 mg, PO, BID Given: 02/13 1003   donepezil (ARICEPT) tablet 20 mg 20 mg, PO, Nightly Given: 02/12 2128   fluticasone (FLONASE) 50 MCG/ACT nasal spray 1 spray 1 spray, NA, Daily Given: 02/13 0955   hydrocortisone-bacitracin-zinc oxide-nystatin (MAGIC BARRIER) cream  1 application 1 application, TOP, BID Given: 02/13 0956   ipratropium-albuterol (DUO-NEB) nebulizer solution 3 mL 3 mL, NEBULIZATION, 4x Daily - RT Given: 02/13 0843   levothyroxine (SYNTHROID, LEVOTHROID) tablet 175 mcg 175 mcg, PO, Q AM Given: 02/13 0953   midodrine (PROAMATINE) tablet 5 mg 5 mg, PO, TID AC Given: 02/13 0954   mirtazapine (REMERON) tablet 15 mg 15 mg, PO, Nightly Given: 02/12 2128   montelukast (SINGULAIR) tablet 10 mg 10 mg, PO, Nightly Given: 02/12 2128   multivitamin with minerals 1 tablet 1 tablet, PO, Daily Given: 02/13 0954   OLANZapine (zyPREXA) tablet 5 mg 5 mg, PO, Nightly Given: 02/12 2128   oxybutynin XL (DITROPAN-XL) 24 hr tablet 5 mg 5 mg, PO, Daily Given: 02/12 2131   pantoprazole (PROTONIX) EC tablet 40 mg 40 mg, PO, Q AM Given: 02/13 0956   polyethylene glycol (MIRALAX) packet 17 g 17 g, PO, Daily Given: 02/13 1003   senna-docusate sodium (SENOKOT-S) 8.6-50 MG tablet 2 tablet 2 tablet, PO, BID Given: 02/13 0954      PRN     Medication Dose/Rate, Route, Frequency Last Action   acetaminophen (TYLENOL) tablet 650 mg 650 mg, PO, Q4H PRN Ordered   albuterol (PROVENTIL) nebulizer solution 0.083% 2.5 mg/3mL 2.5 mg, NEBULIZATION, Q6H PRN Ordered   bisacodyl (DULCOLAX) EC tablet 10 mg 10 mg, PO, Daily PRN Ordered   busPIRone (BUSPAR) tablet 5 mg 5 mg, PO, TID PRN Ordered   diclofenac (VOLTAREN) 1 % gel 4 g 4 g, TOP, Daily PRN Ordered   ondansetron ODT (ZOFRAN-ODT) disintegrating tablet 4 mg 4 mg, PO, Q8H PRN Ordered          Social History:   Social History     Tobacco Use   • Smoking status: Current Every Day Smoker     Packs/day: 0.50     Types: Cigarettes     Start date: 1960   • Smokeless tobacco: Never Used   • Tobacco comment: Educated pt on quitting   Substance Use Topics   • Alcohol use: Never     Frequency: Never      Family History:  Family History   Problem Relation Age of Onset   • Cancer Mother    • Arthritis Father    • Heart disease Father    • Early death Brother    • Heart  "disease Brother    • Malig Hyperthermia Neg Hx         ROS  Constitutional: Awake and alert, no fever. No nosebleeds  Abdomen           no abdominal pain   Cardiac              no chest pain  Pulmonary          no shortness of breath      /54 (BP Location: Left arm, Patient Position: Lying)   Pulse 66   Temp 97.6 °F (36.4 °C) (Oral)   Resp 18   Ht 180.3 cm (71\")   Wt 72.4 kg (159 lb 11.2 oz)   SpO2 96%   BMI 22.27 kg/m²   General appearance: No acute changes   Neck: Trachea midline; NECK, supple, no thyromegaly or lymphadenopathy   Lungs: Normal size and shape, normal breath sounds, equal distribution of air, no rales and rhonchi   CV: S1-S2 regular, no murmurs, no rub, no gallop   Abdomen: Soft, non-tender; no masses , no abnormal abdominal sounds   Extremities: No deformity , normal color , no peripheral edema   Skin: Normal temperature, turgor and texture; no rash, ulcers            Constitutional:  Temp:  [96.4 °F (35.8 °C)-98.1 °F (36.7 °C)] 98.1 °F (36.7 °C)  Heart Rate:  [41-68] 56  Resp:  [16-20] 18  BP: (116-144)/(57-75) 118/60    Physical Exam   General:  Appears in no acute distress, resting in bed  Eyes: EOM normal and no conjunctival drainage  HEENT:  No JVD. Thyroid not visibly enlarged. No mucosal pallor or cyanosis  Respiratory: Respirations regular and unlabored at rest. BBS with good air entry in all fields. No crackles, rubs or wheezes auscultated  Cardiovascular: S1S2 Regular rate and rhythm. No murmur, rub or gallop auscultated. No carotid bruits. DP/PT pulses    . No pretibial pitting edema  Gastrointestinal: Abdomen soft, flat, non tender. Bowel sounds present. No hepatosplenomegaly. No ascites  Musculoskeletal: GOODMAN x4. No abnormal movements  Extremities: No digital clubbing or cyanosis  Skin:  Skin warm and dry to touch. No rashes  No xanthoma  Neuro: AAO x3 CN II-XII grossly intact              Cardiographics  ECG:   SR with nonspecific T wave inversion    Sinus " bradycardia      Telemetry:      Echocardiogram:   9/2017  Interpretation Summary     · Left ventricular systolic function is normal. Calculated EF = 57.4%. Estimated EF was in agreement with the calculated EF. Normal left ventricular cavity size noted. All left ventricular wall segments contract normally. Left ventricular wall thickness is consistent with mild concentric hypertrophy. Septal wall motion is abnormal, consistent with a post-operative state. Left ventricular diastolic function is normal.  · Normal left atrial size noted. Saline test results are negative.         Imaging  Chest X-ray:   Study Result     XR CHEST 1 VW-     HISTORY: Male who is 74 years-old,  syncope     TECHNIQUE: Frontal view of the chest     COMPARISON: 12/12/2019     FINDINGS: Heart size is borderline. Aorta is tortuous, calcified.  Sternotomy wires noted. Slight prominence of vascular and interstitial  markings.  No focal pulmonary consolidation, pleural effusion, or  pneumothorax. No acute osseous process.     IMPRESSION:  No focal pulmonary consolidation. Borderline heart size with  slight prominence of vascular and interstitial markings. Tortuous aorta.     This report was finalized on 2/12/2020 12:41 PM by Dr. Mendoza Womack M.D.           I have reviewed the patient's recent medical history and current medications, as well as personally reviewed/interpreted the ECG/telemetry data  Lab Review   Results from last 7 days   Lab Units 02/12/20  1157   TROPONIN T ng/mL <0.010     Results from last 7 days   Lab Units 02/13/20  0448   MAGNESIUM mg/dL 2.0     Results from last 7 days   Lab Units 02/13/20  0448   SODIUM mmol/L 142   POTASSIUM mmol/L 4.2   BUN mg/dL 16   CREATININE mg/dL 0.72*   CALCIUM mg/dL 9.0        Results from last 7 days   Lab Units 02/13/20  0448 02/12/20  1157   WBC 10*3/mm3 7.65 10.94*   HEMOGLOBIN g/dL 10.8* 12.6*   HEMATOCRIT % 32.4* 37.3*   PLATELETS 10*3/mm3 220 221     The following medical decision was  discussed in detail with Dr. Lott      Assessment:  1. Syncope   2. Coronary Artery Diease  3. History of CVA  4. Hyperlipidemia  5. Hyperglycemia  6. COPD  7.  History of CABG (2002, 2007)    Recommendations / Plan:   Victor Manuel Issa is a 74-year-old male, who is new to our service.  He is a patient of Dr. Mendoza Read's.  While having a doctor's visit with Dr. Read yesterday he had a syncopal episode which lasted about 5 minutes.  ECG while in the office noted sinus bradycardia, rate 47 without ST or T wave changes.  It was noted that he did not take his Midorin for his blood pressure that morning.  At that time Dr. Read said he should go to the hospital for further evaluations and that his Sinemet for his Parkinsons may have contributed to his orthostasis.  Previous stress test 9/2019 was negative for ischemia.  Previous echo showed EF 57.4%, see full report as above. Troponin negative x's one, and ECG in ED showed SR with nonspecific T wave changes. At this time we will do an echo, troponin, ECG, and ZIO at discharge. Further recommendations once echo has been read.      Addendum: stress test in am      It was explained to the patient that stress testing carries 85% specificity/sensitivity, and does not rule out future cardiac event.  Risks of the procedure were explained to the patient including shortness of breath, induction of myocardial infarction, and dizziness.  Patient is agreeable to proceeding with stress testing.     Labs/tests ordered for am: Echo, troponin, ECG and ZIO at discharge.      Kathy Cortes, CUONG  2/13/2020, 9:07 AM    Patient personally interviewed and above subjective findings personally confirmed during a face to face contact with patient today  All findings of physical examination confirmed  All pertinent and performed labs, cardiac procedures ,  radiographs of the last 24 hours personally reviewed  Impression and plans discussed/elaborated and implemented jointly as  described above     MD DEBORAH Carrero/Transcription:   Dictated utilizing Dragon dictation

## 2020-02-13 NOTE — PROGRESS NOTES
Discharge Planning Assessment  [facility]     Patient Name: Victor Manuel Issa  MRN: [id number]  Today's Date: 2/13/2020    Admit Date: 2/12/2020    Discharge Needs Assessment     Row Name 02/13/20 1651       Living Environment    Lives With  spouse    Name(s) of Who Lives With Patient  wife, Evelyn Issa, [phone]    Current Living Arrangements  home/apartment/condo    Provides Primary Care For  no one    Family Caregiver if Needed  spouse    Quality of Family Relationships  helpful;involved;supportive    Able to Return to Prior Arrangements  yes       Resource/Environmental Concerns    Resource/Environmental Concerns  none       Transition Planning    Patient/Family Anticipates Transition to  home with help/services    Patient/Family Anticipated Services at Transition  home health care    Transportation Anticipated  family or friend will provide       Discharge Needs Assessment    Concerns to be Addressed  discharge planning    Equipment Currently Used at Home  wheelchair;walker, rolling;shower chair    Outpatient/Agency/Support Group Needs  homecare agency    Discharge Facility/Level of Care Needs  home with home health    Discharge Coordination/Progress  [facility]        Discharge Plan     Row Name 02/13/20 8245       Plan    Plan  Home with wife and continued [facility] via personal w/c van    Provided post acute provider list?  Refused    Refused Provider List Comment  Pt current [facility], declines CMS information    Patient/Family in Agreement with Plan  yes    Plan Comments  CCP met with pt and wife to discuss d/c planning. Facesheet verified. CCP role explained. Pt resides with his wife in a single level home, and uses wheelchair, walker and shower chair. Pt reports current [facility] home health services (awaiting confirmation, declines CMS information) and has been to [facility] for past sub-acute rehab. Pt's pharmacy [facility]. Pt has private caregiver (no agency affiliation)  who assists 2x/week (one day with home care and one day with personal care). Per wife, pt will transport home via personal w/c van. CCP to follow to assist should d/c needs arise. Yovana Leon LCSW        Destination      Coordination has not been started for this encounter.      Durable Medical Equipment      Coordination has not been started for this encounter.      Dialysis/Infusion      Coordination has not been started for this encounter.      Home Medical Care      Service Provider Request Status Selected Services Address Phone Number Fax Number    [facility] Pending - Request Sent N/A [address] [address] [place] [address] [phone]      Therapy      Coordination has not been started for this encounter.      Community Resources      Coordination has not been started for this encounter.          Demographic Summary     Row Name 02/13/20 1651       General Information    Admission Type  observation    Arrived From  home    Required Notices Provided  Observation Status Notice    Referral Source  admission list    Reason for Consult  discharge planning    Preferred Language  English        Functional Status     Row Name 02/13/20 1651       Functional Status    Usual Activity Tolerance  fair    Current Activity Tolerance  fair       Functional Status, IADL    Medications  assistive equipment and person    Meal Preparation  assistive equipment and person    Housekeeping  assistive equipment and person    Laundry  assistive equipment and person    Shopping  assistive equipment and person       Mental Status Summary    Recent Changes in Mental Status/Cognitive Functioning  no changes        Psychosocial    No documentation.       Abuse/Neglect    No documentation.       Legal    No documentation.       Substance Abuse    No documentation.       Patient Forms    No documentation.           Vee Leon LCSW

## 2020-02-14 PROBLEM — I95.1 ORTHOSTASIS: Status: ACTIVE | Noted: 2019-01-01

## 2020-02-14 NOTE — PLAN OF CARE
Problem: Patient Care Overview  Goal: Plan of Care Review  Outcome: Ongoing (interventions implemented as appropriate)  Flowsheets  Taken 2/14/2020 0158  Progress: no change  Outcome Summary: NPO since MN for stress test. Patient anxious to go home. No dc plan noted in chart, but could be possibility. Wife at bedside. Patient denies complaints. VSS and awaiting AM labs.  Taken 2/14/2020 0000  Plan of Care Reviewed With: patient

## 2020-02-14 NOTE — PROGRESS NOTES
Case Management Discharge Note      Final Note: [facility]    Provided post acute provider list?: Refused    Destination      No service has been selected for the patient.      Durable Medical Equipment      No service has been selected for the patient.      Dialysis/Infusion      No service has been selected for the patient.      Home Medical Care - Selection Complete      Service Provider Request Status Selected Services Address Phone Number Fax Number    [facility] Selected Home Health Services [address] [address] [place], [place] [id number] [phone]      Therapy      No service has been selected for the patient.      Community Resources      No service has been selected for the patient.        Transportation Services  Private: Car    Final Discharge Disposition Code: 06 - home with home health care

## 2020-02-14 NOTE — DISCHARGE SUMMARY
Date of Admission: 2/12/2020  Date of Discharge:  2/14/2020  Primary Care Physician: Provider, No Known     Discharge Diagnosis:  Active Hospital Problems    Diagnosis  POA   • **Syncope [R55]  Yes   • Orthostasis [I95.1]  Unknown   • Muscle spasticity [M62.838]  Yes   • Hypothyroidism (acquired) [E03.9]  Yes   • Parkinson's disease [G20]  Yes   • COPD (chronic obstructive pulmonary disease) [J44.9]  Yes   • Coronary artery disease [I25.10]  Yes      Resolved Hospital Problems   No resolved problems to display.       DETAILS OF HOSPITAL STAY     Pertinent Test Results and Procedures Performed  Chest x-ray showed no acute infiltrate, consolidation, or effusion    2D echocardiogram:  · Mild mitral valve regurgitation is present  · Left atrial cavity size is mild-to-moderately dilated.  · Calculated EF = 61.0%  · There is no evidence of pericardial effusion.      Stress test:  · Left ventricular ejection fraction is normal (Calculated EF = 60%).  · Myocardial perfusion imaging indicates a normal myocardial perfusion study with no evidence of ischemia.  · Impressions are consistent with a low risk study.    Hospital Course  This is a 74-year-old male with history of Parkinson's disease, COPD, CAD, and orthostatic hypotension who presented to the emergency room after suffering a syncopal episode at his cardiologist's office.  Please see H&P for full details of admission.  Upon admission here he was evaluated by cardiology and had work-up as described above.  Cardiac evaluation was unremarkable.  Ultimately, I feel that his syncope was likely due to orthostasis from his Parkinson's/Sinemet as well as missing his admitted drain the morning of his office visit with his cardiologist.  He was evaluated by neurology who did not recommend any changes to his Parkinson's medications or other neurologic work-up.  Cardiology recommends a ZIO patch be placed at discharge and he will follow-up in the outpatient setting.  At this  point he is medically stable and will be released home today if okay with cardiology.    Physical Exam at Discharge:  General: No acute distress, AAOx3, chronically ill-appearing  HEENT: EOMI, PERRL  Cardiovascular: +s1 and s2, RRR  Lungs: No rhonchi or wheezing  Abdomen: soft, nontender  Neuro: Some rigidity and bradykinesia associated with his Parkinson's disease    Consults:   Consults     Date and Time Order Name Status Description    2/12/2020 1718 Inpatient Neurology Consult General Completed     2/12/2020 1718 Inpatient Cardiology Consult Completed     2/12/2020 1407 [facility] (on-call MD unless specified) Details Completed             Condition on Discharge: Stable, improved    Discharge Disposition  Home or Self Care    Discharge Medications     Discharge Medications      Changes to Medications      Instructions Start Date   midodrine 2.5 MG tablet  Commonly known as:  PROAMATINE  What changed:  how much to take   2.5 mg, Oral, 3 Times Daily Before Meals      pantoprazole 40 MG EC tablet  Commonly known as:  PROTONIX  What changed:  when to take this   40 mg, Oral, 2 Times Daily Before Meals         Continue These Medications      Instructions Start Date   acetaminophen 325 MG tablet  Commonly known as:  TYLENOL   650 mg, Oral, Every 4 Hours PRN      albuterol (2.5 MG/3ML) 0.083% nebulizer solution  Commonly known as:  PROVENTIL   2.5 mg, Nebulization, Every 6 Hours PRN      arformoterol 15 MCG/2ML nebulizer solution  Commonly known as:  BROVANA   15 mcg, Nebulization, 2 Times Daily - RT      aspirin 325 MG EC tablet   325 mg, Oral, Daily      bisacodyl 5 MG EC tablet  Commonly known as:  DULCOLAX   10 mg, Oral, Daily PRN      busPIRone 5 MG tablet  Commonly known as:  BUSPAR   5 mg, Oral, 3 Times Daily PRN      carbidopa-levodopa  MG per tablet  Commonly known as:  SINEMET   1 tablet, Oral, 3 Times Daily      cholecalciferol 25 MCG (1000 UT) tablet  Commonly known as:  VITAMIN D3   1,000 Units, Oral,  Daily      diclofenac 1 % gel gel  Commonly known as:  VOLTAREN   4 g, Topical, Daily PRN      divalproex 500 MG DR tablet  Commonly known as:  DEPAKOTE   500 mg, Oral, 2 Times Daily      donepezil 10 MG tablet  Commonly known as:  ARICEPT   20 mg, Oral, Nightly      fluticasone 50 MCG/ACT nasal spray  Commonly known as:  FLONASE   1 spray, Nasal, Daily      hydrocortisone-bacitracin-zinc oxide-nystatin  Commonly known as:  MAGIC BARRIER   1 application, Topical, 2 Times Daily      ipratropium-albuterol 0.5-2.5 mg/3 ml nebulizer  Commonly known as:  DUO-NEB   3 mL, Nebulization, 4 Times Daily - RT      levothyroxine 175 MCG tablet  Commonly known as:  SYNTHROID, LEVOTHROID   175 mcg, Oral, Daily      mirtazapine 15 MG tablet  Commonly known as:  REMERON   15 mg, Oral, Nightly      montelukast 10 MG tablet  Commonly known as:  SINGULAIR   10 mg, Oral, Nightly      multivitamin with minerals tablet tablet   1 tablet, Oral, Daily      OLANZapine 5 MG tablet  Commonly known as:  zyPREXA   5 mg, Oral, Nightly      ondansetron ODT 4 MG disintegrating tablet  Commonly known as:  ZOFRAN ODT   4 mg, Oral, Every 8 Hours PRN      oxybutynin XL 5 MG 24 hr tablet  Commonly known as:  DITROPAN-XL   5 mg, Oral, Daily      polyethylene glycol packet  Commonly known as:  MIRALAX   17 g, Oral, Daily      senna-docusate sodium 8.6-50 MG tablet  Commonly known as:  SENOKOT-S   2 tablets, Oral, 2 Times Daily      simvastatin 40 MG tablet  Commonly known as:  ZOCOR   40 mg, Oral, Nightly             Discharge Diet:   Diet Instructions     Diet: Regular, Consistent Carbohydrate; Nectar / Syrup Thick      Discharge Diet:   Regular  Consistent Carbohydrate       Fluid Consistency:  Nectar / Syrup Thick          Activity at Discharge:   Activity Instructions     Activity as Tolerated            Follow-up Appointments  No future appointments.  Additional Instructions for the Follow-ups that You Need to Schedule     Discharge Follow-up with  PCP   As directed       Currently Documented PCP:    Provider, No Known    PCP Phone Number:    [phone]     Follow Up Details:  1 week         Discharge Follow-up with Specified Provider: Dr. Crawford of cardiology in 3-4 weeks   As directed      To:  Dr. Crawford of cardiology in 3-4 weeks             I have examined and discussed discharge planning with the patient today.     Tu Ruggiero MD  02/14/20  2:32 PM    Time: Discharge 23 min

## 2020-02-14 NOTE — PROGRESS NOTES
[facility]  Cardiology Progress Note    Patient Identification:  Name: Victor Manuel Issa  Age: 74 y.o.  Sex: male  :  1945  MRN: [id number]                 Follow Up / Chief Complaint: Follow-up syncopal episode    Interval History: Stress test negative, echo with EF 61% and no acute abnormality.  Patient to go home on ZIO.       Subjective: Denies CP and SOA      Objective:    Past Medical History:  Past Medical History:   Diagnosis Date   • Anxiety    • Arthritis    • Bipolar 1 disorder (CMS/HCC)    • Coronary artery disease    • Disease of thyroid gland    • GERD (gastroesophageal reflux disease)    • Hyperlipidemia    • Mobility impaired    • Myocardial infarction (CMS/HCC)    • Overactive bladder    • Parkinsonian syndrome (CMS/HCC)    • Prostatitis    • Restrictive pattern present on pulmonary function testing    • Seasonal allergies    • Stroke (CMS/HCC)     RESIDUAL WEAKNESS RIGHT LOWER EXTREMITY   • Urinary incontinence    • Urinary retention      Past Surgical History:  Past Surgical History:   Procedure Laterality Date   • ABDOMINAL SURGERY     • CARDIAC SURGERY     • CHOLECYSTECTOMY     • CORONARY ARTERY BYPASS GRAFT      x2   • ENDOSCOPY N/A 10/4/2018    LA grade D reflux esophagitis, 4cm hiatal hernia, esohageal stenosis, normal stomach, erythematous duodenopathy, normal second portion of duodenum, no specimens collected   • ENDOSCOPY N/A 2018    Procedure: ESOPHAGOGASTRODUODENOSCOPY WITH DILATATION;  Surgeon: Paul Duncan MD;  Location: [facility] ENDOSCOPY;  Service: Gastroenterology   • EYE SURGERY      cataracts   • HIP ENDOPROSTHESIS Right 2018    Procedure: RIGHT HIP HEMIARTHROPLASTY;  Surgeon: Winston Vallejo MD;  Location: [facility] OR;  Service: Orthopedics   • HIP FRACTURE SURGERY Right     PARTIAL HIP REPLACEMENT   • MUSCLE BIOPSY Left 3/24/2016    Procedure: LT THIGH MUSCLE BIOPSY;  Surgeon: Fausto Mack MD;  Location: [facility] OR;   Service:    • SKIN BIOPSY     • THYROID SURGERY     • TOTAL HIP ARTHROPLASTY REVISION Right 1/18/2019    Procedure: CONVERSION RT POSTERIOR HIP BIPOLAR TO TOTAL HIP ARTHROPLASTY;  Surgeon: Radha Paredes MD;  Location: [facility];  Service: Orthopedics   • TURP / TRANSURETHRAL INCISION / DRAINAGE PROSTATE     • VASCULAR SURGERY          Social History:   Social History     Tobacco Use   • Smoking status: Current Every Day Smoker     Packs/day: 0.50     Types: Cigarettes     Start date: 1960   • Smokeless tobacco: Never Used   • Tobacco comment: Educated pt on quitting   Substance Use Topics   • Alcohol use: Never     Frequency: Never      Family History:  Family History   Problem Relation Age of Onset   • Cancer Mother    • Arthritis Father    • Heart disease Father    • Early death Brother    • Heart disease Brother    • Malig Hyperthermia Neg Hx           Allergies:  Allergies   Allergen Reactions   • Oxycodone Mental Status Change   • Promethazine Hallucinations   • Beet [Beta Vulgaris] Unknown (See Comments)     Pt cant remember     Scheduled Meds:    aspirin 325 mg Daily   atorvastatin 20 mg Daily   carbidopa-levodopa 1 tablet TID   cholecalciferol 1,000 Units Daily   divalproex 500 mg BID   donepezil 20 mg Nightly   fluticasone 1 spray Daily   hydrocortisone-bacitracin-zinc oxide-nystatin 1 application BID   ipratropium-albuterol 3 mL 4x Daily - RT   levothyroxine 175 mcg Q AM   midodrine 5 mg TID AC   mirtazapine 15 mg Nightly   montelukast 10 mg Nightly   multivitamin with minerals 1 tablet Daily   OLANZapine 5 mg Nightly   oxybutynin XL 5 mg Daily   pantoprazole 40 mg Q AM   polyethylene glycol 17 g Daily   senna-docusate sodium 2 tablet BID     I have reviewed the patient's recent medical history and current medications, as well as personally reviewed/interpreted the ECG/telemetry data    INTAKE AND OUTPUT:    Intake/Output Summary (Last 24 hours) at 2/14/2020 5462  Last data filed at  "2020 1451  Gross per 24 hour   Intake 720 ml   Output --   Net 720 ml       ROS  Constitutional: Awake and alert, no fever. No nosebleeds  Abdomen           no abdominal pain   Cardiac              no chest pain  Pulmonary          no shortness of breath      /66 (BP Location: Right arm, Patient Position: Lying)   Pulse 63   Temp 97.8 °F (36.6 °C) (Oral)   Resp 16   Ht 180.3 cm (71\")   Wt 72.4 kg (159 lb 11.2 oz)   SpO2 100%   BMI 22.27 kg/m²   General appearance: No acute changes   Neck: Trachea midline; NECK, supple, no thyromegaly or lymphadenopathy   Lungs: Normal size and shape, normal breath sounds, equal distribution of air, no rales and rhonchi   CV: S1-S2 regular, no murmurs, no rub, no gallop   Abdomen: Soft, non-tender; no masses , no abnormal abdominal sounds   Extremities: No deformity , normal color , no peripheral edema   Skin: Normal temperature, turgor and texture; no rash, ulcers          Cardiographics  Telemetry:   2020      EC2020 sinus rhythm rate 60      19 Sinus bradycardia rate 40        Echocardiogram:   2020  Interpretation Summary     · Mild mitral valve regurgitation is present  · Left atrial cavity size is mild-to-moderately dilated.  · Calculated EF = 61.0%  · There is no evidence of pericardial effusion.     Stress test  2020  Interpretation Summary     · Left ventricular ejection fraction is normal (Calculated EF = 60%).  · Myocardial perfusion imaging indicates a normal myocardial perfusion study with no evidence of ischemia.  · Impressions are consistent with a low risk study.           Lab Review   Results from last 7 days   Lab Units 20  0333 20  1157   TROPONIN T ng/mL <0.010 <0.010     Results from last 7 days   Lab Units 20  0333   MAGNESIUM mg/dL 2.0     Results from last 7 days   Lab Units 20  0333   SODIUM mmol/L 143   POTASSIUM mmol/L 4.1   BUN mg/dL 19   CREATININE mg/dL 0.72*   CALCIUM mg/dL 9.1 " "      Results from last 7 days   Lab Units 02/14/20  0333 02/13/20  0448 02/12/20  1157   WBC 10*3/mm3 10.38 7.65 10.94*   HEMOGLOBIN g/dL 10.9* 10.8* 12.6*   HEMATOCRIT % 32.9* 32.4* 37.3*   PLATELETS 10*3/mm3 235 220 221     Estimated Creatinine Clearance: 80.4 mL/min (A) (by C-G formula based on SCr of 0.72 mg/dL (L)).    I have reviewed the medical decision making with Dr. Lott in detail.     Assessment:  1.  Syncope  2.  Bradycardia  3.  CAD status Post CABG 2002/2007  4.  Hyperlipidemia  5.  Prior CVA    Plan:  Echo with EF of 61% and no acute abnormality, stress test negative for any evidence of myocardial ischemia.  ZIO Patch to be placed at discharge.  Anemia stable, defer to attending.  Blood pressure currently stable and controlled.  Heart rate 50s to 60s.  Continue Midrin.  Patient to follow-up in 4 weeks with cardiology for results of monitor.  Currently stable from CV standpoint, okay to discharge.          )2/14/2020  CUONG Kaba      Patient personally interviewed and above subjective findings personally confirmed during a face to face contact with patient today  All findings of physical examination confirmed  All pertinent and performed labs, cardiac procedures ,  radiographs of the last 24 hours personally reviewed  Impression and plans discussed/elaborated and implemented jointly as described above     Emelyn Lott MD          EMR Dragon/Transcription:   \"Dictated utilizing Dragon dictation\".   "

## 2020-09-02 PROBLEM — R33.9 URINARY RETENTION: Status: ACTIVE | Noted: 2020-01-01

## 2020-09-02 PROBLEM — N20.1 URETERAL CALCULUS, LEFT: Status: ACTIVE | Noted: 2020-01-01

## 2020-09-02 PROBLEM — F31.75 BIPOLAR 1 DISORDER, DEPRESSED, PARTIAL REMISSION (HCC): Status: ACTIVE | Noted: 2020-01-01

## 2020-09-02 PROBLEM — J96.01 ACUTE RESPIRATORY FAILURE WITH HYPOXIA (HCC): Status: ACTIVE | Noted: 2020-01-01

## 2020-09-02 PROBLEM — M25.559 ARTHRALGIA OF HIP: Status: ACTIVE | Noted: 2018-10-02

## 2020-09-02 PROBLEM — I10 HYPERTENSION: Status: ACTIVE | Noted: 2017-09-29

## 2020-09-02 PROBLEM — J45.909 ASTHMA: Status: ACTIVE | Noted: 2018-05-06

## 2020-09-02 PROBLEM — R13.10 DYSPHAGIA: Status: ACTIVE | Noted: 2020-01-01

## 2020-09-02 NOTE — ANESTHESIA PREPROCEDURE EVALUATION
Anesthesia Evaluation     Patient summary reviewed and Nursing notes reviewed   no history of anesthetic complications:               Airway   Comment: intubated  Dental      Pulmonary    (+) pneumonia , a smoker Current, COPD,   Cardiovascular     ECG reviewed  Rhythm: regular    (+) past MI , CAD, CABG, hyperlipidemia,     ROS comment: 2/13/20:  Sinus rhythm  Nonspecific T abnormalities, lateral leads  NO SIGNIFICANT CHANGE FROM PREVIOUS ECG    · Left ventricular ejection fraction is normal (Calculated EF = 60%).  · Myocardial perfusion imaging indicates a normal myocardial perfusion study with no evidence of ischemia.  · Impressions are consistent with a low risk study.    Neuro/Psych  (+) CVA, syncope, psychiatric history,     GI/Hepatic/Renal/Endo    (+)  GERD, GI bleeding ,     Musculoskeletal     Abdominal    Substance History - negative use     OB/GYN negative ob/gyn ROS         Other   arthritis,                      Anesthesia Plan    ASA 5 - emergent     general   (Results for YONI PEREZ (MRN [id number]) as of 9/2/2020 15:51    9/2/2020 14:15  WBC: 16.86 (H)  RBC: 2.32 (L)  Hemoglobin: 7.4 (L)  Hematocrit: 23.0 (L)    9/2/2020 14:52      ABG: Results for YONI PEREZ (MRN [id number]) as of 9/2/2020 15:51    9/2/2020 12:14  pH, Arterial: 7.144 (C)  pCO2, Arterial: 36.4  pO2, Arterial: 325.1 (H)  HCO3, Arterial: 12.5 (L)  Base Excess: -15.5 (L)  A-a Gradiant: 0.5    Results for YONI PEREZ (MRN [id number]) as of 9/2/2020 15:51    9/2/2020 12:14    9/2/2020 13:28    9/2/2020 13:28    9/2/2020 14:15  Glucose: 70  Sodium: 146 (H)  Potassium: 1.9 (C)  CO2: 6.1 (L)  Chloride: 131 (H)  Anion Gap: 8.9  Creatinine: 1.11  BUN: 18  BUN/Creatinine Ratio: 16.2  Calcium: 4.0 (C)  eGFR Non African Am: 64  Alkaline Phosphatase: 175 (H)  Total Protein: 2.1 (L)  ALT (SGPT): 92 (H)  AST (SGOT): 329 (H)  Total Bilirubin: 1.6 (H)  Albumin: 0.80 (L)  Globulin: 1.3  A/G Ratio: 0.6    9/2/2020  14:52    )  intravenous induction     Anesthetic plan, all risks, benefits, and alternatives have been provided, discussed and informed consent has been obtained with: patient.    Plan discussed with CRNA and attending.

## 2020-09-02 NOTE — ED NOTES
Paul levine and silverio rn tried for blood cultures x 3 was unsuccessful     Silverio Flores, RN  09/02/20 1588

## 2020-09-02 NOTE — ED TRIAGE NOTES
Patient wife called EMS, stating that he was not responding to her. Patient was picked up and EMS noted he was very short of breath and placed him on a non rebreather, patient o2 sats were in the 80's but came up to 93%. Patient was being transported and they noted his blood sugar was 28. They did give d50 about the time they got to the ER. Patient sitting up in stretcher still confused and on o2 at this time.

## 2020-09-02 NOTE — ED NOTES
Nurses teresa and silverio both have on gowns with the papr hats on. Patient now intubated. Dr hutchins was at bedside with papr on also.      Silverio Flores, RN  09/02/20 4407

## 2020-09-02 NOTE — ED NOTES
The second set of blood cultures was a short draw, patient has been stuck several times for  Blood cultures. Was only able to get blue top.      Marj Flores, RN  09/02/20 2109

## 2020-09-02 NOTE — CONSULTS
FIRST UROLOGY CONSULT      Patient Identification:  NAME:  Victor Manuel Issa  Age:  75 y.o.   Sex:  male   :  1945   MRN:  [id number]       Chief complaint: Not verbal currently intubated    History of present illness: 85-year-old gentleman sees Dr. Luis Manuel Chilel and previous patient of Dr. Danny Carr in our office.  History of bladder outlet obstruction who had a hole up procedure and found to have incidental prostate carcinoma.  He has a neurogenic bladder and is on intermittent catheterization.  Comes in today with lethargy poorly responsive and in the emergency room had to be intubated.  Severely hypotensive consistent with sepsis.  CT imaging shows an obstructing left ureteral calculus with mild perinephric edema.  He had a very thickened inflamed-looking bladder wall.  His wife catheterizes him 2-3 times a day from what I could ascertain.  They attempted to catheterize him in the emergency room and were unsuccessful.  A biotics have been started.  He is currently on pressors with Chris-Synephrine.  Severely acidotic on a bicarb drip.      Past medical history:  Past Medical History:   Diagnosis Date   • Anxiety    • Arthritis    • Bipolar 1 disorder (CMS/HCC)    • Coronary artery disease    • Disease of thyroid gland    • GERD (gastroesophageal reflux disease)    • Hyperlipidemia    • Mobility impaired    • Myocardial infarction (CMS/HCC)    • Overactive bladder    • Parkinsonian syndrome (CMS/HCC)    • Prostatitis    • Restrictive pattern present on pulmonary function testing    • Seasonal allergies    • Stroke (CMS/HCC)     RESIDUAL WEAKNESS RIGHT LOWER EXTREMITY   • Urinary incontinence    • Urinary retention        Past surgical history:  Past Surgical History:   Procedure Laterality Date   • ABDOMINAL SURGERY     • CARDIAC SURGERY     • CHOLECYSTECTOMY     • CORONARY ARTERY BYPASS GRAFT      x2   • ENDOSCOPY N/A 10/4/2018    LA grade D reflux esophagitis, 4cm hiatal hernia, esohageal  stenosis, normal stomach, erythematous duodenopathy, normal second portion of duodenum, no specimens collected   • ENDOSCOPY N/A 11/30/2018    Procedure: ESOPHAGOGASTRODUODENOSCOPY WITH DILATATION;  Surgeon: Paul Duncan MD;  Location: [facility] ENDOSCOPY;  Service: Gastroenterology   • EYE SURGERY      cataracts   • HIP ENDOPROSTHESIS Right 5/5/2018    Procedure: RIGHT HIP HEMIARTHROPLASTY;  Surgeon: Winston Vallejo MD;  Location: [facility] MAIN OR;  Service: Orthopedics   • HIP FRACTURE SURGERY Right     PARTIAL HIP REPLACEMENT   • MUSCLE BIOPSY Left 3/24/2016    Procedure: LT THIGH MUSCLE BIOPSY;  Surgeon: Fausto Mack MD;  Location: [facility] MAIN OR;  Service:    • SKIN BIOPSY     • THYROID SURGERY     • TOTAL HIP ARTHROPLASTY REVISION Right 1/18/2019    Procedure: CONVERSION RT POSTERIOR HIP BIPOLAR TO TOTAL HIP ARTHROPLASTY;  Surgeon: Radha Paredes MD;  Location: [facility] MAIN OR;  Service: Orthopedics   • TURP / TRANSURETHRAL INCISION / DRAINAGE PROSTATE     • VASCULAR SURGERY         Allergies:  Oxycodone; Promethazine; and Beet [beta vulgaris]    Home medications:  Medications Prior to Admission   Medication Sig Dispense Refill Last Dose   • acetaminophen (TYLENOL) 325 MG tablet Take 2 tablets by mouth Every 4 (Four) Hours As Needed for Mild Pain , Headache or Fever.   2/11/2020 at Unknown time   • albuterol (PROVENTIL) (2.5 MG/3ML) 0.083% nebulizer solution Take 2.5 mg by nebulization Every 6 (Six) Hours As Needed for Wheezing.  12 2/11/2020 at Unknown time   • arformoterol (BROVANA) 15 MCG/2ML nebulizer solution Take 15 mcg by nebulization 2 (Two) Times a Day.   12/6/2019 at Unknown time   • aspirin  MG EC tablet Take 1 tablet by mouth Daily.   2/11/2020 at Unknown time   • bisacodyl (DULCOLAX) 5 MG EC tablet Take 2 tablets by mouth Daily As Needed for Constipation. 30 tablet 0 Past Week at Unknown time   • busPIRone (BUSPAR) 5 MG tablet Take 1 tablet by mouth 3 (Three) Times  a Day As Needed (anxiety).   2/11/2020 at Unknown time   • carbidopa-levodopa (SINEMET)  MG per tablet Take 1 tablet by mouth 3 (Three) Times a Day.   2/11/2020 at Unknown time   • cholecalciferol (VITAMIN D3) 1000 UNITS tablet Take 1,000 Units by mouth daily.   2/11/2020 at Unknown time   • diclofenac (VOLTAREN) 1 % gel gel Apply 4 g topically Daily As Needed (apply to the knees).   2/11/2020 at Unknown time   • divalproex (DEPAKOTE) 500 MG DR tablet Take 500 mg by mouth 2 (Two) Times a Day.   2/11/2020 at Unknown time   • donepezil (ARICEPT) 10 MG tablet Take 20 mg by mouth Every Night.   2/11/2020 at Unknown time   • fluticasone (FLONASE) 50 MCG/ACT nasal spray 1 spray into each nostril Daily.   2/11/2020 at Unknown time   • hydrocortisone-bacitracin-zinc oxide-nystatin (MAGIC BARRIER) Apply 1 application topically to the appropriate area as directed 2 (Two) Times a Day.   Unknown at Unknown time   • ipratropium-albuterol (DUO-NEB) 0.5-2.5 mg/3 ml nebulizer Take 3 mL by nebulization 4 (Four) Times a Day. 360 mL  Unknown at Unknown time   • levothyroxine (SYNTHROID, LEVOTHROID) 175 MCG tablet Take 175 mcg by mouth Daily.   2/11/2020 at Unknown time   • midodrine (PROAMATINE) 2.5 MG tablet Take 1 tablet by mouth 3 (Three) Times a Day Before Meals. (Patient taking differently: Take 5 mg by mouth 3 (Three) Times a Day Before Meals.)   2/11/2020 at Unknown time   • mirtazapine (REMERON) 15 MG tablet Take 15 mg by mouth Every Night.   2/11/2020 at Unknown time   • montelukast (SINGULAIR) 10 MG tablet Take 10 mg by mouth Every Night.   2/11/2020 at Unknown time   • Multiple Vitamins-Minerals (MULTIVITAMIN WITH MINERALS) tablet tablet Take 1 tablet by mouth Daily.   2/11/2020 at Unknown time   • OLANZapine (zyPREXA) 5 MG tablet Take 1 tablet by mouth Every Night.   2/11/2020 at Unknown time   • ondansetron ODT (ZOFRAN ODT) 4 MG disintegrating tablet Take 1 tablet by mouth Every 8 (Eight) Hours As Needed for Nausea  or Vomiting. 30 tablet 0 Past Month at Unknown time   • oxybutynin XL (DITROPAN-XL) 5 MG 24 hr tablet Take 5 mg by mouth Daily.   Patient Taking Differently at Unknown time   • pantoprazole (PROTONIX) 40 MG EC tablet Take 1 tablet by mouth 2 (Two) Times a Day Before Meals. (Patient taking differently: Take 40 mg by mouth Daily.) 180 tablet 1 Patient Taking Differently at Unknown time   • polyethylene glycol (MIRALAX) packet Take 17 g by mouth Daily.   Past Week at Unknown time   • senna-docusate sodium (SENOKOT-S) 8.6-50 MG tablet Take 2 tablets by mouth 2 (Two) Times a Day.   Past Week at Unknown time   • simvastatin (ZOCOR) 40 MG tablet Take 40 mg by mouth Every Night.   2/11/2020 at Unknown time        Hospital medications:    [MAR Hold] fludrocortisone 50 mcg Nasogastric Daily   [MAR Hold] hydrocortisone sodium succinate 50 mg Intravenous Q8H   [MAR Hold] piperacillin-tazobactam 4.5 g Intravenous Q8H   potassium chloride 40 mEq Oral Once   potassium chloride 10 mEq Intravenous Q1H   [MAR Hold] vancomycin 20 mg/kg Intravenous Q24H       norepinephrine 0.02-0.3 mcg/kg/min Last Rate: 0.3 mcg/kg/min (09/02/20 1605)   Pharmacy to dose vancomycin     phenylephrine 0.5-3 mcg/kg/min Last Rate: 1.5 mcg/kg/min (09/02/20 1625)   Sodium Bicarbonate-Dextrose 150 mEq Last Rate: 100 mL/hr (09/02/20 1620)   vasopressin 0.03 Units/min      •  [MAR Hold] calcium gluconate **AND** [MAR Hold] calcium gluconate IVPB **AND** Calcium  •  Pharmacy to dose vancomycin  •  sterile water    Family history:  Family History   Problem Relation Age of Onset   • Cancer Mother    • Arthritis Father    • Heart disease Father    • Early death Brother    • Heart disease Brother    • Malig Hyperthermia Neg Hx        Social history:  Social History     Tobacco Use   • Smoking status: Current Every Day Smoker     Packs/day: 0.50     Types: Cigarettes     Start date: 1960   • Smokeless tobacco: Never Used   • Tobacco comment: Educated pt on quitting      Substance Use Topics   • Alcohol use: Never     Frequency: Never   • Drug use: No       Review of systems:    Negative 12-system ROS except for the following: Patient unable to elicit any response due to his intubation.  Some the history was obtained by his daughter who is a nurse here at [facility]      Objective:  TMax 24 hours:   Temp (24hrs), Av °F (36.1 °C), Min:97 °F (36.1 °C), Max:97 °F (36.1 °C)      Vitals Ranges:   Temp:  [97 °F (36.1 °C)] 97 °F (36.1 °C)  Heart Rate:  [] 103  Resp:  [16-32] 18  BP: ()/(28-90) 99/61  FiO2 (%):  [40 %-100 %] 40 %    Intake/Output Last 3 shifts:  No intake/output data recorded.     Physical Exam:       General Appearance:   Patient sedated and intubated   Head:    Normocephalic, without obvious abnormality, atraumatic   Eyes:          PERRL, conjunctivae and corneas clear   Ears:    Normal external inspection   Throat:   No oral lesions, oral mucosa moist   Neck:   Supple, no LAD, trachea midline   Back:     No CVA tenderness   Lungs:     Respirations unlabored, symmetric excursion    Heart:    RRR, intact peripheral pulses   Abdomen:    Scaphoid mildly bladder distention   :   Penis normal testes normal scrotum normal   SALINAS:    Extremities:  No fecal impaction but feels goal soilage noted.  Prostate is not discernibly abnormal.   No edema, mild lower extremity contractures   Skin:   No bleeding, bruising or rashes   Neuro/Psych:  Patient intubated       Results review:   I reviewed the patient's new clinical results.    Data review:  Lab Results (last 24 hours)     Procedure Component Value Units Date/Time    Urine Culture - Urine, Urinary Bladder [[id number]] Collected:  20 161    Specimen:  Urine from Urinary Bladder Updated:  20 1640    Urine Culture - Urine, Kidney, Left [[id number]] Collected:  20 162    Specimen:  Urine from Kidney, Left Updated:  20 1640    Manual Differential [[id number]]  (Abnormal) Collected:   09/02/20 1415    Specimen:  Blood Updated:  09/02/20 1545     Neutrophil % 92.0 %      Comment: 2+ Bands Noted        Lymphocyte % 2.0 %      Monocyte % 2.0 %      Metamyelocyte % 1.0 %      Myelocyte % 3.0 %      Neutrophils Absolute 15.51 10*3/mm3      Lymphocytes Absolute 0.34 10*3/mm3      Monocytes Absolute 0.34 10*3/mm3      nRBC 3.0 /100 WBC      Crenated RBC's Mod/2+     Polychromasia Slight/1+     Vacuolated Neutrophils Slight/1+     Platelet Morphology Normal    Comprehensive Metabolic Panel [[id number]]  (Abnormal) Collected:  09/02/20 1415    Specimen:  Blood Updated:  09/02/20 1457     Glucose 70 mg/dL      BUN 18 mg/dL      Creatinine 1.11 mg/dL      Sodium 146 mmol/L      Potassium 1.9 mmol/L      Chloride 131 mmol/L      CO2 6.1 mmol/L      Calcium 4.0 mg/dL      Total Protein 2.1 g/dL      Albumin 0.80 g/dL      ALT (SGPT) 92 U/L      AST (SGOT) 329 U/L      Alkaline Phosphatase 175 U/L      Total Bilirubin 1.6 mg/dL      eGFR Non African Amer 64 mL/min/1.73      Globulin 1.3 gm/dL      A/G Ratio 0.6 g/dL      BUN/Creatinine Ratio 16.2     Anion Gap 8.9 mmol/L     Narrative:       GFR Normal >60  Chronic Kidney Disease <60  Kidney Failure <15      Lactic Acid, Reflex Timer (This will reflex a repeat order 3-3:15 hours after ordered.) [[id number]] Collected:  09/02/20 1102    Specimen:  Blood Updated:  09/02/20 1445     Hold Tube Hold for add-ons.     Comment: Auto resulted.       CBC & Differential [[id number]] Collected:  09/02/20 1415    Specimen:  Blood Updated:  09/02/20 1439    Narrative:       The following orders were created for panel order CBC & Differential.  Procedure                               Abnormality         Status                     ---------                               -----------         ------                     CBC Auto Differential[[id number]]        Abnormal            Final result                 Please view results for these tests on the individual orders.    CBC Auto  "Differential [[id number]]  (Abnormal) Collected:  09/02/20 1415    Specimen:  Blood Updated:  09/02/20 1439     WBC 16.86 10*3/mm3      RBC 2.32 10*6/mm3      Hemoglobin 7.4 g/dL      Hematocrit 23.0 %      MCV 99.1 fL      MCH 31.9 pg      MCHC 32.2 g/dL      RDW 12.5 %      RDW-SD 45.5 fl      MPV 10.8 fL      Platelets 65 10*3/mm3     Manual Differential [[id number]]  (Abnormal) Collected:  09/02/20 1102    Specimen:  Blood Updated:  09/02/20 1250     Neutrophil % 60.0 %      Comment: 1+ bands        Lymphocyte % 16.0 %      Monocyte % 1.0 %      Metamyelocyte % 9.0 %      Myelocyte % 14.0 %      Neutrophils Absolute 5.12 10*3/mm3      Lymphocytes Absolute 1.36 10*3/mm3      Monocytes Absolute 0.09 10*3/mm3      RBC Morphology Normal     WBC Morphology Normal     Platelet Morphology Normal    Procalcitonin [[id number]]  (Abnormal) Collected:  09/02/20 1103    Specimen:  Blood Updated:  09/02/20 1229     Procalcitonin 56.79 ng/mL     Narrative:       As a Marker for Sepsis (Non-Neonates):   1. <0.5 ng/mL represents a low risk of severe sepsis and/or septic shock.  1. >2 ng/mL represents a high risk of severe sepsis and/or septic shock.    As a Marker for Lower Respiratory Tract Infections that require antibiotic therapy:  PCT on Admission     Antibiotic Therapy             6-12 Hrs later  > 0.5                Strongly Recommended            >0.25 - <0.5         Recommended  0.1 - 0.25           Discouraged                   Remeasure/reassess PCT  <0.1                 Strongly Discouraged          Remeasure/reassess PCT      As 28 day mortality risk marker: \"Change in Procalcitonin Result\" (> 80 % or <=80 %) if Day 0 (or Day 1) and Day 4 values are available. Refer to http://www.[facility]-pct-calculator.com/   Change in PCT <=80 %   A decrease of PCT levels below or equal to 80 % defines a positive change in PCT test result representing a higher risk for 28-day all-cause mortality of patients diagnosed with severe " sepsis or septic shock.  Change in PCT > 80 %   A decrease of PCT levels of more than 80 % defines a negative change in PCT result representing a lower risk for 28-day all-cause mortality of patients diagnosed with severe sepsis or septic shock.                Results may be falsely decreased if patient taking Biotin.     Troponin [[id number]]  (Normal) Collected:  09/02/20 1103    Specimen:  Blood Updated:  09/02/20 1227     Troponin T <0.010 ng/mL     Narrative:       Troponin T Reference Range:  <= 0.03 ng/mL-   Negative for AMI  >0.03 ng/mL-     Abnormal for myocardial necrosis.  Clinicians would have to utilize clinical acumen, EKG, Troponin and serial changes to determine if it is an Acute Myocardial Infarction or myocardial injury due to an underlying chronic condition.       Results may be falsely decreased if patient taking Biotin.      BNP [[id number]]  (Abnormal) Collected:  09/02/20 1103    Specimen:  Blood Updated:  09/02/20 1224     proBNP 2,221.0 pg/mL     Narrative:       Among patients with dyspnea, NT-proBNP is highly sensitive for the detection of acute congestive heart failure. In addition NT-proBNP of <300 pg/ml effectively rules out acute congestive heart failure with 99% negative predictive value.    Results may be falsely decreased if patient taking Biotin.      Blood Gas, Arterial [[id number]]  (Abnormal) Collected:  09/02/20 1214    Specimen:  Arterial Blood Updated:  09/02/20 1218     Site Arterial: left brachial     Brady's Test N/A     pH, Arterial 7.144 pH units      Comment: Critical:Notify TRINY GARCIA (02-Sep-20 12:17:42)Read back ok        pCO2, Arterial 36.4 mm Hg      pO2, Arterial 325.1 mm Hg      HCO3, Arterial 12.5 mmol/L      Base Excess, Arterial -15.5 mmol/L      O2 Saturation Calculated 99.9 %      A-a Gradiant 0.5 mmHg      Barometric Pressure for Blood Gas 748.0 mmHg      Modality Adult Vent     FIO2 100 %      Ventilator Mode AC     Set Tidal Volume 500     Set [facility]  Resp Rate 20     Rate 34 Breaths/minute      PEEP 5    Respiratory Panel PCR w/COVID-19(SARS-CoV-2) [facility]D/[facility] In-House, NP Swab in UTM/VTM, 3-4 HR TAT - Swab, Nasopharynx [[id number]]  (Normal) Collected:  09/02/20 1109    Specimen:  Swab from Nasopharynx Updated:  09/02/20 1215     ADENOVIRUS, PCR Not Detected     Coronavirus 229E Not Detected     Coronavirus HKU1 Not Detected     Coronavirus NL63 Not Detected     Coronavirus OC43 Not Detected     COVID19 Not Detected     Human Metapneumovirus Not Detected     Human Rhinovirus/Enterovirus Not Detected     Influenza A PCR Not Detected     Influenza A H1 Not Detected     Influenza A H1 2009 PCR Not Detected     Influenza A H3 Not Detected     Influenza B PCR Not Detected     Parainfluenza Virus 1 Not Detected     Parainfluenza Virus 2 Not Detected     Parainfluenza Virus 3 Not Detected     Parainfluenza Virus 4 Not Detected     RSV, PCR Not Detected     Bordetella pertussis pcr Not Detected     Bordetella parapertussis PCR Not Detected     Chlamydophila pneumoniae PCR Not Detected     Mycoplasma pneumo by PCR Not Detected    Narrative:       Fact sheet for providers: https://docs.[employer]/wp-content/uploads/NKS9007-4639-RB6.1-EUA-Provider-Fact-Sheet-3.pdf    Fact sheet for patients: https://docs.[employer]/wp-content/uploads/ERI0602-7158-KQ6.1-EUA-Patient-Fact-Sheet-1.pdf    POCT Occult Blood Stool [[id number]]  (Normal) Collected:  09/02/20 1208    Specimen:  Stool from Per Rectum Updated:  09/02/20 1209     Fecal Occult Blood Negative     Lot Number [id number]     Expiration Date 02/28/2021     Positive Control Positive     Negative Control Negative    Comprehensive Metabolic Panel [[id number]]  (Abnormal) Collected:  09/02/20 1103    Specimen:  Blood Updated:  09/02/20 1153     Glucose 42 mg/dL      BUN 16 mg/dL      Creatinine 1.16 mg/dL      Sodium 151 mmol/L      Potassium 1.9 mmol/L      Chloride 131 mmol/L      CO2 7.4 mmol/L      Calcium 4.6  mg/dL      Total Protein 1.9 g/dL      Albumin 0.90 g/dL      ALT (SGPT) 24 U/L      AST (SGOT) 88 U/L      Alkaline Phosphatase 106 U/L      Total Bilirubin 1.2 mg/dL      eGFR Non African Amer 61 mL/min/1.73      Globulin 1.0 gm/dL      A/G Ratio 0.9 g/dL      BUN/Creatinine Ratio 13.8     Anion Gap 12.6 mmol/L     Narrative:       GFR Normal >60  Chronic Kidney Disease <60  Kidney Failure <15      Blood Culture - Blood, Arm, Right [[id number]] Collected:  09/02/20 1120    Specimen:  Blood from Arm, Right Updated:  09/02/20 1147    Blood Culture - Blood, Arm, Left [[id number]] Collected:  09/02/20 1142    Specimen:  Blood from Arm, Left Updated:  09/02/20 1146    CBC & Differential [[id number]] Collected:  09/02/20 1102    Specimen:  Blood Updated:  09/02/20 1144    Narrative:       The following orders were created for panel order CBC & Differential.  Procedure                               Abnormality         Status                     ---------                               -----------         ------                     CBC Auto Differential[[id number]]        Abnormal            Final result                 Please view results for these tests on the individual orders.    CBC Auto Differential [[id number]]  (Abnormal) Collected:  09/02/20 1102    Specimen:  Blood Updated:  09/02/20 1144     WBC 8.53 10*3/mm3      RBC 1.44 10*6/mm3      Hemoglobin 4.6 g/dL      Hematocrit 14.1 %      MCV 97.9 fL      MCH 31.9 pg      MCHC 32.6 g/dL      RDW 12.3 %      RDW-SD 43.5 fl      MPV 10.3 fL      Platelets 34 10*3/mm3     Lactic Acid, Plasma [[id number]]  (Abnormal) Collected:  09/02/20 1102    Specimen:  Blood Updated:  09/02/20 1141     Lactate 5.9 mmol/L     C-reactive Protein [[id number]]  (Abnormal) Collected:  09/02/20 1103    Specimen:  Blood Updated:  09/02/20 1141     C-Reactive Protein 5.28 mg/dL     Magnesium [[id number]]  (Abnormal) Collected:  09/02/20 1103    Specimen:  Blood Updated:  09/02/20 1141     Magnesium  1.0 mg/dL     Valproic Acid Level, Total [[id number]]  (Abnormal) Collected:  09/02/20 1103    Specimen:  Blood Updated:  09/02/20 1141     Valproic Acid 19.0 mcg/mL     POC Glucose Once [[id number]]  (Normal) Collected:  09/02/20 1032    Specimen:  Blood Updated:  09/02/20 1052     Glucose 88 mg/dL            Imaging:  Imaging Results (Last 24 Hours)     Procedure Component Value Units Date/Time    FL C Arm During Surgery [[id number]] Resulted:  09/02/20 1637     Updated:  09/02/20 1637    Narrative:       This procedure was auto-finalized with no dictation required.    XR Abdomen 1 View [[id number]] Resulted:  09/02/20 1636     Updated:  09/02/20 1637    CT Head Without Contrast [[id number]] Collected:  09/02/20 1554     Updated:  09/02/20 1632    Narrative:       EMERGENCY NONCONTRAST HEAD CT 09/02/2020     CLINICAL HISTORY: Altered mental status.     TECHNIQUE: Spiral CT images were obtained from the base of the skull to  the vertex without intravenous contrast. Images were reformatted and  submitted in 3 mm thick axial CT section with brain algorithm and 2 mm  thick sagittal and coronal reconstructions were performed and submitted  in brain algorithm.     COMPARISON: This is correlated to prior MRI of the brain from [facility] 05/07/2018.     FINDINGS: There is some mild patchy low-density in the periventricular  white matter consistent with mild small vessel disease. There is an 8 x  6 mm old lacunar infarct in posterior left putamen. There is an  additional 6 x 3 mm old lacunar infarct at the junction of head and body  of the right caudate nucleus and there is a 5 x 3 mm old lacunar infarct  at the junction of head and body of left caudate nucleus.. There is an  11 x 5 mm old inferior lateral right cerebellar infarct in the right  PICA territory and these are all unchanged since prior MRI 05/07/2018.  Mild diffuse cerebral atrophy is present. The lateral and third  ventricles are very minimally  prominent in size and felt to be due to  central volume loss or atrophy. I see no mass effect, no midline shift,  no extra-axial fluid collections are identified. There is no evidence of  acute intracranial hemorrhage. The paranasal sinuses and mastoid air  cells and middle ear cavities are clear. Calvarium and skull base are  normal in appearance.       Impression:       1. No acute abnormality seen with no change when compared to prior MRI  of the head from [facility] 05/07/2018  2. There is mild small vessel disease in the periventricular white  matter of the cerebral hemispheres and some diffuse cerebral atrophy.  There is an 8 x 6 mm old lacunar infarct in posterior left putamen and a  6 x 3 mm old lacunar infarct at the junction of the head and body of the  right caudate nucleus and a 5 x 3 mm old lacunar infarct at the junction  of the head and body of left caudate nucleus. There is an 11 x 5 mm old  inferior lateral right cerebellar infarct in the right PICA territory.  The remainder of the head CT is normal The etiology of the altered  mental status is not established on this exam.     Radiation dose reduction techniques were utilized, including automated  exposure control and exposure modulation based on body size.     This report was finalized on 9/2/2020 4:29 PM by Dr. Tu Perea M.D.       CT Chest With Contrast [[id number]] Collected:  09/02/20 1526     Updated:  09/02/20 1547    Narrative:       CT SCANS OF THE CHEST AND ABDOMEN AND PELVIS WITH INTRAVENOUS CONTRAST     HISTORY: Confusion. Acute anemia.     The CT scans of the chest and abdomen and pelvis were performed as an  emergency procedure with intravenous contrast and compared to previous  CT scan of the abdomen and pelvis dated 03/21/2014 and previous CT  angiography of the chest dated 12/09/2019. The following findings are  present:     1. There are changes of pulmonary emphysema and there is some scattered  areas of  tree-in-bud pattern predominantly in the left lower lobe and  adjacent left upper lobe most consistent with nonspecific infectious or  inflammatory change and bronchiolitis. This is more prominent since the  previous exam. The lungs are otherwise clear except for a few scattered  calcified granulomas.  2. An ET tube ends above the jose. There is no mediastinal or hilar or  axillary adenopathy. There is no pericardial effusion.  3. There is air within the intrahepatic bile ducts consistent with  previous papillotomy that is unchanged from previous studies. The liver  texture is slightly heterogeneous consistent with areas of fatty  infiltration. The spleen, pancreas, and both adrenal glands are  unremarkable.  4. There is mild left renal obstruction secondary to a 10 mm stone at  the left ureteral pelvic junction. There is also some heterogeneous  enhancement of the left kidney concerning for associated pyelonephritis  and further clinical correlation is recommended. There are multiple  small cortical cysts involving both kidneys.  5. There is no aortic aneurysm or retroperitoneal lymphadenopathy. There  is mild distention of the stomach containing air and fluid and there is  also a localized distended fluid-filled small bowel loop in the right  lower quadrant but this is nearly identical to a CT scan dating back to  2014. There is a large amount of dense stool within the rectal ampulla  that measures up to 8.7 cm and concerning for an element of fecal  impaction.  6. In the pelvis there is irregular wall thickening of the urinary  bladder with small cystic changes in the bladder wall that is new since  2014. The findings suggest infectious cystitis and further clinical  correlation is recommended.                 Radiation dose reduction techniques were utilized, including automated  exposure control and exposure modulation based on body size.     This report was finalized on 9/2/2020 3:44 PM by Dr. Orlando Tamayo,  M.D.       CT Abdomen Pelvis With Contrast [[id number]] Collected:  09/02/20 1526     Updated:  09/02/20 1547    Narrative:       CT SCANS OF THE CHEST AND ABDOMEN AND PELVIS WITH INTRAVENOUS CONTRAST     HISTORY: Confusion. Acute anemia.     The CT scans of the chest and abdomen and pelvis were performed as an  emergency procedure with intravenous contrast and compared to previous  CT scan of the abdomen and pelvis dated 03/21/2014 and previous CT  angiography of the chest dated 12/09/2019. The following findings are  present:     1. There are changes of pulmonary emphysema and there is some scattered  areas of tree-in-bud pattern predominantly in the left lower lobe and  adjacent left upper lobe most consistent with nonspecific infectious or  inflammatory change and bronchiolitis. This is more prominent since the  previous exam. The lungs are otherwise clear except for a few scattered  calcified granulomas.  2. An ET tube ends above the jose. There is no mediastinal or hilar or  axillary adenopathy. There is no pericardial effusion.  3. There is air within the intrahepatic bile ducts consistent with  previous papillotomy that is unchanged from previous studies. The liver  texture is slightly heterogeneous consistent with areas of fatty  infiltration. The spleen, pancreas, and both adrenal glands are  unremarkable.  4. There is mild left renal obstruction secondary to a 10 mm stone at  the left ureteral pelvic junction. There is also some heterogeneous  enhancement of the left kidney concerning for associated pyelonephritis  and further clinical correlation is recommended. There are multiple  small cortical cysts involving both kidneys.  5. There is no aortic aneurysm or retroperitoneal lymphadenopathy. There  is mild distention of the stomach containing air and fluid and there is  also a localized distended fluid-filled small bowel loop in the right  lower quadrant but this is nearly identical to a CT scan dating  back to  2014. There is a large amount of dense stool within the rectal ampulla  that measures up to 8.7 cm and concerning for an element of fecal  impaction.  6. In the pelvis there is irregular wall thickening of the urinary  bladder with small cystic changes in the bladder wall that is new since  2014. The findings suggest infectious cystitis and further clinical  correlation is recommended.                 Radiation dose reduction techniques were utilized, including automated  exposure control and exposure modulation based on body size.     This report was finalized on 9/2/2020 3:44 PM by Dr. Orlando Tamayo M.D.       XR Chest 1 View [[id number]] Collected:  09/02/20 1214     Updated:  09/02/20 1221    Narrative:       XR CHEST 1 VW-     Clinical: Acute mental status change, intubated     Chest radiograph comparison 2/12/2020     FINDINGS: Endotracheal tube superimposes the tracheal air column, tip is  located approximately 2 cm above the jose. Median sternotomy wires and  vascular markers in position. Cardiac size stable, there is  atherosclerotic calcification of the aorta. Skinfolds superimposing the  right chest.     Patient is rotated towards the left. There is diffuse increase in the  interstitial pattern, some of which was demonstrated on prior  examinations and could be chronic, there appear to be small pleural  effusions blunting the costophrenic angles and probable underlying  vascular congestion. No focal area of consolidation seen. The remainder  of examination is unremarkable.     CONCLUSION: Diffuse increase in the overall interstitial pattern with  small pleural effusions blunting the costophrenic angles consistent with  vascular congestion. Endotracheal tube position is satisfactory.     This report was finalized on 9/2/2020 12:18 PM by Dr. Ernst Srivastava M.D.                Assessment:       Acute respiratory failure with hypoxia (CMS/HCC)    Urinary sepsis likely secondary to severe cystitis  a sending to the left kidney with an obstructing ureteral calculus  Neurogenic bladder  Obstructing left ureteral calculus        Plan:     Patient will be taken the operative suite emergently.  Currently intubated.  I have obtained verbal consent from his daughter but it is an emergent situation and she understands.    Misbah Roth MD  09/02/20  16:47

## 2020-09-02 NOTE — PROGRESS NOTES
[facility]  Clinical Pharmacy Department     Vancomycin Pharmacokinetic Note    Victor Manuel Issa is a 75 y.o. male who is on day 1 of pharmacy to dose vancomycin for sepsis/septic shock.Pt with large obstructing kidney stone with plan to go emergently to OR.    Target level: AUC24 400-600  Consulting Provider:  Dr. Jared Love  Current Vancomycin Dose:  1250mg IV x 1 received in ER  Planned Duration of Therapy: 7 days  Other Antimicrobials: Piperacillin/tazobactam, dose adjusted to 4.5gm q8h per protocol (higher dose) due to septic shock    Allergies  Allergies as of 09/02/2020 - Reviewed 09/02/2020   Allergen Reaction Noted   • Oxycodone Mental Status Change 12/07/2019   • Promethazine Hallucinations 12/07/2019   • Beet [beta vulgaris] Unknown (See Comments) 10/02/2018       Microbiology:  Microbiology Results (last 10 days)     Procedure Component Value - Date/Time    Respiratory Panel PCR w/COVID-19(SARS-CoV-2) [facility] In-House, NP Swab in UTM/VTM, 3-4 HR TAT - Swab, Nasopharynx [[id number]]  (Normal) Collected:  09/02/20 1109    Lab Status:  Final result Specimen:  Swab from Nasopharynx Updated:  09/02/20 1215     ADENOVIRUS, PCR Not Detected     Coronavirus 229E Not Detected     Coronavirus HKU1 Not Detected     Coronavirus NL63 Not Detected     Coronavirus OC43 Not Detected     COVID19 Not Detected     Human Metapneumovirus Not Detected     Human Rhinovirus/Enterovirus Not Detected     Influenza A PCR Not Detected     Influenza A H1 Not Detected     Influenza A H1 2009 PCR Not Detected     Influenza A H3 Not Detected     Influenza B PCR Not Detected     Parainfluenza Virus 1 Not Detected     Parainfluenza Virus 2 Not Detected     Parainfluenza Virus 3 Not Detected     Parainfluenza Virus 4 Not Detected     RSV, PCR Not Detected     Bordetella pertussis pcr Not Detected     Bordetella parapertussis PCR Not Detected     Chlamydophila pneumoniae PCR Not Detected     Mycoplasma pneumo by  "PCR Not Detected    Narrative:       Fact sheet for providers: https://docs.[employer]/wp-content/uploads/RFZ2062-5763-IW4.1-EUA-Provider-Fact-Sheet-3.pdf    Fact sheet for patients: https://docs.[employer]/wp-content/uploads/JXQ5909-9834-EL2.1-EUA-Patient-Fact-Sheet-1.pdf          Pertinent Radiology/Imagin/2 - CT Abd/Pelvis - There is mild left renal obstruction secondary to a 10 mm stone at  the left ureteral pelvic junction. There is also some heterogeneous  enhancement of the left kidney concerning for associated pyelonephritis  and further clinical correlation is recommended.    Vitals/Labs/I&O  177.8 cm (70\")  63.5 kg (140 lb)  Body mass index is 20.09 kg/m².   Temp:  [97 °F (36.1 °C)] 97 °F (36.1 °C)  Heart Rate:  [] 103  Resp:  [16-32] 18  BP: ()/(28-90) 99/61  FiO2 (%):  [40 %-100 %] 40 %    Results from last 7 days   Lab Units 20  1415 20  1102   WBC 10*3/mm3 16.86* 8.53     Results from last 7 days   Lab Units 20  1102   LACTATE mmol/L 5.9*      Results from last 7 days   Lab Units 20  1103   PROCALCITONIN ng/mL 56.79*     Results from last 7 days   Lab Units 20  1103   CRP mg/dL 5.28*              Estimated Creatinine Clearance: 50.1 mL/min (by C-G formula based on SCr of 1.11 mg/dL).  Results from last 7 days   Lab Units 20  1415 20  1103   BUN mg/dL 18 16   CREATININE mg/dL 1.11 1.16     Intake & Output (last 3 days)     None          Vancomycin Dosing and Level History:  Last Dose Received in the ED/Outside Facility: Vancomycin 1250mg IV at 1314 on   Is Patient on Dialysis or Renal Replacement: No    Assessment/Plan:  -Based on patient's age, weight, renal function, clinical condition and review of [employer] predictive data, will begin vancomycin 1250mg IV Q24h. This regimen gives a predicted steady-state trough of 16.1 mg/L and AUC24 of 544 mg*h/L.  -Vancomycin level ordered for 1200 on   -Plan for pharmacy to re-assess " patient's labs, clinical condition and renal function on 9/3 to determine if initial regimen needs adjustment prior to level  -Follow-up cultures for potential de-escalation if MRSA coverage not needed    Thank you for involving pharmacy in this patient's care. Please contact pharmacy with any questions or concerns.           Christiana Maravilla, PharmD, [facility]  Clinical Pharmacy Specialist, Emergency Medicine   Phone: [phone]

## 2020-09-02 NOTE — H&P
.     Admission Note    Patient Identification:  Victor Manuel Issa  75 y.o.  male  1945  [id number]            CC: Altered mental status weakness hypoglycemia brought in from care facility  History of Present Illness:  Patient is 75-year-old with  medical history of bipolar 1 disorder coronary artery disease GERD hyperlipidemia impaired mobility since history of a stroke prostatitis parkinsonian syndrome and history of urinary retention who presented to the emergency room from care facility with altered mental status and increased weakness.  He was found to be hypoxic and hypoglycemic with sugars of 28 treated with D50 by emergency medical services.  Apparently patient is on chronic steroids and blood pressure medication to keep his blood pressure elevated.  However his wife does provide history that patient denied any recent vomiting or diarrhea.  Patient is intubated unable to give any any reliable history he is very ill-appearing with increased respiratory effort on mechanical ventilation    Past Medical History:   Diagnosis Date   • Anxiety    • Arthritis    • Bipolar 1 disorder (CMS/HCC)    • Coronary artery disease    • Disease of thyroid gland    • GERD (gastroesophageal reflux disease)    • Hyperlipidemia    • Mobility impaired    • Myocardial infarction (CMS/HCC)    • Overactive bladder    • Parkinsonian syndrome (CMS/HCC)    • Prostatitis    • Restrictive pattern present on pulmonary function testing    • Seasonal allergies    • Stroke (CMS/HCC)     RESIDUAL WEAKNESS RIGHT LOWER EXTREMITY   • Urinary incontinence    • Urinary retention        Past Surgical History:   Procedure Laterality Date   • ABDOMINAL SURGERY     • CARDIAC SURGERY     • CHOLECYSTECTOMY     • CORONARY ARTERY BYPASS GRAFT      x2   • ENDOSCOPY N/A 10/4/2018    LA grade D reflux esophagitis, 4cm hiatal hernia, esohageal stenosis, normal stomach, erythematous duodenopathy, normal second portion of duodenum, no specimens  collected   • ENDOSCOPY N/A 11/30/2018    Procedure: ESOPHAGOGASTRODUODENOSCOPY WITH DILATATION;  Surgeon: Paul Duncan MD;  Location: [facility] ENDOSCOPY;  Service: Gastroenterology   • EYE SURGERY      cataracts   • HIP ENDOPROSTHESIS Right 5/5/2018    Procedure: RIGHT HIP HEMIARTHROPLASTY;  Surgeon: Winston Vallejo MD;  Location: [facility] MAIN OR;  Service: Orthopedics   • HIP FRACTURE SURGERY Right     PARTIAL HIP REPLACEMENT   • MUSCLE BIOPSY Left 3/24/2016    Procedure: LT THIGH MUSCLE BIOPSY;  Surgeon: Fausto Mack MD;  Location: [facility] MAIN OR;  Service:    • SKIN BIOPSY     • THYROID SURGERY     • TOTAL HIP ARTHROPLASTY REVISION Right 1/18/2019    Procedure: CONVERSION RT POSTERIOR HIP BIPOLAR TO TOTAL HIP ARTHROPLASTY;  Surgeon: Radha Paredes MD;  Location: [facility] MAIN OR;  Service: Orthopedics   • TURP / TRANSURETHRAL INCISION / DRAINAGE PROSTATE     • VASCULAR SURGERY          Social History     Socioeconomic History   • Marital status:      Spouse name: Not on file   • Number of children: Not on file   • Years of education: Not on file   • Highest education level: Not on file   Tobacco Use   • Smoking status: Current Every Day Smoker     Packs/day: 0.50     Types: Cigarettes     Start date: 1960   • Smokeless tobacco: Never Used   • Tobacco comment: Educated pt on quitting   Substance and Sexual Activity   • Alcohol use: Never     Frequency: Never   • Drug use: No   • Sexual activity: Defer     Partners: Female       Family History   Problem Relation Age of Onset   • Cancer Mother    • Arthritis Father    • Heart disease Father    • Early death Brother    • Heart disease Brother    • Malig Hyperthermia Neg Hx          (Not in a hospital admission)    Allergies   Allergen Reactions   • Oxycodone Mental Status Change   • Promethazine Hallucinations   • Beet [Beta Vulgaris] Unknown (See Comments)     Pt cant remember       Review of Systems:  Unable to obtain    Physical  Exam:  Vitals:  Vitals:    09/02/20 1352 09/02/20 1357 09/02/20 1403 09/02/20 1422   BP: (!) 78/50 (!) 78/45 (!) 88/56 (!) 59/28   Pulse: 96 95 94 89   Resp: 20 20 18    Temp:       TempSrc:       SpO2: 96%  94%    Weight:       Height:         FiO2 (%): 40 %     Body mass index is 20.09 kg/m².  No intake or output data in the 24 hours ending 09/02/20 1449  Vent Settings       Vt (Set, L): 500 L  Resp Rate (Set): 25     FiO2 (%): 40 %  PEEP/CPAP (cm H2O): 5 cm H20       Resp Rate (Observed) Vent: 32                  Exam:  General appearance: Patient is toxic ill-appearing with agonal respirations despite mechanical ventilation  Eyes: anicteric sclerae, dry eyes were rolled back  HENT: Does not of ET tube no abrasions to head  Neck: Trachea midline; FROM, supple, no thyromegaly or lymphadenopathy  Lungs: Symmetric bilateral rapid respiratory effort symmetric bilateral air entry mechanical breath sounds, no wheeze  CV: Cardiac regular no rub  Abdomen: Soft, non-tender; no masses or HSM no rigidity  Extremities: No peripheral edema or extremity lymphadenopathy  Skin: cool to touch distally, mottled in le  Psych: not alert or oriented    Scheduled meds:    hydrocortisone sodium succinate 50 mg Intravenous Q8H   potassium chloride 10 mEq Intravenous Once   potassium chloride 10 mEq Intravenous Q1H       Data Review:   I reviewed the patient's medications and new clinical results.  Lab Results   Component Value Date    CALCIUM 4.6 (C) 09/02/2020    PHOS 2.5 12/12/2019     Results from last 7 days   Lab Units 09/02/20  1415 09/02/20  1103  09/02/20  1102   AST (SGOT) U/L 329* 88*  --   --    MAGNESIUM mg/dL  --  1.0*  --   --    SODIUM mmol/L 146* 151*  --   --    POTASSIUM mmol/L 1.9* 1.9*  --   --    CHLORIDE mmol/L 131* 131*  --   --    CO2 mmol/L 6.1* 7.4*  --   --    BUN mg/dL 18 16  --   --    CREATININE mg/dL 1.11 1.16  --   --    GLUCOSE mg/dL 70 42*   < >  --    CALCIUM mg/dL 4.0* 4.6*  --   --    WBC 10*3/mm3  16.86*  --   --  8.53   HEMOGLOBIN g/dL 7.4*  --   --  4.6*   PLATELETS 10*3/mm3 65*  --   --  34*   ALT (SGPT) U/L 92* 24  --   --     < > = values in this interval not displayed.     Results from last 7 days   Lab Units 09/02/20  1103   TROPONIN T ng/mL <0.010     Results from last 7 days   Lab Units 09/02/20  1214   PH, ARTERIAL pH units 7.144*   PO2 ART mm Hg 325.1*   PCO2, ARTERIAL mm Hg 36.4   HCO3 ART mmol/L 12.5*     Estimated Creatinine Clearance: 47.9 mL/min (by C-G formula based on SCr of 1.16 mg/dL).    IMAGING:  I have reviewed all imaging studies since my last documentation.  Imaging Results (Most Recent)     Procedure Component Value Units Date/Time    XR Chest 1 View [[id number]] Collected:  09/02/20 1214     Updated:  09/02/20 1221    Narrative:       XR CHEST 1 VW-     Clinical: Acute mental status change, intubated     Chest radiograph comparison 2/12/2020     FINDINGS: Endotracheal tube superimposes the tracheal air column, tip is  located approximately 2 cm above the jose. Median sternotomy wires and  vascular markers in position. Cardiac size stable, there is  atherosclerotic calcification of the aorta. Skinfolds superimposing the  right chest.     Patient is rotated towards the left. There is diffuse increase in the  interstitial pattern, some of which was demonstrated on prior  examinations and could be chronic, there appear to be small pleural  effusions blunting the costophrenic angles and probable underlying  vascular congestion. No focal area of consolidation seen. The remainder  of examination is unremarkable.     CONCLUSION: Diffuse increase in the overall interstitial pattern with  small pleural effusions blunting the costophrenic angles consistent with  vascular congestion. Endotracheal tube position is satisfactory.     This report was finalized on 9/2/2020 12:18 PM by Dr. Ernst Srivastava M.D.             ASSESSMENT /   PLAN:  Severe metabolic acidosis  Airway compromise  Management  of mechanical ventilation  Severe hypokalemia  Severe hypocalcemia  Severe anemia  Shock - septic hypovolumic   Bipolar 1 disorder  Coronary artery disease  GERD  Hyperlipidemia  Mobility impaired  Parkinsonism syndrome  History of a stroke  History of urinary retention  ?Adrenal insufficiency?  Hypomagnesia  Transaminitis    Stat CT abdomen and pelvis no obvious bleeding source and a dramatic decrease in his hemoglobin also asked him to repeat a stat CBC as well as a CMP.  No known history of aneurysm there is no bruising on exam that I can detect however no obvious bleeding and a hemoglobin that low with hypotension is refractory we need to investigate for source of bleeding    Stat Replete magnesium    Stat 2 units packed red blood cells as we have refractory hypotension and shock combination of septic and hypovolemic if hemoglobin value accurate.    fludricortisone through ng    Potassium replacement will need at least 6 runs of potassium with enteral access will give 40 p.o. as well if this is persistent on repeat  Calcium replacement urgently    Mechanical ventilation reviewed have increased the tidal volumes of mechanical ventilation to help compensate for severe metabolic acidosis.    Inability to place De Anda catheter monitor is having some obstruction last nephrology place De Anda catheter    Solu-Cortef to treat any adrenal insufficiency    Norepinephrine phenylephrine and vasopressin wean as able    Patient will need a central access. If no icu bed available ill go down to er and     I have spoken with ER nurse and your provider about the above      Total critical care time was 80 minutes, excluding any separately billable procedure time.    Jared Love MD  [facility]  09/02/20  301    Ct abd reviewed ? Left hydroureter with obstructing stone?  Small area on ct chest.  Stool burden high, large amount of fluid in stomach.      Urology consulted already d/w Er provider, calling urology  now.    Jared Love MD  [facility]  09/02/20  3:18 PM

## 2020-09-02 NOTE — ED PROVIDER NOTES
EMERGENCY DEPARTMENT ENCOUNTER    Room Number:  18/18  Date of encounter:  9/2/2020  PCP: Provider, No Known  Historian: Wife, EMS      HPI:  Chief Complaint: Altered mental status, breathing difficulties  A complete HPI/ROS/PMH/PSH/SH/FH are unobtainable due to: Patient condition    Context: Victor Manuel Issa is a 75 y.o. male who presents to the ED via [facility] from home with increasing weakness and altered mental status that started yesterday and worsened today.  She noted that he did seem to be short of breath, EMS found him to be in the low 80s on room air when they got there but improved to the low 90s on nonrebreather.  Blood sugar was noted to be 28, was given D50 prior to arrival.  Wife denies any recent nausea, vomiting, diarrhea, she does have to in and out catheter him for urine twice a day.  Not currently on any antibiotics, is typically on chronic steroids and blood pressure medications to keep his blood pressure elevated.  She has not noted any new fevers, cough.      MEDICAL RECORD REVIEW    Chart review shows discharge summary from Feb 14th of 2020 after being admitted for syncope, with history of Parkinson's, muscle spasticity, COPD    PAST MEDICAL HISTORY  Active Ambulatory Problems     Diagnosis Date Noted   • Thigh pain, musculoskeletal 03/22/2016   • Left leg weakness 09/28/2017   • History of CVA (cerebrovascular accident) 09/28/2017   • COPD (chronic obstructive pulmonary disease) 09/28/2017   • Coronary artery disease 09/28/2017   • Cervical myelopathy (CMS/[facility]) 09/29/2017   • Parkinson's disease 09/29/2017   • Debility 09/29/2017   • Cerebrovascular disease 09/29/2017   • Abnormal TSH 09/30/2017   • Hypothyroidism (acquired) 10/01/2017   • Hip fracture requiring operative repair, right, closed, initial encounter (CMS/[facility]) 05/03/2018   • Urinary tract infection without hematuria 05/06/2018   • Hypoxia 05/06/2018   • Sinus bradycardia 05/12/2018   • Chest pain 10/02/2018   • Hip pain,  chronic, right 10/02/2018   • Hematemesis 10/02/2018   • Hematemesis with nausea 10/02/2018   • H/O medication noncompliance 10/03/2018   • Hyperlipidemia 10/03/2018   • Muscle spasticity 10/24/2019   • COPD exacerbation (CMS/[facility]) 12/07/2019   • Hypophosphatemia 12/07/2019   • Pneumonia of right upper lobe due to infectious organism 12/08/2019   • Sepsis due to pneumonia (CMS/[facility]) 12/09/2019   • Sepsis with metabolic encephalopathy (CMS/[facility]) 12/09/2019   • Orthostasis 12/09/2019   • Syncope 02/12/2020     Resolved Ambulatory Problems     Diagnosis Date Noted   • Post-traumatic osteoarthritis of right hip 01/18/2019   • Hip pain 01/18/2019   • 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 01/18/2019     Past Medical History:   Diagnosis Date   • Anxiety    • Arthritis    • Bipolar 1 disorder (CMS/[facility])    • Disease of thyroid gland    • GERD (gastroesophageal reflux disease)    • Mobility impaired    • Myocardial infarction (CMS/[facility])    • Overactive bladder    • Parkinsonian syndrome (CMS/[facility])    • Prostatitis    • Restrictive pattern present on pulmonary function testing    • Seasonal allergies    • Stroke (CMS/[facility])    • Urinary incontinence    • Urinary retention          PAST SURGICAL HISTORY  Past Surgical History:   Procedure Laterality Date   • ABDOMINAL SURGERY     • CARDIAC SURGERY     • CHOLECYSTECTOMY     • CORONARY ARTERY BYPASS GRAFT      x2   • ENDOSCOPY N/A 10/4/2018    LA grade D reflux esophagitis, 4cm hiatal hernia, esohageal stenosis, normal stomach, erythematous duodenopathy, normal second portion of duodenum, no specimens collected   • ENDOSCOPY N/A 11/30/2018    Procedure: ESOPHAGOGASTRODUODENOSCOPY WITH DILATATION;  Surgeon: Paul Duncan MD;  Location: [facility] ENDOSCOPY;  Service: Gastroenterology   • EYE SURGERY      cataracts   • HIP ENDOPROSTHESIS Right 5/5/2018    Procedure: RIGHT HIP HEMIARTHROPLASTY;  Surgeon: Winston Vallejo MD;  Location: [facility] OR;  Service:  Orthopedics   • HIP FRACTURE SURGERY Right     PARTIAL HIP REPLACEMENT   • MUSCLE BIOPSY Left 3/24/2016    Procedure: LT THIGH MUSCLE BIOPSY;  Surgeon: Fausto Mack MD;  Location: [facility] OR;  Service:    • SKIN BIOPSY     • THYROID SURGERY     • TOTAL HIP ARTHROPLASTY REVISION Right 1/18/2019    Procedure: CONVERSION RT POSTERIOR HIP BIPOLAR TO TOTAL HIP ARTHROPLASTY;  Surgeon: Radha Paredes MD;  Location: [facility] OR;  Service: Orthopedics   • TURP / TRANSURETHRAL INCISION / DRAINAGE PROSTATE     • VASCULAR SURGERY           FAMILY HISTORY  Family History   Problem Relation Age of Onset   • Cancer Mother    • Arthritis Father    • Heart disease Father    • Early death Brother    • Heart disease Brother    • Malig Hyperthermia Neg Hx          SOCIAL HISTORY  Social History     Socioeconomic History   • Marital status:      Spouse name: Not on file   • Number of children: Not on file   • Years of education: Not on file   • Highest education level: Not on file   Tobacco Use   • Smoking status: Current Every Day Smoker     Packs/day: 0.50     Types: Cigarettes     Start date: 1960   • Smokeless tobacco: Never Used   • Tobacco comment: Educated pt on quitting   Substance and Sexual Activity   • Alcohol use: Never     Frequency: Never   • Drug use: No   • Sexual activity: Defer     Partners: Female         ALLERGIES  Oxycodone; Promethazine; and Beet [beta vulgaris]        REVIEW OF SYSTEMS  Review of Systems     All systems reviewed and negative except for those discussed in HPI.       PHYSICAL EXAM    I have reviewed the triage vital signs and nursing notes.    ED Triage Vitals   Temp Heart Rate Resp BP SpO2   09/02/20 1019 09/02/20 1012 09/02/20 1012 09/02/20 1012 09/02/20 1012   97 °F (36.1 °C) 102 16 130/74 90 %      Temp src Heart Rate Source Patient Position BP Location FiO2 (%)   09/02/20 1019 09/02/20 1040 -- -- 09/02/20 1040   Tympanic Monitor   100 %       Physical  Exam  General: Unresponsive, not following commands, toxic appearing  HEENT: Mucous membranes dry, atraumatic, normocephalic, EOMI  Neck: Full ROM  Pulm: Tachypnea, shallow breathing, diminished breath sounds in the bases bilaterally no overt wheezes, scattered mild rales  Cardiovascular: Regular rhythm with intermittent bradycardia, intact distal pulses  GI: Soft, nondistended  MSK: No obvious deformity  Skin: Warm, dry  Neuro: GCS 6, no spontaneous movements  Psych: No agitation      CAPR, gown, gloves used. Patient in surgical mask.    LAB RESULTS  Recent Results (from the past 24 hour(s))   POC Glucose Once    Collection Time: 09/02/20 10:32 AM   Result Value Ref Range    Glucose 88 70 - 130 mg/dL   Lactic Acid, Plasma    Collection Time: 09/02/20 11:02 AM   Result Value Ref Range    Lactate 5.9 (C) 0.5 - 2.0 mmol/L   CBC Auto Differential    Collection Time: 09/02/20 11:02 AM   Result Value Ref Range    WBC 8.53 3.40 - 10.80 10*3/mm3    RBC 1.44 (L) 4.14 - 5.80 10*6/mm3    Hemoglobin 4.6 (C) 13.0 - 17.7 g/dL    Hematocrit 14.1 (C) 37.5 - 51.0 %    MCV 97.9 (H) 79.0 - 97.0 fL    MCH 31.9 26.6 - 33.0 pg    MCHC 32.6 31.5 - 35.7 g/dL    RDW 12.3 12.3 - 15.4 %    RDW-SD 43.5 37.0 - 54.0 fl    MPV 10.3 6.0 - 12.0 fL    Platelets 34 (C) 140 - 450 10*3/mm3   Lactic Acid, Reflex Timer (This will reflex a repeat order 3-3:15 hours after ordered.)    Collection Time: 09/02/20 11:02 AM   Result Value Ref Range    Hold Tube Hold for add-ons.    Manual Differential    Collection Time: 09/02/20 11:02 AM   Result Value Ref Range    Neutrophil % 60.0 42.7 - 76.0 %    Lymphocyte % 16.0 (L) 19.6 - 45.3 %    Monocyte % 1.0 (L) 5.0 - 12.0 %    Metamyelocyte % 9.0 (H) 0.0 - 0.0 %    Myelocyte % 14.0 (H) 0.0 - 0.0 %    Neutrophils Absolute 5.12 1.70 - 7.00 10*3/mm3    Lymphocytes Absolute 1.36 0.70 - 3.10 10*3/mm3    Monocytes Absolute 0.09 (L) 0.10 - 0.90 10*3/mm3    RBC Morphology Normal Normal    WBC Morphology Normal Normal     Platelet Morphology Normal Normal   Comprehensive Metabolic Panel    Collection Time: 09/02/20 11:03 AM   Result Value Ref Range    Glucose 42 (C) 65 - 99 mg/dL    BUN 16 8 - 23 mg/dL    Creatinine 1.16 0.76 - 1.27 mg/dL    Sodium 151 (H) 136 - 145 mmol/L    Potassium 1.9 (C) 3.5 - 5.2 mmol/L    Chloride 131 (H) 98 - 107 mmol/L    CO2 7.4 (L) 22.0 - 29.0 mmol/L    Calcium 4.6 (C) 8.6 - 10.5 mg/dL    Total Protein 1.9 (L) 6.0 - 8.5 g/dL    Albumin 0.90 (L) 3.50 - 5.20 g/dL    ALT (SGPT) 24 1 - 41 U/L    AST (SGOT) 88 (H) 1 - 40 U/L    Alkaline Phosphatase 106 39 - 117 U/L    Total Bilirubin 1.2 0.0 - 1.2 mg/dL    eGFR Non African Amer 61 >60 mL/min/1.73    Globulin 1.0 gm/dL    A/G Ratio 0.9 g/dL    BUN/Creatinine Ratio 13.8 7.0 - 25.0    Anion Gap 12.6 5.0 - 15.0 mmol/L   BNP    Collection Time: 09/02/20 11:03 AM   Result Value Ref Range    proBNP 2,221.0 (H) 0.0-1,800.0 pg/mL   Troponin    Collection Time: 09/02/20 11:03 AM   Result Value Ref Range    Troponin T <0.010 0.000 - 0.030 ng/mL   Procalcitonin    Collection Time: 09/02/20 11:03 AM   Result Value Ref Range    Procalcitonin 56.79 (H) 0.00 - 0.25 ng/mL   C-reactive Protein    Collection Time: 09/02/20 11:03 AM   Result Value Ref Range    C-Reactive Protein 5.28 (H) 0.00 - 0.50 mg/dL   Magnesium    Collection Time: 09/02/20 11:03 AM   Result Value Ref Range    Magnesium 1.0 (L) 1.6 - 2.4 mg/dL   Valproic Acid Level, Total    Collection Time: 09/02/20 11:03 AM   Result Value Ref Range    Valproic Acid 19.0 (L) 50.0 - 125.0 mcg/mL   Respiratory Panel PCR w/COVID-19(SARS-CoV-2) [facility] In-House, NP Swab in UTM/VTM, 3-4 HR TAT - Swab, Nasopharynx    Collection Time: 09/02/20 11:09 AM   Result Value Ref Range    ADENOVIRUS, PCR Not Detected Not Detected    Coronavirus 229E Not Detected Not Detected    Coronavirus HKU1 Not Detected Not Detected    Coronavirus NL63 Not Detected Not Detected    Coronavirus OC43 Not Detected Not Detected    COVID19  Not Detected Not Detected - Ref. Range    Human Metapneumovirus Not Detected Not Detected    Human Rhinovirus/Enterovirus Not Detected Not Detected    Influenza A PCR Not Detected Not Detected    Influenza A H1 Not Detected Not Detected    Influenza A H1 2009 PCR Not Detected Not Detected    Influenza A H3 Not Detected Not Detected    Influenza B PCR Not Detected Not Detected    Parainfluenza Virus 1 Not Detected Not Detected    Parainfluenza Virus 2 Not Detected Not Detected    Parainfluenza Virus 3 Not Detected Not Detected    Parainfluenza Virus 4 Not Detected Not Detected    RSV, PCR Not Detected Not Detected    Bordetella pertussis pcr Not Detected Not Detected    Bordetella parapertussis PCR Not Detected Not Detected    Chlamydophila pneumoniae PCR Not Detected Not Detected    Mycoplasma pneumo by PCR Not Detected Not Detected   Type & Screen    Collection Time: 09/02/20 12:04 PM   Result Value Ref Range    ABO Type NO TYPE DETERMINED     RH type Positive     Antibody Screen Negative     T&S Expiration Date 9/5/2020 11:59:59 PM    ABO / Rh    Collection Time: 09/02/20 12:04 PM   Result Value Ref Range    ABO Type A     RH type Positive    POCT Occult Blood Stool    Collection Time: 09/02/20 12:08 PM   Result Value Ref Range    Fecal Occult Blood Negative Negative    Lot Number [id number]     Expiration Date 02/28/2021     Positive Control Positive Positive    Negative Control Negative Negative   Blood Gas, Arterial    Collection Time: 09/02/20 12:14 PM   Result Value Ref Range    Site Arterial: left brachial     Brady's Test N/A     pH, Arterial 7.144 (C) 7.350 - 7.450 pH units    pCO2, Arterial 36.4 35.0 - 45.0 mm Hg    pO2, Arterial 325.1 (H) 80.0 - 100.0 mm Hg    HCO3, Arterial 12.5 (L) 22.0 - 28.0 mmol/L    Base Excess, Arterial -15.5 (L) 0.0 - 2.0 mmol/L    O2 Saturation Calculated 99.9 (H) 92.0 - 99.0 %    A-a Gradiant 0.5 mmHg    Barometric Pressure for Blood Gas 748.0 mmHg    Modality Adult Vent     FIO2 100  %    Ventilator Mode AC     Set Tidal Volume 500     Set [facility] Resp Rate 20     Rate 34 Breaths/minute    PEEP 5    Prepare RBC, 2 Units    Collection Time: 09/02/20  1:28 PM   Result Value Ref Range    Product Code M0964W54     Unit Number [id number]     UNIT  ABO A     UNIT  RH POS     Crossmatch Interpretation Compatible     Dispense Status XM     Blood Expiration Date [id number]     Blood Type Barcode 6200     Product Code K1183Y53     Unit Number [id number]     UNIT  ABO A     UNIT  RH POS     Crossmatch Interpretation Compatible     Dispense Status XM     Blood Expiration Date [id number]     Blood Type Barcode 6200    Comprehensive Metabolic Panel    Collection Time: 09/02/20  2:15 PM   Result Value Ref Range    Glucose 70 65 - 99 mg/dL    BUN 18 8 - 23 mg/dL    Creatinine 1.11 0.76 - 1.27 mg/dL    Sodium 146 (H) 136 - 145 mmol/L    Potassium 1.9 (C) 3.5 - 5.2 mmol/L    Chloride 131 (H) 98 - 107 mmol/L    CO2 6.1 (L) 22.0 - 29.0 mmol/L    Calcium 4.0 (C) 8.6 - 10.5 mg/dL    Total Protein 2.1 (L) 6.0 - 8.5 g/dL    Albumin 0.80 (L) 3.50 - 5.20 g/dL    ALT (SGPT) 92 (H) 1 - 41 U/L    AST (SGOT) 329 (H) 1 - 40 U/L    Alkaline Phosphatase 175 (H) 39 - 117 U/L    Total Bilirubin 1.6 (H) 0.0 - 1.2 mg/dL    eGFR Non African Amer 64 >60 mL/min/1.73    Globulin 1.3 gm/dL    A/G Ratio 0.6 g/dL    BUN/Creatinine Ratio 16.2 7.0 - 25.0    Anion Gap 8.9 5.0 - 15.0 mmol/L   CBC Auto Differential    Collection Time: 09/02/20  2:15 PM   Result Value Ref Range    WBC 16.86 (H) 3.40 - 10.80 10*3/mm3    RBC 2.32 (L) 4.14 - 5.80 10*6/mm3    Hemoglobin 7.4 (L) 13.0 - 17.7 g/dL    Hematocrit 23.0 (L) 37.5 - 51.0 %    MCV 99.1 (H) 79.0 - 97.0 fL    MCH 31.9 26.6 - 33.0 pg    MCHC 32.2 31.5 - 35.7 g/dL    RDW 12.5 12.3 - 15.4 %    RDW-SD 45.5 37.0 - 54.0 fl    MPV 10.8 6.0 - 12.0 fL    Platelets 65 (L) 140 - 450 10*3/mm3       Ordered the above labs and independently reviewed the results.        RADIOLOGY  Xr Chest 1  View    Result Date: 9/2/2020  XR CHEST 1 VW-  Clinical: Acute mental status change, intubated  Chest radiograph comparison 2/12/2020  FINDINGS: Endotracheal tube superimposes the tracheal air column, tip is located approximately 2 cm above the jose. Median sternotomy wires and vascular markers in position. Cardiac size stable, there is atherosclerotic calcification of the aorta. Skinfolds superimposing the right chest.  Patient is rotated towards the left. There is diffuse increase in the interstitial pattern, some of which was demonstrated on prior examinations and could be chronic, there appear to be small pleural effusions blunting the costophrenic angles and probable underlying vascular congestion. No focal area of consolidation seen. The remainder of examination is unremarkable.  CONCLUSION: Diffuse increase in the overall interstitial pattern with small pleural effusions blunting the costophrenic angles consistent with vascular congestion. Endotracheal tube position is satisfactory.  This report was finalized on 9/2/2020 12:18 PM by Dr. Ernst Srivastava M.D.        I ordered the above noted radiological studies. Reviewed by me.  See dictation for official radiology interpretation.      PROCEDURES    Critical Care  Performed by: Kiran Chaudhary MD  Authorized by: Jared Love MD     Critical care provider statement:     Critical care time (minutes):  65    Critical care time was exclusive of:  Separately billable procedures and treating other patients    Critical care was necessary to treat or prevent imminent or life-threatening deterioration of the following conditions:  Circulatory failure, CNS failure or compromise, sepsis and shock    Critical care was time spent personally by me on the following activities:  Development of treatment plan with patient or surrogate, discussions with consultants, evaluation of patient's response to treatment, examination of patient, obtaining history from patient  or surrogate, ordering and performing treatments and interventions, ordering and review of laboratory studies, ordering and review of radiographic studies, pulse oximetry, re-evaluation of patient's condition and review of old charts  Intubation  Date/Time: 9/2/2020 3:45 PM  Performed by: Kiran Chaudhary MD  Authorized by: Jared Love MD     Consent:     Consent obtained:  Emergent situation    Consent given by:  Spouse  Pre-procedure details:     Patient status:  Unresponsive    Pretreatment meds: etomidate.    Paralytics:  None  Procedure details:     Preoxygenation:  Bag valve mask    CPR in progress: no      Intubation method:  Oral    Oral intubation technique: glidescope.    Laryngoscope blade:  Mac 4    Tube size (mm):  7.5    Tube type:  Cuffed    Number of attempts:  1    Cricoid pressure: no      Tube visualized through cords: yes    Placement assessment:     ETT to lip:  26    ETT to teeth:  25    Tube secured with:  ETT lozano    Breath sounds:  Equal and absent over the epigastrium    Placement verification: chest rise, condensation, CXR verification, direct visualization, equal breath sounds and ETCO2 detector      CXR findings:  ETT in proper place  Post-procedure details:     Patient tolerance of procedure:  Tolerated well, no immediate complications          MEDICATIONS GIVEN IN ER    Medications   norepinephrine (LEVOPHED) 8 mg/250 mL (32 mcg/mL) in sodium chloride 0.9% infusion (premix) (0.3 mcg/kg/min × 63.5 kg Intravenous Rate/Dose Change 9/2/20 1353)   hydrocortisone sodium succinate (Solu-CORTEF) injection 50 mg (has no administration in time range)   potassium chloride 10 mEq in 100 mL IVPB (10 mEq Intravenous New Bag 9/2/20 1531)   potassium chloride 10 mEq in 100 mL IVPB (has no administration in time range)   phenylephrine (WINSTON-SYNEPHRINE) 50 mg in sodium chloride 0.9 % 250 mL (0.2 mg/mL) infusion (1 mcg/kg/min × 63.5 kg Intravenous New Bag 9/2/20 1422)   Vasopressin (PITRESSIN)  20 Units in sodium chloride 0.9 % 100 mL (0.2 Units/mL) infusion (has no administration in time range)   sodium bicarbonate 150 mEq/1000 mL dextrose infusion (150 mEq Intravenous New Bag 9/2/20 1506)   sodium chloride 0.9 % bolus 1,000 mL (1,000 mL Intravenous New Bag 9/2/20 1519)   calcium gluconate 1 g/100 mL (10 mg/mL) NS IVPB (VTB) (has no administration in time range)     And   calcium gluconate 6 g in sodium chloride 0.9 % 500 mL IVPB (has no administration in time range)   piperacillin-tazobactam (ZOSYN) 3.375 g in iso-osmotic dextrose 50 ml (premix) (has no administration in time range)   Pharmacy to dose vancomycin (has no administration in time range)   vancomycin (VANCOCIN) in iso-osmotic dextrose IVPB 1 g (premix) 200 mL (has no administration in time range)   fludrocortisone tablet 50 mcg (has no administration in time range)   potassium chloride (KAYCIEL) 20 MEQ/15ML (10%) solution 40 mEq (has no administration in time range)   etomidate (AMIDATE) injection 19.06 mg (19 mg Intravenous Given 9/2/20 1039)   Norepinephrine-Sodium Chloride (LEVOPHED) 8-0.9 MG/250ML-% infusion solution  - ADS Override Pull (8 mg  Given 9/2/20 1056)   hydrocortisone sod succinate PF (Solu-CORTEF) injection 100 mg (100 mg Intravenous Given 9/2/20 1232)   vancomycin 1250 mg/250 mL 0.9% NS IVPB ([facility]) (0 mg/kg × 63.5 kg Intravenous Stopped 9/2/20 1500)   piperacillin-tazobactam (ZOSYN) 3.375 g in iso-osmotic dextrose 50 ml (premix) (0 g Intravenous Stopped 9/2/20 1215)   potassium chloride 10 mEq in 100 mL IVPB (0 mEq Intravenous Stopped 9/2/20 1312)   magnesium sulfate 2g/50 mL (PREMIX) infusion (0 g Intravenous Stopped 9/2/20 1450)   dextrose (D50W) 25 g/ 50mL Intravenous Solution 25 g (25 g Intravenous Given 9/2/20 1208)   iopamidol (ISOVUE-300) 61 % injection 100 mL (85 mL Intravenous Given by Other 9/2/20 2931)         PROGRESS, DATA ANALYSIS, CONSULTS, AND MEDICAL DECISION MAKING    All labs have been independently  reviewed by me.  All radiology studies have been reviewed by me and discussed with radiologist dictating the report.   EKG's independently viewed and interpreted by me.  Discussion below represents my analysis of pertinent findings related to patient's condition, differential diagnosis, treatment plan and final disposition.      ED Course as of Sep 02 1543   Wed Sep 02, 2020   1045 Patient hypoxic and not responding, bgl up to 88. Discussed with wife, patient to be Full Code. Patient intubated, remains hypotensive, giving IV fluid bolus with plans for levophed if no improvement.    [DC]   1229 Discussed with Jared Love, ICU, discussed patient's clinical course and findings today, treatment modalities, and we discussed adding on CT of the chest and abdomen pelvis.  Patient will be admitted to the ICU.    [DC]   1523 CT shows a large obstructing proximal left ureteral stone which may be the source of issues if this is becoming infected at this point.  Urology paged.    [DC]   1529 Discussed with Dr. Roth, Urology, discussed patient clinical course and findings today, his severe daily, and need for ICU stay.  He will consult.    [DC]   1540 10mm left UPJ stone with hydronephrosis, thickened bladder wall consistent infectious cystitis per Radiologist after discussion about CT Abd/Pelvis    [DC]   1542 Discussed CT head findings with the radiologist, nothing acutely emergent.    [DC]      ED Course User Index  [DC] Kiran Chaudhary MD     Patient demonstrate severe hypotension and hypoxia on arrival, was intubated very shortly after arrival.  Peripheral Levophed was initially started with IV fluid boluses.  Did see some transient provement up even greater than 110 systolic by have had some refractory hypotension.  Unclear etiology of symptoms for most of the ER visit, however, we finally were able to stabilize him enough to get him to CT scan where we found an obstructive left UPJ stone is associated with what looks  to be infectious cystitis and possible pyelonephritis.  Patient has been covered with broad-spectrum antibiotics.  Severe anemia, blood transfusion has been ordered, severe electrolyte abnormalities and these have been addressed with IV repletion.  ICU doctor has evaluated bedside, has added on more vasopressors and will be placing a central line.  Wife has been updated several times and is up-to-date on findings at this point.  Patient to go to the ICU, urology has also been contacted due to the obstructive stone with concerns for associated infection.  They will be consulting.      AS OF 15:43 VITALS:    BP - 95/62  HR - 104  TEMP - 97 °F (36.1 °C) (Tympanic)  02 SATS - 99%        DIAGNOSIS  Final diagnoses:   Acute respiratory failure with hypoxia (CMS/HCC)   Acute encephalopathy   Acute anemia   Hypernatremia   Hypokalemia   Hypomagnesemia   Hypotension, unspecified hypotension type   Hypocalcemia   Sepsis, due to unspecified organism, unspecified whether acute organ dysfunction present (CMS/HCC)         DISPOSITION  ADMISSION to the ICU    Discussed treatment plan and reason for admission with pt/family and admitting physician.  Pt/family voiced understanding of the plan for admission for further testing/treatment as needed.                 Kiran Chaudhary MD  09/02/20 5072

## 2020-09-02 NOTE — ED NOTES
Nurse attempted to cath patient, nurse attempted twice and there was resistance and blood that came into the tube. I attempted twice and both times could not get the cath inserted and had blood. MD notified.      Marj Flores, RN  09/02/20 1387

## 2020-09-02 NOTE — OP NOTE
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]    Procedure(s):  CYSTOSCOPY LEFT URETERAL STENT INSERTION, GUZMÁN CATHETER PLACEMENT    Surgeon(s):  Misbah Roth MD    Anesthesia: General    Staff:   Circulator: Yuridia Solis RN; Tatiana Higginbotham RN  Radiology Technologist: Paul Duran  Scrub Person: Manav Campos    Estimated Blood Loss: minimal    Specimens:                Order Name Source Comment Collection Info Order Time   URINE CULTURE Urinary Bladder  Collected By: Misbah Roth MD 9/2/2020  4:20 PM   URINE CULTURE Kidney, Left  Collected By: Misbah Roth MD 9/2/2020  4:28 PM         Drains:   Urethral Catheter Non-latex;Silicone 18 Fr. (Active)       Ureteral Drain/Stent Left ureter 6 Fr. (Active)       Findings: Large obstructing ureteral calculus.  Purulent urine obtained from the left kidney sent for culture.  Purulent urine from the bladder sent for culture.  6 Vietnamese stent placed without tether and Guzmán catheter placed.  Trauma to the penile urethra from catheter attempts false passage.    Complications: None apparent    Indications: 75-year-old gentleman very ill currently and abated on pressors with urinary sepsis.  Left ureteral calculus hydronephrosis.  Now presents for emergent stent.  Informed consent has been obtained verbally and I declare this an emergent situation.  The daughter understands.  The wife is not available.    Procedure: Patient was taken up she given general anesthesia already in the ER so he was already intubated.  Is transferred to the cystoscopy table some sedation was given.  Space lithotomy.  He had some lower extremity contractures.  He had fecal soilage  this was cleaned he was prepped and draped in a sterile fashion.  Timeout was performed.  Cystoscope was inserted.  He had a large false passage in his mid pendulous urethra but I was able to negotiate to the true urethral passage passed down into the prostatic urethra which was wide open and into the bladder.  The bladder showed thick purulent urine and I drained the status of the sulfur culture.  Bladder showed severe trabeculation cellules and diverticulum.  The left orifice was identified.  A guidewire was passed up.  The stone could be seen and I was able to manipulate this up back up to the pelvis.  A Pollick catheter was passed and about 15 cc of very thick purulent urine was obtained for the left renal pelvis.  We sent this off for culture.  Guidewire was replaced and a 6 Armenian by 26 cm stent was then placed.  Did not drain well probably because of the thickness of the urine but it had good positioning proximal and distal on fluoroscopy.  I was able to pass a 18 Armenian catheter and he was taken to recovery and remain intubated on pressors.      Misbah Roth MD     Date: 9/2/2020  Time: 16:54

## 2020-09-02 NOTE — ANESTHESIA POSTPROCEDURE EVALUATION
Patient: Victor Manuel Issa    Procedure Summary     Date:  09/02/20 Room / Location:  [facility] OR 01 / [facility] MAIN OR    Anesthesia Start:  1604 Anesthesia Stop:  1653    Procedure:  CYSTOSCOPY LEFT URETERAL STENT INSERTION, GUZMÁN CATHETER PLACEMENT (Left ) Diagnosis:       Ureteral calculus, left      Acute pyelonephritis      Atonic neurogenic bladder      (Left obstructing calculus with urinary sepsis)    Surgeon:  Misbah Roth MD Provider:  Marj Liu MD    Anesthesia Type:  general ASA Status:  5 - Emergent          Anesthesia Type: general    Vitals  Vitals Value Taken Time   /66 9/2/2020  6:22 PM   Temp 36.6 °C (97.9 °F) 9/2/2020  4:42 PM   Pulse 100 9/2/2020  6:23 PM   Resp 22 9/2/2020  6:20 PM   SpO2 94 % 9/2/2020  6:23 PM   Vitals shown include unvalidated device data.        Post Anesthesia Care and Evaluation    Patient location during evaluation: bedside  Patient participation: complete - patient participated  Level of consciousness: awake  Pain score: 1  Pain management: adequate  Airway patency: patent  Anesthetic complications: No anesthetic complications  PONV Status: controlled  Cardiovascular status: acceptable  Respiratory status: acceptable  Hydration status: acceptable    Comments: --------------------            09/02/20               1820     --------------------   BP:       107/66     Pulse:     100       Resp:       22       Temp:                SpO2:      94%      --------------------

## 2020-09-02 NOTE — ANESTHESIA PROCEDURE NOTES
Central Line      Patient reassessed immediately prior to procedure    Patient location during procedure: post-op  Start time: 9/2/2020 5:00 PM  Stop Time:9/2/2020 5:30 PM  Indications: MD/Surgeon request and vascular access  Staff  Anesthesiologist: Mani Devlin MD  Preanesthetic Checklist  Completed: patient identified, site marked, surgical consent, timeout performed, IV checked, risks and benefits discussed and monitors and equipment checked  Central Line Prep  Sterile Tech:gloves, cap, gown, mask and sterile barriers  Prep: chloraprep  all elements of maximal sterile barrier technique not followed for medical reasons  Patient monitoring: blood pressure monitoring, continuous pulse oximetry and EKG  Central Line Procedure  Laterality:left  Location:internal jugular  Catheter Type:triple lumen  Catheter Size:7 Fr  Guidance:ultrasound guided  PROCEDURE NOTE/ULTRASOUND INTERPRETATION.  Using ultrasound guidance the potential vascular sites for insertion of the catheter were visualized to determine the patency of the vessel to be used for vascular access.  After selecting the appropriate site for insertion, the needle was visualized under ultrasound being inserted into the internal jugular vein, followed by ultrasound confirmation of wire and catheter placement. There were no abnormalities seen on ultrasound; an image was taken; and the patient tolerated the procedure with no complications. Images: still images not obtained  Assessment  Post procedure:biopatch applied, line sutured and occlusive dressing applied  Assessement:blood return through all ports, free fluid flow, chest x-ray ordered, no pneumothorax on x-ray and placement verified by x-ray  Complications:no  Patient Tolerance:patient tolerated the procedure well with no apparent complications

## 2020-09-03 NOTE — PROGRESS NOTES
"  Daily Progress Note.   [facility]ENS[facility]ARE  9/3/2020    Patient:  Name:  Victor Manuel Issa  MRN:  [id number]  1945  75 y.o.  male         CC: Altered mental status weakness hypoglycemia brought in from care facility  History of Present Illness:  Patient is 75-year-old with  medical history of bipolar 1 disorder coronary artery disease GERD hyperlipidemia impaired mobility since history of a stroke prostatitis parkinsonian syndrome and history of urinary retention who presented to the emergency room from care facility with altered mental status and increased weakness.  He was found to be hypoxic and hypoglycemic with sugars of 28 treated with D50 by emergency medical services.  Apparently patient is on chronic steroids and blood pressure medication to keep his blood pressure elevated.  However his wife does provide history that patient denied any recent vomiting or diarrhea.  Patient is intubated unable to give any any reliable history he is very ill-appearing with increased respiratory effort on mechanical ventilation    Interval History:  Patient taken urgently to surgery to help alleviate infectious etiology  Patient on multiple vasopressors  Significant amount of mechanical ventilation  Mental status is extremely poor.  Renal failure worsening  Metabolic acidosis with significant persistent lactic acidosis    Physical Exam:  /94   Pulse 105   Temp 98.4 °F (36.9 °C) (Oral)   Resp (!) 34   Ht 177.8 cm (70\")   Wt 70.7 kg (155 lb 13.8 oz)   SpO2 (!) 67%   BMI 22.36 kg/m²   Body mass index is 22.36 kg/m².    Intake/Output Summary (Last 24 hours) at 9/3/2020 0856  Last data filed at 9/3/2020 0641  Gross per 24 hour   Intake 3441 ml   Output 300 ml   Net 3141 ml   Vent Settings       Vt (Set, L): 0.55 L  Resp Rate (Set): 28  Pressure Support (cm H2O): 0 cm H20  FiO2 (%): 100 %  PEEP/CPAP (cm H2O): 5 cm H20    Minute Ventilation (L/min) (Obs): 21 L/min  Resp Rate (Observed) " Vent: 36     I:E Ratio (Obs): 1.20:1    PIP Observed (cm H2O): 32 cm H2O  Plateau Pressure (cm H2O): 0 cm H2O    General appearance: Patient is toxic ill-appearing with agonal respirations despite mechanical ventilation  Eyes: anicteric sclerae, dry eyes were rolled back  HENT: + ET tube no abrasions to head  Neck: Trachea midline; FROM, supple, no thyromegaly or lymphadenopathy  Lungs: Symmetric bilateral rapid respiratory effort symmetric bilateral air entry mechanical breath sounds, no wheeze  CV: Cardiactachy reg  Abdomen: Soft, non-tender; no masses or HSM no rigidity  Extremities: No peripheral edema or extremity lymphadenopathy  Skin: cool to touch distally, mottled in le  Psych: not alert or oriented  Data Review:  Notable Labs:  Results from last 7 days   Lab Units 09/03/20 0540 09/02/20 1415 09/02/20  1102   WBC 10*3/mm3 45.76* 16.86* 8.53   HEMOGLOBIN g/dL 12.2* 7.4* 4.6*   PLATELETS 10*3/mm3 53* 65* 34*     Results from last 7 days   Lab Units 09/03/20 0540 09/02/20 2038 09/02/20 1711 09/02/20 1415 09/02/20  1103   SODIUM mmol/L 146* 145 144 146* 151*   POTASSIUM mmol/L 4.6 4.2 4.7 1.9* 1.9*   CHLORIDE mmol/L 111* 110* 110* 131* 131*   CO2 mmol/L 16.0* 14.2* 13.9* 6.1* 7.4*   BUN mg/dL 43* 33* 32* 18 16   CREATININE mg/dL 3.14* 2.42* 2.47* 1.11 1.16   GLUCOSE mg/dL 147* 124* 105* 70 42*   CALCIUM mg/dL 8.5* 9.0 9.8 4.0* 4.6*   MAGNESIUM mg/dL  --  2.5*  --   --  1.0*   PHOSPHORUS mg/dL  --  4.0  --   --   --    Estimated Creatinine Clearance: 19.7 mL/min (A) (by C-G formula based on SCr of 3.14 mg/dL (H)).    Results from last 7 days   Lab Units 09/03/20 0540 09/02/20 2038 09/02/20  1721 09/02/20  1711 09/02/20  1415 09/02/20  1103  09/02/20  1102   AST (SGOT) U/L 739* 973*  --  1,007* 329* 88*   < >  --    ALT (SGPT) U/L 288* 258*  --  243* 92* 24   < >  --    PROCALCITONIN ng/mL  --   --   --   --   --  56.79*  --   --    LACTATE mmol/L  --   --  7.8*  --   --   --   --  5.9*   CRP mg/dL  --    --   --   --   --  5.28*  --   --    PLATELETS 10*3/mm3 53*  --   --   --  65*  --   --  34*    < > = values in this interval not displayed.       Results from last 7 days   Lab Units 09/02/20  2352 09/02/20  1214   PH, ARTERIAL pH units 7.255* 7.144*   PCO2, ARTERIAL mm Hg 33.0* 36.4   PO2 ART mm Hg 79.8* 325.1*   HCO3 ART mmol/L 14.6* 12.5*       Imaging:  Reviewed chest images personally from past 3 days    Scheduled meds:      fludrocortisone 50 mcg Nasogastric Daily   hydrocortisone sodium succinate 50 mg Intravenous Q8H   piperacillin-tazobactam 4.5 g Intravenous Q8H   potassium chloride 40 mEq Oral Once   vancomycin 20 mg/kg Intravenous Q24H       ASSESSMENT  /  PLAN:  Severe metabolic acidosis  Airway compromise  Management of mechanical ventilation  Severe hypokalemia  Severe hypocalcemia  Severe anemia  Shock - septic hypovolumic   Bipolar 1 disorder  Coronary artery disease  GERD  Hyperlipidemia  Mobility impaired  Parkinsonism syndrome  History of a stroke  History of urinary retention  ?Adrenal insufficiency?  Hypomagnesia  Transaminitis  Severe septic shock  AHRF  BUNNY  Lactic acidosis  Pyelonephritis  Obstructing left ureteral calculus    Reviewed significant vasopressor requirement mechanical ventilation requirements persistent lactic acidosis poor mental status and concerning prognosis.  Discussed with Dr. De La Fuente.  He did not believe patient would tolerate renal replacement therapy and he is very correct on this.    Reviewed urology procedure note appreciate their rapid response for this patient.    Unfortunately this patient is unlikely to survive this acute illness.  Family at bedside expressed their desire to pursue comfort measures.  They understand that we will remove life support mechanisms and give medications for any discomfort breathlessness or anxiety.  They did request a  to come by to administer last rites.  When this is accomplished we will transition to paliative care  efforts.  Discussed with bedside RN, appreciate insight and care.      Jared Love MD  [facility]  09/03/20  903 AM

## 2020-09-03 NOTE — CONSULTS
"   LOS: 1 day    Patient Care Team:  Provider, No Known as PCP - General  Shira Pierce APRN as PCP - Claims Attributed    Chief Complaint:    Chief Complaint   Patient presents with   • Altered Mental Status     patient was confused and had altered mental status on arrival in ems truck they noted his blood sugar was 28, patient was given d50 IV   • Shortness of Breath     Follow UP BUNNY  Subjective     Interval History:   76 yo WM with h/o CAD, bipolar disorder, Parkinson's, neurogenic bladder with indwelling summers admitted yesterday with AMS & gen weakness.  Found to be hypoxic, hypoglycemic, septic.  CT abd/pelvis with contrast showed 10mm stone of left UPJ and e/o pyelonephritis.  Underwent cystoscopy and stent yesterday.  Initial labs erroneous (K 1.9, Ca 4.6).  Repeat labs revealed BUNNY, Cr 2.5, lactic acidosis with HCo3 14, lactate 7.8; worse today, BUN/Cr up to 43/3.1, with HCo3 16.  K is 4.6.  He's on two pressors and high FIO2 support on ventilator.  Unresponsive. Profound leukocytosis with WBC 45K    Objective     Vital Signs  Temp:  [97 °F (36.1 °C)-98.4 °F (36.9 °C)] 98.4 °F (36.9 °C)  Heart Rate:  [] 105  Resp:  [16-34] 34  BP: ()/() 141/94  FiO2 (%):  [40 %-100 %] 100 %    Flowsheet Rows      First Filed Value   Admission Height  177.8 cm (70\") Documented at 09/02/2020 1035   Admission Weight  63.5 kg (140 lb) Documented at 09/02/2020 1037          No intake/output data recorded.  I/O last 3 completed shifts:  In: 3441 [I.V.:3441]  Out: 300 [Urine:300]    Intake/Output Summary (Last 24 hours) at 9/3/2020 0824  Last data filed at 9/3/2020 0641  Gross per 24 hour   Intake 3441 ml   Output 300 ml   Net 3141 ml       Physical Exam:  GEN frail WM unresponsive on ventilator  HEENT NC/AT, ETT in place   Neck supple no JVD  Lungs bilat rhonchi  CV tachycardic, regular  abd soft NT/ND  +summers bloody scant urine  vasc no pedal/ankle edema 2+ radial pulses  MS no joint warmth or " erythema  Derm normal turgor, no rash  Neuro lethargic & unresponsive         Results Review:    Results from last 7 days   Lab Units 09/03/20  0540 09/02/20 2038 09/02/20  1711   SODIUM mmol/L 146* 145 144   POTASSIUM mmol/L 4.6 4.2 4.7   CHLORIDE mmol/L 111* 110* 110*   CO2 mmol/L 16.0* 14.2* 13.9*   BUN mg/dL 43* 33* 32*   CREATININE mg/dL 3.14* 2.42* 2.47*   CALCIUM mg/dL 8.5* 9.0 9.8   BILIRUBIN mg/dL 3.8* 4.1* 3.6*   ALK PHOS U/L 251* 288* 322*   ALT (SGPT) U/L 288* 258* 243*   AST (SGOT) U/L 739* 973* 1,007*   GLUCOSE mg/dL 147* 124* 105*       Estimated Creatinine Clearance: 19.7 mL/min (A) (by C-G formula based on SCr of 3.14 mg/dL (H)).    Results from last 7 days   Lab Units 09/02/20 2038 09/02/20  1103   MAGNESIUM mg/dL 2.5* 1.0*   PHOSPHORUS mg/dL 4.0  --              Results from last 7 days   Lab Units 09/03/20  0540 09/02/20  1415 09/02/20  1102   WBC 10*3/mm3 45.76* 16.86* 8.53   HEMOGLOBIN g/dL 12.2* 7.4* 4.6*   PLATELETS 10*3/mm3 53* 65* 34*               Imaging Results (Last 24 Hours)     Procedure Component Value Units Date/Time    XR Chest 1 View [[id number]] Collected:  09/02/20 1904     Updated:  09/02/20 1956    Narrative:       XR CHEST 1 VW-     HISTORY: 75-year-old male status post central line placement.     FINDINGS: Left central line catheter tip is within the distal aspect of  the brachiocephalic vein and possibly at the junction with the SVC.  There is no evidence for a pneumothorax. The distal tip of the  endotracheal tube is approximately 1.5 cm proximal to the jose. The  ill-defined airspace opacities at the left lower lobe seen on the CT of  the chest performed earlier today are nearly inconspicuous on the x-ray.  There is some vascular congestion and likely development of small  bilateral pleural effusions.     This report was finalized on 9/2/2020 7:53 PM by Dr. Gina Lieberman M.D.       XR Abdomen 1 View [[id number]] Collected:  09/02/20 1743     Updated:  09/02/20 1751     Narrative:       PORTABLE C-ARM VIEW OF THE ABDOMEN 09/02/2020     CLINICAL HISTORY: Cystoscopy with left ureteral stent placement.      COMPARISON: This is correlated with CT abdomen and pelvis 09/02/2020 at  2:50 PM.     FINDINGS: One fluoroscopy spot image of the abdomen and pelvis was  submitted for interpretation and 24 seconds of fluoroscopy times views  there is a cystoscope in the bladder. There has been deployment of a  double-J stent extending from the midpole of the left kidney with its  inferior J at the expected location of the left side of the urinary  bladder. There is a 10 mm calcified stone projecting over the proximal  aspect of the stent. The precise location of the stone is difficult to  say but may be in the renal pelvis.      This report was finalized on 9/2/2020 5:56 PM by Dr. Tu Perea M.D.       FL C Arm During Surgery [[id number]] Resulted:  09/02/20 1637     Updated:  09/02/20 1637    Narrative:       This procedure was auto-finalized with no dictation required.    CT Head Without Contrast [[id number]] Collected:  09/02/20 1554     Updated:  09/02/20 1632    Narrative:       EMERGENCY NONCONTRAST HEAD CT 09/02/2020     CLINICAL HISTORY: Altered mental status.     TECHNIQUE: Spiral CT images were obtained from the base of the skull to  the vertex without intravenous contrast. Images were reformatted and  submitted in 3 mm thick axial CT section with brain algorithm and 2 mm  thick sagittal and coronal reconstructions were performed and submitted  in brain algorithm.     COMPARISON: This is correlated to prior MRI of the brain from [facility] 05/07/2018.     FINDINGS: There is some mild patchy low-density in the periventricular  white matter consistent with mild small vessel disease. There is an 8 x  6 mm old lacunar infarct in posterior left putamen. There is an  additional 6 x 3 mm old lacunar infarct at the junction of head and body  of the right caudate nucleus and there  is a 5 x 3 mm old lacunar infarct  at the junction of head and body of left caudate nucleus.. There is an  11 x 5 mm old inferior lateral right cerebellar infarct in the right  PICA territory and these are all unchanged since prior MRI 05/07/2018.  Mild diffuse cerebral atrophy is present. The lateral and third  ventricles are very minimally prominent in size and felt to be due to  central volume loss or atrophy. I see no mass effect, no midline shift,  no extra-axial fluid collections are identified. There is no evidence of  acute intracranial hemorrhage. The paranasal sinuses and mastoid air  cells and middle ear cavities are clear. Calvarium and skull base are  normal in appearance.       Impression:       1. No acute abnormality seen with no change when compared to prior MRI  of the head from [facility] 05/07/2018  2. There is mild small vessel disease in the periventricular white  matter of the cerebral hemispheres and some diffuse cerebral atrophy.  There is an 8 x 6 mm old lacunar infarct in posterior left putamen and a  6 x 3 mm old lacunar infarct at the junction of the head and body of the  right caudate nucleus and a 5 x 3 mm old lacunar infarct at the junction  of the head and body of left caudate nucleus. There is an 11 x 5 mm old  inferior lateral right cerebellar infarct in the right PICA territory.  The remainder of the head CT is normal The etiology of the altered  mental status is not established on this exam.     Radiation dose reduction techniques were utilized, including automated  exposure control and exposure modulation based on body size.     This report was finalized on 9/2/2020 4:29 PM by Dr. Tu Perea M.D.       CT Chest With Contrast [[id number]] Collected:  09/02/20 1526     Updated:  09/02/20 1547    Narrative:       CT SCANS OF THE CHEST AND ABDOMEN AND PELVIS WITH INTRAVENOUS CONTRAST     HISTORY: Confusion. Acute anemia.     The CT scans of the chest and abdomen and  pelvis were performed as an  emergency procedure with intravenous contrast and compared to previous  CT scan of the abdomen and pelvis dated 03/21/2014 and previous CT  angiography of the chest dated 12/09/2019. The following findings are  present:     1. There are changes of pulmonary emphysema and there is some scattered  areas of tree-in-bud pattern predominantly in the left lower lobe and  adjacent left upper lobe most consistent with nonspecific infectious or  inflammatory change and bronchiolitis. This is more prominent since the  previous exam. The lungs are otherwise clear except for a few scattered  calcified granulomas.  2. An ET tube ends above the jose. There is no mediastinal or hilar or  axillary adenopathy. There is no pericardial effusion.  3. There is air within the intrahepatic bile ducts consistent with  previous papillotomy that is unchanged from previous studies. The liver  texture is slightly heterogeneous consistent with areas of fatty  infiltration. The spleen, pancreas, and both adrenal glands are  unremarkable.  4. There is mild left renal obstruction secondary to a 10 mm stone at  the left ureteral pelvic junction. There is also some heterogeneous  enhancement of the left kidney concerning for associated pyelonephritis  and further clinical correlation is recommended. There are multiple  small cortical cysts involving both kidneys.  5. There is no aortic aneurysm or retroperitoneal lymphadenopathy. There  is mild distention of the stomach containing air and fluid and there is  also a localized distended fluid-filled small bowel loop in the right  lower quadrant but this is nearly identical to a CT scan dating back to  2014. There is a large amount of dense stool within the rectal ampulla  that measures up to 8.7 cm and concerning for an element of fecal  impaction.  6. In the pelvis there is irregular wall thickening of the urinary  bladder with small cystic changes in the bladder wall  that is new since  2014. The findings suggest infectious cystitis and further clinical  correlation is recommended.                 Radiation dose reduction techniques were utilized, including automated  exposure control and exposure modulation based on body size.     This report was finalized on 9/2/2020 3:44 PM by Dr. Orlando Tamayo M.D.       CT Abdomen Pelvis With Contrast [[id number]] Collected:  09/02/20 1526     Updated:  09/02/20 1547    Narrative:       CT SCANS OF THE CHEST AND ABDOMEN AND PELVIS WITH INTRAVENOUS CONTRAST     HISTORY: Confusion. Acute anemia.     The CT scans of the chest and abdomen and pelvis were performed as an  emergency procedure with intravenous contrast and compared to previous  CT scan of the abdomen and pelvis dated 03/21/2014 and previous CT  angiography of the chest dated 12/09/2019. The following findings are  present:     1. There are changes of pulmonary emphysema and there is some scattered  areas of tree-in-bud pattern predominantly in the left lower lobe and  adjacent left upper lobe most consistent with nonspecific infectious or  inflammatory change and bronchiolitis. This is more prominent since the  previous exam. The lungs are otherwise clear except for a few scattered  calcified granulomas.  2. An ET tube ends above the jose. There is no mediastinal or hilar or  axillary adenopathy. There is no pericardial effusion.  3. There is air within the intrahepatic bile ducts consistent with  previous papillotomy that is unchanged from previous studies. The liver  texture is slightly heterogeneous consistent with areas of fatty  infiltration. The spleen, pancreas, and both adrenal glands are  unremarkable.  4. There is mild left renal obstruction secondary to a 10 mm stone at  the left ureteral pelvic junction. There is also some heterogeneous  enhancement of the left kidney concerning for associated pyelonephritis  and further clinical correlation is recommended. There are  multiple  small cortical cysts involving both kidneys.  5. There is no aortic aneurysm or retroperitoneal lymphadenopathy. There  is mild distention of the stomach containing air and fluid and there is  also a localized distended fluid-filled small bowel loop in the right  lower quadrant but this is nearly identical to a CT scan dating back to  2014. There is a large amount of dense stool within the rectal ampulla  that measures up to 8.7 cm and concerning for an element of fecal  impaction.  6. In the pelvis there is irregular wall thickening of the urinary  bladder with small cystic changes in the bladder wall that is new since  2014. The findings suggest infectious cystitis and further clinical  correlation is recommended.                 Radiation dose reduction techniques were utilized, including automated  exposure control and exposure modulation based on body size.     This report was finalized on 9/2/2020 3:44 PM by Dr. Orlando Tamayo M.D.       XR Chest 1 View [[id number]] Collected:  09/02/20 1214     Updated:  09/02/20 1221    Narrative:       XR CHEST 1 VW-     Clinical: Acute mental status change, intubated     Chest radiograph comparison 2/12/2020     FINDINGS: Endotracheal tube superimposes the tracheal air column, tip is  located approximately 2 cm above the jose. Median sternotomy wires and  vascular markers in position. Cardiac size stable, there is  atherosclerotic calcification of the aorta. Skinfolds superimposing the  right chest.     Patient is rotated towards the left. There is diffuse increase in the  interstitial pattern, some of which was demonstrated on prior  examinations and could be chronic, there appear to be small pleural  effusions blunting the costophrenic angles and probable underlying  vascular congestion. No focal area of consolidation seen. The remainder  of examination is unremarkable.     CONCLUSION: Diffuse increase in the overall interstitial pattern with  small pleural  effusions blunting the costophrenic angles consistent with  vascular congestion. Endotracheal tube position is satisfactory.     This report was finalized on 9/2/2020 12:18 PM by Dr. Ernst Srivastava M.D.             fludrocortisone 50 mcg Nasogastric Daily   hydrocortisone sodium succinate 50 mg Intravenous Q8H   piperacillin-tazobactam 4.5 g Intravenous Q8H   potassium chloride 40 mEq Oral Once   vancomycin 20 mg/kg Intravenous Q24H       norepinephrine 0.02-0.3 mcg/kg/min Last Rate: 0.3 mcg/kg/min (09/03/20 0821)   Pharmacy to dose vancomycin     phenylephrine 0.5-3 mcg/kg/min Last Rate: 2.5 mcg/kg/min (09/03/20 0821)   Sodium Bicarbonate-Dextrose 150 mEq Last Rate: 150 mEq (09/03/20 0221)   vasopressin 0.03 Units/min Last Rate: 0.03 Units/min (09/03/20 0233)       Medication Review:   Current Facility-Administered Medications   Medication Dose Route Frequency Provider Last Rate Last Dose   • acetaminophen (TYLENOL) tablet 650 mg  650 mg Oral Q4H PRN Jared Love MD        Or   • acetaminophen (TYLENOL) suppository 650 mg  650 mg Rectal Q4H PRN Jared Love MD       • aluminum-magnesium hydroxide-simethicone (MAALOX MAX) 400-400-40 MG/5ML suspension 15 mL  15 mL Oral Q6H PRN Jared Love MD       • bisacodyl (DULCOLAX) EC tablet 5 mg  5 mg Oral Daily PRN Jared Love MD       • bisacodyl (DULCOLAX) suppository 10 mg  10 mg Rectal Daily PRN Jared Love MD       • calcium gluconate 1 g/100 mL (10 mg/mL) NS IVPB (VTB)  1 g Intravenous PRN Jared Love MD        And   • calcium gluconate 6 g in sodium chloride 0.9 % 500 mL IVPB  6 g Intravenous PRN Jared Love MD       • fludrocortisone tablet 50 mcg  50 mcg Nasogastric Daily Jared Love MD       • hydrocortisone sodium succinate (Solu-CORTEF) injection 50 mg  50 mg Intravenous Q8H Jared Love MD   50 mg at 09/03/20 0542   • norepinephrine (LEVOPHED) 8 mg/250 mL (32 mcg/mL) in sodium  chloride 0.9% infusion (premix)  0.02-0.3 mcg/kg/min Intravenous Titrated Jared Love MD 35.7 mL/hr at 09/03/20 0821 0.3 mcg/kg/min at 09/03/20 0821   • ondansetron (ZOFRAN) tablet 4 mg  4 mg Oral Q6H PRN Jared Love MD        Or   • ondansetron (ZOFRAN) injection 4 mg  4 mg Intravenous Q6H PRN Jared Love MD       • Pharmacy to dose vancomycin   Does not apply Continuous PRN Jared Love MD       • phenylephrine (WINSTON-SYNEPHRINE) 50 mg in sodium chloride 0.9 % 250 mL (0.2 mg/mL) infusion  0.5-3 mcg/kg/min Intravenous Titrated Jared Love MD 47.6 mL/hr at 09/03/20 0821 2.5 mcg/kg/min at 09/03/20 0821   • piperacillin-tazobactam (ZOSYN) 4.5 g in iso-osmotic dextrose 100 mL IVPB (premix)  4.5 g Intravenous Q8H Jared Love MD   4.5 g at 09/03/20 0542   • potassium chloride (KLOR-CON) packet 40 mEq  40 mEq Oral Once Misbah Roth MD       • sodium bicarbonate 150 mEq/1000 mL dextrose infusion  150 mEq Intravenous Continuous Jared Love  mL/hr at 09/03/20 0221 150 mEq at 09/03/20 0221   • vancomycin 1250 mg/250 mL 0.9% NS IVPB ([facility])  20 mg/kg Intravenous Q24H Misbah Roth MD       • Vasopressin (PITRESSIN) 20 Units in sodium chloride 0.9 % 100 mL (0.2 Units/mL) infusion  0.03 Units/min Intravenous Titrated Jared Love MD 9 mL/hr at 09/03/20 0233 0.03 Units/min at 09/03/20 0233       Assessment/Plan   Olig BUNNY - likely ATN in setting of septic shock; waste products climbing; UOP scant; acidotic but K normal.  Too hemodynamically unstable for HD and poor candidate for CRRT given poor prognosis     Septic shock, on two pressors, WBC 45K  Obstructive left calculus, UTI, s/p cystoscopy/stent  Acute hypoxemic respiratory failure   AG metabolic acidosis   Anemia of ABL - transfused, hgb up to 12  Hypernatremia, mild  Transaminitis   Toxic metabolic encephalopathy    Plan  - prognosis is grim and patient poor candidate for CRRT.   Family agree and are amenable to palliative care, which appears most appropriate at this point.  Discussed with Dr Love, who will continue goals of care conversation this AM    Thank you for involving me in pt's care.          Sepsis (CMS/[facility])    COPD (chronic obstructive pulmonary disease) (CMS/[facility])    Ureteral calculus, left              Paul De La Fuente MD  09/03/20  08:24

## 2020-09-03 NOTE — PLAN OF CARE
New admit last night from surgery, pt unresponsive early evening withdrew from painful stimuli and had a gag reflex but by 4am no gag reflex, no pain with draw and pupils non reactive as on admission, non responsive, SBP labile on 3 pressors on/off through the night, continue bicarb drip remain on vent per settings, increase resp, summers remain dark red drainage, rn called family and update status, at bedside and changed code status to no CPR no DEFIB, family wishes to discuss with MD before possible withdrawing of care

## 2020-09-03 NOTE — NURSING NOTE
RN called CARLENE due to Glascow score 3 no pupil reaction, no pain withdraw, no gag reflex, clammy and BP labile on 3 pressors off/on. CARLENE will follow

## 2020-09-04 LAB
BACTERIA SPEC AEROBE CULT: ABNORMAL
GRAM STN SPEC: ABNORMAL
ISOLATED FROM: ABNORMAL
ISOLATED FROM: ABNORMAL

## 2020-09-04 NOTE — DISCHARGE SUMMARY
[facility]  Discharge Summary    Date of Admission: 9/2/2020  Date of Death:  9/3/2020    PCP: Provider, No Known    Principle Cause of Death:   Septic Shock with multiorgan failure    Secondary Diagnoses:     Sepsis (CMS/[facility])    COPD (chronic obstructive pulmonary disease) (CMS/[facility])    Ureteral calculus, left  Severe metabolic acidosis  Airway compromise  Management of mechanical ventilation  Severe hypokalemia  Severe hypocalcemia  Severe anemia  Shock - septic hypovolumic   Bipolar 1 disorder  Coronary artery disease  GERD  Hyperlipidemia  Mobility impaired  Parkinsonism syndrome  History of a stroke  History of urinary retention  ?Adrenal insufficiency?  Hypomagnesia  Transaminitis  Severe septic shock  AHRF  BUNNY  Lactic acidosis  Pyelonephritis  Obstructing left ureteral calculus      Presenting Problem/History of Present Illness  Hypocalcemia [E83.51]  Hypokalemia [E87.6]  Hypomagnesemia [E83.42]  Hypernatremia [E87.0]  Acute respiratory failure with hypoxia (CMS/[facility]) [J96.01]  Acute encephalopathy [G93.40]  Hypotension, unspecified hypotension type [I95.9]  Acute anemia [D64.9]  Sepsis, due to unspecified organism, unspecified whether acute organ dysfunction present (CMS/[facility]) [A41.9]      Hospital Course  Patient was a 75 y.o. male who presented to [facility] with septic shock, respiratory failure, severe metabolic acidosis and renal failure who went straight to OR for removal of obstructing left ureteral calculus and had aggressive electrolyte and fluid repletion and antibiotics.  Despite aggressive therapy his condition worsened and upon discussion with the patients family the decision was made to pursue comfort measures.       Jared Love MD  09/04/20  07:19

## 2020-09-06 LAB
BACTERIA SPEC AEROBE CULT: ABNORMAL
BH BB BLOOD EXPIRATION DATE: NORMAL
BH BB BLOOD EXPIRATION DATE: NORMAL
BH BB BLOOD TYPE BARCODE: 6200
BH BB BLOOD TYPE BARCODE: 6200
BH BB DISPENSE STATUS: NORMAL
BH BB DISPENSE STATUS: NORMAL
BH BB PRODUCT CODE: NORMAL
BH BB PRODUCT CODE: NORMAL
BH BB UNIT NUMBER: NORMAL
BH BB UNIT NUMBER: NORMAL
CROSSMATCH INTERPRETATION: NORMAL
CROSSMATCH INTERPRETATION: NORMAL
UNIT  ABO: NORMAL
UNIT  ABO: NORMAL
UNIT  RH: NORMAL
UNIT  RH: NORMAL

## 2020-09-07 NOTE — PROGRESS NOTES
Case Management Discharge Note      Final Note:  9/3/2020.......Yasir AGOSTO            Final Discharge Disposition Code: 20 -

## 2020-11-22 NOTE — NURSING NOTE
SPOKE WITH DR EVANS REGARDING ORDER TO HAVE CARDIOLOGY ADDRESS EKG TO DETERMINE IF PATIENT CLEARED CARDIAC DAVID--DR EVANS STATES HE WANTS TO ORDER CARDIAC CONSULT INSTEAD   You can access the FollowMyHealth Patient Portal offered by [facility] by registering at the following website: [email or web address]. By joining [facility]’s FollowMyHealth portal, you will also be able to view your health information using other applications (apps) compatible with our system.

## 2020-12-05 NOTE — DISCHARGE PLACEMENT REQUEST
"Victor Manuel Issa (74 y.o. Male)     Date of Birth Social Security Number Address Home Phone MRN    1945  [id number] [facility] [phone] [id number]    Orthodoxy Marital Status          [employer]        Admission Date Admission Type Admitting Provider Attending Provider Department, Room/Bed    12/7/19 Emergency Jeovany Mcmahon MD Lykins, Matthew D, MD [address], [address]    Discharge Date Discharge Disposition Discharge Destination                       Attending Provider:  Fabian Daigle MD    Allergies:  Oxycodone, Promethazine, Beet [Beta Vulgaris]    Isolation:  None   Infection:  None   Code Status:  CPR    Ht:  180.3 cm (71\")   Wt:  66.6 kg (146 lb 14.4 oz)    Admission Cmt:  None   Principal Problem:  None                Active Insurance as of 12/7/2019     Primary Coverage     Payor Plan Insurance Group Employer/Plan Group    MEDICARE MEDICARE A & B      Payor Plan Address Payor Plan Phone Number Payor Plan Fax Number Effective Dates    [address] [phone]  6/1/2008 - None Entered    [facility] [id number]       Subscriber Name Subscriber Birth Date Member ID       VICTOR MANUEL ISSA 1945 [id number]           Secondary Coverage     Payor Plan Insurance Group Employer/Plan Group    [facility] [id number]     Payor Plan Address Payor Plan Phone Number Payor Plan Fax Number Effective Dates    [address]   12/1/2016 - None Entered    [facility] [id number]       Subscriber Name Subscriber Birth Date Member ID       VICTOR MANUEL ISSA 1945 [id number]                 Emergency Contacts      (Rel.) Home Phone Work Phone Mobile Phone    Luis Manuel,Evelyn (Spouse) [phone] -- [phone]    Agatha Garcia (Daughter) -- -- [phone]          " Statement Selected

## 2021-10-14 NOTE — TELEPHONE ENCOUNTER
----- Message from CUONG Moore sent at 11/9/2018  8:27 AM EST -----  Please call patient and let him know his Hgb is much improved at 11.6 from 9.4. WBC is elevated but looks to be close to what it has been in the past month. Everything else looks ok. Thanks.  
----- Message from CUONG Moore sent at 11/9/2018  8:27 AM EST -----  Please call patient and let him know his Hgb is much improved at 11.6 from 9.4. WBC is elevated but looks to be close to what it has been in the past month. Everything else looks ok. Thanks.  
Called pt and advised per Liss Valdes louise this hgb is much imroved at 11.6 from 9.4 .  Wbc is elevated but looks to be close to what it has been in the past month.  Everything else looks ok.  Pt verb understanding  
VM to pt with request to contact office.  
stated

## 2022-06-30 NOTE — ANESTHESIA PROCEDURE NOTES
Airway  Urgency: elective    Date/Time: 1/18/2019 10:08 AM  Airway not difficult    General Information and Staff    Patient location during procedure: OR  Anesthesiologist: Bella Munoz MD  CRNA: Chelsey Tompkins CRNA    Indications and Patient Condition  Indications for airway management: airway protection    Preoxygenated: yes  MILS not maintained throughout  Mask difficulty assessment: 1 - vent by mask    Final Airway Details  Final airway type: endotracheal airway      Successful airway: ETT  Cuffed: yes   Successful intubation technique: direct laryngoscopy  Endotracheal tube insertion site: oral  Blade: Doug  Blade size: 3  ETT size (mm): 8.0  Cormack-Lehane Classification: grade I - full view of glottis  Placement verified by: chest auscultation and capnometry   Cuff volume (mL): 8  Measured from: lips  ETT to lips (cm): 23  Number of attempts at approach: 1    Additional Comments  Pt preoxygenated prior to induction, easy mask airway, atraumatic intubation,+ ETCO2, + bs bilat,  ETT secured and connected to ventilator.            
No

## 2023-12-29 NOTE — DISCHARGE PLACEMENT REQUEST
"Victor Manuel Issa (73 y.o. Male)     Date of Birth Social Security Number Address Home Phone MRN    1945  [id number] [facility] [phone] [id number]    [employer] Marital Status          Nondenominational        Admission Date Admission Type Admitting Provider Attending Provider Department, Room/Bed    10/2/18 Emergency Parker Romero MD Edling, Stephen A, MD [address], [address]    Discharge Date Discharge Disposition Discharge Destination                       Attending Provider:  Jeovany Mcmahon MD    Allergies:  Beet [Beta Vulgaris]    Isolation:  None   Infection:  None   Code Status:  CPR    Ht:  180.3 cm (71\")   Wt:  73 kg (161 lb)    Admission Cmt:  None   Principal Problem:  Chest pain [R07.9]                 Active Insurance as of 10/2/2018     Primary Coverage     Payor Plan Insurance Group Employer/Plan Group    MEDICARE MEDICARE A & B      Payor Plan Address Payor Plan Phone Number Effective From Effective To    [address] [phone] 6/1/2008     [facility] [id number]       Subscriber Name Subscriber Birth Date Member ID       VICTOR MANUEL ISSA 1945 [id number]           Secondary Coverage     Payor Plan Insurance Group Employer/Plan Group    [facility] SUPP [id number]     Payor Plan Address Payor Plan Phone Number Effective From Effective To    [address]  12/1/2016     [facility] [id number]       Subscriber Name Subscriber Birth Date Member ID       VICTOR MANUEL ISSA 1945 [id number]                 Emergency Contacts      (Rel.) Home Phone Work Phone Mobile Phone    Evelyn Issa (Spouse) [phone] -- [phone]    Agatha Garcia (Daughter) -- -- [phone]              "
"Victor Manuel Issa (73 y.o. Male)     Date of Birth Social Security Number Address Home Phone MRN    1945  [id number] [facility] [address] [phone] [id number]    [employer] Marital Status          Church        Admission Date Admission Type Admitting Provider Attending Provider Department, Room/Bed    10/2/18 Emergency Parker Romero MD Edling, Stephen A, MD [address], [address]/1    Discharge Date Discharge Disposition Discharge Destination                       Attending Provider:  Jeovany Mcmahon MD    Allergies:  Beet [Beta Vulgaris]    Isolation:  None   Infection:  None   Code Status:  CPR    Ht:  180.3 cm (71\")   Wt:  73 kg (161 lb)    Admission Cmt:  None   Principal Problem:  Chest pain [R07.9]                 Active Insurance as of 10/2/2018     Primary Coverage     Payor Plan Insurance Group Employer/Plan Group    MEDICARE MEDICARE A & B      Payor Plan Address Payor Plan Phone Number Effective From Effective To    [address] [phone] 6/1/2008     [facility] [id number]       Subscriber Name Subscriber Birth Date Member ID       VICTOR MANUEL ISSA 1945 [id number]           Secondary Coverage     Payor Plan Insurance Group Employer/Plan Group    [facility] SUPP [id number]     Payor Plan Address Payor Plan Phone Number Effective From Effective To    [address]  12/1/2016     [facility] [address]       Subscriber Name Subscriber Birth Date Member ID       VICTOR MANUEL ISSA 1945 [id number]                 Emergency Contacts      (Rel.) Home Phone Work Phone Mobile Phone    Evelyn Issa (Spouse) [phone] -- [phone]    Agatha Garcia (Daughter) -- -- [phone]            "
abdominal pain

## (undated) DEVICE — INTENDED FOR TISSUE SEPARATION, AND OTHER PROCEDURES THAT REQUIRE A SHARP SURGICAL BLADE TO PUNCTURE OR CUT.: Brand: BARD-PARKER ® CARBON RIB-BACK BLADES

## (undated) DEVICE — CANN NASL CO2 TRULINK W/O2 A/

## (undated) DEVICE — SKIN PREP TRAY W/CHG: Brand: MEDLINE INDUSTRIES, INC.

## (undated) DEVICE — APPL CHLORAPREP W/TINT 26ML ORNG

## (undated) DEVICE — LOU CYSTO: Brand: MEDLINE INDUSTRIES, INC.

## (undated) DEVICE — PROXIMATE RH ROTATING HEAD SKIN STAPLERS (35 WIDE) CONTAINS 35 STAINLESS STEEL STAPLES: Brand: PROXIMATE

## (undated) DEVICE — DRSNG WND GEL FIBR OPTICELL AG PLS W/SLV LF 4X5IN  STRL

## (undated) DEVICE — DRAPE,HIP,W/POUCHES,STERILE: Brand: MEDLINE

## (undated) DEVICE — PREP IM ENCHANCED TOTAL HIP BONE                                    PREPARATION KIT: Brand: PREP-IM

## (undated) DEVICE — MAT FLR ABSORBENT LG 4FT 10 2.5FT

## (undated) DEVICE — DRAPE,U/ SHT,SPLIT,PLAS,STERIL: Brand: MEDLINE

## (undated) DEVICE — 1000 S-DRAPE TOWEL DRAPE 10/BX: Brand: STERI-DRAPE™

## (undated) DEVICE — DRSNG WND GZ CURAD OIL EMULSION 3X8IN LF STRL 1PK

## (undated) DEVICE — BITEBLOCK OMNI BLOC

## (undated) DEVICE — CMT BONE PALACOS R HI/VISC 1X40: Type: IMPLANTABLE DEVICE | Site: HIP | Status: NON-FUNCTIONAL

## (undated) DEVICE — NITINOL WIRE WITH HYDROPHILIC TIP: Brand: SENSOR

## (undated) DEVICE — OPTIFOAM GENTLE SA, POSTOP, 4X8: Brand: MEDLINE

## (undated) DEVICE — HEWSON SUTURE RETRIEVER: Brand: HEWSON SUTURE RETRIEVER

## (undated) DEVICE — SPNG GZ WOVN 4X4IN 12PLY 10/BX STRL

## (undated) DEVICE — SOL ISO/ALC RUB 70PCT 4OZ

## (undated) DEVICE — PREP SOL POVIDONE/IODINE BT 4OZ

## (undated) DEVICE — DRSNG BRDR MEPILEX P/OP SIL 4X8IN

## (undated) DEVICE — CATH URETRL FLXITP POLLACK STD 5F 70CM

## (undated) DEVICE — GLV SURG BIOGEL LTX PF 7

## (undated) DEVICE — CONTAINER,SPECIMEN,OR STERILE,4OZ: Brand: MEDLINE

## (undated) DEVICE — ENCORE® LATEX ORTHO SIZE 8, STERILE LATEX POWDER-FREE SURGICAL GLOVE: Brand: ENCORE

## (undated) DEVICE — Device: Brand: DEFENDO AIR/WATER/SUCTION AND BIOPSY VALVE

## (undated) DEVICE — GLV SURG TRIUMPH CLASSIC PF LTX 8 STRL

## (undated) DEVICE — PK HIP TOTL 40

## (undated) DEVICE — HANDPIECE SET WITH COAXIAL HIGH FLOW TIP AND SUCTION TUBE: Brand: INTERPULSE

## (undated) DEVICE — ANTIBACTERIAL UNDYED BRAIDED (POLYGLACTIN 910), SYNTHETIC ABSORBABLE SUTURE: Brand: COATED VICRYL

## (undated) DEVICE — DRAPE,REIN 53X77,STERILE: Brand: MEDLINE

## (undated) DEVICE — SUT TEVDEX 5 K61 30IN 79745

## (undated) DEVICE — DRSNG SURESITE WNDW 4X4.5

## (undated) DEVICE — CEMENT MIXING SYSTEM WITH FEMORAL BREAKWAY NOZZLE: Brand: REVOLUTION

## (undated) DEVICE — IMMOB KN 3PNL DLX CANVS 22IN BLU

## (undated) DEVICE — 3M™ IOBAN™ 2 ANTIMICROBIAL INCISE DRAPE 6650EZ: Brand: IOBAN™ 2

## (undated) DEVICE — PAD,ABDOMINAL,8"X10",ST,LF: Brand: MEDLINE

## (undated) DEVICE — GLV SURG BIOGEL LTX PF 8 1/2

## (undated) DEVICE — TUBING, SUCTION, 1/4" X 10', STRAIGHT: Brand: MEDLINE

## (undated) DEVICE — SUT PDS LP 0 CTX VIO 60IN Z990G

## (undated) DEVICE — SYR CONTRL LUERLOK 10CC

## (undated) DEVICE — GLV SURG TRIUMPH CLASSIC PF LTX 8.5 STRL

## (undated) DEVICE — TIDISHIELD UROLOGY DRAIN BAGS FROSTY VINYL STERILE FITS SIEMENS UROSKOP ACCESS 20 PER CASE: Brand: TIDISHIELD

## (undated) DEVICE — SUT VIC 0 CT1 36IN J946H

## (undated) DEVICE — SUT VIC 1 CT1 36IN J947H

## (undated) DEVICE — SUT ETHIB 0 CT1 CR8 18IN CX21D

## (undated) DEVICE — GLV SURG BIOGEL LTX PF 8

## (undated) DEVICE — MARKR SKIN W/RULR AND LBL

## (undated) DEVICE — SUT VIC 0 CT1 CR8 18IN J840D